# Patient Record
Sex: MALE | Race: BLACK OR AFRICAN AMERICAN | NOT HISPANIC OR LATINO | Employment: OTHER | URBAN - METROPOLITAN AREA
[De-identification: names, ages, dates, MRNs, and addresses within clinical notes are randomized per-mention and may not be internally consistent; named-entity substitution may affect disease eponyms.]

---

## 2017-02-27 ENCOUNTER — TELEPHONE (OUTPATIENT)
Dept: FAMILY MEDICINE | Facility: CLINIC | Age: 62
End: 2017-02-27

## 2017-02-27 ENCOUNTER — OFFICE VISIT (OUTPATIENT)
Dept: FAMILY MEDICINE | Facility: CLINIC | Age: 62
End: 2017-02-27
Payer: COMMERCIAL

## 2017-02-27 VITALS
DIASTOLIC BLOOD PRESSURE: 86 MMHG | SYSTOLIC BLOOD PRESSURE: 136 MMHG | TEMPERATURE: 98 F | WEIGHT: 235 LBS | BODY MASS INDEX: 27.87 KG/M2

## 2017-02-27 DIAGNOSIS — R21 GROIN RASH: ICD-10-CM

## 2017-02-27 DIAGNOSIS — N40.1 BENIGN PROSTATIC HYPERPLASIA WITH LOWER URINARY TRACT SYMPTOMS, UNSPECIFIED MORPHOLOGY: ICD-10-CM

## 2017-02-27 DIAGNOSIS — R30.0 DYSURIA: ICD-10-CM

## 2017-02-27 DIAGNOSIS — R31.9 HEMATURIA: Primary | ICD-10-CM

## 2017-02-27 DIAGNOSIS — R05.9 COUGH: ICD-10-CM

## 2017-02-27 LAB
ALBUMIN UR-MCNC: NEGATIVE MG/DL
APPEARANCE UR: CLEAR
BILIRUB UR QL STRIP: NEGATIVE
COLOR UR AUTO: YELLOW
GLUCOSE UR STRIP-MCNC: NEGATIVE MG/DL
HGB UR QL STRIP: ABNORMAL
KETONES UR STRIP-MCNC: NEGATIVE MG/DL
LEUKOCYTE ESTERASE UR QL STRIP: ABNORMAL
NITRATE UR QL: NEGATIVE
PH UR STRIP: 8 PH (ref 5–7)
RBC #/AREA URNS AUTO: NORMAL /HPF (ref 0–2)
SP GR UR STRIP: 1.01 (ref 1–1.03)
URN SPEC COLLECT METH UR: ABNORMAL
UROBILINOGEN UR STRIP-ACNC: 0.2 EU/DL (ref 0.2–1)
WBC #/AREA URNS AUTO: NORMAL /HPF (ref 0–2)

## 2017-02-27 PROCEDURE — 87591 N.GONORRHOEAE DNA AMP PROB: CPT | Performed by: PHYSICIAN ASSISTANT

## 2017-02-27 PROCEDURE — G0103 PSA SCREENING: HCPCS | Performed by: PHYSICIAN ASSISTANT

## 2017-02-27 PROCEDURE — 99214 OFFICE O/P EST MOD 30 MIN: CPT | Performed by: PHYSICIAN ASSISTANT

## 2017-02-27 PROCEDURE — 87086 URINE CULTURE/COLONY COUNT: CPT | Performed by: PHYSICIAN ASSISTANT

## 2017-02-27 PROCEDURE — 81001 URINALYSIS AUTO W/SCOPE: CPT | Performed by: PHYSICIAN ASSISTANT

## 2017-02-27 PROCEDURE — 87491 CHLMYD TRACH DNA AMP PROBE: CPT | Performed by: PHYSICIAN ASSISTANT

## 2017-02-27 RX ORDER — CICLOPIROX 7.7 MG/G
GEL TOPICAL
Qty: 45 G | Refills: 2 | Status: SHIPPED | OUTPATIENT
Start: 2017-02-27 | End: 2017-05-05

## 2017-02-27 NOTE — PROGRESS NOTES
SUBJECTIVE:                                                    Sukh Craven is a 61 year old male who presents to clinic today for the following health issues:    Genitourinary symptoms      Duration: x two weeks     Description:  frequency, hesitancy and hematuria    Intensity:  moderate    Accompanying signs and symptoms (fever/discharge/nausea/vomiting/back or abdominal pain):  Rash in groin area     History (frequent UTI's/kidney stones/prostate problems): Prostate   Sexually active: YES    Precipitating or alleviating factors: None    Therapies tried and outcome: none        Chronic history of LUTS symptoms, urgency, frequency, dysuria, hesitancy, ongoing issue, trials of Finasteride and Alpha blockers without significant improvements. Has also tried anti-cholinergics like oxybutynin, ongoing issues without improvements.     Reports a few episodes now of painless hematuria. States that he's had no increasing symptoms. No fevers, chills, etc.    Rash - chronic - going, using topical antifungal and most recently Ciclopirox - that seems to help the most. Would like refills.    Cough - dry with some viral URI symptoms, mostly resolving. Denies fevers. No shortness of breath.     Patient Active Problem List   Diagnosis     Hematuria     BPH (benign prostatic hypertrophy)     Advanced directives, counseling/discussion     Elevated serum creatinine     History of Helicobacter infection     Hypertension goal BP (blood pressure) < 140/90     CKD (chronic kidney disease) stage 2, GFR 60-89 ml/min     CARDIOVASCULAR SCREENING; LDL GOAL LESS THAN 160     Pre-diabetes      Current Outpatient Prescriptions   Medication     ciclopirox 0.77 % GEL     losartan-hydrochlorothiazide (HYZAAR) 100-12.5 MG per tablet     albuterol (PROAIR HFA, PROVENTIL HFA, VENTOLIN HFA) 108 (90 BASE) MCG/ACT inhaler     sildenafil (VIAGRA) 100 MG tablet     No current facility-administered medications for this visit.         Problem list and  histories reviewed & adjusted, as indicated.  Additional history: as documented    Problem list, Medication list, Allergies, and Medical/Social/Surgical histories reviewed in [facility] and updated as appropriate.    ROS:  Constitutional, HEENT, cardiovascular, pulmonary, gi and gu systems are negative, except as otherwise noted.    OBJECTIVE:                                                    /86  Temp 98  F (36.7  C) (Oral)  Wt 235 lb (106.6 kg)  BMI 27.87 kg/m2  Body mass index is 27.87 kg/(m^2).  GENERAL: healthy, alert and no distress  ABDOMEN: Soft, non tender, No CVA tenderness   : Declined      ASSESSMENT/PLAN:                                                      (R31.9) Hematuria  (primary encounter diagnosis)  Comment:   Plan: *UA reflex to Microscopic and Culture         ([facility] (except         [place] and [place]), NEISSERIA GONORRHOEA         PCR, CHLAMYDIA TRACHOMATIS PCR, Urine         Microscopic, Prostate spec antigen screen,         Urine Culture Aerobic Bacterial, UROLOGY ADULT         REFERRAL, CT Abdomen Pelvis w/o Contrast        UA showing 0-2 RBC - hematuria on dip. Due to chronic history and noted painless hematuria - recommend CT evaluate renal system and refer to Urology for reevaluation.     (R30.0) Dysuria  Comment:   Plan: NEISSERIA GONORRHOEA PCR, CHLAMYDIA TRACHOMATIS        PCR, Prostate spec antigen screen, Urine         Culture Aerobic Bacterial, UROLOGY ADULT         REFERRAL, CT Abdomen Pelvis w/o Contrast        As noted     (N40.1) Benign prostatic hyperplasia with lower urinary tract symptoms, unspecified morphology  Comment:   Plan: Prostate spec antigen screen, UROLOGY ADULT         REFERRAL, CT Abdomen Pelvis w/o Contrast        Chronic - as noted    (R05) Cough  Comment:   Plan: Reports viral symptoms this past few weeks. Symptomatic measures and suggestions for self care given.  Follow up if not improving or symptoms change as  discussed.  All questions were answered.     (R21) Groin rash  Comment:   Plan: ciclopirox 0.77 % GEL        Refills given      ДМИТРИЙ ORDAZ PA-C  [facility]

## 2017-02-27 NOTE — NURSING NOTE
"Chief Complaint   Patient presents with     Urinary Problem     Gross Hematuria        Initial /86  Temp 98  F (36.7  C) (Oral)  Wt 235 lb (106.6 kg)  BMI 27.87 kg/m2 Estimated body mass index is 27.87 kg/(m^2) as calculated from the following:    Height as of 7/13/16: 6' 5\" (1.956 m).    Weight as of this encounter: 235 lb (106.6 kg).  Medication Reconciliation: complete       Nataliya Pineda CMA      "

## 2017-02-27 NOTE — TELEPHONE ENCOUNTER
Rene from the [facility] would like someone from the patients pcp to return his call, he has a question about one of the tests that was ordered for the patient. Please advise [phone]    . Bernadine Farmer  Patient Representative

## 2017-02-27 NOTE — MR AVS SNAPSHOT
After Visit Summary   2/27/2017    Sukh Craven    MRN: [id number]           Patient Information     Date Of Birth          1955        Visit Information        Provider Department      2/27/2017 9:20 AM Ned Martinez PA-C [facility]        Today's Diagnoses     Hematuria    -  1    Dysuria        Benign prostatic hyperplasia with lower urinary tract symptoms, unspecified morphology        Cough        Groin rash          Care Instructions    1. To Schedule the CT scan of abdomen call:  [facility] Imaging Scheduling Phone Number: [phone]  Or   [facility] Imaging Scheduling Phone number: [phone]         Follow-ups after your visit        Additional Services     UROLOGY ADULT REFERRAL       Your provider has referred you to:[facility] [place] [facility] - [place] [phone]   [email or web address] Dr. Bowser or Weight   Please be aware that coverage of these services is subject to the terms and limitations of your health insurance plan.  Call member services at your health plan with any benefit or coverage questions.      Please bring the following with you to your appointment:    (1) Any X-Rays, CTs or MRIs which have been performed.  Contact the facility where they were done to arrange for  prior to your scheduled appointment.    (2) List of current medications  (3) This referral request   (4) Any documents/labs given to you for this referral                  Future tests that were ordered for you today     Open Future Orders        Priority Expected Expires Ordered    CT Abdomen Pelvis w/o Contrast Routine  2/27/2018 2/27/2017            Who to contact     If you have questions or need follow up information about today's clinic visit or your schedule please contact [facility] directly at [phone].  Normal or non-critical lab  "and imaging results will be communicated to you by MyChart, letter or phone within 4 business days after the clinic has received the results. If you do not hear from us within 7 days, please contact the clinic through Abinehart or phone. If you have a critical or abnormal lab result, we will notify you by phone as soon as possible.  Submit refill requests through Nextly or call your pharmacy and they will forward the refill request to us. Please allow 3 business days for your refill to be completed.          Additional Information About Your Visit        [employer] Information     Nextly lets you send messages to your doctor, view your test results, renew your prescriptions, schedule appointments and more. To sign up, go to [email or web address] . Click on \"Log in\" on the left side of the screen, which will take you to the Welcome page. Then click on \"Sign up Now\" on the right side of the page.     You will be asked to enter the access code listed below, as well as some personal information. Please follow the directions to create your username and password.     Your access code is: [email or web address]  Expires: 2017  9:56 AM     Your access code will  in 90 days. If you need help or a new code, please call your [place] clinic or [phone].        Care EveryWhere ID     This is your Care EveryWhere ID. This could be used by other organizations to access your [place] medical records  [id number]        Your Vitals Were     Temperature BMI (Body Mass Index)                98  F (36.7  C) (Oral) 27.87 kg/m2           Blood Pressure from Last 3 Encounters:   17 136/86   16 142/88   16 137/85    Weight from Last 3 Encounters:   17 235 lb (106.6 kg)   16 244 lb (110.7 kg)   16 234 lb (106.1 kg)              We Performed the Following     *UA reflex to Microscopic and Culture ([facility] (except [place] and [place])     CHLAMYDIA TRACHOMATIS PCR     " NEISSERIA GONORRHOEA PCR     Prostate spec antigen screen     Urine Culture Aerobic Bacterial     Urine Microscopic     UROLOGY ADULT REFERRAL          Where to get your medicines      These medications were sent to [id number] - [place] - [address] AT [facility] OF [address]  [address] [place] [address]     Phone:  [phone]     ciclopirox 0.77 % Gel          Primary Care Provider Office Phone # Fax #    Ned Martinez PA-C [phone]       [facility] [address] [address]        Thank you!     Thank you for choosing [facility]  for your care. Our goal is always to provide you with excellent care. Hearing back from our patients is one way we can continue to improve our services. Please take a few minutes to complete the written survey that you may receive in the mail after your visit with us. Thank you!             Your Updated Medication List - Protect others around you: Learn how to safely use, store and throw away your medicines at www.disposemymeds.org.          This list is accurate as of: 2/27/17  9:56 AM.  Always use your most recent med list.                   Brand Name Dispense Instructions for use    albuterol 108 (90 BASE) MCG/ACT Inhaler    PROAIR HFA/PROVENTIL HFA/VENTOLIN HFA    1 Inhaler    Inhale 2 puffs into the lungs every 6 hours as needed for shortness of breath / dyspnea or wheezing       ciclopirox 0.77 % Gel     45 g    Apply twice daily to affected areas       losartan-hydrochlorothiazide 100-12.5 MG per tablet    HYZAAR    90 tablet    Take 1 tablet by mouth daily       sildenafil 100 MG cap/tab    VIAGRA    12 tablet    Take 1 tablet (100 mg) by mouth daily as needed for erectile dysfunction at least 30 minutes before intercourse.

## 2017-02-27 NOTE — PATIENT INSTRUCTIONS
1. To Schedule the CT scan of abdomen call:  [facility] Imaging Scheduling Phone Number: [phone]  Or   [facility] Imaging Scheduling Phone number: [phone]

## 2017-02-27 NOTE — LETTER
[facility]  [address] [address] [phone]      February 28, 2017      Sukh Craven  [address] [address]  [facility] [place] [facility] [address]          Dear Mr. Craven    The results of your recent lab tests were within normal limits. Enclosed is a copy of these results.  If you have any further questions or problems, please contact our office.    Sincerely,      Ned Martinez PA-C/sd    Results for orders placed or performed in visit on 02/27/17   *UA reflex to Microscopic and Culture ([facility] and [facility] (except [place] and [place])   Result Value Ref Range    Color Urine Yellow     Appearance Urine Clear     Glucose Urine Negative NEG mg/dL    Bilirubin Urine Negative NEG    Ketones Urine Negative NEG mg/dL    Specific Gravity Urine 1.015 1.003 - 1.035    Blood Urine Trace (A) NEG    pH Urine 8.0 (H) 5.0 - 7.0 pH    Protein Albumin Urine Negative NEG mg/dL    Urobilinogen Urine 0.2 0.2 - 1.0 EU/dL    Nitrite Urine Negative NEG    Leukocyte Esterase Urine Trace (A) NEG    Source Midstream Urine    Urine Microscopic   Result Value Ref Range    WBC Urine O - 2 0 - 2 /HPF    RBC Urine O - 2 0 - 2 /HPF   Prostate spec antigen screen   Result Value Ref Range    PSA 1.80 0 - 4 ug/L   NEISSERIA GONORRHOEA PCR   Result Value Ref Range    Specimen Descrip Urine     N Gonorrhea PCR  NEG     Negative   Negative for N. gonorrhoeae rRNA by transcription mediated amplification.   A negative result by transcription mediated amplification does not preclude the   presence of N. gonorrhoeae infection because results are dependent on proper   and adequate collection, absence of inhibitors, and sufficient rRNA to be   detected.     CHLAMYDIA TRACHOMATIS PCR   Result Value Ref Range    Specimen Description Urine     Chlamydia Trachomatis PCR  NEG     Negative   Negative for C. trachomatis rRNA by transcription mediated amplification.   A negative result by transcription  mediated amplification does not preclude the   presence of C. trachomatis infection because results are dependent on proper   and adequate collection, absence of inhibitors, and sufficient rRNA to be   detected.     Urine Culture Aerobic Bacterial   Result Value Ref Range    Specimen Description Midstream Urine     Culture Micro No growth     Micro Report Status FINAL 02/28/2017

## 2017-02-28 LAB
BACTERIA SPEC CULT: NO GROWTH
C TRACH DNA SPEC QL NAA+PROBE: NORMAL
MICRO REPORT STATUS: NORMAL
N GONORRHOEA DNA SPEC QL NAA+PROBE: NORMAL
PSA SERPL-ACNC: 1.8 UG/L (ref 0–4)
SPECIMEN SOURCE: NORMAL

## 2017-02-28 NOTE — TELEPHONE ENCOUNTER
Called and spoke with Rohan. He would like to talk with Rodolfo Martinez. Routed call to him.    Juan Valadez RN

## 2017-03-03 ENCOUNTER — PRE VISIT (OUTPATIENT)
Dept: UROLOGY | Facility: CLINIC | Age: 62
End: 2017-03-03

## 2017-03-03 ENCOUNTER — TELEPHONE (OUTPATIENT)
Dept: FAMILY MEDICINE | Facility: CLINIC | Age: 62
End: 2017-03-03

## 2017-03-03 DIAGNOSIS — R21 GROIN RASH: ICD-10-CM

## 2017-03-03 NOTE — TELEPHONE ENCOUNTER
[facility] faxed a Drug Change Request re: ciclopirox 0.77 % GEL    Medication was prohibitively expensive.  Patient requests a less expensive alternative.  If appropriate, please fax back with approval along with strength, directions, quantity and refills.

## 2017-03-03 NOTE — TELEPHONE ENCOUNTER
Will route to provider to advise.  See message below.      Lesia Whatley RN   March 3, 2017 8:52 AM  [facility] Triage   [phone]

## 2017-03-06 ENCOUNTER — TELEPHONE (OUTPATIENT)
Dept: FAMILY MEDICINE | Facility: CLINIC | Age: 62
End: 2017-03-06

## 2017-03-06 NOTE — TELEPHONE ENCOUNTER
Team RN  Please call Sukh  CT scan was overall normal but he does have an enlarged prostate and some bladder all thickening as we predicted.  His enlarged prostate is probably the cause of most of his ongoing issues.    I'd suggest he get back in with his UROLOGIST to evaluate and discuss these results. Looks like he is on the books for 4/4 - make sure he knows to keep that but he doesn't need to be seen more urgently UNLESS he gets blood in his urine again or pain in his groin.    Thank you.  Ned Martinez

## 2017-04-04 ENCOUNTER — OFFICE VISIT (OUTPATIENT)
Dept: UROLOGY | Facility: CLINIC | Age: 62
End: 2017-04-04

## 2017-04-04 ENCOUNTER — ALLIED HEALTH/NURSE VISIT (OUTPATIENT)
Dept: UROLOGY | Facility: CLINIC | Age: 62
End: 2017-04-04

## 2017-04-04 VITALS
WEIGHT: 241 LBS | SYSTOLIC BLOOD PRESSURE: 169 MMHG | HEART RATE: 72 BPM | BODY MASS INDEX: 28.46 KG/M2 | HEIGHT: 77 IN | DIASTOLIC BLOOD PRESSURE: 102 MMHG

## 2017-04-04 DIAGNOSIS — N40.1 BENIGN NODULAR PROSTATIC HYPERPLASIA WITH LOWER URINARY TRACT SYMPTOMS: Primary | ICD-10-CM

## 2017-04-04 DIAGNOSIS — N40.1 BENIGN PROSTATIC HYPERPLASIA WITH LOWER URINARY TRACT SYMPTOMS, UNSPECIFIED MORPHOLOGY: Primary | ICD-10-CM

## 2017-04-04 RX ORDER — CIPROFLOXACIN 500 MG/1
500 TABLET, FILM COATED ORAL 2 TIMES DAILY
Qty: 14 TABLET | Refills: 0 | Status: SHIPPED | OUTPATIENT
Start: 2017-04-04 | End: 2017-04-17

## 2017-04-04 RX ORDER — LISINOPRIL 20 MG/1
TABLET ORAL
Refills: 0 | COMMUNITY
Start: 2016-06-08 | End: 2017-04-17

## 2017-04-04 RX ORDER — LOSARTAN POTASSIUM AND HYDROCHLOROTHIAZIDE 12.5; 5 MG/1; MG/1
TABLET ORAL
Refills: 0 | COMMUNITY
Start: 2016-07-12 | End: 2017-04-17

## 2017-04-04 ASSESSMENT — ENCOUNTER SYMPTOMS
DIFFICULTY URINATING: 1
HEMATURIA: 1
DYSURIA: 0

## 2017-04-04 ASSESSMENT — PAIN SCALES - GENERAL: PAINLEVEL: NO PAIN (0)

## 2017-04-04 NOTE — MR AVS SNAPSHOT
After Visit Summary   4/4/2017    Sukh Craven    MRN: [id number]           Patient Information     Date Of Birth          1955        Visit Information        Provider Department      4/4/2017 7:45 AM Nurse, [facility]rologic C[facility]        Today's Diagnoses     Benign prostatic hyperplasia with lower urinary tract symptoms, unspecified morphology    -  1       Follow-ups after your visit        Your next 10 appointments already scheduled     Apr 13, 2017  8:00 AM CDT   (Arrive by 7:45 AM)   PAC RN ASSESSMENT with Isabel Pac Rn   [facility] ([facility])    [address] [address]   [phone]            Apr 13, 2017  8:30 AM CDT   (Arrive by 8:15 AM)   PAC EVALUATION with  Pac Andrew 2   [facility] ([facility])    [address] [address]   [phone]            Apr 13, 2017  9:30 AM CDT   (Arrive by 9:15 AM)   PAC Anesthesia Consult with  Pac Anesthesiologist   [facility] [place] ([facility])    [address] [address]   [phone]            May 30, 2017 10:00 AM CDT   (Arrive by 9:45 AM)   Post-Op with Cresencio Foote MD   [facility]rolog[facility] C[facility] ([facility])    [address] [place] [id number] [phone]              Future tests that were ordered for you today     Open Future Orders        Priority Expected Expires Ordered    CBC with platelets Routine  4/4/2018 4/4/2017    Basic metabolic panel Routine  4/4/2018 4/4/2017            Who to contact     Please call your clinic at [phone] to:    Ask questions about your health    Make or cancel appointments    Discuss your  medicines    Learn about your test results    Speak to your doctor   If you have compliments or concerns about an experience at your clinic, or if you wish to file a complaint, please contact [facility] Patient Relations at [phone] or email us at [email or web address]         Additional Information About Your Visit        [employer] Information     [email or web address] is an electronic gateway that provides easy, online access to your medical records. With [employer], you can request a clinic appointment, read your test results, renew a prescription or communicate with your care team.     To sign up for [email or web address] visit the website at [email or web address]   You will be asked to enter the access code listed below, as well as some personal information. Please follow the directions to create your username and password.     Your access code is: [email or web address]  Expires: 2017 10:56 AM     Your access code will  in 90 days. If you need help or a new code, please contact your [facility] or call [phone] for assistance.        Care EveryWhere ID     This is your Care EveryWhere ID. This could be used by other organizations to access your [facility] medical records  [id number]         Blood Pressure from Last 3 Encounters:   17 (!) 169/102   17 136/86   16 142/88    Weight from Last 3 Encounters:   17 109.3 kg (241 lb)   17 106.6 kg (235 lb)   16 110.7 kg (244 lb)              Today, you had the following     No orders found for display         Today's Medication Changes          These changes are accurate as of: 17  8:30 AM.  If you have any questions, ask your nurse or doctor.               Start taking these medicines.        Dose/Directions    ciprofloxacin 500 MG tablet   Commonly known as:  CIPRO   Used for:  Benign nodular prostatic hyperplasia with lower urinary tract symptoms   Started by:  Cresencio Foote  MD AVEL        Dose:  500 mg   Take 1 tablet (500 mg) by mouth 2 times daily   Quantity:  14 tablet   Refills:  0            Where to get your medicines      These medications were sent to [id number]  [place] - [address] AT [facility] OF [address]  [address] [place] [address]     Phone:  [phone]     ciprofloxacin 500 MG tablet                Primary Care Provider Office Phone # Fax #    Ned Martinez PA-C [phone]       [facility] [address] [address]        Thank you!     Thank you for choosing [facility] UROLOGY AND [facility] FOR PROSTATE AND UROLOGIC CANCERS  for your care. Our goal is always to provide you with excellent care. Hearing back from our patients is one way we can continue to improve our services. Please take a few minutes to complete the written survey that you may receive in the mail after your visit with us. Thank you!             Your Updated Medication List - Protect others around you: Learn how to safely use, store and throw away your medicines at www.disposemymeds.org.          This list is accurate as of: 4/4/17  8:30 AM.  Always use your most recent med list.                   Brand Name Dispense Instructions for use    albuterol 108 (90 BASE) MCG/ACT Inhaler    PROAIR HFA/PROVENTIL HFA/VENTOLIN HFA    1 Inhaler    Inhale 2 puffs into the lungs every 6 hours as needed for shortness of breath / dyspnea or wheezing       ciclopirox 0.77 % Gel     45 g    Apply twice daily to affected areas       ciprofloxacin 500 MG tablet    CIPRO    14 tablet    Take 1 tablet (500 mg) by mouth 2 times daily       lisinopril 20 MG tablet    PRINIVIL/ZESTRIL         * losartan-hydrochlorothiazide 50-12.5 MG per tablet    HYZAAR     TK 1 T PO D       * losartan-hydrochlorothiazide 100-12.5 MG per tablet    HYZAAR    90 tablet    Take 1 tablet by mouth daily       sildenafil 100 MG cap/tab    VIAGRA     12 tablet    Take 1 tablet (100 mg) by mouth daily as needed for erectile dysfunction at least 30 minutes before intercourse.       terbinafine 1 % Gel    LAMISIL ADVANCED    30 g    Apply twice daily to affected areas for 2 weeks.       * Notice:  This list has 2 medication(s) that are the same as other medications prescribed for you. Read the directions carefully, and ask your doctor or other care provider to review them with you.

## 2017-04-04 NOTE — LETTER
4/4/2017       RE: Sukh Craven  [address] [address]  [facility] [address]     Dear Colleague,    Thank you for referring your patient, Sukh Craven, to the [facility] UROLOGY AND INST FOR PROSTATE AND UROLOGIC CANCERS at [facility]. Please see a copy of my visit note below.          Chief Complaint:   BPH         Consult or Referral:   Mr. Sukh Craven is a 62 year old male seen in consultation from Dr. Martinez         History of Present Illness:    Sukh Craven is a very pleasant 62 year old male who presents with a history of severely bothersome LUTS.  His main issue is nocturia up to q1h which is very bothersome to him, particularly because he is a  and feels tired all the time.  Saw a urologist previously at [facility], has been on medications which were no effective, was offered TURP but did not want surgery at the time, now open to all treatment options.    No hx of UTI.  Minimal ejaculate.  Has had gross hematuria one or two times in the very remote past, had this evaluated by his outside urologist.  HAs never had catheter dependent retention but has felt close to this several times.  His stream is usually pretty slow, has very bothersome daytime and nightime frequency. Never feels completely empty.  No family hx of prostate cancer or prostate disease.  Quit tobacco 2007.         Past Medical History:     Past Medical History:   Diagnosis Date     NO ACTIVE PROBLEMS    Hypertension         Past Surgical History:     Past Surgical History:   Procedure Laterality Date     C EXPLORE WOUND,NECK       REMOVAL OF SPERM DUCT(S)              Medications     Current Outpatient Prescriptions   Medication     lisinopril (PRINIVIL/ZESTRIL) 20 MG tablet     losartan-hydrochlorothiazide (HYZAAR) 50-12.5 MG per tablet     terbinafine (LAMISIL ADVANCED) 1 % GEL     ciclopirox 0.77 % GEL     losartan-hydrochlorothiazide (HYZAAR) 100-12.5 MG per tablet      "albuterol (PROAIR HFA, PROVENTIL HFA, VENTOLIN HFA) 108 (90 BASE) MCG/ACT inhaler     sildenafil (VIAGRA) 100 MG tablet     No current facility-administered medications for this visit.             Family History:     Family History   Problem Relation Age of Onset     DIABETES Sister      DIABETES Sister      DIABETES Brother      C.A.D. No family hx of      Hypertension No family hx of      CEREBROVASCULAR DISEASE No family hx of      Breast Cancer No family hx of      Cancer - colorectal No family hx of      Prostate Cancer No family hx of             Social History:     Social History     Social History     Marital status:      Spouse name: Ricardo     Number of children: 5     Years of education: 14     Occupational History      [employer]     Social History Main Topics     Smoking status: Former Smoker     Packs/day: 0.50     Years: 25.00     Types: Cigarettes     Quit date: 3/23/2007     Smokeless tobacco: Never Used     Alcohol use Yes      Comment: rare- social drinker     Drug use: No     Sexual activity: Yes     Partners: Female     Other Topics Concern      Service Yes     [employer]- 6 years     Blood Transfusions No     Caffeine Concern No     Occupational Exposure No     Hobby Hazards No     Sleep Concern No     Stress Concern No     Weight Concern No     Special Diet No     Back Care No     Exercise Yes     4 times a week     Bike Helmet Yes     Seat Belt Yes     Self-Exams Yes     Parent/Sibling W/ Cabg, Mi Or Angioplasty Before 65f 55m? No     Social History Narrative            Allergies:   No known drug allergies         Review of Systems:  From intake questionnaire   Negative 14 system review except as noted on HPI, nurse's note.         Physical Exam:   Patient is a 62 year old  male   Vitals: Blood pressure (!) 169/102, pulse 72, height 1.956 m (6' 5\"), weight 109.3 kg (241 lb).  General Appearance Adult: Alert, no acute distress, oriented  HENT: throat/mouth:normal, " good dentition  Neck: No adenopathy,masses or thyromegaly  Lungs: no respiratory distress, or pursed lip breathing  Heart: No obvious jugular venous distension present  Abdomen: soft, nontender, no organomegaly or masses, Body mass index is 28.58 kg/(m^2).  Lymphatics: No cervical or supraclavicular adenopathy  Musculoskeltal: extremities normal, no peripheral edema  Skin: no suspicious lesions or rashes  Neuro: Alert, oriented, speech and mentation normal  Psych: affect and mood normal  Gait: Normal  :  Normal phallus, bilateral descended testes, no masses, EDUARD smooth, greater than 50 gm     Uroflow and Post-Void Residual by Bladder Scan   PVR: 105        Labs and Pathology:    I personally reviewed all applicable laboratory data and went over findings with patient  Significant for:    CBC RESULTS:  No results for input(s): WBC, HGB, PLT in the last 50675 hours.     BMP RESULTS:  Recent Labs   Lab Test  06/08/16   1614  05/26/16   1518  05/10/16   1824  07/14/14   1509   NA  138  140  140  141   POTASSIUM  4.1  3.7  4.0  4.1   CHLORIDE  106  106  107  105   CO2  28  27  27  24   ANIONGAP  4  7  6  11   GLC  104*  88  75  85   BUN  21  17  19  16   CR  1.20  1.20  1.20  1.27*   GFRESTIMATED  62  62  62  58*   GFRESTBLACK  75  75  75  71   DORIS  9.3  8.9  9.4  9.2       UA RESULTS:   Recent Labs   Lab Test  02/27/17   0937  01/18/12   1350   SG  1.015  1.017   URINEPH  8.0*  7.5*   NITRITE  Negative  Negative   RBCU  O - 2  O - 2   WBCU  O - 2  10-25*       CALCIUM RESULTS  Lab Results   Component Value Date    DORIS 9.3 06/08/2016    DORIS 8.9 05/26/2016    DORIS 9.4 05/10/2016       PSA RESULTS  PSA   Date Value Ref Range Status   02/27/2017 1.80 0 - 4 ug/L Final     Comment:     Assay Method:  Chemiluminescence using Siemens Vista analyzer   02/24/2012 1.21 0 - 4 ug/L Final   03/26/2009 1.04 0 - 4 ug/L Final   08/07/2007 1.17 0 - 4 ug/L Final         Imaging:    I personally reviewed all applicable imaging and went  over findings with patient.  Significant for CT 2/2-017 with enormous median lobe of the prostate with severe intravesical protrusion, estiamted gland size > 100 gm    Urinary tract: No stones visualized on the noncontrast examination.  Normal appearance of the kidneys on the cortical medullary phase. No  intraluminal filling defects within the renal collecting systems. The  bladder is not completely evaluated on this scan, but there is central  prostate hypertrophy, increased since 9/27/2004. Coarse desiccation is  within the prostate. The bladder is compressed but there is concentric  bladder wall thickening.     Abdomen and pelvis:   Liver: No suspicious liver lesions. Portal veins appear patent.  Gallbladder: No gallstones. No evidence of acute cholecystitis.  Spleen: Normal size.  Pancreas: No suspicious pancreatic lesions. The pancreatic duct is not  dilated.  Adrenal glands: Subcentimeter bilateral adrenal nodules.  Bowel: No bowel wall thickening. The appendix is unremarkable.  Lymph nodes: No retroperitoneal, mesenteric, or pelvic  lymphadenopathy.  Fluid: No free fluid within the abdomen.  Vessels: No infrarenal aortic aneurysm.      Lung bases: No consolidation or pleural effusion. Elevation of the  right hemidiaphragm.     Bones and soft tissues: No suspicious osseous lesions.         IMPRESSION:   1. No evidence of an intraluminal filling defect within the kidneys  or ureter.  2. Incomplete evaluation of the bladder secondary to central prostate  hypertrophy. Given the symptoms, direct visualization of the bladder  via cystoscopy may be indicated to rule out an obscured malignancy.  3. Concentric bladder wall thickening, which may be secondary to  obstruction from the enlarged prostate versus infection.  4. Small bilateral adrenal nodules, stable since 2004 and therefore  statistically benign.     Standardized Questionnaire:      [employer] SYMPTOM SCORE  SUM 35/35  QOL  6/6    [place] INCONTINENCE INDEX    -Total Severity 17/32   -Total bother 3/8               Assessment and Plan:     Assessment:  62 year old M with hx of large prostate with very large median lobe, significant bother, elevated PVR, desires surgical treatment    Plan:  After discussion of medical and surgical treatments for BPH including alpha blockers, anticholinergics, transurethral resection, laser ablation, enucleation and simple prostatectomy, Mr. Craven has decided to proceed with Holmium Laser Enucleation of the Prostate (HoLEP).      We discussed the associated risks of this procedure included but not limited to the following:  -Bleeding, potentially significant enough to require clot evacuation and blood transfusion  -Infection, for which we will plan to treat preoperatively based on targeted antibiotic therapy  -Damage to the bladder, urethra and penis including the risk of urethral stricture and bladder neck contracture  -Risk of incidentally discovered prostate cancer.  We discussed that this would not preclude him from further therapy though it could prolong recovery and potentially increase risk of complications associated with cancer treatment.  Further preoperative workup to assess for prostate cancer prior to surgery was offered but patient deferred.  -Risk of retrograde ejaculation which would be expected to occur in the majority if not all men after HoLEP  -Risk of urinary incontinence.  We discussed that in the majority of men this is a transient process that is generally self limited to the first 6-12 weeks after surgery though could take longer to resolve depending on baseline bladder instability.  -Risk of postperative urinary retention though in published series HoLEP has been found to be associated with high success rates of achieving spontaneous voiding even in men with underactive and atonic bladders.  -We will proceed with preoperative clearance with preference to minimize all  anticoagulation as deemed acceptable by his primary care provider.       Cresencio Foote MD    CC:  Ned Martinez      Answers for HPI/ROS submitted by the patient on 4/4/2017   General Symptoms: No  Skin Symptoms: No  HENT Symptoms: No  EYE SYMPTOMS: No  HEART SYMPTOMS: No  LUNG SYMPTOMS: No  INTESTINAL SYMPTOMS: No  URINARY SYMPTOMS: Yes  REPRODUCTIVE SYMPTOMS: Yes  SKELETAL SYMPTOMS: No  BLOOD SYMPTOMS: No  NERVOUS SYSTEM SYMPTOMS: No  MENTAL HEALTH SYMPTOMS: No  Trouble holding urine or incontinence: Yes  Pain or burning: No  Trouble starting or stopping: Yes  Increased frequency of urination: Yes  Blood in urine: Yes  Decreased frequency of urination: No  Frequent nighttime urination: Yes  Difficulty emptying bladder: Yes  Scrotal pain or swelling: No  Erectile dysfunction: No  Penile discharge: Yes  Genital ulcers: No  Reduced libido: No      Again, thank you for allowing me to participate in the care of your patient.      Sincerely,    Cresencio Foote MD

## 2017-04-04 NOTE — MR AVS SNAPSHOT
After Visit Summary   4/4/2017    Sukh Craven    MRN: [id number]           Patient Information     Date Of Birth          1955        Visit Information        Provider Department      4/4/2017 7:00 AM Cresencio Foote MD [facility] Urology and [facility] for Prostate and Urologic Cancers        Today's Diagnoses     Benign nodular prostatic hyperplasia with lower urinary tract symptoms    -  1       Follow-ups after your visit        Additional Services     PAC Visit Referral (For [facility] Only)       Does this visit require an Anesthesia consult?  Preop clearance, hx of HTN    H&P done by:  Surgeon/Designee      Please be aware that coverage of these services is subject to the terms and limitations of your health insurance plan.  Call member services at your health plan with any benefit or coverage questions.      Please bring the following to your appointment:  >>   Any x-rays, CTs or MRIs which have been performed.  Contact the facility where they were done to arrange for  prior to your scheduled appointment.  Any new CT, MRI or other procedures ordered by your specialist must be performed at a [facility] facility or coordinated by your clinic's referral office.    >>   List of current medications  >>   This referral request   >>   Any documents/labs given to you for this referral                  Future tests that were ordered for you today     Open Future Orders        Priority Expected Expires Ordered    Urine Culture Aerobic Bacterial Routine  4/4/2018 4/4/2017    CBC with platelets Routine  4/4/2018 4/4/2017    Basic metabolic panel Routine  4/4/2018 4/4/2017            Who to contact     Please call your clinic at [phone] to:    Ask questions about your health    Make or cancel appointments    Discuss your medicines    Learn about your test results    Speak to your doctor   If you have compliments or concerns about an experience at your clinic, or if you wish to file a complaint,  "please contact [facility] Patient Relations at [phone] or email us at [email or web address]         Additional Information About Your Visit        [employer] Information     [facility] is an electronic gateway that provides easy, online access to your medical records. With [facility], you can request a clinic appointment, read your test results, renew a prescription or communicate with your care team.     To sign up for [facility] visit the website at [email or web address]   You will be asked to enter the access code listed below, as well as some personal information. Please follow the directions to create your username and password.     Your access code is: [email or web address]  Expires: 2017 10:56 AM     Your access code will  in 90 days. If you need help or a new code, please contact your [facility] or call [phone] for assistance.        Care EveryWhere ID     This is your Care EveryWhere ID. This could be used by other organizations to access your [facility] medical records  [id number]        Your Vitals Were     Pulse Height BMI (Body Mass Index)             72 1.956 m (6' 5\") 28.58 kg/m2          Blood Pressure from Last 3 Encounters:   17 (!) 169/102   17 136/86   16 142/88    Weight from Last 3 Encounters:   17 109.3 kg (241 lb)   17 106.6 kg (235 lb)   16 110.7 kg (244 lb)              We Performed the Following     PAC Visit Referral (For [facility] Only)     Dionne-Operative Worksheet (Urology)          Today's Medication Changes          These changes are accurate as of: 17  7:44 AM.  If you have any questions, ask your nurse or doctor.               Start taking these medicines.        Dose/Directions    ciprofloxacin 500 MG tablet   Commonly known as:  CIPRO   Used for:  Benign nodular prostatic hyperplasia with lower urinary tract symptoms   Started by:  Cresencio Foote MD        Dose:  500 " mg   Take 1 tablet (500 mg) by mouth 2 times daily   Quantity:  14 tablet   Refills:  0            Where to get your medicines      These medications were sent to [id number] - [place], [place] - [address] AT [facility] OF [address]  [address] [place] [address]     Phone:  [phone]     ciprofloxacin 500 MG tablet                Primary Care Provider Office Phone # Fax #    Ned Martinez PA-C [phone]       [facility] [address] [facility] [address]        Thank you!     Thank you for choosing [facility] UROLOGY AND [facility] FOR PROSTATE AND UROLOGIC CANCERS  for your care. Our goal is always to provide you with excellent care. Hearing back from our patients is one way we can continue to improve our services. Please take a few minutes to complete the written survey that you may receive in the mail after your visit with us. Thank you!             Your Updated Medication List - Protect others around you: Learn how to safely use, store and throw away your medicines at www.disposemymeds.org.          This list is accurate as of: 4/4/17  7:44 AM.  Always use your most recent med list.                   Brand Name Dispense Instructions for use    albuterol 108 (90 BASE) MCG/ACT Inhaler    PROAIR HFA/PROVENTIL HFA/VENTOLIN HFA    1 Inhaler    Inhale 2 puffs into the lungs every 6 hours as needed for shortness of breath / dyspnea or wheezing       ciclopirox 0.77 % Gel     45 g    Apply twice daily to affected areas       ciprofloxacin 500 MG tablet    CIPRO    14 tablet    Take 1 tablet (500 mg) by mouth 2 times daily       lisinopril 20 MG tablet    PRINIVIL/ZESTRIL         * losartan-hydrochlorothiazide 50-12.5 MG per tablet    HYZAAR     TK 1 T PO D       * losartan-hydrochlorothiazide 100-12.5 MG per tablet    HYZAAR    90 tablet    Take 1 tablet by mouth daily       sildenafil 100 MG cap/tab    VIAGRA    12 tablet    Take 1  tablet (100 mg) by mouth daily as needed for erectile dysfunction at least 30 minutes before intercourse.       terbinafine 1 % Gel    LAMISIL ADVANCED    30 g    Apply twice daily to affected areas for 2 weeks.       * Notice:  This list has 2 medication(s) that are the same as other medications prescribed for you. Read the directions carefully, and ask your doctor or other care provider to review them with you.

## 2017-04-04 NOTE — PROGRESS NOTES
Chief Complaint:   BPH         Consult or Referral:   Mr. Sukh Craven is a 62 year old male seen in consultation from Dr. Martinez         History of Present Illness:    Sukh Craven is a very pleasant 62 year old male who presents with a history of severely bothersome LUTS.  His main issue is nocturia up to q1h which is very bothersome to him, particularly because he is a  and feels tired all the time.  Saw a urologist previously at [facility], has been on medications which were no effective, was offered TURP but did not want surgery at the time, now open to all treatment options.    No hx of UTI.  Minimal ejaculate.  Has had gross hematuria one or two times in the very remote past, had this evaluated by his outside urologist.  HAs never had catheter dependent retention but has felt close to this several times.  His stream is usually pretty slow, has very bothersome daytime and nightime frequency. Never feels completely empty.  No family hx of prostate cancer or prostate disease.  Quit tobacco 2007.         Past Medical History:     Past Medical History:   Diagnosis Date     NO ACTIVE PROBLEMS    Hypertension         Past Surgical History:     Past Surgical History:   Procedure Laterality Date     C EXPLORE WOUND,NECK       REMOVAL OF SPERM DUCT(S)              Medications     Current Outpatient Prescriptions   Medication     lisinopril (PRINIVIL/ZESTRIL) 20 MG tablet     losartan-hydrochlorothiazide (HYZAAR) 50-12.5 MG per tablet     terbinafine (LAMISIL ADVANCED) 1 % GEL     ciclopirox 0.77 % GEL     losartan-hydrochlorothiazide (HYZAAR) 100-12.5 MG per tablet     albuterol (PROAIR HFA, PROVENTIL HFA, VENTOLIN HFA) 108 (90 BASE) MCG/ACT inhaler     sildenafil (VIAGRA) 100 MG tablet     No current facility-administered medications for this visit.             Family History:     Family History   Problem Relation Age of Onset     DIABETES Sister      DIABETES Sister      DIABETES Brother  "     C.A.D. No family hx of      Hypertension No family hx of      CEREBROVASCULAR DISEASE No family hx of      Breast Cancer No family hx of      Cancer - colorectal No family hx of      Prostate Cancer No family hx of             Social History:     Social History     Social History     Marital status:      Spouse name: Ricardo     Number of children: 5     Years of education: 14     Occupational History      [employer]     Social History Main Topics     Smoking status: Former Smoker     Packs/day: 0.50     Years: 25.00     Types: Cigarettes     Quit date: 3/23/2007     Smokeless tobacco: Never Used     Alcohol use Yes      Comment: rare- social drinker     Drug use: No     Sexual activity: Yes     Partners: Female     Other Topics Concern      Service Yes     [employer]- 6 years     Blood Transfusions No     Caffeine Concern No     Occupational Exposure No     Hobby Hazards No     Sleep Concern No     Stress Concern No     Weight Concern No     Special Diet No     Back Care No     Exercise Yes     4 times a week     Bike Helmet Yes     Seat Belt Yes     Self-Exams Yes     Parent/Sibling W/ Cabg, Mi Or Angioplasty Before 65f 55m? No     Social History Narrative            Allergies:   No known drug allergies         Review of Systems:  From intake questionnaire   Negative 14 system review except as noted on HPI, nurse's note.         Physical Exam:   Patient is a 62 year old  male   Vitals: Blood pressure (!) 169/102, pulse 72, height 1.956 m (6' 5\"), weight 109.3 kg (241 lb).  General Appearance Adult: Alert, no acute distress, oriented  HENT: throat/mouth:normal, good dentition  Neck: No adenopathy,masses or thyromegaly  Lungs: no respiratory distress, or pursed lip breathing  Heart: No obvious jugular venous distension present  Abdomen: soft, nontender, no organomegaly or masses, Body mass index is 28.58 kg/(m^2).  Lymphatics: No cervical or supraclavicular " adenopathy  Musculoskeltal: extremities normal, no peripheral edema  Skin: no suspicious lesions or rashes  Neuro: Alert, oriented, speech and mentation normal  Psych: affect and mood normal  Gait: Normal  :  Normal phallus, bilateral descended testes, no masses, EDUARD smooth, greater than 50 gm     Uroflow and Post-Void Residual by Bladder Scan   PVR: 105        Labs and Pathology:    I personally reviewed all applicable laboratory data and went over findings with patient  Significant for:    CBC RESULTS:  No results for input(s): WBC, HGB, PLT in the last 97733 hours.     BMP RESULTS:  Recent Labs   Lab Test  06/08/16   1614  05/26/16   1518  05/10/16   1824  07/14/14   1509   NA  138  140  140  141   POTASSIUM  4.1  3.7  4.0  4.1   CHLORIDE  106  106  107  105   CO2  28  27  27  24   ANIONGAP  4  7  6  11   GLC  104*  88  75  85   BUN  21  17  19  16   CR  1.20  1.20  1.20  1.27*   GFRESTIMATED  62  62  62  58*   GFRESTBLACK  75  75  75  71   DORIS  9.3  8.9  9.4  9.2       UA RESULTS:   Recent Labs   Lab Test  02/27/17   0937  01/18/12   1350   SG  1.015  1.017   URINEPH  8.0*  7.5*   NITRITE  Negative  Negative   RBCU  O - 2  O - 2   WBCU  O - 2  10-25*       CALCIUM RESULTS  Lab Results   Component Value Date    DORIS 9.3 06/08/2016    DORIS 8.9 05/26/2016    DORIS 9.4 05/10/2016       PSA RESULTS  PSA   Date Value Ref Range Status   02/27/2017 1.80 0 - 4 ug/L Final     Comment:     Assay Method:  Chemiluminescence using Siemens Vista analyzer   02/24/2012 1.21 0 - 4 ug/L Final   03/26/2009 1.04 0 - 4 ug/L Final   08/07/2007 1.17 0 - 4 ug/L Final         Imaging:    I personally reviewed all applicable imaging and went over findings with patient.  Significant for CT 2/2-017 with enormous median lobe of the prostate with severe intravesical protrusion, estiamted gland size > 100 gm    Urinary tract: No stones visualized on the noncontrast examination.  Normal appearance of the kidneys on the cortical medullary phase.  No  intraluminal filling defects within the renal collecting systems. The  bladder is not completely evaluated on this scan, but there is central  prostate hypertrophy, increased since 9/27/2004. Coarse desiccation is  within the prostate. The bladder is compressed but there is concentric  bladder wall thickening.     Abdomen and pelvis:   Liver: No suspicious liver lesions. Portal veins appear patent.  Gallbladder: No gallstones. No evidence of acute cholecystitis.  Spleen: Normal size.  Pancreas: No suspicious pancreatic lesions. The pancreatic duct is not  dilated.  Adrenal glands: Subcentimeter bilateral adrenal nodules.  Bowel: No bowel wall thickening. The appendix is unremarkable.  Lymph nodes: No retroperitoneal, mesenteric, or pelvic  lymphadenopathy.  Fluid: No free fluid within the abdomen.  Vessels: No infrarenal aortic aneurysm.      Lung bases: No consolidation or pleural effusion. Elevation of the  right hemidiaphragm.     Bones and soft tissues: No suspicious osseous lesions.         IMPRESSION:   1. No evidence of an intraluminal filling defect within the kidneys  or ureter.  2. Incomplete evaluation of the bladder secondary to central prostate  hypertrophy. Given the symptoms, direct visualization of the bladder  via cystoscopy may be indicated to rule out an obscured malignancy.  3. Concentric bladder wall thickening, which may be secondary to  obstruction from the enlarged prostate versus infection.  4. Small bilateral adrenal nodules, stable since 2004 and therefore  statistically benign.     Standardized Questionnaire:      [employer] SYMPTOM SCORE  SUM 35/35  QOL 6/6    [place] INCONTINENCE INDEX    -Total Severity 17/32   -Total bother 3/8               Assessment and Plan:     Assessment:  62 year old M with hx of large prostate with very large median lobe, significant bother, elevated PVR, desires surgical treatment    Plan:  After discussion of medical and surgical  treatments for BPH including alpha blockers, anticholinergics, transurethral resection, laser ablation, enucleation and simple prostatectomy, Mr. Craven has decided to proceed with Holmium Laser Enucleation of the Prostate (HoLEP).      We discussed the associated risks of this procedure included but not limited to the following:  -Bleeding, potentially significant enough to require clot evacuation and blood transfusion  -Infection, for which we will plan to treat preoperatively based on targeted antibiotic therapy  -Damage to the bladder, urethra and penis including the risk of urethral stricture and bladder neck contracture  -Risk of incidentally discovered prostate cancer.  We discussed that this would not preclude him from further therapy though it could prolong recovery and potentially increase risk of complications associated with cancer treatment.  Further preoperative workup to assess for prostate cancer prior to surgery was offered but patient deferred.  -Risk of retrograde ejaculation which would be expected to occur in the majority if not all men after HoLEP  -Risk of urinary incontinence.  We discussed that in the majority of men this is a transient process that is generally self limited to the first 6-12 weeks after surgery though could take longer to resolve depending on baseline bladder instability.  -Risk of postperative urinary retention though in published series HoLEP has been found to be associated with high success rates of achieving spontaneous voiding even in men with underactive and atonic bladders.  -We will proceed with preoperative clearance with preference to minimize all anticoagulation as deemed acceptable by his primary care provider.       Cresencio Foote MD    CC:  Ned Martinez      Answers for HPI/ROS submitted by the patient on 4/4/2017   General Symptoms: No  Skin Symptoms: No  HENT Symptoms: No  EYE SYMPTOMS: No  HEART SYMPTOMS: No  LUNG SYMPTOMS: No  INTESTINAL  SYMPTOMS: No  URINARY SYMPTOMS: Yes  REPRODUCTIVE SYMPTOMS: Yes  SKELETAL SYMPTOMS: No  BLOOD SYMPTOMS: No  NERVOUS SYSTEM SYMPTOMS: No  MENTAL HEALTH SYMPTOMS: No  Trouble holding urine or incontinence: Yes  Pain or burning: No  Trouble starting or stopping: Yes  Increased frequency of urination: Yes  Blood in urine: Yes  Decreased frequency of urination: No  Frequent nighttime urination: Yes  Difficulty emptying bladder: Yes  Scrotal pain or swelling: No  Erectile dysfunction: No  Penile discharge: Yes  Genital ulcers: No  Reduced libido: No

## 2017-04-04 NOTE — NURSING NOTE
Pre Op Teaching Flowsheet       Pre and Post op Patient Education  Relevant Diagnosis:  holmium laser enucleation of the prostate  Surgical procedure:  BPH  Teaching Topic:  Pre and post op teaching  Person Involved in teaching: Sukh Craven    Motivation Level:  Asks Questions: Yes  Eager to Learn:  Yes  Cooperative: Yes  Receptive (willing/able to accept information):  Yes    Patient demonstrates understanding of the following:  Date of surgery:  4/27/17  Location of surgery:  [address] [place] [address]  History and Physical and any other testing necessary prior to surgery: Yes  Required time line for completion of History and Physical and any pre-op testing: Yes    Patient demonstrates understanding of the following:  Pre-op bowel prep:  N/A  Pre-op showering/scrub information with PCMX Soap: Yes  Blood thinner medications discussed and when to stop (if applicable):  N/A      Infection Prevention:   Patient demonstrates understanding of the following:  Surgical procedure site care taught: Yes  Signs and symptoms of infection taught:  Yes      Post-op follow-up:  Discussed how to contact the hospital, nurse, and clinic scheduling staff if necessary.    Instructional materials used/given/mailed:  [place] Surgery Booklet, post op teaching sheet, Map, Soap, and arrival/location information.    Surgical instructions packet given to patient in office:  Yes.  Patient unable to schedule his PAC appointment due to having to leave because its his birthday today.  He still needs to be scheduled for a Pac visit

## 2017-04-05 LAB
BACTERIA SPEC CULT: NO GROWTH
Lab: NORMAL
MICRO REPORT STATUS: NORMAL
SPECIMEN SOURCE: NORMAL

## 2017-04-11 ENCOUNTER — ANESTHESIA EVENT (OUTPATIENT)
Dept: SURGERY | Facility: CLINIC | Age: 62
End: 2017-04-11
Payer: COMMERCIAL

## 2017-04-17 ENCOUNTER — ALLIED HEALTH/NURSE VISIT (OUTPATIENT)
Dept: SURGERY | Facility: CLINIC | Age: 62
End: 2017-04-17

## 2017-04-17 ENCOUNTER — OFFICE VISIT (OUTPATIENT)
Dept: SURGERY | Facility: CLINIC | Age: 62
End: 2017-04-17

## 2017-04-17 VITALS
OXYGEN SATURATION: 97 % | RESPIRATION RATE: 18 BRPM | HEART RATE: 72 BPM | TEMPERATURE: 98.4 F | BODY MASS INDEX: 27.12 KG/M2 | WEIGHT: 229.7 LBS | HEIGHT: 77 IN | DIASTOLIC BLOOD PRESSURE: 100 MMHG | SYSTOLIC BLOOD PRESSURE: 179 MMHG

## 2017-04-17 DIAGNOSIS — N40.1 BENIGN NODULAR PROSTATIC HYPERPLASIA WITH LOWER URINARY TRACT SYMPTOMS: ICD-10-CM

## 2017-04-17 DIAGNOSIS — Z01.818 PREOP GENERAL PHYSICAL EXAM: Primary | ICD-10-CM

## 2017-04-17 DIAGNOSIS — Z01.818 PREOP GENERAL PHYSICAL EXAM: ICD-10-CM

## 2017-04-17 LAB
ALBUMIN UR-MCNC: NEGATIVE MG/DL
ANION GAP SERPL CALCULATED.3IONS-SCNC: 6 MMOL/L (ref 3–14)
APPEARANCE UR: CLEAR
BILIRUB UR QL STRIP: NEGATIVE
BUN SERPL-MCNC: 16 MG/DL (ref 7–30)
CALCIUM SERPL-MCNC: 9.2 MG/DL (ref 8.5–10.1)
CHLORIDE SERPL-SCNC: 104 MMOL/L (ref 94–109)
CO2 SERPL-SCNC: 31 MMOL/L (ref 20–32)
COLOR UR AUTO: YELLOW
CREAT SERPL-MCNC: 1.14 MG/DL (ref 0.66–1.25)
ERYTHROCYTE [DISTWIDTH] IN BLOOD BY AUTOMATED COUNT: 13.9 % (ref 10–15)
GFR SERPL CREATININE-BSD FRML MDRD: 66 ML/MIN/1.7M2
GLUCOSE SERPL-MCNC: 82 MG/DL (ref 70–99)
GLUCOSE UR STRIP-MCNC: NEGATIVE MG/DL
HCT VFR BLD AUTO: 45.9 % (ref 40–53)
HGB BLD-MCNC: 15 G/DL (ref 13.3–17.7)
HGB UR QL STRIP: NEGATIVE
KETONES UR STRIP-MCNC: NEGATIVE MG/DL
LEUKOCYTE ESTERASE UR QL STRIP: NEGATIVE
MCH RBC QN AUTO: 29.5 PG (ref 26.5–33)
MCHC RBC AUTO-ENTMCNC: 32.7 G/DL (ref 31.5–36.5)
MCV RBC AUTO: 90 FL (ref 78–100)
NITRATE UR QL: NEGATIVE
PH UR STRIP: 7 PH (ref 5–7)
PLATELET # BLD AUTO: 167 10E9/L (ref 150–450)
POTASSIUM SERPL-SCNC: 4.1 MMOL/L (ref 3.4–5.3)
RBC # BLD AUTO: 5.08 10E12/L (ref 4.4–5.9)
SODIUM SERPL-SCNC: 140 MMOL/L (ref 133–144)
SP GR UR STRIP: 1.01 (ref 1–1.03)
URN SPEC COLLECT METH UR: NORMAL
UROBILINOGEN UR STRIP-MCNC: 0 MG/DL (ref 0–2)
WBC # BLD AUTO: 4.7 10E9/L (ref 4–11)

## 2017-04-17 ASSESSMENT — LIFESTYLE VARIABLES: TOBACCO_USE: 1

## 2017-04-17 NOTE — PATIENT INSTRUCTIONS
Preparing for Your Surgery      Name:  Sukh Craven   MRN:  [id number]   :  1955   Today's Date:  2017     Arriving for surgery:  Surgery date:  17  Surgery time:  1:10 pm  Arrival time:  11:10 am    Please come to:   [facility] [address]  [address] [place]  [address]    - parking is available in front of [facility] from 5:15AM to 8:00PM. If you prefer, park your car in the Green Lot.    -Proceed to the 3rd floor, check in at the Adult Surgery Waiting Lounge. [phone]    If an escort is needed stop at the Information Desk in the lobby. Inform the information person that you are here for surgery. An escort to the Adult Surgery Waiting Lounge will be provided.  -  Bring your ID and insurance card.    What can I eat or drink?  -  You may have solid food or milk products until 8 hours prior to your surgery. (5:10 am)  -  You may have water, apple juice, BLACK coffee (NO creamer or nondairy creamer), or 7up/Sprite until 2 hours prior to your surgery. (11:10 am)    Which medicines can I take?  -Do not bring your own medications to the hospital.        -  Follow Urology Clinic instructions regarding Ibuprofen. If no instructions given, NO ibuprofen the day prior to surgery.        -  Take these medications the morning of surgery:  Losartan-Hydrochlorothiazide (Per Provider)  Acetaminophen (Tylenol) if needed    How do I prepare myself?  -  Take two showers: one the night before surgery; and one the morning of surgery.         Use Scrubcare or Hibiclens to wash from neck down.  You may use your own shampoo and conditioner. No other hair products.   -  Do NOT use lotion, powder, deodorant, or antiperspirant the day of your surgery.  -  Do NOT wear any jewelry.  Questions or Concerns:  If you have questions or concerns, please call the  Preoperative Assessment Center, Monday-Friday 7AM-7PM:  [phone]    AFTER YOUR SURGERY  Breathing  exercises   Breathing exercises help you recover faster. Take deep breaths and let the air out slowly. This will:     Help you wake up after surgery.    Help prevent complications like pneumonia.  Preventing complications will help you go home sooner.   Nausea and vomiting   You may feel sick to your stomach after surgery; if so, let your nurse know.    Pain control:  After surgery, you may have pain. Our goal is to help you manage your pain. Pain medicine will help you feel comfortable enough to do activities that will help you heal.  These activities may include breathing exercises, walking and physical therapy.   To help your health care team treat your pain we will ask: 1) If you have pain  2) where it is located 3) describe your pain in your words  Methods of pain control include medications given by mouth, vein or by nerve block for some surgeries.  Sequential Compression Device (SCD) or Pneumo Boots:  You may need to wear SCD S on your legs or feet. These are wraps connected to a machine that pumps in air and releases it. The repeated pumping helps prevent blood clots from forming.

## 2017-04-17 NOTE — MR AVS SNAPSHOT
After Visit Summary   2017    Sukh Craven    MRN: [id number]           Patient Information     Date Of Birth          1955        Visit Information        Provider Department      2017 5:00 PM Rn, [facility]        Care Instructions    Preparing for Your Surgery      Name:  Sukh Craven   MRN:  [id number]   :  1955   Today's Date:  2017     Arriving for surgery:  Surgery date:  17  Surgery time:  1:10 pm  Arrival time:  11:10 am    Please come to:   [facility] [address]  [address] [place]  [address]    - parking is available in front of [facility] from 5:15AM to 8:00PM. If you prefer, park your car in the Green Lot.    -Proceed to the 3rd floor, check in at the Adult Surgery Waiting Lounge. [phone]    If an escort is needed stop at the Information Desk in the lobby. Inform the information person that you are here for surgery. An escort to the Adult Surgery Waiting Lounge will be provided.  -  Bring your ID and insurance card.    What can I eat or drink?  -  You may have solid food or milk products until 8 hours prior to your surgery. (5:10 am)  -  You may have water, apple juice, BLACK coffee (NO creamer or nondairy creamer), or 7up/Sprite until 2 hours prior to your surgery. (11:10 am)    Which medicines can I take?  -Do not bring your own medications to the hospital.        -  Follow Urology Clinic instructions regarding Ibuprofen. If no instructions given, NO ibuprofen the day prior to surgery.        -  Take these medications the morning of surgery:  Losartan-Hydrochlorothiazide (Per Provider)  Acetaminophen (Tylenol) if needed    How do I prepare myself?  -  Take two showers: one the night before surgery; and one the morning of surgery.         Use Scrubcare or Hibiclens to wash from neck down.  You may use your own shampoo and conditioner. No other hair  products.   -  Do NOT use lotion, powder, deodorant, or antiperspirant the day of your surgery.  -  Do NOT wear any jewelry.  Questions or Concerns:  If you have questions or concerns, please call the  Preoperative Assessment Center, Monday-Friday 7AM-7PM:  [phone]    AFTER YOUR SURGERY  Breathing exercises   Breathing exercises help you recover faster. Take deep breaths and let the air out slowly. This will:     Help you wake up after surgery.    Help prevent complications like pneumonia.  Preventing complications will help you go home sooner.   Nausea and vomiting   You may feel sick to your stomach after surgery; if so, let your nurse know.    Pain control:  After surgery, you may have pain. Our goal is to help you manage your pain. Pain medicine will help you feel comfortable enough to do activities that will help you heal.  These activities may include breathing exercises, walking and physical therapy.   To help your health care team treat your pain we will ask: 1) If you have pain  2) where it is located 3) describe your pain in your words  Methods of pain control include medications given by mouth, vein or by nerve block for some surgeries.  Sequential Compression Device (SCD) or Pneumo Boots:  You may need to wear SCD S on your legs or feet. These are wraps connected to a machine that pumps in air and releases it. The repeated pumping helps prevent blood clots from forming.                   Follow-ups after your visit        Your next 10 appointments already scheduled     Apr 17, 2017  4:30 PM CDT   (Arrive by 4:15 PM)   PAC Anesthesia Consult with  Pac Anesthesiologist   [facility] Preoperative Assessment Center ([facility])    [address] [place] [id number] [phone]            Apr 17, 2017  5:00 PM CDT   (Arrive by 4:45 PM)   PAC RN ASSESSMENT with Isabel Pac Rn   [facility] Preoperative Assessment [place] ([facility])     [address] [facility] [id number] [phone]            Apr 17, 2017  5:30 PM CDT   LAB with  LAB   [facility] ([facility])    [address] [place] [address] [id number] [phone]           Patient must bring picture ID.  Patient should be prepared to give a urine specimen  Please do not eat 10-12 hours before your appointment if you are coming in fasting for labs on lipids, cholesterol, or glucose (sugar).  Pregnant women should follow their Care Team instructions. Water with medications is okay. Do not drink coffee or other fluids.   If you have concerns about taking  your medications, please ask at office or if scheduling via PreApps, send a message by clicking on Secure Messaging, Message Your Care Team.            Apr 27, 2017   Procedure with Cresencio Foote MD   [facility], [place], Same Day Surgery (--)    [address] [id number] [phone]            May 30, 2017 10:00 AM CDT   (Arrive by 9:45 AM)   Post-Op with Cresencio Foote MD   [facility] Urology and Inst for Prostate and Urologic Cancers ([facility])    [address] [place] [id number] [phone]              Future tests that were ordered for you today     Open Future Orders        Priority Expected Expires Ordered    ABO/Rh type and screen Routine 4/17/2017 5/17/2017 4/17/2017    UA reflex to Microscopic and Culture Routine 4/17/2017 5/17/2017 4/17/2017            Who to contact     Please call your clinic at [phone] to:    Ask questions about your health    Make or cancel appointments    Discuss your medicines    Learn about your test results    Speak to your doctor   If you have compliments or concerns about an experience at your clinic, or if you wish to file a complaint, please contact [facility] Patient Relations at [phone] or email us at  [email or web address]         Additional Information About Your Visit        Space [employer] Information     [employer] is an electronic gateway that provides easy, online access to your medical records. With [employer], you can request a clinic appointment, read your test results, renew a prescription or communicate with your care team.     To sign up for [employer] visit the website at [email or web address]   You will be asked to enter the access code listed below, as well as some personal information. Please follow the directions to create your username and password.     Your access code is: [email or web address]  Expires: 2017 10:56 AM     Your access code will  in 90 days. If you need help or a new code, please contact your [facility] or call [phone] for assistance.        Care EveryWhere ID     This is your Care EveryWhere ID. This could be used by other organizations to access your [facility] medical records  [id number]         Blood Pressure from Last 3 Encounters:   17 (!) 187/105   17 (!) 169/102   17 136/86    Weight from Last 3 Encounters:   17 104.2 kg (229 lb 11.2 oz)   17 109.3 kg (241 lb)   17 106.6 kg (235 lb)              Today, you had the following     No orders found for display         Today's Medication Changes          These changes are accurate as of: 17  4:28 PM.  If you have any questions, ask your nurse or doctor.               These medicines have changed or have updated prescriptions.        Dose/Directions    losartan-hydrochlorothiazide 100-12.5 MG per tablet   Commonly known as:  HYZAAR   This may have changed:  when to take this   Used for:  Essential hypertension with goal blood pressure less than 140/90        Dose:  1 tablet   Take 1 tablet by mouth daily   Quantity:  90 tablet   Refills:  0                Primary Care Provider Office Phone # Fax #    Ned Martinez PA-C [phone]  [phone]       [facility] [address] [address]        Thank you!     Thank you for choosing [facility]  for your care. Our goal is always to provide you with excellent care. Hearing back from our patients is one way we can continue to improve our services. Please take a few minutes to complete the written survey that you may receive in the mail after your visit with us. Thank you!             Your Updated Medication List - Protect others around you: Learn how to safely use, store and throw away your medicines at www.disposemymeds.org.          This list is accurate as of: 4/17/17  4:28 PM.  Always use your most recent med list.                   Brand Name Dispense Instructions for use    ciclopirox 0.77 % Gel     45 g    Apply twice daily to affected areas       losartan-hydrochlorothiazide 100-12.5 MG per tablet    HYZAAR    90 tablet    Take 1 tablet by mouth daily

## 2017-04-17 NOTE — H&P
Pre-Operative H & P     CC:  Preoperative exam to assess for increased cardiopulmonary risk while undergoing surgery and anesthesia.    Date of Encounter: 4/17/2017  Primary Care Physician:  Ned Martinez  Sukh Craven is a 62 year old male who presents for pre-operative H & P in preparation for Holmium Laser Enucleation Of The Prostate  with Dr. Foote on 4/27/2017 at [facility].     History is obtained from the patient.     Past Medical History  Past Medical History:   Diagnosis Date     BPH (benign prostatic hyperplasia)      HTN (hypertension)        Past Surgical History  Past Surgical History:   Procedure Laterality Date     C EXPLORE WOUND,NECK       REMOVAL OF SPERM DUCT(S)         Hx of Blood transfusions/reactions: none    Hx of abnormal bleeding or anti-platelet use: none    Menstrual history: No LMP for male patient.:     Steroid use in the last year: none    Personal or FH with difficulty with Anesthesia:  None        Prior to Admission Medications  Current Outpatient Prescriptions   Medication Sig Dispense Refill     ciclopirox 0.77 % GEL Apply twice daily to affected areas 45 g 2     losartan-hydrochlorothiazide (HYZAAR) 100-12.5 MG per tablet Take 1 tablet by mouth daily (Patient taking differently: Take 1 tablet by mouth every morning ) 90 tablet 0       Allergies  Allergies   Allergen Reactions     No Known Drug Allergies        Social History  Social History     Social History     Marital status:      Spouse name: Ricardo     Number of children: 5     Years of education: 14     Occupational History      [employer]     Social History Main Topics     Smoking status: Former Smoker     Packs/day: 0.50     Years: 25.00     Types: Cigarettes     Quit date: 3/23/2007     Smokeless tobacco: Never Used     Alcohol use Yes      Comment: rare- social drinker     Drug use: No     Sexual activity: Yes     Partners:  Female     Other Topics Concern      Service Yes     [employer]- 6 years     Blood Transfusions No     Caffeine Concern No     Occupational Exposure No     Hobby Hazards No     Sleep Concern No     Stress Concern No     Weight Concern No     Special Diet No     Back Care No     Exercise Yes     4 times a week     Bike Helmet Yes     Seat Belt Yes     Self-Exams Yes     Parent/Sibling W/ Cabg, Mi Or Angioplasty Before 65f 55m? No     Social History Narrative       Family History  Family History   Problem Relation Age of Onset     DIABETES Sister      DIABETES Sister      DIABETES Brother      C.A.D. No family hx of      Hypertension No family hx of      CEREBROVASCULAR DISEASE No family hx of      Breast Cancer No family hx of      Cancer - colorectal No family hx of      Prostate Cancer No family hx of                Anesthesia Evaluation     . Pt has had prior anesthetic. Type: MAC           ROS/MED HX    ENT/Pulmonary:     (+)tobacco use, Past use 1/2 PPD for 25, quit 2007 packs/day  , . .    Neurologic:  - neg neurologic ROS     Cardiovascular:     (+) hypertension----. : . . . :. . No previous cardiac testing       METS/Exercise Tolerance:  >4 METS   Hematologic:  - neg hematologic  ROS       Musculoskeletal:  - neg musculoskeletal ROS       GI/Hepatic:  - neg GI/hepatic ROS       Renal/Genitourinary:     (+) BPH,       Endo:  - neg endo ROS       Psychiatric:  - neg psychiatric ROS       Infectious Disease:  - neg infectious disease ROS       Malignancy:      - no malignancy   Other:    (+) No chance of pregnancy C-spine cleared: N/A, no H/O Chronic Pain,no other significant disability   - neg other ROS           Physical Exam  Normal systems: cardiovascular and pulmonary    Airway   Mallampati: II  TM distance: >3 FB  Neck ROM: full    Dental   (+) missing    Cardiovascular   Rhythm and rate: regular and normal      Pulmonary    breath sounds clear to auscultation               The complete review of systems  "is negative other than noted in the HPI or here.   Temp: 98.4  F (36.9  C) Temp src: Oral BP: (!) 179/100 Pulse: 72   Resp: 18 SpO2: 97 %         229 lbs 11.2 oz  6' 5\"   Body mass index is 27.24 kg/(m^2).         Physical Exam  Constitutional: Awake, alert, cooperative, no apparent distress, and appears stated age.  Eyes: Pupils equal, round and reactive to light, extra ocular muscles intact, sclera clear, conjunctiva normal.  HENT: Normocephalic, oral pharynx with moist mucus membranes, good dentition. No goiter appreciated.   Respiratory: Clear to auscultation bilaterally, no crackles or wheezing.  Cardiovascular: Regular rate and rhythm, normal S1 and S2, and no murmur noted.  Carotids +2, no bruits. No edema. Palpable pulses to radial  DP and PT arteries.   GI: Normal bowel sounds, soft, non-distended, non-tender, no masses palpated, no hepatosplenomegaly.  Surgical scars: none  Lymph/Hematologic: No cervical lymphadenopathy and no supraclavicular lymphadenopathy.  Genitourinary:  deferred  Skin: Warm and dry.  No rashes at anticipated surgical site. Groin rash  Musculoskeletal: Full ROM of neck. There is no redness, warmth, or swelling of the joints. Gross motor strength is normal.    Neurologic: Awake, alert, oriented to name, place and time. Cranial nerves II-XII are grossly intact. Gait is normal.   Neuropsychiatric: Calm, cooperative. Normal affect.     Labs: (personally reviewed)  Lab Results   Component Value Date    WBC 4.7 04/17/2017     Lab Results   Component Value Date    RBC 5.08 04/17/2017     Lab Results   Component Value Date    HGB 15.0 04/17/2017     Lab Results   Component Value Date    HCT 45.9 04/17/2017     No components found for: MCT  Lab Results   Component Value Date    MCV 90 04/17/2017     Lab Results   Component Value Date    MCH 29.5 04/17/2017     Lab Results   Component Value Date    MCHC 32.7 04/17/2017     Lab Results   Component Value Date    RDW 13.9 04/17/2017     Lab " Results   Component Value Date     04/17/2017       Last Basic Metabolic Panel:  Lab Results   Component Value Date     04/17/2017      Lab Results   Component Value Date    POTASSIUM 4.1 04/17/2017     Lab Results   Component Value Date    CHLORIDE 104 04/17/2017     Lab Results   Component Value Date    DORIS 9.2 04/17/2017     Lab Results   Component Value Date    CO2 31 04/17/2017     Lab Results   Component Value Date    BUN 16 04/17/2017     Lab Results   Component Value Date    CR 1.14 04/17/2017     Lab Results   Component Value Date    GLC 82 04/17/2017       EKG: Personally reviewed but formal cardiology read pending: not indicated        ASSESSMENT and PLAN  Sukh Craven is a 62 year old male scheduled to undergo Holmium Laser Enucleation Of The Prostate. He has the following specific operative considerations:   - RCRI : Low serious cardiac risks.  0.4% risk of major adverse cardiac event.   - Anesthesia considerations:  Refer to PAC assessment in anesthesia records  - VTE risk: 1.8% due to age and gender  - DALLIN # of risks 2/8 = 25%  - Post-op delirium risk: high risk due to age  - Risk of PONV score = 2.  If > 2, anti-emetic intervention recommended.      Previous anesthesia, only MAC, without complications   Cardiac: HTN, daily hyzaar. Will take morning of surgery. Denies cardiac symptoms  Pulmonary: Smoked   PPD for 25 years, quit 2007. Denies current pulmonary symptoms  Renal: BPH causing urinary frequency and retention   Currently has rash in groin, followed by PCP and will update surgeon  Pt optimized for surgery. AVS with information on surgery time/arrival time, meds and NPO status given by nursing staff         Patient was discussed with Dr Rizzo.    HEMAL Cade CNS  Preoperative Assessment Center  [facility]  Phone: [phone]  Fax: [phone]

## 2017-04-17 NOTE — ANESTHESIA PREPROCEDURE EVALUATION
Anesthesia Evaluation     . Pt has had prior anesthetic. Type: MAC           ROS/MED HX    ENT/Pulmonary:     (+)tobacco use, Past use 1/2 PPD for 25, quit 2007 packs/day  , . .    Neurologic:  - neg neurologic ROS     Cardiovascular:     (+) hypertension----. : . . . :. . No previous cardiac testing       METS/Exercise Tolerance:  >4 METS   Hematologic:  - neg hematologic  ROS       Musculoskeletal:  - neg musculoskeletal ROS       GI/Hepatic:  - neg GI/hepatic ROS       Renal/Genitourinary:     (+) BPH,       Endo:  - neg endo ROS       Psychiatric:  - neg psychiatric ROS       Infectious Disease:  - neg infectious disease ROS       Malignancy:      - no malignancy   Other:    (+) No chance of pregnancy C-spine cleared: N/A, no H/O Chronic Pain,no other significant disability   - neg other ROS                 Physical Exam  Normal systems: cardiovascular and pulmonary    Airway   Mallampati: II  TM distance: >3 FB  Neck ROM: full    Dental   (+) missing    Cardiovascular   Rhythm and rate: regular and normal      Pulmonary    breath sounds clear to auscultation    Other findings: Lab Results      Component                Value               Date                      WBC                      4.7                 04/17/2017            Lab Results      Component                Value               Date                      RBC                      5.08                04/17/2017            Lab Results      Component                Value               Date                      HGB                      15.0                04/17/2017            Lab Results      Component                Value               Date                      HCT                      45.9                04/17/2017            No components found for: MCT  Lab Results      Component                Value               Date                      MCV                      90                  04/17/2017            Lab Results      Component                 Value               Date                      MCH                      29.5                04/17/2017            Lab Results      Component                Value               Date                      MCHC                     32.7                04/17/2017            Lab Results      Component                Value               Date                      RDW                      13.9                04/17/2017            Lab Results      Component                Value               Date                      PLT                      167                 04/17/2017              Last Basic Metabolic Panel:  Lab Results      Component                Value               Date                      NA                       140                 04/17/2017             Lab Results      Component                Value               Date                      POTASSIUM                4.1                 04/17/2017            Lab Results      Component                Value               Date                      CHLORIDE                 104                 04/17/2017            Lab Results      Component                Value               Date                      DORIS                      9.2                 04/17/2017            Lab Results      Component                Value               Date                      CO2                      31                  04/17/2017            Lab Results      Component                Value               Date                      BUN                      16                  04/17/2017            Lab Results      Component                Value               Date                      CR                       1.14                04/17/2017            Lab Results      Component                Value               Date                      GLC                      82                  04/17/2017                     PAC Discussion and Assessment    ASA Classification: 2  Case is suitable for: [place]  Anesthetic techniques and relevant risks discussed: GA  Invasive monitoring and risk discussed: Yes  Types:   Possibility and Risk of blood transfusion discussed: Yes  NPO instructions given:   Additional anesthetic preparation and risks discussed:   Needs early admission to pre-op area:   Other:     PAC Resident/NP Anesthesia Assessment:  Sukh Craven is a 61 yo male scheduled for Holmium Laser Enucleation Of The Prostate on 4/27/2017 by Dr. Foote. PAC referral by Dr. Foote for assessment and optimization of anesthesia due to HTN. Previous anesthesia, only MAC, without complications     Cardiac: HTN, daily hyzaar. Will take morning of surgery. Denies cardiac symptoms  Pulmonary: Smoked   PPD for 25 years, quit 2007. Denies current pulmonary symptoms  Renal: BPH causing urinary frequency and retention     Currently has rash in groin, followed by PCP and will update surgeon    Pt also evaluated by Dr. Rizzo. See recommendations below. Labs drawn today.  I spent 20 minutes face to face with pt, assessing, examining, and educating           Reviewed and Signed by PAC Mid-Level Provider/Resident  Mid-Level Provider/Resident: Nancie Trivedi, HEMAL  Date: 4/17/2017  Time: 1600    Attending Anesthesiologist Anesthesia Assessment:  62 year old for laser enucleation of prostate. Chart reviewed, patient seen and evaluated; agree with above assessment. No prior surgeries, no significant cardiac or pulmonary disease. Active, stable HTN.    Patient is appropriate for the planned procedure without further workup or medical management change. The final anesthesia plan will be determined by the physician anesthesiologist caring for the patient on the day of surgery.      Reviewed and Signed by PAC Anesthesiologist  Anesthesiologist: [email or web address]  Date: 4/17/2017  Time:   Pass/Fail: Pass  Disposition:     PAC Pharmacist Assessment:        Pharmacist:   Date:   Time:      Anesthesia Plan      History & Physical Review  History and  physical reviewed and following examination; no interval change.    ASA Status:  2 .        Plan for General and LMA with Intravenous and Propofol induction. Maintenance will be Balanced.    PONV prophylaxis:  Ondansetron (or other 5HT-3) and Dexamethasone or Solumedrol       Postoperative Care  Postoperative pain management:  Multi-modal analgesia.      Consents  Anesthetic plan, risks, benefits and alternatives discussed with:  Patient.  Use of blood products discussed: Yes.   Use of blood products discussed with Patient.  Consented to blood products.  .                          .

## 2017-04-27 ENCOUNTER — HOSPITAL ENCOUNTER (OUTPATIENT)
Facility: CLINIC | Age: 62
Setting detail: OBSERVATION
Discharge: HOME | End: 2017-04-29
Attending: UROLOGY | Admitting: UROLOGY
Payer: COMMERCIAL

## 2017-04-27 ENCOUNTER — ANESTHESIA (OUTPATIENT)
Dept: SURGERY | Facility: CLINIC | Age: 62
End: 2017-04-27
Payer: COMMERCIAL

## 2017-04-27 ENCOUNTER — SURGERY (OUTPATIENT)
Age: 62
End: 2017-04-27

## 2017-04-27 DIAGNOSIS — N13.8 BPH (BENIGN PROSTATIC HYPERTROPHY) WITH URINARY OBSTRUCTION: Primary | ICD-10-CM

## 2017-04-27 DIAGNOSIS — N40.1 BPH (BENIGN PROSTATIC HYPERTROPHY) WITH URINARY OBSTRUCTION: Primary | ICD-10-CM

## 2017-04-27 LAB
ABO + RH BLD: NORMAL
ABO + RH BLD: NORMAL
BLD GP AB SCN SERPL QL: NORMAL
BLOOD BANK CMNT PATIENT-IMP: NORMAL
BLOOD BANK CMNT PATIENT-IMP: NORMAL
SPECIMEN EXP DATE BLD: NORMAL

## 2017-04-27 PROCEDURE — 25000128 H RX IP 250 OP 636: Performed by: ANESTHESIOLOGY

## 2017-04-27 PROCEDURE — 25000128 H RX IP 250 OP 636: Performed by: UROLOGY

## 2017-04-27 PROCEDURE — 88305 TISSUE EXAM BY PATHOLOGIST: CPT | Performed by: UROLOGY

## 2017-04-27 PROCEDURE — 36000062 ZZH SURGERY LEVEL 4 1ST 30 MIN - UMMC: Performed by: UROLOGY

## 2017-04-27 PROCEDURE — 37000008 ZZH ANESTHESIA TECHNICAL FEE, 1ST 30 MIN: Performed by: UROLOGY

## 2017-04-27 PROCEDURE — 36000064 ZZH SURGERY LEVEL 4 EA 15 ADDTL MIN - UMMC: Performed by: UROLOGY

## 2017-04-27 PROCEDURE — 71000015 ZZH RECOVERY PHASE 1 LEVEL 2 EA ADDTL HR: Performed by: UROLOGY

## 2017-04-27 PROCEDURE — 37000009 ZZH ANESTHESIA TECHNICAL FEE, EACH ADDTL 15 MIN: Performed by: UROLOGY

## 2017-04-27 PROCEDURE — 27210794 ZZH OR GENERAL SUPPLY STERILE: Performed by: UROLOGY

## 2017-04-27 PROCEDURE — C1758 CATHETER, URETERAL: HCPCS | Performed by: UROLOGY

## 2017-04-27 PROCEDURE — 88305 TISSUE EXAM BY PATHOLOGIST: CPT | Mod: 26 | Performed by: UROLOGY

## 2017-04-27 PROCEDURE — 40000169 ZZH STATISTIC PRE-PROCEDURE ASSESSMENT I: Performed by: UROLOGY

## 2017-04-27 PROCEDURE — 71000014 ZZH RECOVERY PHASE 1 LEVEL 2 FIRST HR: Performed by: UROLOGY

## 2017-04-27 PROCEDURE — 25000132 ZZH RX MED GY IP 250 OP 250 PS 637: Performed by: UROLOGY

## 2017-04-27 PROCEDURE — 25000566 ZZH SEVOFLURANE, EA 15 MIN: Performed by: UROLOGY

## 2017-04-27 PROCEDURE — 25800025 ZZH RX 258: Performed by: NURSE ANESTHETIST, CERTIFIED REGISTERED

## 2017-04-27 PROCEDURE — 27211024 ZZHC OR SUPPLY OTHER OPNP: Performed by: UROLOGY

## 2017-04-27 PROCEDURE — 25000132 ZZH RX MED GY IP 250 OP 250 PS 637: Performed by: ANESTHESIOLOGY

## 2017-04-27 PROCEDURE — 25000128 H RX IP 250 OP 636: Performed by: NURSE ANESTHETIST, CERTIFIED REGISTERED

## 2017-04-27 PROCEDURE — 25000125 ZZHC RX 250: Performed by: NURSE ANESTHETIST, CERTIFIED REGISTERED

## 2017-04-27 RX ORDER — NALOXONE HYDROCHLORIDE 0.4 MG/ML
.1-.4 INJECTION, SOLUTION INTRAMUSCULAR; INTRAVENOUS; SUBCUTANEOUS
Status: DISCONTINUED | OUTPATIENT
Start: 2017-04-27 | End: 2017-04-27 | Stop reason: HOSPADM

## 2017-04-27 RX ORDER — ACETAMINOPHEN 325 MG/1
975 TABLET ORAL ONCE
Status: COMPLETED | OUTPATIENT
Start: 2017-04-27 | End: 2017-04-27

## 2017-04-27 RX ORDER — SODIUM CHLORIDE 9 MG/ML
INJECTION, SOLUTION INTRAVENOUS CONTINUOUS PRN
Status: DISCONTINUED | OUTPATIENT
Start: 2017-04-27 | End: 2017-04-27

## 2017-04-27 RX ORDER — SODIUM CHLORIDE 9 MG/ML
INJECTION, SOLUTION INTRAVENOUS CONTINUOUS
Status: DISCONTINUED | OUTPATIENT
Start: 2017-04-27 | End: 2017-04-29 | Stop reason: HOSPADM

## 2017-04-27 RX ORDER — MEPERIDINE HYDROCHLORIDE 25 MG/ML
12.5 INJECTION INTRAMUSCULAR; INTRAVENOUS; SUBCUTANEOUS
Status: DISCONTINUED | OUTPATIENT
Start: 2017-04-27 | End: 2017-04-27 | Stop reason: HOSPADM

## 2017-04-27 RX ORDER — ONDANSETRON 2 MG/ML
4 INJECTION INTRAMUSCULAR; INTRAVENOUS EVERY 6 HOURS PRN
Status: DISCONTINUED | OUTPATIENT
Start: 2017-04-27 | End: 2017-04-29 | Stop reason: HOSPADM

## 2017-04-27 RX ORDER — SODIUM CHLORIDE, SODIUM LACTATE, POTASSIUM CHLORIDE, CALCIUM CHLORIDE 600; 310; 30; 20 MG/100ML; MG/100ML; MG/100ML; MG/100ML
INJECTION, SOLUTION INTRAVENOUS CONTINUOUS
Status: DISCONTINUED | OUTPATIENT
Start: 2017-04-27 | End: 2017-04-27 | Stop reason: HOSPADM

## 2017-04-27 RX ORDER — ONDANSETRON 4 MG/1
4 TABLET, ORALLY DISINTEGRATING ORAL EVERY 30 MIN PRN
Status: DISCONTINUED | OUTPATIENT
Start: 2017-04-27 | End: 2017-04-27 | Stop reason: HOSPADM

## 2017-04-27 RX ORDER — FENTANYL CITRATE 50 UG/ML
25-50 INJECTION, SOLUTION INTRAMUSCULAR; INTRAVENOUS EVERY 5 MIN PRN
Status: DISCONTINUED | OUTPATIENT
Start: 2017-04-27 | End: 2017-04-27 | Stop reason: HOSPADM

## 2017-04-27 RX ORDER — ONDANSETRON 2 MG/ML
4 INJECTION INTRAMUSCULAR; INTRAVENOUS EVERY 30 MIN PRN
Status: DISCONTINUED | OUTPATIENT
Start: 2017-04-27 | End: 2017-04-27 | Stop reason: HOSPADM

## 2017-04-27 RX ORDER — FENTANYL CITRATE 50 UG/ML
INJECTION, SOLUTION INTRAMUSCULAR; INTRAVENOUS PRN
Status: DISCONTINUED | OUTPATIENT
Start: 2017-04-27 | End: 2017-04-27

## 2017-04-27 RX ORDER — FUROSEMIDE 10 MG/ML
INJECTION INTRAMUSCULAR; INTRAVENOUS PRN
Status: DISCONTINUED | OUTPATIENT
Start: 2017-04-27 | End: 2017-04-27

## 2017-04-27 RX ORDER — LIDOCAINE HYDROCHLORIDE 20 MG/ML
INJECTION, SOLUTION INFILTRATION; PERINEURAL PRN
Status: DISCONTINUED | OUTPATIENT
Start: 2017-04-27 | End: 2017-04-27

## 2017-04-27 RX ORDER — FENTANYL CITRATE 50 UG/ML
25-50 INJECTION, SOLUTION INTRAMUSCULAR; INTRAVENOUS
Status: DISCONTINUED | OUTPATIENT
Start: 2017-04-27 | End: 2017-04-27 | Stop reason: HOSPADM

## 2017-04-27 RX ORDER — ONDANSETRON 2 MG/ML
INJECTION INTRAMUSCULAR; INTRAVENOUS PRN
Status: DISCONTINUED | OUTPATIENT
Start: 2017-04-27 | End: 2017-04-27

## 2017-04-27 RX ORDER — NALOXONE HYDROCHLORIDE 0.4 MG/ML
.1-.4 INJECTION, SOLUTION INTRAMUSCULAR; INTRAVENOUS; SUBCUTANEOUS
Status: DISCONTINUED | OUTPATIENT
Start: 2017-04-27 | End: 2017-04-28

## 2017-04-27 RX ORDER — NITROFURANTOIN 25; 75 MG/1; MG/1
100 CAPSULE ORAL EVERY 12 HOURS SCHEDULED
Status: DISCONTINUED | OUTPATIENT
Start: 2017-04-27 | End: 2017-04-29 | Stop reason: HOSPADM

## 2017-04-27 RX ORDER — PROCHLORPERAZINE MALEATE 5 MG
5-10 TABLET ORAL EVERY 6 HOURS PRN
Status: DISCONTINUED | OUTPATIENT
Start: 2017-04-27 | End: 2017-04-29 | Stop reason: HOSPADM

## 2017-04-27 RX ORDER — PROPOFOL 10 MG/ML
INJECTION, EMULSION INTRAVENOUS PRN
Status: DISCONTINUED | OUTPATIENT
Start: 2017-04-27 | End: 2017-04-27

## 2017-04-27 RX ORDER — ONDANSETRON 4 MG/1
4 TABLET, ORALLY DISINTEGRATING ORAL EVERY 6 HOURS PRN
Status: DISCONTINUED | OUTPATIENT
Start: 2017-04-27 | End: 2017-04-29 | Stop reason: HOSPADM

## 2017-04-27 RX ORDER — ACETAMINOPHEN 325 MG/1
650 TABLET ORAL EVERY 6 HOURS PRN
Status: DISCONTINUED | OUTPATIENT
Start: 2017-04-27 | End: 2017-04-29 | Stop reason: HOSPADM

## 2017-04-27 RX ORDER — HYDROMORPHONE HYDROCHLORIDE 1 MG/ML
.3-.5 INJECTION, SOLUTION INTRAMUSCULAR; INTRAVENOUS; SUBCUTANEOUS
Status: DISCONTINUED | OUTPATIENT
Start: 2017-04-27 | End: 2017-04-29 | Stop reason: HOSPADM

## 2017-04-27 RX ORDER — ALUMINA, MAGNESIA, AND SIMETHICONE 2400; 2400; 240 MG/30ML; MG/30ML; MG/30ML
15-30 SUSPENSION ORAL EVERY 4 HOURS PRN
Status: DISCONTINUED | OUTPATIENT
Start: 2017-04-27 | End: 2017-04-29 | Stop reason: HOSPADM

## 2017-04-27 RX ORDER — EPHEDRINE SULFATE 50 MG/ML
INJECTION, SOLUTION INTRAVENOUS PRN
Status: DISCONTINUED | OUTPATIENT
Start: 2017-04-27 | End: 2017-04-27

## 2017-04-27 RX ORDER — GABAPENTIN 300 MG/1
300 CAPSULE ORAL ONCE
Status: COMPLETED | OUTPATIENT
Start: 2017-04-27 | End: 2017-04-27

## 2017-04-27 RX ORDER — OXYCODONE HYDROCHLORIDE 5 MG/1
5-10 TABLET ORAL
Status: DISCONTINUED | OUTPATIENT
Start: 2017-04-27 | End: 2017-04-29 | Stop reason: HOSPADM

## 2017-04-27 RX ORDER — HYDROMORPHONE HYDROCHLORIDE 1 MG/ML
.3-.5 INJECTION, SOLUTION INTRAMUSCULAR; INTRAVENOUS; SUBCUTANEOUS EVERY 10 MIN PRN
Status: DISCONTINUED | OUTPATIENT
Start: 2017-04-27 | End: 2017-04-27 | Stop reason: HOSPADM

## 2017-04-27 RX ORDER — SODIUM CHLORIDE, SODIUM LACTATE, POTASSIUM CHLORIDE, CALCIUM CHLORIDE 600; 310; 30; 20 MG/100ML; MG/100ML; MG/100ML; MG/100ML
INJECTION, SOLUTION INTRAVENOUS CONTINUOUS PRN
Status: DISCONTINUED | OUTPATIENT
Start: 2017-04-27 | End: 2017-04-27

## 2017-04-27 RX ADMIN — FENTANYL CITRATE 100 MCG: 50 INJECTION, SOLUTION INTRAMUSCULAR; INTRAVENOUS at 16:29

## 2017-04-27 RX ADMIN — FENTANYL CITRATE 100 MCG: 50 INJECTION, SOLUTION INTRAMUSCULAR; INTRAVENOUS at 15:40

## 2017-04-27 RX ADMIN — FUROSEMIDE 20 MG: 10 INJECTION, SOLUTION INTRAVENOUS at 17:29

## 2017-04-27 RX ADMIN — OXYCODONE HYDROCHLORIDE 10 MG: 5 TABLET ORAL at 23:07

## 2017-04-27 RX ADMIN — PROPOFOL 300 MG: 10 INJECTION, EMULSION INTRAVENOUS at 15:40

## 2017-04-27 RX ADMIN — ACETAMINOPHEN 975 MG: 325 TABLET, FILM COATED ORAL at 12:01

## 2017-04-27 RX ADMIN — LIDOCAINE HYDROCHLORIDE 40 MG: 20 INJECTION, SOLUTION INFILTRATION; PERINEURAL at 15:40

## 2017-04-27 RX ADMIN — SODIUM CHLORIDE, POTASSIUM CHLORIDE, SODIUM LACTATE AND CALCIUM CHLORIDE: 600; 310; 30; 20 INJECTION, SOLUTION INTRAVENOUS at 15:30

## 2017-04-27 RX ADMIN — EPHEDRINE SULFATE 10 MG: 50 INJECTION, SOLUTION INTRAMUSCULAR; INTRAVENOUS; SUBCUTANEOUS at 15:54

## 2017-04-27 RX ADMIN — OXYCODONE HYDROCHLORIDE 10 MG: 5 TABLET ORAL at 20:24

## 2017-04-27 RX ADMIN — SODIUM CHLORIDE: 9 INJECTION, SOLUTION INTRAVENOUS at 15:53

## 2017-04-27 RX ADMIN — NITROFURANTOIN (MONOHYDRATE/MACROCRYSTALS) 100 MG: 75; 25 CAPSULE ORAL at 21:02

## 2017-04-27 RX ADMIN — MIDAZOLAM HYDROCHLORIDE 2 MG: 1 INJECTION, SOLUTION INTRAMUSCULAR; INTRAVENOUS at 15:30

## 2017-04-27 RX ADMIN — SODIUM CHLORIDE: 9 INJECTION, SOLUTION INTRAVENOUS at 23:07

## 2017-04-27 RX ADMIN — GABAPENTIN 300 MG: 300 CAPSULE ORAL at 12:01

## 2017-04-27 RX ADMIN — GENTAMICIN SULFATE 380 MG: 40 INJECTION, SOLUTION INTRAMUSCULAR; INTRAVENOUS at 15:52

## 2017-04-27 RX ADMIN — ONDANSETRON 4 MG: 2 INJECTION INTRAMUSCULAR; INTRAVENOUS at 17:31

## 2017-04-27 RX ADMIN — FENTANYL CITRATE 50 MCG: 50 INJECTION, SOLUTION INTRAMUSCULAR; INTRAVENOUS at 17:06

## 2017-04-27 RX ADMIN — HYDROMORPHONE HYDROCHLORIDE 0.5 MG: 1 INJECTION, SOLUTION INTRAMUSCULAR; INTRAVENOUS; SUBCUTANEOUS at 18:46

## 2017-04-27 RX ADMIN — SODIUM CHLORIDE: 9 INJECTION, SOLUTION INTRAVENOUS at 17:57

## 2017-04-27 NOTE — IP AVS SNAPSHOT
[facility] [address]    [address]    [place] [address]    Phone:  [phone]                                       After Visit Summary   4/27/2017    Sukh Craven    MRN: [id number]           After Visit Summary Signature Page     I have received my discharge instructions, and my questions have been answered. I have discussed any challenges I see with this plan with the nurse or doctor.    ..........................................................................................................................................  Patient/Patient Representative Signature      ..........................................................................................................................................  Patient Representative Print Name and Relationship to Patient    ..................................................               ................................................  Date                                            Time    ..........................................................................................................................................  Reviewed by Signature/Title    ...................................................              ..............................................  Date                                                            Time

## 2017-04-27 NOTE — BRIEF OP NOTE
[facility] Brief Operative Note      Pre-operative diagnosis: Benign Nodular Prostatic Hyperplasia with Lower Urinary Tract Symptoms    Post-operative diagnosis: Same as above   Procedure: Procedure(s):  Holmium Laser Enucleation Of The Prostate  - Wound Class: II-Clean Contaminated     Surgeon: Surgeon(s) and Role:     * Cresencio Foote MD - Primary     * Lukasz Mathur MD - Resident - Assisting   Assistant(s):           Anesthesia: General   Estimated blood loss: 250 mL           Drains: 22Fr 3-way catheter with 60 ml in balloon     Specimens:   ID Type Source Tests Collected by Time Destination   1 : Pubic Hair for Research Other (specify in comments) Other OR DOCUMENTATION ONLY Cresencio Foote MD 4/27/2017  4:27 PM    A : Prostate Tissue Prostate SURGICAL PATHOLOGY EXAM Cresencio Foote MD 4/27/2017  5:52 PM       Complications: None   Condition: Patient taken to recovery in stable condition.         Findings: See dictated operative report for full details     Postop plan: -Admit to outpatient obs overnight on CBI  -Macrobid x 1 week

## 2017-04-27 NOTE — IP AVS SNAPSHOT
MRN:[id number]                      After Visit Summary   4/27/2017    Sukh Craven    MRN: [id number]           Thank you!     Thank you for choosing [facility] for your care. Our goal is always to provide you with excellent care. Hearing back from our patients is one way we can continue to improve our services. Please take a few minutes to complete the written survey that you may receive in the mail after you visit with us. Thank you!        Patient Information     Date Of Birth          1955        Designated Caregiver       Most Recent Value    Caregiver    Will someone help with your care after discharge? yes    Name of designated caregiver Waylon Gar [children]    Phone number of caregiver [phone]    Caregiver address N/A      About your hospital stay     You were admitted on:  April 27, 2017 You last received care in the:  [facility]    You were discharged on:  April 29, 2017       Who to Call     For medical emergencies, please call 911.  For non-urgent questions about your medical care, please call your primary care provider or clinic, [phone]  For questions related to your surgery, please call your surgery clinic        Attending Provider     Provider Cresencio Bush MD Urology       Primary Care Provider Office Phone # Fax #    Ned Martinez PA-C [phone]       [address] [address]        After Care Instructions     Activity Restriction       Activity  - No strenuous exercise for 6 weeks.  - No lifting, pushing, pulling more than 10 pounds for 6 weeks.   - Do not strain with bowel movements.  - Do not drive until you can press the brake pedal quickly and fully without pain.   - Do not operate a motor vehicle while taking narcotic pain medications.            Contact Information       Phone numbers:   - Monday through Friday 8am to 4:30pm: Call [phone] with questions or  "to schedule or confirm appointment.    - Nights or weekends: call the after hours emergency pager - [phone] and tell the  \"I would like to page the Urology Resident on call.\"  - For emergencies, call [phone]            Diet Instructions       Resume pre-procedure diet            Follow up       Follow-Up:  - Follow up with your surgeon as previously scheduled  - Call your primary care provider to touch base regarding your recent admission.   - Call or return sooner than your regularly scheduled visit if you develop any of the following: fever (greater than 101.5), uncontrolled pain, uncontrolled nausea or vomiting, as well as increased redness, swelling, or drainage from your wound.            Medications       Medications  - Transition from narcotic pain medications to tylenol (acetaminophen) as you are able.  Wean yourself off all pain medications as you are able.  - Some pain medications contain both tylenol (acetaminophen) and a narcotic (Norco, vicodin, percocet), do not take more than 4,000mg of Tylenol (acetaminophen) from all sources in any 24 hour period.  - Narcotics can make you constipated.  Take over the counter fiber (metamucil or benefiber) and stool softeners (miralax, docusate or senna) while taking narcotic pain medications, but stop if you develop diarrhea.  - No driving or operating machinery while taking narcotic pain medications            No Alcohol       For 24 hours post procedure            Urination       Urination  - Catheter will be removed prior to discharge. Some patient will need to have catheter replaced before leaving the hospital and will go home with the catheter for a few days, this does not mean the operation has failed but the prostate may need more time to recover  - Some light redness (up to a watermelon-like-pink in color) is expected in the upcoming 7-10 day and may last as long as 4 weeks.  You can also expect some debris.  - If having blood in the urine, make " "sure to increase your water intake and monitor for improvement  - If you are unable to urinate you should return urgently to the ED or call clinic to try to arrange for an urgent visit same day.   - If you are going home with a catheter: Nurse/ to write care and instructions.  Treat the catheter like an extension of your body; do not let it get caught or snagged on anything.  Leave the catheter in place until your follow up. Call the numbers listed above for any concerns.                  Your next 10 appointments already scheduled     May 30, 2017 10:00 AM CDT   (Arrive by 9:45 AM)   Post-Op with Cresencio Foote MD   [facility] ([facility])    [address]  [address] [facility] [address] [phone]              Pending Results     Date and Time Order Name Status Description    4/27/2017 1752 Surgical pathology exam In process             Statement of Approval     Ordered          04/29/17 0703  I have reviewed and agree with all the recommendations and orders detailed in this document.  EFFECTIVE NOW     Approved and electronically signed by:  Valentine Luciano MD             Admission Information     Date & Time Provider Department Dept. Phone    4/27/2017 Cresencio Foote MD [facility] [phone]      Your Vitals Were     Blood Pressure Pulse Temperature Respirations Height Weight    156/87 (BP Location: Left arm) 81 98.6  F (37  C) 16 1.956 m (6' 5\") 103.4 kg (227 lb 15.3 oz)    Pulse Oximetry BMI (Body Mass Index)                96% 27.03 kg/m2          [facility] Information     [facility] lets you send messages to your doctor, view your test results, renew your prescriptions, schedule appointments and more. To sign up, go to [email or web address] . Click on \"Log in\" on the left side of the screen, which will take you to the Welcome page. Then click on \"Sign up Now\" on the right side of the " page.     You will be asked to enter the access code listed below, as well as some personal information. Please follow the directions to create your username and password.     Your access code is: [email or web address]  Expires: 2017 10:56 AM     Your access code will  in 90 days. If you need help or a new code, please call your [facility] clinic or [phone].        Care EveryWhere ID     This is your Care EveryWhere ID. This could be used by other organizations to access your [facility] medical records  [id number]           Review of your medicines      START taking        Dose / Directions    nitrofurantoin (macrocrystal-monohydrate) 100 MG capsule   Commonly known as:  MACROBID   Indication:  Surgical Prophylaxis        Dose:  100 mg   Take 1 capsule (100 mg) by mouth every 12 hours for 7 days   Quantity:  14 capsule   Refills:  0         CONTINUE these medicines which may have CHANGED, or have new prescriptions. If we are uncertain of the size of tablets/capsules you have at home, strength may be listed as something that might have changed.        Dose / Directions    losartan-hydrochlorothiazide 100-12.5 MG per tablet   Commonly known as:  HYZAAR   This may have changed:  when to take this   Used for:  Essential hypertension with goal blood pressure less than 140/90        Dose:  1 tablet   Take 1 tablet by mouth daily   Quantity:  90 tablet   Refills:  0         CONTINUE these medicines which have NOT CHANGED        Dose / Directions    ciclopirox 0.77 % Gel   Used for:  Groin rash        Apply twice daily to affected areas   Quantity:  45 g   Refills:  2       CIPROFLOXACIN PO        Dose:  500 mg   Take 500 mg by mouth 2 times daily   Refills:  0            Where to get your medicines      These medications were sent to [facility] [address]  [address] [address]     Phone:  [phone]     nitrofurantoin (macrocrystal-monohydrate) 100 MG  capsule                Protect others around you: Learn how to safely use, store and throw away your medicines at www.disposemymeds.org.             Medication List: This is a list of all your medications and when to take them. Check marks below indicate your daily home schedule. Keep this list as a reference.      Medications           Morning Afternoon Evening Bedtime As Needed    ciclopirox 0.77 % Gel   Apply twice daily to affected areas                                CIPROFLOXACIN PO   Take 500 mg by mouth 2 times daily                                losartan-hydrochlorothiazide 100-12.5 MG per tablet   Commonly known as:  HYZAAR   Take 1 tablet by mouth daily   Last time this was given:  1 tablet on 4/29/2017  8:07 AM                                nitrofurantoin (macrocrystal-monohydrate) 100 MG capsule   Commonly known as:  MACROBID   Take 1 capsule (100 mg) by mouth every 12 hours for 7 days   Last time this was given:  100 mg on 4/29/2017  8:07 AM

## 2017-04-27 NOTE — LETTER
Transition Communication Hand-off for Care Transitions to Next Level of Care Provider    Name: Sukh Craven  MRN #: [id number]  Primary Care Provider: ДМИТРИЙ ORDAZ     Primary Clinic: [address] [address]     Reason for Hospitalization:  Benign Nodular Prostatic Hyperplasia with Lower Urinary Tract Symptoms   BPH (benign prostatic hypertrophy) with urinary obstruction  Admit Date/Time: 4/27/2017 10:50 AM  Discharge Date: 4/28/17  Payor Source: Payor: [employer] / Plan: [employer] OPEN ACCESS FULLY INSURED / Product Type: HMO /            Reason for Communication Hand-off Referral: Notify admit for procedure    Discharge Plan: Home No needs           Follow-up specialty is recommended: Yes    Follow-up plan:  Future Appointments  Date Time Provider Department Center   5/30/2017 10:00 AM [facility]       Any outstanding tests or procedures:              Key Recommendations:      Maren Nickerson    AVS/Discharge Summary is the source of truth; this is a helpful guide for improved communication of patient story

## 2017-04-27 NOTE — ANESTHESIA CARE TRANSFER NOTE
Patient: Sukh Craven    Procedure(s):  Holmium Laser Enucleation Of The Prostate  - Wound Class: II-Clean Contaminated    Diagnosis: Benign Nodular Prostatic Hyperplasia with Lower Urinary Tract Symptoms   Diagnosis Additional Information: No value filed.    Anesthesia Type:   General, LMA     Note:  Airway :Face Mask  Patient transferred to:PACU  Comments: Strong SV, VSS. Report to RN.      Vitals: (Last set prior to Anesthesia Care Transfer)    CRNA VITALS  4/27/2017 1738 - 4/27/2017 1816      4/27/2017             Resp Rate (set): 10                Electronically Signed By: HEMAL Hollingsworth CRNA  April 27, 2017  6:16 PM

## 2017-04-27 NOTE — OP NOTE
Preoperative Diagnosis - BPH  Postoperative Diagnosis - Same  Procedure - Holmium laser enucleation of prostate  Surgeon - Cresencio Foote  Assistant - Lukasz Mathur  Anesthesia - General  Estimated Blood Loss - 250  Specimens -Prostate  Drains-22F 3 way pleitez  Complications-None  Disposition-Stable to PACU    Findings-61 yo M with history of severe BPH, enlarged median lobe    Indications for procedure -   Mr. Sukh Craven is a 62 year old year old year old male with a history of BPH.  He was seen previously where a discussion was had regarding treatment options and ultimately has decided to proceed with holmium laser enucleation of the prostate for which he presents today.    Procedure in detail -   After obtaining informed consent, the patient was taken to the operating room and placed under general anesthesia.  He was repositioned in dorsal lithotomy making sure that the legs were positioned and padded safely.  He was then prepped and draped in standard sterile fashion.  Culture sensitive antibiotics were administered and bilateral sequential compression devices were placed.  A time out was performed confirming the appropriate patient identity and planned procedure.    The procedure was begun by generously lubricating the urethra.  We then sequentially dilated the urethra using the jaimie sounds from 22-30F.  The urethra was noted to be adeqaute.      Next we inserted the 28F outer sheath over a deflecting obturator and looked the scope through the posterior urethra and into the bladder.  The prostate was noted to have a trilobar configuration.  We inspected the bladder noting that it had moderate trabeculations.  At this point we inserted the 550 micron holmium laser through the 7F laser catheter.    We began enucleation by making a 7 o'clock incision.  We carried this incision down to the prostatic capsule from the bladder neck towards the apex just proximal to the veromontanum.  We then proceeded  with a bottom approach, electing to enucleate the right lobe first.  We performed the majority of the dissection at 40 schaefer making sure to keep the energy at 40 schaefer or lower when working near the apex.  The capsular planes on this side were noted to be quite sticky.  Once the majority of the lobe was dissected we isolated the mucosal strip and transected it with the laser at 40 schaefer.  We then proceeded to take down the remaining lateral and posterior attachments and pushed the lobe into the bladder.  The remaining bladder neck attachments were identified and transected, freeing the lobe up entirely. Of note, we did attempt to identify an anterior capsular plane in order to go top down, this approach however was not chosen as the anterior capsule was quite sticky and the appropriate plane was unable to be definitively identified.    With the first lobe enucleated we then turned our attention to the contralateral side and enucleated the remaining lobe with a bottom upo approach.  The capsular planes on this side were very sticky as well.  We isolated the mucosal strip and transected it at 40 schaefer.  We then took down the remaining lateral and posterior attachments and pushed the lobe into the bladder.  The remaining bladder neck attachments were identified and transected, freeing the lobe up entirely.  Total enucleation time was 70 minutes.  Enucleation was challenging secondary to the poor planes.  No capsular perforations were identified.    At this point there was a moderate amount of bleeding and approximately 15 minutes were spent identifying bleeding vessels in the fossa, coagulating them with the laser, and ablating a small amount of residual prostate tissue.  Once hemostasis was adequate we switched from the laser resectoscope to the 26F offset telescope with the Piranha morcellation device.  We added an extra inflow to distend the bladder.  The blades were adjusted and set at a rate of 1500 RPM.  We  proceeded with morcellation making sure that we morcellated the entirety of the adenoma.  Total morcellation time was 11 minutes.    We then switched back to the laser resectoscope one final time to ensure that no residual tissue was left in the bladder.  We also confirmed that the bladder was unharmed from morcellation and that both ureteral orifices were unharmed during the procedure.  We inspected the fossa and obtained final hemostasis.  We did elect to perform a bladder neck incision at 6 o'clock as the prostate was noted to be relatively small in the fossa with the majority of the tissue being in the median lobe itself. We looked the scope out noting that the sphincter was completely intact without evidence of thermal or mechanical injury.    We lubricated the urethra again and passed a 22F 3-way pleitez catheter over a catheter guide.  The urine was noted to be light pink.  We filled the balloon with 60 cc's of sterile water and  did place the catheter on traction.      The specimen was weighed and found to be approximately 47 g.    The patient was woken from anesthesia and taken to the recovery room in stable condition.      Cresencio Foote

## 2017-04-28 LAB
ANION GAP SERPL CALCULATED.3IONS-SCNC: 8 MMOL/L (ref 3–14)
BUN SERPL-MCNC: 14 MG/DL (ref 7–30)
CALCIUM SERPL-MCNC: 8.1 MG/DL (ref 8.5–10.1)
CHLORIDE SERPL-SCNC: 107 MMOL/L (ref 94–109)
CO2 SERPL-SCNC: 30 MMOL/L (ref 20–32)
CREAT SERPL-MCNC: 1.24 MG/DL (ref 0.66–1.25)
ERYTHROCYTE [DISTWIDTH] IN BLOOD BY AUTOMATED COUNT: 14.1 % (ref 10–15)
GFR SERPL CREATININE-BSD FRML MDRD: 59 ML/MIN/1.7M2
GLUCOSE SERPL-MCNC: 126 MG/DL (ref 70–99)
HCT VFR BLD AUTO: 39.6 % (ref 40–53)
HGB BLD-MCNC: 12.2 G/DL (ref 13.3–17.7)
HGB BLD-MCNC: 12.8 G/DL (ref 13.3–17.7)
MCH RBC QN AUTO: 29.5 PG (ref 26.5–33)
MCHC RBC AUTO-ENTMCNC: 32.3 G/DL (ref 31.5–36.5)
MCV RBC AUTO: 91 FL (ref 78–100)
PLATELET # BLD AUTO: 157 10E9/L (ref 150–450)
POTASSIUM SERPL-SCNC: 4 MMOL/L (ref 3.4–5.3)
RBC # BLD AUTO: 4.34 10E12/L (ref 4.4–5.9)
SODIUM SERPL-SCNC: 145 MMOL/L (ref 133–144)
WBC # BLD AUTO: 5.9 10E9/L (ref 4–11)

## 2017-04-28 PROCEDURE — G0378 HOSPITAL OBSERVATION PER HR: HCPCS

## 2017-04-28 PROCEDURE — 25000128 H RX IP 250 OP 636: Performed by: UROLOGY

## 2017-04-28 PROCEDURE — 36415 COLL VENOUS BLD VENIPUNCTURE: CPT | Performed by: UROLOGY

## 2017-04-28 PROCEDURE — 80048 BASIC METABOLIC PNL TOTAL CA: CPT | Performed by: UROLOGY

## 2017-04-28 PROCEDURE — 85018 HEMOGLOBIN: CPT | Mod: 91 | Performed by: UROLOGY

## 2017-04-28 PROCEDURE — 85027 COMPLETE CBC AUTOMATED: CPT | Performed by: UROLOGY

## 2017-04-28 PROCEDURE — 25000132 ZZH RX MED GY IP 250 OP 250 PS 637: Performed by: UROLOGY

## 2017-04-28 RX ORDER — NITROFURANTOIN 25; 75 MG/1; MG/1
100 CAPSULE ORAL EVERY 12 HOURS
Qty: 14 CAPSULE | Refills: 0 | Status: SHIPPED | OUTPATIENT
Start: 2017-04-28 | End: 2017-05-05

## 2017-04-28 RX ORDER — NALOXONE HYDROCHLORIDE 0.4 MG/ML
.1-.4 INJECTION, SOLUTION INTRAMUSCULAR; INTRAVENOUS; SUBCUTANEOUS
Status: DISCONTINUED | OUTPATIENT
Start: 2017-04-28 | End: 2017-04-29 | Stop reason: HOSPADM

## 2017-04-28 RX ORDER — LOSARTAN POTASSIUM AND HYDROCHLOROTHIAZIDE 12.5; 1 MG/1; MG/1
1 TABLET ORAL DAILY
Status: DISCONTINUED | OUTPATIENT
Start: 2017-04-28 | End: 2017-04-29 | Stop reason: HOSPADM

## 2017-04-28 RX ADMIN — NITROFURANTOIN (MONOHYDRATE/MACROCRYSTALS) 100 MG: 75; 25 CAPSULE ORAL at 22:05

## 2017-04-28 RX ADMIN — LOSARTAN POTASSIUM AND HYDROCHLOROTHIAZIDE 1 TABLET: 100; 12.5 TABLET, FILM COATED ORAL at 16:23

## 2017-04-28 RX ADMIN — SODIUM CHLORIDE: 9 INJECTION, SOLUTION INTRAVENOUS at 08:54

## 2017-04-28 RX ADMIN — OXYCODONE HYDROCHLORIDE 10 MG: 5 TABLET ORAL at 08:53

## 2017-04-28 RX ADMIN — HYDROMORPHONE HYDROCHLORIDE 0.5 MG: 1 INJECTION, SOLUTION INTRAMUSCULAR; INTRAVENOUS; SUBCUTANEOUS at 11:47

## 2017-04-28 RX ADMIN — ONDANSETRON 4 MG: 2 INJECTION INTRAMUSCULAR; INTRAVENOUS at 19:23

## 2017-04-28 RX ADMIN — NITROFURANTOIN (MONOHYDRATE/MACROCRYSTALS) 100 MG: 75; 25 CAPSULE ORAL at 09:50

## 2017-04-28 RX ADMIN — OXYCODONE HYDROCHLORIDE 10 MG: 5 TABLET ORAL at 17:50

## 2017-04-28 RX ADMIN — ATROPA BELLADONNA AND OPIUM 1 SUPPOSITORY: 16.2; 3 SUPPOSITORY RECTAL at 12:11

## 2017-04-28 RX ADMIN — OXYCODONE HYDROCHLORIDE 10 MG: 5 TABLET ORAL at 12:11

## 2017-04-28 RX ADMIN — OXYCODONE HYDROCHLORIDE 10 MG: 5 TABLET ORAL at 05:54

## 2017-04-28 RX ADMIN — OXYCODONE HYDROCHLORIDE 10 MG: 5 TABLET ORAL at 02:01

## 2017-04-28 ASSESSMENT — ACTIVITIES OF DAILY LIVING (ADL)
RETIRED_EATING: 0-->INDEPENDENT
TRANSFERRING: 0-->INDEPENDENT
BATHING: 0-->INDEPENDENT
COGNITION: 0 - NO COGNITION ISSUES REPORTED
TOILETING: 0-->INDEPENDENT
DRESS: 0-->INDEPENDENT
AMBULATION: 0-->INDEPENDENT
SWALLOWING: 0-->SWALLOWS FOODS/LIQUIDS WITHOUT DIFFICULTY
FALL_HISTORY_WITHIN_LAST_SIX_MONTHS: NO
AMBULATION: 0-->INDEPENDENT
BATHING: 0-->INDEPENDENT
TOILETING: 0-->INDEPENDENT
TRANSFERRING: 0-->INDEPENDENT
SWALLOWING: 0-->SWALLOWS FOODS/LIQUIDS WITHOUT DIFFICULTY
DRESS: 0-->INDEPENDENT
RETIRED_COMMUNICATION: 0-->UNDERSTANDS/COMMUNICATES WITHOUT DIFFICULTY
COMMUNICATION: 0-->UNDERSTANDS/COMMUNICATES WITHOUT DIFFICULTY
EATING: 0-->INDEPENDENT

## 2017-04-28 NOTE — PROGRESS NOTES
Urology Daily Progress Note    24 hour events/Subjective:     - No acute events overnight   - Overnight catheter did require irrigation for clots. Patient denies any bladder spasms or suprapubic pain    O:  Vitals: Afebrile, VSS  General: Alert, interactive, in NAD  Resp: Non-labored breathing on RA  Abdomen: Soft, non-tender, non distended.  Ext: Warm and well perfused, No LE edema   Rodriguez: Light pink/watermelon with CBI slow rate. Irrigated at bedside without success (Catheter clogged)    I&O  CBI     Labs/Imaging  Heme:  Recent Labs  Lab 04/28/17  0543   WBC 5.9   HGB 12.8*        Chem:  Recent Labs  Lab 04/28/17  0543   POTASSIUM 4.0   CR 1.24       Assessment/Plan  62 year old y/o male POD#1 s/p HoLEP for BPH.      - Patient to stay until two more voids, check PVRs,     - Other cares as ordered    - Anticipate discharge to home later today           Seen and examined with Dr. Foote    --    Lukasz Mathur MD  Urology Resident           Contacting the Urology Team     Please use the following job codes to reach the Urology Team. Note that you must use an in house phone and that job codes cannot receive text pages.     On weekdays, dial [phone] (or star-star-star 777 on the new [employer] telephones) then [phone] to reach the Adult Urology resident or PA on call    On weekdays, dial [phone] (or star-star-star 777 on the new [employer] telephones) then [phone] to reach the Pediatric Urology resident    On weeknights and weekends, dial [phone] (or star-star-star 777 on the new [employer] telephones) then [phone] to reach the Urology resident on call (for both Adult and Pediatrics)

## 2017-04-28 NOTE — DISCHARGE SUMMARY
Discharge Summary     Sukh Craven MRN# [id number]   YOB: 1955 Age: 62 year old     Date of Admission:  4/27/2017  Date of Discharge::  4/29/2017  9:28 AM  Admitting Physician:  Cresencio Foote MD  Discharge Physician:  Valentine Luciano MD  Primary Care Physician:         Ned Martinez          Admission Diagnoses:   Benign Nodular Prostatic Hyperplasia with Lower Urinary Tract Symptoms           Discharge Diagnosis:   Same as above         Procedures:   4/27/17: Procedure(s):  Holmium Laser Enucleation Of The Prostate  - Wound Class: II-Clean Contaminated        Non-operative procedures:   None performed          Consultations:   None         Imaging Studies:     Results for orders placed or performed in visit on 02/28/17   CT Abdomen Pelvis w/o & w Contrast    Narrative    EXAMINATION: CT ABDOMEN PELVIS W/O & W CONTRAST, 2/28/2017 4:15  PM    TECHNIQUE:  Helical CT images from the lung bases through the  symphysis pubis were obtained  without and with contrast using CT  urogram protocol. Contrast dose: 135 cc Isovue 370    COMPARISON: CT abdomen pelvis 9/27/2004    HISTORY: Renal protocol evaluate hematuria, Hematuria, unspecified,  Dysuria, Benign prostatic hyperplasia with lower urinary tract  symptoms    FINDINGS:    Urinary tract: No stones visualized on the noncontrast examination.  Normal appearance of the kidneys on the cortical medullary phase. No  intraluminal filling defects within the renal collecting systems. The  bladder is not completely evaluated on this scan, but there is central  prostate hypertrophy, increased since 9/27/2004. Coarse desiccation is  within the prostate. The bladder is compressed but there is concentric  bladder wall thickening.    Abdomen and pelvis:   Liver: No suspicious liver lesions. Portal veins appear patent.  Gallbladder: No gallstones. No evidence of acute cholecystitis.  Spleen: Normal size.  Pancreas: No  suspicious pancreatic lesions. The pancreatic duct is not  dilated.  Adrenal glands: Subcentimeter bilateral adrenal nodules.  Bowel: No bowel wall thickening. The appendix is unremarkable.  Lymph nodes: No retroperitoneal, mesenteric, or pelvic  lymphadenopathy.  Fluid: No free fluid within the abdomen.  Vessels: No infrarenal aortic aneurysm.     Lung bases: No consolidation or pleural effusion. Elevation of the  right hemidiaphragm.    Bones and soft tissues: No suspicious osseous lesions.      Impression    IMPRESSION:   1.  No evidence of an intraluminal filling defect within the kidneys  or ureter.  2.  Incomplete evaluation of the bladder secondary to central prostate  hypertrophy. Given the symptoms, direct visualization of the bladder  via cystoscopy may be indicated to rule out an obscured malignancy.  3.  Concentric bladder wall thickening, which may be secondary to  obstruction from the enlarged prostate versus infection.  4.  Small bilateral adrenal nodules, stable since 2004 and therefore  statistically benign.    I have personally reviewed the examination and initial interpretation  and I agree with the findings.    MARE BLOCK MD            Medications Prior to Admission:     Prescriptions Prior to Admission   Medication Sig Dispense Refill Last Dose     CIPROFLOXACIN PO Take 500 mg by mouth 2 times daily   4/27/2017 at Unknown time     ciclopirox 0.77 % GEL Apply twice daily to affected areas 45 g 2 4/25/2017 at Unknown time     losartan-hydrochlorothiazide (HYZAAR) 100-12.5 MG per tablet Take 1 tablet by mouth daily (Patient taking differently: Take 1 tablet by mouth every morning ) 90 tablet 0 4/27/2017 at 1000            Discharge Medications:     Current Discharge Medication List      START taking these medications    Details   nitrofurantoin, macrocrystal-monohydrate, (MACROBID) 100 MG capsule Take 1 capsule (100 mg) by mouth every 12 hours for 7 days  Qty: 14 capsule, Refills: 0     Associated Diagnoses: BPH (benign prostatic hypertrophy) with urinary obstruction         CONTINUE these medications which have NOT CHANGED    Details   CIPROFLOXACIN PO Take 500 mg by mouth 2 times daily      ciclopirox 0.77 % GEL Apply twice daily to affected areas  Qty: 45 g, Refills: 2    Associated Diagnoses: Groin rash      losartan-hydrochlorothiazide (HYZAAR) 100-12.5 MG per tablet Take 1 tablet by mouth daily  Qty: 90 tablet, Refills: 0    Associated Diagnoses: Essential hypertension with goal blood pressure less than 140/90                    Brief History of Illness:   Reason for admission requiring a surgical or invasive procedure:   Benign Nodular Prostatic Hyperplasia with Lower Urinary Tract Symptoms    The patient underwent the following procedure(s):   See above   There were no immediate complications during this procedure.    Please refer to the full operative summary for details.           Hospital Course:   The patient's hospital course was remarkable for development of clot retention on POD#1 after his trial of void.  A catheter was replaced and a large amount of clot was evacuated.  He was restarted on continuous bladder irrigation.  On POD#2 he passed his repeat TOV and was discharged to home without a catheter.     On POD#2 patient was ambulating without assitance, tolerating the discharge diet, had pain controlled with PO medications to go home with, and requiring no IV medications or fluids. Patient was discharged home with appropriate contact information, follow-up and instructions as seen below in the discharge paperwork.       Final Pathology Result:   Pending at time of discharge         Discharge Instructions and Follow-Up:     Discharge Procedure Orders  Activity Restriction   Order Comments: Activity  - No strenuous exercise for 6 weeks.  - No lifting, pushing, pulling more than 10 pounds for 6 weeks.   - Do not strain with bowel movements.  - Do not drive until you can press the  brake pedal quickly and fully without pain.   - Do not operate a motor vehicle while taking narcotic pain medications.     Urination   Order Comments: Urination  - Catheter will be removed prior to discharge. Some patient will need to have catheter replaced before leaving the hospital and will go home with the catheter for a few days, this does not mean the operation has failed but the prostate may need more time to recover  - Some light redness (up to a watermelon-like-pink in color) is expected in the upcoming 7-10 day and may last as long as 4 weeks.  You can also expect some debris.  - If having blood in the urine, make sure to increase your water intake and monitor for improvement  - If you are unable to urinate you should return urgently to the ED or call clinic to try to arrange for an urgent visit same day.   - If you are going home with a catheter: Nurse/ to write care and instructions.  Treat the catheter like an extension of your body; do not let it get caught or snagged on anything.  Leave the catheter in place until your follow up. Call the numbers listed above for any concerns.     Medications   Order Comments: Medications  - Transition from narcotic pain medications to tylenol (acetaminophen) as you are able.  Wean yourself off all pain medications as you are able.  - Some pain medications contain both tylenol (acetaminophen) and a narcotic (Norco, vicodin, percocet), do not take more than 4,000mg of Tylenol (acetaminophen) from all sources in any 24 hour period.  - Narcotics can make you constipated.  Take over the counter fiber (metamucil or benefiber) and stool softeners (miralax, docusate or senna) while taking narcotic pain medications, but stop if you develop diarrhea.  - No driving or operating machinery while taking narcotic pain medications     Follow up   Order Comments: Follow-Up:  - Follow up with your surgeon as previously scheduled  - Call your primary care provider to touch  "base regarding your recent admission.   - Call or return sooner than your regularly scheduled visit if you develop any of the following: fever (greater than 101.5), uncontrolled pain, uncontrolled nausea or vomiting, as well as increased redness, swelling, or drainage from your wound.     No Alcohol   Order Comments: For 24 hours post procedure     Diet Instructions   Order Comments: Resume pre-procedure diet     Contact Information   Order Comments: Phone numbers:   - Monday through Friday 8am to 4:30pm: Call [phone] with questions or to schedule or confirm appointment.    - Nights or weekends: call the after hours emergency pager - [phone] and tell the  \"I would like to page the Urology Resident on call.\"  - For emergencies, call [phone]              Discharge Disposition:   Discharged to Home      Condition at discharge: Good    --    Valentine Luciano MD  Urology Resident    6:28 PM, 4/28/2017    "

## 2017-04-28 NOTE — PROGRESS NOTES
Care Coordinator- Discharge Planning     Admission Date/Time:  4/27/2017  Attending MD:  Cresencio Foote MD     Data  Date of initial CC assessment:  4/28/17  Chart reviewed, discussed with interdisciplinary team.   Patient was admitted for: OP procedure     Assessment  Concerns with insurance coverage for discharge needs: None.  Current Living Situation: Patient lives alone.  Support System: Supportive  Services Involved: None identified  Transportation: Car  Barriers to Discharge: None identified    This CC met with patient at bedside, denies any concerns with DC. Maureen son will provide ride at time of discharge      Plan  Anticipated Discharge Date:  4/28/2017  Anticipated Discharge Plan:  Home    CTS Handoff completed:  YES    Maren Nickerson, RN

## 2017-04-28 NOTE — PLAN OF CARE
"Problem: Goal Outcome Summary  Goal: Goal Outcome Summary  Outcome: Improving  Admission to 10A     Pt arrived on stretcher from PACU, transferred via hovermat into bed, oriented to call light and room, performed routine assessments and entered appropriate data into computer, VSS, afebrile, A&Ox3, pt denied pain but reported having increased \"pressure \" around bladder area, tried to explain possible etiology of pressure sensation, pt wanted to dangle and stand for a little while, dripping blood was noticed coming from meatus and pleitez catheter did not drain appropriately, CBI stopped and bladder irrigation/clot evacuation performed x2, multiple small clots/sediments obtained and pleitez catheter now running clear again and CBI is continued for now, pt reported significant relief from pressure sensation, denies nausea or dizziness, no SOB or CP reported, tolerated standing well, no passing gas yet, PIV infusing, family members here to visit, MD notified about irrigation and will continue to monitor for further clots and necessity for bladder irrigation, continue to follow POC.    Update: CBI running on medium/heavy and output is light water melon almost clear, pt is comfortably resting in bed.   Update: CBI titrated to medium/slow and urine is clear to very light watermelon color, pt has been resting appropriately, oxycodone given as needed, VSS, afebrile, denies nausea or dizziness, no SOB or CP reported, pt able to make needs known, will continue to monitor and assist.   "

## 2017-04-28 NOTE — PROVIDER NOTIFICATION
Pt c/o extreme pain. Pt sweating profusely and shaking, stating that he needs to urinate but can't.  IV dilaudid administered and urology contacted right away.    Urology asked for bladder scan.  Results were 199ml.  Pt continued to be in pain, with elevated BP. Urology notified and coming to put pleitez in pt.

## 2017-04-28 NOTE — ANESTHESIA POSTPROCEDURE EVALUATION
Patient: Sukh Craven    Procedure(s):  Holmium Laser Enucleation Of The Prostate  - Wound Class: II-Clean Contaminated    Diagnosis:Benign Nodular Prostatic Hyperplasia with Lower Urinary Tract Symptoms   Diagnosis Additional Information: No value filed.    Anesthesia Type:  General, LMA    Note:  Anesthesia Post Evaluation    Patient location during evaluation: PACU and Bedside  Patient participation: Able to fully participate in evaluation  Level of consciousness: awake and alert  Pain management: adequate  Airway patency: patent  Cardiovascular status: acceptable  Respiratory status: acceptable  Hydration status: balanced  PONV: none     Anesthetic complications: None          Last vitals:  Vitals:    04/27/17 1930 04/27/17 1954 04/27/17 2104   BP:  158/88 144/85   Pulse:  61    Resp:      Temp: 36.5  C (97.7  F) 35.4  C (95.8  F)    SpO2:  96% 98%         Electronically Signed By: Garima Tellez MD  April 27, 2017  11:16 PM

## 2017-04-28 NOTE — PLAN OF CARE
Problem: Goal Outcome Summary  Goal: Goal Outcome Summary  Outcome: Therapy, progress toward functional goals as expected  /95, called to urology 15:55, resident will restart BP meds.

## 2017-04-28 NOTE — PROGRESS NOTES
UROLOGY BRIEF NOTE    Paged regarding by RN reporting patient in severe pain, profusely sweating.  He had been urinating mirian blood with large amounts of clot after catheter removal but PVRs were low.  Now he is reporting he is unable to urinate.      Patient was prepped and draped. 20 ml of 2% lidocaine jelly instilled per urethra.  A 24Fr 3-way catheter was inserted with 600 ml return of red urine.  I was able to irrigate out about 50 ml of clot but catheter was then clogging.  I removed the catheter and placed a 24Fr Rousch catheter and was then able to irrigate out another 150-200 ml of clot. Clot was located almost completely in the prostatic fossa (none in bladder).  50 ml put in the balloon and restarted CBI which was crystal clear on moderate rate.  Patient reported significant improvement in pain.      Will plan on changing to obs status and keeping patient one additional night for bleeding.    - Wean CBI as able  - Recheck labs tomorrow AM  - If CBI remains clear will plan on another TOV tomorrow morning and discharge at that time.    Discussed with Dr. Dary Mathur MD  Urology Resident

## 2017-04-28 NOTE — PROGRESS NOTES
Pt moved to observation status.  Observation paperwork printed and given to pt.  Change in status explained, pt has no further questions at this point.

## 2017-04-28 NOTE — PLAN OF CARE
Problem: Goal Outcome Summary  Goal: Goal Outcome Summary  Outcome: No Change  Patient A&O, lungs CTA, bowel sounds active-not passing gas yet. Pt taking oxycodone for pain. No numbness, tingling, weakness or SOB per pt report. Fluids promoted, IV fluids infusing, NS @100ml/hr. Pt had catheter and CBI removed this morning by urology.  Pt was able to urinate, though output was mostly large clots.  PVRs were in the low teens.  About noon pt reported increasing pain and struggling to urinate.  Urology notified and asked to bladder scan pt.  In the time it took to scan pt and update urology, pt had begun to sweat profusely and shake, along with having elevated BP d/t pain. Urology came over to place new pleitez and irrigate.   Pt responded well, CBI restarted, and pt staying overnight for observation. Pt independent in room, able to make needs known, and uses call light apporpriately.  Will continue to monitor.

## 2017-04-29 VITALS
TEMPERATURE: 98.6 F | OXYGEN SATURATION: 96 % | DIASTOLIC BLOOD PRESSURE: 87 MMHG | SYSTOLIC BLOOD PRESSURE: 156 MMHG | WEIGHT: 227.96 LBS | HEIGHT: 77 IN | BODY MASS INDEX: 26.92 KG/M2 | HEART RATE: 81 BPM | RESPIRATION RATE: 16 BRPM

## 2017-04-29 LAB
ANION GAP SERPL CALCULATED.3IONS-SCNC: 4 MMOL/L (ref 3–14)
BUN SERPL-MCNC: 11 MG/DL (ref 7–30)
CALCIUM SERPL-MCNC: 7.8 MG/DL (ref 8.5–10.1)
CHLORIDE SERPL-SCNC: 107 MMOL/L (ref 94–109)
CO2 SERPL-SCNC: 31 MMOL/L (ref 20–32)
CREAT SERPL-MCNC: 1.17 MG/DL (ref 0.66–1.25)
ERYTHROCYTE [DISTWIDTH] IN BLOOD BY AUTOMATED COUNT: 13.9 % (ref 10–15)
GFR SERPL CREATININE-BSD FRML MDRD: 64 ML/MIN/1.7M2
GLUCOSE SERPL-MCNC: 126 MG/DL (ref 70–99)
HCT VFR BLD AUTO: 29.8 % (ref 40–53)
HGB BLD-MCNC: 9.7 G/DL (ref 13.3–17.7)
MCH RBC QN AUTO: 29.1 PG (ref 26.5–33)
MCHC RBC AUTO-ENTMCNC: 32.6 G/DL (ref 31.5–36.5)
MCV RBC AUTO: 90 FL (ref 78–100)
PLATELET # BLD AUTO: 141 10E9/L (ref 150–450)
POTASSIUM SERPL-SCNC: 3.5 MMOL/L (ref 3.4–5.3)
RBC # BLD AUTO: 3.33 10E12/L (ref 4.4–5.9)
SODIUM SERPL-SCNC: 142 MMOL/L (ref 133–144)
WBC # BLD AUTO: 5.6 10E9/L (ref 4–11)

## 2017-04-29 PROCEDURE — 36415 COLL VENOUS BLD VENIPUNCTURE: CPT | Performed by: UROLOGY

## 2017-04-29 PROCEDURE — G0378 HOSPITAL OBSERVATION PER HR: HCPCS

## 2017-04-29 PROCEDURE — 80048 BASIC METABOLIC PNL TOTAL CA: CPT | Performed by: UROLOGY

## 2017-04-29 PROCEDURE — 25000132 ZZH RX MED GY IP 250 OP 250 PS 637: Performed by: UROLOGY

## 2017-04-29 PROCEDURE — 85027 COMPLETE CBC AUTOMATED: CPT | Performed by: UROLOGY

## 2017-04-29 RX ADMIN — NITROFURANTOIN (MONOHYDRATE/MACROCRYSTALS) 100 MG: 75; 25 CAPSULE ORAL at 08:07

## 2017-04-29 RX ADMIN — LOSARTAN POTASSIUM AND HYDROCHLOROTHIAZIDE 1 TABLET: 100; 12.5 TABLET, FILM COATED ORAL at 08:07

## 2017-04-29 NOTE — PROGRESS NOTES
"Urology Daily Progress Note    24 hour events/Subjective:     - No acute events overnight   - Pain well controlled on current regimen   - Tolerating regular diet ; no nausea or vomiting   - +flatus, +BM   - ambulating    O:  Vitals: /87 (BP Location: Left arm)  Pulse 75  Temp 98.2  F (36.8  C) (Oral)  Resp 20  Ht 1.956 m (6' 5\")  Wt 103.4 kg (227 lb 15.3 oz)  SpO2 98%  BMI 27.03 kg/m2  General: Alert, interactive, in NAD  Resp: Non-labored breathing on RA  Abdomen: Soft, non-tender;  Ext: Warm and well perfused   Rodriguez/Urostomy: Light pink/watermelon with continous bladder irrigation on very slow gtt   Drain: none    I/O last 3 completed shifts:  In: 1000 [P.O.:1000]  Out: 03919 [Urine:77873] - Last 24 hours      Labs/Imaging  Heme:  Recent Labs  Lab 04/28/17  1010 04/28/17  0543   WBC  --  5.9   HGB 12.2* 12.8*   PLT  --  157     Chem:  Recent Labs  Lab 04/28/17  0543   POTASSIUM 4.0   CR 1.24       Assessment/Plan  62 year old y/o male POD#2  s/p  HOLEP. Doing well    - Trial of void this morning.  - Anticipate discharge to home this morning.  - Macrobid x 7 days    Discussed with Dr. Foote.  --    Valentine Luciano MD  Urology Resident           Contacting the Urology Team     Please use the following job codes to reach the Urology Team. Note that you must use an in house phone and that job codes cannot receive text pages.     On weekdays, dial [phone] (or star-star-star [phone] on the new [facility] telephones) then [phone] to reach the Adult Urology resident or PA on call    On weekdays, dial [phone] (or star-star-star [phone] on the new [facility] telephones) then [phone] to reach the Pediatric Urology resident    On weeknights and weekends, dial [phone] (or star-star-star [phone] on the new [facility] telephones) then [phone] to reach the Urology resident on call (for both Adult and Pediatrics)              "

## 2017-04-29 NOTE — PLAN OF CARE
Problem: Goal Outcome Summary  Goal: Goal Outcome Summary  Outcome: Therapy, progress toward functional goals as expected  April 29, 2017. Note for the night shift.  D:  Pt is on Observation status.  Slept well tonight. Denied pain all night. Has no appetite for food. Drinking ginger ale without problems. IV saline locked this AM before the pleitez came out, so it would be easier for him to get up and void. Urine pink all night with the CBI at a moderately slow drip rate. At 0630 this AM  In and pulled out the pleitez. Shortly after that voided and scanned for 30 cc. Dr called with the results. At 0700 Voided 100cc and 15 minutes later scanned for 11cc. Day nurse aware and will contact the Urology DR. Call light with in reach.Able to make needs known.  A: Doing OK.P: Monitor closely.  Supportive cares. Medicate for pain as needed.Encourage activity today. Let Dr know pt does not have appetite yet for food.      Observation goals are:  1: Vitals will be stable. .   2: Tolerate po intake.  3: Pain under control on po meds.   0000; Vitals stable at 2200, Sipping ginger ale.  Denies pain.   0200 Sleeping .  0400 Vitals not checked at this time. Sipping ginger ale. Denies pain.  0600  Vitals not checked at this time. Will be done in the AM. Tolerating ginger ale but says really does not feel like eating food.   3; Still denying pain.

## 2017-04-29 NOTE — PLAN OF CARE
"Problem: Goal Outcome Summary  Goal: Goal Outcome Summary  Outcome: Therapy, progress toward functional goals as expected  Start of shift offered pain meds, pt wanted to wait. CDI running clear, no clots. Pt did try some juice & jello. The urology resident came & flushed the cath. At 19:00 pt did have nausea & emesis. Pt was sweating, vitals done & BP was elevated at 211/104. zofran was given. BP rechecked at 19:30 was 195/102, rechecked at 20:30 145/80. Pt felt \"relaxed\" nausea gone. CBI continues to be light pink & clear.       "

## 2017-04-29 NOTE — PLAN OF CARE
Problem: Goal Outcome Summary  Goal: Goal Outcome Summary  Outcome: Adequate for Discharge Date Met:  04/29/17  Pt. discharged at 0930 to home. Pt. was accompanied by family, and left with personal belongings. Pt. received complete discharge paperwork and 1 medication as filled by discharge pharmacy. Pt. was given times of last dose for all discharge medications in writing on discharge medication sheets. Discharge teaching included medication, pain management, activity restrictions,  and signs and symptoms of infection. Pt. to follow up with urology in 4 weeks. Pt. had no further questions at the time of discharge and no unmet needs were identified.

## 2017-05-01 ENCOUNTER — TELEPHONE (OUTPATIENT)
Dept: FAMILY MEDICINE | Facility: CLINIC | Age: 62
End: 2017-05-01

## 2017-05-01 NOTE — TELEPHONE ENCOUNTER
This patient was discharged from [facility] on 04/29/2017.    Discharge Diagnosis: Benign prostatic hypertrophy with urinary obstruction , benign nodular    Follow-up instructions: Call your primary care provider to touch base regarding your recent admission    A follow-up visit has not been scheduled.      Number of ED/ER visits in the last 12 months:  0     Please follow-up with patient.    Thelma Rodriguez

## 2017-05-02 LAB — COPATH REPORT: NORMAL

## 2017-05-02 NOTE — TELEPHONE ENCOUNTER
"Hospital/TCU/ED for chronic condition Discharge Protocol    \"Hi, my name is Juan Valadez, a registered nurse, and I am calling from [facility].  I am calling to follow up and see how things are going for you after your recent emergency visit/hospital/TCU stay.\"    Tell me how you are doing now that you are home?\" So far so good, a little but tired      Discharge Instructions    \"Let's review your discharge instructions.  What is/are the follow-up recommendations?  Pt. Response: follow up with surgeon and PCP. Went over follow up and warning signs to seek care    \"Has an appointment with your primary care provider been scheduled?\"   Yes. (confirm)    \"When you see the provider, I would recommend that you bring your medications with you.\"    Medications    \"Tell me what changed about your medicines when you discharged?\"    Changes to chronic meds?    0-1    \"What questions do you have about your medications?\"    None     New diagnoses of heart failure, COPD, diabetes, or MI?    No              Medication reconciliation completed? Yes  Was MTM referral placed (*Make sure to put transitions as reason for referral)?   No    Call Summary    \"What questions or concerns do you have about your recent visit and your follow-up care?\"     none    \"If you have questions or things don't continue to improve, we encourage you contact us through the main clinic number (give number).  Even if the clinic is not open, triage nurses are available 24/7 to help you.     We would like you to know that our clinic has extended hours (provide information).  We also have urgent care (provide details on closest location and hours/contact info)\"      \"Thank you for your time and take care!\"             "

## 2017-05-05 ENCOUNTER — OFFICE VISIT (OUTPATIENT)
Dept: FAMILY MEDICINE | Facility: CLINIC | Age: 62
End: 2017-05-05
Payer: COMMERCIAL

## 2017-05-05 VITALS
DIASTOLIC BLOOD PRESSURE: 84 MMHG | TEMPERATURE: 98.7 F | HEART RATE: 72 BPM | HEIGHT: 77 IN | BODY MASS INDEX: 26.33 KG/M2 | SYSTOLIC BLOOD PRESSURE: 140 MMHG | WEIGHT: 223 LBS

## 2017-05-05 DIAGNOSIS — D62 ANEMIA DUE TO BLOOD LOSS, ACUTE: Primary | ICD-10-CM

## 2017-05-05 DIAGNOSIS — N18.2 CKD (CHRONIC KIDNEY DISEASE) STAGE 2, GFR 60-89 ML/MIN: ICD-10-CM

## 2017-05-05 DIAGNOSIS — B35.4 TINEA CORPORIS: ICD-10-CM

## 2017-05-05 DIAGNOSIS — N40.1 BENIGN NON-NODULAR PROSTATIC HYPERPLASIA WITH LOWER URINARY TRACT SYMPTOMS: ICD-10-CM

## 2017-05-05 LAB
ERYTHROCYTE [DISTWIDTH] IN BLOOD BY AUTOMATED COUNT: 14.3 % (ref 10–15)
HCT VFR BLD AUTO: 30.6 % (ref 40–53)
HGB BLD-MCNC: 10 G/DL (ref 13.3–17.7)
MCH RBC QN AUTO: 29.8 PG (ref 26.5–33)
MCHC RBC AUTO-ENTMCNC: 32.7 G/DL (ref 31.5–36.5)
MCV RBC AUTO: 91 FL (ref 78–100)
PLATELET # BLD AUTO: 270 10E9/L (ref 150–450)
RBC # BLD AUTO: 3.36 10E12/L (ref 4.4–5.9)
WBC # BLD AUTO: 5.7 10E9/L (ref 4–11)

## 2017-05-05 PROCEDURE — 80048 BASIC METABOLIC PNL TOTAL CA: CPT | Performed by: PHYSICIAN ASSISTANT

## 2017-05-05 PROCEDURE — 99495 TRANSJ CARE MGMT MOD F2F 14D: CPT | Performed by: PHYSICIAN ASSISTANT

## 2017-05-05 PROCEDURE — 85027 COMPLETE CBC AUTOMATED: CPT | Performed by: PHYSICIAN ASSISTANT

## 2017-05-05 PROCEDURE — 36415 COLL VENOUS BLD VENIPUNCTURE: CPT | Performed by: PHYSICIAN ASSISTANT

## 2017-05-05 RX ORDER — FERROUS GLUCONATE 324(38)MG
324 TABLET ORAL
Qty: 30 TABLET | Refills: 0 | Status: SHIPPED | OUTPATIENT
Start: 2017-05-05 | End: 2017-06-23

## 2017-05-05 RX ORDER — KETOCONAZOLE 200 MG/1
200 TABLET ORAL DAILY
Qty: 14 TABLET | Refills: 0 | Status: SHIPPED | OUTPATIENT
Start: 2017-05-05 | End: 2017-06-23

## 2017-05-05 NOTE — MR AVS SNAPSHOT
"              After Visit Summary   5/5/2017    Sukh Craven    MRN: [id number]           Patient Information     Date Of Birth          1955        Visit Information        Provider Department      5/5/2017 3:00 PM Ned Martinez PA-C [facility]        Today's Diagnoses     CKD (chronic kidney disease) stage 2, GFR 60-89 ml/min    -  1    Anemia due to blood loss, acute        Benign non-nodular prostatic hyperplasia with lower urinary tract symptoms        Tinea corporis           Follow-ups after your visit        Your next 10 appointments already scheduled     May 30, 2017 10:00 AM CDT   (Arrive by 9:45 AM)   Post-Op with Cresencio Foote MD   [facility] Urology and [facility] for Prostate and Urologic Cancers ([facility])    [address] [place] [address] [phone]              Who to contact     If you have questions or need follow up information about today's clinic visit or your schedule please contact [facility] directly at [phone].  Normal or non-critical lab and imaging results will be communicated to you by [employer], letter or phone within 4 business days after the clinic has received the results. If you do not hear from us within 7 days, please contact the clinic through [employer] or phone. If you have a critical or abnormal lab result, we will notify you by phone as soon as possible.  Submit refill requests through [employer] or call your pharmacy and they will forward the refill request to us. Please allow 3 business days for your refill to be completed.          Additional Information About Your Visit        MyChart Information     [employer] lets you send messages to your doctor, view your test results, renew your prescriptions, schedule appointments and more. To sign up, go to [email or web address] . Click on \"Log in\" on the left side of the screen, which will take you to the Welcome page. Then " "click on \"Sign up Now\" on the right side of the page.     You will be asked to enter the access code listed below, as well as some personal information. Please follow the directions to create your username and password.     Your access code is: [email or web address]  Expires: 2017 10:56 AM     Your access code will  in 90 days. If you need help or a new code, please call your [place] clinic or [phone].        Care EveryWhere ID     This is your Care EveryWhere ID. This could be used by other organizations to access your [place] medical records  [id number]        Your Vitals Were     Pulse Temperature Height BMI (Body Mass Index)          72 98.7  F (37.1  C) (Oral) 6' 5\" (1.956 m) 26.44 kg/m2         Blood Pressure from Last 3 Encounters:   17 140/84   17 156/87   17 (!) 179/100    Weight from Last 3 Encounters:   17 223 lb (101.2 kg)   17 227 lb 15.3 oz (103.4 kg)   17 229 lb 11.2 oz (104.2 kg)              We Performed the Following     Basic metabolic panel  (Ca, Cl, CO2, Creat, Gluc, K, Na, BUN)     CBC with platelets          Today's Medication Changes          These changes are accurate as of: 17  3:40 PM.  If you have any questions, ask your nurse or doctor.               Start taking these medicines.        Dose/Directions    ferrous gluconate 324 (38 FE) MG tablet   Commonly known as:  FERGON   Used for:  Anemia due to blood loss, acute   Started by:  Ned Martinez PA-C        Dose:  324 mg   Take 1 tablet (324 mg) by mouth daily (with breakfast)   Quantity:  30 tablet   Refills:  0       ketoconazole 200 MG tablet   Commonly known as:  NIZORAL   Used for:  Tinea corporis   Started by:  Ned Martinez PA-C        Dose:  200 mg   Take 1 tablet (200 mg) by mouth daily   Quantity:  14 tablet   Refills:  0         These medicines have changed or have updated prescriptions.        Dose/Directions    losartan-hydrochlorothiazide 100-12.5 MG per tablet "   Commonly known as:  HYZAAR   This may have changed:  when to take this   Used for:  Essential hypertension with goal blood pressure less than 140/90        Dose:  1 tablet   Take 1 tablet by mouth daily   Quantity:  90 tablet   Refills:  0         Stop taking these medicines if you haven't already. Please contact your care team if you have questions.     ciclopirox 0.77 % Gel   Stopped by:  Ned Martinez PA-C                Where to get your medicines      These medications were sent to [facility] [address] [place] [address] BR[place]NEY LEE  [address] AT [facility] OF  [address]  [address] [facility], [place] [address]     Phone:  [phone]     ferrous gluconate 324 (38 FE) MG tablet    ketoconazole 200 MG tablet                Primary Care Provider Office Phone # Fax #    Ned Martinez PA-C [phone]       [address] [address]        Thank you!     Thank you for choosing [facility]  for your care. Our goal is always to provide you with excellent care. Hearing back from our patients is one way we can continue to improve our services. Please take a few minutes to complete the written survey that you may receive in the mail after your visit with us. Thank you!             Your Updated Medication List - Protect others around you: Learn how to safely use, store and throw away your medicines at www.disposemymeds.org.          This list is accurate as of: 5/5/17  3:40 PM.  Always use your most recent med list.                   Brand Name Dispense Instructions for use    ferrous gluconate 324 (38 FE) MG tablet    FERGON    30 tablet    Take 1 tablet (324 mg) by mouth daily (with breakfast)       ketoconazole 200 MG tablet    NIZORAL    14 tablet    Take 1 tablet (200 mg) by mouth daily       losartan-hydrochlorothiazide 100-12.5 MG per tablet    HYZAAR    90 tablet    Take 1 tablet by mouth daily        nitrofurantoin (macrocrystal-monohydrate) 100 MG capsule    MACROBID    14 capsule    Take 1 capsule (100 mg) by mouth every 12 hours for 7 days

## 2017-05-05 NOTE — PROGRESS NOTES
SUBJECTIVE:                                                    Sukh Craven is a 62 year old male who presents to clinic today for the following health issues:      Hospital Follow-up Visit:    Hospital/Nursing Home/IP Rehab Facility: [facility]  Date of Admission: 4/27/17  Date of Discharge: 4/29/17  Reason(s) for Admission: Benign nodular prostatic hyperplasia with lower urinary tract symptoms       Status post Holmium Laser enucleation of the prostate with complications of clot formation resulting in bladder outlet obstruction needing catheterization and further hospitalization. He reports great improvements in all symptoms, he was up instead of hourly only after a 4 hour stretch and bleeding is improving. He did have significant post surgical blood loss resulting in the need for transfusion and oral iron replacement. He feels fatigued.     Hemoglobin was trending up at discharge.          Problems taking medications regularly:  None       Medication changes since discharge: None       Problems adhering to non-medication therapy:  None    Summary of hospitalization:  [facility] discharge summary reviewed  Diagnostic Tests/Treatments reviewed.  Follow up needed: none  Other Healthcare Providers Involved in Patient s Care:         None  Update since discharge: improved.   Post Discharge Medication Reconciliation: discharge medications reconciled, continue medications without change.  Plan of care communicated with patient     Coding guidelines for this visit:  Type of Medical   Decision Making Face-to-Face Visit       within 7 Days of discharge Face-to-Face Visit        within 14 days of discharge   Moderate Complexity 32860 [id number]   High Complexity 87480 48565            Problem list and histories reviewed & adjusted, as indicated.  Additional history: as documented    Labs reviewed in EPIC    Reviewed and updated as needed this visit by clinical staff  Tobacco  Allergies  Med  "Hx  Surg Hx  Fam Hx  Soc Hx      Reviewed and updated as needed this visit by Provider         ROS:  Constitutional, HEENT, cardiovascular, pulmonary, gi and gu systems are negative, except as otherwise noted.    OBJECTIVE:                                                    /84 (Cuff Size: Adult Large)  Pulse 72  Temp 98.7  F (37.1  C) (Oral)  Ht 6' 5\" (1.956 m)  Wt 223 lb (101.2 kg)  BMI 26.44 kg/m2  Body mass index is 26.44 kg/(m^2).  GENERAL: healthy, alert and no distress  RESP: lungs clear to auscultation - no rales, rhonchi or wheezes  CV: regular rate and rhythm, normal S1 S2, no S3 or S4, no murmur, click or rub, no peripheral edema and peripheral pulses strong  ABDOMEN: soft, nontender, no hepatosplenomegaly, no masses and bowel sounds normal  SKIN: no suspicious lesions or rashes    Lab Results   Component Value Date    HGB 10.0 05/05/2017           ASSESSMENT/PLAN:                                                      (D62) Anemia due to blood loss, acute  (primary encounter diagnosis)  Comment:   Plan: CBC with platelets, ferrous gluconate (FERGON)         324 (38 FE) MG tablet        Rechecking - HGB is going up. Continue oral iron. Watch for increasing blood loss, sounds like he's had improvements. Monitoring parameters reviewed     (N18.2) CKD (chronic kidney disease) stage 2, GFR 60-89 ml/min  Comment:   Plan: Basic metabolic panel  (Ca, Cl, CO2, Creat,         Gluc, K, Na, BUN)        Recheck labs     (N40.1) Benign non-nodular prostatic hyperplasia with lower urinary tract symptoms  Comment:   Plan: Symptom improvements     (B35.4) Tinea corporis  Comment:   Plan: ketoconazole (NIZORAL) 200 MG tablet        Persistent groin rash known fungal infection. Will treat orals.     ДМИТРИЙ ORDAZ PA-C  [facility]  "

## 2017-05-05 NOTE — LETTER
[address] [address] [phone]      May 9, 2017      Sukh Craven  [address] [address]  [facility] [address]              Dear Sukh,    Kidney labs are good. The blood count has improved a bit. Let me know if you are feeling lousy and we need to recheck your blood counts when you are back from your trip.     Thanks.      Sincerely,    Ned Martinez PA-C/sd    Results for orders placed or performed in visit on 05/05/17   CBC with platelets   Result Value Ref Range    WBC 5.7 4.0 - 11.0 10e9/L    RBC Count 3.36 (L) 4.4 - 5.9 10e12/L    Hemoglobin 10.0 (L) 13.3 - 17.7 g/dL    Hematocrit 30.6 (L) 40.0 - 53.0 %    MCV 91 78 - 100 fl    MCH 29.8 26.5 - 33.0 pg    MCHC 32.7 31.5 - 36.5 g/dL    RDW 14.3 10.0 - 15.0 %    Platelet Count 270 150 - 450 10e9/L   Basic metabolic panel  (Ca, Cl, CO2, Creat, Gluc, K, Na, BUN)   Result Value Ref Range    Sodium 142 133 - 144 mmol/L    Potassium 3.9 3.4 - 5.3 mmol/L    Chloride 107 94 - 109 mmol/L    Carbon Dioxide 26 20 - 32 mmol/L    Anion Gap 9 3 - 14 mmol/L    Glucose 83 70 - 99 mg/dL    Urea Nitrogen 17 7 - 30 mg/dL    Creatinine 1.10 0.66 - 1.25 mg/dL    GFR Estimate 68 >60 mL/min/1.7m2    GFR Estimate If Black 83 >60 mL/min/1.7m2    Calcium 8.8 8.5 - 10.1 mg/dL

## 2017-05-05 NOTE — NURSING NOTE
"Chief Complaint   Patient presents with     Hospital F/U       Initial /84 (Cuff Size: Adult Large)  Pulse 72  Temp 98.7  F (37.1  C) (Oral)  Ht 6' 5\" (1.956 m)  Wt 223 lb (101.2 kg)  BMI 26.44 kg/m2 Estimated body mass index is 26.44 kg/(m^2) as calculated from the following:    Height as of this encounter: 6' 5\" (1.956 m).    Weight as of this encounter: 223 lb (101.2 kg).  Medication Reconciliation: complete   Guadalupe Bailey, Certified Medical Assistant (AAMA)     "

## 2017-05-06 LAB
ANION GAP SERPL CALCULATED.3IONS-SCNC: 9 MMOL/L (ref 3–14)
BUN SERPL-MCNC: 17 MG/DL (ref 7–30)
CALCIUM SERPL-MCNC: 8.8 MG/DL (ref 8.5–10.1)
CHLORIDE SERPL-SCNC: 107 MMOL/L (ref 94–109)
CO2 SERPL-SCNC: 26 MMOL/L (ref 20–32)
CREAT SERPL-MCNC: 1.1 MG/DL (ref 0.66–1.25)
GFR SERPL CREATININE-BSD FRML MDRD: 68 ML/MIN/1.7M2
GLUCOSE SERPL-MCNC: 83 MG/DL (ref 70–99)
POTASSIUM SERPL-SCNC: 3.9 MMOL/L (ref 3.4–5.3)
SODIUM SERPL-SCNC: 142 MMOL/L (ref 133–144)

## 2017-05-09 ENCOUNTER — TELEPHONE (OUTPATIENT)
Dept: FAMILY MEDICINE | Facility: CLINIC | Age: 62
End: 2017-05-09

## 2017-05-09 NOTE — TELEPHONE ENCOUNTER
Forms received from patient for Rodolfo Martinez PA-C.  Forms placed in provider 'sign me' folder.  Please place form at  for pickup after completion.    Melvi Richardson,

## 2017-05-09 NOTE — TELEPHONE ENCOUNTER
Forms completed, signed, copy made for chart and placed at  for pickup. Spoke with patient.    Melvi Richardson,

## 2017-05-23 ENCOUNTER — PRE VISIT (OUTPATIENT)
Dept: UROLOGY | Facility: CLINIC | Age: 62
End: 2017-05-23

## 2017-05-23 NOTE — TELEPHONE ENCOUNTER
Patient coming in for surgical follow up. Spoke with patient to arrive to clinic with a full bladder and patient verbalized understanding

## 2017-06-06 ENCOUNTER — OFFICE VISIT (OUTPATIENT)
Dept: UROLOGY | Facility: CLINIC | Age: 62
End: 2017-06-06

## 2017-06-06 VITALS
DIASTOLIC BLOOD PRESSURE: 111 MMHG | BODY MASS INDEX: 26.33 KG/M2 | SYSTOLIC BLOOD PRESSURE: 157 MMHG | HEIGHT: 77 IN | HEART RATE: 72 BPM | WEIGHT: 223 LBS

## 2017-06-06 DIAGNOSIS — N40.1 BENIGN NODULAR PROSTATIC HYPERPLASIA WITH LOWER URINARY TRACT SYMPTOMS: ICD-10-CM

## 2017-06-06 DIAGNOSIS — N40.1 BENIGN NODULAR PROSTATIC HYPERPLASIA WITH LOWER URINARY TRACT SYMPTOMS: Primary | ICD-10-CM

## 2017-06-06 LAB
BASOPHILS # BLD AUTO: 0.1 10E9/L (ref 0–0.2)
BASOPHILS NFR BLD AUTO: 1.6 %
DIFFERENTIAL METHOD BLD: ABNORMAL
EOSINOPHIL # BLD AUTO: 0.1 10E9/L (ref 0–0.7)
EOSINOPHIL NFR BLD AUTO: 2.8 %
ERYTHROCYTE [DISTWIDTH] IN BLOOD BY AUTOMATED COUNT: 13.8 % (ref 10–15)
HCT VFR BLD AUTO: 40.8 % (ref 40–53)
HGB BLD-MCNC: 12.6 G/DL (ref 13.3–17.7)
IMM GRANULOCYTES # BLD: 0 10E9/L (ref 0–0.4)
IMM GRANULOCYTES NFR BLD: 0.2 %
LYMPHOCYTES # BLD AUTO: 1.5 10E9/L (ref 0.8–5.3)
LYMPHOCYTES NFR BLD AUTO: 34.8 %
MCH RBC QN AUTO: 28.6 PG (ref 26.5–33)
MCHC RBC AUTO-ENTMCNC: 30.9 G/DL (ref 31.5–36.5)
MCV RBC AUTO: 93 FL (ref 78–100)
MONOCYTES # BLD AUTO: 0.4 10E9/L (ref 0–1.3)
MONOCYTES NFR BLD AUTO: 8.2 %
NEUTROPHILS # BLD AUTO: 2.2 10E9/L (ref 1.6–8.3)
NEUTROPHILS NFR BLD AUTO: 52.4 %
NRBC # BLD AUTO: 0 10*3/UL
NRBC BLD AUTO-RTO: 0 /100
PLATELET # BLD AUTO: 214 10E9/L (ref 150–450)
RBC # BLD AUTO: 4.4 10E12/L (ref 4.4–5.9)
WBC # BLD AUTO: 4.3 10E9/L (ref 4–11)

## 2017-06-06 ASSESSMENT — PAIN SCALES - GENERAL: PAINLEVEL: NO PAIN (0)

## 2017-06-06 NOTE — MR AVS SNAPSHOT
After Visit Summary   6/6/2017    Sukh Craven    MRN: [id number]           Patient Information     Date Of Birth          1955        Visit Information        Provider Department      6/6/2017 9:30 AM Cresencio Foote MD [facility] Urology and [facility] for Prostate and Urologic Cancers        Today's Diagnoses     Benign nodular prostatic hyperplasia with lower urinary tract symptoms    -  1      Care Instructions    Return in 2 months for a post void residual and an AUA symptom score.    It was a pleasure meeting with you today.  Thank you for allowing me and my team the privilege of caring for you today.  YOU are the reason we are here, and I truly hope we provided you with the excellent service you deserve.  Please let us know if there is anything else we can do for you so that we can be sure you are leaving completely satisfied with your care experience.                  Follow-ups after your visit        Your next 10 appointments already scheduled     Aug 08, 2017  3:00 PM CDT   (Arrive by 2:45 PM)   Return Visit with MD VERNON Mckoy [facility] Urology and [facility] for Prostate and Urologic Cancers ([facility])    [address] [place] [address] [phone]              Future tests that were ordered for you today     Open Future Orders        Priority Expected Expires Ordered    CBC with platelets differential Routine  6/6/2018 6/6/2017            Who to contact     Please call your clinic at [phone] to:    Ask questions about your health    Make or cancel appointments    Discuss your medicines    Learn about your test results    Speak to your doctor   If you have compliments or concerns about an experience at your clinic, or if you wish to file a complaint, please contact [facility] Patient Relations at [phone] or email us at [email or web address]         Additional Information About  "Your Visit        [employer] Information     [employer] is an electronic gateway that provides easy, online access to your medical records. With [employer], you can request a clinic appointment, read your test results, renew a prescription or communicate with your care team.     To sign up for [employer] visit the website at [email or web address]   You will be asked to enter the access code listed below, as well as some personal information. Please follow the directions to create your username and password.     Your access code is: [email or web address]  Expires: 2017 10:08 AM     Your access code will  in 90 days. If you need help or a new code, please contact your [facility] or call [phone] for assistance.        Care EveryWhere ID     This is your Care EveryWhere ID. This could be used by other organizations to access your [place] medical records  [id number]        Your Vitals Were     Pulse Height BMI (Body Mass Index)             72 1.956 m (6' 5\") 26.44 kg/m2          Blood Pressure from Last 3 Encounters:   17 (!) 157/111   17 140/84   17 156/87    Weight from Last 3 Encounters:   17 101.2 kg (223 lb)   17 101.2 kg (223 lb)   17 103.4 kg (227 lb 15.3 oz)              We Performed the Following     POST-VOID RESIDUAL BLADDER SCAN          Today's Medication Changes          These changes are accurate as of: 17 10:08 AM.  If you have any questions, ask your nurse or doctor.               These medicines have changed or have updated prescriptions.        Dose/Directions    losartan-hydrochlorothiazide 100-12.5 MG per tablet   Commonly known as:  HYZAAR   This may have changed:  when to take this   Used for:  Essential hypertension with goal blood pressure less than 140/90        Dose:  1 tablet   Take 1 tablet by mouth daily   Quantity:  90 tablet   Refills:  0                Primary Care Provider Office Phone # Fax #    Ned Doyle " LENIN Martinez [phone]       [facility] [address] [place] [address]        Thank you!     Thank you for choosing [facility] UROLOGY AND [facility] FOR PROSTATE AND UROLOGIC CANCERS  for your care. Our goal is always to provide you with excellent care. Hearing back from our patients is one way we can continue to improve our services. Please take a few minutes to complete the written survey that you may receive in the mail after your visit with us. Thank you!             Your Updated Medication List - Protect others around you: Learn how to safely use, store and throw away your medicines at www.disposemymeds.org.          This list is accurate as of: 6/6/17 10:08 AM.  Always use your most recent med list.                   Brand Name Dispense Instructions for use    ferrous gluconate 324 (38 FE) MG tablet    FERGON    30 tablet    Take 1 tablet (324 mg) by mouth daily (with breakfast)       ketoconazole 200 MG tablet    NIZORAL    14 tablet    Take 1 tablet (200 mg) by mouth daily       losartan-hydrochlorothiazide 100-12.5 MG per tablet    HYZAAR    90 tablet    Take 1 tablet by mouth daily

## 2017-06-06 NOTE — NURSING NOTE
Chief Complaint   Patient presents with     Surgical Followup     laser  holmium      pvr was obtained via ultrasound    Timothy REID

## 2017-06-06 NOTE — LETTER
6/6/2017       RE: Sukh Craven  [address] [address]  [facility] [address]     Dear Colleague,    Thank you for referring your patient, Sukh Craven, to the [facility] UROLOGY AND INST FOR PROSTATE AND UROLOGIC CANCERS at [facility]. Please see a copy of my visit note below.    HoLEP 4 Week Follow-Up Note    Today I had the pleasure of seeing Mr. Craven in follow-up for a history of BPH    He underwent holmium laser enucleation of the prostate approximately 4 weeks ago     48 gms of tissue were removed.  Pathology was benign    His recovery thus far has been uncomplicated.  He is very happy with his urinary stream and endorses marked improvement in his ability to sleep through the night.  He has minimal terminal dysuria or bothersome frequency.  He does still have a pink tinge to urine on occasion.    His main complaint is that he still feels quite weak overall since the procedure.  He saw his PMD for this last week who placed him on iron supplements.    He denies any urinary leakage    AUA Symptom Score is 14/35 with a 2/6 for bother    Uroflow/Post Void Residual  PVR 0    Lab Results   Component Value Date    WBC 4.3 06/06/2017     Lab Results   Component Value Date    RBC 4.40 06/06/2017     Lab Results   Component Value Date    HGB 12.6 06/06/2017     Lab Results   Component Value Date    HCT 40.8 06/06/2017     Lab Results   Component Value Date    MCV 93 06/06/2017     Lab Results   Component Value Date    MCH 28.6 06/06/2017     Lab Results   Component Value Date    MCHC 30.9 06/06/2017     Lab Results   Component Value Date    RDW 13.8 06/06/2017     Lab Results   Component Value Date     06/06/2017         Assessment/Plan - 62 year old male 4 weeks status post HoLEP  -CBC checked to assess for anemia as cause of weakness, Hct is 40, would not expect this to be the cause, can continue to f.u with PMD  -RTC 2-3 months for sx check and PVR    >15 minutes were  spent face to face with patient over half of which was spent providing medical counseling regarding post HoLEP urinary symptoms    Cresencio Foote MD

## 2017-06-06 NOTE — PATIENT INSTRUCTIONS
Return in 2 months for a post void residual and an AUA symptom score.    It was a pleasure meeting with you today.  Thank you for allowing me and my team the privilege of caring for you today.  YOU are the reason we are here, and I truly hope we provided you with the excellent service you deserve.  Please let us know if there is anything else we can do for you so that we can be sure you are leaving completely satisfied with your care experience.

## 2017-06-06 NOTE — LETTER
Sukh Craven  [address] [address]  [facility] [address]      June 6, 2017    To Whom It May Concern -     This letter is being written on the behalf of Sukh Craven who is under my care for a recent history of urologic surgery.  He still has some lingering effects from surgery that will recquire two more weeks of refraining from heavy straining, lifting and strenuous exertion until he makes a complete recovery and can return to his usual activities.    Sincerely,  Cresencio Foote

## 2017-06-07 ENCOUNTER — TELEPHONE (OUTPATIENT)
Dept: UROLOGY | Facility: CLINIC | Age: 62
End: 2017-06-07

## 2017-06-07 NOTE — TELEPHONE ENCOUNTER
----- Message from Cresencio Foote MD sent at 6/7/2017 11:14 AM CDT -----  Contact: [phone]  That is so strange.  He is one month after surgery and his PVR was 0 yesterday.  I called him to find out more.  I would be fine with a fill and pull today, tomorrow, anytime.  Somehting unusual going on here it seems.    Jareth    ----- Message -----     From: Vivian Perez LPN     Sent: 6/7/2017   8:29 AM       To: Cresencio Foote MD    Patient called and left his appt yesterday and was great within a few hours he was unable to void went to ED at [facility] and pleitez catheter put in  When should this come out??? Do anything else for him vivian

## 2017-06-07 NOTE — PROGRESS NOTES
HoLEP 4 Week Follow-Up Note    Today I had the pleasure of seeing Mr. Craven in follow-up for a history of BPH    He underwent holmium laser enucleation of the prostate approximately 4 weeks ago     48 gms of tissue were removed.  Pathology was benign    His recovery thus far has been uncomplicated.  He is very happy with his urinary stream and endorses marked improvement in his ability to sleep through the night.  He has minimal terminal dysuria or bothersome frequency.  He does still have a pink tinge to urine on occasion.    His main complaint is that he still feels quite weak overall since the procedure.  He saw his PMD for this last week who placed him on iron supplements.    He denies any urinary leakage    AUA Symptom Score is 14/35 with a 2/6 for bother    Uroflow/Post Void Residual  PVR 0    Lab Results   Component Value Date    WBC 4.3 06/06/2017     Lab Results   Component Value Date    RBC 4.40 06/06/2017     Lab Results   Component Value Date    HGB 12.6 06/06/2017     Lab Results   Component Value Date    HCT 40.8 06/06/2017     Lab Results   Component Value Date    MCV 93 06/06/2017     Lab Results   Component Value Date    MCH 28.6 06/06/2017     Lab Results   Component Value Date    MCHC 30.9 06/06/2017     Lab Results   Component Value Date    RDW 13.8 06/06/2017     Lab Results   Component Value Date     06/06/2017         Assessment/Plan - 62 year old male 4 weeks status post HoLEP  -CBC checked to assess for anemia as cause of weakness, Hct is 40, would not expect this to be the cause, can continue to f.u with PMD  -RTC 2-3 months for sx check and PVR    >15 minutes were spent face to face with patient over half of which was spent providing medical counseling regarding post HoLEP urinary symptoms

## 2017-06-07 NOTE — TELEPHONE ENCOUNTER
Patient called and stated after seeing dr acosta yesterday and a few hours later unable to void  Went to [facility] ED and pleitez catheter placed.  Sent message to dr acosta. Josephine Perez LPN Staff Nurse

## 2017-06-08 ENCOUNTER — ALLIED HEALTH/NURSE VISIT (OUTPATIENT)
Dept: UROLOGY | Facility: CLINIC | Age: 62
End: 2017-06-08

## 2017-06-08 DIAGNOSIS — N40.1 BENIGN NODULAR PROSTATIC HYPERPLASIA WITH LOWER URINARY TRACT SYMPTOMS: Primary | ICD-10-CM

## 2017-06-08 NOTE — PROGRESS NOTES
Chief Complaint   Patient presents with     Allied Health Visit     TOV / catheter removal       Patient Active Problem List   Diagnosis     Hematuria     BPH (benign prostatic hypertrophy)     Advanced directives, counseling/discussion     Elevated serum creatinine     History of Helicobacter infection     Hypertension goal BP (blood pressure) < 140/90     CKD (chronic kidney disease) stage 2, GFR 60-89 ml/min     CARDIOVASCULAR SCREENING; LDL GOAL LESS THAN 160     Pre-diabetes     BPH (benign prostatic hypertrophy) with urinary obstruction       Allergies   Allergen Reactions     No Known Drug Allergies        Current Outpatient Prescriptions   Medication Sig Dispense Refill     ketoconazole (NIZORAL) 200 MG tablet Take 1 tablet (200 mg) by mouth daily 14 tablet 0     ferrous gluconate (FERGON) 324 (38 FE) MG tablet Take 1 tablet (324 mg) by mouth daily (with breakfast) 30 tablet 0     losartan-hydrochlorothiazide (HYZAAR) 100-12.5 MG per tablet Take 1 tablet by mouth daily (Patient taking differently: Take 1 tablet by mouth every morning ) 90 tablet 0       Social History   Substance Use Topics     Smoking status: Former Smoker     Packs/day: 0.50     Years: 25.00     Types: Cigarettes     Quit date: 3/23/2007     Smokeless tobacco: Never Used     Alcohol use Yes      Comment: rare- social drinker       Sukh Craven comes into clinic today at the request of DR. Foote for TOV / catheter removal.    This service provided today was under the direct supervision of Dr. Bowser, who was available if needed.    Sukh Craven presented today for a trial of void.  Approximately 150 mL of normal saline instilled into bladder via catheter.  Patient stated He had urge to urinate and catheter was removed without difficulty.  Patient was given a urinal to measure urine output.  Patient voided approximately 160 mL of yellow urine.  PVR 0 mL.    Cipro 500 given per protocol: Yes    Patient did tolerate procedure  well.    Teaching done with patient verbally as where to call or go if pain, fever, or unable to urinate post catheter removal.    Sharon Jimenez LPN  6/8/2017  10:04 AM

## 2017-06-08 NOTE — MR AVS SNAPSHOT
After Visit Summary   6/8/2017    Sukh Craven    MRN: [id number]           Patient Information     Date Of Birth          1955        Visit Information        Provider Department      6/8/2017 10:30 AM Nurse,  [facility]        Today's Diagnoses     Benign nodular prostatic hyperplasia with lower urinary tract symptoms    -  1       Follow-ups after your visit        Your next 10 appointments already scheduled     Jun 08, 2017 10:30 AM CDT   (Arrive by 10:15 AM)   Return Visit with  [facility] Nurse   [facility] ([facility])    [address] [place] [id number] [phone]            Jun 23, 2017  9:00 AM CDT   Office Visit with Ned Martinez PA-C   [facility] ([facility]    [address] [place] [id number] [phone]           Bring a current list of meds and any records pertaining to this visit.  For Physicals, please bring immunization records and any forms needing to be filled out.  Please arrive 10 minutes early to complete paperwork.            Aug 08, 2017  3:00 PM CDT   (Arrive by 2:45 PM)   Return Visit with Cresencio Foote MD   [facility] ([facility])    [address] [place] [id number] [phone]              Who to contact     Please call your clinic at [phone] to:    Ask questions about your health    Make or cancel appointments    Discuss your medicines    Learn about your test results    Speak to your doctor   If you have compliments or concerns about an experience at your clinic, or if you wish to file a complaint, please contact [facility] Patient Relations at [phone] or email us at  [email or web address]         Additional Information About Your Visit        [email or web address] Information     Cleankeyst is an electronic gateway that provides easy, online access to your medical records. With [email or web address], you can request a clinic appointment, read your test results, renew a prescription or communicate with your care team.     To sign up for [email or web address] visit the website at [email or web address]   You will be asked to enter the access code listed below, as well as some personal information. Please follow the directions to create your username and password.     Your access code is: [email or web address]  Expires: 2017 10:08 AM     Your access code will  in 90 days. If you need help or a new code, please contact your [facility] or call [phone] for assistance.        Care EveryWhere ID     This is your Care EveryWhere ID. This could be used by other organizations to access your [facility] medical records  [id number]         Blood Pressure from Last 3 Encounters:   17 (!) 157/111   17 140/84   17 156/87    Weight from Last 3 Encounters:   17 101.2 kg (223 lb)   17 101.2 kg (223 lb)   17 103.4 kg (227 lb 15.3 oz)              We Performed the Following     POST-VOID RESIDUAL BLADDER SCAN          Today's Medication Changes          These changes are accurate as of: 17 10:15 AM.  If you have any questions, ask your nurse or doctor.               These medicines have changed or have updated prescriptions.        Dose/Directions    losartan-hydrochlorothiazide 100-12.5 MG per tablet   Commonly known as:  HYZAAR   This may have changed:  when to take this   Used for:  Essential hypertension with goal blood pressure less than 140/90        Dose:  1 tablet   Take 1 tablet by mouth daily   Quantity:  90 tablet   Refills:  0                Primary Care Provider Office Phone # Fax #    Ned Martinez PA-C [phone]        [facility] [address] [place] [address]        Thank you!     Thank you for choosing [facility] AND [facility] PROST[facility] UROLOGIC CANCERS  for your care. Our goal is always to provide you with excellent care. Hearing back from our patients is one way we can continue to improve our services. Please take a few minutes to complete the written survey that you may receive in the mail after your visit with us. Thank you!             Your Updated Medication List - Protect others around you: Learn how to safely use, store and throw away your medicines at www.disposemymeds.org.          This list is accurate as of: 6/8/17 10:15 AM.  Always use your most recent med list.                   Brand Name Dispense Instructions for use    ferrous gluconate 324 (38 FE) MG tablet    FERGON    30 tablet    Take 1 tablet (324 mg) by mouth daily (with breakfast)       ketoconazole 200 MG tablet    NIZORAL    14 tablet    Take 1 tablet (200 mg) by mouth daily       losartan-hydrochlorothiazide 100-12.5 MG per tablet    HYZAAR    90 tablet    Take 1 tablet by mouth daily

## 2017-06-23 ENCOUNTER — OFFICE VISIT (OUTPATIENT)
Dept: FAMILY MEDICINE | Facility: CLINIC | Age: 62
End: 2017-06-23
Payer: COMMERCIAL

## 2017-06-23 VITALS
DIASTOLIC BLOOD PRESSURE: 85 MMHG | HEART RATE: 72 BPM | SYSTOLIC BLOOD PRESSURE: 131 MMHG | WEIGHT: 225 LBS | BODY MASS INDEX: 26.57 KG/M2 | TEMPERATURE: 98.2 F | OXYGEN SATURATION: 98 % | HEIGHT: 77 IN

## 2017-06-23 DIAGNOSIS — R31.9 HEMATURIA: ICD-10-CM

## 2017-06-23 DIAGNOSIS — I10 UNCONTROLLED HYPERTENSION: ICD-10-CM

## 2017-06-23 DIAGNOSIS — R21 GROIN RASH: ICD-10-CM

## 2017-06-23 DIAGNOSIS — R33.8 ACUTE URINARY RETENTION: Primary | ICD-10-CM

## 2017-06-23 DIAGNOSIS — D62 ANEMIA DUE TO BLOOD LOSS, ACUTE: ICD-10-CM

## 2017-06-23 DIAGNOSIS — I10 ESSENTIAL HYPERTENSION WITH GOAL BLOOD PRESSURE LESS THAN 140/90: ICD-10-CM

## 2017-06-23 PROCEDURE — 99214 OFFICE O/P EST MOD 30 MIN: CPT | Performed by: PHYSICIAN ASSISTANT

## 2017-06-23 RX ORDER — FERROUS GLUCONATE 324(38)MG
324 TABLET ORAL
Qty: 30 TABLET | Refills: 0 | Status: SHIPPED | OUTPATIENT
Start: 2017-06-23 | End: 2017-08-08

## 2017-06-23 RX ORDER — TRIAMCINOLONE ACETONIDE 1 MG/G
CREAM TOPICAL
Qty: 80 G | Refills: 1 | Status: SHIPPED | OUTPATIENT
Start: 2017-06-23 | End: 2017-07-14

## 2017-06-23 RX ORDER — CLOTRIMAZOLE 1 %
CREAM (GRAM) TOPICAL 2 TIMES DAILY
Qty: 45 G | Refills: 1 | Status: SHIPPED | OUTPATIENT
Start: 2017-06-23 | End: 2017-07-14

## 2017-06-23 RX ORDER — LOSARTAN POTASSIUM AND HYDROCHLOROTHIAZIDE 12.5; 1 MG/1; MG/1
1 TABLET ORAL DAILY
Qty: 90 TABLET | Refills: 0 | Status: SHIPPED | OUTPATIENT
Start: 2017-06-23 | End: 2018-05-01

## 2017-06-23 NOTE — NURSING NOTE
"Chief Complaint   Patient presents with     Surgical Followup     Refill Request     Hypertension     Medication Reconciliation     ran out of Hyzaar       Initial /90 (BP Location: Right arm, Patient Position: Chair, Cuff Size: Adult Large)  Pulse 72  Temp 98.2  F (36.8  C) (Oral)  Ht 6' 5\" (1.956 m)  Wt 225 lb (102.1 kg)  SpO2 98%  BMI 26.68 kg/m2 Estimated body mass index is 26.68 kg/(m^2) as calculated from the following:    Height as of this encounter: 6' 5\" (1.956 m).    Weight as of this encounter: 225 lb (102.1 kg).  Medication Reconciliation: complete   Ngoc Whitfield MA      "

## 2017-06-23 NOTE — PROGRESS NOTES
SUBJECTIVE:                                                    Sukh Craven is a 62 year old male who presents to clinic today for the following health issues:    Patient ran out of BP medication. Needing refill today.    Hypertension Follow-up      Outpatient blood pressures are not being checked.    Low Salt Diet: no added salt    Ran out of medications    BP elevated      Recently presented to the [facility]  ER for sudden urinary retention brought on by an unclear cause - probably post surgical clot formation. He was catheterized and 500 cc of urine was expressed. He was discharged in stable condition with catheter in place.    He did see his urologist a few days later and the catheter was successfully removed - he's passing urine mostly clear, sometimes gets an occasional clot but overall urine flow is doing well    Fatigue, slowly getting better but still an issue. He was anemic due to acute blood loss, his recent hgb was improved.   Lab Results   Component Value Date    HGB 12.6 06/06/2017      He isn't able to exercise due to this ongoing health issue, feels tired. No low mood, no depression.     Rash - continues to itch but is mostly improved       Amount of exercise or physical activity: None    Problems taking medications regularly: No    Medication side effects: none    Diet: NO salt    Problem list and histories reviewed & adjusted, as indicated.  Additional history: as documented    Labs reviewed in EPIC    Reviewed and updated as needed this visit by clinical staff  Tobacco  Allergies  Med Hx  Surg Hx  Fam Hx  Soc Hx      Reviewed and updated as needed this visit by Provider         ROS:  Constitutional, HEENT, cardiovascular, pulmonary, gi and gu systems are negative, except as otherwise noted.    OBJECTIVE:                                                    /90 (BP Location: Right arm, Patient Position: Chair, Cuff Size: Adult Large)  Pulse 72  Temp 98.2  F (36.8  C) (Oral)   "Ht 6' 5\" (1.956 m)  Wt 225 lb (102.1 kg)  SpO2 98%  BMI 26.68 kg/m2  Body mass index is 26.68 kg/(m^2).  GENERAL: healthy, alert and no distress       ASSESSMENT/PLAN:                                                      (R33.8) Acute urinary retention  (primary encounter diagnosis)  Comment:   Plan: Resolved. Continue monitoring. Follow up with urology as needed     (I10) Uncontrolled hypertension  Comment:   Plan: losartan-hydrochlorothiazide (HYZAAR) 100-12.5         MG per tablet        Refills - restart, check blood pressures daily for a few weeks and update me if above 140/90    (I10) Essential hypertension with goal blood pressure less than 140/90  Comment:   Plan: losartan-hydrochlorothiazide (HYZAAR) 100-12.5         MG per tablet        As noted - recheck daily for a few weeks     (D62) Anemia due to blood loss, acute  Comment:   Plan: ferrous gluconate (FERGON) 324 (38 FE) MG         tablet        Refills given, I want to get his hgb a bit higher     (R31.9) Hematuria  Comment:   Plan: Resolving     (R21) Groin rash  Comment:   Plan: clotrimazole (LOTRIMIN) 1 % cream,         triamcinolone (KENALOG) 0.1 % cream        Will treat topically - mix both and use twice daily to affected areas     ДМИТРИЙ ORDAZ PA-C  [facility]    "

## 2017-06-23 NOTE — MR AVS SNAPSHOT
After Visit Summary   6/23/2017    Sukh Craven    MRN: [id number]           Patient Information     Date Of Birth          1955        Visit Information        Provider Department      6/23/2017 9:00 AM Ned Martinez PA-C [facility]        Today's Diagnoses     Acute urinary retention    -  1    Uncontrolled hypertension        Essential hypertension with goal blood pressure less than 140/90        Anemia due to blood loss, acute        Hematuria        Groin rash          Care Instructions    [facility]   Discharged by : Ngoc Whitfield MA    Paper scripts provided to patient : no   If you have any questions regarding to your visit please contact your care team:   [facility] Hours Telephone Number   LENIN Dhaliwal Dr., PA-C    7am-7pm Monday - Thursday   7am-5pm Fridays  [phone]   (Appointment scheduling available 24/7)   RN Line   [phone] option 2       What options do I have for visits at the clinic other than the traditional office visit?   To expand how we care for you, many of our providers are utilizing electronic visits (e-visits) and telephone visits, when medically appropriate, for interactions with their patients rather than a visit in the clinic. We also offer nurse visits for many medical concerns. Just like any other service, we will bill your insurance company for this type of visit based on time spent on the phone with your provider. Not all insurance companies cover these visits. Please check with your medical insurance if this type of visit is covered. You will be responsible for any charges that are not paid by your insurance.     E-visits via [employer]: generally incur a $35.00 fee.     Telephone visits:   Time spent on the phone: *charged based on time that is spent on the phone in increments of 10 minutes. Estimated cost:   5-10 mins $30.00   11-20 mins.  $59.00   21-30 mins. $85.00   Use New Earth Solutionshart (secure email communication and access to your chart) to send your primary care provider a message or make an appointment. Ask someone on your Team how to sign up for [employer].   For a Price Quote for your services, please call our Consumer Price Line at [phone].   As always, Thank you for trusting us with your health care needs!                [facility]:    Scheduling Appointments  [facility]  Call: [phone]    [facility]  Call: [phone]    [facility]  Call: [phone]                    Follow-ups after your visit        Your next 10 appointments already scheduled     Aug 08, 2017  3:00 PM CDT   (Arrive by 2:45 PM)   Return Visit with Cresencio Foote MD   [facility] ([facility])    [address] [place] [id number] [phone]              Who to contact     If you have questions or need follow up information about today's clinic visit or your schedule please contact [facility] directly at [phone].  Normal or non-critical lab and imaging results will be communicated to you by MyChart, letter or phone within 4 business days after the clinic has received the results. If you do not hear from us within 7 days, please contact the clinic through [employer] Earth Solutionshart or phone. If you have a critical or abnormal lab result, we will notify you by phone as soon as possible.  Submit refill requests through Patterns or call your pharmacy and they will forward the refill request to us. Please allow 3 business days for your refill to be completed.          Additional Information About Your Visit        Patterns Information     Patterns lets you send messages to your doctor, view your test results, renew your prescriptions, schedule appointments and more. To sign up,  "go to [email or web address] . Click on \"Log in\" on the left side of the screen, which will take you to the Welcome page. Then click on \"Sign up Now\" on the right side of the page.     You will be asked to enter the access code listed below, as well as some personal information. Please follow the directions to create your username and password.     Your access code is: [email or web address]  Expires: 2017 10:08 AM     Your access code will  in 90 days. If you need help or a new code, please call your [place] clinic or [phone].        Care EveryWhere ID     This is your Care EveryWhere ID. This could be used by other organizations to access your [place] medical records  [id number]        Your Vitals Were     Pulse Temperature Height Pulse Oximetry BMI (Body Mass Index)       72 98.2  F (36.8  C) (Oral) 6' 5\" (1.956 m) 98% 26.68 kg/m2        Blood Pressure from Last 3 Encounters:   17 183/90   17 (!) 157/111   17 140/84    Weight from Last 3 Encounters:   17 225 lb (102.1 kg)   17 223 lb (101.2 kg)   17 223 lb (101.2 kg)              Today, you had the following     No orders found for display         Today's Medication Changes          These changes are accurate as of: 17  9:31 AM.  If you have any questions, ask your nurse or doctor.               Start taking these medicines.        Dose/Directions    clotrimazole 1 % cream   Commonly known as:  LOTRIMIN   Used for:  Groin rash   Started by:  Ned Martinez PA-C        Apply topically 2 times daily   Quantity:  45 g   Refills:  1       triamcinolone 0.1 % cream   Commonly known as:  KENALOG   Used for:  Groin rash   Started by:  Ned Martinez PA-C        Apply to groin 2 times daily   Quantity:  80 g   Refills:  1         Stop taking these medicines if you haven't already. Please contact your care team if you have questions.     ketoconazole 200 MG tablet   Commonly known as:  NIZORAL   Stopped by:  " Ned Martinez PA-C                Where to get your medicines      These medications were sent to [facility] [address] [place] [address] [place], [place] - [address] AT [facility] [address]  [address] [address]     Phone:  [phone]     clotrimazole 1 % cream    ferrous gluconate 324 (38 FE) MG tablet    losartan-hydrochlorothiazide 100-12.5 MG per tablet    triamcinolone 0.1 % cream                Primary Care Provider Office Phone # Fax #    Ned Martinez PA-C [phone]       [facility] [address] [facility] [address]        Equal Access to Services     DESI TIPTON AH: Hadii maikol velasco hadasho Soomaali, waaxda luqadaha, qaybta kaalmada adeegyada, waxay idiin hayaan maura casiano. So [facility] [phone].    ATENCIÓN: Si habla español, tiene a ambriz disposición servicios gratuitos de asistencia lingüística. [facility] [phone].    We comply with applicable federal civil rights laws and [place] laws. We do not discriminate on the basis of race, color, national origin, age, disability sex, sexual orientation or gender identity.            Thank you!     Thank you for choosing [facility]  for your care. Our goal is always to provide you with excellent care. Hearing back from our patients is one way we can continue to improve our services. Please take a few minutes to complete the written survey that you may receive in the mail after your visit with us. Thank you!             Your Updated Medication List - Protect others around you: Learn how to safely use, store and throw away your medicines at www.disposemymeds.org.          This list is accurate as of: 6/23/17  9:31 AM.  Always use your most recent med list.                   Brand Name Dispense Instructions for use Diagnosis    clotrimazole 1 % cream    LOTRIMIN    45 g    Apply topically 2 times daily    Groin rash       ferrous gluconate 324 (38 FE) MG  tablet    FERGON    30 tablet    Take 1 tablet (324 mg) by mouth daily (with breakfast)    Anemia due to blood loss, acute       losartan-hydrochlorothiazide 100-12.5 MG per tablet    HYZAAR    90 tablet    Take 1 tablet by mouth daily    Essential hypertension with goal blood pressure less than 140/90, Uncontrolled hypertension       triamcinolone 0.1 % cream    KENALOG    80 g    Apply to groin 2 times daily    Groin rash

## 2017-06-23 NOTE — PATIENT INSTRUCTIONS
[facility]   Discharged by : Ngoc Whtifield MA    Paper scripts provided to patient : no   If you have any questions regarding to your visit please contact your care team:   [facility] [place] [facility] Hours Telephone Number   LENIN Dhaliwal Dr., PA-C    7am-7pm Monday - Thursday   7am-5pm Fridays  [phone]   (Appointment scheduling available 24/7)   RN Line   [phone] option 2       What options do I have for visits at the clinic other than the traditional office visit?   To expand how we care for you, many of our providers are utilizing electronic visits (e-visits) and telephone visits, when medically appropriate, for interactions with their patients rather than a visit in the clinic. We also offer nurse visits for many medical concerns. Just like any other service, we will bill your insurance company for this type of visit based on time spent on the phone with your provider. Not all insurance companies cover these visits. Please check with your medical insurance if this type of visit is covered. You will be responsible for any charges that are not paid by your insurance.     E-visits via [employer]hart: generally incur a $35.00 fee.     Telephone visits:   Time spent on the phone: *charged based on time that is spent on the phone in increments of 10 minutes. Estimated cost:   5-10 mins $30.00   11-20 mins. $59.00   21-30 mins. $85.00   Use [employer] Reagentshart (secure email communication and access to your chart) to send your primary care provider a message or make an appointment. Ask someone on your Team how to sign up for [employer].   For a Price Quote for your services, please call our Consumer Price Line at [phone].   As always, Thank you for trusting us with your health care needs!                [facility]:    Scheduling Appointments  [facility]  Call: [phone]    [facility]  Call:  [phone]    [facility]  Call: [phone]

## 2017-06-23 NOTE — LETTER
[address] [id number] [phone]      June 23, 2017      RE: Sukh Craven   [address] [id number]  [facility] [address]              To Whom it May Concern:    Sukh can return to work on 6/26/2017 without restrictions.     Sincerely,

## 2017-06-26 ENCOUNTER — OFFICE VISIT (OUTPATIENT)
Dept: FAMILY MEDICINE | Facility: CLINIC | Age: 62
End: 2017-06-26
Payer: COMMERCIAL

## 2017-06-26 ENCOUNTER — RADIANT APPOINTMENT (OUTPATIENT)
Dept: GENERAL RADIOLOGY | Facility: CLINIC | Age: 62
End: 2017-06-26
Attending: PHYSICIAN ASSISTANT
Payer: COMMERCIAL

## 2017-06-26 VITALS
BODY MASS INDEX: 26.21 KG/M2 | HEART RATE: 64 BPM | DIASTOLIC BLOOD PRESSURE: 100 MMHG | TEMPERATURE: 98.3 F | HEIGHT: 77 IN | WEIGHT: 222 LBS | SYSTOLIC BLOOD PRESSURE: 172 MMHG | OXYGEN SATURATION: 100 %

## 2017-06-26 DIAGNOSIS — M25.521 RIGHT ELBOW PAIN: ICD-10-CM

## 2017-06-26 DIAGNOSIS — M25.729 OLECRANON BONE SPUR: ICD-10-CM

## 2017-06-26 DIAGNOSIS — J20.9 ACUTE BRONCHITIS WITH SYMPTOMS > 10 DAYS: ICD-10-CM

## 2017-06-26 DIAGNOSIS — M25.521 RIGHT ELBOW PAIN: Primary | ICD-10-CM

## 2017-06-26 PROCEDURE — 73070 X-RAY EXAM OF ELBOW: CPT | Mod: RT

## 2017-06-26 PROCEDURE — 99214 OFFICE O/P EST MOD 30 MIN: CPT | Performed by: PHYSICIAN ASSISTANT

## 2017-06-26 RX ORDER — TRAMADOL HYDROCHLORIDE 50 MG/1
50-100 TABLET ORAL EVERY 6 HOURS PRN
Qty: 16 TABLET | Refills: 0 | Status: SHIPPED | OUTPATIENT
Start: 2017-06-26 | End: 2017-07-14

## 2017-06-26 RX ORDER — AZITHROMYCIN 250 MG/1
TABLET, FILM COATED ORAL
Qty: 6 TABLET | Refills: 0 | Status: SHIPPED | OUTPATIENT
Start: 2017-06-26 | End: 2017-07-14

## 2017-06-26 ASSESSMENT — PAIN SCALES - GENERAL: PAINLEVEL: EXTREME PAIN (8)

## 2017-06-26 NOTE — PROGRESS NOTES
SUBJECTIVE:                                                    Sukh Craven is a 62 year old male who presents to clinic today for the following health issues:      Joint Pain    Onset: 4 days    Description:   Location: right elbow  Character: Sharp    Intensity: severe    Progression of Symptoms: worse    Accompanying Signs & Symptoms:  Other symptoms: swelling and cannot rase his arm     History:   Previous similar pain: no       Precipitating factors:   Trauma or overuse: YES- played basketball on Friday and the pain started then      Alleviating factors:  Improved by: nothing    Therapies Tried and outcome: ice, heat not helping with symptoms     Patient is also complaining of a cough for the past month. He states that his cough is productive and it occurs throughout the day. He vipin having shortness of breath or chest pain. He has been taking mucinex for his cough, but has not noted much relief.   Problem list and histories reviewed & adjusted, as indicated.  Additional history: as documented    Patient Active Problem List   Diagnosis     Hematuria     BPH (benign prostatic hypertrophy)     Advanced directives, counseling/discussion     Elevated serum creatinine     History of Helicobacter infection     Hypertension goal BP (blood pressure) < 140/90     CKD (chronic kidney disease) stage 2, GFR 60-89 ml/min     CARDIOVASCULAR SCREENING; LDL GOAL LESS THAN 160     Pre-diabetes     BPH (benign prostatic hypertrophy) with urinary obstruction     Past Surgical History:   Procedure Laterality Date     C EXPLORE WOUND,NECK       LASER HOLMIUM ENUCLEATION PROSTATE N/A 4/27/2017    Procedure: LASER HOLMIUM ENUCLEATION PROSTATE;  Holmium Laser Enucleation Of The Prostate ;  Surgeon: Cresencio Foote MD;  Location: [facility]     REMOVAL OF SPERM DUCT(S)         Social History   Substance Use Topics     Smoking status: Former Smoker     Packs/day: 0.50     Years: 25.00     Types: Cigarettes     Quit date:  "3/23/2007     Smokeless tobacco: Never Used     Alcohol use Yes      Comment: rare- social drinker     Family History   Problem Relation Age of Onset     DIABETES Sister      DIABETES Sister      DIABETES Brother      C.A.D. No family hx of      Hypertension No family hx of      CEREBROVASCULAR DISEASE No family hx of      Breast Cancer No family hx of      Cancer - colorectal No family hx of      Prostate Cancer No family hx of          Current Outpatient Prescriptions   Medication Sig Dispense Refill     losartan-hydrochlorothiazide (HYZAAR) 100-12.5 MG per tablet Take 1 tablet by mouth daily 90 tablet 0     ferrous gluconate (FERGON) 324 (38 FE) MG tablet Take 1 tablet (324 mg) by mouth daily (with breakfast) 30 tablet 0     clotrimazole (LOTRIMIN) 1 % cream Apply topically 2 times daily 45 g 1     triamcinolone (KENALOG) 0.1 % cream Apply to groin 2 times daily 80 g 1     Allergies   Allergen Reactions     No Known Drug Allergies        Reviewed and updated as needed this visit by clinical staff  Tobacco  Allergies  Meds  Med Hx  Surg Hx  Fam Hx  Soc Hx      Reviewed and updated as needed this visit by Provider       ROS:  Constitutional, HEENT, cardiovascular, pulmonary, GI, , musculoskeletal, neuro, skin, endocrine and psych systems are negative, except as otherwise noted.    OBJECTIVE:   BP (!) 172/100  Pulse 64  Temp 98.3  F (36.8  C) (Oral)  Ht 6' 5\" (1.956 m)  Wt 222 lb (100.7 kg)  SpO2 100%  BMI 26.33 kg/m2    GENERAL: healthy, alert and no distress  EYES: Eyes grossly normal to inspection, PERRL and conjunctivae and sclerae normal  HENT: ear canals and TM's normal, nose and mouth without ulcers or lesions  NECK: no adenopathy, no asymmetry, masses, or scars and thyroid normal to palpation  RESP: lungs clear to auscultation - no rales, rhonchi or wheezes  CV: regular rate and rhythm, normal S1 S2, no S3 or S4, no murmur, click or rub, no peripheral edema and peripheral pulses strong  MS: TTP " of right elbow, not localized to medial or lateral with effusion noted. ROM decreased due to swelling of elbow. NO warmth or redness noted.     Diagnostic Test Results:  Xray - Mild osteoarthritic changes noted with a loose body posteriorly. Joint effusion.     ASSESSMENT/PLAN:     1. Right elbow pain  Follow up in the next couple of days with ortho. No sign of systemic infection or septic arthritis.   - XR Elbow Right 2 Views; Future  - traMADol (ULTRAM) 50 MG tablet; Take 1-2 tablets ( mg) by mouth every 6 hours as needed for pain maximum 6 tablet(s) per day  Dispense: 16 tablet; Refill: 0  - ORTHO  REFERRAL    2. Olecranon bone spur  - traMADol (ULTRAM) 50 MG tablet; Take 1-2 tablets ( mg) by mouth every 6 hours as needed for pain maximum 6 tablet(s) per day  Dispense: 16 tablet; Refill: 0  - ORTHO  REFERRAL    3. Acute bronchitis with symptoms > 10 days        Push fluids and rest. Honey for cough, humidified air at night.     - azithromycin (ZITHROMAX) 250 MG tablet; Two tablets first day, then one tablet daily for four days.  Dispense: 6 tablet; Refill: 0  Nevin Wilson PA-C  [facility]

## 2017-06-26 NOTE — MR AVS SNAPSHOT
After Visit Summary   6/26/2017    Sukh Craven    MRN: [id number]           Patient Information     Date Of Birth          1955        Visit Information        Provider Department      6/26/2017 10:40 AM Nevin Wilson PA-C [facility]        Today's Diagnoses     Right elbow pain    -  1    Olecranon bone spur        Acute bronchitis with symptoms > 10 days           Follow-ups after your visit        Additional Services     ORTHO  REFERRAL       [facility] is referring you to the Orthopedic  Services at [facility].       The  Representative will assist you in the coordination of your Orthopedic and Musculoskeletal Care as prescribed by your physician.    The  Representative will call you within 1 business day to help schedule your appointment, or you may contact the  Representative at:    All areas ~ [phone]     Type of Referral : Non Surgical       Timeframe requested: 1 - 2 days    Coverage of these services is subject to the terms and limitations of your health insurance plan.  Please call member services at your health plan with any benefit or coverage questions.      If X-rays, CT or MRI's have been performed, please contact the facility where they were done to arrange for , prior to your scheduled appointment.  Please bring this referral request to your appointment and present it to your specialist.                  Your next 10 appointments already scheduled     Aug 08, 2017  3:00 PM CDT   (Arrive by 2:45 PM)   Return Visit with Cresencio Foote MD   [facility] ([facility])    [address] [place] [address]  4th Floor  [facility] [address] [phone]              Who to contact     If you have questions or need follow up information about today's clinic visit or your schedule please  "contact [facility] directly at [phone].  Normal or non-critical lab and imaging results will be communicated to you by MyChart, letter or phone within 4 business days after the clinic has received the results. If you do not hear from us within 7 days, please contact the clinic through MyChart or phone. If you have a critical or abnormal lab result, we will notify you by phone as soon as possible.  Submit refill requests through [employer] or call your pharmacy and they will forward the refill request to us. Please allow 3 business days for your refill to be completed.          Additional Information About Your Visit        [facility] Information     [employer] lets you send messages to your doctor, view your test results, renew your prescriptions, schedule appointments and more. To sign up, go to [email or web address] . Click on \"Log in\" on the left side of the screen, which will take you to the Welcome page. Then click on \"Sign up Now\" on the right side of the page.     You will be asked to enter the access code listed below, as well as some personal information. Please follow the directions to create your username and password.     Your access code is: [email or web address]  Expires: 2017 10:08 AM     Your access code will  in 90 days. If you need help or a new code, please call your [facility] clinic or [phone].        Care EveryWhere ID     This is your Care EveryWhere ID. This could be used by other organizations to access your [facility] medical records  [id number]        Your Vitals Were     Pulse Temperature Height BMI (Body Mass Index)          64 98.3  F (36.8  C) (Oral) 6' 5\" (1.956 m) 26.33 kg/m2         Blood Pressure from Last 3 Encounters:   17 (!) 172/108   17 131/85   17 (!) 157/111    Weight from Last 3 Encounters:   17 222 lb (100.7 kg)   17 225 lb (102.1 kg)   17 223 lb (101.2 kg)              We Performed the Following     ORTHO  " REFERRAL          Today's Medication Changes          These changes are accurate as of: 6/26/17 11:24 AM.  If you have any questions, ask your nurse or doctor.               Start taking these medicines.        Dose/Directions    azithromycin 250 MG tablet   Commonly known as:  ZITHROMAX   Used for:  Acute bronchitis with symptoms > 10 days   Started by:  Nevin Wilson PA-C        Two tablets first day, then one tablet daily for four days.   Quantity:  6 tablet   Refills:  0       traMADol 50 MG tablet   Commonly known as:  ULTRAM   Used for:  Right elbow pain, Olecranon bone spur   Started by:  Nevin Wilson PA-C        Dose:   mg   Take 1-2 tablets ( mg) by mouth every 6 hours as needed for pain maximum 6 tablet(s) per day   Quantity:  16 tablet   Refills:  0            Where to get your medicines      These medications were sent to [facility] [address] [place] [address] [address] AT [facility] [address]  [address] [facility] [address]     Phone:  [phone]     azithromycin 250 MG tablet         Some of these will need a paper prescription and others can be bought over the counter.  Ask your nurse if you have questions.     Bring a paper prescription for each of these medications     traMADol 50 MG tablet                Primary Care Provider Office Phone # Fax #    Ned Vivek Martinez PA-C [phone]       [address] [address]        Equal Access to Services     DESI TIPTON AH: Hadii maikol ku hadasho Soomaali, waaxda luqadaha, qaybta kaalmada adeegyada, waxay dulce maria haypauline casiano. So [facility] [phone].    ATENCIÓN: Si habla español, tiene a ambriz disposición servicios gratuitos de asistencia lingüística. Llame al [phone].    We comply with applicable federal civil rights laws and [place] laws. We do not discriminate on the basis of race, color, national origin,  age, disability sex, sexual orientation or gender identity.            Thank you!     Thank you for choosing [facility]  for your care. Our goal is always to provide you with excellent care. Hearing back from our patients is one way we can continue to improve our services. Please take a few minutes to complete the written survey that you may receive in the mail after your visit with us. Thank you!             Your Updated Medication List - Protect others around you: Learn how to safely use, store and throw away your medicines at www.disposemymeds.org.          This list is accurate as of: 6/26/17 11:24 AM.  Always use your most recent med list.                   Brand Name Dispense Instructions for use Diagnosis    azithromycin 250 MG tablet    ZITHROMAX    6 tablet    Two tablets first day, then one tablet daily for four days.    Acute bronchitis with symptoms > 10 days       clotrimazole 1 % cream    LOTRIMIN    45 g    Apply topically 2 times daily    Groin rash       ferrous gluconate 324 (38 FE) MG tablet    FERGON    30 tablet    Take 1 tablet (324 mg) by mouth daily (with breakfast)    Anemia due to blood loss, acute       losartan-hydrochlorothiazide 100-12.5 MG per tablet    HYZAAR    90 tablet    Take 1 tablet by mouth daily    Essential hypertension with goal blood pressure less than 140/90, Uncontrolled hypertension       traMADol 50 MG tablet    ULTRAM    16 tablet    Take 1-2 tablets ( mg) by mouth every 6 hours as needed for pain maximum 6 tablet(s) per day    Right elbow pain, Olecranon bone spur       triamcinolone 0.1 % cream    KENALOG    80 g    Apply to groin 2 times daily    Groin rash

## 2017-06-26 NOTE — NURSING NOTE
"Chief Complaint   Patient presents with     Elbow Pain     since Friday        Initial BP (!) 172/108  Pulse 64  Temp 98.3  F (36.8  C) (Oral)  Ht 6' 5\" (1.956 m)  Wt 222 lb (100.7 kg)  BMI 26.33 kg/m2 Estimated body mass index is 26.33 kg/(m^2) as calculated from the following:    Height as of this encounter: 6' 5\" (1.956 m).    Weight as of this encounter: 222 lb (100.7 kg).  Medication Reconciliation: complete   Savanah Martínez CMA (AAMA)      "

## 2017-07-06 ENCOUNTER — TELEPHONE (OUTPATIENT)
Dept: FAMILY MEDICINE | Facility: CLINIC | Age: 62
End: 2017-07-06

## 2017-07-06 NOTE — TELEPHONE ENCOUNTER
[facility]: Medical Request Form for Rodolfo Martinez PA-C.  Forms placed in provider 'sign me' folder.  Please fax forms to [phone] after completion.    Melvi Richardson,

## 2017-07-14 ENCOUNTER — RADIANT APPOINTMENT (OUTPATIENT)
Dept: GENERAL RADIOLOGY | Facility: CLINIC | Age: 62
End: 2017-07-14
Attending: NURSE PRACTITIONER
Payer: COMMERCIAL

## 2017-07-14 ENCOUNTER — OFFICE VISIT (OUTPATIENT)
Dept: FAMILY MEDICINE | Facility: CLINIC | Age: 62
End: 2017-07-14
Payer: COMMERCIAL

## 2017-07-14 VITALS
BODY MASS INDEX: 26.21 KG/M2 | SYSTOLIC BLOOD PRESSURE: 143 MMHG | HEART RATE: 80 BPM | HEIGHT: 77 IN | RESPIRATION RATE: 22 BRPM | OXYGEN SATURATION: 98 % | WEIGHT: 222 LBS | TEMPERATURE: 97.5 F | DIASTOLIC BLOOD PRESSURE: 93 MMHG

## 2017-07-14 DIAGNOSIS — R20.2 PARESTHESIAS: ICD-10-CM

## 2017-07-14 DIAGNOSIS — M79.674 PAIN OF TOE OF RIGHT FOOT: Primary | ICD-10-CM

## 2017-07-14 DIAGNOSIS — M79.674 PAIN OF TOE OF RIGHT FOOT: ICD-10-CM

## 2017-07-14 LAB
ANION GAP SERPL CALCULATED.3IONS-SCNC: 8 MMOL/L (ref 3–14)
BASOPHILS # BLD AUTO: 0 10E9/L (ref 0–0.2)
BASOPHILS NFR BLD AUTO: 0.6 %
BUN SERPL-MCNC: 19 MG/DL (ref 7–30)
CALCIUM SERPL-MCNC: 9.7 MG/DL (ref 8.5–10.1)
CHLORIDE SERPL-SCNC: 106 MMOL/L (ref 94–109)
CO2 SERPL-SCNC: 27 MMOL/L (ref 20–32)
CREAT SERPL-MCNC: 1.12 MG/DL (ref 0.66–1.25)
DIFFERENTIAL METHOD BLD: ABNORMAL
EOSINOPHIL # BLD AUTO: 0.1 10E9/L (ref 0–0.7)
EOSINOPHIL NFR BLD AUTO: 2.5 %
ERYTHROCYTE [DISTWIDTH] IN BLOOD BY AUTOMATED COUNT: 13.7 % (ref 10–15)
GFR SERPL CREATININE-BSD FRML MDRD: 67 ML/MIN/1.7M2
GLUCOSE SERPL-MCNC: 85 MG/DL (ref 70–99)
HBA1C MFR BLD: 5.6 % (ref 4.3–6)
HCT VFR BLD AUTO: 38.2 % (ref 40–53)
HGB BLD-MCNC: 12.5 G/DL (ref 13.3–17.7)
LYMPHOCYTES # BLD AUTO: 1.3 10E9/L (ref 0.8–5.3)
LYMPHOCYTES NFR BLD AUTO: 34.9 %
MCH RBC QN AUTO: 28.5 PG (ref 26.5–33)
MCHC RBC AUTO-ENTMCNC: 32.7 G/DL (ref 31.5–36.5)
MCV RBC AUTO: 87 FL (ref 78–100)
MONOCYTES # BLD AUTO: 0.3 10E9/L (ref 0–1.3)
MONOCYTES NFR BLD AUTO: 8.9 %
NEUTROPHILS # BLD AUTO: 1.9 10E9/L (ref 1.6–8.3)
NEUTROPHILS NFR BLD AUTO: 53.1 %
PLATELET # BLD AUTO: 208 10E9/L (ref 150–450)
POTASSIUM SERPL-SCNC: 4.2 MMOL/L (ref 3.4–5.3)
RBC # BLD AUTO: 4.38 10E12/L (ref 4.4–5.9)
SODIUM SERPL-SCNC: 141 MMOL/L (ref 133–144)
TSH SERPL DL<=0.005 MIU/L-ACNC: 1.25 MU/L (ref 0.4–4)
URATE SERPL-MCNC: 5.9 MG/DL (ref 3.5–7.2)
VIT B12 SERPL-MCNC: 889 PG/ML (ref 193–986)
WBC # BLD AUTO: 3.6 10E9/L (ref 4–11)

## 2017-07-14 PROCEDURE — 36415 COLL VENOUS BLD VENIPUNCTURE: CPT | Performed by: NURSE PRACTITIONER

## 2017-07-14 PROCEDURE — 82607 VITAMIN B-12: CPT | Performed by: NURSE PRACTITIONER

## 2017-07-14 PROCEDURE — 80048 BASIC METABOLIC PNL TOTAL CA: CPT | Performed by: NURSE PRACTITIONER

## 2017-07-14 PROCEDURE — 84550 ASSAY OF BLOOD/URIC ACID: CPT | Performed by: NURSE PRACTITIONER

## 2017-07-14 PROCEDURE — 83036 HEMOGLOBIN GLYCOSYLATED A1C: CPT | Performed by: NURSE PRACTITIONER

## 2017-07-14 PROCEDURE — 99214 OFFICE O/P EST MOD 30 MIN: CPT | Performed by: NURSE PRACTITIONER

## 2017-07-14 PROCEDURE — 84443 ASSAY THYROID STIM HORMONE: CPT | Performed by: NURSE PRACTITIONER

## 2017-07-14 PROCEDURE — 72100 X-RAY EXAM L-S SPINE 2/3 VWS: CPT

## 2017-07-14 PROCEDURE — 85025 COMPLETE CBC W/AUTO DIFF WBC: CPT | Performed by: NURSE PRACTITIONER

## 2017-07-14 RX ORDER — HYDROCODONE BITARTRATE AND ACETAMINOPHEN 5; 325 MG/1; MG/1
1 TABLET ORAL EVERY 4 HOURS PRN
Qty: 18 TABLET | Refills: 0 | Status: SHIPPED | OUTPATIENT
Start: 2017-07-14 | End: 2017-08-08

## 2017-07-14 NOTE — NURSING NOTE
"Chief Complaint   Patient presents with     Musculoskeletal Problem       Initial BP (!) 143/93 (BP Location: Left arm, Patient Position: Chair, Cuff Size: Adult Regular)  Pulse 80  Temp 97.5  F (36.4  C) (Oral)  Resp 22  Ht 6' 5\" (1.956 m)  Wt 222 lb (100.7 kg)  SpO2 98%  BMI 26.33 kg/m2 Estimated body mass index is 26.33 kg/(m^2) as calculated from the following:    Height as of this encounter: 6' 5\" (1.956 m).    Weight as of this encounter: 222 lb (100.7 kg).  Medication Reconciliation: complete     Asher Malave      "

## 2017-07-14 NOTE — PROGRESS NOTES
SUBJECTIVE:                                                    Sukh Craven is a 62 year old male who presents to clinic today for the following health issues:    Musculoskeletal problem/pain      Duration: x 3-4 days     Description  Location: Both feet primarily the right big toe    Intensity:  Moderate to severe    Accompanying signs and symptoms: numbness, tingling and weakness cant put pressure down without pain, cold feeling     History  Previous similar problem: no   Previous evaluation:  none    Precipitating or alleviating factors:  Trauma or overuse: no   Aggravating factors include: standing, walking, climbing stairs, lifting, exercise, overuse and laying is the worse the last 3 night hasn't been able to sleep has been sleeping sitting up      Therapies tried and outcome: nothing    Pain is in right great toe, worse when laying down with feet elevated, better with feet flat on the ground. Tingling, cold sensation in the foot. Consumes a regular diet including meat sources. History of anemia due to blood loss during surgery April 2017.    Problem list and histories reviewed & adjusted, as indicated.  Additional history: as documented    Patient Active Problem List   Diagnosis     Hematuria     BPH (benign prostatic hypertrophy)     Advanced directives, counseling/discussion     Elevated serum creatinine     History of Helicobacter infection     Hypertension goal BP (blood pressure) < 140/90     CKD (chronic kidney disease) stage 2, GFR 60-89 ml/min     CARDIOVASCULAR SCREENING; LDL GOAL LESS THAN 160     Pre-diabetes     BPH (benign prostatic hypertrophy) with urinary obstruction     Past Surgical History:   Procedure Laterality Date     C EXPLORE WOUND,NECK       LASER HOLMIUM ENUCLEATION PROSTATE N/A 4/27/2017    Procedure: LASER HOLMIUM ENUCLEATION PROSTATE;  Holmium Laser Enucleation Of The Prostate ;  Surgeon: Cresencio Foote MD;  [facility]     REMOVAL OF SPERM DUCT(S)         Social  "History   Substance Use Topics     Smoking status: Former Smoker     Packs/day: 0.50     Years: 25.00     Types: Cigarettes     Quit date: 3/23/2007     Smokeless tobacco: Never Used     Alcohol use Yes      Comment: rare- social drinker     Family History   Problem Relation Age of Onset     DIABETES Sister      DIABETES Sister      DIABETES Brother      C.A.D. No family hx of      Hypertension No family hx of      CEREBROVASCULAR DISEASE No family hx of      Breast Cancer No family hx of      Cancer - colorectal No family hx of      Prostate Cancer No family hx of            Reviewed and updated as needed this visit by clinical staff  Tobacco  Allergies  Meds  Med Hx  Surg Hx  Fam Hx  Soc Hx      Reviewed and updated as needed this visit by Provider         ROS:  Constitutional, MS, neuro, skin systems are negative, except as otherwise noted.    OBJECTIVE:     BP (!) 143/93 (BP Location: Left arm, Patient Position: Chair, Cuff Size: Adult Regular)  Pulse 80  Temp 97.5  F (36.4  C) (Oral)  Resp 22  Ht 6' 5\" (1.956 m)  Wt 222 lb (100.7 kg)  SpO2 98%  BMI 26.33 kg/m2  Body mass index is 26.33 kg/(m^2).  GENERAL: healthy, alert and no distress  MS: Hallux valgus deformity bilaterally, nontender foot and toe joints. Good ROM of ankle and toes.   SKIN: no suspicious lesions or rashes  NEURO: Dulled sensation over right toes and right medial foot.   Comprehensive back pain exam:  No tenderness, unable to toe walk on both sides, indicating possible L4 nerve involvement, Unable to elicit LE extremity reflex bilaterally, Light touch diminished on  right dorsum of foot (or Toes 1-3), indicating possible L5 nerve involvement and Straight leg positive on  right, indicating possible ipsilateral radiculopathy    Diagnostic Test Results:  No results found for this or any previous visit (from the past 24 hour(s)).  Xray - Lumbar spine- pending    ASSESSMENT/PLAN:       1. Pain of toe of right foot  Symptoms suspicious " for radicular distribution of pain. Will check labs to r/o other causes. X-ray of spine today. One time Cisne prescription due to severe nocturnal pain. If labs unrevealing consider EMG.  - CBC with platelets differential  - Vitamin B12  - TSH with free T4 reflex  - Hemoglobin A1c  - Basic metabolic panel  - XR Lumbar Spine 2/3 Views; Future  - Uric acid  - HYDROcodone-acetaminophen (NORCO) 5-325 MG per tablet; Take 1 tablet by mouth every 4 hours as needed for pain maximum 6 tablet(s) per day  Dispense: 18 tablet; Refill: 0    2. Paresthesias  As above  - CBC with platelets differential  - Vitamin B12  - TSH with free T4 reflex  - Hemoglobin A1c  - Basic metabolic panel  - XR Lumbar Spine 2/3 Views; Future    Follow up pending lab/x-ray results    HEMAL Becerra [facility]

## 2017-07-14 NOTE — PATIENT INSTRUCTIONS
[facility]    If you have any questions regarding to your visit please contact your care team:       Team Red:   Clinic Hours Telephone Number   Dr. Orin Peguero, NP   7am-7pm  Monday - Thursday   7am-5pm  Fridays  [phone]  (Appointment scheduling available 24/7)    Questions about your visit?   Team Line  [phone]   Urgent Care - [place] - 11am-9pm Monday-Friday Saturday-Sunday- 9am-5pm   [place] - 5pm-9pm Monday-Friday Saturday-Sunday- 9am-5pm  [phone] - [place]   [phone] - [place]       What options do I have for visits at the clinic other than the traditional office visit?  To expand how we care for you, many of our providers are utilizing electronic visits (e-visits) and telephone visits, when medically appropriate, for interactions with their patients rather than a visit in the clinic.   We also offer nurse visits for many medical concerns. Just like any other service, we will bill your insurance company for this type of visit based on time spent on the phone with your provider. Not all insurance companies cover these visits. Please check with your medical insurance if this type of visit is covered. You will be responsible for any charges that are not paid by your insurance.      E-visits via Location:  generally incur a $35.00 fee.  Telephone visits:  Time spent on the phone: *charged based on time that is spent on the phone in increments of 10 minutes. Estimated cost:   5-10 mins $30.00   11-20 mins. $59.00   21-30 mins. $85.00     Use ticcklet (secure email communication and access to your chart) to send your primary care provider a message or make an appointment. Ask someone on your Team how to sign up for Location.  For a Price Quote for your services, please call our Consumer Price Line at [phone].      As always, Thank you for trusting us with your health care needs!  Asher YOUNG  [place]

## 2017-07-14 NOTE — MR AVS SNAPSHOT
After Visit Summary   7/14/2017    Sukh Craven    MRN: [id number]           Patient Information     Date Of Birth          1955        Visit Information        Provider Department      7/14/2017 2:20 PM Roselia Peguero APRN CNP [facility]        Today's Diagnoses     Pain of toe of right foot    -  1    Paresthesias          Care Instructions    [facility]    If you have any questions regarding to your visit please contact your care team:       Team Red:   Clinic Hours Telephone Number   Dr. Orin Peguero, NP   7am-7pm  Monday - Thursday   7am-5pm  Fridays  [phone]  (Appointment scheduling available 24/7)    Questions about your visit?   Team Line  [phone]   Urgent Care - [place] - 11am-9pm Monday-Friday Saturday-Sunday- 9am-5pm   [place] - 5pm-9pm Monday-Friday Saturday-Sunday- 9am-5pm  [phone] - [facility]  [phone] - [place]       What options do I have for visits at the clinic other than the traditional office visit?  To expand how we care for you, many of our providers are utilizing electronic visits (e-visits) and telephone visits, when medically appropriate, for interactions with their patients rather than a visit in the clinic.   We also offer nurse visits for many medical concerns. Just like any other service, we will bill your insurance company for this type of visit based on time spent on the phone with your provider. Not all insurance companies cover these visits. Please check with your medical insurance if this type of visit is covered. You will be responsible for any charges that are not paid by your insurance.      E-visits via [employer]:  generally incur a $35.00 fee.  Telephone visits:  Time spent on the phone: *charged based on time that is spent on the phone in increments of 10 minutes. Estimated cost:   5-10 mins $30.00   11-20 mins.  "$59.00   21-30 mins. $85.00     Use Repairogenhart (secure email communication and access to your chart) to send your primary care provider a message or make an appointment. Ask someone on your Team how to sign up for [employer].  For a Price Quote for your services, please call our Consumer Price Line at [phone].      As always, Thank you for trusting us with your health care needs!  Asher Malave            Follow-ups after your visit        Your next 10 appointments already scheduled     Aug 08, 2017  3:00 PM CDT   (Arrive by 2:45 PM)   Return Visit with Cresencio Foote MD   [facility] ([facility])    [address] [place] [id number] [phone]              Future tests that were ordered for you today     Open Future Orders        Priority Expected Expires Ordered    XR Lumbar Spine 2/3 Views Routine 7/14/2017 7/14/2018 7/14/2017            Who to contact     If you have questions or need follow up information about today's clinic visit or your schedule please contact [facility] directly at [phone].  Normal or non-critical lab and imaging results will be communicated to you by MyChart, letter or phone within 4 business days after the clinic has received the results. If you do not hear from us within 7 days, please contact the clinic through [employer]rogenhart or phone. If you have a critical or abnormal lab result, we will notify you by phone as soon as possible.  Submit refill requests through tweetTV or call your pharmacy and they will forward the refill request to us. Please allow 3 business days for your refill to be completed.          Additional Information About Your Visit        tweetTV Information     tweetTV lets you send messages to your doctor, view your test results, renew your prescriptions, schedule appointments and more. To sign up, go to [email or web address] . Click on \"Log in\" " "on the left side of the screen, which will take you to the Welcome page. Then click on \"Sign up Now\" on the right side of the page.     You will be asked to enter the access code listed below, as well as some personal information. Please follow the directions to create your username and password.     Your access code is: [email or web address]  Expires: 2017 10:08 AM     Your access code will  in 90 days. If you need help or a new code, please call your [facility] or [phone].        Care EveryWhere ID     This is your Care EveryWhere ID. This could be used by other organizations to access your [place] medical records  [id number]        Your Vitals Were     Pulse Temperature Respirations Height Pulse Oximetry BMI (Body Mass Index)    80 97.5  F (36.4  C) (Oral) 22 6' 5\" (1.956 m) 98% 26.33 kg/m2       Blood Pressure from Last 3 Encounters:   17 (!) 143/93   17 (!) 172/100   17 131/85    Weight from Last 3 Encounters:   17 222 lb (100.7 kg)   17 222 lb (100.7 kg)   17 225 lb (102.1 kg)              We Performed the Following     Basic metabolic panel     CBC with platelets differential     Hemoglobin A1c     TSH with free T4 reflex     Uric acid     Vitamin B12          Today's Medication Changes          These changes are accurate as of: 17  3:08 PM.  If you have any questions, ask your nurse or doctor.               Start taking these medicines.        Dose/Directions    HYDROcodone-acetaminophen 5-325 MG per tablet   Commonly known as:  NORCO   Used for:  Pain of toe of right foot   Started by:  Roselia Peguero APRN CNP        Dose:  1 tablet   Take 1 tablet by mouth every 4 hours as needed for pain maximum 6 tablet(s) per day   Quantity:  18 tablet   Refills:  0            Where to get your medicines      Some of these will need a paper prescription and others can be bought over the counter.  Ask your nurse if you have questions.     Bring a paper " prescription for each of these medications     HYDROcodone-acetaminophen 5-325 MG per tablet                Primary Care Provider Office Phone # Fax #    Ned Martinez PA-C [phone]       [address] [address]        Equal Access to Services     DESI TIPTON : Hadii aad ku hadasho Soomaali, waaxda luqadaha, qaybta kaalmada adeegyada, waxay benjiin hayaan adeelmer casiano. So [facility] [phone].    ATENCIÓN: Si habla español, tiene a ambriz disposición servicios gratuitos de asistencia lingüística. Omiddanielle al [phone].    We comply with applicable federal civil rights laws and [place] laws. We do not discriminate on the basis of race, color, national origin, age, disability sex, sexual orientation or gender identity.            Thank you!     Thank you for choosing [facility]  for your care. Our goal is always to provide you with excellent care. Hearing back from our patients is one way we can continue to improve our services. Please take a few minutes to complete the written survey that you may receive in the mail after your visit with us. Thank you!             Your Updated Medication List - Protect others around you: Learn how to safely use, store and throw away your medicines at www.disposemymeds.org.          This list is accurate as of: 7/14/17  3:08 PM.  Always use your most recent med list.                   Brand Name Dispense Instructions for use Diagnosis    ferrous gluconate 324 (38 FE) MG tablet    FERGON    30 tablet    Take 1 tablet (324 mg) by mouth daily (with breakfast)    Anemia due to blood loss, acute       HYDROcodone-acetaminophen 5-325 MG per tablet    NORCO    18 tablet    Take 1 tablet by mouth every 4 hours as needed for pain maximum 6 tablet(s) per day    Pain of toe of right foot       losartan-hydrochlorothiazide 100-12.5 MG per tablet    HYZAAR    90 tablet    Take 1 tablet by mouth daily     Essential hypertension with goal blood pressure less than 140/90, Uncontrolled hypertension

## 2017-07-17 NOTE — PROGRESS NOTES
Please call the patient with these results:    Your lab and x-ray results are normal. The next step is an EMG test to evaluate the nerves in your legs- a referral has been placed for the test.    Roselia Peguero, CNP

## 2017-08-03 ENCOUNTER — PRE VISIT (OUTPATIENT)
Dept: UROLOGY | Facility: CLINIC | Age: 62
End: 2017-08-03

## 2017-08-03 NOTE — TELEPHONE ENCOUNTER
PVR after recent retention issue. Passed TOV.  Called and spoke with Sukh, told to do uroflow upon arrival in clinic.

## 2017-08-08 ENCOUNTER — OFFICE VISIT (OUTPATIENT)
Dept: UROLOGY | Facility: CLINIC | Age: 62
End: 2017-08-08

## 2017-08-08 VITALS
HEART RATE: 78 BPM | WEIGHT: 232.3 LBS | SYSTOLIC BLOOD PRESSURE: 141 MMHG | HEIGHT: 77 IN | DIASTOLIC BLOOD PRESSURE: 88 MMHG | BODY MASS INDEX: 27.43 KG/M2

## 2017-08-08 DIAGNOSIS — N40.0 BENIGN PROSTATIC HYPERPLASIA, PRESENCE OF LOWER URINARY TRACT SYMPTOMS UNSPECIFIED: Primary | ICD-10-CM

## 2017-08-08 RX ORDER — AZITHROMYCIN 250 MG/1
TABLET, FILM COATED ORAL
Refills: 0 | COMMUNITY
Start: 2017-06-26 | End: 2017-08-08

## 2017-08-08 RX ORDER — CLOTRIMAZOLE 1 %
CREAM (GRAM) TOPICAL
Refills: 1 | COMMUNITY
Start: 2017-06-23 | End: 2019-04-09

## 2017-08-08 RX ORDER — OXYCODONE AND ACETAMINOPHEN 5; 325 MG/1; MG/1
1 TABLET ORAL
COMMUNITY
Start: 2016-01-28 | End: 2017-08-08

## 2017-08-08 RX ORDER — TRIAMCINOLONE ACETONIDE 1 MG/G
CREAM TOPICAL
Refills: 1 | COMMUNITY
Start: 2017-06-23 | End: 2019-04-09

## 2017-08-08 RX ORDER — TRAMADOL HYDROCHLORIDE 50 MG/1
TABLET ORAL
Refills: 0 | COMMUNITY
Start: 2017-06-26 | End: 2017-08-08

## 2017-08-08 RX ORDER — KETOCONAZOLE 200 MG/1
TABLET ORAL
Refills: 0 | COMMUNITY
Start: 2017-05-05 | End: 2017-08-08

## 2017-08-08 RX ORDER — CIPROFLOXACIN 500 MG/1
TABLET, FILM COATED ORAL
Refills: 0 | COMMUNITY
Start: 2017-04-04 | End: 2017-08-08

## 2017-08-08 ASSESSMENT — PAIN SCALES - GENERAL: PAINLEVEL: NO PAIN (0)

## 2017-08-08 NOTE — PROGRESS NOTES
UROLOGY FOLLOW-UP NOTE          Chief Complaint:   Today I had the pleasure of seeing Mr. Sukh Craven in follow-up for a chief complaint of obstructive lower urinary tract symptoms         Interval Update    Sukh Craven is a very pleasant 62 year old male last seen on 6/6/17. Patient underwent HoLEP on 4/27/17.   At the last visit, the patient was reporting weakness which he had been started on iron supplementation by his PCP. Today, he states he is doing really well. He feels that his stream is significantly improved and that his urinary frequency has essentially resolved. Denies hematuria, dysuria, abdominal or flank pain. PVR today 26 mL.    AUA 14/35, QOL 2         Physical Exam:   Patient is a 62 year old  male   General Appearance Adult: Alert, no acute distress, oriented  Abdomen: soft, nontender, no organomegaly or masses  Skin: no suspicious lesions or rashes  Neuro: Alert, oriented, speech and mentation normal  Psych: affect and mood normal        Labs and Pathology:    I personally reviewed all applicable laboratory data and went over findings with patient  Significant for:    CBC RESULTS:  Recent Labs   Lab Test  07/14/17   1511  06/06/17   1052  05/05/17   1543  04/29/17   0608   WBC  3.6*  4.3  5.7  5.6   HGB  12.5*  12.6*  10.0*  9.7*   PLT  208  214  270  141*        BMP RESULTS:  Recent Labs   Lab Test  07/14/17   1511  05/05/17   1543  04/29/17   0608  04/28/17   0543   NA  141  142  142  145*   POTASSIUM  4.2  3.9  3.5  4.0   CHLORIDE  106  107  107  107   CO2  27  26  31  30   ANIONGAP  8  9  4  8   GLC  85  83  126*  126*   CR  1.12  1.10  1.17  1.24   GFRESTIMATED  67  68  64  59*   GFRESTBLACK  81  83  77  72   DORIS  9.7  8.8  7.8*  8.1*       UA RESULTS:   Recent Labs   Lab Test  04/17/17   1705  02/27/17   0937  01/18/12   1350   SG  1.015  1.015  1.017   URINEPH  7.0  8.0*  7.5*   NITRITE  Negative  Negative  Negative   RBCU   --   O - 2  O - 2   WBCU   --   O - 2  10-25*        PSA RESULTS  PSA   Date Value Ref Range Status   02/27/2017 1.80 0 - 4 ug/L Final     Comment:     Assay Method:  Chemiluminescence using Siemens Vista analyzer   02/24/2012 1.21 0 - 4 ug/L Final   03/26/2009 1.04 0 - 4 ug/L Final   08/07/2007 1.17 0 - 4 ug/L Final         Imaging:    None           Past Medical History:     Past Medical History:   Diagnosis Date     BPH (benign prostatic hyperplasia)      HTN (hypertension)             Past Surgical History:     Past Surgical History:   Procedure Laterality Date     C EXPLORE WOUND,NECK       LASER HOLMIUM ENUCLEATION PROSTATE N/A 4/27/2017    Procedure: LASER HOLMIUM ENUCLEATION PROSTATE;  Holmium Laser Enucleation Of The Prostate ;  Surgeon: Cresencio Foote MD;  Location: [facility]     REMOVAL OF SPERM DUCT(S)              Medications     Current Outpatient Prescriptions   Medication     oxyCODONE-acetaminophen (PERCOCET) 5-325 MG per tablet     azithromycin (ZITHROMAX) 250 MG tablet     ciprofloxacin (CIPRO) 500 MG tablet     clotrimazole (LOTRIMIN) 1 % cream     ketoconazole (NIZORAL) 200 MG tablet     traMADol (ULTRAM) 50 MG tablet     triamcinolone (KENALOG) 0.1 % cream     HYDROcodone-acetaminophen (NORCO) 5-325 MG per tablet     losartan-hydrochlorothiazide (HYZAAR) 100-12.5 MG per tablet     ferrous gluconate (FERGON) 324 (38 FE) MG tablet     No current facility-administered medications for this visit.             Family History:     Family History   Problem Relation Age of Onset     DIABETES Sister      DIABETES Sister      DIABETES Brother      C.A.D. No family hx of      Hypertension No family hx of      CEREBROVASCULAR DISEASE No family hx of      Breast Cancer No family hx of      Cancer - colorectal No family hx of      Prostate Cancer No family hx of             Social History:     Social History     Social History     Marital status:      Spouse name: Ricardo     Number of children: 5     Years of education: 14     Occupational  History      [employer]     Social History Main Topics     Smoking status: Former Smoker     Packs/day: 0.50     Years: 25.00     Types: Cigarettes     Quit date: 3/23/2007     Smokeless tobacco: Never Used     Alcohol use Yes      Comment: rare- social drinker     Drug use: No     Sexual activity: Yes     Partners: Female     Other Topics Concern      Service Yes     [employer]- 6 years     Blood Transfusions No     Caffeine Concern No     Occupational Exposure No     Hobby Hazards No     Sleep Concern No     Stress Concern No     Weight Concern No     Special Diet No     Back Care No     Exercise Yes     4 times a week     Bike Helmet Yes     Seat Belt Yes     Self-Exams Yes     Parent/Sibling W/ Cabg, Mi Or Angioplasty Before 65f 55m? No     Social History Narrative            Allergies:   No known drug allergies         Review of Systems:  From intake questionnaire   Negative 14 system review except as noted on HPI, nurse's note.           Assessment/Plan   Mr. Bellamy presents today for follow up after HoLEP procedure 4/27/17. Patient's urinary symptoms have improved significantly since surgery. PVR low. Discussed PSA surveillance with patient including expected drop in PSA after procedure. Recommend yearly PSA which we will plan to check at follow-up in one year. . Patient may follow up sooner if issues arise prior.     Patient seen and discussed with Cresencio Somers MD saw and evaluated this patient and agree with the plan as stated above       CC:.  Ned Martinez      >15 minutes were spent face to face with patient over half of which was spent providing medical counseling regarding BPH and post HoLEP urinary status

## 2017-08-08 NOTE — LETTER
8/8/2017       RE: Sukh Craven  [address] [address]  [facility] [address]     Dear Colleague,    Thank you for referring your patient, Sukh Craven, to the [facility] PR[facility] UROLOGIC CANCERS at [facility]. Please see a copy of my visit note below.           UROLOGY FOLLOW-UP NOTE        Chief Complaint:   Today I had the pleasure of seeing Mr. Sukh Craven in follow-up for a chief complaint of obstructive lower urinary tract symptoms         Interval Update    Sukh Craven is a very pleasant 62 year old male last seen on 6/6/17. Patient underwent HoLEP on 4/27/17.   At the last visit, the patient was reporting weakness which he had been started on iron supplementation by his PCP. Today, he states he is doing really well. He feels that his stream is significantly improved and that his urinary frequency has essentially resolved. Denies hematuria, dysuria, abdominal or flank pain. PVR today 26 mL.    AUA 14/35, QOL 2         Physical Exam:   Patient is a 62 year old  male   General Appearance Adult: Alert, no acute distress, oriented  Abdomen: soft, nontender, no organomegaly or masses  Skin: no suspicious lesions or rashes  Neuro: Alert, oriented, speech and mentation normal  Psych: affect and mood normal      Labs and Pathology:    I personally reviewed all applicable laboratory data and went over findings with patient  Significant for:    CBC RESULTS:  Recent Labs   Lab Test  07/14/17   1511  06/06/17   1052  05/05/17   1543  04/29/17   0608   WBC  3.6*  4.3  5.7  5.6   HGB  12.5*  12.6*  10.0*  9.7*   PLT  208  214  270  141*        BMP RESULTS:  Recent Labs   Lab Test  07/14/17   1511  05/05/17   1543  04/29/17   0608  04/28/17   0543   NA  141  142  142  145*   POTASSIUM  4.2  3.9  3.5  4.0   CHLORIDE  106  107  107  107   CO2  27  26  31  30   ANIONGAP  8  9  4  8   GLC  85  83  126*  126*   CR  1.12  1.10  1.17  1.24   GFRESTIMATED  67   68  64  59*   GFRESTBLACK  81  83  77  72   DORIS  9.7  8.8  7.8*  8.1*       UA RESULTS:   Recent Labs   Lab Test  04/17/17   1705  02/27/17   0937  01/18/12   1350   SG  1.015  1.015  1.017   URINEPH  7.0  8.0*  7.5*   NITRITE  Negative  Negative  Negative   RBCU   --   O - 2  O - 2   WBCU   --   O - 2  10-25*       PSA RESULTS  PSA   Date Value Ref Range Status   02/27/2017 1.80 0 - 4 ug/L Final     Comment:     Assay Method:  Chemiluminescence using Siemens Vista analyzer   02/24/2012 1.21 0 - 4 ug/L Final   03/26/2009 1.04 0 - 4 ug/L Final   08/07/2007 1.17 0 - 4 ug/L Final         Imaging:    None         Past Medical History:     Past Medical History:   Diagnosis Date     BPH (benign prostatic hyperplasia)      HTN (hypertension)             Past Surgical History:     Past Surgical History:   Procedure Laterality Date     C EXPLORE WOUND,NECK       LASER HOLMIUM ENUCLEATION PROSTATE N/A 4/27/2017    Procedure: LASER HOLMIUM ENUCLEATION PROSTATE;  Holmium Laser Enucleation Of The Prostate ;  Surgeon: Cresencio Foote MD;  [facility]     REMOVAL OF SPERM DUCT(S)              Medications     Current Outpatient Prescriptions   Medication     oxyCODONE-acetaminophen (PERCOCET) 5-325 MG per tablet     azithromycin (ZITHROMAX) 250 MG tablet     ciprofloxacin (CIPRO) 500 MG tablet     clotrimazole (LOTRIMIN) 1 % cream     ketoconazole (NIZORAL) 200 MG tablet     traMADol (ULTRAM) 50 MG tablet     triamcinolone (KENALOG) 0.1 % cream     HYDROcodone-acetaminophen (NORCO) 5-325 MG per tablet     losartan-hydrochlorothiazide (HYZAAR) 100-12.5 MG per tablet     ferrous gluconate (FERGON) 324 (38 FE) MG tablet     No current facility-administered medications for this visit.           Family History:     Family History   Problem Relation Age of Onset     DIABETES Sister      DIABETES Sister      DIABETES Brother      C.A.D. No family hx of      Hypertension No family hx of      CEREBROVASCULAR DISEASE No family hx  of      Breast Cancer No family hx of      Cancer - colorectal No family hx of      Prostate Cancer No family hx of             Social History:     Social History     Social History     Marital status:      Spouse name: Ricardo     Number of children: 5     Years of education: 14     Occupational History      [employer]     Social History Main Topics     Smoking status: Former Smoker     Packs/day: 0.50     Years: 25.00     Types: Cigarettes     Quit date: 3/23/2007     Smokeless tobacco: Never Used     Alcohol use Yes      Comment: rare- social drinker     Drug use: No     Sexual activity: Yes     Partners: Female     Other Topics Concern      Service Yes     [employer]- 6 years     Blood Transfusions No     Caffeine Concern No     Occupational Exposure No     Hobby Hazards No     Sleep Concern No     Stress Concern No     Weight Concern No     Special Diet No     Back Care No     Exercise Yes     4 times a week     Bike Helmet Yes     Seat Belt Yes     Self-Exams Yes     Parent/Sibling W/ Cabg, Mi Or Angioplasty Before 65f 55m? No     Social History Narrative            Allergies:   No known drug allergies         Review of Systems:  From intake questionnaire   Negative 14 system review except as noted on HPI, nurse's note.           Assessment/Plan   Mr. Bellamy presents today for follow up after HoLEP procedure 4/27/17. Patient's urinary symptoms have improved significantly since surgery. PVR low. Discussed PSA surveillance with patient including expected drop in PSA after procedure. Recommend yearly PSA which we will plan to check at follow-up in one year. . Patient may follow up sooner if issues arise prior.     Patient seen and discussed with Cresencio Somers MD saw and evaluated this patient and agree with the plan as stated above       CC:.  Ned Martinez      >15 minutes were spent face to face with patient over half of which  was spent providing medical counseling regarding BPH and post HoLEP urinary status  Again, thank you for allowing me to participate in the care of your patient.      Sincerely,  Cresencio Foote MD

## 2017-08-08 NOTE — MR AVS SNAPSHOT
After Visit Summary   2017    Sukh Craven    MRN: [id number]           Patient Information     Date Of Birth          1955        Visit Information        Provider Department      2017 3:00 PM Cresencio Foote MD [facility] Urolog[facility]        Today's Diagnoses     Benign prostatic hyperplasia, presence of lower urinary tract symptoms unspecified    -  1       Follow-ups after your visit        Who to contact     Please call your clinic at [phone] to:    Ask questions about your health    Make or cancel appointments    Discuss your medicines    Learn about your test results    Speak to your doctor   If you have compliments or concerns about an experience at your clinic, or if you wish to file a complaint, please contact [facility] Patient Relations at [phone] or email us at [email or web address]         Additional Information About Your Visit        MyChart Information     Advanced BioHealingt is an electronic gateway that provides easy, online access to your medical records. With [employer], you can request a clinic appointment, read your test results, renew a prescription or communicate with your care team.     To sign up for Advanced BioHealingt visit the website at [email or web address]   You will be asked to enter the access code listed below, as well as some personal information. Please follow the directions to create your username and password.     Your access code is: [email or web address]  Expires: 2017 10:08 AM     Your access code will  in 90 days. If you need help or a new code, please contact your [facility] or call [phone] for assistance.        Care EveryWhere ID     This is your Care EveryWhere ID. This could be used by other organizations to access your [place] medical records  [id number]        Your Vitals Were     Pulse Height BMI (Body Mass Index)             78  "1.956 m (6' 5\") 27.55 kg/m2          Blood Pressure from Last 3 Encounters:   08/08/17 141/88   07/14/17 (!) 143/93   06/26/17 (!) 172/100    Weight from Last 3 Encounters:   08/08/17 105.4 kg (232 lb 4.8 oz)   07/14/17 100.7 kg (222 lb)   06/26/17 100.7 kg (222 lb)              Today, you had the following     No orders found for display       Primary Care Provider Office Phone # Fax #    Ned Martinez PA-C [phone]       [address] [facility] [address]        Equal Access to Services     DESI TIPTON : Hadii maikol gonzalezo Sokasandra, waaxda luqadaha, qaybta kaalmada adeegyada, mariel casiano. So [facility] [phone].    ATENCIÓN: Si habla español, tiene a ambriz disposición servicios gratuitos de asistencia lingüística. Llame al [phone].    We comply with applicable federal civil rights laws and [place] laws. We do not discriminate on the basis of race, color, national origin, age, disability sex, sexual orientation or gender identity.            Thank you!     Thank you for choosing [facility] AND [facility] FOR PROSTATE AND UROLOGIC CANCERS  for your care. Our goal is always to provide you with excellent care. Hearing back from our patients is one way we can continue to improve our services. Please take a few minutes to complete the written survey that you may receive in the mail after your visit with us. Thank you!             Your Updated Medication List - Protect others around you: Learn how to safely use, store and throw away your medicines at www.disposemymeds.org.          This list is accurate as of: 8/8/17 11:59 PM.  Always use your most recent med list.                   Brand Name Dispense Instructions for use Diagnosis    clotrimazole 1 % cream    LOTRIMIN     YVONNE EXT AA BID        losartan-hydrochlorothiazide 100-12.5 MG per tablet    HYZAAR    90 tablet    Take 1 tablet by mouth daily    Essential hypertension with goal blood pressure less " than 140/90, Uncontrolled hypertension       triamcinolone 0.1 % cream    KENALOG     YVONNE EXT TO GROIN BID

## 2018-04-17 ENCOUNTER — TELEPHONE (OUTPATIENT)
Dept: FAMILY MEDICINE | Facility: CLINIC | Age: 63
End: 2018-04-17

## 2018-04-17 NOTE — TELEPHONE ENCOUNTER
Panel Management Review      Patient has the following on his problem list:     Hypertension   Last three blood pressure readings:  BP Readings from Last 3 Encounters:   08/08/17 141/88   07/14/17 (!) 143/93   06/26/17 (!) 172/100     Blood pressure: FAILED    HTN Guidelines:  Age 18-59 BP range:  Less than 140/90  Age 60-85 with Diabetes:  Less than 140/90  Age 60-85 without Diabetes:  less than 150/90      Composite cancer screening  Chart review shows that this patient is due/due soon for the following None  Summary:    Patient is due/failing the following:   NONE    Action needed:   Patient needs office visit for hypertension follow up.    Type of outreach:    Phone, left message for patient to call back.     Questions for provider review:    None                                                                                                                                    Guadalupe Bailey Certified Medical Assistant (AAMA)      Chart routed to Care Team .

## 2018-04-17 NOTE — TELEPHONE ENCOUNTER
Patient returned call and scheduled appointment with Rodolfo Martinez on 5/1/18.    Guadalupe Bailey, Certified Medical Assistant (AAMA)

## 2018-05-01 ENCOUNTER — OFFICE VISIT (OUTPATIENT)
Dept: FAMILY MEDICINE | Facility: CLINIC | Age: 63
End: 2018-05-01
Payer: COMMERCIAL

## 2018-05-01 VITALS
SYSTOLIC BLOOD PRESSURE: 207 MMHG | HEART RATE: 71 BPM | HEIGHT: 77 IN | TEMPERATURE: 98.4 F | BODY MASS INDEX: 26.85 KG/M2 | WEIGHT: 227.38 LBS | DIASTOLIC BLOOD PRESSURE: 97 MMHG

## 2018-05-01 DIAGNOSIS — R35.89 POLYURIA: ICD-10-CM

## 2018-05-01 DIAGNOSIS — Z12.11 SPECIAL SCREENING FOR MALIGNANT NEOPLASMS, COLON: ICD-10-CM

## 2018-05-01 DIAGNOSIS — N40.1 BENIGN PROSTATIC HYPERPLASIA WITH LOWER URINARY TRACT SYMPTOMS, SYMPTOM DETAILS UNSPECIFIED: ICD-10-CM

## 2018-05-01 DIAGNOSIS — I10 ESSENTIAL HYPERTENSION WITH GOAL BLOOD PRESSURE LESS THAN 140/90: Primary | ICD-10-CM

## 2018-05-01 LAB
ALBUMIN UR-MCNC: 30 MG/DL
APPEARANCE UR: CLEAR
BACTERIA #/AREA URNS HPF: ABNORMAL /HPF
BILIRUB UR QL STRIP: NEGATIVE
COLOR UR AUTO: YELLOW
GLUCOSE UR STRIP-MCNC: NEGATIVE MG/DL
HBA1C MFR BLD: 5.9 % (ref 0–5.6)
HGB UR QL STRIP: ABNORMAL
KETONES UR STRIP-MCNC: NEGATIVE MG/DL
LEUKOCYTE ESTERASE UR QL STRIP: NEGATIVE
NITRATE UR QL: NEGATIVE
NON-SQ EPI CELLS #/AREA URNS LPF: ABNORMAL /LPF
PH UR STRIP: 7.5 PH (ref 5–7)
RBC #/AREA URNS AUTO: ABNORMAL /HPF
SOURCE: ABNORMAL
SP GR UR STRIP: 1.02 (ref 1–1.03)
UROBILINOGEN UR STRIP-ACNC: 0.2 EU/DL (ref 0.2–1)
WBC #/AREA URNS AUTO: ABNORMAL /HPF

## 2018-05-01 PROCEDURE — 80048 BASIC METABOLIC PNL TOTAL CA: CPT | Performed by: PHYSICIAN ASSISTANT

## 2018-05-01 PROCEDURE — 99214 OFFICE O/P EST MOD 30 MIN: CPT | Performed by: PHYSICIAN ASSISTANT

## 2018-05-01 PROCEDURE — 36415 COLL VENOUS BLD VENIPUNCTURE: CPT | Performed by: PHYSICIAN ASSISTANT

## 2018-05-01 PROCEDURE — 83036 HEMOGLOBIN GLYCOSYLATED A1C: CPT | Performed by: PHYSICIAN ASSISTANT

## 2018-05-01 PROCEDURE — 81001 URINALYSIS AUTO W/SCOPE: CPT | Performed by: PHYSICIAN ASSISTANT

## 2018-05-01 RX ORDER — LOSARTAN POTASSIUM AND HYDROCHLOROTHIAZIDE 12.5; 1 MG/1; MG/1
1 TABLET ORAL DAILY
Qty: 90 TABLET | Refills: 0 | Status: SHIPPED | OUTPATIENT
Start: 2018-05-01 | End: 2019-04-09

## 2018-05-01 NOTE — MR AVS SNAPSHOT
After Visit Summary   5/1/2018    Sukh Craven    MRN: [id number]           Patient Information     Date Of Birth          1955        Visit Information        Provider Department      5/1/2018 5:20 PM Ned Martinez PA-C [facility]        Today's Diagnoses     Essential hypertension with goal blood pressure less than 140/90    -  1    Benign prostatic hyperplasia with lower urinary tract symptoms, symptom details unspecified        Polyuria        Special screening for malignant neoplasms, colon           Follow-ups after your visit        Additional Services     GASTROENTEROLOGY ADULT REF PROCEDURE ONLY Other; [facility] [phone]       Last Lab Result: Creatinine (mg/dL)       Date                     Value                 07/14/2017               1.12             ----------  Body mass index is 26.96 kg/(m^2).     Needed:  No  Language:  English    Patient will be contacted to schedule procedure.     Please be aware that coverage of these services is subject to the terms and limitations of your health insurance plan.  Call member services at your health plan with any benefit or coverage questions.  Any procedures must be performed at a [place] facility OR coordinated by your clinic's referral office.    Please bring the following with you to your appointment:    (1) Any X-Rays, CTs or MRIs which have been performed.  Contact the facility where they were done to arrange for  prior to your scheduled appointment.    (2) List of current medications   (3) This referral request   (4) Any documents/labs given to you for this referral            UROLOGY ADULT REFERRAL       Your provider has referred you to: [facility]: [place] for Prostate and Urologic Cancers - [place] [phone]   [email or web address]    Please be aware that coverage of these services is subject to the terms and limitations of your health insurance plan.  Call member services at  "your health plan with any benefit or coverage questions.      Please bring the following with you to your appointment:    (1) Any X-Rays, CTs or MRIs which have been performed.  Contact the facility where they were done to arrange for  prior to your scheduled appointment.    (2) List of current medications  (3) This referral request   (4) Any documents/labs given to you for this referral                  Who to contact     If you have questions or need follow up information about today's clinic visit or your schedule please contact [facility] directly at [phone].  Normal or non-critical lab and imaging results will be communicated to you by CardShark Poker Productshart, letter or phone within 4 business days after the clinic has received the results. If you do not hear from us within 7 days, please contact the clinic through [employer] Productshart or phone. If you have a critical or abnormal lab result, we will notify you by phone as soon as possible.  Submit refill requests through Xpresso or call your pharmacy and they will forward the refill request to us. Please allow 3 business days for your refill to be completed.          Additional Information About Your Visit        MyChart Information     Xpresso lets you send messages to your doctor, view your test results, renew your prescriptions, schedule appointments and more. To sign up, go to [email or web address] . Click on \"Log in\" on the left side of the screen, which will take you to the Welcome page. Then click on \"Sign up Now\" on the right side of the page.     You will be asked to enter the access code listed below, as well as some personal information. Please follow the directions to create your username and password.     Your access code is: [email or web address]  Expires: 2018  6:06 PM     Your access code will  in 90 days. If you need help or a new code, please call your [facility] or [phone].        Care EveryWhere ID     This is your Care " "EveryWhere ID. This could be used by other organizations to access your [place] medical records  [id number]        Your Vitals Were     Pulse Temperature Height BMI (Body Mass Index)          76 98.4  F (36.9  C) (Oral) 6' 5\" (1.956 m) 26.96 kg/m2         Blood Pressure from Last 3 Encounters:   05/01/18 (!) 219/110   08/08/17 141/88   07/14/17 (!) 143/93    Weight from Last 3 Encounters:   05/01/18 227 lb 6 oz (103.1 kg)   08/08/17 232 lb 4.8 oz (105.4 kg)   07/14/17 222 lb (100.7 kg)              We Performed the Following     *UA reflex to Microscopic and Culture ([place] and [place] [facility] (except [place] and [place])     Basic metabolic panel  (Ca, Cl, CO2, Creat, Gluc, K, Na, BUN)     GASTROENTEROLOGY ADULT REF PROCEDURE ONLY Other; [facility] [phone]     Hemoglobin A1c     UROLOGY ADULT REFERRAL          Where to get your medicines      These medications were sent to [facility] [address] [place] [address] [address] [facility] [address]  [address] [facility] [address]     Phone:  [phone]     losartan-hydrochlorothiazide 100-12.5 MG per tablet          Primary Care Provider Office Phone # Fax #    Ned Martinez PA-C [phone]       [facility] [address]        Equal Access to Services     MAGGY TIPTON AH: Hadii maikol velasco hadasho Soomaali, waaxda luqadaha, qaybta kaalmada adeegyada, mariel casiano. So [facility] [phone].    ATENCIÓN: Si habla español, tiene a ambriz disposición servicios gratuitos de asistencia lingüística. Llame al [phone].    We comply with applicable federal civil rights laws and [place] laws. We do not discriminate on the basis of race, color, national origin, age, disability, sex, sexual orientation, or gender identity.            Thank you!     Thank you for choosing [facility]  for your care. Our goal is always to provide you with excellent care. " Hearing back from our patients is one way we can continue to improve our services. Please take a few minutes to complete the written survey that you may receive in the mail after your visit with us. Thank you!             Your Updated Medication List - Protect others around you: Learn how to safely use, store and throw away your medicines at www.disposemymeds.org.          This list is accurate as of 5/1/18  6:06 PM.  Always use your most recent med list.                   Brand Name Dispense Instructions for use Diagnosis    clotrimazole 1 % cream    LOTRIMIN     YVONNE EXT AA BID        losartan-hydrochlorothiazide 100-12.5 MG per tablet    HYZAAR    90 tablet    Take 1 tablet by mouth daily    Essential hypertension with goal blood pressure less than 140/90       triamcinolone 0.1 % cream    KENALOG     YVONNE EXT TO GROIN BID

## 2018-05-01 NOTE — PROGRESS NOTES
SUBJECTIVE:   Sukh Craven is a 63 year old male who presents to clinic today for the following health issues:      Hypertension Follow-up      Outpatient blood pressures are being checked at home.  Results are unsure.  Around the 130s.    Low Salt Diet: low salt      Amount of exercise or physical activity: 3-4 days per week    Problems taking medications regularly: No    Medication side effects: none    Diet: regular (no restrictions) and low salt    Hasn't filled or been on his BP meds regularly, last fill was nearly a year ago. Says he's missed it a good deal. Feels fine.     Urinary frequency and night time symptoms have recurred. Was doing great for some time. His flow and stream are great but his frequency and night time urgency are high.     Patient Active Problem List   Diagnosis     Hematuria     BPH (benign prostatic hypertrophy)     Advanced directives, counseling/discussion     Elevated serum creatinine     History of Helicobacter infection     Hypertension goal BP (blood pressure) < 140/90     CKD (chronic kidney disease) stage 2, GFR 60-89 ml/min     CARDIOVASCULAR SCREENING; LDL GOAL LESS THAN 160     Pre-diabetes     BPH (benign prostatic hypertrophy) with urinary obstruction      Current Outpatient Prescriptions   Medication     clotrimazole (LOTRIMIN) 1 % cream     losartan-hydrochlorothiazide (HYZAAR) 100-12.5 MG per tablet     triamcinolone (KENALOG) 0.1 % cream     No current facility-administered medications for this visit.       Problem list and histories reviewed & adjusted, as indicated.  Additional history: as documented    Labs reviewed in EPIC    Reviewed and updated as needed this visit by clinical staff  Tobacco  Allergies  Meds  Med Hx  Surg Hx  Fam Hx  Soc Hx      Reviewed and updated as needed this visit by Provider         ROS:  Constitutional, HEENT, cardiovascular, pulmonary, gi and gu systems are negative, except as otherwise noted.    OBJECTIVE:     BP (!) 207/97  "(BP Location: Right arm, Cuff Size: Adult Large)  Pulse 71  Temp 98.4  F (36.9  C) (Oral)  Ht 6' 5\" (1.956 m)  Wt 227 lb 6 oz (103.1 kg)  BMI 26.96 kg/m2  Body mass index is 26.96 kg/(m^2).  GENERAL: healthy, alert and no distress  HENT: ear canals and TM's normal, nose and mouth without ulcers or lesions  NECK: no adenopathy, no asymmetry, masses, or scars and thyroid normal to palpation  RESP: lungs clear to auscultation - no rales, rhonchi or wheezes  CV: regular rate and rhythm, normal S1 S2, no S3 or S4, no murmur, click or rub, no peripheral edema and peripheral pulses strong  MS: no gross musculoskeletal defects noted, no edema  SKIN: no suspicious lesions or rashes  NEURO: CN Ii-XII intact     ASSESSMENT/PLAN:       ICD-10-CM    1. Essential hypertension with goal blood pressure less than 140/90 I10 losartan-hydrochlorothiazide (HYZAAR) 100-12.5 MG per tablet     Urine Microscopic   2. Benign prostatic hyperplasia with lower urinary tract symptoms, symptom details unspecified N40.1    3. Polyuria R35.8 Basic metabolic panel  (Ca, Cl, CO2, Creat, Gluc, K, Na, BUN)     Hemoglobin A1c     *UA reflex to Microscopic and Culture ([place] and [place] [facility] (except [place] and [place])     UROLOGY ADULT REFERRAL   4. Special screening for malignant neoplasms, colon Z12.11 GASTROENTEROLOGY ADULT REF PROCEDURE ONLY Other; [facility] [phone]      Severe - very elevated. He needs to stay on his meds. He's often non compliant. Recommend daily use. I want to see him in 2 weeks to recheck the BP. Labs today.    Despite his very high BP, he is completely asymptomatic - states he feels fine. Recommend watch for worrisome symptoms.     - recurrence of his chronic BPH / LUTS type symptoms, refer back to urology for reevaluation.    DOROTHEA Carballo, PA-C   "

## 2018-05-01 NOTE — LETTER
May 3, 2018      Sukh Craven  [address] [address]  [facility] [address]        Dear Sukh,     Labs all look stable. A bit of protein and blood in your urine, probably from the blood pressure elevations. You do have an elevated A1C (diabetes test), probably pre-diabetic at this stage.  Enclosed are your results.   Results for orders placed or performed in visit on 05/01/18   Basic metabolic panel  (Ca, Cl, CO2, Creat, Gluc, K, Na, BUN)   Result Value Ref Range    Sodium 141 133 - 144 mmol/L    Potassium 4.5 3.4 - 5.3 mmol/L    Chloride 107 94 - 109 mmol/L    Carbon Dioxide 25 20 - 32 mmol/L    Anion Gap 9 3 - 14 mmol/L    Glucose 87 70 - 99 mg/dL    Urea Nitrogen 17 7 - 30 mg/dL    Creatinine 1.20 0.66 - 1.25 mg/dL    GFR Estimate 62 >60 mL/min/1.7m2    GFR Estimate If Black 74 >60 mL/min/1.7m2    Calcium 9.3 8.5 - 10.1 mg/dL   Hemoglobin A1c   Result Value Ref Range    Hemoglobin A1C 5.9 (H) 0 - 5.6 %   *UA reflex to Microscopic and Culture ([place] and [place] [facility] (except [place] and [place])   Result Value Ref Range    Color Urine Yellow     Appearance Urine Clear     Glucose Urine Negative NEG^Negative mg/dL    Bilirubin Urine Negative NEG^Negative    Ketones Urine Negative NEG^Negative mg/dL    Specific Gravity Urine 1.020 1.003 - 1.035    Blood Urine Trace (A) NEG^Negative    pH Urine 7.5 (H) 5.0 - 7.0 pH    Protein Albumin Urine 30 (A) NEG^Negative mg/dL    Urobilinogen Urine 0.2 0.2 - 1.0 EU/dL    Nitrite Urine Negative NEG^Negative    Leukocyte Esterase Urine Negative NEG^Negative    Source Midstream Urine    Urine Microscopic   Result Value Ref Range    WBC Urine 0 - 5 OTO5^0 - 5 /HPF    RBC Urine O - 2 OTO2^O - 2 /HPF    Squamous Epithelial /LPF Urine Few FEW^Few /LPF    Bacteria Urine Few (A) NEG^Negative /HPF         Need to keep on your medications for blood pressure!!     See you in a few weeks.         Thanks.       Ned Martinez, MPAS, PA-C/kb

## 2018-05-02 LAB
ANION GAP SERPL CALCULATED.3IONS-SCNC: 9 MMOL/L (ref 3–14)
BUN SERPL-MCNC: 17 MG/DL (ref 7–30)
CALCIUM SERPL-MCNC: 9.3 MG/DL (ref 8.5–10.1)
CHLORIDE SERPL-SCNC: 107 MMOL/L (ref 94–109)
CO2 SERPL-SCNC: 25 MMOL/L (ref 20–32)
CREAT SERPL-MCNC: 1.2 MG/DL (ref 0.66–1.25)
GFR SERPL CREATININE-BSD FRML MDRD: 62 ML/MIN/1.7M2
GLUCOSE SERPL-MCNC: 87 MG/DL (ref 70–99)
POTASSIUM SERPL-SCNC: 4.5 MMOL/L (ref 3.4–5.3)
SODIUM SERPL-SCNC: 141 MMOL/L (ref 133–144)

## 2018-05-15 ENCOUNTER — OFFICE VISIT (OUTPATIENT)
Dept: FAMILY MEDICINE | Facility: CLINIC | Age: 63
End: 2018-05-15
Payer: COMMERCIAL

## 2018-05-15 VITALS
BODY MASS INDEX: 27.04 KG/M2 | DIASTOLIC BLOOD PRESSURE: 86 MMHG | HEIGHT: 77 IN | SYSTOLIC BLOOD PRESSURE: 160 MMHG | WEIGHT: 229 LBS | TEMPERATURE: 98.2 F

## 2018-05-15 DIAGNOSIS — I10 HYPERTENSION GOAL BP (BLOOD PRESSURE) < 140/90: Primary | ICD-10-CM

## 2018-05-15 DIAGNOSIS — R35.0 URINARY FREQUENCY: ICD-10-CM

## 2018-05-15 DIAGNOSIS — R22.0 MASS OF SCALP: ICD-10-CM

## 2018-05-15 DIAGNOSIS — N18.2 CKD (CHRONIC KIDNEY DISEASE) STAGE 2, GFR 60-89 ML/MIN: ICD-10-CM

## 2018-05-15 PROCEDURE — 99214 OFFICE O/P EST MOD 30 MIN: CPT | Performed by: PHYSICIAN ASSISTANT

## 2018-05-15 NOTE — MR AVS SNAPSHOT
After Visit Summary   5/15/2018    Sukh Craven    MRN: [id number]           Patient Information     Date Of Birth          1955        Visit Information        Provider Department      5/15/2018 2:40 PM Ned Martinez PA-C [facility]        Today's Diagnoses     Hypertension goal BP (blood pressure) < 140/90    -  1    CKD (chronic kidney disease) stage 2, GFR 60-89 ml/min        Mass of scalp        Urinary frequency           Follow-ups after your visit        Additional Services     UROLOGY ADULT REFERRAL       Your provider has referred you to: [facility]: [place] for Prostate and Urologic Cancers - [place] [phone]   [email or web address]    Please be aware that coverage of these services is subject to the terms and limitations of your health insurance plan.  Call member services at your health plan with any benefit or coverage questions.      Please bring the following with you to your appointment:    (1) Any X-Rays, CTs or MRIs which have been performed.  Contact the facility where they were done to arrange for  prior to your scheduled appointment.    (2) List of current medications  (3) This referral request   (4) Any documents/labs given to you for this referral                  Who to contact     If you have questions or need follow up information about today's clinic visit or your schedule please contact [facility] directly at [phone].  Normal or non-critical lab and imaging results will be communicated to you by MyChart, letter or phone within 4 business days after the clinic has received the results. If you do not hear from us within 7 days, please contact the clinic through MyChart or phone. If you have a critical or abnormal lab result, we will notify you by phone as soon as possible.  Submit refill requests through MaintenanceNet or call your pharmacy and they will forward the refill request to us. Please allow 3 business  "days for your refill to be completed.          Additional Information About Your Visit        [email or web address] Information     BitGravity lets you send messages to your doctor, view your test results, renew your prescriptions, schedule appointments and more. To sign up, go to [email or web address] . Click on \"Log in\" on the left side of the screen, which will take you to the Welcome page. Then click on \"Sign up Now\" on the right side of the page.     You will be asked to enter the access code listed below, as well as some personal information. Please follow the directions to create your username and password.     Your access code is: [email or web address]  Expires: 2018  6:06 PM     Your access code will  in 90 days. If you need help or a new code, please call your [place] clinic or [phone].        Care EveryWhere ID     This is your Care EveryWhere ID. This could be used by other organizations to access your [place] medical records  [id number]        Your Vitals Were     Temperature Height BMI (Body Mass Index)             98.2  F (36.8  C) (Oral) 6' 5\" (1.956 m) 27.16 kg/m2          Blood Pressure from Last 3 Encounters:   05/15/18 152/86   18 (!) 207/97   17 141/88    Weight from Last 3 Encounters:   05/15/18 229 lb (103.9 kg)   18 227 lb 6 oz (103.1 kg)   17 232 lb 4.8 oz (105.4 kg)              We Performed the Following     UROLOGY ADULT REFERRAL        Primary Care Provider Office Phone # Fax #    Ned Martinez PA-C [phone]       [address] [facility] [address]        Equal Access to Services     DESI TIPTON : Lizeth Armijo, aquilino lopes, diane kaalmada vesna, mariel casiano. So [facility] [phone].    ATENCIÓN: Si habla español, tiene a ambriz disposición servicios gratuitos de asistencia lingüística. Llame al [phone].    We comply with applicable federal civil rights laws and [place] laws. We do " not discriminate on the basis of race, color, national origin, age, disability, sex, sexual orientation, or gender identity.            Thank you!     Thank you for choosing [facility]  for your care. Our goal is always to provide you with excellent care. Hearing back from our patients is one way we can continue to improve our services. Please take a few minutes to complete the written survey that you may receive in the mail after your visit with us. Thank you!             Your Updated Medication List - Protect others around you: Learn how to safely use, store and throw away your medicines at www.disposemymeds.org.          This list is accurate as of 5/15/18  3:10 PM.  Always use your most recent med list.                   Brand Name Dispense Instructions for use Diagnosis    clotrimazole 1 % cream    LOTRIMIN     YVONNE EXT AA BID        losartan-hydrochlorothiazide 100-12.5 MG per tablet    HYZAAR    90 tablet    Take 1 tablet by mouth daily    Essential hypertension with goal blood pressure less than 140/90       triamcinolone 0.1 % cream    KENALOG     YVONNE EXT TO GROIN BID

## 2018-05-15 NOTE — PROGRESS NOTES
SUBJECTIVE:   Sukh Craven is a 63 year old male who presents to clinic today for the following health issues:      Hypertension Follow-up      Outpatient blood pressures are being checked at home.     Low Salt Diet: avoiding salt      Amount of exercise or physical activity: 3 days/week for an average of 15-30 minutes    Problems taking medications regularly: No    Medication side effects: none    Diet: no salt    Elevated BPs - improving since being back on his meds. Denies side effects.     Polyuria - still night time urinating a lot.   Lab Results   Component Value Date    A1C 5.9 05/01/2018      - Denies decreased flow - flow is quite strong since his TURP, actually.    Left side temple area mass present for a month or two he thinks. Non tender.       Patient Active Problem List   Diagnosis     Hematuria     BPH (benign prostatic hypertrophy)     Advanced directives, counseling/discussion     Elevated serum creatinine     History of Helicobacter infection     Hypertension goal BP (blood pressure) < 140/90     CKD (chronic kidney disease) stage 2, GFR 60-89 ml/min     CARDIOVASCULAR SCREENING; LDL GOAL LESS THAN 160     Pre-diabetes     BPH (benign prostatic hypertrophy) with urinary obstruction      Current Outpatient Prescriptions   Medication     clotrimazole (LOTRIMIN) 1 % cream     losartan-hydrochlorothiazide (HYZAAR) 100-12.5 MG per tablet     triamcinolone (KENALOG) 0.1 % cream     No current facility-administered medications for this visit.           Problem list and histories reviewed & adjusted, as indicated.  Additional history: as documented    Labs reviewed in EPIC    Reviewed and updated as needed this visit by clinical staff       Reviewed and updated as needed this visit by Provider         ROS:  Constitutional, HEENT, cardiovascular, pulmonary, gi and gu systems are negative, except as otherwise noted.    OBJECTIVE:     /86 (Cuff Size: Adult Large)  Temp 98.2  F (36.8  C) (Oral)   "Ht 6' 5\" (1.956 m)  Wt 229 lb (103.9 kg)  BMI 27.16 kg/m2  Body mass index is 27.16 kg/(m^2).  GENERAL: healthy, alert and no distress  RESP: lungs clear to auscultation - no rales, rhonchi or wheezes  CV: regular rate and rhythm, normal S1 S2, no S3 or S4, no murmur, click or rub, no peripheral edema and peripheral pulses strong  SKIN: Left temple, there is a mobile, rubbery, well defined mass 1.5 cm diameter   ASSESSMENT/PLAN:     (I10) Hypertension goal BP (blood pressure) < 140/90  (primary encounter diagnosis)  Comment:   Plan: Improving. I want him to monitor and get back to me - we need to manage more strictly if BPs remain elevated. He agrees to check 2 times a week and get back to me in a month or so. This prescription is given with a discussion of side effects, risks and proper use.  Instructions are given to follow up if not improving or symptoms change or worsen as discussed.     (N18.2) CKD (chronic kidney disease) stage 2, GFR 60-89 ml/min  Comment:   Plan: Stable     (R22.0) Mass of scalp  Comment:   Plan: Left side scalp. Should have an US to clarify what this is. He wants to monitor for now instead.    (R35.0) Urinary frequency  Comment:   Plan: UROLOGY ADULT REFERRAL        Unclear, I want him to see his urologist again. He agrees.     ДМИТРИЙ ORDAZ PA-C  [facility]    "

## 2019-03-18 ENCOUNTER — TELEPHONE (OUTPATIENT)
Dept: FAMILY MEDICINE | Facility: CLINIC | Age: 64
End: 2019-03-18

## 2019-03-18 NOTE — TELEPHONE ENCOUNTER
Panel Management Review      Patient has the following on his problem list:     Hypertension   Last three blood pressure readings:  BP Readings from Last 3 Encounters:   05/15/18 160/86   05/01/18 (!) 207/97   08/08/17 141/88     Blood pressure: FAILED    HTN Guidelines:  Age 18-59 BP range:  Less than 140/90  Age 60-85 with Diabetes:  Less than 140/90  Age 60-85 without Diabetes:  less than 150/90      Composite cancer screening  Chart review shows that this patient is due/due soon for the following Colonoscopy  Summary:    Patient is due/failing the following:   COLONOSCOPY and PHYSICAL    Action needed:   Patient needs office visit for Physical, BP check, fasting labs and colonoscopy referral.    Type of outreach:    Sent letter.    Questions for provider review:    None                                                                                                                                    Chi Bella MA    Chart Closed.           [facility]  [facility] [place]  [address]  [address]  [address] [facility] [address]  Dept: [address] [facility] Urology Office Note - Established    Patient:  Valerie Marrero  YOB: 1953  Date: 10/23/2017    The patient is a 60 y.o. male who presents today for evaluation of the following problems:   Chief Complaint   Patient presents with    Nephrolithiasis     [facility] follow up        HPI  Here for follow up on ER visit for kidney stone. Pain started 4 days ago, LT flank 7-8/10, had CT done and was given pain meds in ER and sent home on Motrin, and pain subsided. Pain reoccured on saturday with aching in his LT flank, frequency, burning and difficulty getting comfortable. Passed 3 stones at 6 pm last night. Is emptying well, frequency has stopped, nocturia 1x, deneis weak stream. Denies fever, dysuria, hematuria. Prior stone was calcium oxylate 20 and Uric acid 80% on prior stone analysis. Summary of old records: N/A    Additional History: N/A    Procedures Today: N/A    Urinalysis today:  No results found for this visit on 10/23/17. Last several PSA's:  Lab Results   Component Value Date    PSA 0.20 08/07/2015    PSA 0.17 06/20/2012     Last total testosterone:  No results found for: TESTOSTERONE    AUA Symptom Score (10/23/2017):   INCOMPLETE EMPTYING: How often have you had the sensation of not emptying your bladder?: Not at all  FREQUENCY: How often do you have to urinate less than every two hours?: Almost always (ON DIARETICS IN THE AM )  INTERMITTENCY: How often have you found you stopped and started again several times when you urinated?: Not at all  URGENCY: How often have you found it difficult to postpone urination?: Not at all  WEAK STREAM: How often have you had a weak urinary stream?: Not at all  STRAINING: How often have you had to strain to start  urination?: Not at all  NOCTURIA: How many times did you typically get up at night to uriniate?: 3 Times  TOTAL I-PSS SCORE[de-identified] 8  How would you feel if you were to spend the rest of your life with your urinary condition?: Mostly Satisfied    Last BUN and creatinine:  Lab Results   Component Value Date    BUN 22 10/19/2017     Lab Results   Component Value Date    CREATININE 1.01 10/19/2017       Additional Lab/Culture results: none    Imaging Reviewed during this Office Visit:      HISTORY:   ORDERING SYSTEM PROVIDED HISTORY: left side pain   TECHNOLOGIST PROVIDED HISTORY:   Ordering Physician Provided Reason for Exam: lt side flank pain       FINDINGS:   Lower Chest: Pulmonary nodules in the left lower lobe and lingula have been   stable since September 11, 2015 and July 24, 2014 confirming radiographic   benignity.  Representative left lower lobe pulmonary nodule remains   approximately 7 mm.  Postoperative changes in the right lung base   redemonstrated.       Organs: Exam is limited by the absence of intravenous contrast.       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 Barbar Borer has   been increased stone burden within the draining left upper pole moiety   ureter.  There are 3 left upper pole moiety ureteral calculi located in the   mid ureteral segment overlying the left sacroiliac joint but are too small to   be seen on the  CT image.  These ureteral calculi range between 5 to 7   mm.  However, degree of left upper pole moiety dilatation is similar compared   to the July 16, 2016 exam.       There are several left lower pole moiety intrarenal calculi the largest   measuring 11 mm by the midpole region (series 2, image 60).   Stable   prominence of the left lower pole moiety renal pelvis without significant   caliceal dilatation/hydronephrosis.       There is a 5 mm intrarenal calculus in the right lower pole kidney.  No right   collecting system dilatation.       Left renal cysts of varying size and attenuation artery demonstrated the   largest measures 2.5 cm in the left lower pole TB24 tablet Take 2 tablets by mouth daily      B-D UF III MINI PEN NEEDLES 31G X 5 MM MISC       lovastatin (MEVACOR) 20 MG tablet Take 20 mg by mouth daily       omega-3 acid ethyl esters (LOVAZA) 1 G capsule Take 2 g by mouth daily.  LOSARTAN POTASSIUM PO Take 50 mg by mouth daily       GLIMEPIRIDE PO Take 8 mg by mouth daily       Montelukast Sodium (SINGULAIR PO) Take 1 tablet by mouth daily.  Fenofibrate (TRICOR PO) Take 145 mg by mouth daily       aspirin 81 MG chewable tablet Take 81 mg by mouth daily. Review of patient's allergies indicates no known allergies. History   Smoking Status    Never Smoker   Smokeless Tobacco    Never Used     (If patient a smoker, smoking cessation counseling offered)    History   Alcohol Use    Yes     Comment: once a week       REVIEW OF SYSTEMS:  Review of Systems   Constitutional: Negative for appetite change, chills and fever. Eyes: Negative for pain, redness and visual disturbance. Respiratory: Positive for cough. Negative for shortness of breath and wheezing. Cardiovascular: Negative for chest pain and leg swelling. Gastrointestinal: Negative for abdominal pain, nausea and vomiting. Genitourinary: Positive for flank pain (thursday ), frequency and hematuria (last saturday ). Negative for difficulty urinating, discharge, dysuria, scrotal swelling, testicular pain and urgency. Musculoskeletal: Negative for back pain, joint swelling and myalgias. Skin: Negative for color change, rash and wound. Neurological: Negative for dizziness, tremors and numbness. Hematological: Negative for adenopathy. Does not bruise/bleed easily. Physical Exam:      Vitals:    10/23/17 0851   BP: 132/78   Pulse: 76   Temp: 97.8 °F (36.6 °C)     Body mass index is 31.19 kg/m². Patient is a 60 y.o. male in no acute distress and alert and oriented to person, place and time. Physical Exam  Constitutional: Patient in no acute distress.   Neuro: Alert

## 2019-03-18 NOTE — LETTER
March 18, 2019      Sukh Craven  [address] [address]  [facility] [address]        Dear Sukh Craven,    Your clinic record indicates that you are due for Colonoscopy or yearly stool blood testing (FIT), complete physical exam, fasting labs and blood pressure check. Please call the  at [phone] to schedule an appointment.     If we indicated that you are due for cholesterol testing, please come in fasting for twelve hours prior to the test.     If you have questions about this letter please contact your provider.    Sincerely,    ДМИТРИЙ ORDAZ PA-C/lu

## 2019-04-09 ENCOUNTER — OFFICE VISIT (OUTPATIENT)
Dept: FAMILY MEDICINE | Facility: CLINIC | Age: 64
End: 2019-04-09
Payer: COMMERCIAL

## 2019-04-09 ENCOUNTER — TELEPHONE (OUTPATIENT)
Dept: FAMILY MEDICINE | Facility: CLINIC | Age: 64
End: 2019-04-09

## 2019-04-09 ENCOUNTER — ANCILLARY PROCEDURE (OUTPATIENT)
Dept: GENERAL RADIOLOGY | Facility: CLINIC | Age: 64
End: 2019-04-09
Attending: PHYSICIAN ASSISTANT
Payer: COMMERCIAL

## 2019-04-09 VITALS
WEIGHT: 213 LBS | DIASTOLIC BLOOD PRESSURE: 86 MMHG | OXYGEN SATURATION: 98 % | HEIGHT: 76 IN | TEMPERATURE: 97.9 F | SYSTOLIC BLOOD PRESSURE: 164 MMHG | HEART RATE: 86 BPM | BODY MASS INDEX: 25.94 KG/M2

## 2019-04-09 DIAGNOSIS — R07.89 CHEST PRESSURE: ICD-10-CM

## 2019-04-09 DIAGNOSIS — N18.2 CKD (CHRONIC KIDNEY DISEASE) STAGE 2, GFR 60-89 ML/MIN: ICD-10-CM

## 2019-04-09 DIAGNOSIS — I10 HYPERTENSION GOAL BP (BLOOD PRESSURE) < 140/90: ICD-10-CM

## 2019-04-09 DIAGNOSIS — R53.83 OTHER FATIGUE: ICD-10-CM

## 2019-04-09 DIAGNOSIS — R21 RASH OF GROIN: ICD-10-CM

## 2019-04-09 DIAGNOSIS — R06.02 SHORTNESS OF BREATH: ICD-10-CM

## 2019-04-09 DIAGNOSIS — R63.0 NO APPETITE: ICD-10-CM

## 2019-04-09 DIAGNOSIS — N40.1 BENIGN PROSTATIC HYPERPLASIA WITH LOWER URINARY TRACT SYMPTOMS, SYMPTOM DETAILS UNSPECIFIED: ICD-10-CM

## 2019-04-09 DIAGNOSIS — Z12.11 SCREEN FOR COLON CANCER: ICD-10-CM

## 2019-04-09 DIAGNOSIS — I10 ESSENTIAL HYPERTENSION WITH GOAL BLOOD PRESSURE LESS THAN 140/90: ICD-10-CM

## 2019-04-09 DIAGNOSIS — Z11.59 NEED FOR HEPATITIS C SCREENING TEST: ICD-10-CM

## 2019-04-09 DIAGNOSIS — R63.4 WEIGHT LOSS: ICD-10-CM

## 2019-04-09 DIAGNOSIS — R07.89 CHEST PRESSURE: Primary | ICD-10-CM

## 2019-04-09 LAB
ALBUMIN UR-MCNC: 30 MG/DL
APPEARANCE UR: CLEAR
BACTERIA #/AREA URNS HPF: ABNORMAL /HPF
BASOPHILS # BLD AUTO: 0 10E9/L (ref 0–0.2)
BASOPHILS NFR BLD AUTO: 0.6 %
BILIRUB UR QL STRIP: NEGATIVE
COLOR UR AUTO: YELLOW
DIFFERENTIAL METHOD BLD: ABNORMAL
EOSINOPHIL # BLD AUTO: 0 10E9/L (ref 0–0.7)
EOSINOPHIL NFR BLD AUTO: 0.6 %
ERYTHROCYTE [DISTWIDTH] IN BLOOD BY AUTOMATED COUNT: 13.6 % (ref 10–15)
ERYTHROCYTE [SEDIMENTATION RATE] IN BLOOD BY WESTERGREN METHOD: 7 MM/H (ref 0–20)
GLUCOSE UR STRIP-MCNC: NEGATIVE MG/DL
HCT VFR BLD AUTO: 52.7 % (ref 40–53)
HGB BLD-MCNC: 17.8 G/DL (ref 13.3–17.7)
HGB UR QL STRIP: ABNORMAL
KETONES UR STRIP-MCNC: NEGATIVE MG/DL
LEUKOCYTE ESTERASE UR QL STRIP: NEGATIVE
LYMPHOCYTES # BLD AUTO: 1.8 10E9/L (ref 0.8–5.3)
LYMPHOCYTES NFR BLD AUTO: 38.7 %
MCH RBC QN AUTO: 30.1 PG (ref 26.5–33)
MCHC RBC AUTO-ENTMCNC: 33.8 G/DL (ref 31.5–36.5)
MCV RBC AUTO: 89 FL (ref 78–100)
MONOCYTES # BLD AUTO: 0.4 10E9/L (ref 0–1.3)
MONOCYTES NFR BLD AUTO: 7.8 %
NEUTROPHILS # BLD AUTO: 2.4 10E9/L (ref 1.6–8.3)
NEUTROPHILS NFR BLD AUTO: 52.3 %
NITRATE UR QL: NEGATIVE
NON-SQ EPI CELLS #/AREA URNS LPF: ABNORMAL /LPF
PH UR STRIP: 6.5 PH (ref 5–7)
PLATELET # BLD AUTO: 166 10E9/L (ref 150–450)
RBC # BLD AUTO: 5.91 10E12/L (ref 4.4–5.9)
RBC #/AREA URNS AUTO: ABNORMAL /HPF
SOURCE: ABNORMAL
SP GR UR STRIP: 1.02 (ref 1–1.03)
UROBILINOGEN UR STRIP-ACNC: 1 EU/DL (ref 0.2–1)
WBC # BLD AUTO: 4.6 10E9/L (ref 4–11)
WBC #/AREA URNS AUTO: ABNORMAL /HPF

## 2019-04-09 PROCEDURE — 80061 LIPID PANEL: CPT | Performed by: PHYSICIAN ASSISTANT

## 2019-04-09 PROCEDURE — 71046 X-RAY EXAM CHEST 2 VIEWS: CPT | Mod: FY

## 2019-04-09 PROCEDURE — 86431 RHEUMATOID FACTOR QUANT: CPT | Performed by: PHYSICIAN ASSISTANT

## 2019-04-09 PROCEDURE — 99215 OFFICE O/P EST HI 40 MIN: CPT | Performed by: PHYSICIAN ASSISTANT

## 2019-04-09 PROCEDURE — 93000 ELECTROCARDIOGRAM COMPLETE: CPT | Performed by: PHYSICIAN ASSISTANT

## 2019-04-09 PROCEDURE — 83880 ASSAY OF NATRIURETIC PEPTIDE: CPT | Performed by: PHYSICIAN ASSISTANT

## 2019-04-09 PROCEDURE — 36415 COLL VENOUS BLD VENIPUNCTURE: CPT | Performed by: PHYSICIAN ASSISTANT

## 2019-04-09 PROCEDURE — 87389 HIV-1 AG W/HIV-1&-2 AB AG IA: CPT | Performed by: PHYSICIAN ASSISTANT

## 2019-04-09 PROCEDURE — 80053 COMPREHEN METABOLIC PANEL: CPT | Performed by: PHYSICIAN ASSISTANT

## 2019-04-09 PROCEDURE — 85652 RBC SED RATE AUTOMATED: CPT | Performed by: PHYSICIAN ASSISTANT

## 2019-04-09 PROCEDURE — 81001 URINALYSIS AUTO W/SCOPE: CPT | Performed by: PHYSICIAN ASSISTANT

## 2019-04-09 PROCEDURE — 86803 HEPATITIS C AB TEST: CPT | Performed by: PHYSICIAN ASSISTANT

## 2019-04-09 PROCEDURE — 82728 ASSAY OF FERRITIN: CPT | Performed by: PHYSICIAN ASSISTANT

## 2019-04-09 PROCEDURE — 84443 ASSAY THYROID STIM HORMONE: CPT | Performed by: PHYSICIAN ASSISTANT

## 2019-04-09 PROCEDURE — 85025 COMPLETE CBC W/AUTO DIFF WBC: CPT | Performed by: PHYSICIAN ASSISTANT

## 2019-04-09 RX ORDER — CLOTRIMAZOLE 1 %
CREAM (GRAM) TOPICAL 2 TIMES DAILY
Qty: 60 G | Refills: 1 | Status: SHIPPED | OUTPATIENT
Start: 2019-04-09 | End: 2019-10-10

## 2019-04-09 RX ORDER — LOSARTAN POTASSIUM AND HYDROCHLOROTHIAZIDE 12.5; 1 MG/1; MG/1
1 TABLET ORAL DAILY
Qty: 90 TABLET | Refills: 1 | Status: SHIPPED | OUTPATIENT
Start: 2019-04-09 | End: 2020-10-08 | Stop reason: ALTCHOICE

## 2019-04-09 RX ORDER — TRIAMCINOLONE ACETONIDE 1 MG/G
CREAM TOPICAL 2 TIMES DAILY
Qty: 90 G | Refills: 1 | Status: SHIPPED | OUTPATIENT
Start: 2019-04-09 | End: 2019-10-10

## 2019-04-09 ASSESSMENT — MIFFLIN-ST. JEOR: SCORE: 1859.72

## 2019-04-09 NOTE — PATIENT INSTRUCTIONS
To Schedule Stress Testing    Jomar Yeager/[facility] Scheduling Phone number: [phone]     You call Urology to schedule a visit

## 2019-04-09 NOTE — PROGRESS NOTES
"  SUBJECTIVE:   Sukh Craven is a 64 year old male who presents to clinic today for the following   health issues:    Large list of concerns today:  1. Chest pressure - noticing on and off. Primarily when in [place] at [employer]. Julian like a weight on his chest when he would lay down. Not able to sleep. Needed to sit up. Resolved when he got up to walk around. Resolved since that time. However, He's had before, but cannot confirm if during activity or randomly. He usually can exercise regularly for an hour or so, but notes recently that he is winded and weak and \"have no energy.\"  2. Shortness of breath - past few months, happens with any exercise or activity. Seems to be getting worse. Reports that he feels it with his usual basketball routine which is fairly intense.   3. No appetite - a few months. Reports that he's lost all appetite. Can eat \"3 bites\" and be full.  4. Loss of about 15 pounds of weight this past few months. Seems due to not eating. Denies fevers, chills, but \"get the sweats.\" Reports that he has no GI symptoms other than \"not having bowel movements like I used to.\" He has refused colonoscopies in the past and hasn't had one. Has occasionally done FIT testing. All normal.  5. BP is very elevated. He is intermittently non compliant with medications. He took a dose today but has missed a few this last week. He will often go off medications for a month or two at a time and then return to restart.     Genitourinary - Male  Onset: 3 months ago    Description:   Dysuria (painful urination): no   Hematuria (blood in urine): no   Frequency: YES  Are you urinating at night : YES  Hesitancy (delay in urine): YES  Retention (unable to empty): YES  Decrease in urinary flow: YES  Incontinence: YES    Progression of Symptoms:  worsening    Accompanying Signs & Symptoms:  Fever: no   Back/Flank pain: no   Urethral discharge: no   Testicle lumps/masses/pain: no   Nausea and/or vomiting: no   Abdominal pain: " "no     History:   History of frequent UTI's: no   History of kidney stones: no   History of hernias: no   Personal or Family history of Prostate problems: YES  Sexually active: YES    Precipitating factors:   none    Alleviating factors:  none      Concern - Shortness of breath  Onset: 7 months    Description:   Patient gets short of breath and feels like \"elephant is sitting on chest\" this gets worse when he lays down    Intensity: moderate    Progression of Symptoms:  worsening and intermittent    Accompanying Signs & Symptoms:  none    Previous history of similar problem:   none    Precipitating factors:   Worsened by: activity    Alleviating factors:  Improved by: none    Therapies Tried and outcome: none    Additional history: as documented    Reviewed  and updated as needed this visit by clinical staff         Reviewed and updated as needed this visit by Provider         Labs reviewed in EPIC    ROS:  Constitutional, HEENT, cardiovascular, pulmonary, GI, , musculoskeletal, neuro, skin, endocrine, hematologic, lyphmatic and psych systems are negative, except as otherwise noted.    OBJECTIVE:     /88 (BP Location: Right arm, Patient Position: Sitting, Cuff Size: Adult Large)   Pulse 86   Temp 97.9  F (36.6  C) (Oral)   Ht 1.918 m (6' 3.5\")   Wt 96.6 kg (213 lb)   SpO2 98%   BMI 26.27 kg/m    Body mass index is 26.27 kg/m .  GENERAL: healthy, alert and no distress  EYES: Eyes grossly normal to inspection, PERRL and conjunctivae and sclerae normal  HENT: ear canals and TM's normal, nose and mouth without ulcers or lesions  NECK: no adenopathy, no asymmetry, masses, or scars and thyroid normal to palpation  RESP: lungs clear to auscultation - no rales, rhonchi or wheezes  CV: He has a grade 2 murmur heard best over the LLSB, challenging to determine if systolic or diastolic on this exam but otherwise regular rate and rhythm, normal S1 S2, no S3 or S4, no other murmur, click or rub, no peripheral edema " and peripheral pulses strong  ABDOMEN: soft, nontender, no hepatosplenomegaly, no masses and bowel sounds normal  MS: no gross musculoskeletal defects noted, no edema  SKIN: no suspicious lesions or rashes  NEURO: Normal strength and tone, mentation intact and speech normal  PSYCH: mentation appears normal, affect normal/bright  LYMPH: no cervical, supraclavicular, axillary, or inguinal adenopathy    EKG:   Chest x ray: Appears normal and consistent with previous x rays - he has an elevated right hemidiaphragm which is not a new finding  EKG: He has a flattened T - wave but no acute findings. I have no previous EKG to compare to.     ASSESSMENT/PLAN:     (R07.89) Chest pressure  (primary encounter diagnosis)  Comment:   Plan: EKG 12-lead complete w/read - Clinics,         Ferritin, Comprehensive metabolic panel (BMP +         Alb, Alk Phos, ALT, AST, Total. Bili, TP), *UA         reflex to Microscopic and Culture ([facility]         [facility] (except [place] and         [place]), TSH, HIV Antigen Antibody Combo, XR         Chest 2 Views, BNP-N terminal pro,         Echocardiogram Exercise Stress, Urine         Microscopic        Unclear. Resolved. Not anginal or activity induced. Recommend labs. Recommend stress echo. If this recurs, go to ER. Has a new murmur, may need dedicated echo later. Warning symptoms of worsening condition discussed and patient shows good understanding.     (R06.02) Shortness of breath  Comment:   Plan: EKG 12-lead complete w/read - Clinics,         Ferritin, Comprehensive metabolic panel (BMP +         Alb, Alk Phos, ALT, AST, Total. Bili, TP),         Rheumatoid factor, XR Chest 2 Views, BNP-N         terminal pro, Echocardiogram Exercise Stress        Unclear. Large differential. Check labs. Stress Echo. X ray and EKG were not particularly revealing today. May need dedicated echo. Warning symptoms of worsening condition discussed and patient shows good understanding.     (I10)  Essential hypertension with goal blood pressure less than 140/90  Comment:   Plan: losartan-hydrochlorothiazide (HYZAAR) 100-12.5         MG tablet        Elevated. Restart / stay on med. He agrees.    (R63.0) No appetite  Comment:   Plan: Ferritin, Comprehensive metabolic panel (BMP +         Alb, Alk Phos, ALT, AST, Total. Bili, TP), ESR:        Erythrocyte sedimentation rate, Rheumatoid         factor, *UA reflex to Microscopic and Culture         ([facility] (except [place]        and [place]), TSH, CBC with platelets and         differential, HIV Antigen Antibody Combo        Unclear cause. Check labs. Follow up after results.    (R53.83) Other fatigue  Comment:   Plan: Ferritin, Comprehensive metabolic panel (BMP +         Alb, Alk Phos, ALT, AST, Total. Bili, TP), ESR:        Erythrocyte sedimentation rate, Rheumatoid         factor, *UA reflex to Microscopic and Culture         ([facility] (except [place]        and [place]), TSH, CBC with platelets and         differential, HIV Antigen Antibody Combo        Unclear. New this past few months. Worse recently. Check labs. Follow up in a week.    (R63.4) Weight loss  Comment:   Plan: Ferritin, Comprehensive metabolic panel (BMP +         Alb, Alk Phos, ALT, AST, Total. Bili, TP), ESR:        Erythrocyte sedimentation rate, Rheumatoid         factor, *UA reflex to Microscopic and Culture         ([facility] (except [place]        and [place]), TSH, CBC with platelets and         differential, HIV Antigen Antibody Combo        Unclear. Check labs. Follow up after results. Get high calorie foods.    (N40.1) Benign prostatic hyperplasia with lower urinary tract symptoms, symptom details unspecified  Comment:   Plan: UROLOGY ADULT REFERRAL        Recurrent. Persistent. Worse. Recommend see urology. Checking a UA today    (N18.2) CKD (chronic kidney disease) stage 2, GFR 60-89 ml/min  Comment:   Plan:  Recheck    (I10) Hypertension goal BP (blood pressure) < 140/90  Comment:   Plan: Lipid panel reflex to direct LDL Fasting        Recheck    (Z11.59) Need for hepatitis C screening test  Comment:   Plan: Hepatitis C Screen Reflex to HCV RNA Quant and         Genotype        Checking    (Z12.11) Screen for colon cancer  Comment:   Plan: Advised again, consider full colonoscopy - he agrees to consider. Do FIT for sure. He will likely to do that.    (R21) Rash of groin  Comment:   Plan: clotrimazole (LOTRIMIN) 1 % external cream,         triamcinolone (KENALOG) 0.1 % external cream        Requesting refills.    This visit took 50 minutes of care time to coordinate and provide care.   ДМИТРИЙ ORDAZ PA-C  [facility]

## 2019-04-09 NOTE — TELEPHONE ENCOUNTER
Noted. Agreed -if symptoms arise, go to ER.    This urologic concern is remarkably recurrent for him and I've done my due diligence trying to get him to go to and have Urology evaluate this but he continues to see me for it. I am unclear why, but I am happy to again address what is ultimately a surgical / functional prostate concern.     Thanks.  Ned Martinez, DOROTHEA, PA-C

## 2019-04-09 NOTE — TELEPHONE ENCOUNTER
"Called and spoke with patient. He says that all of the symptoms he was having before surgery have returned. Symptoms include frequent urination, especially at night. Flow starts, stops, then leaks.     Another reason that he wants to see PCP is because he is having some new symptoms. He states it's been going on \"for a while\", does not specify a time frame, but says it was worse when he recently went to [place]. He intermittently feels like elephant is sitting on chest. When this happens he says he can't get enough air. It feels like someone's holding nose and hand partially over mouth. Has sometimes has wheezing when this happens and breaks out in a cold sweat even if it's cold outside. He is not currently having these symptoms. I told patient that he needs to go to ER immediately if this happens again before his appointment. He would like to address this with PCP.    Route to PCP as LYN Valadez RN    "

## 2019-04-10 LAB
ALBUMIN SERPL-MCNC: 4.8 G/DL (ref 3.4–5)
ALP SERPL-CCNC: 67 U/L (ref 40–150)
ALT SERPL W P-5'-P-CCNC: 38 U/L (ref 0–70)
ANION GAP SERPL CALCULATED.3IONS-SCNC: 9 MMOL/L (ref 3–14)
AST SERPL W P-5'-P-CCNC: 36 U/L (ref 0–45)
BILIRUB SERPL-MCNC: 1.3 MG/DL (ref 0.2–1.3)
BUN SERPL-MCNC: 29 MG/DL (ref 7–30)
CALCIUM SERPL-MCNC: 9.5 MG/DL (ref 8.5–10.1)
CHLORIDE SERPL-SCNC: 104 MMOL/L (ref 94–109)
CHOLEST SERPL-MCNC: 257 MG/DL
CO2 SERPL-SCNC: 24 MMOL/L (ref 20–32)
CREAT SERPL-MCNC: 1.31 MG/DL (ref 0.66–1.25)
FERRITIN SERPL-MCNC: 145 NG/ML (ref 26–388)
GFR SERPL CREATININE-BSD FRML MDRD: 58 ML/MIN/{1.73_M2}
GLUCOSE SERPL-MCNC: 88 MG/DL (ref 70–99)
HCV AB SERPL QL IA: NONREACTIVE
HDLC SERPL-MCNC: 41 MG/DL
HIV 1+2 AB+HIV1 P24 AG SERPL QL IA: NONREACTIVE
LDLC SERPL CALC-MCNC: 196 MG/DL
NONHDLC SERPL-MCNC: 216 MG/DL
NT-PROBNP SERPL-MCNC: 141 PG/ML (ref 0–125)
POTASSIUM SERPL-SCNC: 3.8 MMOL/L (ref 3.4–5.3)
PROT SERPL-MCNC: 9.3 G/DL (ref 6.8–8.8)
RHEUMATOID FACT SER NEPH-ACNC: <20 IU/ML (ref 0–20)
SODIUM SERPL-SCNC: 137 MMOL/L (ref 133–144)
TRIGL SERPL-MCNC: 102 MG/DL
TSH SERPL DL<=0.005 MIU/L-ACNC: 1.81 MU/L (ref 0.4–4)

## 2019-04-23 ENCOUNTER — ANCILLARY PROCEDURE (OUTPATIENT)
Dept: CARDIOLOGY | Facility: CLINIC | Age: 64
End: 2019-04-23
Attending: PHYSICIAN ASSISTANT
Payer: COMMERCIAL

## 2019-04-23 DIAGNOSIS — R07.89 CHEST PRESSURE: ICD-10-CM

## 2019-04-23 DIAGNOSIS — R06.02 SHORTNESS OF BREATH: ICD-10-CM

## 2019-04-23 PROCEDURE — 93325 DOPPLER ECHO COLOR FLOW MAPG: CPT | Performed by: INTERNAL MEDICINE

## 2019-04-23 PROCEDURE — 93321 DOPPLER ECHO F-UP/LMTD STD: CPT | Performed by: INTERNAL MEDICINE

## 2019-04-23 PROCEDURE — 93308 TTE F-UP OR LMTD: CPT | Performed by: INTERNAL MEDICINE

## 2019-04-23 NOTE — NURSING NOTE
Stress test was changed to limited echo due to high resting BP (200/111 and after 10 minutes laying 185/94) per cardiologist on site.

## 2019-05-28 ENCOUNTER — NURSE TRIAGE (OUTPATIENT)
Dept: NURSING | Facility: CLINIC | Age: 64
End: 2019-05-28

## 2019-05-28 ENCOUNTER — OFFICE VISIT (OUTPATIENT)
Dept: FAMILY MEDICINE | Facility: CLINIC | Age: 64
End: 2019-05-28
Payer: COMMERCIAL

## 2019-05-28 ENCOUNTER — ANCILLARY PROCEDURE (OUTPATIENT)
Dept: CT IMAGING | Facility: CLINIC | Age: 64
End: 2019-05-28
Attending: NURSE PRACTITIONER
Payer: COMMERCIAL

## 2019-05-28 VITALS
BODY MASS INDEX: 26.27 KG/M2 | TEMPERATURE: 98.2 F | HEART RATE: 68 BPM | OXYGEN SATURATION: 100 % | WEIGHT: 213 LBS | DIASTOLIC BLOOD PRESSURE: 80 MMHG | SYSTOLIC BLOOD PRESSURE: 160 MMHG

## 2019-05-28 DIAGNOSIS — E78.5 HYPERLIPIDEMIA LDL GOAL <160: ICD-10-CM

## 2019-05-28 DIAGNOSIS — H53.9 VISUAL DISTURBANCE: ICD-10-CM

## 2019-05-28 DIAGNOSIS — I10 HTN, GOAL BELOW 140/90: ICD-10-CM

## 2019-05-28 DIAGNOSIS — I35.1 MODERATE AORTIC INSUFFICIENCY: ICD-10-CM

## 2019-05-28 DIAGNOSIS — I77.819 DILATATION OF AORTA (H): ICD-10-CM

## 2019-05-28 DIAGNOSIS — R29.90 STROKE-LIKE SYMPTOMS: Primary | ICD-10-CM

## 2019-05-28 DIAGNOSIS — R55 PRE-SYNCOPE: ICD-10-CM

## 2019-05-28 LAB
BASOPHILS # BLD AUTO: 0 10E9/L (ref 0–0.2)
BASOPHILS NFR BLD AUTO: 0.9 %
DIFFERENTIAL METHOD BLD: NORMAL
EOSINOPHIL # BLD AUTO: 0.1 10E9/L (ref 0–0.7)
EOSINOPHIL NFR BLD AUTO: 1.1 %
ERYTHROCYTE [DISTWIDTH] IN BLOOD BY AUTOMATED COUNT: 14.3 % (ref 10–15)
HCT VFR BLD AUTO: 47 % (ref 40–53)
HGB BLD-MCNC: 15.7 G/DL (ref 13.3–17.7)
LYMPHOCYTES # BLD AUTO: 1.4 10E9/L (ref 0.8–5.3)
LYMPHOCYTES NFR BLD AUTO: 29.8 %
MCH RBC QN AUTO: 30.4 PG (ref 26.5–33)
MCHC RBC AUTO-ENTMCNC: 33.4 G/DL (ref 31.5–36.5)
MCV RBC AUTO: 91 FL (ref 78–100)
MONOCYTES # BLD AUTO: 0.5 10E9/L (ref 0–1.3)
MONOCYTES NFR BLD AUTO: 10.6 %
NEUTROPHILS # BLD AUTO: 2.7 10E9/L (ref 1.6–8.3)
NEUTROPHILS NFR BLD AUTO: 57.6 %
PLATELET # BLD AUTO: 152 10E9/L (ref 150–450)
RBC # BLD AUTO: 5.17 10E12/L (ref 4.4–5.9)
WBC # BLD AUTO: 4.6 10E9/L (ref 4–11)

## 2019-05-28 PROCEDURE — 93000 ELECTROCARDIOGRAM COMPLETE: CPT | Performed by: NURSE PRACTITIONER

## 2019-05-28 PROCEDURE — 36415 COLL VENOUS BLD VENIPUNCTURE: CPT | Performed by: NURSE PRACTITIONER

## 2019-05-28 PROCEDURE — 85025 COMPLETE CBC W/AUTO DIFF WBC: CPT | Performed by: NURSE PRACTITIONER

## 2019-05-28 PROCEDURE — 70450 CT HEAD/BRAIN W/O DYE: CPT | Mod: TC

## 2019-05-28 PROCEDURE — 80048 BASIC METABOLIC PNL TOTAL CA: CPT | Performed by: NURSE PRACTITIONER

## 2019-05-28 PROCEDURE — 99215 OFFICE O/P EST HI 40 MIN: CPT | Performed by: NURSE PRACTITIONER

## 2019-05-28 RX ORDER — SPIRONOLACTONE 25 MG/1
25 TABLET ORAL DAILY
Qty: 30 TABLET | Refills: 0 | Status: CANCELLED | OUTPATIENT
Start: 2019-05-28

## 2019-05-28 RX ORDER — ROSUVASTATIN CALCIUM 20 MG/1
20 TABLET, COATED ORAL DAILY
Qty: 60 TABLET | Refills: 1 | Status: SHIPPED | OUTPATIENT
Start: 2019-05-28 | End: 2020-07-29

## 2019-05-28 RX ORDER — METOPROLOL SUCCINATE 25 MG/1
25 TABLET, EXTENDED RELEASE ORAL DAILY
Qty: 30 TABLET | Refills: 1 | Status: SHIPPED | OUTPATIENT
Start: 2019-05-28 | End: 2021-06-04

## 2019-05-28 RX ORDER — ASPIRIN 81 MG/1
81 TABLET, CHEWABLE ORAL ONCE
Status: CANCELLED | OUTPATIENT
Start: 2019-05-28 | End: 2019-05-28

## 2019-05-28 RX ORDER — ATORVASTATIN CALCIUM 20 MG/1
20 TABLET, FILM COATED ORAL DAILY
Status: CANCELLED | OUTPATIENT
Start: 2019-05-28

## 2019-05-28 NOTE — PATIENT INSTRUCTIONS
Start Crestor 20 mg daily for cholesterol    Start metoprolol 25 mg daily for BP     Return Friday for follow up.

## 2019-05-28 NOTE — TELEPHONE ENCOUNTER
Sukh is taking Lisinopril and feels like it is not working.  Pharmacy notified Sukh that there is a recall on medication.  Sukh does not want to go to [facility] he is requesting an appointment today with primary MD.      Reason for Disposition    [1] Systolic BP  >= 200 OR Diastolic >= 120  AND [2] having NO cardiac or neurologic symptoms    Additional Information    Negative: Difficult to awaken or acting confused (e.g., disoriented, slurred speech)    Negative: Severe difficulty breathing (e.g., struggling for each breath, speaks in single words)    Negative: [1] Weakness of the face, arm or leg on one side of the body AND [2] new onset    Negative: [1] Numbness (i.e., loss of sensation) of the face, arm or leg on one side of the body AND [2] new onset    Negative: [1] Chest pain lasts > 5 minutes AND [2] history of heart disease  (i.e., heart attack, bypass surgery, angina, angioplasty, CHF)    Negative: [1] Chest pain AND [2] took nitrogylcerin AND [3] pain was not relieved    Negative: Sounds like a life-threatening emergency to the triager    Negative: [1] Systolic BP  >= 160 OR Diastolic >= 100 AND [2] cardiac or neurologic symptoms (e.g., chest pain, difficulty breathing, unsteady gait, blurred vision)    Negative: [1] Pregnant > 20 weeks AND [2] new hand or face swelling    Negative: [1] Pregnant > 20 weeks AND [2] BP Systolic BP  >= 140 OR Diastolic >= 90    Protocols used: HIGH BLOOD PRESSURE-A-

## 2019-05-29 LAB
ANION GAP SERPL CALCULATED.3IONS-SCNC: 6 MMOL/L (ref 3–14)
BUN SERPL-MCNC: 18 MG/DL (ref 7–30)
CALCIUM SERPL-MCNC: 9.1 MG/DL (ref 8.5–10.1)
CHLORIDE SERPL-SCNC: 106 MMOL/L (ref 94–109)
CO2 SERPL-SCNC: 28 MMOL/L (ref 20–32)
CREAT SERPL-MCNC: 1.23 MG/DL (ref 0.66–1.25)
GFR SERPL CREATININE-BSD FRML MDRD: 62 ML/MIN/{1.73_M2}
GLUCOSE SERPL-MCNC: 84 MG/DL (ref 70–99)
POTASSIUM SERPL-SCNC: 4 MMOL/L (ref 3.4–5.3)
SODIUM SERPL-SCNC: 140 MMOL/L (ref 133–144)

## 2019-05-31 ENCOUNTER — OFFICE VISIT (OUTPATIENT)
Dept: FAMILY MEDICINE | Facility: CLINIC | Age: 64
End: 2019-05-31
Payer: COMMERCIAL

## 2019-05-31 VITALS
DIASTOLIC BLOOD PRESSURE: 70 MMHG | SYSTOLIC BLOOD PRESSURE: 160 MMHG | HEIGHT: 76 IN | WEIGHT: 222 LBS | HEART RATE: 76 BPM | TEMPERATURE: 98.3 F | BODY MASS INDEX: 27.03 KG/M2

## 2019-05-31 DIAGNOSIS — Z12.11 SPECIAL SCREENING FOR MALIGNANT NEOPLASMS, COLON: ICD-10-CM

## 2019-05-31 DIAGNOSIS — I10 HYPERTENSION GOAL BP (BLOOD PRESSURE) < 140/90: ICD-10-CM

## 2019-05-31 DIAGNOSIS — R01.1 UNDIAGNOSED CARDIAC MURMURS: ICD-10-CM

## 2019-05-31 PROCEDURE — 99214 OFFICE O/P EST MOD 30 MIN: CPT | Performed by: NURSE PRACTITIONER

## 2019-05-31 ASSESSMENT — MIFFLIN-ST. JEOR: SCORE: 1900.55

## 2019-05-31 NOTE — PATIENT INSTRUCTIONS
Preferred location:  Dr. Watts Can  [phone]  Please be aware that coverage of these services is subject to the terms and limitations of your health insurance plan.  Call member services at your health plan with any benefit or coverage questions.       Please bring the following to your appointment:  Any x-rays, CTs or MRIs which have been performed. Contact the facility where they were done to arrange for  prior to your scheduled appointment.    List of current medications  This referral request   Any documents/labs given to you for this referral

## 2019-05-31 NOTE — PROGRESS NOTES
Subjective     Sukh Craven is a 64 year old male who presents to clinic today for the following health issues:    HPI   Hypertension Follow-up      Do you check your blood pressure regularly outside of the clinic? Yes     Are you following a low salt diet? No    Are your blood pressures ever more than 140 on the top number (systolic) OR more   than 90 on the bottom number (diastolic), for example 140/90? Yes    Amount of exercise or physical activity: 6-7 days/week for an average of 45-60 minutes    Problems taking medications regularly: No    Medication side effects: none    Diet: regular (no restrictions)    Patient reports that he took his medications prior to coming to today's appointment.  He was quite flustered when he arrived as he was late for his appointment.  His initial blood pressure reading was 180/105.  On repeat measurements still elevated 160/70.  He denies headache, chest pain chest pressure shortness of breath he has not had another presyncopal episode or lightheadedness since his last visit.  He never made the improvement with cardiology as he did not have the number.    He is due for his colonoscopy and he will schedule this.    Patient Active Problem List   Diagnosis     Hematuria     BPH (benign prostatic hypertrophy)     Advanced directives, counseling/discussion     Elevated serum creatinine     History of Helicobacter infection     Hypertension goal BP (blood pressure) < 140/90     CKD (chronic kidney disease) stage 2, GFR 60-89 ml/min     CARDIOVASCULAR SCREENING; LDL GOAL LESS THAN 160     Pre-diabetes     BPH (benign prostatic hypertrophy) with urinary obstruction     Moderate aortic insufficiency     Past Surgical History:   Procedure Laterality Date     C EXPLORE WOUND,NECK       LASER HOLMIUM ENUCLEATION PROSTATE N/A 4/27/2017    Procedure: LASER HOLMIUM ENUCLEATION PROSTATE;  Holmium Laser Enucleation Of The Prostate ;  Surgeon: Cresencio Foote MD;  Location:  OR      "REMOVAL OF SPERM DUCT(S)         Social History     Tobacco Use     Smoking status: Former Smoker     Packs/day: 0.50     Years: 25.00     Pack years: 12.50     Types: Cigarettes     Last attempt to quit: 3/23/2007     Years since quittin.1     Smokeless tobacco: Never Used   Substance Use Topics     Alcohol use: Yes     Comment: rare- social drinker     Family History   Problem Relation Age of Onset     Diabetes Sister      Diabetes Sister      Diabetes Brother      C.A.D. No family hx of      Hypertension No family hx of      Cerebrovascular Disease No family hx of      Breast Cancer No family hx of      Cancer - colorectal No family hx of      Prostate Cancer No family hx of          Current Outpatient Medications   Medication Sig Dispense Refill     clotrimazole (LOTRIMIN) 1 % external cream Apply topically 2 times daily 60 g 1     losartan-hydrochlorothiazide (HYZAAR) 100-12.5 MG tablet Take 1 tablet by mouth daily 90 tablet 1     metoprolol succinate ER (TOPROL-XL) 25 MG 24 hr tablet Take 1 tablet (25 mg) by mouth daily 30 tablet 1     rosuvastatin (CRESTOR) 20 MG tablet Take 1 tablet (20 mg) by mouth daily 60 tablet 1     triamcinolone (KENALOG) 0.1 % external cream Apply topically 2 times daily 90 g 1       Reviewed and updated as needed this visit by Provider         Review of Systems   ROS COMP: Constitutional, HEENT, cardiovascular, pulmonary, GI, , musculoskeletal, neuro, skin, endocrine and psych systems are negative, except as otherwise noted.      Objective    /70   Pulse 76   Temp 98.3  F (36.8  C) (Oral)   Ht 1.918 m (6' 3.5\")   Wt 100.7 kg (222 lb)   BMI 27.38 kg/m    Body mass index is 27.38 kg/m .  Physical Exam   GENERAL: healthy, alert and no distress  EYES: Eyes grossly normal to inspection, PERRL and conjunctivae and sclerae normal  NECK: no adenopathy, no asymmetry, masses, or scars and thyroid normal to palpation  RESP: lungs clear to auscultation - no rales, rhonchi or " "wheezes  CV: normal S1 S2, no S3 or S4, grade 2/6 diastolic murmur heard best over the LLSB, peripheral pulses strong and no peripheral edema  ABDOMEN: soft, nontender, no hepatosplenomegaly, no masses and bowel sounds normal  MS: no gross musculoskeletal defects noted, no edema  PSYCH: mentation appears normal, affect normal/bright    Diagnostic Test Results:  Labs reviewed in Epic  none         Assessment & Plan     1. Hypertension goal BP (blood pressure) < 140/90  Still uncontrolled on Hyzaar 100-12.5 mg daily and recently added metoprolol 25 mg daily he needs to get into see cardiology given his recent presyncopal episode history, his moderate dilation of the aorta and moderate aortic insufficiency.  See note from 5/28/19  Await further medication changes until he is seen by cardiology.      2. Undiagnosed cardiac murmurs  Likely 2/2 aortic insufficiency.      3. Special screening for malignant neoplasms, colon  Will schedule   - GASTROENTEROLOGY ADULT REF PROCEDURE ONLY     BMI:   Estimated body mass index is 27.38 kg/m  as calculated from the following:    Height as of this encounter: 1.918 m (6' 3.5\").    Weight as of this encounter: 100.7 kg (222 lb).   Weight management plan: Discussed healthy diet and exercise guidelines        MEDICATIONS:  Continue current medications without change    No follow-ups on file.    HEMAL Metz [facility]    "

## 2019-06-04 ENCOUNTER — TELEPHONE (OUTPATIENT)
Dept: FAMILY MEDICINE | Facility: CLINIC | Age: 64
End: 2019-06-04

## 2019-10-03 ENCOUNTER — HEALTH MAINTENANCE LETTER (OUTPATIENT)
Age: 64
End: 2019-10-03

## 2019-10-10 ENCOUNTER — OFFICE VISIT (OUTPATIENT)
Dept: FAMILY MEDICINE | Facility: CLINIC | Age: 64
End: 2019-10-10
Payer: COMMERCIAL

## 2019-10-10 VITALS
TEMPERATURE: 98.3 F | WEIGHT: 220 LBS | BODY MASS INDEX: 27.35 KG/M2 | OXYGEN SATURATION: 98 % | HEART RATE: 91 BPM | DIASTOLIC BLOOD PRESSURE: 76 MMHG | HEIGHT: 75 IN | SYSTOLIC BLOOD PRESSURE: 131 MMHG

## 2019-10-10 DIAGNOSIS — L30.8 OTHER ECZEMA: Primary | ICD-10-CM

## 2019-10-10 PROCEDURE — 99213 OFFICE O/P EST LOW 20 MIN: CPT | Performed by: FAMILY MEDICINE

## 2019-10-10 RX ORDER — CLOBETASOL PROPIONATE 0.5 MG/G
CREAM TOPICAL 2 TIMES DAILY
Qty: 60 G | Refills: 0 | Status: SHIPPED | OUTPATIENT
Start: 2019-10-10 | End: 2020-10-08

## 2019-10-10 ASSESSMENT — MIFFLIN-ST. JEOR: SCORE: 1891.35

## 2019-10-10 ASSESSMENT — PAIN SCALES - GENERAL: PAINLEVEL: NO PAIN (0)

## 2019-10-10 NOTE — PROGRESS NOTES
Subjective     Sukh Craven is a 64 year old male who presents to clinic today for the following health issues:    HPI   Patient comes in today with continuing rash, he reports he has been having on his private area for many years.  Been treated multiple different cream and medication.  In the past was given Ketoconazole, Clotrimazole,  cream, been given Kenalog cream.  Reports Kenalog cream does help with the itch but does not resolve the rash.  He denies any other pain.  Is been present for many years  Rash  Onset: Ongoing    Description:   Location: Groin  Character: round  Itching (Pruritis): YES    Progression of Symptoms:  worsening    Accompanying Signs & Symptoms:  Fever: no   Body aches or joint pain: no   Sore throat symptoms: no   Recent cold symptoms: YES    History:   Previous similar rash: YES    Precipitating factors:   Exposure to similar rash: YES  New exposures: None   Recent travel: no     Alleviating factors:  None    Therapies Tried and outcome: Triamcinolone 0.1%; not working      Patient Active Problem List   Diagnosis     Hematuria     BPH (benign prostatic hypertrophy)     Advanced directives, counseling/discussion     Elevated serum creatinine     History of Helicobacter infection     Hypertension goal BP (blood pressure) < 140/90     CKD (chronic kidney disease) stage 2, GFR 60-89 ml/min     CARDIOVASCULAR SCREENING; LDL GOAL LESS THAN 160     Pre-diabetes     BPH (benign prostatic hypertrophy) with urinary obstruction     Moderate aortic insufficiency     Past Surgical History:   Procedure Laterality Date     C EXPLORE WOUND,NECK       LASER HOLMIUM ENUCLEATION PROSTATE N/A 4/27/2017    Procedure: LASER HOLMIUM ENUCLEATION PROSTATE;  Holmium Laser Enucleation Of The Prostate ;  Surgeon: Cresencio Foote MD;  Location: [facility] OR     REMOVAL OF SPERM DUCT(S)         Social History     Tobacco Use     Smoking status: Former Smoker     Packs/day: 0.50     Years: 25.00     Pack years:  12.50     Types: Cigarettes     Last attempt to quit: 3/23/2007     Years since quittin.5     Smokeless tobacco: Never Used   Substance Use Topics     Alcohol use: Yes     Comment: rare- social drinker     Family History   Problem Relation Age of Onset     Diabetes Sister      Diabetes Sister      Diabetes Brother      C.A.D. No family hx of      Hypertension No family hx of      Cerebrovascular Disease No family hx of      Breast Cancer No family hx of      Cancer - colorectal No family hx of      Prostate Cancer No family hx of          Current Outpatient Medications   Medication Sig Dispense Refill     clobetasol (TEMOVATE) 0.05 % external cream Apply topically 2 times daily Apply to area bid for 7 days, then bid as needed 60 g 0     losartan-hydrochlorothiazide (HYZAAR) 100-12.5 MG tablet Take 1 tablet by mouth daily 90 tablet 1     metoprolol succinate ER (TOPROL-XL) 25 MG 24 hr tablet Take 1 tablet (25 mg) by mouth daily 30 tablet 1     rosuvastatin (CRESTOR) 20 MG tablet Take 1 tablet (20 mg) by mouth daily 60 tablet 1       Reviewed and updated as needed this visit by Provider         Review of Systems   ROS COMP: Constitutional, HEENT, cardiovascular, pulmonary, gi and gu systems are negative, except as otherwise noted.      Objective    There were no vitals taken for this visit.  There is no height or weight on file to calculate BMI.  Physical Exam   GENERAL: healthy, alert and no distress  SKIN: excoriation - penis shaft and scortum and dry, scaling     Diagnostic Test Results:  Labs reviewed in Epic  none         Assessment & Plan     1. Other eczema    - clobetasol (TEMOVATE) 0.05 % external cream; Apply topically 2 times daily Apply to area bid for 7 days, then bid as needed  Dispense: 60 g; Refill: 0  - DERMATOLOGY REFERRAL       See Patient Instructions    Return for Physical Exam.    Misha Greenberg MD  [facility]

## 2019-12-09 ENCOUNTER — TELEPHONE (OUTPATIENT)
Dept: FAMILY MEDICINE | Facility: CLINIC | Age: 64
End: 2019-12-09

## 2019-12-09 NOTE — LETTER
December 9, 2019      Sukh Craven  [address] [address]  [place] [address]      Dear Sukh,     We care about your health and have reviewed your health plan. We have reviewed your medical conditions, medication list, and lab results and are making recommendations based on this review, to better manage your health.    You are in particular need of attention regarding:  - Scheduling a Colon Cancer Screening (Colonoscopy only) [phone] for [facility], call clinic for referral elsewhere  - Scheduling an Annual Physical / Wellness Visit with your primary care provider    Here is a list of Health Maintenance topics that are due now or due soon:  Health Maintenance Due   Topic Date Due     ZOSTER IMMUNIZATION (1 of 2) 04/04/2007     PREVENTIVE CARE VISIT  07/15/2011     COLONOSCOPY  08/18/2018     ADVANCE CARE PLANNING  08/26/2018     INFLUENZA VACCINE (1) 09/01/2019     BMP  11/28/2019       Please call us at [phone] (or use [employer]) to address the above recommendations.     Thank you for trusting [facility] with your healthcare needs. We appreciate the opportunity to serve you and look forward to supporting you in the future.    Healthy Regards,    Your Care Team

## 2019-12-09 NOTE — TELEPHONE ENCOUNTER
Panel Management Review      Patient has the following on his problem list:     Hypertension   Last three blood pressure readings:  BP Readings from Last 3 Encounters:   10/10/19 131/76   05/31/19 160/70   05/28/19 160/80     Blood pressure: MONITOR    HTN Guidelines:  Less than 140/90      Composite cancer screening  Chart review shows that this patient is due/due soon for the following Colonoscopy  Summary:    Patient is due/failing the following:   COLONOSCOPY and PHYSICAL    Action needed:   Patient needs office visit for physical.    Type of outreach:    Sent [employer] message. and Sent letter.    Questions for provider review:    None                                                                                                                                    Savanah Martínez CMA (AAMA)

## 2020-07-01 ENCOUNTER — OFFICE VISIT (OUTPATIENT)
Dept: FAMILY MEDICINE | Facility: CLINIC | Age: 65
End: 2020-07-01
Payer: OTHER MISCELLANEOUS

## 2020-07-01 ENCOUNTER — TELEPHONE (OUTPATIENT)
Dept: FAMILY MEDICINE | Facility: CLINIC | Age: 65
End: 2020-07-01

## 2020-07-01 VITALS
BODY MASS INDEX: 26.65 KG/M2 | HEART RATE: 72 BPM | DIASTOLIC BLOOD PRESSURE: 70 MMHG | SYSTOLIC BLOOD PRESSURE: 156 MMHG | WEIGHT: 216 LBS | TEMPERATURE: 98.2 F | OXYGEN SATURATION: 98 %

## 2020-07-01 DIAGNOSIS — K40.90 LEFT INGUINAL HERNIA: Primary | ICD-10-CM

## 2020-07-01 PROCEDURE — 99213 OFFICE O/P EST LOW 20 MIN: CPT | Performed by: FAMILY MEDICINE

## 2020-07-01 RX ORDER — AMLODIPINE BESYLATE 10 MG/1
10 TABLET ORAL
COMMUNITY
Start: 2020-05-06 | End: 2021-06-04

## 2020-07-01 NOTE — LETTER
July 2, 2020      Sukh Craven  [address]  [place] [address]        To Whom It May Concern:    Sukh Craven  was seen on 07/01/2020.  Please excuse him , completely off work, until : sees and get cleared by general Surgeon.   He has possible hernia, will remain off work, until get see and cleared by surgeon        Sincerely,        Misha Greenberg MD

## 2020-07-01 NOTE — PROGRESS NOTES
Subjective     Sukh Craven is a 65 year old male who presents to clinic today for the following health issues:    HPI   Pt has concerns with groin pain. Pt reports from x1 day ago. Pt is a  and pushed a pallet and felt some pressure. Pt said that the area is swollen and is still feeling pain. OTC Aleve    Patient is check a , he reports yesterday he was pushing a heavy pallet in the back of his truck and he feels sharp, pain, lump to his left lower abdomen, groin area.  Since that time the lump has gotten smaller.  He remained to have more tenderness.  However , he denies nausea, denies vomiting, denies constipation,  Denies pain with urination.    Patient Active Problem List   Diagnosis     Hematuria     BPH (benign prostatic hypertrophy)     Advanced directives, counseling/discussion     Elevated serum creatinine     History of Helicobacter infection     Hypertension goal BP (blood pressure) < 140/90     CKD (chronic kidney disease) stage 2, GFR 60-89 ml/min     CARDIOVASCULAR SCREENING; LDL GOAL LESS THAN 160     Pre-diabetes     BPH (benign prostatic hypertrophy) with urinary obstruction     Moderate aortic insufficiency     Past Surgical History:   Procedure Laterality Date     C EXPLORE WOUND,NECK       LASER HOLMIUM ENUCLEATION PROSTATE N/A 2017    Procedure: LASER HOLMIUM ENUCLEATION PROSTATE;  Holmium Laser Enucleation Of The Prostate ;  Surgeon: Cresencio Foote MD;  Location: [facility] OR     REMOVAL OF SPERM DUCT(S)         Social History     Tobacco Use     Smoking status: Former Smoker     Packs/day: 0.50     Years: 25.00     Pack years: 12.50     Types: Cigarettes     Last attempt to quit: 3/23/2007     Years since quittin.2     Smokeless tobacco: Never Used   Substance Use Topics     Alcohol use: Yes     Comment: rare- social drinker     Family History   Problem Relation Age of Onset     Diabetes Sister      Diabetes Sister      Diabetes Brother      C.A.D. No family hx  of      Hypertension No family hx of      Cerebrovascular Disease No family hx of      Breast Cancer No family hx of      Cancer - colorectal No family hx of      Prostate Cancer No family hx of          Current Outpatient Medications   Medication Sig Dispense Refill     amLODIPine (NORVASC) 10 MG tablet Take 10 mg by mouth       clobetasol (TEMOVATE) 0.05 % external cream Apply topically 2 times daily Apply to area bid for 7 days, then bid as needed 60 g 0     losartan-hydrochlorothiazide (HYZAAR) 100-12.5 MG tablet Take 1 tablet by mouth daily 90 tablet 1     metoprolol succinate ER (TOPROL-XL) 25 MG 24 hr tablet Take 1 tablet (25 mg) by mouth daily 30 tablet 1     rosuvastatin (CRESTOR) 20 MG tablet Take 1 tablet (20 mg) by mouth daily 60 tablet 1     Allergies   Allergen Reactions     No Known Drug Allergies      Reviewed and updated as needed this visit by Provider         Review of Systems   Constitutional, HEENT, cardiovascular, pulmonary, gi and gu systems are negative, except as otherwise noted.      Objective    There were no vitals taken for this visit.  There is no height or weight on file to calculate BMI.  Physical Exam   GENERAL: healthy, alert and no distress  ABDOMEN: soft, nontender, no hepatosplenomegaly, no masses and bowel sounds normal   (male): normal male genitalia without lesions or urethral discharge,   Positive for left inguinal hernia    Diagnostic Test Results:  Labs reviewed in Epic  No orders of the defined types were placed in this encounter.          Assessment & Plan       ICD-10-CM    1. Left inguinal hernia  K40.90 GENERAL SURG ADULT REFERRAL     Patient does have a small left inguinal hernia.  He was advised to avoid heavy lifting, pushing or moving.  Pending.  He was advised with diet and weight loss.    Patient would like to see a surgeon to discuss possible surgical option.  BMI:   Estimated body mass index is 26.65 kg/m  as calculated from the following:    Height as of  "10/10/19: 1.917 m (6' 3.49\").    Weight as of this encounter: 98 kg (216 lb).   Weight management plan: Discussed healthy diet and exercise guidelines        There are no Patient Instructions on file for this visit.    Return in about 3 months (around 10/1/2020) for Physical Exam.    Misha Greenberg MD  [facility]      "

## 2020-07-01 NOTE — TELEPHONE ENCOUNTER
Reason for Call:  Other Letter    Detailed comments: Patient requesting a letter for employer that states wether or not he is able to return to work. Patient was seen in clinic today for a work related injury and employer needs a letter that clearly states wether patient is okay to return to work and if so, if he will have any restrictions. Patient was letter e-mailed to  [email or web address]    Phone Number Patient can be reached at: Home number on file [phone] (home)    Best Time: Any    Can we leave a detailed message on this number? YES    Call taken on 7/1/2020 at 4:20 PM by Myriam Emery

## 2020-07-03 ENCOUNTER — OFFICE VISIT (OUTPATIENT)
Dept: SURGERY | Facility: CLINIC | Age: 65
End: 2020-07-03
Payer: OTHER MISCELLANEOUS

## 2020-07-03 VITALS
BODY MASS INDEX: 25.86 KG/M2 | SYSTOLIC BLOOD PRESSURE: 140 MMHG | HEART RATE: 72 BPM | HEIGHT: 77 IN | DIASTOLIC BLOOD PRESSURE: 81 MMHG | WEIGHT: 219 LBS

## 2020-07-03 DIAGNOSIS — K40.90 NON-RECURRENT INGUINAL HERNIA OF LEFT SIDE WITHOUT OBSTRUCTION OR GANGRENE: Primary | ICD-10-CM

## 2020-07-03 DIAGNOSIS — Z01.818 PRE-OP TESTING: ICD-10-CM

## 2020-07-03 PROCEDURE — 99244 OFF/OP CNSLTJ NEW/EST MOD 40: CPT | Performed by: SURGERY

## 2020-07-03 ASSESSMENT — MIFFLIN-ST. JEOR: SCORE: 1905.76

## 2020-07-03 NOTE — LETTER
"    7/3/2020         RE: Sukh Craven  [address]  [place] [address]        Dear Colleague,    Thank you for referring your patient, Sukh Craven, to the [facility]. Please see a copy of my visit note below.    Patient seen in consultation for inguinal hernia by Misael Greenberg    HPI:  Patient is a 65 year old male  with complaints of left groin lump, pain  The patient noticed the symptoms about earlier this week ago.    Was pushing a heavy pallet at work (works as ). Felt something in the left groin at the time. Later when at home felt a lump that was \"squishy\". If pushed on or lays down looked like it went away. Has noticed it since but the pain is much better than it was initially.  Right side has felt normal.  No previous hernia history or previous repair  pain medication (Aleve) and rest makes the episode better.  Patient has not family history of hernia problems    Review Of Systems    Skin: negative  Ears/Nose/Throat: negative  Respiratory: No shortness of breath, dyspnea on exertion, cough, or hemoptysis  Cardiovascular: negative  Gastrointestinal: negative  Genitourinary: prostate (frequency)  Musculoskeletal: as above  Neurologic: negative  Hematologic/Lymphatic/Immunologic: negative  Endocrine: negative      Past Medical History:   Diagnosis Date     BPH (benign prostatic hyperplasia)      HTN (hypertension)        Past Surgical History:   Procedure Laterality Date     C EXPLORE WOUND,NECK       LASER HOLMIUM ENUCLEATION PROSTATE N/A 4/27/2017    Procedure: LASER HOLMIUM ENUCLEATION PROSTATE;  Holmium Laser Enucleation Of The Prostate ;  Surgeon: Cresencio Foote MD;  Location: [facility] OR     REMOVAL OF SPERM DUCT(S)         Social History     Socioeconomic History     Marital status:      Spouse name: Ricardo     Number of children: 5     Years of education: 14     Highest education level: Not on file   Occupational History     Occupation:      " Employer: [employer]   Social Needs     Financial resource strain: Not on file     Food insecurity     Worry: Not on file     Inability: Not on file     Transportation needs     Medical: Not on file     Non-medical: Not on file   Tobacco Use     Smoking status: Former Smoker     Packs/day: 0.50     Years: 25.00     Pack years: 12.50     Types: Cigarettes     Last attempt to quit: 3/23/2007     Years since quittin.2     Smokeless tobacco: Never Used   Substance and Sexual Activity     Alcohol use: Yes     Comment: rare- social drinker     Drug use: No     Sexual activity: Yes     Partners: Female   Lifestyle     Physical activity     Days per week: Not on file     Minutes per session: Not on file     Stress: Not on file   Relationships     Social connections     Talks on phone: Not on file     Gets together: Not on file     Attends Temple service: Not on file     Active member of club or organization: Not on file     Attends meetings of clubs or organizations: Not on file     Relationship status: Not on file     Intimate partner violence     Fear of current or ex partner: Not on file     Emotionally abused: Not on file     Physically abused: Not on file     Forced sexual activity: Not on file   Other Topics Concern      Service Yes     Comment: [employer]- 6 years     Blood Transfusions No     Caffeine Concern No     Occupational Exposure No     Hobby Hazards No     Sleep Concern No     Stress Concern No     Weight Concern No     Special Diet No     Back Care No     Exercise Yes     Comment: 4 times a week     Bike Helmet Yes     Seat Belt Yes     Self-Exams Yes     Parent/sibling w/ CABG, MI or angioplasty before 65F 55M? No   Social History Narrative     Not on file       Current Outpatient Medications   Medication Sig Dispense Refill     amLODIPine (NORVASC) 10 MG tablet Take 10 mg by mouth       losartan-hydrochlorothiazide (HYZAAR) 100-12.5 MG tablet Take 1 tablet by mouth daily 90 tablet 1  "    metoprolol succinate ER (TOPROL-XL) 25 MG 24 hr tablet Take 1 tablet (25 mg) by mouth daily 30 tablet 1     rosuvastatin (CRESTOR) 20 MG tablet Take 1 tablet (20 mg) by mouth daily 60 tablet 1     clobetasol (TEMOVATE) 0.05 % external cream Apply topically 2 times daily Apply to area bid for 7 days, then bid as needed 60 g 0       Medications and history reviewed    Physical exam:  Vitals: BP (!) 140/81   Pulse 72   Ht 1.956 m (6' 5\")   Wt 99.3 kg (219 lb)   BMI 25.97 kg/m    BMI= Body mass index is 25.97 kg/m .     healthy, alert and no distress  Constitutional:   Head: Normocephalic. No masses, lesions, tenderness or abnormalities  Cardiovascular: negative, PMI normal. No lifts, heaves, or thrills. RRR. No murmurs, clicks gallops or rub  Respiratory: negative, Percussion normal. Good diaphragmatic excursion. Lungs clear  Gastrointestinal: Abdomen soft, non-tender. BS normal. No masses, organomegaly  : Normal external genitalia without lesions and there is a small bulge left groin with cough. Any hernia currently reduced. This area is sore with the palpation. Right side normal. No testicular tenderness/abnormality  Musculoskeletal: extremities normal- no gross deformities noted, gait normal and normal muscle tone  Skin: no suspicious lesions or rashes  Psychiatric: mentation appears normal and affect normal/bright  Hematologic/Lymphatic/Immunologic: Normal cervical lymph nodes  Patient able to get up on table without difficulty.      Assessment:     ICD-10-CM    1. Non-recurrent inguinal hernia of left side without obstruction or gangrene  K40.90 [facility]-Operative Worksheet   2. Pre-op testing  Z01.818 Asymptomatic COVID-19 Virus (Coronavirus) by PCR     Plan: Patient with small but symptomatic inguinal hernia on the left side.  This was at work and patient is pursuing work comp.  I offered a minimally invasive robotic repair.  Risks of surgery discussed including, but not limited to bleeding, infection, " recurrence, damage to intra-abdominal contents such as nerves, blood vessels, bowel or other organs.  Risks of anesthesia also discussed.  Although mesh is a better long term repair if it gets infected it must be removed. Mesh problems such as fracture, erosion or adhesions are possible.  If there is evidence of an infection at time of surgery it will be cancelled and rescheduled to when well.    Discussed massaging hernia back in and using ice if becomes more painful.  If not able to reduce then go to emergency room.  Recommended keeping lifting pushing pulling at work light to about 20 pounds or less which will also be her postop restrictions.    Rene Ashraf MD      Again, thank you for allowing me to participate in the care of your patient.        Sincerely,        Rene Ashraf MD

## 2020-07-03 NOTE — PROGRESS NOTES
"Patient seen in consultation for inguinal hernia by Misael Greenberg    HPI:  Patient is a 65 year old male  with complaints of left groin lump, pain  The patient noticed the symptoms about earlier this week ago.    Was pushing a heavy pallet at work (works as ). Felt something in the left groin at the time. Later when at home felt a lump that was \"squishy\". If pushed on or lays down looked like it went away. Has noticed it since but the pain is much better than it was initially.  Right side has felt normal.  No previous hernia history or previous repair  pain medication (Aleve) and rest makes the episode better.  Patient has not family history of hernia problems    Review Of Systems    Skin: negative  Ears/Nose/Throat: negative  Respiratory: No shortness of breath, dyspnea on exertion, cough, or hemoptysis  Cardiovascular: negative  Gastrointestinal: negative  Genitourinary: prostate (frequency)  Musculoskeletal: as above  Neurologic: negative  Hematologic/Lymphatic/Immunologic: negative  Endocrine: negative      Past Medical History:   Diagnosis Date     BPH (benign prostatic hyperplasia)      HTN (hypertension)        Past Surgical History:   Procedure Laterality Date     C EXPLORE WOUND,NECK       LASER HOLMIUM ENUCLEATION PROSTATE N/A 4/27/2017    Procedure: LASER HOLMIUM ENUCLEATION PROSTATE;  Holmium Laser Enucleation Of The Prostate ;  Surgeon: Cresencio Foote MD;  Location: [facility]     REMOVAL OF SPERM DUCT(S)         Social History     Socioeconomic History     Marital status:      Spouse name: Ricardo     Number of children: 5     Years of education: 14     Highest education level: Not on file   Occupational History     Occupation:      Employer: [employer]   Social Needs     Financial resource strain: Not on file     Food insecurity     Worry: Not on file     Inability: Not on file     Transportation needs     Medical: Not on file     Non-medical: Not on file "   Tobacco Use     Smoking status: Former Smoker     Packs/day: 0.50     Years: 25.00     Pack years: 12.50     Types: Cigarettes     Last attempt to quit: 3/23/2007     Years since quittin.2     Smokeless tobacco: Never Used   Substance and Sexual Activity     Alcohol use: Yes     Comment: rare- social drinker     Drug use: No     Sexual activity: Yes     Partners: Female   Lifestyle     Physical activity     Days per week: Not on file     Minutes per session: Not on file     Stress: Not on file   Relationships     Social connections     Talks on phone: Not on file     Gets together: Not on file     Attends Congregation service: Not on file     Active member of club or organization: Not on file     Attends meetings of clubs or organizations: Not on file     Relationship status: Not on file     Intimate partner violence     Fear of current or ex partner: Not on file     Emotionally abused: Not on file     Physically abused: Not on file     Forced sexual activity: Not on file   Other Topics Concern      Service Yes     Comment: [employer]- 6 years     Blood Transfusions No     Caffeine Concern No     Occupational Exposure No     Hobby Hazards No     Sleep Concern No     Stress Concern No     Weight Concern No     Special Diet No     Back Care No     Exercise Yes     Comment: 4 times a week     Bike Helmet Yes     Seat Belt Yes     Self-Exams Yes     Parent/sibling w/ CABG, MI or angioplasty before 65F 55M? No   Social History Narrative     Not on file       Current Outpatient Medications   Medication Sig Dispense Refill     amLODIPine (NORVASC) 10 MG tablet Take 10 mg by mouth       losartan-hydrochlorothiazide (HYZAAR) 100-12.5 MG tablet Take 1 tablet by mouth daily 90 tablet 1     metoprolol succinate ER (TOPROL-XL) 25 MG 24 hr tablet Take 1 tablet (25 mg) by mouth daily 30 tablet 1     rosuvastatin (CRESTOR) 20 MG tablet Take 1 tablet (20 mg) by mouth daily 60 tablet 1     clobetasol (TEMOVATE) 0.05 % external  "cream Apply topically 2 times daily Apply to area bid for 7 days, then bid as needed 60 g 0       Medications and history reviewed    Physical exam:  Vitals: BP (!) 140/81   Pulse 72   Ht 1.956 m (6' 5\")   Wt 99.3 kg (219 lb)   BMI 25.97 kg/m    BMI= Body mass index is 25.97 kg/m .     healthy, alert and no distress  Constitutional:   Head: Normocephalic. No masses, lesions, tenderness or abnormalities  Cardiovascular: negative, PMI normal. No lifts, heaves, or thrills. RRR. No murmurs, clicks gallops or rub  Respiratory: negative, Percussion normal. Good diaphragmatic excursion. Lungs clear  Gastrointestinal: Abdomen soft, non-tender. BS normal. No masses, organomegaly  : Normal external genitalia without lesions and there is a small bulge left groin with cough. Any hernia currently reduced. This area is sore with the palpation. Right side normal. No testicular tenderness/abnormality  Musculoskeletal: extremities normal- no gross deformities noted, gait normal and normal muscle tone  Skin: no suspicious lesions or rashes  Psychiatric: mentation appears normal and affect normal/bright  Hematologic/Lymphatic/Immunologic: Normal cervical lymph nodes  Patient able to get up on table without difficulty.      Assessment:     ICD-10-CM    1. Non-recurrent inguinal hernia of left side without obstruction or gangrene  K40.90 Dionne-Operative Worksheet   2. Pre-op testing  Z01.818 Asymptomatic COVID-19 Virus (Coronavirus) by PCR     Plan: Patient with small but symptomatic inguinal hernia on the left side.  This was at work and patient is pursuing work comp.  I offered a minimally invasive robotic repair.  Risks of surgery discussed including, but not limited to bleeding, infection, recurrence, damage to intra-abdominal contents such as nerves, blood vessels, bowel or other organs.  Risks of anesthesia also discussed.  Although mesh is a better long term repair if it gets infected it must be removed. Mesh problems such " as fracture, erosion or adhesions are possible.  If there is evidence of an infection at time of surgery it will be cancelled and rescheduled to when well.    Discussed massaging hernia back in and using ice if becomes more painful.  If not able to reduce then go to emergency room.  Recommended keeping lifting pushing pulling at work light to about 20 pounds or less which will also be her postop restrictions.    Rene Ashraf MD

## 2020-07-03 NOTE — LETTER
[facility]  [address] [address]  [phone]          July 3, 2020    RE:  Sukh Craven                                                                                                                                                       [address]  [facility] [address]            To whom it may concern:    Sukh Craven is under my professional care for inguinal hernia. He  may return to work with the following: The employee is ABLE to return to work today.    When the patient returns to work, the following restrictions apply until about 2 weeks after surgery (date TBD):  Lift, carry, push, and pull no more than:  20 lbs      Sincerely,        Rene Ashraf MD

## 2020-07-06 ENCOUNTER — TELEPHONE (OUTPATIENT)
Dept: SURGERY | Facility: CLINIC | Age: 65
End: 2020-07-06

## 2020-07-06 NOTE — TELEPHONE ENCOUNTER
NEEDS WORK COMP APPROVAL  Sharon Clement  [phone]  Claim # [id number]    Type of surgery: Robotic assisted laparoscopic left inguinal hernia repair possible bilateral possible open   CPT 43141     Non-recurrent inguinal hernia of left side without obstruction or gangrene K40.90    Location of surgery: [facility]  Date and time of surgery: TBD  Surgeon: Dr Ashraf  Pre-Op Appt Date: TBD  Post-Op Appt Date: TBD   Packet sent out:   Pre-cert/Authorization completed:  Yes  Date:     Per Patient he will call back with work comp information.     I called work comp and voicemail for Adj asked that clinicals be faxed to [phone]. I have faxed surgery request. Also information to Tish with work comp info.     Surgery has been approved per Sharon Clement at [facility]. 07/14/2020

## 2020-07-13 NOTE — TELEPHONE ENCOUNTER
Called Sharon Clement at [phone] and left a message. She is on vacation until Tuesday, 07/14. Left a message. 07/13/2020.

## 2020-07-14 NOTE — TELEPHONE ENCOUNTER
Scheduled 7-30-20 @ [facility] [place].    Work Comp has been approved per Sharon Clement at [facility].     Called Sharon with  and let her know day of surgery. 07/30/2020    Sent to  and was received. 07/14/2020

## 2020-07-27 DIAGNOSIS — Z01.818 PRE-OP TESTING: ICD-10-CM

## 2020-07-28 LAB
SARS-COV-2 RNA SPEC QL NAA+PROBE: NOT DETECTED
SPECIMEN SOURCE: NORMAL

## 2020-07-29 ENCOUNTER — OFFICE VISIT (OUTPATIENT)
Dept: FAMILY MEDICINE | Facility: CLINIC | Age: 65
End: 2020-07-29
Payer: OTHER MISCELLANEOUS

## 2020-07-29 VITALS
BODY MASS INDEX: 26.21 KG/M2 | OXYGEN SATURATION: 98 % | HEART RATE: 73 BPM | TEMPERATURE: 98.2 F | WEIGHT: 222 LBS | SYSTOLIC BLOOD PRESSURE: 130 MMHG | DIASTOLIC BLOOD PRESSURE: 70 MMHG | HEIGHT: 77 IN

## 2020-07-29 DIAGNOSIS — I10 HYPERTENSION GOAL BP (BLOOD PRESSURE) < 140/90: ICD-10-CM

## 2020-07-29 DIAGNOSIS — K40.90 NON-RECURRENT UNILATERAL INGUINAL HERNIA WITHOUT OBSTRUCTION OR GANGRENE: ICD-10-CM

## 2020-07-29 DIAGNOSIS — I77.819 DILATATION OF AORTA (H): ICD-10-CM

## 2020-07-29 DIAGNOSIS — Z01.818 PREOP GENERAL PHYSICAL EXAM: Primary | ICD-10-CM

## 2020-07-29 DIAGNOSIS — N18.2 CKD (CHRONIC KIDNEY DISEASE) STAGE 2, GFR 60-89 ML/MIN: ICD-10-CM

## 2020-07-29 DIAGNOSIS — Z12.11 SCREEN FOR COLON CANCER: ICD-10-CM

## 2020-07-29 LAB
ANION GAP SERPL CALCULATED.3IONS-SCNC: 5 MMOL/L (ref 3–14)
BUN SERPL-MCNC: 20 MG/DL (ref 7–30)
CALCIUM SERPL-MCNC: 9.5 MG/DL (ref 8.5–10.1)
CHLORIDE SERPL-SCNC: 107 MMOL/L (ref 94–109)
CO2 SERPL-SCNC: 28 MMOL/L (ref 20–32)
CREAT SERPL-MCNC: 1.38 MG/DL (ref 0.66–1.25)
GFR SERPL CREATININE-BSD FRML MDRD: 54 ML/MIN/{1.73_M2}
GLUCOSE SERPL-MCNC: 85 MG/DL (ref 70–99)
POTASSIUM SERPL-SCNC: 3.8 MMOL/L (ref 3.4–5.3)
SODIUM SERPL-SCNC: 140 MMOL/L (ref 133–144)

## 2020-07-29 PROCEDURE — 99214 OFFICE O/P EST MOD 30 MIN: CPT | Performed by: FAMILY MEDICINE

## 2020-07-29 PROCEDURE — 80048 BASIC METABOLIC PNL TOTAL CA: CPT | Performed by: FAMILY MEDICINE

## 2020-07-29 PROCEDURE — 36415 COLL VENOUS BLD VENIPUNCTURE: CPT | Performed by: FAMILY MEDICINE

## 2020-07-29 RX ORDER — HYDRALAZINE HYDROCHLORIDE 25 MG/1
25 TABLET, FILM COATED ORAL DAILY PRN
COMMUNITY
Start: 2020-05-06 | End: 2021-06-04

## 2020-07-29 ASSESSMENT — MIFFLIN-ST. JEOR: SCORE: 1919.37

## 2020-07-29 NOTE — PROGRESS NOTES
[address] [address]  [phone]  Dept: [phone]    PRE-OP EVALUATION:  Today's date: 2020     Sukh Craven (: 1955) presents for pre-operative evaluation assessment as requested by Dr. Ashraf.  He requires evaluation and anesthesia risk assessment prior to undergoing surgery/procedure for treatment of left inguinal hernia.    Proposed Surgery/ Procedure: ROBOTIC SI ASSISTED LAPAROSCOPIC HERNIA REPAIR LEFT  INGUINAL POSSIBLE BILATERAL,POSSIBLE OPEN HERNIA REPAIR INGUINAL  Date of Surgery/ Procedure: 2020  Time of Surgery/ Procedure: 9:00AM        Hospital/Surgical Facility: [facility] Operating Room  Surgery Fax Number: [phone]  Primary Physician: Ned Martinez  Type of Anesthesia Anticipated: General    Preoperative Questionnaire:   No - Have you ever had a heart attack or stroke?  No - Have you ever had surgery on your heart or blood vessels, such as a stent, coronary (heart) bypass, or surgery on an artery in the head, neck, heart, or legs?  No - Do you have chest pain when you are physically active?  No - Do you have a history of heart failure?  No - Do you currently have a cold, bronchitis, or symptoms of other respiratory (head and chest) infections?  No - Do you have a cough, shortness of breath, or wheezing?  No - Do you or anyone in your family have a history of blood clots?  No - Do you or anyone in your family have a serious bleeding problem, such as long-lasting bleeding after surgeries or cuts?  No - Have you ever had anemia or been told to take iron pills?  No - Have you had any abnormal blood loss such as black, tarry or bloody stools, or abnormal vaginal bleeding?  No - Have you ever had a blood transfusion?  NO- HAVE YOU EVER HAD A BLOOD TRANSFUSION?   No - Have you or anyone in your family ever had problems with anesthesia (sedation for surgery)?  No - Do you have sleep apnea,  excessive snoring, or daytime drowsiness?   No - Do you have any artifical heart valves or other implanted medical devices, such as a pacemaker, defibrillator, or continuous glucose monitor?  No - Do you have any artifical joints?  No - Are you allergic to latex?  No - Is there any chance that you may be pregnant?    Patient has a Health Care Directive or Living Will:  NO    HPI:     HPI related to upcoming procedure: working and felt sharp pain then found a bulge about 3 weeks ago.  No other symptoms.      HYPERTENSION - Patient has longstanding history of HTN , currently denies any symptoms referable to elevated blood pressure. Specifically denies chest pain, palpitations, dyspnea, orthopnea, PND or peripheral edema. Blood pressure readings have been in normal range. Current medication regimen is as listed below. Patient denies any side effects of medication.     RENAL INSUFFICIENCY - Patient has a longstanding history of moderate-severe chronic renal insufficiency.       MEDICAL HISTORY:     Patient Active Problem List    Diagnosis Date Noted     Dilatation of aorta (H) 07/29/2020     Priority: Medium     Moderate aortic insufficiency 05/28/2019     Priority: Medium     BPH (benign prostatic hypertrophy) with urinary obstruction 04/27/2017     Priority: Medium     Pre-diabetes 05/17/2016     Priority: Medium     A1C      6.2   5/10/2016        CARDIOVASCULAR SCREENING; LDL GOAL LESS THAN 160 10/10/2014     Priority: Medium     CKD (chronic kidney disease) stage 2, GFR 60-89 ml/min 06/17/2014     Priority: Medium     Hypertension goal BP (blood pressure) < 140/90 11/03/2013     Priority: Medium     History of Helicobacter infection 10/28/2013     Priority: Medium     Elevated serum creatinine 08/27/2013     Priority: Medium     Patient failed to return to the clinic for repeat test.       Advanced directives, counseling/discussion 08/26/2013     Priority: Medium     Advance Care Planning:   ACP Review and  Resources Provided:  Reviewed chart for advance care plan.  Sukh Craven has no plan or code status on file. Discussed available resources and provided with information. Confirmed code status reflects current choices pending further ACP discussions.  Confirmed/documented designated decision maker(s). See permanent comments section of demographics in clinical tab.   Added by Samira Bella on 8/26/2013             BPH (benign prostatic hypertrophy) 03/16/2009     Priority: Medium     (Problem list name updated by automated process. Provider to review and confirm.)       Hematuria 02/18/2008     Priority: Medium     Problem list name updated by automated process. Provider to review and confirm  Imo Update utility        Past Medical History:   Diagnosis Date     BPH (benign prostatic hyperplasia)      HTN (hypertension)      Past Surgical History:   Procedure Laterality Date     LASER HOLMIUM ENUCLEATION PROSTATE N/A 4/27/2017    Procedure: LASER HOLMIUM ENUCLEATION PROSTATE;  Holmium Laser Enucleation Of The Prostate ;  Surgeon: Cresencio Foote MD;  [facility]     REMOVAL OF SPERM DUCT(S)       Current Outpatient Medications   Medication Sig Dispense Refill     amLODIPine (NORVASC) 10 MG tablet Take 10 mg by mouth       clobetasol (TEMOVATE) 0.05 % external cream Apply topically 2 times daily Apply to area bid for 7 days, then bid as needed 60 g 0     hydrALAZINE (APRESOLINE) 25 MG tablet 25 mg by Oral or Feeding Tube route daily as needed for high blood pressure       losartan-hydrochlorothiazide (HYZAAR) 100-12.5 MG tablet Take 1 tablet by mouth daily 90 tablet 1     metoprolol succinate ER (TOPROL-XL) 25 MG 24 hr tablet Take 1 tablet (25 mg) by mouth daily 30 tablet 1     OTC products: None, except as noted above    Allergies   Allergen Reactions     No Known Drug Allergies       Latex Allergy: NO    Social History     Tobacco Use     Smoking status: Former Smoker     Packs/day: 0.50     Years: 25.00      "Pack years: 12.50     Types: Cigarettes     Last attempt to quit: 3/23/2007     Years since quittin.3     Smokeless tobacco: Never Used   Substance Use Topics     Alcohol use: Yes     Comment: rare- social drinker     History   Drug Use No       REVIEW OF SYSTEMS:   Constitutional, neuro, ENT, endocrine, pulmonary, cardiac, gastrointestinal, genitourinary, musculoskeletal, integument and psychiatric systems are negative, except as otherwise noted.    EXAM:   /70 (BP Location: Right arm)   Pulse 73   Temp 98.2  F (36.8  C) (Oral)   Ht 1.956 m (6' 5\")   Wt 100.7 kg (222 lb)   SpO2 98%   BMI 26.33 kg/m      GENERAL APPEARANCE: healthy, alert and no distress     EYES: EOMI,  PERRL     HENT: ear canals and TM's normal and nose and mouth without ulcers or lesions     NECK: no adenopathy, no asymmetry, masses, or scars and thyroid normal to palpation     RESP: lungs clear to auscultation - no rales, rhonchi or wheezes     CV: regular rates and rhythm, normal S1 S2, no S3 or S4 and no murmur, click or rub     ABDOMEN:  soft, nontender, no HSM or masses and bowel sounds normal     MS: extremities normal- no gross deformities noted, no evidence of inflammation in joints, FROM in all extremities.     SKIN: no suspicious lesions or rashes     NEURO: Normal strength and tone, sensory exam grossly normal, mentation intact and speech normal     PSYCH: mentation appears normal. and affect normal/bright     LYMPHATICS: No cervical adenopathy    DIAGNOSTICS:     Labs Resulted Today: No results found for any visits on 20.  Labs Drawn and in Process:   Unresulted Labs Ordered in the Past 30 Days of this Admission     Date and Time Order Name Status Description    2020 1707 BASIC METABOLIC PANEL In process           Recent Labs   Lab Test 19  1440 19  1737 18  1813 17  1511   HGB 15.7 17.8*  --  12.5*    166  --  208    137 141 141   POTASSIUM 4.0 3.8 4.5 4.2   CR 1.23 " 1.31* 1.20 1.12   A1C  --   --  5.9* 5.6        IMPRESSION:   Reason for surgery/procedure: left inguinal hernia  Diagnosis/reason for consult: The primary encounter diagnosis was Preop general physical exam. Diagnoses of Non-recurrent unilateral inguinal hernia without obstruction or gangrene, Hypertension goal BP (blood pressure) < 140/90, CKD (chronic kidney disease) stage 2, GFR 60-89 ml/min, Dilatation of aorta (H), and Screen for colon cancer were also pertinent to this visit.     The proposed surgical procedure is considered INTERMEDIATE risk.    REVISED CARDIAC RISK INDEX  The patient has the following serious cardiovascular risks for perioperative complications such as (MI, PE, VFib and 3  AV Block):  No serious cardiac risks  INTERPRETATION: 0 risks: Class I (very low risk - 0.4% complication rate)    The patient has the following additional risks for perioperative complications:  No identified additional risks      ICD-10-CM    1. Preop general physical exam  Z01.818 BASIC METABOLIC PANEL   2. Non-recurrent unilateral inguinal hernia without obstruction or gangrene  K40.90    3. Hypertension goal BP (blood pressure) < 140/90  I10 BASIC METABOLIC PANEL   4. CKD (chronic kidney disease) stage 2, GFR 60-89 ml/min  N18.2 BASIC METABOLIC PANEL   5. Dilatation of aorta (H)  I77.819    6. Screen for colon cancer  Z12.11        RECOMMENDATIONS:     --Patient is to take all scheduled medications on the day of surgery    APPROVAL GIVEN to proceed with proposed procedure, without further diagnostic evaluation  Patient is aware and will set up colonoscopy soon.  Dilation of aorta - has follow up soon. No concern for hernia repair.       Signed Electronically by: Gina Marrero MD    Copy of this evaluation report is provided to requesting physician.    [facility] Preop Guidelines    Revised Cardiac Risk Index

## 2020-07-30 NOTE — RESULT ENCOUNTER NOTE
Mr. Craven,    Your kidney function was normal.    Please contact the clinic if you have additional questions.  Thank you.    Sincerely,    Gina Marrero MD

## 2020-08-10 ENCOUNTER — OFFICE VISIT (OUTPATIENT)
Dept: SURGERY | Facility: CLINIC | Age: 65
End: 2020-08-10
Payer: OTHER MISCELLANEOUS

## 2020-08-10 VITALS
DIASTOLIC BLOOD PRESSURE: 78 MMHG | HEART RATE: 71 BPM | BODY MASS INDEX: 26.21 KG/M2 | WEIGHT: 221 LBS | SYSTOLIC BLOOD PRESSURE: 133 MMHG

## 2020-08-10 DIAGNOSIS — Z09 S/P BILATERAL INGUINAL HERNIA REPAIR, FOLLOW-UP EXAM: Primary | ICD-10-CM

## 2020-08-10 DIAGNOSIS — Z87.19 S/P BILATERAL INGUINAL HERNIA REPAIR, FOLLOW-UP EXAM: Primary | ICD-10-CM

## 2020-08-10 PROCEDURE — 99024 POSTOP FOLLOW-UP VISIT: CPT | Performed by: SURGERY

## 2020-08-10 ASSESSMENT — PAIN SCALES - GENERAL: PAINLEVEL: MODERATE PAIN (5)

## 2020-08-10 NOTE — LETTER
8/10/2020         RE: Sukh Craven  [address]  [facility] [address]        Dear Colleague,    Thank you for referring your patient, Sukh Craven, to the [facility]. Please see a copy of my visit note below.    General Surgery Post Op    Pt returns for follow up visit s/p robotic bilateral inguinal hernia repair on 7/30/2020.    Patient has been doing well, tolerating diet. Bowels moving well. Pain controlled. No issues with wound healing/redness/drainage. No fevers.  Areas of incisions as well as around the pubic bone are still sore.  He has been keeping it very light at home.  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    Exam:  Constitutional: healthy, alert and no distress  Cardiovascular: negative, PMI normal. No lifts, heaves, or thrills. RRR. No murmurs, clicks gallops or rub  Respiratory: negative, Percussion normal. Good diaphragmatic excursion. Lungs clear  Gastrointestinal: Abdomen soft, non-tender. BS normal. No masses, organomegaly  Incisions healing well.  No palpable recurrence of inguinal hernias    Assessment:     ICD-10-CM    1. S/P bilateral inguinal hernia repair, follow-up exam  Z09      Plan: With ongoing soreness I would like him to continue keeping activities on the lighter side for the next few weeks.  Will target a return to work in about 2 weeks though if there is persistent pain such that is not ready we may need to put that back even further.  He can start increasing his activities at home as tolerated and pain permits.  Follow-up with me as needed    Rene Ashraf MD      Again, thank you for allowing me to participate in the care of your patient.        Sincerely,        Rene Ashraf MD

## 2020-08-10 NOTE — PROGRESS NOTES
General Surgery Post Op    Pt returns for follow up visit s/p robotic bilateral inguinal hernia repair on 7/30/2020.    Patient has been doing well, tolerating diet. Bowels moving well. Pain controlled. No issues with wound healing/redness/drainage. No fevers.  Areas of incisions as well as around the pubic bone are still sore.  He has been keeping it very light at home.  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    Exam:  Constitutional: healthy, alert and no distress  Cardiovascular: negative, PMI normal. No lifts, heaves, or thrills. RRR. No murmurs, clicks gallops or rub  Respiratory: negative, Percussion normal. Good diaphragmatic excursion. Lungs clear  Gastrointestinal: Abdomen soft, non-tender. BS normal. No masses, organomegaly  Incisions healing well.  No palpable recurrence of inguinal hernias    Assessment:     ICD-10-CM    1. S/P bilateral inguinal hernia repair, follow-up exam  Z09      Plan: With ongoing soreness I would like him to continue keeping activities on the lighter side for the next few weeks.  Will target a return to work in about 2 weeks though if there is persistent pain such that is not ready we may need to put that back even further.  He can start increasing his activities at home as tolerated and pain permits.  Follow-up with me as needed    Rene Ashraf MD

## 2020-08-10 NOTE — LETTER
[facility]  [address] [facility] [address]  [phone]          August 10, 2020    RE:  Sukh Craven                                                                                                                                                       [address]  [facility] [address]            To whom it may concern:    Sukh Craven is under my professional care for S/P bilateral inguinal hernia repair, follow-up exam He may return to work with the following: The employee is UNABLE to return to work until 8/24/2020    When the patient returns to work, the following restrictions apply:  When able to return to work will be no restrictions      Sincerely,        Rene Ashraf MD

## 2020-10-05 ENCOUNTER — NURSE TRIAGE (OUTPATIENT)
Dept: NURSING | Facility: CLINIC | Age: 65
End: 2020-10-05

## 2020-10-05 NOTE — TELEPHONE ENCOUNTER
He wakes from sleep, short of breath. He has chills, sweats, no fever and has a rash. He will get to the ER for shortness of breath at rest.  Kenyatta Meyer RN  [facility]      Reason for Disposition    MODERATE difficulty breathing (e.g., speaks in phrases, SOB even at rest, pulse 100-120)    Additional Information    Negative: SEVERE difficulty breathing (e.g., struggling for each breath, speaks in single words)    Negative: Difficult to awaken or acting confused (e.g., disoriented, slurred speech)    Negative: Bluish (or gray) lips or face now    Negative: Shock suspected (e.g., cold/pale/clammy skin, too weak to stand, low BP, rapid pulse)    Negative: Sounds like a life-threatening emergency to the triager    Negative: [1] COVID-19 exposure AND [2] no symptoms    Negative: COVID-19 and Breastfeeding, questions about    Negative: [1] Adult with possible COVID-19 symptoms AND [2] triager concerned about severity of symptoms or other causes    Negative: SEVERE or constant chest pain or pressure (Exception: mild central chest pain, present only when coughing)    Protocols used: CORONAVIRUS (COVID-19) DIAGNOSED OR [id number] 8.4.20

## 2020-10-08 ENCOUNTER — OFFICE VISIT (OUTPATIENT)
Dept: FAMILY MEDICINE | Facility: CLINIC | Age: 65
End: 2020-10-08
Payer: COMMERCIAL

## 2020-10-08 VITALS
HEIGHT: 77 IN | HEART RATE: 72 BPM | BODY MASS INDEX: 26 KG/M2 | WEIGHT: 220.2 LBS | SYSTOLIC BLOOD PRESSURE: 132 MMHG | TEMPERATURE: 97 F | OXYGEN SATURATION: 98 % | DIASTOLIC BLOOD PRESSURE: 64 MMHG

## 2020-10-08 DIAGNOSIS — R06.02 SHORTNESS OF BREATH: Primary | ICD-10-CM

## 2020-10-08 DIAGNOSIS — R31.9 HEMATURIA, UNSPECIFIED TYPE: ICD-10-CM

## 2020-10-08 DIAGNOSIS — R21 RASH: ICD-10-CM

## 2020-10-08 DIAGNOSIS — I48.91 ATRIAL FIBRILLATION, UNSPECIFIED TYPE (H): ICD-10-CM

## 2020-10-08 DIAGNOSIS — I10 HYPERTENSION GOAL BP (BLOOD PRESSURE) < 140/90: ICD-10-CM

## 2020-10-08 DIAGNOSIS — R39.9 LOWER URINARY TRACT SYMPTOMS (LUTS): ICD-10-CM

## 2020-10-08 LAB
ALBUMIN UR-MCNC: NEGATIVE MG/DL
APPEARANCE UR: CLEAR
BACTERIA #/AREA URNS HPF: ABNORMAL /HPF
BILIRUB UR QL STRIP: NEGATIVE
COLOR UR AUTO: YELLOW
GLUCOSE UR STRIP-MCNC: NEGATIVE MG/DL
HGB UR QL STRIP: NEGATIVE
KETONES UR STRIP-MCNC: NEGATIVE MG/DL
LEUKOCYTE ESTERASE UR QL STRIP: NEGATIVE
NITRATE UR QL: NEGATIVE
NON-SQ EPI CELLS #/AREA URNS LPF: ABNORMAL /LPF
PH UR STRIP: 6 PH (ref 5–7)
RBC #/AREA URNS AUTO: ABNORMAL /HPF
SOURCE: ABNORMAL
SP GR UR STRIP: 1.02 (ref 1–1.03)
UROBILINOGEN UR STRIP-ACNC: 0.2 EU/DL (ref 0.2–1)
WBC #/AREA URNS AUTO: ABNORMAL /HPF

## 2020-10-08 PROCEDURE — 99214 OFFICE O/P EST MOD 30 MIN: CPT | Performed by: PHYSICIAN ASSISTANT

## 2020-10-08 PROCEDURE — 84443 ASSAY THYROID STIM HORMONE: CPT | Performed by: PHYSICIAN ASSISTANT

## 2020-10-08 PROCEDURE — 81001 URINALYSIS AUTO W/SCOPE: CPT | Performed by: PHYSICIAN ASSISTANT

## 2020-10-08 RX ORDER — LOSARTAN POTASSIUM 100 MG/1
TABLET ORAL
COMMUNITY
Start: 2020-08-26 | End: 2021-06-04

## 2020-10-08 RX ORDER — ASPIRIN 325 MG
325 TABLET ORAL DAILY
COMMUNITY
End: 2023-02-14

## 2020-10-08 RX ORDER — HYDROCHLOROTHIAZIDE 12.5 MG/1
TABLET ORAL
COMMUNITY
Start: 2020-04-23 | End: 2021-06-04

## 2020-10-08 ASSESSMENT — MIFFLIN-ST. JEOR: SCORE: 1911.2

## 2020-10-08 NOTE — PROGRESS NOTES
"Subjective     Sukh Craven is a 65 year old male who presents to clinic today for the following health issues:    HPI    The patient reports that for the past several weeks he has not slept because when he lays down he wakes up gasping for air, SOB with his heart pounding in an irregular rhythm. Once that has happened he can no longer sleep. He was recently seen in the ER for this issue and nothing apparent was found. He has a future appointment or an echocardiogram scheduled. Today he has no complaints of SOB or palpitations but he wants to find out what is going on. He hasn't had an appetite and has not been as active as he would like since Covid and he feels like he is losing weight.     He also reports a pruritic rash on the ventral side of his penis that is \"the worst its ever been.\" He usually has the rash in his groin area, but it moved to his penis. He reports that it itches so bad that he scratches the rash until it bleeds.     On two separate occasions the patient thought he saw blood at the beginning of his stream while urinating. He does not report any pain, burning or urgency. But he does report that he gets up 5-6 times at night to urinate.     Acute Illness  Acute illness concerns: Shortness of breath, it comes and goes, patient has difficulty sleeping when he has shortness of breath, there was one episode where he could not sleep for 36 hours.   Onset/Duration: a few weeks ago, it has improved  Symptoms:  Fever: no  Chills/Sweats: no  Headache (location?): no  Sinus Pressure: no  Conjunctivitis:  no  Ear Pain: no  Rhinorrhea: no  Congestion: no  Sore Throat: no  Cough: no  Wheeze: no  Decreased Appetite: YES  Nausea: no  Vomiting: no  Diarrhea: no  Dysuria/Freq.: no  Dysuria or Hematuria: no  Fatigue/Achiness: no  Sick/Strep Exposure: no  Therapies tried and outcome: None    Rash  Onset/Duration: a couple of weeks  Description  Location: groin area  Character: flakey, red, burning  Itching: " "severe, can scratch till it bleeds  Intensity:  moderate  Progression of Symptoms:  worsening  Accompanying signs and symptoms:   Fever: no  Body aches or joint pain: no  Sore throat symptoms: no  Recent cold symptoms: no  History:           Previous episodes of similar rash: Yes, on numerous occasion, it gets worse each time it comes back.   New exposures:  None  Recent travel: no  Exposure to similar rash: no  Precipitating or alleviating factors: nothing  Therapies tried and outcome: clobestol cream not helping much.     Blood in urine:     Patient Active Problem List   Diagnosis     Hematuria     BPH (benign prostatic hypertrophy)     Advanced directives, counseling/discussion     Elevated serum creatinine     History of Helicobacter infection     Hypertension goal BP (blood pressure) < 140/90     CKD (chronic kidney disease) stage 2, GFR 60-89 ml/min     CARDIOVASCULAR SCREENING; LDL GOAL LESS THAN 160     Pre-diabetes     BPH (benign prostatic hypertrophy) with urinary obstruction     Moderate aortic insufficiency     Dilatation of aorta (H)      Current Outpatient Medications   Medication     amLODIPine (NORVASC) 10 MG tablet     aspirin (ASA) 325 MG tablet     butenafine (MENTAX) 1 % CREA cream     hydrALAZINE (APRESOLINE) 25 MG tablet     hydrochlorothiazide (HYDRODIURIL) 12.5 MG tablet     losartan (COZAAR) 100 MG tablet     metoprolol succinate ER (TOPROL-XL) 25 MG 24 hr tablet     No current facility-administered medications for this visit.         Review of Systems   Constitutional, HEENT, cardiovascular, pulmonary, GI, , musculoskeletal, neuro, skin, endocrine and psych systems are negative, except as otherwise noted.      Objective    /64 (BP Location: Right arm, Patient Position: Chair, Cuff Size: Adult Large)   Pulse 72   Temp 97  F (36.1  C) (Tympanic)   Ht 1.956 m (6' 5\")   Wt 99.9 kg (220 lb 3.2 oz)   SpO2 98%   BMI 26.11 kg/m    Body mass index is 26.11 kg/m .  Physical Exam " "  GENERAL: healthy, alert and no distress  HENT: ear canals and TM's normal, nose and mouth without ulcers or lesions  NECK: no adenopathy, no asymmetry, masses, or scars and thyroid normal to palpation  RESP: lungs clear to auscultation - no rales, rhonchi or wheezes  CV: regular rate and rhythm, normal S1 S2, no S3 or S4, no murmur, click or rub, no peripheral edema and peripheral pulses strong  SKIN: Penile shaft has erythema, thinning, scaling        Assessment & Plan     Shortness of breath  Unclear. \"Episodes\" for a few years. Lasts a few days. ER work up negative. EKG negative. Has paroxysmal atrial fib. Suspect atrial fib with RVR? Recommend he see his cardiologist he agrees. Symptoms are normal today. He feels fine. Warning symptoms of worsening condition discussed and patient shows good understanding.   - TSH    Hypertension goal BP (blood pressure) < 140/90  Stable.     Atrial fibrillation, unspecified type (H)  Paroxysmal. Recommend monitoring.   - TSH    Rash  Question yeast. Will treat.   - butenafine (MENTAX) 1 % CREA cream; Apply twice a day x 3 weeks to affected area    Hematuria, unspecified type  Intermittent. Recheck shows RBC. Refer back to urology.   - UA with Microscopic reflex to Culture  - UROLOGY ADULT REFERRAL; Future    Lower urinary tract symptoms (LUTS)  Increased symptoms status post Laser treatment 5 years ago. Was \"really good\" after surgery - but has returned to prior levels. Up 5 times a night. Refer back to Urology.   - UROLOGY ADULT REFERRAL; Future      No follow-ups on file.    ДМИТРИЙ ORDAZ PA-C  [facility]    "

## 2020-10-09 LAB — TSH SERPL DL<=0.005 MIU/L-ACNC: 0.62 MU/L (ref 0.4–4)

## 2020-10-15 ENCOUNTER — TELEPHONE (OUTPATIENT)
Dept: UROLOGY | Facility: CLINIC | Age: 65
End: 2020-10-15

## 2020-10-15 NOTE — TELEPHONE ENCOUNTER
[facility] Call Center    Phone Message    May a detailed message be left on voicemail: yes     Reason for Call: Appointment Intake    Referring Provider Name: Ned Martinez PA-C  Diagnosis and/or Symptoms: Hematuria, unspecified type    Referral and recs are in the system. Please advise and contact patient directly for scheduling!    Action Taken: Message routed to:  [facility] ([facility] Urology    Travel Screening: Not Applicable

## 2021-01-15 ENCOUNTER — HEALTH MAINTENANCE LETTER (OUTPATIENT)
Age: 66
End: 2021-01-15

## 2021-03-22 ENCOUNTER — PRE VISIT (OUTPATIENT)
Dept: UROLOGY | Facility: CLINIC | Age: 66
End: 2021-03-22

## 2021-03-22 NOTE — TELEPHONE ENCOUNTER
MEDICAL RECORDS REQUEST   [facility]  Urology Clinic  [address] [place] [address]  PHONE: [phone]  Fax: [phone]        FUTURE VISIT INFORMATION                                                   Sukh Craven, : 1955 scheduled for future visit at [facility]    APPOINTMENT INFORMATION:    Date: 3/23/2021    Provider:  Liya ELLIOTT    Reason for Visit/Diagnosis: BPH    REFERRAL INFORMATION:    Referring provider:  Self    Specialty: N/A    Referring providers clinic:  N/A    Clinic contact number:  N/A    RECORDS REQUESTED FOR VISIT                                                     NOTES  STATUS/DETAILS   OFFICE NOTE from referring provider  yes   OFFICE NOTE from other specialist  yes   DISCHARGE SUMMARY from hospital  no   DISCHARGE REPORT from the ER  no   OPERATIVE REPORT  no   MEDICATION LIST  no   LABS     URINALYSIS (UA)  yes     PRE-VISIT CHECKLIST      Record collection complete Yes   Appointment appropriately scheduled           (right time/right provider) Yes   MyChart activation Yes   Questionnaire complete If no, please explain: in process      Completed by: Michelle Tesfaye

## 2021-03-22 NOTE — TELEPHONE ENCOUNTER
Reason for visit: BPH consult - last seen with Dr. Foote 2017     Relevant information: HoLEP procedure 4/27/17    Records/imaging/labs/orders: all records available    Pt called: no need for a call    At Rooming: standard

## 2021-03-23 ENCOUNTER — PRE VISIT (OUTPATIENT)
Dept: UROLOGY | Facility: CLINIC | Age: 66
End: 2021-03-23

## 2021-03-23 ENCOUNTER — OFFICE VISIT (OUTPATIENT)
Dept: UROLOGY | Facility: CLINIC | Age: 66
End: 2021-03-23
Payer: COMMERCIAL

## 2021-03-23 VITALS
BODY MASS INDEX: 27.51 KG/M2 | WEIGHT: 233 LBS | HEART RATE: 79 BPM | HEIGHT: 77 IN | SYSTOLIC BLOOD PRESSURE: 146 MMHG | DIASTOLIC BLOOD PRESSURE: 77 MMHG

## 2021-03-23 DIAGNOSIS — R31.29 MICROSCOPIC HEMATURIA: ICD-10-CM

## 2021-03-23 DIAGNOSIS — R35.0 URINARY FREQUENCY: Primary | ICD-10-CM

## 2021-03-23 DIAGNOSIS — Z98.890 HISTORY OF PROSTATE SURGERY: ICD-10-CM

## 2021-03-23 PROCEDURE — 99203 OFFICE O/P NEW LOW 30 MIN: CPT | Mod: 25 | Performed by: NURSE PRACTITIONER

## 2021-03-23 PROCEDURE — 51741 ELECTRO-UROFLOWMETRY FIRST: CPT | Performed by: NURSE PRACTITIONER

## 2021-03-23 PROCEDURE — 51798 US URINE CAPACITY MEASURE: CPT | Performed by: NURSE PRACTITIONER

## 2021-03-23 RX ORDER — OXYBUTYNIN CHLORIDE 10 MG/1
10 TABLET, EXTENDED RELEASE ORAL DAILY
Qty: 30 TABLET | Refills: 4 | Status: SHIPPED | OUTPATIENT
Start: 2021-03-23 | End: 2021-06-29 | Stop reason: ALTCHOICE

## 2021-03-23 RX ORDER — ROSUVASTATIN CALCIUM 20 MG/1
20 TABLET, COATED ORAL
COMMUNITY
Start: 2020-10-01 | End: 2022-07-08 | Stop reason: ALTCHOICE

## 2021-03-23 ASSESSMENT — MIFFLIN-ST. JEOR: SCORE: 1969.26

## 2021-03-23 ASSESSMENT — PAIN SCALES - GENERAL: PAINLEVEL: NO PAIN (0)

## 2021-03-23 NOTE — PATIENT INSTRUCTIONS
Please follow up with Rena Nickerson in 3 months with a UA lab urine test prior to your visit.     It was a pleasure meeting with you today.  Thank you for allowing me and my team the privilege of caring for you today.  YOU are the reason we are here, and I truly hope we provided you with the excellent service you deserve.  Please let us know if there is anything else we can do for you so that we can be sure you are leaving completely satisfied with your care experience.

## 2021-03-23 NOTE — PROGRESS NOTES
Chief Complaint:   LUTS         History of Present Illness:    Sukh Craven is a 65 year old male with a history of CKD and BPH s/p HoLEP in 04/2017 who presents who presents for evaluation of LUTS. Symptoms were reportedly improved after surgery, but have now returned to their pre-surgery level. Nocturia is his primary issue, as he is up 5 times per night. He does also endorse daytime frequency, as well. He has tried to limit fluids in an effort to reduce his frequency, which has not helped.     He has also had an issue with hematuria, which occured twice in the fall during the initiation of voiding. UA obtained 10/8/20 showed 2-5 RBC/HPF.  Denies any gross hematuria today.          Past Medical History:     Past Medical History:   Diagnosis Date     BPH (benign prostatic hyperplasia)      HTN (hypertension)             Past Surgical History:     Past Surgical History:   Procedure Laterality Date     LASER HOLMIUM ENUCLEATION PROSTATE N/A 4/27/2017    Procedure: LASER HOLMIUM ENUCLEATION PROSTATE;  Holmium Laser Enucleation Of The Prostate ;  Surgeon: Cresencio Foote MD;  Location: [facility] OR     REMOVAL OF SPERM DUCT(S)              Medications     Current Outpatient Medications   Medication     amLODIPine (NORVASC) 10 MG tablet     aspirin (ASA) 325 MG tablet     hydrALAZINE (APRESOLINE) 25 MG tablet     hydrochlorothiazide (HYDRODIURIL) 12.5 MG tablet     metoprolol succinate ER (TOPROL-XL) 25 MG 24 hr tablet     rosuvastatin (CRESTOR) 20 MG tablet     butenafine (MENTAX) 1 % CREA cream     losartan (COZAAR) 100 MG tablet     No current facility-administered medications for this visit.             Family History:     Family History   Problem Relation Age of Onset     Diabetes Sister      Diabetes Sister      Diabetes Brother      C.A.D. No family hx of      Hypertension No family hx of      Cerebrovascular Disease No family hx of      Breast Cancer No family hx of      Cancer - colorectal No family  hx of      Prostate Cancer No family hx of             Social History:     Social History     Socioeconomic History     Marital status:      Spouse name: Ricardo     Number of children: 5     Years of education: 14     Highest education level: Not on file   Occupational History     Occupation:      Employer: [employer]   Social Needs     Financial resource strain: Not on file     Food insecurity     Worry: Not on file     Inability: Not on file     Transportation needs     Medical: Not on file     Non-medical: Not on file   Tobacco Use     Smoking status: Former Smoker     Packs/day: 0.50     Years: 25.00     Pack years: 12.50     Types: Cigarettes     Quit date: 3/23/2007     Years since quittin.0     Smokeless tobacco: Never Used   Substance and Sexual Activity     Alcohol use: Yes     Comment: rare- social drinker     Drug use: No     Sexual activity: Yes     Partners: Female   Lifestyle     Physical activity     Days per week: Not on file     Minutes per session: Not on file     Stress: Not on file   Relationships     Social connections     Talks on phone: Not on file     Gets together: Not on file     Attends Jainism service: Not on file     Active member of club or organization: Not on file     Attends meetings of clubs or organizations: Not on file     Relationship status: Not on file     Intimate partner violence     Fear of current or ex partner: Not on file     Emotionally abused: Not on file     Physically abused: Not on file     Forced sexual activity: Not on file   Other Topics Concern      Service Yes     Comment: [employer]- 6 years     Blood Transfusions No     Caffeine Concern No     Occupational Exposure No     Hobby Hazards No     Sleep Concern No     Stress Concern No     Weight Concern No     Special Diet No     Back Care No     Exercise Yes     Comment: 4 times a week     Bike Helmet Yes     Seat Belt Yes     Self-Exams Yes     Parent/sibling w/ CABG, MI or  "angioplasty before 65F 55M? No   Social History Narrative     Not on file            Allergies:   No known drug allergies         Review of Systems:  From intake questionnaire   Negative 14 system review except as noted on HPI, nurse's note.         Physical Exam:   Patient is a 65 year old  male   Vitals: Blood pressure (!) 146/77, pulse 79, height 1.956 m (6' 5\"), weight 105.7 kg (233 lb).  General Appearance Adult: Alert, no acute distress, oriented  Lungs: no respiratory distress, or pursed lip breathing  Heart: No obvious jugular venous distension present  Abdomen: soft, nontender, no organomegaly or masses, Body mass index is 27.63 kg/m .  : deferred     Uroflow and Post-Void Residual by Bladder Scan     PVR: 0 mL      Labs and Pathology:    I personally reviewed all applicable laboratory data and went over findings with patient  Significant for:    CBC RESULTS:  Recent Labs   Lab Test 05/28/19  1440 04/09/19  1737 07/14/17  1511 06/06/17  1052   WBC 4.6 4.6 3.6* 4.3   HGB 15.7 17.8* 12.5* 12.6*    166 208 214        BMP RESULTS:  Recent Labs   Lab Test 07/29/20  1711 05/28/19  1440 04/09/19  1737 05/01/18  1813    140 137 141   POTASSIUM 3.8 4.0 3.8 4.5   CHLORIDE 107 106 104 107   CO2 28 28 24 25   ANIONGAP 5 6 9 9   GLC 85 84 88 87   BUN 20 18 29 17   CR 1.38* 1.23 1.31* 1.20   GFRESTIMATED 54* 62 58* 62   GFRESTBLACK 62 72 67 74   DORIS 9.5 9.1 9.5 9.3       UA RESULTS:   Recent Labs   Lab Test 10/08/20  1118 04/09/19  1747 05/01/18  1823   SG 1.025 1.020 1.020   URINEPH 6.0 6.5 7.5*   NITRITE Negative Negative Negative   RBCU 2-5* 5-10* O - 2   WBCU 0 - 5 0 - 5 0 - 5       CALCIUM RESULTS  Lab Results   Component Value Date    DORIS 9.5 07/29/2020    DORIS 9.1 05/28/2019    DORIS 9.5 04/09/2019       PSA RESULTS  PSA   Date Value Ref Range Status   02/27/2017 1.80 0 - 4 ug/L Final     Comment:     Assay Method:  Chemiluminescence using Siemens Vista analyzer   02/24/2012 1.21 0 - 4 ug/L Final "   03/26/2009 1.04 0 - 4 ug/L Final   08/07/2007 1.17 0 - 4 ug/L Final       INR  No results for input(s): INR in the last 47648 hours.        Imaging:    I personally reviewed all applicable imaging and went over findings with patient.  Significant for:    Results for orders placed or performed in visit on 05/28/19   CT Head w/o Contrast    Narrative    CT SCAN OF THE HEAD WITHOUT CONTRAST   5/28/2019 3:48 PM     HISTORY: Pre syncopal episodes, blurry vision, uncontrolled BP.     TECHNIQUE:  Axial images of the head and coronal reformations without  IV contrast material.  Radiation dose for this scan was reduced using  automated exposure control, adjustment of the mA and/or kV according  to patient size, or iterative reconstruction technique.    COMPARISON: None.    FINDINGS:  The ventricles are normal in size, shape and configuration.  The brain parenchyma and subarachnoid spaces are normal. There is no  evidence of intracranial hemorrhage, mass, acute infarct or anomaly.     The visualized portions of the sinuses and mastoids appear normal.  There is no evidence of trauma.      Impression    IMPRESSION: Normal CT scan of the head.      DOV SEGURA MD            Assessment and Plan:     Assessment: 65 year old male with a history of CKD and BPH s/p HoLEP in 04/2017 who has been experiencing bothersome LUTS, primarily irritative in nature (frequency, nocturia). Denies a history of sleep apnea or LE edema to attribute his nocturia. PVR 0 mL today. Given his surgical history and nature of symptoms, I suspect there is some component of bladder overactivity present. Will start him on a trial of an anticholinergic, and we discussed this today. Will also need to follow his microhematuria, and will plan to reevaluate in a few months.    Plan:   -Start oxybutynin XL 10 mg daily. Potential side effects discussed and Rx sent.  -Follow up with me in 3 months for a virtual visit, with a repeat UA collected beforehand. If  microhematuria remains on this sample, will consider moving forward with further workup.       Rena Nickerson, CNP  Department of Urology

## 2021-03-23 NOTE — LETTER
3/23/2021       RE: Sukh Craven  [address]  [facility] [address]     Dear Colleague,    Thank you for referring your patient, Sukh Craven, to the [facility] UROLOGY CLINIC [place] at [facility]. Please see a copy of my visit note below.            Chief Complaint:   LUTS         History of Present Illness:    Sukh Craven is a 65 year old male with a history of CKD and BPH s/p HoLEP in 04/2017 who presents who presents for evaluation of LUTS. Symptoms were reportedly improved after surgery, but have now returned to their pre-surgery level. Nocturia is his primary issue, as he is up 5 times per night. He does also endorse daytime frequency, as well. He has tried to limit fluids in an effort to reduce his frequency, which has not helped.     He has also had an issue with hematuria, which occured twice in the fall during the initiation of voiding. UA obtained 10/8/20 showed 2-5 RBC/HPF.  Denies any gross hematuria today.          Past Medical History:     Past Medical History:   Diagnosis Date     BPH (benign prostatic hyperplasia)      HTN (hypertension)             Past Surgical History:     Past Surgical History:   Procedure Laterality Date     LASER HOLMIUM ENUCLEATION PROSTATE N/A 4/27/2017    Procedure: LASER HOLMIUM ENUCLEATION PROSTATE;  Holmium Laser Enucleation Of The Prostate ;  Surgeon: Cresencio Foote MD;  Location: [facility] OR     REMOVAL OF SPERM DUCT(S)              Medications     Current Outpatient Medications   Medication     amLODIPine (NORVASC) 10 MG tablet     aspirin (ASA) 325 MG tablet     hydrALAZINE (APRESOLINE) 25 MG tablet     hydrochlorothiazide (HYDRODIURIL) 12.5 MG tablet     metoprolol succinate ER (TOPROL-XL) 25 MG 24 hr tablet     rosuvastatin (CRESTOR) 20 MG tablet     butenafine (MENTAX) 1 % CREA cream     losartan (COZAAR) 100 MG tablet     No current facility-administered medications for this visit.              Family History:     Family History   Problem Relation Age of Onset     Diabetes Sister      Diabetes Sister      Diabetes Brother      C.A.D. No family hx of      Hypertension No family hx of      Cerebrovascular Disease No family hx of      Breast Cancer No family hx of      Cancer - colorectal No family hx of      Prostate Cancer No family hx of             Social History:     Social History     Socioeconomic History     Marital status:      Spouse name: Ricardo     Number of children: 5     Years of education: 14     Highest education level: Not on file   Occupational History     Occupation:      Employer: [employer]   Social Needs     Financial resource strain: Not on file     Food insecurity     Worry: Not on file     Inability: Not on file     Transportation needs     Medical: Not on file     Non-medical: Not on file   Tobacco Use     Smoking status: Former Smoker     Packs/day: 0.50     Years: 25.00     Pack years: 12.50     Types: Cigarettes     Quit date: 3/23/2007     Years since quittin.0     Smokeless tobacco: Never Used   Substance and Sexual Activity     Alcohol use: Yes     Comment: rare- social drinker     Drug use: No     Sexual activity: Yes     Partners: Female   Lifestyle     Physical activity     Days per week: Not on file     Minutes per session: Not on file     Stress: Not on file   Relationships     Social connections     Talks on phone: Not on file     Gets together: Not on file     Attends Gnosticism service: Not on file     Active member of club or organization: Not on file     Attends meetings of clubs or organizations: Not on file     Relationship status: Not on file     Intimate partner violence     Fear of current or ex partner: Not on file     Emotionally abused: Not on file     Physically abused: Not on file     Forced sexual activity: Not on file   Other Topics Concern      Service Yes     Comment: [employer]- 6 years     Blood  "Transfusions No     Caffeine Concern No     Occupational Exposure No     Hobby Hazards No     Sleep Concern No     Stress Concern No     Weight Concern No     Special Diet No     Back Care No     Exercise Yes     Comment: 4 times a week     Bike Helmet Yes     Seat Belt Yes     Self-Exams Yes     Parent/sibling w/ CABG, MI or angioplasty before 65F 55M? No   Social History Narrative     Not on file            Allergies:   No known drug allergies         Review of Systems:  From intake questionnaire   Negative 14 system review except as noted on HPI, nurse's note.         Physical Exam:   Patient is a 65 year old  male   Vitals: Blood pressure (!) 146/77, pulse 79, height 1.956 m (6' 5\"), weight 105.7 kg (233 lb).  General Appearance Adult: Alert, no acute distress, oriented  Lungs: no respiratory distress, or pursed lip breathing  Heart: No obvious jugular venous distension present  Abdomen: soft, nontender, no organomegaly or masses, Body mass index is 27.63 kg/m .  : deferred     Uroflow and Post-Void Residual by Bladder Scan     PVR: 0 mL      Labs and Pathology:    I personally reviewed all applicable laboratory data and went over findings with patient  Significant for:    CBC RESULTS:  Recent Labs   Lab Test 05/28/19  1440 04/09/19  1737 07/14/17  1511 06/06/17  1052   WBC 4.6 4.6 3.6* 4.3   HGB 15.7 17.8* 12.5* 12.6*    166 208 214        BMP RESULTS:  Recent Labs   Lab Test 07/29/20  1711 05/28/19  1440 04/09/19  1737 05/01/18  1813    140 137 141   POTASSIUM 3.8 4.0 3.8 4.5   CHLORIDE 107 106 104 107   CO2 28 28 24 25   ANIONGAP 5 6 9 9   GLC 85 84 88 87   BUN 20 18 29 17   CR 1.38* 1.23 1.31* 1.20   GFRESTIMATED 54* 62 58* 62   GFRESTBLACK 62 72 67 74   DORIS 9.5 9.1 9.5 9.3       UA RESULTS:   Recent Labs   Lab Test 10/08/20  1118 04/09/19  1747 05/01/18  1823   SG 1.025 1.020 1.020   URINEPH 6.0 6.5 7.5*   NITRITE Negative Negative Negative   RBCU 2-5* 5-10* O - 2   WBCU 0 - 5 0 - 5 0 - 5 "       CALCIUM RESULTS  Lab Results   Component Value Date    DORIS 9.5 07/29/2020    DORIS 9.1 05/28/2019    DORIS 9.5 04/09/2019       PSA RESULTS  PSA   Date Value Ref Range Status   02/27/2017 1.80 0 - 4 ug/L Final     Comment:     Assay Method:  Chemiluminescence using Siemens Vista analyzer   02/24/2012 1.21 0 - 4 ug/L Final   03/26/2009 1.04 0 - 4 ug/L Final   08/07/2007 1.17 0 - 4 ug/L Final       INR  No results for input(s): INR in the last 16896 hours.        Imaging:    I personally reviewed all applicable imaging and went over findings with patient.  Significant for:    Results for orders placed or performed in visit on 05/28/19   CT Head w/o Contrast    Narrative    CT SCAN OF THE HEAD WITHOUT CONTRAST   5/28/2019 3:48 PM     HISTORY: Pre syncopal episodes, blurry vision, uncontrolled BP.     TECHNIQUE:  Axial images of the head and coronal reformations without  IV contrast material.  Radiation dose for this scan was reduced using  automated exposure control, adjustment of the mA and/or kV according  to patient size, or iterative reconstruction technique.    COMPARISON: None.    FINDINGS:  The ventricles are normal in size, shape and configuration.  The brain parenchyma and subarachnoid spaces are normal. There is no  evidence of intracranial hemorrhage, mass, acute infarct or anomaly.     The visualized portions of the sinuses and mastoids appear normal.  There is no evidence of trauma.      Impression    IMPRESSION: Normal CT scan of the head.      DOV SEGURA MD            Assessment and Plan:     Assessment: 65 year old male with a history of CKD and BPH s/p HoLEP in 04/2017 who has been experiencing bothersome LUTS, primarily irritative in nature (frequency, nocturia). Denies a history of sleep apnea or LE edema to attribute his nocturia. PVR 0 mL today. Given his surgical history and nature of symptoms, I suspect there is some component of bladder overactivity present. Will start him on a trial of an  anticholinergic, and we discussed this today. Will also need to follow his microhematuria, and will plan to reevaluate in a few months.    Plan:   -Start oxybutynin XL 10 mg daily. Potential side effects discussed and Rx sent.  -Follow up with me in 3 months for a virtual visit, with a repeat UA collected beforehand. If microhematuria remains on this sample, will consider moving forward with further workup.       Rena Nickerson, CNP  Department of Urology

## 2021-03-23 NOTE — NURSING NOTE
Chief Complaint   Patient presents with     Consult For     LUTS, some of the same symptoms pre-holep; nocturia being the most bothersome       - Rahel Ackerman,   EMT Clinic Support

## 2021-04-28 ENCOUNTER — RESEARCH ENCOUNTER (OUTPATIENT)
Dept: NEUROLOGY | Facility: CLINIC | Age: 66
End: 2021-04-28

## 2021-05-07 ENCOUNTER — TELEPHONE (OUTPATIENT)
Dept: PHARMACY | Facility: CLINIC | Age: 66
End: 2021-05-07

## 2021-05-07 NOTE — TELEPHONE ENCOUNTER
PHARMACY TELEPHONE ENCOUNTER:    Reason: MGILDE call      Called to establish care.  Patient requests a call back in 1-2 weeks when he has more values in the system.  He has been successful using the cuff over the past two days.    Paulo Cheung PharmOmerD.

## 2021-05-11 ENCOUNTER — IMMUNIZATION (OUTPATIENT)
Dept: NURSING | Facility: CLINIC | Age: 66
End: 2021-05-11
Payer: COMMERCIAL

## 2021-05-11 PROCEDURE — 91300 PR COVID VAC PFIZER DIL RECON 30 MCG/0.3 ML IM: CPT

## 2021-05-11 PROCEDURE — 0001A PR COVID VAC PFIZER DIL RECON 30 MCG/0.3 ML IM: CPT

## 2021-05-24 ENCOUNTER — TELEPHONE (OUTPATIENT)
Dept: PHARMACY | Facility: CLINIC | Age: 66
End: 2021-05-24

## 2021-05-24 NOTE — TELEPHONE ENCOUNTER
Telephone Encounter:  [facility]      Reason for Call: blood pressure control.    Patient requests a call later in the afternoon.    Audelia FryD.

## 2021-06-01 ENCOUNTER — IMMUNIZATION (OUTPATIENT)
Dept: NURSING | Facility: CLINIC | Age: 66
End: 2021-06-01
Attending: INTERNAL MEDICINE
Payer: COMMERCIAL

## 2021-06-01 PROCEDURE — 91300 PR COVID VAC PFIZER DIL RECON 30 MCG/0.3 ML IM: CPT

## 2021-06-01 PROCEDURE — 0002A PR COVID VAC PFIZER DIL RECON 30 MCG/0.3 ML IM: CPT

## 2021-06-04 ENCOUNTER — TELEPHONE (OUTPATIENT)
Dept: PHARMACY | Facility: CLINIC | Age: 66
End: 2021-06-04

## 2021-06-04 DIAGNOSIS — I10 HYPERTENSION GOAL BP (BLOOD PRESSURE) < 140/90: Primary | ICD-10-CM

## 2021-06-04 RX ORDER — HYDRALAZINE HYDROCHLORIDE 25 MG/1
25 TABLET, FILM COATED ORAL 3 TIMES DAILY
Qty: 90 TABLET | Refills: 1 | Status: SHIPPED | OUTPATIENT
Start: 2021-06-04 | End: 2022-01-14

## 2021-06-04 RX ORDER — HYDRALAZINE HYDROCHLORIDE 25 MG/1
25 TABLET, FILM COATED ORAL 3 TIMES DAILY
Qty: 90 TABLET | Refills: 1 | Status: SHIPPED | OUTPATIENT
Start: 2021-06-04 | End: 2021-06-04

## 2021-06-04 RX ORDER — HYDROCHLOROTHIAZIDE 12.5 MG/1
12.5 TABLET ORAL DAILY
Qty: 30 TABLET | Refills: 3 | Status: SHIPPED | OUTPATIENT
Start: 2021-06-04 | End: 2021-06-18

## 2021-06-04 RX ORDER — AMLODIPINE BESYLATE 5 MG/1
5 TABLET ORAL DAILY
Qty: 30 TABLET | Refills: 1 | Status: SHIPPED | OUTPATIENT
Start: 2021-06-04 | End: 2021-07-16

## 2021-06-04 NOTE — TELEPHONE ENCOUNTER
Telephone Encounter:  [facility] Study Participant      Reason for Call: Initial visit      Blood Pressure Medications:  >   After medication reconciliation patient states to take only the following medications   >hydrochlorothiazide 12.5 mg daily  > Hydralazine 25 mg x 1 time a day  Last contact with patient:           Changes in Diet:  Not discussed      Changes in Activity and Diet:   not discussed    Medication Adherence:  Poor due to refill problems    Changes in weight:      Health Goals:              Current Outpatient Medications   Medication Sig Dispense Refill     amLODIPine (NORVASC) 10 MG tablet Take 10 mg by mouth       aspirin (ASA) 325 MG tablet Take 325 mg by mouth daily       butenafine (MENTAX) 1 % CREA cream Apply twice a day x 3 weeks to affected area (Patient not taking: Reported on 3/23/2021) 30 g 2     hydrALAZINE (APRESOLINE) 25 MG tablet 25 mg by Oral or Feeding Tube route daily as needed for high blood pressure       hydrochlorothiazide (HYDRODIURIL) 12.5 MG tablet        losartan (COZAAR) 100 MG tablet TAKE ONE TABLET BY MOUTH EVERY MORNING       metoprolol succinate ER (TOPROL-XL) 25 MG 24 hr tablet Take 1 tablet (25 mg) by mouth daily 30 tablet 1     oxybutynin ER (DITROPAN-XL) 10 MG 24 hr tablet Take 1 tablet (10 mg) by mouth daily 30 tablet 4     rosuvastatin (CRESTOR) 20 MG tablet Take 20 mg by mouth            Home Blood Preassure Readings:          Sys Cara HR Date Time  [phone] 82 Jun 4, 2021, 04:45am  165 85 78 Jun 4, 2021, 04:44am  168 82 81 Caden 3, 2021, 04:44am  156 83 81 Caden 3, 2021, 04:43am  170 94 78 Jun 2, 2021, 04:32am  184 95 83 Jun 2, 2021, 04:31am  135 78 65 May 31, 2021, 09:02am  129 80 81 May 30, 2021, 10:30pm  153 97 72 May 30, 2021, 09:34pm  182 92 73 May 30, 2021, 09:29pm  131 77 71 May 28, 2021, 03:49am  172 86 74 May 28, 2021, 03:48am  155 85 75 May 27, 2021, 04:37am  175 82 76 May 27, 2021, 04:36am  151 77 72 May 26, 2021, 04:38am  148 81 72 May 26, 2021,  04:37am  163 78 79 May 25, 2021, 04:41am  140 76 68 May 13, 2021, 04:36am  157 65 70 May 13, 2021, 04:35am  194 88 72 May 12, 2021, 05:02am           Assessment & Plan   Impression:  Medication compliance has been an issue for Sukh.  Today we have done the reconciliation over the phone with the medications he has available to him at home.  He has been consistently taking hydrochlorothiazide and hydralazine.  Unfortunately the hydralazine has been taking only 1 time a day.  Other medications on the medication list have been prescribed in the past but not filled in several months.  Plan today is to restart a modest dose of amlodipine.  I described adverse effects for the patient.  He agrees to fill the medication over the weekend and to follow-up next week.       Medication Changes:  1. Hypertension goal BP (blood pressure) < 140/90  Refills sent  - amLODIPine (NORVASC) 5 MG tablet; Take 1 tablet (5 mg) by mouth daily  Dispense: 30 tablet; Refill: 1  - hydrochlorothiazide (HYDRODIURIL) 12.5 MG tablet; Take 1 tablet (12.5 mg) by mouth daily  Dispense: 30 tablet; Refill: 3    - hydrALAZINE (APRESOLINE) 25 MG tablet; Take 1 tablet (25 mg) by mouth 3 times daily  Dispense: 90 tablet; Refill: 1        Medications removed from the med list due to patient not taking:  Losartan  Metoprolol succinate.           Follow-Up Plan:  1 week              Note forwarded to Ned Martinez, Pharm.D.

## 2021-06-18 ENCOUNTER — TELEPHONE (OUTPATIENT)
Dept: PHARMACY | Facility: CLINIC | Age: 66
End: 2021-06-18

## 2021-06-18 DIAGNOSIS — I10 HYPERTENSION GOAL BP (BLOOD PRESSURE) < 140/90: ICD-10-CM

## 2021-06-18 RX ORDER — HYDROCHLOROTHIAZIDE 25 MG/1
25 TABLET ORAL DAILY
Qty: 30 TABLET | Refills: 3 | Status: SHIPPED | OUTPATIENT
Start: 2021-06-18 | End: 2021-10-18

## 2021-06-18 NOTE — TELEPHONE ENCOUNTER
Telephone Encounter:  [facility] Study Participant      Reason for Call: blood pressure follow up    Was started on CCB at last call.  Is tolerating the medication and denies any lower extremity edema.     Blood Pressure Medications:  > hydrochlorothiazide 12.5 mg dialy     >Amlodipine 5 mg daily (NEW)  >Hydralazine 25 mg TID      Takes after BP checks   Last contact with patient:           Changes in Diet:  Has not been eating well.  Due to dentures.  Reduced food intake.  Mash potatoes and mac&cheese     Tries to reduce salt added to diet.      Changes in Activity:   decreased recently.   Working - labor intensive     Medication Adherence:  Forgets to take on weekend.     Changes in weight:   not discussed    Health Goals:         New dentures happened a month ago.  This makes eating painful.  He has needed to change his diet to accommodate this.       Current Outpatient Medications   Medication Sig Dispense Refill     amLODIPine (NORVASC) 5 MG tablet Take 1 tablet (5 mg) by mouth daily 30 tablet 1     aspirin (ASA) 325 MG tablet Take 325 mg by mouth daily       butenafine (MENTAX) 1 % CREA cream Apply twice a day x 3 weeks to affected area (Patient not taking: Reported on 3/23/2021) 30 g 2     hydrALAZINE (APRESOLINE) 25 MG tablet Take 1 tablet (25 mg) by mouth 3 times daily 90 tablet 1     hydrochlorothiazide (HYDRODIURIL) 12.5 MG tablet Take 1 tablet (12.5 mg) by mouth daily 30 tablet 3     oxybutynin ER (DITROPAN-XL) 10 MG 24 hr tablet Take 1 tablet (10 mg) by mouth daily 30 tablet 4     rosuvastatin (CRESTOR) 20 MG tablet Take 20 mg by mouth        Ref Range & Units 8mo ago   SODIUM 136 - 145 mmol/L 140    POTASSIUM 3.5 - 5.1 mmol/L 3.6    CHLORIDE 98 - 112 mmol/L 106    CARBON DIOXIDE 21 - 32 mmol/L 26    BUN (UREA NITRO) 7 - 24 mg/dL 15    CREATININE 0.70 - 1.30 mg/dL 1.27    EST GFR (CKD-EPI) >60 mL/min 60Low     EST GFR IF AFRICAN AM >60 mL/min >60    GLUCOSE 74 - 106 mg/dL 131High     CALCIUM,  SERUM 8.5 - 10.1 mg/dL 9.6    ANION GAP 0.0 - 15.0 mmol/L 8.0    Resulting Agency  [facility]   Specimen Collected: 10/05/20  7:48 AM Last Resulted: 10/05/20  8:42 AM   Received From: [facility]  Result Received: 10/06/20  9:56 AM          Home Blood Preassure Readings:        Systolic Diastolic Heart Rate Date Time  147 85 62 Jun 16, 2021, 04:42am  167 94 70 Jun 16, 2021, 04:41am  149 85 78 Caden 15, 2021, 04:34am  168 97 79 Caden 15, 2021, 04:33am  156 83 63 Jun 14, 2021, 04:22am  153 78 64 Jun 14, 2021, 04:21am  198 85 67 Jun 11, 2021, 03:38am  176 79 65 Jun 11, 2021, 03:36am  176 84 65 Caden 10, 2021, 03:40am  173 77 64 Caden 10, 2021, 03:38am  170 80 74 Jun 9, 2021, 01:19pm  178 81 63 Jun 9, 2021, 04:39am  168 83 63 Jun 9, 2021, 04:37am  193 81 68 Jun 8, 2021, 04:42am  190 79 73 Jun 8, 2021, 04:41am  145 75 63 Jun 7, 2021, 08:03am  151 83 63 Jun 7, 2021, 08:01am  160 84 82 Jun 4, 2021, 04:45am  165 85 78 Jun 4, 2021, 04:44am  168 82 81 Caden 3, 2021, 04:44am  156 83 81 Caden 3, 2021, 04:43am  170 94 78 Jun 2, 2021, 04:32am  184 95 83 Jun 2, 2021, 04:31am  135 78 65 May 31, 2021, 09:02am       Assessment & Plan   Impression:  Blood pressure poorly controlled. Pt has been able to tolerate new amlodipine Rx.  Filled at the start of this month.      Poor medication adherence:  He will plan set an alarm for the weekend to remember to take his meds. Also has trouble taking medication TID.  He has been taking the hydralazine BID most days, but misses the afternoon dose.    He agrees that if he sets an alarm to take his meds on the weekend, that would be helpful.      Medication Changes:      1. Hypertension goal BP (blood pressure) < 140/90  - hydrochlorothiazide (HYDRODIURIL) 25 MG tablet; Take 1 tablet (25 mg) by mouth daily  Dispense: 30 tablet; Refill: 3    Sent to pharmacy      Follow-Up Plan:  Ask patient about weekend medication alarm.  Begin taking 25 mg hydrochlorothiazide  Will call back  in one week.               Paulo Cheung, Pharm.D.      Note forwarded to PCP.

## 2021-06-25 ENCOUNTER — PRE VISIT (OUTPATIENT)
Dept: UROLOGY | Facility: CLINIC | Age: 66
End: 2021-06-25

## 2021-06-25 NOTE — TELEPHONE ENCOUNTER
Reason for Visit: 3 month follow up    Diagnosis: urinary frequency    Orders/Procedures/Records: in system    Contact Patient: n/a    Rooming Requirements: normal      Sharon Jimenez LPN  06/25/21  10:36 AM

## 2021-06-28 NOTE — PROGRESS NOTES
Chief Complaint:   LUTS; microhematuria         History of Present Illness:    Sukh Craven is a 66 year old male with a history of CKD and BPH s/p HoLEP in 04/2017 who presents for follow up. I last saw him on 3/23/21. His LUTS had improved after surgery, but then returned to their pre-surgery level. Noted that nocturia was his primary issue, as he was having to get up 5 times per night. Also with daytime frequency. Had tried to limit fluids in an effort to reduce his frequency, which had not helped. He also mentioned gross hematuria x2, which occurred in the fall during the initiation of voiding. UA obtained 10/8/20 showed 2-5 RBC/HPF. He denied any gross hematuria at the time of our visit. PVR was 0 mL. Given the irritative nature of his symptoms (frequency, nocturia) I suspected a component of OAB contributing to his symptoms, and started him on oxybutynin XL 10 mg daily. Regarding his microhematuria, we planned to reevaluate this again prior to follow up. UA placed.     Today, he states that although the oxybutynin seemed to work at reducing his frequency initially, it is now starting to get worse again. His nocturia was down to once per night, but he is now getting up 5x per night. Also has daytime frequency, and voids every few hours. He is a  and needs to make frequent stops to void. However, he has no obstructive-type symptoms, including hesitancy, intermittency, or slowed stream. Feels like he empties his bladder completely with each void. Denies any gross hematuria.     Hematuria Risk Factors:  Age >40: Yes  Smoking history: Quiet 10 years ago  Occupational exposure to chemicals or dyes (ie, benzenes, aromatic amines): No  History of urologic disorder or disease: BPH  History of irritative voiding symptoms: Yes; frequency  History of urinary tract infection: No   Analgesic abuse: No  History of pelvic irradiation: No         Past Medical History:     Past Medical History:    Diagnosis Date     BPH (benign prostatic hyperplasia)      HTN (hypertension)             Past Surgical History:     Past Surgical History:   Procedure Laterality Date     LASER HOLMIUM ENUCLEATION PROSTATE N/A 2017    Procedure: LASER HOLMIUM ENUCLEATION PROSTATE;  Holmium Laser Enucleation Of The Prostate ;  Surgeon: Cresencio Foote MD;  Location: [facility]     REMOVAL OF SPERM DUCT(S)              Medications     Current Outpatient Medications   Medication     amLODIPine (NORVASC) 5 MG tablet     hydrALAZINE (APRESOLINE) 25 MG tablet     hydrochlorothiazide (HYDRODIURIL) 25 MG tablet     oxybutynin ER (DITROPAN XL) 15 MG 24 hr tablet     oxybutynin ER (DITROPAN-XL) 10 MG 24 hr tablet     aspirin (ASA) 325 MG tablet     butenafine (MENTAX) 1 % CREA cream     rosuvastatin (CRESTOR) 20 MG tablet     No current facility-administered medications for this visit.             Family History:     Family History   Problem Relation Age of Onset     Diabetes Sister      Diabetes Sister      Diabetes Brother      C.A.D. No family hx of      Hypertension No family hx of      Cerebrovascular Disease No family hx of      Breast Cancer No family hx of      Cancer - colorectal No family hx of      Prostate Cancer No family hx of             Social History:     Social History     Socioeconomic History     Marital status: Single     Spouse name: Ricardo     Number of children: 5     Years of education: 14     Highest education level: Not on file   Occupational History     Occupation:      Employer: [employer]   Social Needs     Financial resource strain: Not on file     Food insecurity     Worry: Not on file     Inability: Not on file     Transportation needs     Medical: Not on file     Non-medical: Not on file   Tobacco Use     Smoking status: Former Smoker     Packs/day: 0.50     Years: 25.00     Pack years: 12.50     Types: Cigarettes     Quit date: 3/23/2007     Years since quittin.2      Smokeless tobacco: Never Used   Substance and Sexual Activity     Alcohol use: Yes     Comment: rare- social drinker     Drug use: No     Sexual activity: Yes     Partners: Female   Lifestyle     Physical activity     Days per week: Not on file     Minutes per session: Not on file     Stress: Not on file   Relationships     Social connections     Talks on phone: Not on file     Gets together: Not on file     Attends [employer] service: Not on file     Active member of club or organization: Not on file     Attends meetings of clubs or organizations: Not on file     Relationship status: Not on file     Intimate partner violence     Fear of current or ex partner: Not on file     Emotionally abused: Not on file     Physically abused: Not on file     Forced sexual activity: Not on file   Other Topics Concern      Service Yes     Comment: [employer]- 6 years     Blood Transfusions No     Caffeine Concern No     Occupational Exposure No     Hobby Hazards No     Sleep Concern No     Stress Concern No     Weight Concern No     Special Diet No     Back Care No     Exercise Yes     Comment: 4 times a week     Bike Helmet Yes     Seat Belt Yes     Self-Exams Yes     Parent/sibling w/ CABG, MI or angioplasty before 65F 55M? No   Social History Narrative     Not on file            Allergies:   No known drug allergies         Review of Systems:  From intake questionnaire   Negative 14 system review except as noted on HPI, nurse's note.         Physical Exam:   Patient is a 66 year old  male   Vitals: Blood pressure (!) 141/80, pulse 69.  General Appearance Adult: Alert, no acute distress, oriented  Lungs: no respiratory distress, or pursed lip breathing  Heart: No obvious jugular venous distension present  Abdomen: soft, nontender, no organomegaly or masses, There is no height or weight on file to calculate BMI.  : deferred      Labs and Pathology:    I personally reviewed all applicable laboratory data and went over findings  with patient  Significant for:    CBC RESULTS:  Recent Labs   Lab Test 05/28/19  1440 04/09/19  1737 07/14/17  1511 06/06/17  1052   WBC 4.6 4.6 3.6* 4.3   HGB 15.7 17.8* 12.5* 12.6*    166 208 214        BMP RESULTS:  Recent Labs   Lab Test 07/29/20  1711 05/28/19  1440 04/09/19  1737 05/01/18  1813    140 137 141   POTASSIUM 3.8 4.0 3.8 4.5   CHLORIDE 107 106 104 107   CO2 28 28 24 25   ANIONGAP 5 6 9 9   GLC 85 84 88 87   BUN 20 18 29 17   CR 1.38* 1.23 1.31* 1.20   GFRESTIMATED 54* 62 58* 62   GFRESTBLACK 62 72 67 74   DORIS 9.5 9.1 9.5 9.3       UA RESULTS:   Recent Labs   Lab Test 10/08/20  1118 04/09/19  1747 05/01/18  1823   SG 1.025 1.020 1.020   URINEPH 6.0 6.5 7.5*   NITRITE Negative Negative Negative   RBCU 2-5* 5-10* O - 2   WBCU 0 - 5 0 - 5 0 - 5       PSA RESULTS  PSA   Date Value Ref Range Status   02/27/2017 1.80 0 - 4 ug/L Final     Comment:     Assay Method:  Chemiluminescence using Siemens Vista analyzer   02/24/2012 1.21 0 - 4 ug/L Final   03/26/2009 1.04 0 - 4 ug/L Final   08/07/2007 1.17 0 - 4 ug/L Final            Assessment and Plan:     Assessment: 66 year old male with a history of CKD and BPH s/p HoLEP in 04/2017 who has been experiencing urinary frequency and nocturia, which was initially improved with oxybutynin 10 mg, but has now gotten worse. Nocturia x5. Discussed options for next steps, including increasing his dose of oxybutynin vs trialing alternative med vs pursuing urodynamics. He would prefer to trial a higher dose of oxybutynin.     Regarding his microhematuria, this is present in trace amounts on our in-office urine dip today. Will send for formal UA to confirm. He denies gross hematuria. No history of kidney stones.     Plan:  -Increase oxybutynin XL dose to 15 mg daily.  -Will send UA/UC. If microhematuria present, will pursue hematuria workup per AUA guidelines.  -Will update PSA.  -Follow up with me via virtual visit in 3 months.       Rena Nickerson,  CNP  Department of Urology

## 2021-06-29 ENCOUNTER — OFFICE VISIT (OUTPATIENT)
Dept: UROLOGY | Facility: CLINIC | Age: 66
End: 2021-06-29
Payer: COMMERCIAL

## 2021-06-29 VITALS — DIASTOLIC BLOOD PRESSURE: 80 MMHG | SYSTOLIC BLOOD PRESSURE: 141 MMHG | HEART RATE: 69 BPM

## 2021-06-29 DIAGNOSIS — R35.0 URINARY FREQUENCY: ICD-10-CM

## 2021-06-29 DIAGNOSIS — R31.29 MICROSCOPIC HEMATURIA: Primary | ICD-10-CM

## 2021-06-29 LAB
ALBUMIN UR-MCNC: 30 MG/DL
ALBUMIN UR-MCNC: 30 MG/DL
APPEARANCE UR: ABNORMAL
APPEARANCE UR: CLEAR
BILIRUB UR QL STRIP: NEGATIVE
BILIRUB UR QL STRIP: NEGATIVE
COLOR UR AUTO: YELLOW
COLOR UR AUTO: YELLOW
GLUCOSE UR STRIP-MCNC: NEGATIVE MG/DL
GLUCOSE UR STRIP-MCNC: NEGATIVE MG/DL
HGB UR QL STRIP: ABNORMAL
HGB UR QL STRIP: ABNORMAL
KETONES UR STRIP-MCNC: NEGATIVE MG/DL
KETONES UR STRIP-MCNC: NEGATIVE MG/DL
LEUKOCYTE ESTERASE UR QL STRIP: NEGATIVE
LEUKOCYTE ESTERASE UR QL STRIP: NEGATIVE
NITRATE UR QL: NEGATIVE
NITRATE UR QL: NEGATIVE
PH UR STRIP: 6 PH (ref 5–7)
PH UR STRIP: 7 PH (ref 5–7)
PSA SERPL-MCNC: 0.65 UG/L (ref 0–4)
RBC #/AREA URNS AUTO: 0 /HPF (ref 0–2)
SOURCE: ABNORMAL
SP GR UR STRIP: 1.02 (ref 1–1.03)
SP GR UR STRIP: 1.02 (ref 1–1.03)
UROBILINOGEN UR STRIP-ACNC: 1 EU/DL (ref 0.2–1)
UROBILINOGEN UR STRIP-MCNC: 0 MG/DL (ref 0–2)
WBC #/AREA URNS AUTO: 0 /HPF (ref 0–5)

## 2021-06-29 PROCEDURE — 81001 URINALYSIS AUTO W/SCOPE: CPT | Performed by: PATHOLOGY

## 2021-06-29 PROCEDURE — 99000 SPECIMEN HANDLING OFFICE-LAB: CPT | Performed by: PATHOLOGY

## 2021-06-29 PROCEDURE — 81003 URINALYSIS AUTO W/O SCOPE: CPT | Performed by: PATHOLOGY

## 2021-06-29 PROCEDURE — 36415 COLL VENOUS BLD VENIPUNCTURE: CPT | Performed by: PATHOLOGY

## 2021-06-29 PROCEDURE — 84153 ASSAY OF PSA TOTAL: CPT | Performed by: PATHOLOGY

## 2021-06-29 PROCEDURE — 87086 URINE CULTURE/COLONY COUNT: CPT | Mod: 90 | Performed by: PATHOLOGY

## 2021-06-29 PROCEDURE — 99213 OFFICE O/P EST LOW 20 MIN: CPT | Performed by: NURSE PRACTITIONER

## 2021-06-29 RX ORDER — OXYBUTYNIN CHLORIDE 15 MG/1
15 TABLET, EXTENDED RELEASE ORAL DAILY
Qty: 90 TABLET | Refills: 3 | Status: SHIPPED | OUTPATIENT
Start: 2021-06-29 | End: 2022-07-08

## 2021-06-29 ASSESSMENT — PAIN SCALES - GENERAL: PAINLEVEL: NO PAIN (0)

## 2021-06-29 NOTE — NURSING NOTE
Chief Complaint   Patient presents with     Follow Up     3 month follow up for microhematuria

## 2021-06-29 NOTE — PATIENT INSTRUCTIONS
UROLOGY CLINIC VISIT PATIENT INSTRUCTIONS    -Start the oxybutynin XL 15 mg daily.  -Have your PSA drawn at the lab.  -We will send your urine for analysis.   -Follow up with me via virtual visit in 3 months.     If you have any issues, questions or concerns in the meantime, do not hesitate to contact us at [phone] or via [facility].     It was a pleasure meeting with you today.  Thank you for allowing me and my team the privilege of caring for you today.  YOU are the reason we are here, and I truly hope we provided you with the excellent service you deserve.  Please let us know if there is anything else we can do for you so that we can be sure you are leaving completely satisfied with your care experience.    Rena Nickerson, CNP

## 2021-06-29 NOTE — LETTER
6/29/2021       RE: Sukh Craven  [address]  [facility] [address]     Dear Colleague,    Thank you for referring your patient, Sukh Craven, to the [facility] [place] [facility]. Please see a copy of my visit note below.            Chief Complaint:   LUTS; microhematuria         History of Present Illness:    Sukh Craven is a 66 year old male with a history of CKD and BPH s/p HoLEP in 04/2017 who presents for follow up. I last saw him on 3/23/21. His LUTS had improved after surgery, but then returned to their pre-surgery level. Noted that nocturia was his primary issue, as he was having to get up 5 times per night. Also with daytime frequency. Had tried to limit fluids in an effort to reduce his frequency, which had not helped. He also mentioned gross hematuria x2, which occurred in the fall during the initiation of voiding. UA obtained 10/8/20 showed 2-5 RBC/HPF. He denied any gross hematuria at the time of our visit. PVR was 0 mL. Given the irritative nature of his symptoms (frequency, nocturia) I suspected a component of OAB contributing to his symptoms, and started him on oxybutynin XL 10 mg daily. Regarding his microhematuria, we planned to reevaluate this again prior to follow up. UA placed.     Today, he states that although the oxybutynin seemed to work at reducing his frequency initially, it is now starting to get worse again. His nocturia was down to once per night, but he is now getting up 5x per night. Also has daytime frequency, and voids every few hours. He is a  and needs to make frequent stops to void. However, he has no obstructive-type symptoms, including hesitancy, intermittency, or slowed stream. Feels like he empties his bladder completely with each void. Denies any gross hematuria.     Hematuria Risk Factors:  Age >40: Yes  Smoking history: Quiet 10 years ago  Occupational exposure to  chemicals or dyes (ie, benzenes, aromatic amines): No  History of urologic disorder or disease: BPH  History of irritative voiding symptoms: Yes; frequency  History of urinary tract infection: No   Analgesic abuse: No  History of pelvic irradiation: No         Past Medical History:     Past Medical History:   Diagnosis Date     BPH (benign prostatic hyperplasia)      HTN (hypertension)             Past Surgical History:     Past Surgical History:   Procedure Laterality Date     LASER HOLMIUM ENUCLEATION PROSTATE N/A 4/27/2017    Procedure: LASER HOLMIUM ENUCLEATION PROSTATE;  Holmium Laser Enucleation Of The Prostate ;  Surgeon: Cresencio Foote MD;  Location[facility]     REMOVAL OF SPERM DUCT(S)              Medications     Current Outpatient Medications   Medication     amLODIPine (NORVASC) 5 MG tablet     hydrALAZINE (APRESOLINE) 25 MG tablet     hydrochlorothiazide (HYDRODIURIL) 25 MG tablet     oxybutynin ER (DITROPAN XL) 15 MG 24 hr tablet     oxybutynin ER (DITROPAN-XL) 10 MG 24 hr tablet     aspirin (ASA) 325 MG tablet     butenafine (MENTAX) 1 % CREA cream     rosuvastatin (CRESTOR) 20 MG tablet     No current facility-administered medications for this visit.             Family History:     Family History   Problem Relation Age of Onset     Diabetes Sister      Diabetes Sister      Diabetes Brother      C.A.D. No family hx of      Hypertension No family hx of      Cerebrovascular Disease No family hx of      Breast Cancer No family hx of      Cancer - colorectal No family hx of      Prostate Cancer No family hx of             Social History:     Social History     Socioeconomic History     Marital status: Single     Spouse name: Ricardo     Number of children: 5     Years of education: 14     Highest education level: Not on file   Occupational History     Occupation:      Employer: [employer]   Social Needs     Financial resource strain: Not on file     Food insecurity     Worry: Not  on file     Inability: Not on file     Transportation needs     Medical: Not on file     Non-medical: Not on file   Tobacco Use     Smoking status: Former Smoker     Packs/day: 0.50     Years: 25.00     Pack years: 12.50     Types: Cigarettes     Quit date: 3/23/2007     Years since quittin.2     Smokeless tobacco: Never Used   Substance and Sexual Activity     Alcohol use: Yes     Comment: rare- social drinker     Drug use: No     Sexual activity: Yes     Partners: Female   Lifestyle     Physical activity     Days per week: Not on file     Minutes per session: Not on file     Stress: Not on file   Relationships     Social connections     Talks on phone: Not on file     Gets together: Not on file     Attends [employer] service: Not on file     Active member of club or organization: Not on file     Attends meetings of clubs or organizations: Not on file     Relationship status: Not on file     Intimate partner violence     Fear of current or ex partner: Not on file     Emotionally abused: Not on file     Physically abused: Not on file     Forced sexual activity: Not on file   Other Topics Concern      Service Yes     Comment: [employer]- 6 years     Blood Transfusions No     Caffeine Concern No     Occupational Exposure No     Hobby Hazards No     Sleep Concern No     Stress Concern No     Weight Concern No     Special Diet No     Back Care No     Exercise Yes     Comment: 4 times a week     Bike Helmet Yes     Seat Belt Yes     Self-Exams Yes     Parent/sibling w/ CABG, MI or angioplasty before 65F 55M? No   Social History Narrative     Not on file            Allergies:   No known drug allergies         Review of Systems:  From intake questionnaire   Negative 14 system review except as noted on HPI, nurse's note.         Physical Exam:   Patient is a 66 year old  male   Vitals: Blood pressure (!) 141/80, pulse 69.  General Appearance Adult: Alert, no acute distress, oriented  Lungs: no respiratory distress, or  pursed lip breathing  Heart: No obvious jugular venous distension present  Abdomen: soft, nontender, no organomegaly or masses, There is no height or weight on file to calculate BMI.  : deferred      Labs and Pathology:    I personally reviewed all applicable laboratory data and went over findings with patient  Significant for:    CBC RESULTS:  Recent Labs   Lab Test 05/28/19  1440 04/09/19  1737 07/14/17  1511 06/06/17  1052   WBC 4.6 4.6 3.6* 4.3   HGB 15.7 17.8* 12.5* 12.6*    166 208 214        BMP RESULTS:  Recent Labs   Lab Test 07/29/20  1711 05/28/19  1440 04/09/19  1737 05/01/18  1813    140 137 141   POTASSIUM 3.8 4.0 3.8 4.5   CHLORIDE 107 106 104 107   CO2 28 28 24 25   ANIONGAP 5 6 9 9   GLC 85 84 88 87   BUN 20 18 29 17   CR 1.38* 1.23 1.31* 1.20   GFRESTIMATED 54* 62 58* 62   GFRESTBLACK 62 72 67 74   DORIS 9.5 9.1 9.5 9.3       UA RESULTS:   Recent Labs   Lab Test 10/08/20  1118 04/09/19  1747 05/01/18  1823   SG 1.025 1.020 1.020   URINEPH 6.0 6.5 7.5*   NITRITE Negative Negative Negative   RBCU 2-5* 5-10* O - 2   WBCU 0 - 5 0 - 5 0 - 5       PSA RESULTS  PSA   Date Value Ref Range Status   02/27/2017 1.80 0 - 4 ug/L Final     Comment:     Assay Method:  Chemiluminescence using Siemens Vista analyzer   02/24/2012 1.21 0 - 4 ug/L Final   03/26/2009 1.04 0 - 4 ug/L Final   08/07/2007 1.17 0 - 4 ug/L Final            Assessment and Plan:     Assessment: 66 year old male with a history of CKD and BPH s/p HoLEP in 04/2017 who has been experiencing urinary frequency and nocturia, which was initially improved with oxybutynin 10 mg, but has now gotten worse. Nocturia x5. Discussed options for next steps, including increasing his dose of oxybutynin vs trialing alternative med vs pursuing urodynamics. He would prefer to trial a higher dose of oxybutynin.     Regarding his microhematuria, this is present in trace amounts on our in-office urine dip today. Will send for formal UA to confirm. He  denies gross hematuria. No history of kidney stones.     Plan:  -Increase oxybutynin XL dose to 15 mg daily.  -Will send UA/UC. If microhematuria present, will pursue hematuria workup per AUA guidelines.  -Will update PSA.  -Follow up with me via virtual visit in 3 months.       Rena Nickerson CNP  Department of Urology

## 2021-06-30 LAB
BACTERIA SPEC CULT: NORMAL
Lab: NORMAL
SPECIMEN SOURCE: NORMAL

## 2021-07-02 ENCOUNTER — TELEPHONE (OUTPATIENT)
Dept: PHARMACY | Facility: CLINIC | Age: 66
End: 2021-07-02

## 2021-07-02 NOTE — TELEPHONE ENCOUNTER
Telephone Encounter:  MGlide Study Participant      Reason for Call:   Called for follow up x 2      Blood Pressure Medications:  >     > hydrochlorothiazide 12.5 mg dialy     >Amlodipine 5 mg daily (NEW)  >Hydralazine 25 mg TID    Last contact with patient:           Changes in Diet:  none     Changes in Activity and Diet:   none   Medication Adherence:  Missing evening dose   Changes in weight:   not discussed   Health Goals:         Missing some hydralazine dose.    Misses the evening dose.       Current Outpatient Medications   Medication Sig Dispense Refill     amLODIPine (NORVASC) 5 MG tablet Take 1 tablet (5 mg) by mouth daily 30 tablet 1     aspirin (ASA) 325 MG tablet Take 325 mg by mouth daily       butenafine (MENTAX) 1 % CREA cream Apply twice a day x 3 weeks to affected area (Patient not taking: Reported on 3/23/2021) 30 g 2     hydrALAZINE (APRESOLINE) 25 MG tablet Take 1 tablet (25 mg) by mouth 3 times daily 90 tablet 1     hydrochlorothiazide (HYDRODIURIL) 25 MG tablet Take 1 tablet (25 mg) by mouth daily 30 tablet 3     oxybutynin ER (DITROPAN XL) 15 MG 24 hr tablet Take 1 tablet (15 mg) by mouth daily 90 tablet 3     rosuvastatin (CRESTOR) 20 MG tablet Take 20 mg by mouth            Home Blood Preassure Readings:        Sys Cara HR Date Time  [phone] Jul 1, 2021, 03:37am  155 87 70 Jul 1, 2021, 03:36am  159 87 66 Jun 30, 2021, 04:32am  [phone] 70 Jun 30, 2021, 04:30am  185 86 77 Jun 29, 2021, 06:17am  165 96 76 Jun 29, 2021, 06:16am  192 110 68 Jun 28, 2021, 04:47am  197 101 75 Jun 28, 2021, 04:46am  161 94 82 Jun 26, 2021, 04:37am  152 88 83 Jun 26, 2021, 04:35am  192 91 73 Jun 25, 2021, 03:33am  160 93 75 Jun 25, 2021, 03:31am  181 96 69 Jun 23, 2021, 04:33am  178 97 69 Jun 23, 2021, 04:32am  189 111 82 Jun 22, 2021, 04:37am  203 101 79 Jun 22, 2021, 04:36am  185 87 67 Jun 21, 2021, 04:34am  178 93 67 Jun 21, 2021, 04:33am  171 92 58 Jun 20, 2021, 09:45am  169 88 62 Jun 20, 2021,  09:43am  171 89 72 Jun 19, 2021, 07:28am  187 93 76 Jun 19, 2021, 07:27am         Assessment & Plan   Impression:    Blood pressure above goal of 140 mmHg.  Discussion today focused on adherence to medication.  Patient has been missing his evening hydralazine dose.  I have emphasized the need to take the evening dose as this likely has not impact on his morning blood pressure readings.  Adherence with morning medications is likely good because the patient has demonstrated good adherence with his blood pressure monitoring.  He can ask the use of his blood pressure machine with taking his morning medications.  Plan today is to reaffirm the need for him to take his evening hydralazine dose.         Medication Changes:    none         Follow-Up Plan:  Follow-up in 1 week.  Plan to increase felodipine to max dose at that time.              Paulo Cheung, Pharm.D.

## 2021-07-12 ENCOUNTER — TELEPHONE (OUTPATIENT)
Dept: PHARMACY | Facility: CLINIC | Age: 66
End: 2021-07-12

## 2021-07-12 NOTE — TELEPHONE ENCOUNTER
Telephone Encounter:  MGlide Study Participant      Reason for Call: follow up blood pressure call  Left VM for Sukh to call back    Blood Pressure Medications:  > hydrochlorothiazide 25 mg dialy     >Amlodipine 5 mg daily (NEW)  >Hydralazine 25 mg TID Last contact with patient:           Changes in Diet:       Changes in Activity and Diet:      Medication Adherence:   Changes in weight:      Health Goals:              Current Outpatient Medications   Medication Sig Dispense Refill     amLODIPine (NORVASC) 5 MG tablet Take 1 tablet (5 mg) by mouth daily 30 tablet 1     aspirin (ASA) 325 MG tablet Take 325 mg by mouth daily       butenafine (MENTAX) 1 % CREA cream Apply twice a day x 3 weeks to affected area (Patient not taking: Reported on 3/23/2021) 30 g 2     hydrALAZINE (APRESOLINE) 25 MG tablet Take 1 tablet (25 mg) by mouth 3 times daily 90 tablet 1     hydrochlorothiazide (HYDRODIURIL) 25 MG tablet Take 1 tablet (25 mg) by mouth daily 30 tablet 3     oxybutynin ER (DITROPAN XL) 15 MG 24 hr tablet Take 1 tablet (15 mg) by mouth daily 90 tablet 3     rosuvastatin (CRESTOR) 20 MG tablet Take 20 mg by mouth            Home Blood Preassure Readings:            Systolic Diastolic Heart Rate Date Time  [phone] Jul 9, 2021, 04:37am  [phone] 80 Jul 9, 2021, 04:36am  172 98 76 Jul 8, 2021, 06:06am  153 91 73 Jul 8, 2021, 06:05am  159 92 77 Jul 7, 2021, 04:38am  162 99 81 Jul 7, 2021, 04:37am  162 99 81 Jul 7, 2021, 04:37am  148 86 56 Jul 6, 2021, 04:41am  148 86 56 Jul 6, 2021, 04:41am  167 101 69 Jul 6, 2021, 04:39am  167 101 69 Jul 6, 2021, 04:39am  151 96 61 Jul 6, 2021, 12:47am  151 96 61 Jul 6, 2021, 12:47am  139 78 77 Jul 5, 2021, 01:44pm  139 78 77 Jul 5, 2021, 01:44pm  144 84 69 Jul 5, 2021, 01:43pm  144 84 69 Jul 5, 2021, 01:43pm  162 85 76 Jul 3, 2021, 07:32am  162 85 76 Jul 3, 2021, 07:32am  139 87 74 Jul 3, 2021, 07:30am  139 87 74 Jul 3, 2021, 07:30am  187 101 82 Jul 2, 2021, 04:37am  176 111 87 Jul 2,  2021, 04:36am  177 92 73 Jul 1, 2021, 03:37am  155 87 70 Jul 1, 2021, 03:36am

## 2021-07-16 ENCOUNTER — TELEPHONE (OUTPATIENT)
Dept: PHARMACY | Facility: CLINIC | Age: 66
End: 2021-07-16

## 2021-07-16 DIAGNOSIS — I10 HYPERTENSION GOAL BP (BLOOD PRESSURE) < 140/90: ICD-10-CM

## 2021-07-16 RX ORDER — AMLODIPINE BESYLATE 10 MG/1
10 TABLET ORAL DAILY
Qty: 30 TABLET | Refills: 6 | Status: SHIPPED | OUTPATIENT
Start: 2021-07-16 | End: 2023-06-16

## 2021-07-16 NOTE — TELEPHONE ENCOUNTER
Telephone Encounter:  MGlide Study Participant      Reason for Call: mglide follow up      Blood Pressure Medications:  >   hydrochlorothiazide 25 mg daily   Amlodipine 5 mg daily   Hydralazine 25 mg daily   >  > Last contact with patient:  7/2/21         Changes in Diet:  none     Changes in Activity and Diet:   no change   Medication Adherence:  Improved evening dose of hydralazine    Changes in weight:   not discussed   Health Goals:              Current Outpatient Medications   Medication Sig Dispense Refill     amLODIPine (NORVASC) 5 MG tablet Take 1 tablet (5 mg) by mouth daily 30 tablet 1     aspirin (ASA) 325 MG tablet Take 325 mg by mouth daily       butenafine (MENTAX) 1 % CREA cream Apply twice a day x 3 weeks to affected area (Patient not taking: Reported on 3/23/2021) 30 g 2     hydrALAZINE (APRESOLINE) 25 MG tablet Take 1 tablet (25 mg) by mouth 3 times daily 90 tablet 1     hydrochlorothiazide (HYDRODIURIL) 25 MG tablet Take 1 tablet (25 mg) by mouth daily 30 tablet 3     oxybutynin ER (DITROPAN XL) 15 MG 24 hr tablet Take 1 tablet (15 mg) by mouth daily 90 tablet 3     rosuvastatin (CRESTOR) 20 MG tablet Take 20 mg by mouth            Home Blood Preassure Readings:          Sys Cara    HR Date Time  [phone] 75 Jul 14, 2021, 04:36am  164 97 74 Jul 14, 2021, 04:35am  166 94 70 Jul 11, 2021, 04:42am  203 101 76 Jul 11, 2021, 04:41am  157 96 81 Jul 9, 2021, 04:37am  187 104 80 Jul 9, 2021, 04:36am  172 98 76 Jul 8, 2021, 06:06am  153 91 73 Jul 8, 2021, 06:05am  159 92 77 Jul 7, 2021, 04:38am  162 99 81 Jul 7, 2021, 04:37am  162 99 81 Jul 7, 2021, 04:37am  148 86 56 Jul 6, 2021, 04:41am  148 86 56 Jul 6, 2021, 04:41am  167 101 69 Jul 6, 2021, 04:39am  167 101 69 Jul 6, 2021, 04:39am  151 96 61 Jul 6, 2021, 12:47am  151 96 61 Jul 6, 2021, 12:47am  139 78 77 Jul 5, 2021, 01:44pm  139 78 77 Jul 5, 2021, 01:44pm  144 84 69 Jul 5, 2021, 01:43pm  144 84 69 Jul 5, 2021, 01:43pm  162 85 76 Jul 3, 2021,  07:32am  162 85 76 Jul 3, 2021, 07:32am  139 87 74 Jul 3, 2021, 07:30am  139 87 74 Jul 3, 2021, 07:30am  187 101 82 Jul 2, 2021, 04:37am  176 111 87 Jul 2, 2021, 04:36am  177 92 73 Jul 1, 2021, 03:37am  155 87 70 Jul 1, 2021, 03:36am         Assessment & Plan   Impression:  Uncontrolled  Patient has been taking his blood pressure medications.  Continues to take the hydralazine 3 times a day.  He states that he has improved his evening dose compliance.  This was her goal from our last encounter.  Continues to take the calcium channel blocker and the thiazide diuretic in the morning.  Tolerating the medication with no new adverse effects.  The patient's only concern is that his pharmacy continues to dispense older doses of the medication, causing some confusion.       Medication Changes:    Plan to increase amlodipine to 10 mg once a day.  I have advised the patient to continue to monitor blood pressure as well as adverse effects.         Follow-Up Plan:  1 to 2-week follow-up.              Note forwarded to PCP    Paulo Cheung, AudeliaD.

## 2021-07-26 ENCOUNTER — TELEPHONE (OUTPATIENT)
Dept: PHARMACY | Facility: CLINIC | Age: 66
End: 2021-07-26

## 2021-07-26 DIAGNOSIS — I10 HYPERTENSION GOAL BP (BLOOD PRESSURE) < 140/90: Primary | ICD-10-CM

## 2021-07-26 NOTE — TELEPHONE ENCOUNTER
Telephone Encounter:  MGlide Study Participant      Reason for Call: blood pressure follow up.    Called to review new blood pressure values.  We discussed the elevated readings recently found.  Home blood pressures remain on average of 180,  but there were readings greater than 200 mmHg. patient denies any chest pain or changes in vision.  Denies any dizziness or acute symptoms.      Blood Pressure Medications:  >   hydrochlorothiazide 25 mg   >   Hydralazine 25 mg TID  >   Amlodipine 10 mg once daily  Last contact with patient:    7/16/21       Changes in Diet:  Not discussed      Changes in Activity and Diet:   no change   Medication Adherence:  Has been taking the amlodipine 10 mg daily.    Has missed the hydralazine 25 mg dose  Once a day.   Changes in weight:      Health Goals:              Current Outpatient Medications   Medication Sig Dispense Refill     amLODIPine (NORVASC) 10 MG tablet Take 1 tablet (10 mg) by mouth daily 30 tablet 6     aspirin (ASA) 325 MG tablet Take 325 mg by mouth daily       butenafine (MENTAX) 1 % CREA cream Apply twice a day x 3 weeks to affected area (Patient not taking: Reported on 3/23/2021) 30 g 2     hydrALAZINE (APRESOLINE) 25 MG tablet Take 1 tablet (25 mg) by mouth 3 times daily 90 tablet 1     hydrochlorothiazide (HYDRODIURIL) 25 MG tablet Take 1 tablet (25 mg) by mouth daily 30 tablet 3     oxybutynin ER (DITROPAN XL) 15 MG 24 hr tablet Take 1 tablet (15 mg) by mouth daily 90 tablet 3     rosuvastatin (CRESTOR) 20 MG tablet Take 20 mg by mouth            Home Blood Preassure Readings:          Sys Cara HR  Date Time  [phone] 75 Jul 24, 2021, 07:41am  170 96 79 Jul 24, 2021, 07:40am  181 95 71 Jul 22, 2021, 04:37am  177 89 74 Jul 22, 2021, 04:36am  169 86 73 Jul 21, 2021, 04:38am  184 92 74 Jul 21, 2021, 04:37am  213 106 90 Jul 20, 2021, 04:28am  188 104 75 Jul 19, 2021, 04:42am  170 91 73 Jul 19, 2021, 04:40am  160 88 74 Jul 18, 2021, 11:42am  169 87 74 Jul 17, 2021,  12:19pm           Assessment & Plan   Impression:    Blood pressure remains above goal, and on occasion has been greater than 200 mmHg.  Patient denies any new acute symptoms related to his hypertension.    Will add lostartan today. Patient understands that he will need to return to clinic for a lab follow up in 2 weeks.  Encouraged TID dosing of the hydralazine therapy.         Medication Changes:  New Losartan 50 mg once a day             Follow-Up Plan:  1 week on Tuesday afternoons.               Paulo Cheung, Pharm.D.  [facility]  [phone]

## 2021-08-05 ENCOUNTER — TELEPHONE (OUTPATIENT)
Dept: PHARMACY | Facility: CLINIC | Age: 66
End: 2021-08-05

## 2021-08-05 RX ORDER — LOSARTAN POTASSIUM 50 MG/1
50 TABLET ORAL DAILY
Qty: 30 TABLET | Refills: 1 | Status: SHIPPED | OUTPATIENT
Start: 2021-08-05 | End: 2021-10-06

## 2021-08-06 NOTE — TELEPHONE ENCOUNTER
PHARMACY TELEPHONE ENCOUNTER:    Reason: Blood pressure follow up      Blood pressure have been elevated for the past week.  Patient notes that he did run out of one of his medications but has restarted the amlodipine.  He has missed a few doses of the hydralazine 25 mg tables as well.  Unclear as to the reason for the spikes in blood pressure.      Today Sukh agrees to initiate Losartan 50 mg once daily. I have described how to use the product(s).  I have explained related adverse effects of the drug therapy to the patient today. He has been on this therapy in the past and understand that he will need to have blood work done in he next 2 weeks.    Follow up in one week.    Paulo Cheung, Pharm.D.      Paulo Cheung, Pharm.D.

## 2021-08-06 NOTE — TELEPHONE ENCOUNTER
Telephone Encounter:  MGlide Study Participant      Reason for Call: home blood pressure       Blood Pressure Medications:  Losartan 50 mg daily   Amlodipine 10 mg daily  Hydralazine 25 mg TID  hydrochlorothiazide 25 mg daily  Last contact with patient:           Changes in Diet:  No change     Changes in Activity and Diet:    No change   Medication Adherence:     Changes in weight:   not discussed     Health Goals:              Current Outpatient Medications   Medication Sig Dispense Refill     amLODIPine (NORVASC) 10 MG tablet Take 1 tablet (10 mg) by mouth daily 30 tablet 6     aspirin (ASA) 325 MG tablet Take 325 mg by mouth daily       butenafine (MENTAX) 1 % CREA cream Apply twice a day x 3 weeks to affected area (Patient not taking: Reported on 3/23/2021) 30 g 2     hydrALAZINE (APRESOLINE) 25 MG tablet Take 1 tablet (25 mg) by mouth 3 times daily 90 tablet 1     hydrochlorothiazide (HYDRODIURIL) 25 MG tablet Take 1 tablet (25 mg) by mouth daily 30 tablet 3     oxybutynin ER (DITROPAN XL) 15 MG 24 hr tablet Take 1 tablet (15 mg) by mouth daily 90 tablet 3     rosuvastatin (CRESTOR) 20 MG tablet Take 20 mg by mouth            Home Blood Preassure Readings:                                           Assessment & Plan   Impression:  Has not been able to fill the Losartan Rx.  Will resend Rx today.   Overall feels that his blood pressures are improved because he has been more compliant with his Hydralazine Rx          Medication Changes:    Begin taking Losartan 50 mg daily          Follow-Up Plan:  1 week follow up              Audelia FryD.

## 2021-08-18 ENCOUNTER — OFFICE VISIT (OUTPATIENT)
Dept: FAMILY MEDICINE | Facility: CLINIC | Age: 66
End: 2021-08-18
Payer: COMMERCIAL

## 2021-08-18 ENCOUNTER — TELEPHONE (OUTPATIENT)
Dept: PHARMACY | Facility: CLINIC | Age: 66
End: 2021-08-18

## 2021-08-18 VITALS
SYSTOLIC BLOOD PRESSURE: 120 MMHG | TEMPERATURE: 98.5 F | BODY MASS INDEX: 24.98 KG/M2 | RESPIRATION RATE: 16 BRPM | WEIGHT: 211.6 LBS | HEART RATE: 72 BPM | HEIGHT: 77 IN | OXYGEN SATURATION: 99 % | DIASTOLIC BLOOD PRESSURE: 70 MMHG

## 2021-08-18 DIAGNOSIS — B35.6 TINEA CRURIS: Primary | ICD-10-CM

## 2021-08-18 DIAGNOSIS — Z12.11 SCREEN FOR COLON CANCER: ICD-10-CM

## 2021-08-18 DIAGNOSIS — N18.31 STAGE 3A CHRONIC KIDNEY DISEASE (H): ICD-10-CM

## 2021-08-18 PROBLEM — N18.30 CHRONIC KIDNEY DISEASE, STAGE 3 (H): Status: ACTIVE | Noted: 2021-08-18

## 2021-08-18 PROCEDURE — 99213 OFFICE O/P EST LOW 20 MIN: CPT | Performed by: FAMILY MEDICINE

## 2021-08-18 RX ORDER — NYSTATIN 100000 [USP'U]/G
POWDER TOPICAL 2 TIMES DAILY PRN
Qty: 60 G | Refills: 0 | Status: SHIPPED | OUTPATIENT
Start: 2021-08-18 | End: 2023-06-16

## 2021-08-18 RX ORDER — PRENATAL VIT 91/IRON/FOLIC/DHA 28-975-200
COMBINATION PACKAGE (EA) ORAL 2 TIMES DAILY
Qty: 84 G | Refills: 3 | Status: SHIPPED | OUTPATIENT
Start: 2021-08-18 | End: 2022-06-15 | Stop reason: ALTCHOICE

## 2021-08-18 ASSESSMENT — MIFFLIN-ST. JEOR: SCORE: 1867.19

## 2021-08-18 NOTE — PROGRESS NOTES
"    Assessment & Plan     Tinea cruris  Discussed good hygiene,   Keep area clean and dry.  - terbinafine (LAMISIL) 1 % external cream; Apply topically 2 times daily Apply bid for 2 weeks, then bid as needed  - nystatin (MYCOSTATIN) 011438 UNIT/GM external powder; Apply topically 2 times daily as needed (as needed)    Stage 3a chronic kidney disease  Stable, no complications    BMI:   Estimated body mass index is 25.09 kg/m  as calculated from the following:    Height as of this encounter: 1.956 m (6' 5\").    Weight as of this encounter: 96 kg (211 lb 9.6 oz).   Weight management plan: Discussed healthy diet and exercise guidelines    No follow-ups on file.    Misha Greenberg MD  [facility]    Eva Luz is a 66 year old who presents for the following health issues   Rash  Onset/Duration: a while   Description  Location: groin   Character: painful, red, breaks open and bleeds   Itching: severe  Intensity:  moderate  Progression of Symptoms:  worsening and intermittent  Accompanying signs and symptoms:   Fever: no  Body aches or joint pain: no  Sore throat symptoms: no  Recent cold symptoms: no  History:           Previous episodes of similar rash: has had before   New exposures:  None  Recent travel: no  Exposure to similar rash: no  Precipitating or alleviating factors: - anti fungal crm, helps (Mentax 1%)    Therapies tried and outcome: mentax helps some but doesn't go away      Review of Systems   Constitutional, HEENT, cardiovascular, pulmonary, gi and gu systems are negative, except as otherwise noted.      Objective    There were no vitals taken for this visit.  There is no height or weight on file to calculate BMI.  Physical Exam   GENERAL: healthy, alert and no distress  SKIN: groin area, erythematous, rash,       Misha Greenberg MD        "

## 2021-08-18 NOTE — TELEPHONE ENCOUNTER
Telephone Encounter:  MGlide Study Participant      Reason for Call:   Called and left vm  Called 3 times  Asked to call back and to look at mychart msg.      Current Outpatient Medications   Medication Sig Dispense Refill     amLODIPine (NORVASC) 10 MG tablet Take 1 tablet (10 mg) by mouth daily 30 tablet 6     aspirin (ASA) 325 MG tablet Take 325 mg by mouth daily       butenafine (MENTAX) 1 % CREA cream Apply twice a day x 3 weeks to affected area (Patient not taking: Reported on 3/23/2021) 30 g 2     hydrALAZINE (APRESOLINE) 25 MG tablet Take 1 tablet (25 mg) by mouth 3 times daily 90 tablet 1     hydrochlorothiazide (HYDRODIURIL) 25 MG tablet Take 1 tablet (25 mg) by mouth daily 30 tablet 3     losartan (COZAAR) 50 MG tablet Take 1 tablet (50 mg) by mouth daily 30 tablet 1     oxybutynin ER (DITROPAN XL) 15 MG 24 hr tablet Take 1 tablet (15 mg) by mouth daily 90 tablet 3     rosuvastatin (CRESTOR) 20 MG tablet Take 20 mg by mouth            Home Blood Preassure Readings:            Sys Cara HR Date Time  [phone] 73 Aug 14, 2021, 08:52am  140 87 79 Aug 13, 2021, 08:30am  164 80 80 Aug 13, 2021, 08:29am  161 88 69 Aug 12, 2021, 08:30am  156 93 73 Aug 11, 2021, 07:40am  156 85 70 Aug 9, 2021, 04:43am  142 83 84 Aug 8, 2021, 01:38pm  165 100 75 Aug 6, 2021, 04:42am  173 86 76 Aug 6, 2021, 04:41am  152 84 70 Aug 5, 2021, 04:42am  152 86 68 Aug 5, 2021, 04:41am  134 86 76 Aug 4, 2021, 04:41am  143 92 77 Aug 4, 2021, 04:40am  137 85 75 Aug 4, 2021, 04:38am  153 91 84 Aug 2, 2021, 04:39am  176 94 85 Aug 2, 2021, 04:37am

## 2021-08-21 ENCOUNTER — TELEPHONE (OUTPATIENT)
Dept: PHARMACY | Facility: CLINIC | Age: 66
End: 2021-08-21

## 2021-08-21 NOTE — TELEPHONE ENCOUNTER
Called today to follow up on blood pressure values.  No answer after 2 calls.   I have left a VM to talk again in one week.  Jabari Fry.D.

## 2021-09-02 ENCOUNTER — PRE VISIT (OUTPATIENT)
Dept: UROLOGY | Facility: CLINIC | Age: 66
End: 2021-09-02

## 2021-09-02 NOTE — TELEPHONE ENCOUNTER
Reason for visit: follow up LUTS     Dx/Hx/Sx: nocturia, BPH    Records/imaging/labs/orders: PSA and UA in epic     At Rooming: video visit

## 2021-09-03 ENCOUNTER — TELEPHONE (OUTPATIENT)
Dept: PHARMACY | Facility: CLINIC | Age: 66
End: 2021-09-03

## 2021-09-03 NOTE — TELEPHONE ENCOUNTER
Telephone Encounter:  MGlide Study Participant      Reason for Call: called x1      Blood Pressure Medications:  >     >  > Last contact with patient:           Changes in Diet:         Changes in Activity and Diet:       Medication Adherence:     Changes in weight:       Health Goals:              Current Outpatient Medications   Medication Sig Dispense Refill     amLODIPine (NORVASC) 10 MG tablet Take 1 tablet (10 mg) by mouth daily 30 tablet 6     aspirin (ASA) 325 MG tablet Take 325 mg by mouth daily       butenafine (MENTAX) 1 % CREA cream Apply twice a day x 3 weeks to affected area 30 g 2     hydrALAZINE (APRESOLINE) 25 MG tablet Take 1 tablet (25 mg) by mouth 3 times daily 90 tablet 1     hydrochlorothiazide (HYDRODIURIL) 25 MG tablet Take 1 tablet (25 mg) by mouth daily 30 tablet 3     losartan (COZAAR) 50 MG tablet Take 1 tablet (50 mg) by mouth daily 30 tablet 1     nystatin (MYCOSTATIN) 872439 UNIT/GM external powder Apply topically 2 times daily as needed (as needed) 60 g 0     oxybutynin ER (DITROPAN XL) 15 MG 24 hr tablet Take 1 tablet (15 mg) by mouth daily 90 tablet 3     rosuvastatin (CRESTOR) 20 MG tablet Take 20 mg by mouth       terbinafine (LAMISIL) 1 % external cream Apply topically 2 times daily Apply bid for 2 weeks, then bid as needed 84 g 3          Home Blood Preassure Readings:              Sys Cara H R Date Time  [phone] Sep 3, 2021, 09:10am  172 92 72 Sep 3, 2021, 09:09am  175 91 65 Sep 3, 2021, 09:08am  149 87 74 Aug 31, 2021, 08:24am  162 86 70 Aug 31, 2021, 08:21am  138 81 65 Aug 30, 2021, 10:12am  144 81 71 Aug 29, 2021, 10:11am  133 81 67 Aug 29, 2021, 10:10am  166 94 75 Aug 28, 2021, 08:21am  138 85 69 Aug 27, 2021, 12:25pm  138 82 74 Aug 26, 2021, 11:38am  156 81 71 Aug 25, 2021, 11:11am  151 85 67 Aug 24, 2021, 09:17am  159 95 75 Aug 23, 2021, 08:17am  159 90 75 Aug 19, 2021, 07:52am  158 93 89 Aug 18, 2021, 08:28am  146 86 82 Aug 17, 2021, 10:47am  163 83 62 Aug 15, 2021,  11:54am       Assessment & Plan   Impression:           Medication Changes:  There are no diagnoses linked to this encounter.             Follow-Up Plan:

## 2021-09-05 ENCOUNTER — HEALTH MAINTENANCE LETTER (OUTPATIENT)
Age: 66
End: 2021-09-05

## 2021-09-13 ENCOUNTER — TELEPHONE (OUTPATIENT)
Dept: PHARMACY | Facility: CLINIC | Age: 66
End: 2021-09-13

## 2021-09-13 DIAGNOSIS — I10 HYPERTENSION GOAL BP (BLOOD PRESSURE) < 140/90: Primary | ICD-10-CM

## 2021-09-13 NOTE — TELEPHONE ENCOUNTER
Telephone Encounter:  MGlide Study Participant      Reason for Call: home blood pressure follow up    Patient states that he has been unable to receive calls because of phone problems.  He was also out of town over the last 1 or 2 weeks and did not bring his device with him.  He was compliant with his medications at those times.      Blood Pressure Medications:  >     Losartan 50 mg daily   Amlodipine 10 mg daily  Hydralazine 25 mg TID  hydrochlorothiazide 25 mg daily  Last contact with patient:           Changes in Diet:  No change     Changes in Activity and Diet:  No change   Medication Adherence:  Good, has been taking medications daily.  Uses his glucometer as a reminder to take his medications   Changes in weight:  Not discussed   Health Goals:              Current Outpatient Medications   Medication Sig Dispense Refill     amLODIPine (NORVASC) 10 MG tablet Take 1 tablet (10 mg) by mouth daily 30 tablet 6     aspirin (ASA) 325 MG tablet Take 325 mg by mouth daily       butenafine (MENTAX) 1 % CREA cream Apply twice a day x 3 weeks to affected area 30 g 2     hydrALAZINE (APRESOLINE) 25 MG tablet Take 1 tablet (25 mg) by mouth 3 times daily 90 tablet 1     hydrochlorothiazide (HYDRODIURIL) 25 MG tablet Take 1 tablet (25 mg) by mouth daily 30 tablet 3     losartan (COZAAR) 50 MG tablet Take 1 tablet (50 mg) by mouth daily 30 tablet 1     nystatin (MYCOSTATIN) 735385 UNIT/GM external powder Apply topically 2 times daily as needed (as needed) 60 g 0     oxybutynin ER (DITROPAN XL) 15 MG 24 hr tablet Take 1 tablet (15 mg) by mouth daily 90 tablet 3     rosuvastatin (CRESTOR) 20 MG tablet Take 20 mg by mouth       terbinafine (LAMISIL) 1 % external cream Apply topically 2 times daily Apply bid for 2 weeks, then bid as needed 84 g 3          Home Blood Preassure Readings:            Sys Cara HR Date Time  [phone] 72 Sep 10, 2021, 07:11am  180 95 73 Sep 10, 2021, 07:10am  181 93 62 Sep 5, 2021, 09:56am  172 94 63 Sep  5, 2021, 09:55am  174 91 67 Sep 3, 2021, 09:10am  172 92 72 Sep 3, 2021, 09:09am  175 91 65 Sep 3, 2021, 09:08am  149 87 74 Aug 31, 2021, 08:24am  162 86 70 Aug 31, 2021, 08:21am  138 81 65 Aug 30, 2021, 10:12am  144 81 71 Aug 29, 2021, 10:11am  133 81 67 Aug 29, 2021, 10:10am  166 94 75 Aug 28, 2021, 08:21am  138 85 69 Aug 27, 2021, 12:25pm                     Assessment & Plan   Impression:  Blood pressure remains uncontrolled.  Patient denies any new symptoms or problems with his elevated blood pressure.  He states compliance with the medication.  He believes that some of his recent traveling may have led to some of the elevated blood pressure readings, but otherwise no change in medication diet or activity.    Patient has not returned to his lab for a BMP.  Plan is to assess renal and potassium levels before elevating losartan.  Patient states that he is able to go to the [facility] for labs.  He agrees to make an appointment this week.         Medication Changes:  No changes made today.             Follow-Up Plan:  Follow-up this week regarding lab work and potentially to increase losartan.              Jabari Fry.D.

## 2021-09-20 ENCOUNTER — TELEPHONE (OUTPATIENT)
Dept: PHARMACY | Facility: CLINIC | Age: 66
End: 2021-09-20

## 2021-09-20 NOTE — TELEPHONE ENCOUNTER
Telephone Encounter:  MGlide Study Participant      Reason for Call: blood pressure follow up      Blood Pressure Medications:  Losartan 50 mg daily   Amlodipine 10 mg daily  Hydralazine 25 mg TID  hydrochlorothiazide 25 mg daily  Last contact with patient:  9/13/21         Changes in Diet:  none     Changes in Activity and Diet:   none   Medication Adherence:  good   Changes in weight:   not discussed   Health Goals:              Current Outpatient Medications   Medication Sig Dispense Refill     amLODIPine (NORVASC) 10 MG tablet Take 1 tablet (10 mg) by mouth daily 30 tablet 6     aspirin (ASA) 325 MG tablet Take 325 mg by mouth daily       butenafine (MENTAX) 1 % CREA cream Apply twice a day x 3 weeks to affected area 30 g 2     hydrALAZINE (APRESOLINE) 25 MG tablet Take 1 tablet (25 mg) by mouth 3 times daily 90 tablet 1     hydrochlorothiazide (HYDRODIURIL) 25 MG tablet Take 1 tablet (25 mg) by mouth daily 30 tablet 3     losartan (COZAAR) 50 MG tablet Take 1 tablet (50 mg) by mouth daily 30 tablet 1     nystatin (MYCOSTATIN) 145294 UNIT/GM external powder Apply topically 2 times daily as needed (as needed) 60 g 0     oxybutynin ER (DITROPAN XL) 15 MG 24 hr tablet Take 1 tablet (15 mg) by mouth daily 90 tablet 3     rosuvastatin (CRESTOR) 20 MG tablet Take 20 mg by mouth       terbinafine (LAMISIL) 1 % external cream Apply topically 2 times daily Apply bid for 2 weeks, then bid as needed 84 g 3          Home Blood Preassure Readings:            Sys Cara HR Date Time  [phone] 73 Sep 19, 2021, 10:00am  161 91 76 Sep 18, 2021, 09:10am  161 91 74 Sep 18, 2021, 09:09am  161 90 75 Sep 17, 2021, 08:01am  146 79 68 Sep 16, 2021, 10:10am  177 98 70 Sep 15, 2021, 08:01am  173 88 60 Sep 14, 2021, 09:04am  207 95 65 Sep 14, 2021, 09:02am       Assessment & Plan   Impression:  Pt has not followed up for BMP labs. He commits to going this week and having this checked.           Medication Changes:    No changes today           Follow-Up Plan:  1 week              Paulo Cheung Pharm.D.

## 2021-09-24 ENCOUNTER — TELEPHONE (OUTPATIENT)
Dept: PHARMACY | Facility: CLINIC | Age: 66
End: 2021-09-24

## 2021-09-24 NOTE — TELEPHONE ENCOUNTER
PHARMACY TELEPHONE ENCOUNTER:    Reason: no insurance       Patient has reached out to [facility]  and explained a lack of insurance.  He is unable to return to clinic for lab work.  I have called to review options for the patient.  This may include using the [facility].    Left a message for the patient.  Will call back later today.        Audelia FryD.        Sys Cara HR Date Time  [phone] 67 Sep 24, 2021, 08:41am  197 95 65 Sep 23, 2021, 10:30am  169 87 73 Sep 22, 2021, 10:57am  184 86 60 Sep 22, 2021, 10:01am  190 92 96 Sep 21, 2021, 08:53am  151 83 65 Sep 20, 2021, 08:12am  134 86 73 Sep 19, 2021, 10:00a      ADDENDUM:  Spoke to patient and he agrees to go to the [facility] for a follow up of his needed BMP lab.    Paulo Cheung Pharm.D.

## 2021-10-06 ENCOUNTER — TELEPHONE (OUTPATIENT)
Dept: PHARMACY | Facility: CLINIC | Age: 66
End: 2021-10-06

## 2021-10-06 DIAGNOSIS — I10 HYPERTENSION GOAL BP (BLOOD PRESSURE) < 140/90: ICD-10-CM

## 2021-10-06 RX ORDER — LOSARTAN POTASSIUM 100 MG/1
100 TABLET ORAL DAILY
Qty: 30 TABLET | Refills: 3 | Status: SHIPPED | OUTPATIENT
Start: 2021-10-06 | End: 2023-06-16

## 2021-10-06 NOTE — TELEPHONE ENCOUNTER
Telephone Encounter:  MGlide Study Participant      Reason for Call: Blood pressure follow-up    Sukh states that he has been traveling for the past week.  There was a death in his family and he needed to travel to [place].  Because of this he was unable to go to the [facility] and received his a lab work.      Blood Pressure Medications:  Losartan 50 mg daily   Amlodipine 10 mg daily  Hydralazine 25 mg TID  hydrochlorothiazide 25 mg daily  Last contact with patient:  9/24/21         Changes in Diet:  No change     Changes in Activity and Diet:  No change   Medication Adherence:  Reports good adherence   Changes in weight:  Not discussed   Health Goals:              Current Outpatient Medications   Medication Sig Dispense Refill     amLODIPine (NORVASC) 10 MG tablet Take 1 tablet (10 mg) by mouth daily 30 tablet 6     aspirin (ASA) 325 MG tablet Take 325 mg by mouth daily       butenafine (MENTAX) 1 % CREA cream Apply twice a day x 3 weeks to affected area 30 g 2     hydrALAZINE (APRESOLINE) 25 MG tablet Take 1 tablet (25 mg) by mouth 3 times daily 90 tablet 1     hydrochlorothiazide (HYDRODIURIL) 25 MG tablet Take 1 tablet (25 mg) by mouth daily 30 tablet 3     losartan (COZAAR) 50 MG tablet Take 1 tablet (50 mg) by mouth daily 30 tablet 1     nystatin (MYCOSTATIN) 681260 UNIT/GM external powder Apply topically 2 times daily as needed (as needed) 60 g 0     oxybutynin ER (DITROPAN XL) 15 MG 24 hr tablet Take 1 tablet (15 mg) by mouth daily 90 tablet 3     rosuvastatin (CRESTOR) 20 MG tablet Take 20 mg by mouth       terbinafine (LAMISIL) 1 % external cream Apply topically 2 times daily Apply bid for 2 weeks, then bid as needed 84 g 3          Home Blood Preassure Readings:      Sys Cara H R Date Time  [phone] Oct 5, 2021, 09:13am  134 88 82 Oct 4, 2021, 09:17am  141 79 68 Oct 3, 2021, 08:58am  158 81 66 Oct 3, 2021, 08:57am  153 93 87 Oct 2, 2021, 08:31am  144 83 79 Oct 1, 2021,  12:02pm  143 89 72 Sep 30, 2021, 11:48am  168 90 68 Sep 29, 2021, 08:10am  189 93 69 Sep 28, 2021, 12:27pm  188 97 70 Sep 27, 2021, 07:54am  193 98 79 Sep 26, 2021, 11:06am  178 105 73 Sep 25, 2021, 08:42am  176 96 67 Sep 24, 2021, 08:41am  197 95 65 Sep 23, 2021, 10:30am  169 87 73 Sep 22, 2021, 10:57am  184 86 60 Sep 22, 2021, 10:01am  190 92 96 Sep 21, 2021, 08:53am  151 83 65 Sep 20, 2021, 08:12a           Assessment & Plan   Impression:  Blood pressure is above goal.  Surprisingly, his blood pressures have been decreased since he is traveling.  Patient is unclear why his blood pressure values are better.  He states his compliance to medications the same and he has been able to bring all his medications to [place], including his blood pressure cuff.  The patient apologizes for not being able to obtain his lab work, but also states that he would prefer to make a change in his medication now without his blood drawn.  The reason for the BMP and requested lab work is to evaluate potassium and serum creatinine.  I have explained to the patient the need to make this change.  Today we agreed to increase the losartan to 100 mg once daily.  The patient will try to attend the [place] [facility] upon his return to have his blood work done.         Medication Changes:  Increase losartan to 100 mg once daily             Follow-Up Plan:    Follow-up in 2 weeks.

## 2021-10-15 DIAGNOSIS — I10 HYPERTENSION GOAL BP (BLOOD PRESSURE) < 140/90: ICD-10-CM

## 2021-10-18 RX ORDER — HYDROCHLOROTHIAZIDE 25 MG/1
25 TABLET ORAL DAILY
Qty: 30 TABLET | Refills: 0 | Status: SHIPPED | OUTPATIENT
Start: 2021-10-18 | End: 2021-10-25 | Stop reason: ALTCHOICE

## 2021-10-25 ENCOUNTER — TELEPHONE (OUTPATIENT)
Dept: PHARMACY | Facility: CLINIC | Age: 66
End: 2021-10-25

## 2021-10-25 DIAGNOSIS — I10 HYPERTENSION GOAL BP (BLOOD PRESSURE) < 140/90: Primary | ICD-10-CM

## 2021-10-25 RX ORDER — CHLORTHALIDONE 50 MG/1
50 TABLET ORAL DAILY
Qty: 30 TABLET | Refills: 3 | Status: SHIPPED | OUTPATIENT
Start: 2021-10-25 | End: 2022-06-15

## 2021-10-25 NOTE — TELEPHONE ENCOUNTER
Telephone Encounter:  MGlide Study Participant      Reason for Call: HTN follow up      Blood Pressure Medications:  >   Amlodipine 10 mg daily   > hydrochlorothiazide 25 mg daily   > Losartan 100 mg daily   Hydralazine 25 mg TID Last contact with patient:    10/06/21       Changes in Diet:  Has been eating better since he has recovered from dental procedure     Changes in Activity and Diet:   no change   Medication Adherence:  Good   Sometimes forgets to take afternoon hydralazine tablet   Changes in weight:      Health Goals:              Current Outpatient Medications   Medication Sig Dispense Refill     amLODIPine (NORVASC) 10 MG tablet Take 1 tablet (10 mg) by mouth daily 30 tablet 6     aspirin (ASA) 325 MG tablet Take 325 mg by mouth daily       butenafine (MENTAX) 1 % CREA cream Apply twice a day x 3 weeks to affected area 30 g 2     hydrALAZINE (APRESOLINE) 25 MG tablet Take 1 tablet (25 mg) by mouth 3 times daily 90 tablet 1     hydrochlorothiazide (HYDRODIURIL) 25 MG tablet Take 1 tablet (25 mg) by mouth daily +++NEED RECHECK+++ 30 tablet 0     losartan (COZAAR) 100 MG tablet Take 1 tablet (100 mg) by mouth daily 30 tablet 3     nystatin (MYCOSTATIN) 325121 UNIT/GM external powder Apply topically 2 times daily as needed (as needed) 60 g 0     oxybutynin ER (DITROPAN XL) 15 MG 24 hr tablet Take 1 tablet (15 mg) by mouth daily 90 tablet 3     rosuvastatin (CRESTOR) 20 MG tablet Take 20 mg by mouth       terbinafine (LAMISIL) 1 % external cream Apply topically 2 times daily Apply bid for 2 weeks, then bid as needed 84 g 3          Home Blood Preassure Readings:            Sys Cara HR Date Time  [phone] Oct 22, 2021, 08:52am  [phone] Oct 21, 2021, 12:40pm  [phone] 66 Oct 20, 2021, 08:05am  [phone] 71 Oct 19, 2021, 10:31am  143 90 86 Oct 18, 2021, 09:20am  146 81 69 Oct 17, 2021, 10:24am  [phone] 72 Oct 16, 2021, 07:49am  138 81 67 Oct 15, 2021, 12:08pm  167 80 70 Oct 14, 2021, 12:23pm  142 84 69 Oct 13,  2021, 09:07am  150 84 52 Oct 12, 2021, 08:36am  148 82 77 Oct 11, 2021, 01:19pm  159 83 71 Oct 8, 2021, 11:28am  190 103 71 Oct 7, 2021, 08:32am  143 82 80 Oct 6, 2021, 11:03am  157 84 66 Oct 5, 2021, 09:13am  134 88 82 Oct 4, 2021, 09:17am  141 79 68 Oct 3, 2021, 08:58am  158 81 66 Oct 3, 2021, 08:57am  153 93 87 Oct 2, 2021, 08:31am  144 83 79 Oct 1, 2021, 12:02pm           Assessment & Plan   Impression:  Blood pressure remains above study goal of .  Patient has been taking the medication as prescribed, but forgets his afternoon hydralazine dose.  He states that his blood pressures have improved since his mouth is healed from his recent dental procedures.  He feels that as his diet has improved, so ice his blood pressure.  Today will change the thiazide diuretic.  Patient will discontinue hydrochlorothiazide 25 mg once daily and initiate chlorthalidone 50 mg once daily.    I have again requested that the patient schedule appointment with lab.  Patient agrees to present to the [facility] clinic for an assessment and blood work.  Recommend a basic metabolic panel to assess both renal function and potassium level.         Medication Changes:    Discontinue hydrochlorothiazide    Initiate chlorthalidone 50 mg once daily         Follow-Up Plan:  Follow-up in 1 week              Note forwarded to PCP.    Paulo Cheung Pharm.D.

## 2021-11-08 ENCOUNTER — TELEPHONE (OUTPATIENT)
Dept: PHARMACY | Facility: CLINIC | Age: 66
End: 2021-11-08

## 2021-11-08 NOTE — TELEPHONE ENCOUNTER
Telephone Encounter:  MGlide Study Participant      Reason for Call: blood pressure follow up    - no response from the patient.   Called 3x      Blood Pressure Medications:  >     >>   Amlodipine 10 mg daily   > chlorthalidone 50 mg daily   > Losartan 100 mg daily   Hydralazine 25 mg TID  > Last contact with patient:           Changes in Diet:         Changes in Activity and Diet:       Medication Adherence:     Changes in weight:       Health Goals:              Current Outpatient Medications   Medication Sig Dispense Refill     amLODIPine (NORVASC) 10 MG tablet Take 1 tablet (10 mg) by mouth daily 30 tablet 6     aspirin (ASA) 325 MG tablet Take 325 mg by mouth daily       butenafine (MENTAX) 1 % CREA cream Apply twice a day x 3 weeks to affected area 30 g 2     chlorthalidone (HYGROTON) 50 MG tablet Take 1 tablet (50 mg) by mouth daily 30 tablet 3     hydrALAZINE (APRESOLINE) 25 MG tablet Take 1 tablet (25 mg) by mouth 3 times daily 90 tablet 1     losartan (COZAAR) 100 MG tablet Take 1 tablet (100 mg) by mouth daily 30 tablet 3     nystatin (MYCOSTATIN) 029081 UNIT/GM external powder Apply topically 2 times daily as needed (as needed) 60 g 0     oxybutynin ER (DITROPAN XL) 15 MG 24 hr tablet Take 1 tablet (15 mg) by mouth daily 90 tablet 3     rosuvastatin (CRESTOR) 20 MG tablet Take 20 mg by mouth       terbinafine (LAMISIL) 1 % external cream Apply topically 2 times daily Apply bid for 2 weeks, then bid as needed 84 g 3          Home Blood Preassure Readings:                     Assessment & Plan   Impression:           Medication Changes:  There are no diagnoses linked to this encounter.             Follow-Up Plan:

## 2021-12-20 RX ORDER — HYDROCHLOROTHIAZIDE 12.5 MG/1
TABLET ORAL
Qty: 30 TABLET | Refills: 3 | OUTPATIENT
Start: 2021-12-20

## 2021-12-20 NOTE — TELEPHONE ENCOUNTER
Medication discontinued, refused.     Ashlee Ward, RN, BSN, PHN  [facility]: [place]

## 2022-01-06 RX ORDER — HYDROCHLOROTHIAZIDE 12.5 MG/1
TABLET ORAL
Qty: 30 TABLET | Refills: 3 | OUTPATIENT
Start: 2022-01-06

## 2022-01-06 NOTE — TELEPHONE ENCOUNTER
Medication discontinued 10/25/21, refused.     Ashlee Ward, RN, BSN, PHN  [facility]: [place]

## 2022-02-20 ENCOUNTER — HEALTH MAINTENANCE LETTER (OUTPATIENT)
Age: 67
End: 2022-02-20

## 2022-05-01 NOTE — TELEPHONE ENCOUNTER
Left a detailed VM to get patient scheduled with Rena Nickerson, Bette Valle or Sayda Glover. Left clinic number for patient to call back and get scheduled. Can be virtual    GI bleed

## 2022-06-12 ENCOUNTER — HEALTH MAINTENANCE LETTER (OUTPATIENT)
Age: 67
End: 2022-06-12

## 2022-06-15 ENCOUNTER — OFFICE VISIT (OUTPATIENT)
Dept: FAMILY MEDICINE | Facility: CLINIC | Age: 67
End: 2022-06-15
Payer: COMMERCIAL

## 2022-06-15 VITALS
HEIGHT: 77 IN | DIASTOLIC BLOOD PRESSURE: 74 MMHG | RESPIRATION RATE: 16 BRPM | WEIGHT: 213 LBS | SYSTOLIC BLOOD PRESSURE: 124 MMHG | BODY MASS INDEX: 25.15 KG/M2 | HEART RATE: 70 BPM | TEMPERATURE: 98 F | OXYGEN SATURATION: 100 %

## 2022-06-15 DIAGNOSIS — L30.8 OTHER ECZEMA: ICD-10-CM

## 2022-06-15 DIAGNOSIS — R10.84 ABDOMINAL PAIN, DIFFUSE: ICD-10-CM

## 2022-06-15 DIAGNOSIS — K21.00 GASTROESOPHAGEAL REFLUX DISEASE WITH ESOPHAGITIS, UNSPECIFIED WHETHER HEMORRHAGE: ICD-10-CM

## 2022-06-15 DIAGNOSIS — Z12.11 SCREEN FOR COLON CANCER: ICD-10-CM

## 2022-06-15 DIAGNOSIS — R63.4 WEIGHT LOSS: ICD-10-CM

## 2022-06-15 DIAGNOSIS — R21 RASH AND NONSPECIFIC SKIN ERUPTION: Primary | ICD-10-CM

## 2022-06-15 DIAGNOSIS — I10 HYPERTENSION GOAL BP (BLOOD PRESSURE) < 140/90: ICD-10-CM

## 2022-06-15 DIAGNOSIS — Z23 HIGH PRIORITY FOR 2019-NCOV VACCINE: ICD-10-CM

## 2022-06-15 PROBLEM — N18.30 CHRONIC KIDNEY DISEASE, STAGE 3 (H): Status: RESOLVED | Noted: 2021-08-18 | Resolved: 2022-06-15

## 2022-06-15 PROBLEM — I77.819 DILATATION OF AORTA (H): Status: RESOLVED | Noted: 2020-07-29 | Resolved: 2022-06-15

## 2022-06-15 LAB — KOH PREPARATION: NORMAL

## 2022-06-15 PROCEDURE — 91305 COVID-19,PF,PFIZER (12+ YRS): CPT | Performed by: FAMILY MEDICINE

## 2022-06-15 PROCEDURE — 0054A COVID-19,PF,PFIZER (12+ YRS): CPT | Performed by: FAMILY MEDICINE

## 2022-06-15 PROCEDURE — 87220 TISSUE EXAM FOR FUNGI: CPT | Performed by: FAMILY MEDICINE

## 2022-06-15 PROCEDURE — 99214 OFFICE O/P EST MOD 30 MIN: CPT | Mod: 25 | Performed by: FAMILY MEDICINE

## 2022-06-15 RX ORDER — METOPROLOL TARTRATE 25 MG/1
25 TABLET, FILM COATED ORAL 2 TIMES DAILY
COMMUNITY
Start: 2022-06-15 | End: 2023-06-16 | Stop reason: ALTCHOICE

## 2022-06-15 RX ORDER — CLOBETASOL PROPIONATE 0.5 MG/G
OINTMENT TOPICAL 2 TIMES DAILY
Qty: 60 G | Refills: 1 | Status: SHIPPED | OUTPATIENT
Start: 2022-06-15 | End: 2022-08-26

## 2022-06-15 RX ORDER — TERBINAFINE HYDROCHLORIDE 250 MG/1
250 TABLET ORAL 2 TIMES DAILY
Qty: 28 TABLET | Refills: 0 | Status: SHIPPED | OUTPATIENT
Start: 2022-06-15 | End: 2022-06-29

## 2022-06-15 ASSESSMENT — PAIN SCALES - GENERAL: PAINLEVEL: NO PAIN (0)

## 2022-06-15 NOTE — PROGRESS NOTES
"  Assessment & Plan     Rash and nonspecific skin eruption  Negative for KOH.  However, there is element of tinea between toes.  Advised with good hygiene,  Change socks daily, wash feet 2x daily  - KOH prep (Other than skin, nails, hair)  - terbinafine (LAMISIL) 250 MG tablet; Take 1 tablet (250 mg) by mouth 2 times daily for 14 days    Gastroesophageal reflux disease with esophagitis, unspecified whether hemorrhage  With nausea, stomach upset, weight loss.  Patient recently had blood work which included TSH, CMP, CBC, PSA, lipid.  No history of diabetes.  Advised patient with diet modification.  Start patient Prilosec 1 tablet twice daily for 2 weeks.  Then 1 tablet daily.  Will be set up to have upper and lower endoscopy.  - omeprazole (PRILOSEC) 20 MG DR capsule; One tab bid for 2 weeks, then one tab daily as needed    High priority for 2019-nCoV vaccine  given  - COVID-19,PF,PFIZER (12+ Yrs GRAY LABEL)    Other eczema  Use as directed  - clobetasol (TEMOVATE) 0.05 % external ointment; Apply topically 2 times daily Apply to area bid for 5- 7 days then bid as needed    Screen for colon cancer  Screening, never had one  - Adult Gastro Ref - Procedure Only; Future    Abdominal pain, diffuse      Weight loss  Started Prilosec,  Upper and lower endoscopies.    Hypertension goal BP (blood pressure) < 140/90  Stable, continue with current medications.  6}    BMI:   Estimated body mass index is 25.26 kg/m  as calculated from the following:    Height as of this encounter: 1.956 m (6' 5\").    Weight as of this encounter: 96.6 kg (213 lb).   Weight management plan: Discussed healthy diet and exercise guidelines      Return in about 2 weeks (around 6/29/2022) for Follow up, in person.    Misha Greenberg MD  [facility]    Eva Luz is a 67 year old:  Patient reports he has been having itchy rashes in both of his feet for some time now, reported are itchy, they are dried and " scaling.      Patient reports he has been having diffuse abdominal pain, nausea weight loss for the past 6 months or so. Lost over 20 ib since last March.     Recently, had a routine physical exam in January of this year with normal CBC, CMP, PSA, thyroid and blood sugar.  He denies any vomiting he has no constipation or diarrhea.  He does report nausea sometimes.  He has no fever no chills.  No headache.  Denies drinking alcohol, does not smoke.  Does not eat spicy food or acidic food.  Does not take aspirin or ibuprofen.  No blood in the urine or stool.    No chief complaint on file.    Patient would like to discuss weight loss  Covid booster given     History of Present Illness       Reason for visit:  Foot rash. I m not sure bot it started a small spot and it just grew now it is all over my feet it really hurts  Symptom onset:  1-2 weeks ago  Symptoms include:  Lots of scratching  Symptom intensity:  Severe  Symptom progression:  Worsening  Had these symptoms before:  Yes  Has tried/received treatment for these symptoms:  Yes  Previous treatment was successful:  No  What makes it worse:  No  What makes it better:  No    He eats 0-1 servings of fruits and vegetables daily.He consumes 1 sweetened beverage(s) daily.He exercises with enough effort to increase his heart rate 9 or less minutes per day.  He exercises with enough effort to increase his heart rate 3 or less days per week. He is missing 1 dose(s) of medications per week.       Review of Systems   Constitutional, HEENT, cardiovascular, pulmonary, GI, , musculoskeletal, neuro, skin, endocrine and psych systems are negative, except as otherwise noted.      Objective    There were no vitals taken for this visit.  There is no height or weight on file to calculate BMI.  Physical Exam   GENERAL: healthy, alert and no distress  NECK: no adenopathy, no asymmetry, masses, or scars and thyroid normal to palpation  RESP: lungs clear to auscultation - no rales,  rhonchi or wheezes  CV: regular rate and rhythm, normal S1 S2, no S3 or S4, no murmur, click or rub, no peripheral edema and peripheral pulses strong  ABDOMEN: soft, nontender, no hepatosplenomegaly, no masses and bowel sounds normal  MS: no gross musculoskeletal defects noted, no edema  NEURO: Normal strength and tone, mentation intact and speech normal  PSYCH: mentation appears normal, affect normal/bright    Orders Placed This Encounter   Procedures     REVIEW OF HEALTH MAINTENANCE PROTOCOL ORDERS     COVID-19,PF,PFIZER (12+ Yrs GRAY LABEL)     Adult Gastro Ref - Procedure Only     Patient labs from January of this year, patient had lipid panel, fasting glucose, PSA, CBC, CMP, TSH.  Reviewed all recent labs with patient.    Misha Greenberg MD            .  ..

## 2022-06-23 ENCOUNTER — TELEPHONE (OUTPATIENT)
Dept: GASTROENTEROLOGY | Facility: CLINIC | Age: 67
End: 2022-06-23

## 2022-06-23 NOTE — TELEPHONE ENCOUNTER
Screening Questions    BlueKIND OF PREP RedLOCATION [review exclusion criteria] GreenSEDATION TYPE    1. Have you had a positive covid test in the last 90 days? N  a. If yes, what date?     2. Do you have a legal guardian or medical Power of ?  Are you able to give consent for your medical care?Y (Sedation review/consideration needed)    3. Are you active on mychart? Y    4. What insurance is in the chart? [employer]    3.   Ordering/Referring Provider: Misha Greenberg MD     4. BMI 25.3 [BMI OVER 40-EXTENDED PREP]  If greater than 40 review exclusion criteria [PAC APPT IF (MAC) @ [facility]]      5.  Respiratory Screening:  [If yes to any of the following HOSPITAL setting only]     Do you use daily home oxygen? N    Do you have mod to severe Obstructive Sleep Apnea? N  [OKAY @ [facility] SH PH RI]   Do you have Pulmonary Hypertension? N     Do you have UNCONTROLLED asthma? N        6.   Have you had a heart or lung transplant? N      7.   Are you currently on dialysis? N [ If yes, G-PREP & HOSPITAL setting only]     8.   Do you have chronic kidney disease? Y [ If yes, G-PREP ]    9.   Have you had a stroke or Transient ischemic attack (TIA - aka  mini stroke ) within 6 months?  N (If yes, please review exclusion criteria)         In the past 6 months, have you had any heart related issues including cardiomyopathy or heart attack? N           If yes, did it require cardiac stenting or other implantable device? N      10   Do you have any implantable devices in your body (pacemaker, defib, LVAD)? N (If yes, please review exclusion criteria)    11.   Do you take nitroglycerin? N            If yes, how often? N  (if yes, HOSPITAL setting ONLY)    12.   Are you currently taking any blood thinners? N           [IF YES, INFORM PATIENT TO FOLLOW UP W/ ORDERING PROVIDER FOR BRIDGING INSTRUCTIONS]     13.   Do you have a diagnosis of diabetes? N   [ If yes, G-PREP ]    14.   [FEMALES] Are you currently pregnant? N    If yes,  how many weeks? N    15.   Are you taking any prescription pain medications on a routine schedule?  N  [ If yes, EXTENDED PREP.] [If yes, MAC]    16.   Do you have any chemical dependencies such as alcohol, street drugs, or methadone?  N [If yes, MAC]    17.   Do you have any history of post-traumatic stress syndrome, severe anxiety or history of psychosis?  N  [If yes, MAC]    18.   Do you transfer independently? (If NO, please HOSPITAL setting  only)  Y    19.  On a regular basis do you go 3-5 days between bowel movements? N   [ If yes, EXTENDED PREP.]    20.   Preferred LOCAL Pharmacy for Pre Prescription:        [facility] [place] [facility] [address] [place] [address].    Scheduling Details      Caller: Sukh Craven  (Please ask for phone number if not scheduled by patient)    Type of Procedure Scheduled: Upper and Lower Endoscopy [EGD and Colonoscopy]    Which Colonoscopy Prep was Sent?: MAYA LONGORIA CF PATIENTS & GROEN'S PATIENTS NEEDS EXTENDED PREP    Surgeon: JOANN  Date of Procedure: 8/16  [place] [facility] [place] Type: [place]    Conscious Sedation- Needs  for 6 hours after the procedure  MAC/General-Needs  for 24 hours after procedure    Pre-op Required at [facility] [place], [place] and OR for MAC sedation: N  (advise patient they will need a pre-op WITH IN 30 DAYS prior to procedure -)      Informed patient they will need an adult  Y  Cannot take any type of public or medical transportation alone    Pre-Procedure Covid test to be completed at [facility] or Externally: GAVE DIRECTIONS    Confirmed Nurse will call to complete assessment Y    Additional comments:

## 2022-07-08 ENCOUNTER — OFFICE VISIT (OUTPATIENT)
Dept: FAMILY MEDICINE | Facility: CLINIC | Age: 67
End: 2022-07-08
Payer: COMMERCIAL

## 2022-07-08 VITALS
WEIGHT: 206 LBS | HEART RATE: 64 BPM | DIASTOLIC BLOOD PRESSURE: 62 MMHG | HEIGHT: 76 IN | RESPIRATION RATE: 20 BRPM | BODY MASS INDEX: 25.09 KG/M2 | TEMPERATURE: 97.4 F | SYSTOLIC BLOOD PRESSURE: 138 MMHG | OXYGEN SATURATION: 100 %

## 2022-07-08 DIAGNOSIS — Z12.11 SCREEN FOR COLON CANCER: ICD-10-CM

## 2022-07-08 DIAGNOSIS — Z23 NEED FOR PNEUMOCOCCAL VACCINATION: ICD-10-CM

## 2022-07-08 DIAGNOSIS — R63.4 WEIGHT LOSS: ICD-10-CM

## 2022-07-08 DIAGNOSIS — I10 HYPERTENSION GOAL BP (BLOOD PRESSURE) < 140/90: ICD-10-CM

## 2022-07-08 DIAGNOSIS — R73.9 ELEVATED BLOOD SUGAR: ICD-10-CM

## 2022-07-08 DIAGNOSIS — Z23 ENCOUNTER FOR IMMUNIZATION: ICD-10-CM

## 2022-07-08 DIAGNOSIS — R19.8 ABDOMINAL FULLNESS: ICD-10-CM

## 2022-07-08 DIAGNOSIS — E55.9 VITAMIN D DEFICIENCY: ICD-10-CM

## 2022-07-08 DIAGNOSIS — R31.9 HEMATURIA, UNSPECIFIED TYPE: ICD-10-CM

## 2022-07-08 DIAGNOSIS — N18.2 CKD (CHRONIC KIDNEY DISEASE) STAGE 2, GFR 60-89 ML/MIN: ICD-10-CM

## 2022-07-08 DIAGNOSIS — Z00.00 ENCOUNTER FOR ANNUAL PHYSICAL EXAM: Primary | ICD-10-CM

## 2022-07-08 DIAGNOSIS — E78.5 DYSLIPIDEMIA: ICD-10-CM

## 2022-07-08 DIAGNOSIS — N40.1 BENIGN PROSTATIC HYPERPLASIA WITH NOCTURIA: ICD-10-CM

## 2022-07-08 DIAGNOSIS — R35.1 BENIGN PROSTATIC HYPERPLASIA WITH NOCTURIA: ICD-10-CM

## 2022-07-08 LAB
ALBUMIN SERPL-MCNC: 4.2 G/DL (ref 3.4–5)
ALBUMIN UR-MCNC: NEGATIVE MG/DL
ALP SERPL-CCNC: 62 U/L (ref 40–150)
ALT SERPL W P-5'-P-CCNC: 42 U/L (ref 0–70)
ANION GAP SERPL CALCULATED.3IONS-SCNC: 11 MMOL/L (ref 3–14)
APPEARANCE UR: CLEAR
AST SERPL W P-5'-P-CCNC: 23 U/L (ref 0–45)
BACTERIA #/AREA URNS HPF: ABNORMAL /HPF
BASOPHILS # BLD AUTO: 0 10E3/UL (ref 0–0.2)
BASOPHILS NFR BLD AUTO: 1 %
BILIRUB SERPL-MCNC: 1 MG/DL (ref 0.2–1.3)
BILIRUB UR QL STRIP: NEGATIVE
BUN SERPL-MCNC: 15 MG/DL (ref 7–30)
CALCIUM SERPL-MCNC: 9.3 MG/DL (ref 8.5–10.1)
CHLORIDE BLD-SCNC: 107 MMOL/L (ref 94–109)
CO2 SERPL-SCNC: 23 MMOL/L (ref 20–32)
COLOR UR AUTO: YELLOW
CREAT SERPL-MCNC: 1.05 MG/DL (ref 0.66–1.25)
CREAT UR-MCNC: 199 MG/DL
EOSINOPHIL # BLD AUTO: 0.1 10E3/UL (ref 0–0.7)
EOSINOPHIL NFR BLD AUTO: 2 %
ERYTHROCYTE [DISTWIDTH] IN BLOOD BY AUTOMATED COUNT: 13.3 % (ref 10–15)
GFR SERPL CREATININE-BSD FRML MDRD: 79 ML/MIN/1.73M2
GLUCOSE BLD-MCNC: 113 MG/DL (ref 70–99)
GLUCOSE UR STRIP-MCNC: NEGATIVE MG/DL
HBA1C MFR BLD: 6 % (ref 0–5.6)
HCT VFR BLD AUTO: 41.8 % (ref 40–53)
HGB BLD-MCNC: 14.1 G/DL (ref 13.3–17.7)
HGB UR QL STRIP: ABNORMAL
KETONES UR STRIP-MCNC: NEGATIVE MG/DL
LEUKOCYTE ESTERASE UR QL STRIP: NEGATIVE
LIPASE SERPL-CCNC: 19 U/L (ref 13–60)
LYMPHOCYTES # BLD AUTO: 1.1 10E3/UL (ref 0.8–5.3)
LYMPHOCYTES NFR BLD AUTO: 33 %
MCH RBC QN AUTO: 30.7 PG (ref 26.5–33)
MCHC RBC AUTO-ENTMCNC: 33.7 G/DL (ref 31.5–36.5)
MCV RBC AUTO: 91 FL (ref 78–100)
MICROALBUMIN UR-MCNC: 68 MG/L
MICROALBUMIN/CREAT UR: 34.17 MG/G CR (ref 0–17)
MONOCYTES # BLD AUTO: 0.4 10E3/UL (ref 0–1.3)
MONOCYTES NFR BLD AUTO: 11 %
NEUTROPHILS # BLD AUTO: 1.9 10E3/UL (ref 1.6–8.3)
NEUTROPHILS NFR BLD AUTO: 53 %
NITRATE UR QL: NEGATIVE
PH UR STRIP: 5.5 [PH] (ref 5–7)
PLATELET # BLD AUTO: 154 10E3/UL (ref 150–450)
POTASSIUM BLD-SCNC: 3.8 MMOL/L (ref 3.4–5.3)
PROT SERPL-MCNC: 8.2 G/DL (ref 6.8–8.8)
RBC # BLD AUTO: 4.6 10E6/UL (ref 4.4–5.9)
RBC #/AREA URNS AUTO: ABNORMAL /HPF
SODIUM SERPL-SCNC: 141 MMOL/L (ref 133–144)
SP GR UR STRIP: 1.02 (ref 1–1.03)
SQUAMOUS #/AREA URNS AUTO: ABNORMAL /LPF
UROBILINOGEN UR STRIP-ACNC: 0.2 E.U./DL
WBC # BLD AUTO: 3.5 10E3/UL (ref 4–11)
WBC #/AREA URNS AUTO: ABNORMAL /HPF

## 2022-07-08 PROCEDURE — 82043 UR ALBUMIN QUANTITATIVE: CPT | Performed by: FAMILY MEDICINE

## 2022-07-08 PROCEDURE — 90677 PCV20 VACCINE IM: CPT | Performed by: FAMILY MEDICINE

## 2022-07-08 PROCEDURE — 83036 HEMOGLOBIN GLYCOSYLATED A1C: CPT | Performed by: FAMILY MEDICINE

## 2022-07-08 PROCEDURE — 83690 ASSAY OF LIPASE: CPT | Performed by: FAMILY MEDICINE

## 2022-07-08 PROCEDURE — 80053 COMPREHEN METABOLIC PANEL: CPT | Performed by: FAMILY MEDICINE

## 2022-07-08 PROCEDURE — 36415 COLL VENOUS BLD VENIPUNCTURE: CPT | Performed by: FAMILY MEDICINE

## 2022-07-08 PROCEDURE — G0009 ADMIN PNEUMOCOCCAL VACCINE: HCPCS | Performed by: FAMILY MEDICINE

## 2022-07-08 PROCEDURE — 81001 URINALYSIS AUTO W/SCOPE: CPT | Performed by: FAMILY MEDICINE

## 2022-07-08 PROCEDURE — 99214 OFFICE O/P EST MOD 30 MIN: CPT | Mod: 25 | Performed by: FAMILY MEDICINE

## 2022-07-08 PROCEDURE — G0402 INITIAL PREVENTIVE EXAM: HCPCS | Performed by: FAMILY MEDICINE

## 2022-07-08 PROCEDURE — 85025 COMPLETE CBC W/AUTO DIFF WBC: CPT | Performed by: FAMILY MEDICINE

## 2022-07-08 RX ORDER — CHOLECALCIFEROL (VITAMIN D3) 50 MCG
1 TABLET ORAL DAILY
Qty: 90 TABLET | Refills: 3 | Status: SHIPPED | OUTPATIENT
Start: 2022-07-08 | End: 2023-06-16

## 2022-07-08 RX ORDER — TAMSULOSIN HYDROCHLORIDE 0.4 MG/1
CAPSULE ORAL
Qty: 180 CAPSULE | Refills: 3 | Status: SHIPPED | OUTPATIENT
Start: 2022-07-08 | End: 2023-06-16

## 2022-07-08 RX ORDER — SIMVASTATIN 20 MG
20 TABLET ORAL DAILY
COMMUNITY
Start: 2022-05-11 | End: 2023-02-14

## 2022-07-08 ASSESSMENT — ENCOUNTER SYMPTOMS
FREQUENCY: 1
ABDOMINAL PAIN: 0
DIZZINESS: 0
SHORTNESS OF BREATH: 0
EYE PAIN: 0
NAUSEA: 0
HEARTBURN: 0
DYSURIA: 0
PARESTHESIAS: 0
DIARRHEA: 0
JOINT SWELLING: 0
NERVOUS/ANXIOUS: 0
HEMATOCHEZIA: 0
WEAKNESS: 0
HEADACHES: 0
CHILLS: 0
HEMATURIA: 0
COUGH: 0
PALPITATIONS: 0
ARTHRALGIAS: 0
FEVER: 0
SORE THROAT: 0
CONSTIPATION: 0
MYALGIAS: 0

## 2022-07-08 ASSESSMENT — ACTIVITIES OF DAILY LIVING (ADL): CURRENT_FUNCTION: NO ASSISTANCE NEEDED

## 2022-07-08 ASSESSMENT — PAIN SCALES - GENERAL: PAINLEVEL: NO PAIN (0)

## 2022-07-08 NOTE — PROGRESS NOTES
"SUBJECTIVE:   Sukh Craven is a 67 year old male who presents for Preventive Visit.  Comes for a physical exam.  History of hypertension, dyslipidemia, bph.  Patient reports continue to loose weight.  He reports in the past year or so he lost over 40 pounds.  He denies any abdominal pain, has no diarrhea no vomiting, no chest pain no short of breath, no blood in urine or stool.  He reports sometimes when he tried to eat he will feel fullness, food has no tastes, that he stopped eating after a few bites.  Otherwise, he has no stomach pain, he has no vomiting.  No diarrhea.  He does not smoke anymore, he drinks maybe once a week.    History of benign prostate hypertrophy, he has been getting more frequency, more urgency, in the past years prostate surgery.    Recent blood test from January of this year he had TSH which was normal, vitamin D was low, PSA was normal, lipid panel    Patient has been advised of split billing requirements and indicates understanding: Yes  Are you in the first 12 months of your Medicare coverage?  Yes,  Visual Acuity:  Right Eye: 20/50   Left Eye: 20/40  Both Eyes: 20/40    Healthy Habits:     In general, how would you rate your overall health?  Fair    Frequency of exercise:  1 day/week    Duration of exercise:  15-30 minutes    Do you usually eat at least 4 servings of fruit and vegetables a day, include whole grains    & fiber and avoid regularly eating high fat or \"junk\" foods?  No    Taking medications regularly:  Yes    Medication side effects:  None    Ability to successfully perform activities of daily living:  No assistance needed    Home Safety:  No safety concerns identified    Hearing Impairment:  No hearing concerns    In the past 6 months, have you been bothered by leaking of urine?  No    In general, how would you rate your overall mental or emotional health?  Good      PHQ-2 Total Score: 0    Additional concerns today:  No    Do you feel safe in your environment? " Yes    Have you ever done Advance Care Planning? (For example, a Health Directive, POLST, or a discussion with a medical provider or your loved ones about your wishes): No, advance care planning information given to patient to review.  Patient declined advance care planning discussion at this time.       Fall risk  Fallen 2 or more times in the past year?: No  Any fall with injury in the past year?: No    Cognitive Screening Clock score 2   , Word score  3    Do you have sleep apnea, excessive snoring or daytime drowsiness?: no    Reviewed and updated as needed this visit by clinical staff                    Reviewed and updated as needed this visit by Provider                   Social History     Tobacco Use     Smoking status: Former Smoker     Packs/day: 0.50     Years: 25.00     Pack years: 12.50     Types: Cigarettes     Quit date: 3/23/2007     Years since quitting: 15.3     Smokeless tobacco: Never Used   Substance Use Topics     Alcohol use: Yes     Comment: rare- social drinker         Alcohol Use 7/8/2022   Prescreen: >3 drinks/day or >7 drinks/week? No           Hyperlipidemia Follow-Up      Are you regularly taking any medication or supplement to lower your cholesterol?   Yes- simvastatin     Are you having muscle aches or other side effects that you think could be caused by your cholesterol lowering medication?  No    Hypertension Follow-up      Do you check your blood pressure regularly outside of the clinic? Yes     Are you following a low salt diet? Yes    Are your blood pressures ever more than 140 on the top number (systolic) OR more   than 90 on the bottom number (diastolic), for example 140/90? No      Current providers sharing in care for this patient include:   Patient Care Team:  Teresa Marquis RN as Registered Nurse  Ned Martinez PA-C as Referring Physician (Physician Assistant)  Rena Nickerson CNP as Nurse Practitioner (Urology)  Paulo Cheung [facility] as Pharmacist  (Pharmacist)  Rena Nickerson CNP as Assigned Surgical Provider  Misha Greenberg MD as Assigned PCP    The following health maintenance items are reviewed in Epic and correct as of today:  Health Maintenance Due   Topic Date Due     MICROALBUMIN  Never done     ZOSTER IMMUNIZATION (1 of 2) Never done     COLORECTAL CANCER SCREENING  08/18/2018     ADVANCE CARE PLANNING  08/26/2018     LIPID  04/09/2020     BMP  01/29/2021     MEDICARE ANNUAL WELLNESS VISIT  04/04/2022     AORTIC ANEURYSM SCREENING (SYSTEM ASSIGNED)  04/04/2022     Pneumococcal Vaccine: 65+ Years (1 - PCV) 04/04/2022     Labs reviewed in EPIC  BP Readings from Last 3 Encounters:   07/08/22 138/62   06/15/22 124/74   08/18/21 120/70    Wt Readings from Last 3 Encounters:   07/08/22 93.4 kg (206 lb)   06/15/22 96.6 kg (213 lb)   08/18/21 96 kg (211 lb 9.6 oz)                  Patient Active Problem List   Diagnosis     Hematuria     BPH (benign prostatic hypertrophy)     Advanced directives, counseling/discussion     Elevated serum creatinine     History of Helicobacter infection     Hypertension goal BP (blood pressure) < 140/90     CKD (chronic kidney disease) stage 2, GFR 60-89 ml/min     CARDIOVASCULAR SCREENING; LDL GOAL LESS THAN 160     Pre-diabetes     BPH (benign prostatic hypertrophy) with urinary obstruction     Moderate aortic insufficiency     Past Surgical History:   Procedure Laterality Date     LASER HOLMIUM ENUCLEATION PROSTATE N/A 4/27/2017    Procedure: LASER HOLMIUM ENUCLEATION PROSTATE;  Holmium Laser Enucleation Of The Prostate ;  Surgeon: Cresencio Foote MD;  Location: [facility]     REMOVAL OF SPERM DUCT(S)         Social History     Tobacco Use     Smoking status: Former Smoker     Packs/day: 0.50     Years: 25.00     Pack years: 12.50     Types: Cigarettes     Quit date: 3/23/2007     Years since quitting: 15.3     Smokeless tobacco: Never Used   Substance Use Topics     Alcohol use: Yes     Comment: rare- social drinker      Family History   Problem Relation Age of Onset     Diabetes Sister      Diabetes Sister      Diabetes Brother      C.A.D. No family hx of      Hypertension No family hx of      Cerebrovascular Disease No family hx of      Breast Cancer No family hx of      Cancer - colorectal No family hx of      Prostate Cancer No family hx of          Current Outpatient Medications   Medication Sig Dispense Refill     amLODIPine (NORVASC) 10 MG tablet Take 1 tablet (10 mg) by mouth daily 30 tablet 6     butenafine (MENTAX) 1 % CREA cream Apply twice a day x 3 weeks to affected area 30 g 2     clobetasol (TEMOVATE) 0.05 % external ointment Apply topically 2 times daily Apply to area bid for 5- 7 days then bid as needed 60 g 1     losartan (COZAAR) 100 MG tablet Take 1 tablet (100 mg) by mouth daily 30 tablet 3     metoprolol tartrate (LOPRESSOR) 25 MG tablet Take 1 tablet (25 mg) by mouth 2 times daily       omeprazole (PRILOSEC) 20 MG DR capsule One tab bid for 2 weeks, then one tab daily as needed 90 capsule 1     simvastatin (ZOCOR) 20 MG tablet Take 20 mg by mouth daily       tamsulosin (FLOMAX) 0.4 MG capsule One tab at bedtime for one month, then 2 tab qhs 180 capsule 3     vitamin D3 (CHOLECALCIFEROL) 50 mcg (2000 units) tablet Take 1 tablet (50 mcg) by mouth daily 90 tablet 3     aspirin (ASA) 325 MG tablet Take 325 mg by mouth daily (Patient not taking: Reported on 7/8/2022)       nystatin (MYCOSTATIN) 875123 UNIT/GM external powder Apply topically 2 times daily as needed (as needed) (Patient not taking: Reported on 7/8/2022) 60 g 0     Pneumonia Vaccine: given today.    Review of Systems   Constitutional: Negative for chills and fever.   HENT: Negative for congestion, ear pain, hearing loss and sore throat.    Eyes: Negative for pain and visual disturbance.   Respiratory: Negative for cough and shortness of breath.    Cardiovascular: Negative for chest pain, palpitations and peripheral edema.   Gastrointestinal:  "Negative for abdominal pain, constipation, diarrhea, heartburn, hematochezia and nausea.   Genitourinary: Positive for frequency and urgency. Negative for dysuria, genital sores, hematuria, impotence and penile discharge.   Musculoskeletal: Negative for arthralgias, joint swelling and myalgias.   Skin: Negative for rash.   Neurological: Negative for dizziness, weakness, headaches and paresthesias.   Psychiatric/Behavioral: Negative for mood changes. The patient is not nervous/anxious.      Constitutional, HEENT, cardiovascular, pulmonary, GI, , musculoskeletal, neuro, skin, endocrine and psych systems are negative, except as otherwise noted.    OBJECTIVE:   There were no vitals taken for this visit. Estimated body mass index is 25.26 kg/m  as calculated from the following:    Height as of 6/15/22: 1.956 m (6' 5\").    Weight as of 6/15/22: 96.6 kg (213 lb).  Physical Exam  GENERAL: healthy, alert and no distress  HENT: ear canals and TM's normal, nose and mouth without ulcers or lesions  NECK: no adenopathy, no asymmetry, masses, or scars and thyroid normal to palpation  RESP: lungs clear to auscultation - no rales, rhonchi or wheezes  CV: regular rate and rhythm, normal S1 S2, no S3 or S4, no murmur, click or rub, no peripheral edema and peripheral pulses strong  ABDOMEN: soft, nontender, no hepatosplenomegaly, no masses and bowel sounds normal  RECTAL: normal sphincter tone, no rectal masses, prostate, large in size. smooth, nontender without nodules or masses  MS: no gross musculoskeletal defects noted, no edema  SKIN: no suspicious lesions or rashes  NEURO: Normal strength and tone, mentation intact and speech normal  PSYCH: mentation appears normal, affect normal/bright    Diagnostic Test Results:  Labs reviewed in Epic  Orders Placed This Encounter   Procedures     SCREENING QUESTIONS FOR ADULT IMMUNIZATIONS     XR Chest 2 Views     Pneumococcal 20 Valent Conjugate (PCV20)     Hemoglobin A1c     Lipase     UA " Macro with Reflex to Micro and Culture - lab collect     Comprehensive metabolic panel (BMP + Alb, Alk Phos, ALT, AST, Total. Bili, TP)     Albumin Random Urine Quantitative with Creat Ratio     Adult GI  Referral - Procedure Only     CBC with platelets and differential       ASSESSMENT / PLAN:   (Z00.00) Encounter for annual physical exam  (primary encounter diagnosis)  Comment:   Plan: Pneumococcal 20 Valent Conjugate (PCV20), CBC         with platelets and differential, Comprehensive         metabolic panel (BMP + Alb, Alk Phos, ALT, AST,        Total. Bili, TP), CANCELED: CBC with platelets         and differential        Routine preventive care discussed.  Advised with diet and exercise.  Update his immunization.  He scheduled to have a colonoscopy August 16, 2022.    (N18.2) CKD (chronic kidney disease) stage 2, GFR 60-89 ml/min  Comment:   Plan: Albumin Random Urine Quantitative with Creat         Ratio        Stable, continue to monitor    (Z12.11) Screen for colon cancer  Comment:   Plan: Schedule for colonoscopy August 16, 2022    (I10) Hypertension goal BP (blood pressure) < 140/90  Comment:   Plan: stable, continue with current medications.    (E78.5) Dyslipidemia  Comment:   Plan: LIpid panel done, 01/19/2022,   Continue with current medications.    (Z23) Encounter for immunization   Comment:   Plan: Pneumococcal 20 Valent Conjugate (PCV20)        Given,    (E55.9) Vitamin D deficiency  Comment:   Plan: vitamin D3 (CHOLECALCIFEROL) 50 mcg (2000         units) tablet        Started.    (R73.9) Elevated blood sugar  Comment:   Plan: Hemoglobin A1c            (R63.4) Weight loss  Comment:   Plan: Lipase, XR Chest 2 Views, UA Macro with Reflex         to Micro and Culture - lab collect, Adult GI          Referral - Procedure Only        Weight loss,  Discussed with patient could be many reasons.  Is scheduled to have a colonoscopy August 16, 2022.  In addition, I added an upper endoscopy  "to be done at the same time.  In addition, we will obtain a chest x-ray, lipase, UA.  Other causes could be medications.    (N40.1,  R35.1) Benign prostatic hyperplasia with nocturia  Comment:   Plan: tamsulosin (FLOMAX) 0.4 MG capsule, Adult GI          Referral - Procedure Only       Stopped Oxybutynin  Started Flomax  Follow up with Urology.    (R19.8) Abdominal fullness  Comment:   Plan: upper Endoscopy.  Lipase    (Z23) Need for pneumococcal vaccination  Comment:   Plan: SCREENING QUESTIONS FOR ADULT IMMUNIZATIONS        Given,       Patient has been advised of split billing requirements and indicates understanding: Yes    COUNSELING:  Reviewed preventive health counseling, as reflected in patient instructions       Regular exercise       Healthy diet/nutrition       Immunizations    Vaccinated for: Pneumococcal             Colon cancer screening       Prostate cancer screening    Estimated body mass index is 25.26 kg/m  as calculated from the following:    Height as of 6/15/22: 1.956 m (6' 5\").    Weight as of 6/15/22: 96.6 kg (213 lb).    Weight management plan: Discussed healthy diet and exercise guidelines    He reports that he quit smoking about 15 years ago. His smoking use included cigarettes. He has a 12.50 pack-year smoking history. He has never used smokeless tobacco.      Appropriate preventive services were discussed with this patient, including applicable screening as appropriate for cardiovascular disease, diabetes, osteopenia/osteoporosis, and glaucoma.  As appropriate for age/gender, discussed screening for colorectal cancer, prostate cancer, breast cancer, and cervical cancer. Checklist reviewing preventive services available has been given to the patient.    Reviewed patients plan of care and provided an AVS. The Basic Care Plan (routine screening as documented in Health Maintenance) for Sukh meets the Care Plan requirement. This Care Plan has been established and reviewed with the " Patient.    Counseling Resources:  ATP IV Guidelines  Pooled Cohorts Equation Calculator  Breast Cancer Risk Calculator  Breast Cancer: Medication to Reduce Risk  FRAX Risk Assessment  ICSI Preventive Guidelines  Dietary Guidelines for Americans, 2010  USDA's MyPlate  ASA Prophylaxis  Lung CA Screening    Misha Greenberg MD  [facility]    Identified Health Risks:

## 2022-07-16 DIAGNOSIS — R35.0 URINARY FREQUENCY: ICD-10-CM

## 2022-07-19 RX ORDER — OXYBUTYNIN CHLORIDE 15 MG/1
15 TABLET, EXTENDED RELEASE ORAL DAILY
Qty: 30 TABLET | Refills: 0 | Status: SHIPPED | OUTPATIENT
Start: 2022-07-19 | End: 2023-07-02

## 2022-07-19 NOTE — TELEPHONE ENCOUNTER
oxybutynin ER (DITROPAN XL) 15 MG 24 hr tablet   Last Written Prescription Date:6/29/21  Last Fill Quantity: 90,   # refills: 3  Last Office Visit :6/29/21  Future Office visit:  None    Scheduling has been notified to contact the pt for appointment.    Routing refill request to provider for review/approval because: end 7/8/22 by other provider. Past due for appt.

## 2022-07-20 ENCOUNTER — TELEPHONE (OUTPATIENT)
Dept: UROLOGY | Facility: CLINIC | Age: 67
End: 2022-07-20

## 2022-08-02 ENCOUNTER — TELEPHONE (OUTPATIENT)
Dept: GASTROENTEROLOGY | Facility: CLINIC | Age: 67
End: 2022-08-02

## 2022-08-02 NOTE — TELEPHONE ENCOUNTER
Caller: Sukh    Procedure: EGD/COLON    [place], [place], and Sur[place] [facility] [place] Provider:Misha Greenberg MD    Reason for cancel (please be detailed, any staff messages or encounters to note?): schedule conflict        Rescheduled: yes     If rescheduled:    Date: 09/26   [place] [facility] [place] Resent: sent gprep due to recall (changes to prep?)   Covid Test Rescheduled: home   Note any change or update to original order/sedation:

## 2022-08-24 DIAGNOSIS — L30.8 OTHER ECZEMA: ICD-10-CM

## 2022-08-26 RX ORDER — CLOBETASOL PROPIONATE 0.5 MG/G
OINTMENT TOPICAL 2 TIMES DAILY
Qty: 60 G | Refills: 1 | Status: SHIPPED | OUTPATIENT
Start: 2022-08-26 | End: 2023-06-16

## 2022-09-19 ENCOUNTER — TELEPHONE (OUTPATIENT)
Dept: GASTROENTEROLOGY | Facility: CLINIC | Age: 67
End: 2022-09-19

## 2022-09-19 DIAGNOSIS — Z12.11 ENCOUNTER FOR SCREENING COLONOSCOPY: Primary | ICD-10-CM

## 2022-09-19 RX ORDER — BISACODYL 5 MG/1
TABLET, DELAYED RELEASE ORAL
Qty: 4 TABLET | Refills: 0 | Status: SHIPPED | OUTPATIENT
Start: 2022-09-19 | End: 2023-02-14

## 2022-09-19 NOTE — TELEPHONE ENCOUNTER
Attempted to contact patient regarding upcoming colonoscopy/EGD procedure on 9.26.2022 for pre assessment questions. No answer.     Unable to leave a message.    Discuss at home rapid antigen COVID test 1-2 days prior to procedure.    Arrival time: 1245    Facility location: [facility]    Sedation type: CS    Indication for procedure: screening, weight loss, epigastric pain    Bowel prep recommendation: Lamar d/t CKD    Prep instructions sent via [employer].  Prep prescription sent to    [facility] [place]  [place] - [address].     Nataliya Burnett RN

## 2022-09-22 NOTE — TELEPHONE ENCOUNTER
Patient returned call.     Pre assessment questions completed for upcoming colonoscopy/EGD procedure scheduled on 9/26/22    COVID policy reviewed. Patient to complete rapid antigen test one to two days before their scheduled procedure. Patient to bring photo of the results when they come in for their procedure.    Reviewed procedural arrival time 1245 and facility location [facility].    Designated  policy reviewed. Instructed to have someone stay 6 hours post procedure.     Reviewed [employer] prep instructions. No fiber/iron supplements or foods that contain nuts/seeds 7 days prior to procedure.     Anticoagulation/blood thinners? ASA    Patient verbalized understanding and had no questions or concerns at this time.    Rachel Quiñonez RN

## 2022-09-26 ENCOUNTER — ANESTHESIA EVENT (OUTPATIENT)
Dept: SURGERY | Facility: AMBULATORY SURGERY CENTER | Age: 67
End: 2022-09-26
Payer: COMMERCIAL

## 2022-09-26 ENCOUNTER — ANESTHESIA (OUTPATIENT)
Dept: SURGERY | Facility: AMBULATORY SURGERY CENTER | Age: 67
End: 2022-09-26
Payer: COMMERCIAL

## 2022-09-26 ENCOUNTER — HOSPITAL ENCOUNTER (OUTPATIENT)
Facility: AMBULATORY SURGERY CENTER | Age: 67
Discharge: HOME | End: 2022-09-26
Attending: INTERNAL MEDICINE | Admitting: INTERNAL MEDICINE
Payer: COMMERCIAL

## 2022-09-26 VITALS
TEMPERATURE: 97.3 F | DIASTOLIC BLOOD PRESSURE: 67 MMHG | RESPIRATION RATE: 16 BRPM | HEIGHT: 78 IN | HEART RATE: 70 BPM | SYSTOLIC BLOOD PRESSURE: 162 MMHG | WEIGHT: 215 LBS | BODY MASS INDEX: 24.88 KG/M2 | OXYGEN SATURATION: 98 %

## 2022-09-26 VITALS — HEART RATE: 72 BPM

## 2022-09-26 LAB
COLONOSCOPY: NORMAL
UPPER GI ENDOSCOPY: NORMAL

## 2022-09-26 PROCEDURE — 88305 TISSUE EXAM BY PATHOLOGIST: CPT | Mod: TC | Performed by: INTERNAL MEDICINE

## 2022-09-26 PROCEDURE — 45380 COLONOSCOPY AND BIOPSY: CPT | Mod: PT

## 2022-09-26 PROCEDURE — 43239 EGD BIOPSY SINGLE/MULTIPLE: CPT

## 2022-09-26 RX ORDER — LIDOCAINE 40 MG/G
CREAM TOPICAL
Status: DISCONTINUED | OUTPATIENT
Start: 2022-09-26 | End: 2022-09-26 | Stop reason: HOSPADM

## 2022-09-26 RX ORDER — ONDANSETRON 2 MG/ML
4 INJECTION INTRAMUSCULAR; INTRAVENOUS EVERY 6 HOURS PRN
Status: DISCONTINUED | OUTPATIENT
Start: 2022-09-26 | End: 2022-09-27 | Stop reason: HOSPADM

## 2022-09-26 RX ORDER — ONDANSETRON 2 MG/ML
4 INJECTION INTRAMUSCULAR; INTRAVENOUS
Status: DISCONTINUED | OUTPATIENT
Start: 2022-09-26 | End: 2022-09-26 | Stop reason: HOSPADM

## 2022-09-26 RX ORDER — PROPOFOL 10 MG/ML
INJECTION, EMULSION INTRAVENOUS PRN
Status: DISCONTINUED | OUTPATIENT
Start: 2022-09-26 | End: 2022-09-26

## 2022-09-26 RX ORDER — NALOXONE HYDROCHLORIDE 0.4 MG/ML
0.2 INJECTION, SOLUTION INTRAMUSCULAR; INTRAVENOUS; SUBCUTANEOUS
Status: DISCONTINUED | OUTPATIENT
Start: 2022-09-26 | End: 2022-09-27 | Stop reason: HOSPADM

## 2022-09-26 RX ORDER — ONDANSETRON 4 MG/1
4 TABLET, ORALLY DISINTEGRATING ORAL EVERY 6 HOURS PRN
Status: DISCONTINUED | OUTPATIENT
Start: 2022-09-26 | End: 2022-09-27 | Stop reason: HOSPADM

## 2022-09-26 RX ORDER — PROPOFOL 10 MG/ML
INJECTION, EMULSION INTRAVENOUS CONTINUOUS PRN
Status: DISCONTINUED | OUTPATIENT
Start: 2022-09-26 | End: 2022-09-26

## 2022-09-26 RX ORDER — SODIUM CHLORIDE, SODIUM LACTATE, POTASSIUM CHLORIDE, CALCIUM CHLORIDE 600; 310; 30; 20 MG/100ML; MG/100ML; MG/100ML; MG/100ML
INJECTION, SOLUTION INTRAVENOUS CONTINUOUS
Status: DISCONTINUED | OUTPATIENT
Start: 2022-09-26 | End: 2022-09-26 | Stop reason: HOSPADM

## 2022-09-26 RX ORDER — FLUMAZENIL 0.1 MG/ML
0.2 INJECTION, SOLUTION INTRAVENOUS
Status: DISCONTINUED | OUTPATIENT
Start: 2022-09-26 | End: 2022-09-27 | Stop reason: HOSPADM

## 2022-09-26 RX ORDER — PROCHLORPERAZINE MALEATE 5 MG
5 TABLET ORAL EVERY 6 HOURS PRN
Status: DISCONTINUED | OUTPATIENT
Start: 2022-09-26 | End: 2022-09-27 | Stop reason: HOSPADM

## 2022-09-26 RX ORDER — NALOXONE HYDROCHLORIDE 0.4 MG/ML
0.4 INJECTION, SOLUTION INTRAMUSCULAR; INTRAVENOUS; SUBCUTANEOUS
Status: DISCONTINUED | OUTPATIENT
Start: 2022-09-26 | End: 2022-09-27 | Stop reason: HOSPADM

## 2022-09-26 RX ADMIN — PROPOFOL 20 MG: 10 INJECTION, EMULSION INTRAVENOUS at 14:41

## 2022-09-26 RX ADMIN — SODIUM CHLORIDE, SODIUM LACTATE, POTASSIUM CHLORIDE, CALCIUM CHLORIDE: 600; 310; 30; 20 INJECTION, SOLUTION INTRAVENOUS at 13:06

## 2022-09-26 RX ADMIN — PROPOFOL 150 MCG/KG/MIN: 10 INJECTION, EMULSION INTRAVENOUS at 14:56

## 2022-09-26 RX ADMIN — PROPOFOL 250 MCG/KG/MIN: 10 INJECTION, EMULSION INTRAVENOUS at 14:39

## 2022-09-26 NOTE — ANESTHESIA PREPROCEDURE EVALUATION
Anesthesia Pre-Procedure Evaluation    Patient: Sukh Craven   MRN: [id number] : 1955        Procedure : Procedure(s):  COLONOSCOPY, WITH POLYPECTOMY  ESOPHAGOGASTRODUODENOSCOPY, WITH BIOPSY          Past Medical History:   Diagnosis Date     BPH (benign prostatic hyperplasia)      Dyslipidemia      HTN (hypertension)       Past Surgical History:   Procedure Laterality Date     LASER HOLMIUM ENUCLEATION PROSTATE N/A 2017    Procedure: LASER HOLMIUM ENUCLEATION PROSTATE;  Holmium Laser Enucleation Of The Prostate ;  Surgeon: Cresencio Foote MD;  Location: [facility]     REMOVAL OF SPERM DUCT(S)        Allergies   Allergen Reactions     No Known Drug Allergies       Social History     Tobacco Use     Smoking status: Former Smoker     Packs/day: 0.50     Years: 25.00     Pack years: 12.50     Types: Cigarettes     Quit date: 3/23/2007     Years since quitting: 15.5     Smokeless tobacco: Never Used   Substance Use Topics     Alcohol use: Yes     Comment: rare- social drinker      Wt Readings from Last 1 Encounters:   22 97.5 kg (215 lb)        Anesthesia Evaluation            ROS/MED HX  ENT/Pulmonary:       Neurologic:       Cardiovascular:     (+) hypertension-----    METS/Exercise Tolerance:     Hematologic:       Musculoskeletal:       GI/Hepatic:       Renal/Genitourinary:     (+) renal disease, type: CRI,     Endo:       Psychiatric/Substance Use:       Infectious Disease:       Malignancy:       Other:               OUTSIDE LABS:  CBC:   Lab Results   Component Value Date    WBC 3.5 (L) 2022    WBC 4.6 2019    HGB 14.1 2022    HGB 15.7 2019    HCT 41.8 2022    HCT 47.0 2019     2022     2019     BMP:   Lab Results   Component Value Date     2022     2020    POTASSIUM 3.8 2022    POTASSIUM 3.8 2020    CHLORIDE 107 2022    CHLORIDE 107 2020    CO2 23 2022    CO2 28 2020     BUN 15 07/08/2022    BUN 20 07/29/2020    CR 1.05 07/08/2022    CR 1.38 (H) 07/29/2020     (H) 07/08/2022    GLC 85 07/29/2020     COAGS: No results found for: PTT, INR, FIBR  POC: No results found for: BGM, HCG, HCGS  HEPATIC:   Lab Results   Component Value Date    ALBUMIN 4.2 07/08/2022    PROTTOTAL 8.2 07/08/2022    ALT 42 07/08/2022    AST 23 07/08/2022    ALKPHOS 62 07/08/2022    BILITOTAL 1.0 07/08/2022     OTHER:   Lab Results   Component Value Date    A1C 6.0 (H) 07/08/2022    DORIS 9.3 07/08/2022    LIPASE 19 07/08/2022    TSH 0.62 10/08/2020    SED 7 04/09/2019       Anesthesia Plan    ASA Status:  3      Anesthesia Type: MAC.   Induction: Intravenous.   Maintenance: TIVA.        Consents            Postoperative Care    Pain management: Multi-modal analgesia.   PONV prophylaxis: Ondansetron (or other 5HT-3), Background Propofol Infusion     Comments:                Karo Calvillo MD

## 2022-09-26 NOTE — ANESTHESIA CARE TRANSFER NOTE
Patient: Sukh Craven    Procedure: Procedure(s):  COLONOSCOPY, WITH POLYPECTOMY  ESOPHAGOGASTRODUODENOSCOPY, WITH BIOPSY       Diagnosis: Screen for colon cancer [Z12.11]  Diagnosis Additional Information: No value filed.    Anesthesia Type:   No value filed.     Note:    Oropharynx: spontaneously breathing  Level of Consciousness: drowsy  Oxygen Supplementation: room air    Independent Airway: airway patency satisfactory and stable  Dentition: dentition unchanged  Vital Signs Stable: post-procedure vital signs reviewed and stable  Report to RN Given: handoff report given  Patient transferred to: Phase II    Handoff Report: Identifed the Patient, Identified the Reponsible Provider, Reviewed the pertinent medical history, Discussed the surgical course, Reviewed Intra-OP anesthesia mangement and issues during anesthesia, Set expectations for post-procedure period and Allowed opportunity for questions and acknowledgement of understanding      Vitals:  Vitals Value Taken Time   /82 09/26/22 1536   Temp 36.2  C (97.2  F) 09/26/22 1535   Pulse 70 09/26/22 1535   Resp 14 09/26/22 1536   SpO2 95 % 09/26/22 1541   Vitals shown include unvalidated device data.    Electronically Signed By: HEMAL El CRNA  September 26, 2022  3:43 PM

## 2022-09-26 NOTE — ANESTHESIA POSTPROCEDURE EVALUATION
Patient: Sukh Craven    Procedure: Procedure(s):  COLONOSCOPY, WITH POLYPECTOMY  ESOPHAGOGASTRODUODENOSCOPY, WITH BIOPSY       Anesthesia Type:  MAC    Note:  Disposition: Outpatient   Postop Pain Control: Uneventful            Sign Out: Well controlled pain   PONV: No   Neuro/Psych: Uneventful            Sign Out: Acceptable/Baseline neuro status   Airway/Respiratory: Uneventful            Sign Out: Acceptable/Baseline resp. status   CV/Hemodynamics: Uneventful            Sign Out: Acceptable CV status; No obvious hypovolemia; No obvious fluid overload   Other NRE: NONE   DID A NON-ROUTINE EVENT OCCUR? No           Last vitals:  Vitals Value Taken Time   /80 09/26/22 1547   Temp 36.2  C (97.2  F) 09/26/22 1535   Pulse 60 09/26/22 1545   Resp 14 09/26/22 1547   SpO2 100 % 09/26/22 1549   Vitals shown include unvalidated device data.    Electronically Signed By: Karo Calvillo MD  September 26, 2022  4:49 PM

## 2022-09-27 LAB
PATH REPORT.COMMENTS IMP SPEC: NORMAL
PATH REPORT.COMMENTS IMP SPEC: NORMAL
PATH REPORT.FINAL DX SPEC: NORMAL
PATH REPORT.GROSS SPEC: NORMAL
PATH REPORT.MICROSCOPIC SPEC OTHER STN: NORMAL
PATH REPORT.RELEVANT HX SPEC: NORMAL
PHOTO IMAGE: NORMAL

## 2022-09-27 PROCEDURE — 88305 TISSUE EXAM BY PATHOLOGIST: CPT | Mod: 26 | Performed by: PATHOLOGY

## 2022-09-27 NOTE — H&P
Sukh Craven  [id number]  male  67 year old      Reason for procedure/surgery: weight loss    Patient Active Problem List   Diagnosis     Hematuria     BPH (benign prostatic hypertrophy)     Advanced directives, counseling/discussion     Elevated serum creatinine     History of Helicobacter infection     Hypertension goal BP (blood pressure) < 140/90     CKD (chronic kidney disease) stage 2, GFR 60-89 ml/min     CARDIOVASCULAR SCREENING; LDL GOAL LESS THAN 160     Pre-diabetes     BPH (benign prostatic hypertrophy) with urinary obstruction     Moderate aortic insufficiency       Past Surgical History:    Past Surgical History:   Procedure Laterality Date     COLONOSCOPY N/A 9/26/2022    Procedure: COLONOSCOPY, WITH POLYPECTOMY;  Surgeon: Long Silver MD;  Location: [facility] OR     ESOPHAGOSCOPY, GASTROSCOPY, DUODENOSCOPY (EGD), COMBINED N/A 9/26/2022    Procedure: ESOPHAGOGASTRODUODENOSCOPY, WITH BIOPSY;  Surgeon: Long Silver MD;  Location: [facility] OR     LASER HOLMIUM ENUCLEATION PROSTATE N/A 4/27/2017    Procedure: LASER HOLMIUM ENUCLEATION PROSTATE;  Holmium Laser Enucleation Of The Prostate ;  Surgeon: Cresencio Foote MD;  [facility]     REMOVAL OF SPERM DUCT(S)         Past Medical History:   Past Medical History:   Diagnosis Date     BPH (benign prostatic hyperplasia)      Dyslipidemia      HTN (hypertension)        Social History:   Social History     Tobacco Use     Smoking status: Former Smoker     Packs/day: 0.50     Years: 25.00     Pack years: 12.50     Types: Cigarettes     Quit date: 3/23/2007     Years since quitting: 15.5     Smokeless tobacco: Never Used   Substance Use Topics     Alcohol use: Yes     Comment: rare- social drinker       Family History:   Family History   Problem Relation Age of Onset     Diabetes Sister      Diabetes Sister      Diabetes Brother      C.A.D. No family hx of      Hypertension No family hx of      Cerebrovascular Disease No family hx  of      Breast Cancer No family hx of      Cancer - colorectal No family hx of      Prostate Cancer No family hx of        Allergies:   Allergies   Allergen Reactions     No Known Drug Allergies        Active Medications:   Current Outpatient Medications   Medication Sig Dispense Refill     amLODIPine (NORVASC) 10 MG tablet Take 1 tablet (10 mg) by mouth daily 30 tablet 6     bisacodyl (DULCOLAX) 5 MG EC tablet Take as directed. One day before exam take 2 tablets at 3 PM. Day of exam take 2 tablets at 6 AM. 4 tablet 0     butenafine (MENTAX) 1 % CREA cream Apply twice a day x 3 weeks to affected area 30 g 2     clobetasol (TEMOVATE) 0.05 % external ointment Apply topically 2 times daily Apply to area bid for 5- 7 days then bid as needed 60 g 1     losartan (COZAAR) 100 MG tablet Take 1 tablet (100 mg) by mouth daily 30 tablet 3     metoprolol tartrate (LOPRESSOR) 25 MG tablet Take 1 tablet (25 mg) by mouth 2 times daily       omeprazole (PRILOSEC) 20 MG DR capsule One tab bid for 2 weeks, then one tab daily as needed 90 capsule 1     oxybutynin ER (DITROPAN XL) 15 MG 24 hr tablet Take 1 tablet (15 mg) by mouth daily Call clinic to schedule follow up appointment. 30 tablet 0     polyethylene glycol (GOLYTELY) 236 g suspension Take as directed. One day before exam fill the jug with water. Cover and shake until well mixed. At 6 PM start drinking an 8oz glass of mixture every 15 minutes until jug is 1/2 empty. Store remainder in the refrigerator. Day of exam at 6 AM Drink the other half of the Golytely jug. Drink one 8-ounce glass every 15 minutes until the jug is empty. You should finish the prep 4 hours before the exam. 4000 mL 0     tamsulosin (FLOMAX) 0.4 MG capsule One tab at bedtime for one month, then 2 tab qhs 180 capsule 3     vitamin D3 (CHOLECALCIFEROL) 50 mcg (2000 units) tablet Take 1 tablet (50 mcg) by mouth daily 90 tablet 3     aspirin (ASA) 325 MG tablet Take 325 mg by mouth daily (Patient not taking:  "Reported on 7/8/2022)       nystatin (MYCOSTATIN) 917497 UNIT/GM external powder Apply topically 2 times daily as needed (as needed) (Patient not taking: Reported on 7/8/2022) 60 g 0     simvastatin (ZOCOR) 20 MG tablet Take 20 mg by mouth daily         Systemic Review:   CONSTITUTIONAL: NEGATIVE for fever, chills, change in weight  ENT/MOUTH: NEGATIVE for ear, mouth and throat problems  RESP: NEGATIVE for significant cough or SOB  CV: NEGATIVE for chest pain, palpitations or peripheral edema    Physical Examination:   Vital Signs: BP (!) 162/67   Pulse 70   Temp 97.3  F (36.3  C) (Temporal)   Resp 16   Ht 1.981 m (6' 6\")   Wt 97.5 kg (215 lb)   SpO2 98%   BMI 24.85 kg/m    GENERAL: healthy, alert and no distress  NECK: no adenopathy, no asymmetry, masses, or scars  RESP: lungs clear to auscultation - no rales, rhonchi or wheezes  CV: regular rate and rhythm, normal S1 S2, no S3 or S4, no murmur, click or rub, no peripheral edema and peripheral pulses strong  ABDOMEN: soft, nontender, no hepatosplenomegaly, no masses and bowel sounds normal  MS: no gross musculoskeletal defects noted, no edema    Plan: Appropriate to proceed as scheduled.      Long Silver MD  9/27/2022    PCP:  No primary care provider on file.    "

## 2022-10-23 ENCOUNTER — HEALTH MAINTENANCE LETTER (OUTPATIENT)
Age: 67
End: 2022-10-23

## 2022-11-08 ENCOUNTER — IMMUNIZATION (OUTPATIENT)
Dept: FAMILY MEDICINE | Facility: CLINIC | Age: 67
End: 2022-11-08
Payer: COMMERCIAL

## 2022-11-08 DIAGNOSIS — Z23 NEED FOR PROPHYLACTIC VACCINATION AND INOCULATION AGAINST INFLUENZA: Primary | ICD-10-CM

## 2022-11-08 PROCEDURE — 99207 PR NO CHARGE NURSE ONLY: CPT

## 2022-11-08 PROCEDURE — G0008 ADMIN INFLUENZA VIRUS VAC: HCPCS

## 2022-11-08 PROCEDURE — 90662 IIV NO PRSV INCREASED AG IM: CPT

## 2022-12-14 ENCOUNTER — PRE VISIT (OUTPATIENT)
Dept: UROLOGY | Facility: CLINIC | Age: 67
End: 2022-12-14

## 2022-12-14 NOTE — TELEPHONE ENCOUNTER
Reason for visit: med refill     Dx/Hx/Sx: urinary frequency     Records/imaging/labs/orders: in epic    At Rooming: standard

## 2023-01-02 NOTE — PROGRESS NOTES
Assessment and Plan:     Assessment: 67 year old male who is ~5.5 years post-HoLEP who experienced a recurrence of irritative LUTS, temporarily improved with oxybutynin, though which are now very severe. AUA score 31/35, poor QOL. He also has a remote history of gross hematuria, now with 5-10 RBCs on a sample from July. PVR 0 mL today, ruling out retention. Given his symptoms, surgical history and hematuria, recommend a cystoscopy for further evaluation. Will also obtain imaging to rule out bleeding from upper tracts.     Plan:  At this time, recommend proceeding with comprehensive hematuria evaluation to include:  - Urinalysis and urine culture to rule out an acute urinary tract infection.   - Urine cytology to look for cells concerning for malignancy.  - CT urogram for upper tract imaging.  - Cystoscopy with Dr. Foote to evaluate the interior of the bladder. Follow up for hematuria as recommended by urologist performing cystoscopic evaluation.  - Will also update a PSA as it has been over a year.     Rena Nickerson, CNP  Department of Urology           Chief Complaint:   LUTS         History of Present Illness:    Sukh Craven is a 67 year old male with a history of CKD and BPH s/p HoLEP in 04/2017 with Dr. Foote who presents for follow up. LUTS improved after surgery, but then came back, and have been primarily irritative in nature (frequency, nocturia). He was started on oxybutynin, which temporarily helped. Dose was increased from 10 mg daily to 15 mg. He had also had a few remote episodes of gross hematuria, so UA with micro obtained and was negative for blood. PSA was low at 0.65.     More recently, UA obtained on 7/8/22 showed 5-10 RBCs.     Today, he states that he has continued to be extremely bothered by irritative symptoms, specifically hourly urination day and night and double-voiding. His stream has remained strong since surgery. He stopped taking the oxybutynin as his prescription was  , but he also does not feel that it really helped all that much. He denies any recent gross hematuria.    Hematuria Risk Factors:  Age >40: Yes  Smoking history: Smoked for 30 years but quit 10-15 years ago  Occupational exposure to chemicals or dyes (ie, benzenes, aromatic amines): No  History of urologic disorder or disease: BPH  History of irritative voiding symptoms: Yes  History of urinary tract infection: No  Analgesic abuse: No  History of pelvic irradiation: No         Past Medical History:     Past Medical History:   Diagnosis Date     BPH (benign prostatic hyperplasia)      Dyslipidemia      HTN (hypertension)             Past Surgical History:     Past Surgical History:   Procedure Laterality Date     COLONOSCOPY N/A 2022    Procedure: COLONOSCOPY, WITH POLYPECTOMY;  Surgeon: Long Silver MD;  Location: [facility] OR     ESOPHAGOSCOPY, GASTROSCOPY, DUODENOSCOPY (EGD), COMBINED N/A 2022    Procedure: ESOPHAGOGASTRODUODENOSCOPY, WITH BIOPSY;  Surgeon: Long Silver MD;  Location: [facility] OR     LASER HOLMIUM ENUCLEATION PROSTATE N/A 2017    Procedure: LASER HOLMIUM ENUCLEATION PROSTATE;  Holmium Laser Enucleation Of The Prostate ;  Surgeon: Cresencio Foote MD;  [facility]     REMOVAL OF SPERM DUCT(S)              Medications     Current Outpatient Medications   Medication     amLODIPine (NORVASC) 10 MG tablet     aspirin (ASA) 325 MG tablet     bisacodyl (DULCOLAX) 5 MG EC tablet     butenafine (MENTAX) 1 % CREA cream     clobetasol (TEMOVATE) 0.05 % external ointment     losartan (COZAAR) 100 MG tablet     metoprolol tartrate (LOPRESSOR) 25 MG tablet     nystatin (MYCOSTATIN) 639151 UNIT/GM external powder     omeprazole (PRILOSEC) 20 MG DR capsule     oxybutynin ER (DITROPAN XL) 15 MG 24 hr tablet     polyethylene glycol (GOLYTELY) 236 g suspension     simvastatin (ZOCOR) 20 MG tablet     tamsulosin (FLOMAX) 0.4 MG capsule     vitamin D3 (CHOLECALCIFEROL) 50  "mcg (2000 units) tablet     No current facility-administered medications for this visit.            Allergies:   No known drug allergies         Review of Systems:  From intake questionnaire   Negative 14 system review except as noted on HPI, nurse's note.         Physical Exam:   Patient is a 67 year old  male   Vitals: Blood pressure (!) 163/73, pulse 73, height 1.956 m (6' 5\"), weight 94.8 kg (209 lb).  General Appearance Adult: Alert, no acute distress, oriented  Lungs: no respiratory distress, or pursed lip breathing  Heart: No obvious jugular venous distension present  Abdomen: soft, nontender, no organomegaly or masses, Body mass index is 24.78 kg/m .  : deferred to cystoscopy    PVR: 0 mL      Labs and Pathology:    I personally reviewed all applicable laboratory data and went over findings with patient  Significant for:    CBC RESULTS:  Recent Labs   Lab Test 07/08/22  0853 05/28/19  1440 04/09/19  1737 07/14/17  1511   WBC 3.5* 4.6 4.6 3.6*   HGB 14.1 15.7 17.8* 12.5*    152 166 208        BMP RESULTS:  Recent Labs   Lab Test 07/08/22  0853 07/29/20  1711 05/28/19  1440 04/09/19  1737 05/01/18  1813    140 140 137 141   POTASSIUM 3.8 3.8 4.0 3.8 4.5   CHLORIDE 107 107 106 104 107   CO2 23 28 28 24 25   ANIONGAP 11 5 6 9 9   * 85 84 88 87   BUN 15 20 18 29 17   CR 1.05 1.38* 1.23 1.31* 1.20   GFRESTIMATED 79 54* 62 58* 62   GFRESTBLACK  --  62 72 67 74   DORIS 9.3 9.5 9.1 9.5 9.3       UA RESULTS:   Recent Labs   Lab Test 07/08/22  0853 06/29/21  0715 06/29/21  0700 10/08/20  1118   SG 1.025 1.025 1.020 1.025   URINEPH 5.5 7.0 6.0 6.0   NITRITE Negative Negative Negative Negative   RBCU 5-10*  --  0 2-5*   WBCU 0-5  --  0 0 - 5       PSA RESULTS  PSA   Date Value Ref Range Status   06/29/2021 0.65 0 - 4 ug/L Final     Comment:     Assay Method:  Chemiluminescence using Siemens Vista analyzer   02/27/2017 1.80 0 - 4 ug/L Final     Comment:     Assay Method:  Chemiluminescence using " Siemens Vista analyzer   02/24/2012 1.21 0 - 4 ug/L Final   03/26/2009 1.04 0 - 4 ug/L Final   08/07/2007 1.17 0 - 4 ug/L Final        Standardized Questionnaire:      [employer] SYMPTOM SCORE  SUM 31/35  QOL 5/6

## 2023-01-03 ENCOUNTER — OFFICE VISIT (OUTPATIENT)
Dept: UROLOGY | Facility: CLINIC | Age: 68
End: 2023-01-03
Payer: COMMERCIAL

## 2023-01-03 VITALS
WEIGHT: 209 LBS | HEIGHT: 77 IN | DIASTOLIC BLOOD PRESSURE: 73 MMHG | SYSTOLIC BLOOD PRESSURE: 163 MMHG | BODY MASS INDEX: 24.68 KG/M2 | HEART RATE: 73 BPM

## 2023-01-03 DIAGNOSIS — Z87.438 HISTORY OF BPH: ICD-10-CM

## 2023-01-03 DIAGNOSIS — R35.0 URINARY FREQUENCY: ICD-10-CM

## 2023-01-03 DIAGNOSIS — R31.29 MICROSCOPIC HEMATURIA: Primary | ICD-10-CM

## 2023-01-03 LAB
ALBUMIN UR-MCNC: 20 MG/DL
APPEARANCE UR: CLEAR
BILIRUB UR QL STRIP: NEGATIVE
COLOR UR AUTO: ABNORMAL
GLUCOSE UR STRIP-MCNC: NEGATIVE MG/DL
HGB UR QL STRIP: ABNORMAL
KETONES UR STRIP-MCNC: NEGATIVE MG/DL
LEUKOCYTE ESTERASE UR QL STRIP: ABNORMAL
NITRATE UR QL: NEGATIVE
PH UR STRIP: 6 [PH] (ref 5–7)
RBC URINE: 6 /HPF
SP GR UR STRIP: 1.02 (ref 1–1.03)
SQUAMOUS EPITHELIAL: 2 /HPF
UROBILINOGEN UR STRIP-MCNC: NORMAL MG/DL
WBC URINE: 7 /HPF

## 2023-01-03 PROCEDURE — 88112 CYTOPATH CELL ENHANCE TECH: CPT | Mod: TC | Performed by: NURSE PRACTITIONER

## 2023-01-03 PROCEDURE — 88112 CYTOPATH CELL ENHANCE TECH: CPT | Mod: 26 | Performed by: PATHOLOGY

## 2023-01-03 PROCEDURE — 51798 US URINE CAPACITY MEASURE: CPT | Performed by: NURSE PRACTITIONER

## 2023-01-03 PROCEDURE — 99213 OFFICE O/P EST LOW 20 MIN: CPT | Mod: 25 | Performed by: NURSE PRACTITIONER

## 2023-01-03 PROCEDURE — 87086 URINE CULTURE/COLONY COUNT: CPT | Performed by: NURSE PRACTITIONER

## 2023-01-03 PROCEDURE — 81001 URINALYSIS AUTO W/SCOPE: CPT | Performed by: PATHOLOGY

## 2023-01-03 ASSESSMENT — PAIN SCALES - GENERAL: PAINLEVEL: NO PAIN (0)

## 2023-01-03 NOTE — NURSING NOTE
"Chief Complaint   Patient presents with     Follow Up     Medication refill       Blood pressure (!) 163/73, pulse 73, height 1.956 m (6' 5\"), weight 94.8 kg (209 lb). Body mass index is 24.78 kg/m .    Patient Active Problem List   Diagnosis     Hematuria     BPH (benign prostatic hypertrophy)     Advanced directives, counseling/discussion     Elevated serum creatinine     History of Helicobacter infection     Hypertension goal BP (blood pressure) < 140/90     CKD (chronic kidney disease) stage 2, GFR 60-89 ml/min     CARDIOVASCULAR SCREENING; LDL GOAL LESS THAN 160     Pre-diabetes     BPH (benign prostatic hypertrophy) with urinary obstruction     Moderate aortic insufficiency       Allergies   Allergen Reactions     No Known Drug Allergies        Current Outpatient Medications   Medication Sig Dispense Refill     amLODIPine (NORVASC) 10 MG tablet Take 1 tablet (10 mg) by mouth daily 30 tablet 6     aspirin (ASA) 325 MG tablet Take 325 mg by mouth daily (Patient not taking: Reported on 7/8/2022)       bisacodyl (DULCOLAX) 5 MG EC tablet Take as directed. One day before exam take 2 tablets at 3 PM. Day of exam take 2 tablets at 6 AM. 4 tablet 0     butenafine (MENTAX) 1 % CREA cream Apply twice a day x 3 weeks to affected area 30 g 2     clobetasol (TEMOVATE) 0.05 % external ointment Apply topically 2 times daily Apply to area bid for 5- 7 days then bid as needed 60 g 1     losartan (COZAAR) 100 MG tablet Take 1 tablet (100 mg) by mouth daily 30 tablet 3     metoprolol tartrate (LOPRESSOR) 25 MG tablet Take 1 tablet (25 mg) by mouth 2 times daily       nystatin (MYCOSTATIN) 646414 UNIT/GM external powder Apply topically 2 times daily as needed (as needed) (Patient not taking: Reported on 7/8/2022) 60 g 0     omeprazole (PRILOSEC) 20 MG DR capsule One tab bid for 2 weeks, then one tab daily as needed 90 capsule 1     oxybutynin ER (DITROPAN XL) 15 MG 24 hr tablet Take 1 tablet (15 mg) by mouth daily Call clinic to " schedule follow up appointment. 30 tablet 0     polyethylene glycol (GOLYTELY) 236 g suspension Take as directed. One day before exam fill the jug with water. Cover and shake until well mixed. At 6 PM start drinking an 8oz glass of mixture every 15 minutes until jug is 1/2 empty. Store remainder in the refrigerator. Day of exam at 6 AM Drink the other half of the Golytely jug. Drink one 8-ounce glass every 15 minutes until the jug is empty. You should finish the prep 4 hours before the exam. 4000 mL 0     simvastatin (ZOCOR) 20 MG tablet Take 20 mg by mouth daily       tamsulosin (FLOMAX) 0.4 MG capsule One tab at bedtime for one month, then 2 tab qhs 180 capsule 3     vitamin D3 (CHOLECALCIFEROL) 50 mcg (2000 units) tablet Take 1 tablet (50 mcg) by mouth daily 90 tablet 3       Social History     Tobacco Use     Smoking status: Former     Packs/day: 0.50     Years: 25.00     Pack years: 12.50     Types: Cigarettes     Quit date: 3/23/2007     Years since quitting: 15.7     Smokeless tobacco: Never   Vaping Use     Vaping Use: Never used   Substance Use Topics     Alcohol use: Yes     Comment: rare- social drinker     Drug use: No       Roselia Knight  1/3/2023  9:00 AM

## 2023-01-03 NOTE — PATIENT INSTRUCTIONS
UROLOGY CLINIC VISIT PATIENT INSTRUCTIONS    1) We will send your urine for repeat analysis, culture, and cytology (to look for any unusual cells coming from the urinary tract).  2) We will also obtain a CT urogram to look at your kidneys, ureters, and bladder.  3) I've decided to update your PSA now since it has been over a year, so my apologies that we didn't discuss this today. The order is in your chart, so will have you get this done prior to your cystoscopy visit.   4) Lastly, we will have you return for a cystoscopy with Dary to evaluate the interior of your bladder.     If you have any issues, questions or concerns in the meantime, do not hesitate to contact us at [phone] or via [facility].     Rena Nickerson, CNP  Department of Urology

## 2023-01-03 NOTE — LETTER
1/3/2023       RE: Sukh Craven  [address]  [facility] [address]     Dear Colleague,    Thank you for referring your patient, Sukh Craven, to the [facility] UROLOGY CLINIC [place] at [facility]. Please see a copy of my visit note below.         Assessment and Plan:     Assessment: 67 year old male who is ~5.5 years post-HoLEP who experienced a recurrence of irritative LUTS, temporarily improved with oxybutynin, though which are now very severe. AUA score 31/35, poor QOL. He also has a remote history of gross hematuria, now with 5-10 RBCs on a sample from July. PVR 0 mL today, ruling out retention. Given his symptoms, surgical history and hematuria, recommend a cystoscopy for further evaluation. Will also obtain imaging to rule out bleeding from upper tracts.     Plan:  At this time, recommend proceeding with comprehensive hematuria evaluation to include:  - Urinalysis and urine culture to rule out an acute urinary tract infection.   - Urine cytology to look for cells concerning for malignancy.  - CT urogram for upper tract imaging.  - Cystoscopy with Dr. Foote to evaluate the interior of the bladder. Follow up for hematuria as recommended by urologist performing cystoscopic evaluation.  - Will also update a PSA as it has been over a year.     Rena Nickerson, CNP  Department of Urology           Chief Complaint:   LUTS         History of Present Illness:    Sukh Craven is a 67 year old male with a history of CKD and BPH s/p HoLEP in 04/2017 with Dr. Foote who presents for follow up. LUTS improved after surgery, but then came back, and have been primarily irritative in nature (frequency, nocturia). He was started on oxybutynin, which temporarily helped. Dose was increased from 10 mg daily to 15 mg. He had also had a few remote episodes of gross hematuria, so UA with micro obtained and was negative for blood. PSA was low at 0.65.      More recently, UA obtained on 22 showed 5-10 RBCs.     Today, he states that he has continued to be extremely bothered by irritative symptoms, specifically hourly urination day and night and double-voiding. His stream has remained strong since surgery. He stopped taking the oxybutynin as his prescription was , but he also does not feel that it really helped all that much. He denies any recent gross hematuria.    Hematuria Risk Factors:  Age >40: Yes  Smoking history: Smoked for 30 years but quit 10-15 years ago  Occupational exposure to chemicals or dyes (ie, benzenes, aromatic amines): No  History of urologic disorder or disease: BPH  History of irritative voiding symptoms: Yes  History of urinary tract infection: No  Analgesic abuse: No  History of pelvic irradiation: No         Past Medical History:     Past Medical History:   Diagnosis Date     BPH (benign prostatic hyperplasia)      Dyslipidemia      HTN (hypertension)             Past Surgical History:     Past Surgical History:   Procedure Laterality Date     COLONOSCOPY N/A 2022    Procedure: COLONOSCOPY, WITH POLYPECTOMY;  Surgeon: Long Silver MD;  Location: [facility] OR     ESOPHAGOSCOPY, GASTROSCOPY, DUODENOSCOPY (EGD), COMBINED N/A 2022    Procedure: ESOPHAGOGASTRODUODENOSCOPY, WITH BIOPSY;  Surgeon: Long Silver MD;  Location: [facility] OR     LASER HOLMIUM ENUCLEATION PROSTATE N/A 2017    Procedure: LASER HOLMIUM ENUCLEATION PROSTATE;  Holmium Laser Enucleation Of The Prostate ;  Surgeon: Cresencio Foote MD;  [facility]     REMOVAL OF SPERM DUCT(S)              Medications     Current Outpatient Medications   Medication     amLODIPine (NORVASC) 10 MG tablet     aspirin (ASA) 325 MG tablet     bisacodyl (DULCOLAX) 5 MG EC tablet     butenafine (MENTAX) 1 % CREA cream     clobetasol (TEMOVATE) 0.05 % external ointment     losartan (COZAAR) 100 MG tablet     metoprolol tartrate (LOPRESSOR) 25 MG  "tablet     nystatin (MYCOSTATIN) 887329 UNIT/GM external powder     omeprazole (PRILOSEC) 20 MG DR capsule     oxybutynin ER (DITROPAN XL) 15 MG 24 hr tablet     polyethylene glycol (GOLYTELY) 236 g suspension     simvastatin (ZOCOR) 20 MG tablet     tamsulosin (FLOMAX) 0.4 MG capsule     vitamin D3 (CHOLECALCIFEROL) 50 mcg (2000 units) tablet     No current facility-administered medications for this visit.            Allergies:   No known drug allergies         Review of Systems:  From intake questionnaire   Negative 14 system review except as noted on HPI, nurse's note.         Physical Exam:   Patient is a 67 year old  male   Vitals: Blood pressure (!) 163/73, pulse 73, height 1.956 m (6' 5\"), weight 94.8 kg (209 lb).  General Appearance Adult: Alert, no acute distress, oriented  Lungs: no respiratory distress, or pursed lip breathing  Heart: No obvious jugular venous distension present  Abdomen: soft, nontender, no organomegaly or masses, Body mass index is 24.78 kg/m .  : deferred to cystoscopy    PVR: 0 mL      Labs and Pathology:    I personally reviewed all applicable laboratory data and went over findings with patient  Significant for:    CBC RESULTS:  Recent Labs   Lab Test 07/08/22  0853 05/28/19  1440 04/09/19  1737 07/14/17  1511   WBC 3.5* 4.6 4.6 3.6*   HGB 14.1 15.7 17.8* 12.5*    152 166 208        BMP RESULTS:  Recent Labs   Lab Test 07/08/22  0853 07/29/20  1711 05/28/19  1440 04/09/19  1737 05/01/18  1813    140 140 137 141   POTASSIUM 3.8 3.8 4.0 3.8 4.5   CHLORIDE 107 107 106 104 107   CO2 23 28 28 24 25   ANIONGAP 11 5 6 9 9   * 85 84 88 87   BUN 15 20 18 29 17   CR 1.05 1.38* 1.23 1.31* 1.20   GFRESTIMATED 79 54* 62 58* 62   GFRESTBLACK  --  62 72 67 74   DORIS 9.3 9.5 9.1 9.5 9.3       UA RESULTS:   Recent Labs   Lab Test 07/08/22  0853 06/29/21  0715 06/29/21  0700 10/08/20  1118   SG 1.025 1.025 1.020 1.025   URINEPH 5.5 7.0 6.0 6.0   NITRITE Negative Negative Negative " Negative   RBCU 5-10*  --  0 2-5*   WBCU 0-5  --  0 0 - 5       PSA RESULTS  PSA   Date Value Ref Range Status   06/29/2021 0.65 0 - 4 ug/L Final     Comment:     Assay Method:  Chemiluminescence using Siemens Vista analyzer   02/27/2017 1.80 0 - 4 ug/L Final     Comment:     Assay Method:  Chemiluminescence using Siemens Vista analyzer   02/24/2012 1.21 0 - 4 ug/L Final   03/26/2009 1.04 0 - 4 ug/L Final   08/07/2007 1.17 0 - 4 ug/L Final        Standardized Questionnaire:      [employer] SYMPTOM SCORE  SUM 31/35  QOL 5/6

## 2023-01-04 LAB
PATH REPORT.COMMENTS IMP SPEC: NORMAL
PATH REPORT.FINAL DX SPEC: NORMAL
PATH REPORT.GROSS SPEC: NORMAL
PATH REPORT.MICROSCOPIC SPEC OTHER STN: NORMAL
PATH REPORT.RELEVANT HX SPEC: NORMAL

## 2023-01-05 LAB — BACTERIA UR CULT: NORMAL

## 2023-01-10 ENCOUNTER — PRE VISIT (OUTPATIENT)
Dept: UROLOGY | Facility: CLINIC | Age: 68
End: 2023-01-10

## 2023-01-10 NOTE — TELEPHONE ENCOUNTER
Reason for visit: Cystoscopy    Dx/Hx/Sx: Microscopic hematuria / Urinary frequency / Hx of BPH    Records/images: In EPIC    At rooming: paper AUA, possible flow/pvr post-cysto; collect urine    Jarad Carranza, KATELYN  January 10, 2023  1:33 PM

## 2023-01-11 ENCOUNTER — LAB (OUTPATIENT)
Dept: LAB | Facility: CLINIC | Age: 68
End: 2023-01-11
Payer: COMMERCIAL

## 2023-01-11 DIAGNOSIS — R31.29 MICROSCOPIC HEMATURIA: ICD-10-CM

## 2023-01-11 DIAGNOSIS — Z87.438 HISTORY OF BPH: ICD-10-CM

## 2023-01-11 LAB — PSA SERPL-MCNC: 0.36 NG/ML (ref 0–4.5)

## 2023-01-11 PROCEDURE — 84153 ASSAY OF PSA TOTAL: CPT | Performed by: PATHOLOGY

## 2023-01-11 PROCEDURE — 36415 COLL VENOUS BLD VENIPUNCTURE: CPT | Performed by: PATHOLOGY

## 2023-01-14 ENCOUNTER — ANCILLARY PROCEDURE (OUTPATIENT)
Dept: CT IMAGING | Facility: CLINIC | Age: 68
End: 2023-01-14
Attending: NURSE PRACTITIONER
Payer: COMMERCIAL

## 2023-01-14 DIAGNOSIS — R31.29 MICROSCOPIC HEMATURIA: ICD-10-CM

## 2023-01-14 LAB
CREAT BLD-MCNC: 1.1 MG/DL (ref 0.7–1.3)
GFR SERPL CREATININE-BSD FRML MDRD: >60 ML/MIN/1.73M2

## 2023-01-14 PROCEDURE — 82565 ASSAY OF CREATININE: CPT | Performed by: PATHOLOGY

## 2023-01-14 PROCEDURE — 74178 CT ABD&PLV WO CNTR FLWD CNTR: CPT | Performed by: STUDENT IN AN ORGANIZED HEALTH CARE EDUCATION/TRAINING PROGRAM

## 2023-01-14 RX ORDER — IOPAMIDOL 755 MG/ML
116 INJECTION, SOLUTION INTRAVASCULAR ONCE
Status: COMPLETED | OUTPATIENT
Start: 2023-01-14 | End: 2023-01-14

## 2023-01-14 RX ADMIN — IOPAMIDOL 116 ML: 755 INJECTION, SOLUTION INTRAVASCULAR at 08:48

## 2023-02-14 ENCOUNTER — TELEPHONE (OUTPATIENT)
Dept: UROLOGY | Facility: CLINIC | Age: 68
End: 2023-02-14

## 2023-02-14 ENCOUNTER — OFFICE VISIT (OUTPATIENT)
Dept: UROLOGY | Facility: CLINIC | Age: 68
End: 2023-02-14
Payer: COMMERCIAL

## 2023-02-14 ENCOUNTER — PATIENT OUTREACH (OUTPATIENT)
Dept: UROLOGY | Facility: CLINIC | Age: 68
End: 2023-02-14

## 2023-02-14 VITALS
HEART RATE: 69 BPM | WEIGHT: 200 LBS | DIASTOLIC BLOOD PRESSURE: 76 MMHG | SYSTOLIC BLOOD PRESSURE: 170 MMHG | BODY MASS INDEX: 23.62 KG/M2 | OXYGEN SATURATION: 98 % | HEIGHT: 77 IN

## 2023-02-14 DIAGNOSIS — R35.0 URINARY FREQUENCY: ICD-10-CM

## 2023-02-14 DIAGNOSIS — R31.29 MICROSCOPIC HEMATURIA: Primary | ICD-10-CM

## 2023-02-14 DIAGNOSIS — N32.0 BLADDER NECK CONTRACTURE: ICD-10-CM

## 2023-02-14 LAB
ALBUMIN UR-MCNC: 20 MG/DL
APPEARANCE UR: CLEAR
BILIRUB UR QL STRIP: NEGATIVE
COLOR UR AUTO: YELLOW
GLUCOSE UR STRIP-MCNC: NEGATIVE MG/DL
HGB UR QL STRIP: NEGATIVE
KETONES UR STRIP-MCNC: ABNORMAL MG/DL
LEUKOCYTE ESTERASE UR QL STRIP: ABNORMAL
MUCOUS THREADS #/AREA URNS LPF: PRESENT /LPF
NITRATE UR QL: NEGATIVE
PH UR STRIP: 6 [PH] (ref 5–7)
RBC URINE: 3 /HPF
SP GR UR STRIP: 1.02 (ref 1–1.03)
SQUAMOUS EPITHELIAL: 1 /HPF
UROBILINOGEN UR STRIP-MCNC: NORMAL MG/DL
WBC URINE: 2 /HPF

## 2023-02-14 PROCEDURE — 52000 CYSTOURETHROSCOPY: CPT | Performed by: UROLOGY

## 2023-02-14 PROCEDURE — 81001 URINALYSIS AUTO W/SCOPE: CPT | Performed by: PATHOLOGY

## 2023-02-14 PROCEDURE — 51798 US URINE CAPACITY MEASURE: CPT | Performed by: UROLOGY

## 2023-02-14 RX ORDER — METOPROLOL SUCCINATE 50 MG/1
1 TABLET, EXTENDED RELEASE ORAL
COMMUNITY
Start: 2022-12-21 | End: 2023-06-16 | Stop reason: ALTCHOICE

## 2023-02-14 RX ORDER — LIDOCAINE HYDROCHLORIDE 20 MG/ML
JELLY TOPICAL ONCE
Status: COMPLETED | OUTPATIENT
Start: 2023-02-14 | End: 2023-02-14

## 2023-02-14 RX ORDER — SIMVASTATIN 20 MG
1 TABLET ORAL AT BEDTIME
COMMUNITY
Start: 2022-01-20 | End: 2023-06-16

## 2023-02-14 RX ORDER — METOPROLOL SUCCINATE 50 MG/1
1 TABLET, EXTENDED RELEASE ORAL DAILY
COMMUNITY
Start: 2022-02-25 | End: 2023-06-16 | Stop reason: ALTCHOICE

## 2023-02-14 RX ADMIN — LIDOCAINE HYDROCHLORIDE: 20 JELLY TOPICAL at 13:06

## 2023-02-14 ASSESSMENT — PAIN SCALES - GENERAL: PAINLEVEL: NO PAIN (0)

## 2023-02-14 NOTE — LETTER
2/14/2023       RE: Sukh Craven  [address]  [facility] [address]     Dear Colleague,    Thank you for referring your patient, Sukh Craven, to the [facility] UROLOGY CLINIC [place] at [facility]. Please see a copy of my visit note below.    CYSTOSCOPY PROCEDURE NOTE    Reason for cystoscopy: Bothersome LUTS    Brief History: 66 yo M with HoLEP 5+ years ago.  Was doing well for years but then more recently started to have bothersome frequency, urgency and sense of incomplete emptying.    CYSTOSCOPY  After obtaining informed consent, the patient was prepped and draped in the standard sterile fashion.  The 15 K[place] flexible cystoscope was inserted through the urethral meatus.      The anterior urethra was:  normal without stricture.    The external sphincter was  appropriately coapted.   The prostatic urethra demonstrated an open holep fossa hypertrophy.    The bladder neck was contracted, there was a 6-7 F opening that was highly occlusive and we could not pass with the scope.      The patient tolerated the procedure well without complication.        Was able to completely empty to toilet with 0 PVR      Assessment/Plan: 66 yo M with remote hx of HoLEP now with bladder neck contracture  -Discussed nature of BNC, treatment options  -Recommend bladder neck incision with holmium laser next available.  -Risks, benefits, alternatives reviewed    ICresencio saw and evaluated this patient and agree with the plan as stated above.  I personally performed all listed procedures.

## 2023-02-14 NOTE — TELEPHONE ENCOUNTER
Spoke with: Patient       Date of surgery: Thursday Feb 16 2023       Location: [facility]      Informed patient they will need a adult : YES      Pre op with provider: TARA      H&P Scheduled in PAC- Dr Foote will do DOS         Pre procedure covid : Not required       Additional imaging: NA        Surgery Packet :NA      Additional comments:Please call patient with surgery teaching.

## 2023-02-14 NOTE — NURSING NOTE
"Chief Complaint   Patient presents with     Cystoscopy     Microscopic hematuria; urinary frequency       Blood pressure (!) 170/76, pulse 69, height 1.956 m (6' 5\"), weight 90.7 kg (200 lb), SpO2 98 %. Body mass index is 23.72 kg/m .    Patient Active Problem List   Diagnosis     Hematuria     BPH (benign prostatic hypertrophy)     Advanced directives, counseling/discussion     Elevated serum creatinine     History of Helicobacter infection     Hypertension goal BP (blood pressure) < 140/90     CKD (chronic kidney disease) stage 2, GFR 60-89 ml/min     CARDIOVASCULAR SCREENING; LDL GOAL LESS THAN 160     Pre-diabetes     BPH (benign prostatic hypertrophy) with urinary obstruction     Moderate aortic insufficiency       Allergies   Allergen Reactions     No Known Drug Allergies        Current Outpatient Medications   Medication Sig Dispense Refill     metoprolol succinate ER (TOPROL XL) 50 MG 24 hr tablet Take 1 tablet by mouth daily       simvastatin (ZOCOR) 20 MG tablet Take 1 tablet by mouth At Bedtime       amLODIPine (NORVASC) 10 MG tablet Take 1 tablet (10 mg) by mouth daily 30 tablet 6     butenafine (MENTAX) 1 % CREA cream Apply twice a day x 3 weeks to affected area 30 g 2     clobetasol (TEMOVATE) 0.05 % external ointment Apply topically 2 times daily Apply to area bid for 5- 7 days then bid as needed 60 g 1     losartan (COZAAR) 100 MG tablet Take 1 tablet (100 mg) by mouth daily 30 tablet 3     metoprolol succinate ER (TOPROL XL) 50 MG 24 hr tablet Take 1 tablet by mouth daily at 2 pm       metoprolol tartrate (LOPRESSOR) 25 MG tablet Take 1 tablet (25 mg) by mouth 2 times daily       nystatin (MYCOSTATIN) 267812 UNIT/GM external powder Apply topically 2 times daily as needed (as needed) (Patient not taking: Reported on 7/8/2022) 60 g 0     omeprazole (PRILOSEC) 20 MG DR capsule One tab bid for 2 weeks, then one tab daily as needed 90 capsule 1     oxybutynin ER (DITROPAN XL) 15 MG 24 hr tablet Take 1 " tablet (15 mg) by mouth daily Call clinic to schedule follow up appointment. 30 tablet 0     tamsulosin (FLOMAX) 0.4 MG capsule One tab at bedtime for one month, then 2 tab qhs 180 capsule 3     vitamin D3 (CHOLECALCIFEROL) 50 mcg (2000 units) tablet Take 1 tablet (50 mcg) by mouth daily 90 tablet 3       Social History     Tobacco Use     Smoking status: Former     Packs/day: 0.50     Years: 25.00     Pack years: 12.50     Types: Cigarettes     Quit date: 3/23/2007     Years since quitting: 15.9     Smokeless tobacco: Never   Vaping Use     Vaping Use: Never used   Substance Use Topics     Alcohol use: Yes     Comment: rare- social drinker     Drug use: No       Invasive Procedure Safety Checklist:    Procedure: Cystoscopy    Action: Complete sections and checkboxes as appropriate.    Pre-procedure:  1. Patient ID Verified with 2 identifiers (Natasha and  or MRN) : YES    2. Procedure and site verified with patient/designee (when able) : YES    3. Accurate consent documentation in medical record : YES    4. H&P (or appropriate assessment) documented in medical record : N/A  H&P must be up to 30 days prior to procedure an updated within 24 hours of                 Procedure as applicable.     5. Relevant diagnostic and radiology test results appropriately labeled and displayed as applicable : YES    6. Blood products, implants, devices, and/or special equipment available for the procedure as applicable : YES    7. Procedure site(s) marked with provider initials [Exclusions: none] : NO    8. Marking not required. Reason : Yes  Procedure does not require site marking    Time Out:     Time-Out performed immediately prior to starting procedure, including verbal and active participation of all team members addressing: YES    1. Correct patient identity.  2. Confirmed that the correct side and site are marked.  3. An accurate procedure to be done.  4. Agreement on the procedure to be done.  5. Correct patient  position.  6. Relevant images and results are properly labeled and appropriately displayed.  7. The need to administer antibiotics or fluids for irrigation purposes during the procedure as applicable.  8. Safety precautions based on patient history or medication use.    During Procedure: Verification of correct person, site, and procedure occurs any time the responsibility for care of the patient is transferred to another member of the care team.    The following medication was given:     MEDICATION:  Lidocaine without epinephrine 2% jelly  ROUTE: urethral   SITE: urethral   DOSE: 10 mL  LOT #: [id number]  : [employer]  EXPIRATION DATE: 8-24  NDC#: [address]-2   Was there drug waste? No    Prior to med admin, verified patient identity using patient's name and date of birth.  Due to med administration, patient instructed to remain in clinic for 15 minutes  afterwards, and to report any adverse reaction to me immediately.    Drug Amount Wasted:  None.  Vial/Syringe: Syringe      KATELYN Monroy  2/14/2023  1:06 PM

## 2023-02-14 NOTE — PROGRESS NOTES
[facility] called and lm with direct cb number for surgery teaching    UA back and resulted, per Dr Foote no culture needed    Pt to have pre-op day of surgery      [facility]  [phone]

## 2023-02-14 NOTE — PATIENT INSTRUCTIONS
"AFTER YOUR CYSTOSCOPY        You have just completed a cystoscopy, or \"cysto\", which allowed your physician to learn more about your bladder (or to remove a stent placed after surgery). We suggest that you continue to avoid caffeine, fruit juice, and alcohol for the next 24 hours, however, you are encouraged to return to your normal activities.         A few things that are considered normal after your cystoscopy:     * Small amount of bleeding (or spotting) that clears within the next 24 hours     * Slight burning sensation with urination     * Sensation of needing to void more frequently     * The feeling of \"air\" in your urine     * Mild discomfort that is relieved with Tylenol        Please contact our office promptly if you:     * Develop a fever above 101 degrees     * Are unable to urinate     * Develop bright red blood that does not stop     * Severe pain or swelling         Please contact our office with any concerns or questions @[phone]   "

## 2023-02-15 ENCOUNTER — ANESTHESIA EVENT (OUTPATIENT)
Dept: SURGERY | Facility: AMBULATORY SURGERY CENTER | Age: 68
End: 2023-02-15
Payer: COMMERCIAL

## 2023-02-15 RX ORDER — OXYCODONE HYDROCHLORIDE 5 MG/1
5 TABLET ORAL EVERY 4 HOURS PRN
Status: DISCONTINUED | OUTPATIENT
Start: 2023-02-15 | End: 2023-02-17 | Stop reason: HOSPADM

## 2023-02-15 RX ORDER — OXYCODONE HYDROCHLORIDE 5 MG/1
10 TABLET ORAL EVERY 4 HOURS PRN
Status: DISCONTINUED | OUTPATIENT
Start: 2023-02-15 | End: 2023-02-17 | Stop reason: HOSPADM

## 2023-02-15 NOTE — PROGRESS NOTES
CYSTOSCOPY PROCEDURE NOTE    Reason for cystoscopy: Bothersome LUTS    Brief History: 68 yo M with HoLEP 5+ years ago.  Was doing well for years but then more recently started to have bothersome frequency, urgency and sense of incomplete emptying.    CYSTOSCOPY  After obtaining informed consent, the patient was prepped and draped in the standard sterile fashion.  The 15 [place]hi flexible cystoscope was inserted through the urethral meatus.      The anterior urethra was:  normal without stricture.    The external sphincter was  appropriately coapted.   The prostatic urethra demonstrated an open holep fossa hypertrophy.    The bladder neck was contracted, there was a 6-7 F opening that was highly occlusive and we could not pass with the scope.      The patient tolerated the procedure well without complication.        Was able to completely empty to toilet with 0 PVR      Assessment/Plan: 68 yo M with remote hx of HoLEP now with bladder neck contracture  -Discussed nature of BNC, treatment options  -Recommend bladder neck incision with holmium laser next available.  -Risks, benefits, alternatives reviewed    ICresencio saw and evaluated this patient and agree with the plan as stated above.  I personally performed all listed procedures.

## 2023-02-16 ENCOUNTER — ANESTHESIA (OUTPATIENT)
Dept: SURGERY | Facility: AMBULATORY SURGERY CENTER | Age: 68
End: 2023-02-16
Payer: COMMERCIAL

## 2023-02-16 ENCOUNTER — HOSPITAL ENCOUNTER (OUTPATIENT)
Facility: AMBULATORY SURGERY CENTER | Age: 68
Discharge: HOME | End: 2023-02-16
Attending: UROLOGY
Payer: COMMERCIAL

## 2023-02-16 VITALS
SYSTOLIC BLOOD PRESSURE: 152 MMHG | HEIGHT: 77 IN | OXYGEN SATURATION: 94 % | RESPIRATION RATE: 16 BRPM | HEART RATE: 63 BPM | TEMPERATURE: 97.3 F | BODY MASS INDEX: 23.62 KG/M2 | DIASTOLIC BLOOD PRESSURE: 82 MMHG | WEIGHT: 200 LBS

## 2023-02-16 DIAGNOSIS — N32.0 BLADDER NECK CONTRACTURE: ICD-10-CM

## 2023-02-16 PROCEDURE — 52276 CYSTOSCOPY AND TREATMENT: CPT

## 2023-02-16 PROCEDURE — 52276 CYSTOSCOPY AND TREATMENT: CPT | Mod: GC | Performed by: UROLOGY

## 2023-02-16 RX ORDER — SODIUM CHLORIDE, SODIUM LACTATE, POTASSIUM CHLORIDE, CALCIUM CHLORIDE 600; 310; 30; 20 MG/100ML; MG/100ML; MG/100ML; MG/100ML
INJECTION, SOLUTION INTRAVENOUS CONTINUOUS
Status: DISCONTINUED | OUTPATIENT
Start: 2023-02-16 | End: 2023-02-16 | Stop reason: HOSPADM

## 2023-02-16 RX ORDER — HYDROMORPHONE HYDROCHLORIDE 1 MG/ML
0.4 INJECTION, SOLUTION INTRAMUSCULAR; INTRAVENOUS; SUBCUTANEOUS EVERY 5 MIN PRN
Status: DISCONTINUED | OUTPATIENT
Start: 2023-02-16 | End: 2023-02-16 | Stop reason: HOSPADM

## 2023-02-16 RX ORDER — GLYCOPYRROLATE 0.2 MG/ML
INJECTION, SOLUTION INTRAMUSCULAR; INTRAVENOUS PRN
Status: DISCONTINUED | OUTPATIENT
Start: 2023-02-16 | End: 2023-02-16

## 2023-02-16 RX ORDER — ONDANSETRON 2 MG/ML
INJECTION INTRAMUSCULAR; INTRAVENOUS PRN
Status: DISCONTINUED | OUTPATIENT
Start: 2023-02-16 | End: 2023-02-16

## 2023-02-16 RX ORDER — ONDANSETRON 2 MG/ML
4 INJECTION INTRAMUSCULAR; INTRAVENOUS EVERY 30 MIN PRN
Status: DISCONTINUED | OUTPATIENT
Start: 2023-02-16 | End: 2023-02-16 | Stop reason: HOSPADM

## 2023-02-16 RX ORDER — CEFAZOLIN SODIUM 2 G/50ML
2 SOLUTION INTRAVENOUS SEE ADMIN INSTRUCTIONS
Status: DISCONTINUED | OUTPATIENT
Start: 2023-02-16 | End: 2023-02-16 | Stop reason: HOSPADM

## 2023-02-16 RX ORDER — FENTANYL CITRATE 50 UG/ML
25 INJECTION, SOLUTION INTRAMUSCULAR; INTRAVENOUS EVERY 5 MIN PRN
Status: DISCONTINUED | OUTPATIENT
Start: 2023-02-16 | End: 2023-02-16 | Stop reason: HOSPADM

## 2023-02-16 RX ORDER — CEFAZOLIN SODIUM 2 G/50ML
2 SOLUTION INTRAVENOUS
Status: COMPLETED | OUTPATIENT
Start: 2023-02-16 | End: 2023-02-16

## 2023-02-16 RX ORDER — NITROFURANTOIN 25; 75 MG/1; MG/1
100 CAPSULE ORAL 2 TIMES DAILY
Qty: 10 CAPSULE | Refills: 0 | Status: SHIPPED | OUTPATIENT
Start: 2023-02-16 | End: 2023-06-16

## 2023-02-16 RX ORDER — FENTANYL CITRATE 50 UG/ML
50 INJECTION, SOLUTION INTRAMUSCULAR; INTRAVENOUS EVERY 5 MIN PRN
Status: DISCONTINUED | OUTPATIENT
Start: 2023-02-16 | End: 2023-02-16 | Stop reason: HOSPADM

## 2023-02-16 RX ORDER — PROPOFOL 10 MG/ML
INJECTION, EMULSION INTRAVENOUS CONTINUOUS PRN
Status: DISCONTINUED | OUTPATIENT
Start: 2023-02-16 | End: 2023-02-16

## 2023-02-16 RX ORDER — ACETAMINOPHEN 325 MG/1
975 TABLET ORAL ONCE
Status: COMPLETED | OUTPATIENT
Start: 2023-02-16 | End: 2023-02-16

## 2023-02-16 RX ORDER — HYDROMORPHONE HYDROCHLORIDE 1 MG/ML
0.2 INJECTION, SOLUTION INTRAMUSCULAR; INTRAVENOUS; SUBCUTANEOUS EVERY 5 MIN PRN
Status: DISCONTINUED | OUTPATIENT
Start: 2023-02-16 | End: 2023-02-16 | Stop reason: HOSPADM

## 2023-02-16 RX ORDER — OXYCODONE HYDROCHLORIDE 5 MG/1
10 TABLET ORAL EVERY 4 HOURS PRN
Status: DISCONTINUED | OUTPATIENT
Start: 2023-02-16 | End: 2023-02-16 | Stop reason: HOSPADM

## 2023-02-16 RX ORDER — LIDOCAINE HYDROCHLORIDE 20 MG/ML
INJECTION, SOLUTION INFILTRATION; PERINEURAL PRN
Status: DISCONTINUED | OUTPATIENT
Start: 2023-02-16 | End: 2023-02-16

## 2023-02-16 RX ORDER — OXYCODONE HYDROCHLORIDE 5 MG/1
5 TABLET ORAL EVERY 4 HOURS PRN
Status: DISCONTINUED | OUTPATIENT
Start: 2023-02-16 | End: 2023-02-16 | Stop reason: HOSPADM

## 2023-02-16 RX ORDER — DEXAMETHASONE SODIUM PHOSPHATE 4 MG/ML
INJECTION, SOLUTION INTRA-ARTICULAR; INTRALESIONAL; INTRAMUSCULAR; INTRAVENOUS; SOFT TISSUE PRN
Status: DISCONTINUED | OUTPATIENT
Start: 2023-02-16 | End: 2023-02-16

## 2023-02-16 RX ORDER — LIDOCAINE 40 MG/G
CREAM TOPICAL
Status: DISCONTINUED | OUTPATIENT
Start: 2023-02-16 | End: 2023-02-16 | Stop reason: HOSPADM

## 2023-02-16 RX ORDER — PHENAZOPYRIDINE HYDROCHLORIDE 100 MG/1
100 TABLET, FILM COATED ORAL 3 TIMES DAILY PRN
Qty: 9 TABLET | Refills: 0 | Status: SHIPPED | OUTPATIENT
Start: 2023-02-16 | End: 2023-06-16

## 2023-02-16 RX ORDER — PROPOFOL 10 MG/ML
INJECTION, EMULSION INTRAVENOUS PRN
Status: DISCONTINUED | OUTPATIENT
Start: 2023-02-16 | End: 2023-02-16

## 2023-02-16 RX ORDER — ONDANSETRON 4 MG/1
4 TABLET, ORALLY DISINTEGRATING ORAL EVERY 30 MIN PRN
Status: DISCONTINUED | OUTPATIENT
Start: 2023-02-16 | End: 2023-02-16 | Stop reason: HOSPADM

## 2023-02-16 RX ADMIN — ONDANSETRON 4 MG: 2 INJECTION INTRAMUSCULAR; INTRAVENOUS at 10:24

## 2023-02-16 RX ADMIN — LIDOCAINE HYDROCHLORIDE 100 MG: 20 INJECTION, SOLUTION INFILTRATION; PERINEURAL at 10:18

## 2023-02-16 RX ADMIN — SODIUM CHLORIDE, SODIUM LACTATE, POTASSIUM CHLORIDE, CALCIUM CHLORIDE: 600; 310; 30; 20 INJECTION, SOLUTION INTRAVENOUS at 08:59

## 2023-02-16 RX ADMIN — CEFAZOLIN SODIUM 2 G: 2 SOLUTION INTRAVENOUS at 10:11

## 2023-02-16 RX ADMIN — DEXAMETHASONE SODIUM PHOSPHATE 4 MG: 4 INJECTION, SOLUTION INTRA-ARTICULAR; INTRALESIONAL; INTRAMUSCULAR; INTRAVENOUS; SOFT TISSUE at 10:24

## 2023-02-16 RX ADMIN — ACETAMINOPHEN 975 MG: 325 TABLET ORAL at 08:58

## 2023-02-16 RX ADMIN — PROPOFOL 200 MG: 10 INJECTION, EMULSION INTRAVENOUS at 10:19

## 2023-02-16 RX ADMIN — GLYCOPYRROLATE 0.2 MG: 0.2 INJECTION, SOLUTION INTRAMUSCULAR; INTRAVENOUS at 10:11

## 2023-02-16 RX ADMIN — PROPOFOL 150 MCG/KG/MIN: 10 INJECTION, EMULSION INTRAVENOUS at 10:19

## 2023-02-16 RX ADMIN — PROPOFOL 100 MCG/KG/MIN: 10 INJECTION, EMULSION INTRAVENOUS at 10:48

## 2023-02-16 NOTE — ANESTHESIA CARE TRANSFER NOTE
Patient: Sukh Craven    Procedure: Procedure(s):  CYSTOSCOPY, WITH MULTIPLE INCISIONS OF BLADDER NECK WITH HOLMIUM LASER       Diagnosis: Bladder neck contracture [N32.0]  Diagnosis Additional Information: No value filed.    Anesthesia Type:   General     Note:    Oropharynx: oropharynx clear of all foreign objects  Level of Consciousness: awake  Oxygen Supplementation: face mask  Level of Supplemental Oxygen (L/min / FiO2): 6  Independent Airway: airway patency satisfactory and stable  Dentition: dentition unchanged  Vital Signs Stable: post-procedure vital signs reviewed and stable  Report to RN Given: handoff report given  Patient transferred to: PACU  Comments: VSS and WNL, comfortable, no PONV, report to Nathan RN  Handoff Report: Identifed the Patient, Identified the Reponsible Provider, Reviewed the pertinent medical history, Discussed the surgical course, Reviewed Intra-OP anesthesia mangement and issues during anesthesia, Set expectations for post-procedure period and Allowed opportunity for questions and acknowledgement of understanding      Vitals:  Vitals Value Taken Time   /75 02/16/23 1128   Temp 36.2  C (97.1  F) 02/16/23 1128   Pulse 65 02/16/23 1128   Resp 16 02/16/23 1128   SpO2 97 % 02/16/23 1128       Electronically Signed By: HEMAL Owens CRNA  February 16, 2023  11:38 AM

## 2023-02-16 NOTE — DISCHARGE INSTRUCTIONS
Diet: No restrictions    Activity: No heavy lifting this weekend    Medications:  Macrobid - take until gone to help prevent infection  Pyridium - take as needed for urinary tract burning/discomfort    Expect some blood in the urine, this is normal as long as your catheter continues to drain.    Follow up:  Return to clinic (same building as surgery was performed, but on 4th floor) tomorrow at 9am to have the catheter removed    Catheter Care:  You will be discharged home with a urinary catheter in place. Your nurse will instruct you on how to care for this at home.  Empty the bag into the toilet several times daily   Keep the catheter secured to your thigh using the securement device. Ensure the tubing doesn t become snagged or tugged.  You may shower normally and allow soapy water to run over the catheter  Sometimes people notice build-up at the insertion site of the catheter to the urethra. You may gently wipe this away with a towelette or washcloth.     Common catheter issues:  Urine leaking around the catheter. This is a common finding in patients with a catheter and is usually no cause for alarm. Typically, it is due to bladder spasms. Bladder spasms occur because your bladder is not used to a forgein object and tries to  squeeze  this out, releasing urine around the catheter. Sometimes patients can experience abdominal cramping. Typically, bladder spasms resolve after a few days. If you find bladder spasms particularly bothersome, ask your doctor about medication to calm these.   Occasionally, urine leaking from around that catheter can be due to a blockage of the catheter- especially if your urine appears bloody. If the urine does not appear to be draining through the tube and your bladder feels full, please contact our department.  The feeling of needing to urinate. Some patients feel the persistent need to urinate with the catheter in place. As long as the catheter is still draining, this is likely due to  "a  forgein object sensation  in your bladder. Even though your bladder is empty of urine, the catheter balloon gives a false sensation that your bladder is full. This typically subsides after 24-48 hours but there are medications that may be able to help with this if the feeling is too bothersome.   Catheter sliding in and out. There is a balloon filled with water inside your bladder to prevent the catheter from slipping out. The tubing is quite long and may slide back and forth freely. If the catheter does slide completely out, save the catheter and call our department immediately.   You may use Vaseline at the tip of the penis to prevent chafing   If you need extra catheter supplies (leg bags, securement devices), call the clinic during business hours to arrange for this.      - Call or return sooner than your regularly scheduled visit if you develop any of the following: fever (greater than 101.5), uncontrolled pain, uncontrolled nausea or vomiting, or inability to urinate.    Phone numbers:   - Monday through Friday 8am to 4:30pm: Call [phone] with questions, requests for medication refills, or to schedule or confirm an appointment.  - Nights or weekends: call the after hours emergency pager - [phone] and tell the  \"I would like to page the Urology Resident on call.\" Please note, due to prescribing laws, resident physicians are unable to prescribe narcotics after-hours. If you feel as though you will need a refill of a narcotic pain medication, you will need to call the clinic during business hours OR seek emergency care.  - For emergencies, call 911      [facility]  Home Care Following Anesthesia  For 24 hours after surgery:  Get plenty of rest.  A responsible adult must stay with you for at least 24 hours after you leave the surgery center.  Do not drive or use heavy equipment.  If you have weakness or tingling, don't drive or use heavy equipment until " "this feeling goes away.   Do not drink alcohol.   Avoid strenuous or risky activities.  Ask for help when climbing stairs.  You may feel lightheaded.  IF so, sit for a few minutes before standing.  Have someone help you get up.   If you have nausea (feel sick to your stomach): Drink only clear liquids such as apple juice, ginger ale, broth or 7-Up.  Rest may also help.  Be sure to drink enough fluids.  Move to a regular diet as you feel able.   You may have a slight fever.  Call the doctor if your fever is over 100 F (37.7 C) (taken under the tongue) or lasts longer than 24 hours.  You may have a dry mouth, a sore throat, muscle aches or trouble sleeping. These should go away after 24 hours.  Do not make important or legal decisions.   It is recommended to avoid smoking.        Today you received a Marcaine or bupivacaine block to numb the nerves near your surgery site.  This is a block using local anesthetic or \"numbing\" medication injected around the nerves to anesthetize or \"numb\" the area supplied by those nerves.  This block is injected into the muscle layer near your surgical site.  The medication may numb the location where you had surgery for 6-18 hours, but may last up to 24 hours.  If your surgical site is an arm or leg you should be careful with your affected limb, since it is possible to injure your limb without being aware of it due to the numbing.  Until full feeling returns, you should guard against bumping or hitting your limb, and avoid extreme hot or cold temperatures on the skin.  As the block wears off, the feeling will return as a tingling or prickly sensation near your surgical site.  You will experience more discomfort from your incision as the feeling returns.  You may want to take a pain pill (a narcotic or Tylenol if this was prescribed by your surgeon) when you start to experience mild pain before the pain beccomes more severe.  If your pain medications do not control your pain you should " notifiy your surgeon.    Tips for taking pain medications  To get the best pain relief possible, remember these points:  Take pain medications as directed, before pain becomes severe.  Pain medication can upset your stomach: taking it with food may help.  Constipation is a common side effect of pain medication. Drink plenty of  fluids.  Eat foods high in fiber. Take a stool softener if recommended by your doctor or pharmacist.  Do not drink alcohol, drive or operate machinery while taking pain medications.  Ask about other ways to control pain, such as with heat, ice or relaxation.    Tylenol/Acetaminophen Consumption  To help encourage the safe use of acetaminophen, the makers of TYLENOL  have lowered the maximum daily dose for single-ingredient Extra Strength TYLENOL  (acetaminophen) products sold in the [place] from 8 pills per day (4,000 mg) to 6 pills per day (3,000 mg). The dosing interval has also changed from 2 pills every 4-6 hours to 2 pills every 6 hours.  If you feel your pain relief is insufficient, you may take Tylenol/Acetaminophen in addition to your narcotic pain medication.   Be careful not to exceed 3,000 mg of Tylenol/Acetaminophen in a 24 hour period from all sources.  If you are taking extra strength Tylenol/acetaminophen (500 mg), the maximum dose is 6 tablets in 24 hours.  If you are taking regular strength acetaminophen (325 mg), the maximum dose is 9 tablets in 24 hours.    Call a doctor for any of the following:  Signs of infection (fever, growing tenderness at the surgery site, a large amount of drainage or bleeding, severe pain, foul-smelling drainage, redness, swelling).  It has been over 8 to 10 hours since surgery and you are still not able to urinate (pass water).  Headache for over 24 hours.  Signs of Covid-19 infection (temperature over 100 degrees, shortness of breath, cough, loss of taste/smell, generalized body aches, persistent headache, chills, sore throat,  nausea/vomiting/diarrhea)  Your doctor is:  Dr. Cresencio Foote, Prostate and Urology: [phone]                  Or dial [phone] and ask for the resident on call for:  Prostate Urology  For emergency care, call the:  [facility]:  [phone] (TTY for hearing impaired: [phone])    Emptying and Cleaning Your Urinary Catheter Bag  You have an indwelling urinary catheter. This drains urine from your bladder into a bag. The bag can be one that is used at your bedside. Or it can be a smaller bag that is strapped to your leg. Follow the steps below to empty and clean a urinary bag.       Drain Clean tube Clean catheter   Step 1. Drain the bag  Wash your hands well with soap and water to prevent infecting the urinary catheter and bag.  If the short drainage tube is inserted into a pocket on the bag, take the drainage tube out of the pocket.  Hold the drainage tube over a toilet or measuring container. Open the valve.  Don t touch the tip of the valve or let it touch the toilet or container.  Wash your hands again.  Step 2. Clean the drainage tube  When the bag is empty, clean the tip of the drainage valve with an alcohol wipe.  Close the valve.  Reinsert the drainage tube into the pocket, if there is one.  Step 3. Clean your skin  Wash your hands well before and after cleaning your skin.  If you have a catheter (such as a Rdoriguez) that enters through the urethra, clean the urethral area with soap and water 1 time(s) daily as you were taught by your healthcare provider. You should also clean after every bowel movement to prevent infection.  Don't pull on the tubing when cleaning so you don t injure the urethra.  Don t apply powder to the genital area or to the tubing.  If you have a suprapubic catheter, your healthcare provider will tell you how to clean your skin around the catheter. This is a catheter that was surgically placed into the bladder through the lower abdomen.  Step 4. Check and  clean the catheter tubing  Check the tubing. If there are kinks, cracks, clogs, or you can t see into the tubing, you ll need to change to new tubing as you were shown by your healthcare provider.  If the current tubing can still be used, wash it with soap and water. Always wash the tubing in the direction away from your body. Don't pull on the tubing.  Dry the tubing with a clean washcloth or paper towel.  Step 5. Keep the drainage bag clean.  If  you have a catheter in place long term, talk to you provider about if and how often you should clean your drainage bag.  If need, clean your drainage bag by following these steps:  Wash your hands well with soap and water.  Disconnect the bag from the catheter tubing. Connect the tubing to the backup bag or drainage device.  Drain any remaining urine from the bag you just disconnected. Close the drainage valve.  Pour some warm (not hot) soapy water into the bag. Swish the soap around, being sure to get the corners of the bag.  Open the drainage valve to drain the soap. Close the valve.  Ask your healthcare provider how often you should clean your bag and what solution you should use to reduce odor and keep your bag free of germs.  Shake the solution a bit and allow it to remain in the bag for 30 minutes.  Drain the solution and rinse the bag with cold tap water.  Hang the bag to drain and air-dry.  When to call your healthcare provider  Call your healthcare provider right away if you have any of the following:  Little or no urine flowing into the bag  Urine leaking where the catheter enters the body  Pain, burning, or redness of the area where the catheter enters the body  Bloody urine (a trace of blood is normal)  Cloudy or foul-smelling urine, or sand-like grains in your urine  Pain in your lower back or lower abdomen  Your catheter falls out  Fever of 100.4 F (38 C) or higher, or as directed by your healthcare provider  Shaking chills      Discharge Instructions:  Caring for Your Leg Bag  You are going home with a urinary catheter and collection device (drainage bag) in place. One type of collection device is called a leg bag. This is a smaller drainage bag that you can wear on your leg to collect urine during the day. The bag can fit under your clothing.   Home care  Wash your hands thoroughly before and after you care for your catheter or collection device.  Gather your supplies:  Alcohol wipes  Soap and water  Towel and washcloth  Leg strap and leg bag  Use soap and water to wash the area where your catheter enters your body. Rinse well.  Secure the bag mulligan to your leg:  Put the leg band high on your thigh with the product label pointing away from your leg.  Stretch the leg band in place and fasten.  Place the catheter tubing over the bag and secure it. You may secure it with a Velcro tab or other method, depending on the product you use. Be sure to leave enough loop in the catheter above the leg band so you won't pull on the tube.  Every 4 to 6 hours, reposition the band. This will prevent pressure from the elastic on your leg. You can do this by changing the bag to the other leg or by raising or lowering the leg band.  Wash the band as often as needed. You can hand wash and dry the leg band.  Place the bag in the bag mulligan.  Clean the urine bag end of the catheter and your catheter port with an alcohol wipe.  Place a towel under the bag and port to keep urine from dripping onto your leg.  Before connecting the outlet valve at the bottom of the bag to the catheter, make sure that it is firmly closed. Flip the valve upward toward the bag. It needs to snap firmly in place. Be sure not to tug on the tubing. Be gentle.  Attach the urine bag to the end of the catheter. Insert the connector snugly into the catheter port. You can prevent dribbling urine by bending the catheter tubing just below the tip and holding it while you disconnect it from the catheter. Be careful to  keep the tip clean while connecting the leg bag tubing to the catheter--this keeps germs from getting into the system.  Drain the bag when it's full. To drain the bag, flip the clamp downward. Direct the flexible outlet tube to control the flow of urine. You don t have to disconnect the leg bag from the catheter to empty it. Raise your leg up to the edge of the toilet to reach the leg bag. Then you can empty the bag directly into the toilet. This way, you won t need to bend over, which may be uncomfortable.  Keep the leg bag clean.  Ask your healthcare provider how often you should clean your bag and what solution you should use ?to reduce odor and keep the bag free of germs.  Shake the solution a bit and allow it to remain in the bag for 30 minutes.    Drain the solution and rinse the bag with cold tap water.  Hang the bag to drain and air dry.  Remember to keep the drainage bag below the level of your bladder for proper drainage.  Follow-up  Make a follow-up appointment as directed by your healthcare provider.  When to call your healthcare provider  Call your healthcare provider right away if you have any of the following:  Redness, swelling, or warmth around the catheter entry site  Pus draining from your catheter entry site or into the catheter tubing and bag  Blood, clots, or floating debris in the urine  Nausea and vomiting  Shaking chills  Fever above 100.4 F (38 C), or as directed by your healthcare provider  Pain that is not relieved by medicine   Catheter that falls out or is dislodged

## 2023-02-16 NOTE — ANESTHESIA POSTPROCEDURE EVALUATION
Patient: Sukh Craven    Procedure: Procedure(s):  CYSTOSCOPY, WITH MULTIPLE INCISIONS OF BLADDER NECK WITH HOLMIUM LASER       Anesthesia Type:  General    Note:  Disposition: Outpatient   Postop Pain Control: Uneventful            Sign Out: Well controlled pain   PONV: No   Neuro/Psych: Uneventful            Sign Out: Acceptable/Baseline neuro status   Airway/Respiratory: Uneventful            Sign Out: Acceptable/Baseline resp. status   CV/Hemodynamics: Uneventful            Sign Out: Acceptable CV status; No obvious hypovolemia; No obvious fluid overload   Other NRE: NONE   DID A NON-ROUTINE EVENT OCCUR? No           Last vitals:  Vitals Value Taken Time   /75 02/16/23 1128   Temp 36.2  C (97.1  F) 02/16/23 1128   Pulse 65 02/16/23 1128   Resp 16 02/16/23 1128   SpO2 97 % 02/16/23 1128       Electronically Signed By: Dinh Leger MD, MD  February 16, 2023  2:46 PM

## 2023-02-16 NOTE — OP NOTE
Operative Report    PREOPERATIVE DIAGNOSIS: Bladder Neck Contracture  POSTOPERATIVE DIAGNOSIS: Same as above     PROCEDURE PERFORMED:   1. Cystoscopy  2. Bladder Neck Incision with Laser     STAFF SURGEON: Cresencio Foote MD was present and participatory for the entire case.   RESIDENT(S):  Rebel Marquis MD  FELLOW: Parvez Bloom MD     FINDINGS: 8 fr bladder neck contracture that was incised with laser to open up at 5 oclock, 7 oclock and 12 oclock. Easily accomodated a 26 Maldivian resectoscope sheath    ANESTHESIA: General  INTRAVENOUS FLUIDS: See anesthesia records  ESTIMATED BLOOD LOSS: Minimal   SPECIMENS: none  DRAINS: 22 Maldivian latex pleitez with 30 ml in balloon.     INDICATIONS FOR PROCEDURE: 67 year old male with history of Holep with recurrent urinary symptoms. A Cystoscopy demonstrated a bladder neck contracture. After discussion of options, Patient interested in proceeding with transurethral incision of bladder neck contracture with laser.     DESCRIPTION OF PROCEDURE: After obtaining informed consent, the patient was taken to the operating room and placed under general anesthesia.  He was repositioned in dorsal lithotomy making sure that the legs were positioned and padded safely.  He was then prepped and draped in standard sterile fashion.  Culture directed antibiotics were administered and bilateral sequential compression devices were placed.  A time out was performed confirming the appropriate patient identity and planned procedure.     We entered the bladder with rigid 26 Maldivian resectoscope. The 8 Maldivian bladder neck contracture was noted. The laser fiber catheter was advanced into bladder and urine aspirated. We then used the 550 micron laser fiber at settings of 2J and 20Hz and made incisions at 12 oclock and 5 oclock and 7 oclock to open up the bladder neck. The scope was able to enter the bladder easily. No bleeding noted.      We then removed the resectoscope and then placed the 22  Niuean 3-way pleitez catheter. Clear urine drained and 30 ml in balloon placed.       The patient was woken from anesthesia and taken to the recovery room in stable condition.     POSTOPERATIVE PLAN:   Home today with pleitez until 2/17/2022 will remove at clinic  Macrobid 100 BID until Monday    I, Cresencio Foote, was present and participatory for the entirety of the procedure

## 2023-02-16 NOTE — ANESTHESIA PREPROCEDURE EVALUATION
Anesthesia Pre-Procedure Evaluation    Patient: Sukh Craven   MRN: [id number] : 1955        Procedure : Procedure(s):  CYSTOSCOPY, WITH MULTIPLE INCISIONS OF BLADDER NECK WITH HOLMIUM LASER          Past Medical History:   Diagnosis Date     BPH (benign prostatic hyperplasia)      Dyslipidemia      HTN (hypertension)       Past Surgical History:   Procedure Laterality Date     COLONOSCOPY N/A 2022    Procedure: COLONOSCOPY, WITH POLYPECTOMY;  Surgeon: Long Silver MD;  Location: [facility] OR     ESOPHAGOSCOPY, GASTROSCOPY, DUODENOSCOPY (EGD), COMBINED N/A 2022    Procedure: ESOPHAGOGASTRODUODENOSCOPY, WITH BIOPSY;  Surgeon: Long Silver MD;  Location: [facility] OR     LASER HOLMIUM ENUCLEATION PROSTATE N/A 2017    Procedure: LASER HOLMIUM ENUCLEATION PROSTATE;  Holmium Laser Enucleation Of The Prostate ;  Surgeon: Cresencio Foote MD;  Location: [facility] OR     REMOVAL OF SPERM DUCT(S)        Allergies   Allergen Reactions     No Known Drug Allergies       Social History     Tobacco Use     Smoking status: Former     Packs/day: 0.50     Years: 25.00     Pack years: 12.50     Types: Cigarettes     Quit date: 3/23/2007     Years since quitting: 15.9     Smokeless tobacco: Never   Substance Use Topics     Alcohol use: Yes     Comment: rare- social drinker      Wt Readings from Last 1 Encounters:   23 90.7 kg (200 lb)        Anesthesia Evaluation            ROS/MED HX  ENT/Pulmonary:  - neg pulmonary ROS     Neurologic:  - neg neurologic ROS     Cardiovascular:     (+) hypertension-----    METS/Exercise Tolerance:     Hematologic:  - neg hematologic  ROS     Musculoskeletal:       GI/Hepatic:  - neg GI/hepatic ROS     Renal/Genitourinary:     (+) renal disease, type: CRI, Pt does not require dialysis,     Endo:  - neg endo ROS     Psychiatric/Substance Use:  - neg psychiatric ROS     Infectious Disease:  - neg infectious disease ROS     Malignancy:       Other:                OUTSIDE LABS:  CBC:   Lab Results   Component Value Date    WBC 3.5 (L) 07/08/2022    WBC 4.6 05/28/2019    HGB 14.1 07/08/2022    HGB 15.7 05/28/2019    HCT 41.8 07/08/2022    HCT 47.0 05/28/2019     07/08/2022     05/28/2019     BMP:   Lab Results   Component Value Date     07/08/2022     07/29/2020    POTASSIUM 3.8 07/08/2022    POTASSIUM 3.8 07/29/2020    CHLORIDE 107 07/08/2022    CHLORIDE 107 07/29/2020    CO2 23 07/08/2022    CO2 28 07/29/2020    BUN 15 07/08/2022    BUN 20 07/29/2020    CR 1.1 01/14/2023    CR 1.05 07/08/2022     (H) 07/08/2022    GLC 85 07/29/2020     COAGS: No results found for: PTT, INR, FIBR  POC: No results found for: BGM, HCG, HCGS  HEPATIC:   Lab Results   Component Value Date    ALBUMIN 4.2 07/08/2022    PROTTOTAL 8.2 07/08/2022    ALT 42 07/08/2022    AST 23 07/08/2022    ALKPHOS 62 07/08/2022    BILITOTAL 1.0 07/08/2022     OTHER:   Lab Results   Component Value Date    A1C 6.0 (H) 07/08/2022    DORIS 9.3 07/08/2022    LIPASE 19 07/08/2022    TSH 0.62 10/08/2020    SED 7 04/09/2019       Anesthesia Plan    ASA Status:  2      Anesthesia Type: General.     - Airway: LMA              Consents    Anesthesia Plan(s) and associated risks, benefits, and realistic alternatives discussed. Questions answered and patient/representative(s) expressed understanding.    - Discussed: Risks, Benefits and Alternatives for BOTH SEDATION and the PROCEDURE were discussed     - Discussed with:       - Extended Intubation/Ventilatory Support Discussed: No.      - Patient is DNR/DNI Status: No    Use of blood products discussed: No .     Postoperative Care       PONV prophylaxis: Ondansetron (or other 5HT-3)     Comments:           H&P reviewed: Unable to attach H&P to encounter due to EHR limitations. H&P Update: appropriate H&P reviewed, patient examined. No interval changes since H&P (within 30 days).         Dinh Leger MD, MD

## 2023-02-17 ENCOUNTER — OFFICE VISIT (OUTPATIENT)
Dept: UROLOGY | Facility: CLINIC | Age: 68
End: 2023-02-17
Payer: COMMERCIAL

## 2023-02-17 ENCOUNTER — PATIENT OUTREACH (OUTPATIENT)
Dept: UROLOGY | Facility: CLINIC | Age: 68
End: 2023-02-17

## 2023-02-17 DIAGNOSIS — N32.0 BLADDER NECK CONTRACTURE: Primary | ICD-10-CM

## 2023-02-17 PROCEDURE — 99211 OFF/OP EST MAY X REQ PHY/QHP: CPT

## 2023-02-17 NOTE — PROGRESS NOTES
Sukh Craven presented today for a trial of void per Dr. Foote following bladder neck cuts 2/16/23    Approximately 120 mL of normal saline instilled into bladder via catheter.  Patient stated He had urge to urinate and catheter was removed without difficulty.  Patient was given a urinal to measure urine output.  Patient voided approximately 100 mL of orange urine around the catheter prior to pull and 20cc into urinal.   Patient did tolerate procedure well.   Pt on macrobid until Monday   Teaching done with patient verbally as where to call or go if pain, fever, or unable to urinate post catheter removal.    Procedure completed with Dr. Bowser available if needed/supervision    Bernadine Osorio, RN, RN  2/17/2023  10:01 AM

## 2023-02-17 NOTE — TELEPHONE ENCOUNTER
Pt phoned for a symptom update. Detailed VM left for pt to return call direct to [facility]    Pt also needs a 6 week followup with Dr. Foote in person for uroflow/pvr. Note sent to marcie Colindres RN  Care Coordinator  [phone]

## 2023-02-20 ENCOUNTER — TELEPHONE (OUTPATIENT)
Dept: UROLOGY | Facility: CLINIC | Age: 68
End: 2023-02-20
Payer: COMMERCIAL

## 2023-03-05 NOTE — H&P
Chief Complaint:   Bladder neck contracture         History of Present Illness:    Sukh Craven is a very pleasant 67 year old male who presents with a history of bnc identified on cystoscopy.         Past Medical History:     Past Medical History:   Diagnosis Date     BPH (benign prostatic hyperplasia)      Dyslipidemia      HTN (hypertension)             Past Surgical History:     Past Surgical History:   Procedure Laterality Date     COLONOSCOPY N/A 9/26/2022    Procedure: COLONOSCOPY, WITH POLYPECTOMY;  Surgeon: Long Silver MD;  Location: [facility] OR     CYSTOSCOPY, BLADDER NECK CUTS, COMBINED N/A 2/16/2023    Procedure: CYSTOSCOPY, WITH MULTIPLE INCISIONS OF BLADDER NECK WITH HOLMIUM LASER;  Surgeon: Cresencio Foote MD;  Location: [facility] OR     ESOPHAGOSCOPY, GASTROSCOPY, DUODENOSCOPY (EGD), COMBINED N/A 9/26/2022    Procedure: ESOPHAGOGASTRODUODENOSCOPY, WITH BIOPSY;  Surgeon: Long Silver MD;  Location: [facility] OR     LASER HOLMIUM ENUCLEATION PROSTATE N/A 4/27/2017    Procedure: LASER HOLMIUM ENUCLEATION PROSTATE;  Holmium Laser Enucleation Of The Prostate ;  Surgeon: Cresencio Foote MD;  Location: [facility]     REMOVAL OF SPERM DUCT(S)              Medications     Current Outpatient Medications   Medication     amLODIPine (NORVASC) 10 MG tablet     butenafine (MENTAX) 1 % CREA cream     clobetasol (TEMOVATE) 0.05 % external ointment     losartan (COZAAR) 100 MG tablet     metoprolol succinate ER (TOPROL XL) 50 MG 24 hr tablet     nitroFURantoin macrocrystal-monohydrate (MACROBID) 100 MG capsule     nystatin (MYCOSTATIN) 925316 UNIT/GM external powder     omeprazole (PRILOSEC) 20 MG DR capsule     oxybutynin ER (DITROPAN XL) 15 MG 24 hr tablet     phenazopyridine (PYRIDIUM) 100 MG tablet     simvastatin (ZOCOR) 20 MG tablet     tamsulosin (FLOMAX) 0.4 MG capsule     vitamin D3 (CHOLECALCIFEROL) 50 mcg (2000 units) tablet     metoprolol succinate ER (TOPROL XL) 50 MG 24  hr tablet     metoprolol tartrate (LOPRESSOR) 25 MG tablet     No current facility-administered medications for this encounter.            Family History:     Family History   Problem Relation Age of Onset     Diabetes Sister      Diabetes Sister      Diabetes Brother      C.A.D. No family hx of      Hypertension No family hx of      Cerebrovascular Disease No family hx of      Breast Cancer No family hx of      Cancer - colorectal No family hx of      Prostate Cancer No family hx of             Social History:     Social History     Socioeconomic History     Marital status: Single     Spouse name: Ricardo     Number of children: 5     Years of education: 14     Highest education level: Not on file   Occupational History     Occupation:      Employer: [employer]   Tobacco Use     Smoking status: Former     Packs/day: 0.50     Years: 25.00     Pack years: 12.50     Types: Cigarettes     Quit date: 3/23/2007     Years since quitting: 15.9     Smokeless tobacco: Never   Vaping Use     Vaping Use: Never used   Substance and Sexual Activity     Alcohol use: Yes     Comment: rare- social drinker     Drug use: No     Sexual activity: Yes     Partners: Female   Other Topics Concern      Service Yes     Comment: [employer]- 6 years     Blood Transfusions No     Caffeine Concern No     Occupational Exposure No     Hobby Hazards No     Sleep Concern No     Stress Concern No     Weight Concern No     Special Diet No     Back Care No     Exercise Yes     Comment: 4 times a week     Bike Helmet Yes     Seat Belt Yes     Self-Exams Yes     Parent/sibling w/ CABG, MI or angioplasty before 65F 55M? No   Social History Narrative     Not on file     Social Determinants of Health     Financial Resource Strain: Not on file   Food Insecurity: Not on file   Transportation Needs: Not on file   Physical Activity: Not on file   Stress: Not on file   Social Connections: Not on file   Intimate Partner Violence: Not on  "file   Housing Stability: Not on file            Allergies:   No known drug allergies         Review of Systems:  From intake questionnaire   Negative 14 system review except as noted on HPI, nurse's note.         Physical Exam:   Patient is a 67 year old  male   Vitals: Blood pressure (!) 152/82, pulse 63, temperature 97.3  F (36.3  C), temperature source Temporal, resp. rate 16, height 1.956 m (6' 5\"), weight 90.7 kg (200 lb), SpO2 94 %.  General Appearance Adult: Alert, no acute distress, oriented  HENT: throat/mouth:normal, good dentition  Neck: No adenopathy,masses or thyromegaly  Lungs: no respiratory distress, or pursed lip breathing  Heart: No obvious jugular venous distension present  Abdomen: soft, nontender, no organomegaly or masses, Body mass index is 23.72 kg/m .  Lymphatics: No cervical or supraclavicular adenopathy  Musculoskeltal: extremities normal, no peripheral edema  Skin: no suspicious lesions or rashes  Neuro: Alert, oriented, speech and mentation normal  Psych: affect and mood normal  Gait: Normal  : Normal phallus        Labs and Pathology:    I personally reviewed all applicable laboratory data and went over findings with patient  Significant for:    CBC RESULTS:  Recent Labs   Lab Test 07/08/22  0853 05/28/19  1440 04/09/19  1737 07/14/17  1511   WBC 3.5* 4.6 4.6 3.6*   HGB 14.1 15.7 17.8* 12.5*    152 166 208        BMP RESULTS:  Recent Labs   Lab Test 01/14/23  0845 07/08/22  0853 07/29/20  1711 05/28/19  1440 04/09/19  1737 05/01/18  1813   NA  --  141 140 140 137 141   POTASSIUM  --  3.8 3.8 4.0 3.8 4.5   CHLORIDE  --  107 107 106 104 107   CO2  --  23 28 28 24 25   ANIONGAP  --  11 5 6 9 9   GLC  --  113* 85 84 88 87   BUN  --  15 20 18 29 17   CR 1.1 1.05 1.38* 1.23 1.31* 1.20   GFRESTIMATED >60 79 54* 62 58* 62   GFRESTBLACK  --   --  62 72 67 74   DORIS  --  9.3 9.5 9.1 9.5 9.3       UA RESULTS:   Recent Labs   Lab Test 02/14/23  1323 01/03/23  0922 07/08/22  0853   SG 1.025 " 1.017 1.025   URINEPH 6.0 6.0 5.5   NITRITE Negative Negative Negative   RBCU 3* 6* 5-10*   WBCU 2 7* 0-5       CALCIUM RESULTS  Lab Results   Component Value Date    DORIS 9.3 07/08/2022    DORIS 9.5 07/29/2020    DORIS 9.1 05/28/2019    DORIS 9.5 04/09/2019       PSA RESULTS  PSA   Date Value Ref Range Status   06/29/2021 0.65 0 - 4 ug/L Final     Comment:     Assay Method:  Chemiluminescence using Siemens Vista analyzer   02/27/2017 1.80 0 - 4 ug/L Final     Comment:     Assay Method:  Chemiluminescence using Siemens Vista analyzer   02/24/2012 1.21 0 - 4 ug/L Final   03/26/2009 1.04 0 - 4 ug/L Final   08/07/2007 1.17 0 - 4 ug/L Final     PSA Tumor Marker   Date Value Ref Range Status   01/11/2023 0.36 0.00 - 4.50 ng/mL Final       INR  No results for input(s): INR in the last 97419 hours.        Imaging:    I personally reviewed all applicable imaging and went over findings with patient.  Significant for:    Results for orders placed or performed in visit on 01/14/23   CT Urogram    Narrative    EXAMINATION: CT ABDOMEN PELVIS W/O & W CONTRAST, 1/14/2023 9:07  AM    TECHNIQUE:  Helical CT images from the lung bases through the  symphysis pubis were obtained  without and with contrast using CT  urogram protocol. Contrast dose: Isovue 370  116 mls    COMPARISON: 2/28/2017, 9/27/2004    HISTORY: Microscopic hematuria.  History of Holmium laser enucleation  of prostate in 2017.    FINDINGS:    Urinary tract: No urolithiasis. Single draining ureter bilaterally. No  filling defect or focal narrowing. Note a small portion of the  proximal ureter is nonopacified with contrast.  No hydronephrosis or  hydroureter.  Bladder is distended and opacified.  No bladder  abnormality.    Chest: Bibasilar atelectasis. Bibasilar fibroatelectasis/scar.    Liver: No mass. No intrahepatic biliary ductal dilation.    Biliary System: Normal gallbladder. No extrahepatic biliary ductal  dilation.    Pancreas: No mass or pancreatic ductal  dilation.    Adrenal glands: Bilateral adrenal gland thickening similar to exam in  2017.    Spleen: Normal.    Gastrointestinal tract: Normal appendix. Normal caliber small and  large bowel.    Mesentery/peritoneum/retroperitoneum: No free fluid or air.  Smooth  curvilinear soft tissue thickening along inferior right hepatic lobe  and in close proximity to upper pole of right kidney is nonspecific  but dates back to 2017 without significant change (ex. Series 4, image  222) favoring benignity.    Lymph nodes: No significant lymphadenopathy.    Vasculature: Patent major abdominal vasculature.  Aortobiiliac  atherosclerotic calcification without aneurysmal dilation.    Pelvis: Urinary bladder is normal.  Decreased size of prostate status  post Holmium laser enucleation in 2017.    Osseous structures: No aggressive or acute osseous lesion.  Multilevel  degenerative changes in the spine.      Soft tissues: Within normal limits.      Impression    IMPRESSION:   1.  No CT urogram finding to explain clinical history.   2.  Bilateral adrenal gland thickening is not significantly changed.  3.  Other chronic unchanged findings as above.    STACI NEWBERRY MD         SYSTEM ID:  [id number]              Assessment and Plan:     Assessment:67 year old male with bladder neck contracture    Plan:  -To OR for laser incision of bnc          ICresencio saw and evaluated this patient and agree with the plan as stated above.  I personally performed all listed procedures.     CC:  Clinic - [facility]

## 2023-03-21 ENCOUNTER — PRE VISIT (OUTPATIENT)
Dept: UROLOGY | Facility: CLINIC | Age: 68
End: 2023-03-21
Payer: COMMERCIAL

## 2023-03-21 NOTE — TELEPHONE ENCOUNTER
Reason for visit: Follow-Up    Dx/Hx/Sx: Bladder neck contracture    Records/imaging/labs/orders: In EPIC    At Rooming: pt had TOV on 2/17/23; paper JAMAL Carranza, EMT  March 21, 2023  9:39 AM

## 2023-04-25 ENCOUNTER — OFFICE VISIT (OUTPATIENT)
Dept: UROLOGY | Facility: CLINIC | Age: 68
End: 2023-04-25
Payer: COMMERCIAL

## 2023-04-25 VITALS
WEIGHT: 200 LBS | DIASTOLIC BLOOD PRESSURE: 83 MMHG | HEIGHT: 77 IN | SYSTOLIC BLOOD PRESSURE: 166 MMHG | HEART RATE: 74 BPM | BODY MASS INDEX: 23.62 KG/M2

## 2023-04-25 DIAGNOSIS — N32.81 OVERACTIVE BLADDER: Primary | ICD-10-CM

## 2023-04-25 PROCEDURE — 99213 OFFICE O/P EST LOW 20 MIN: CPT | Performed by: UROLOGY

## 2023-04-25 RX ORDER — POLYETHYLENE GLYCOL 3350 17 G/17G
1 POWDER, FOR SOLUTION ORAL DAILY
Qty: 507 G | Refills: 1 | Status: SHIPPED | OUTPATIENT
Start: 2023-04-25 | End: 2023-06-16

## 2023-04-25 ASSESSMENT — PAIN SCALES - GENERAL: PAINLEVEL: NO PAIN (0)

## 2023-04-25 NOTE — PATIENT INSTRUCTIONS
Please follow-up with Rena Weeks in 6 weeks virtually or in person, if she is booked up, you can see Devin NOYOLA in person.

## 2023-04-25 NOTE — LETTER
4/25/2023       RE: Sukh Craven  [address]  [facility] [address]     Dear Colleague,    Thank you for referring your patient, Sukh Craven, to the [facility] [place] [facility]. Please see a copy of my visit note below.          UROLOGY OUTPATIENT VISIT      Chief Complaint:   Bladder neck obstruction      Synopsis    Sukh rCaven is a very pleasant AGE: 68 year old year old person    He underwent laser enucleation of the prostate several years ago  This past year was noted to have a bladder neck contracture  He underwent laser incision of the contracture about 6 weeks ago  He is delighted with symptom improvement since the procedure, flow is excellent again  He mainly has some overactive type symptoms  This is in the setting of severe constipation  AUA SS - 13/35 - 5/ for postpone and nocturia  QOL 3  PVR 0          Medications     Current Outpatient Medications   Medication    amLODIPine (NORVASC) 10 MG tablet    losartan (COZAAR) 100 MG tablet    metoprolol succinate ER (TOPROL XL) 50 MG 24 hr tablet    metoprolol succinate ER (TOPROL XL) 50 MG 24 hr tablet    metoprolol tartrate (LOPRESSOR) 25 MG tablet    polyethylene glycol (MIRALAX) 17 GM/Dose powder    butenafine (MENTAX) 1 % CREA cream    clobetasol (TEMOVATE) 0.05 % external ointment    nitroFURantoin macrocrystal-monohydrate (MACROBID) 100 MG capsule    nystatin (MYCOSTATIN) 292484 UNIT/GM external powder    omeprazole (PRILOSEC) 20 MG DR capsule    oxybutynin ER (DITROPAN XL) 15 MG 24 hr tablet    phenazopyridine (PYRIDIUM) 100 MG tablet    simvastatin (ZOCOR) 20 MG tablet    tamsulosin (FLOMAX) 0.4 MG capsule    vitamin D3 (CHOLECALCIFEROL) 50 mcg (2000 units) tablet     No current facility-administered medications for this visit.         The following  distinct labs were reviewed    I personally reviewed all applicable laboratory data and went over findings  with patient  Significant for:    CBC RESULTS:  Recent Labs   Lab Test 07/08/22  0853 05/28/19  1440 04/09/19  1737 07/14/17  1511   WBC 3.5* 4.6 4.6 3.6*   HGB 14.1 15.7 17.8* 12.5*    152 166 208        BMP RESULTS:  Recent Labs   Lab Test 01/14/23  0845 07/08/22  0853 07/29/20  1711 05/28/19  1440 04/09/19  1737 05/01/18  1813   NA  --  141 140 140 137 141   POTASSIUM  --  3.8 3.8 4.0 3.8 4.5   CHLORIDE  --  107 107 106 104 107   CO2  --  23 28 28 24 25   ANIONGAP  --  11 5 6 9 9   GLC  --  113* 85 84 88 87   BUN  --  15 20 18 29 17   CR 1.1 1.05 1.38* 1.23 1.31* 1.20   GFRESTIMATED >60 79 54* 62 58* 62   GFRESTBLACK  --   --  62 72 67 74       CALCIUM RESULTS:  Recent Labs   Lab Test 07/08/22  0853 07/29/20  1711 05/28/19  1440 04/09/19  1737   DORIS 9.3 9.5 9.1 9.5       PTH RESULTS:  No results for input(s): PTHI in the last 10662 hours.    HGB A1C RESULTS:  Lab Results   Component Value Date    A1C 6.0 07/08/2022    A1C 5.9 05/01/2018    A1C 5.6 07/14/2017    A1C 6.2 05/10/2016       UA RESULTS:   Recent Labs   Lab Test 02/14/23  1323 01/03/23  0922 07/08/22  0853   SG 1.025 1.017 1.025   URINEPH 6.0 6.0 5.5   NITRITE Negative Negative Negative   RBCU 3* 6* 5-10*   WBCU 2 7* 0-5       PSA RESULTS  PSA   Date Value Ref Range Status   06/29/2021 0.65 0 - 4 ug/L Final     Comment:     Assay Method:  Chemiluminescence using Siemens Vista analyzer   02/27/2017 1.80 0 - 4 ug/L Final     Comment:     Assay Method:  Chemiluminescence using Siemens Vista analyzer   02/24/2012 1.21 0 - 4 ug/L Final   03/26/2009 1.04 0 - 4 ug/L Final   08/07/2007 1.17 0 - 4 ug/L Final     PSA Tumor Marker   Date Value Ref Range Status   01/11/2023 0.36 0.00 - 4.50 ng/mL Final         Recent Imaging Report    I personally reviewed all applicable imaging and went over the below findings with patient.    Results for orders placed or performed in visit on 01/14/23   CT Urogram    Narrative    EXAMINATION: CT ABDOMEN PELVIS W/O & W  CONTRAST, 1/14/2023 9:07  AM    TECHNIQUE:  Helical CT images from the lung bases through the  symphysis pubis were obtained  without and with contrast using CT  urogram protocol. Contrast dose: Isovue 370  116 mls    COMPARISON: 2/28/2017, 9/27/2004    HISTORY: Microscopic hematuria.  History of Holmium laser enucleation  of prostate in 2017.    FINDINGS:    Urinary tract: No urolithiasis. Single draining ureter bilaterally. No  filling defect or focal narrowing. Note a small portion of the  proximal ureter is nonopacified with contrast.  No hydronephrosis or  hydroureter.  Bladder is distended and opacified.  No bladder  abnormality.    Chest: Bibasilar atelectasis. Bibasilar fibroatelectasis/scar.    Liver: No mass. No intrahepatic biliary ductal dilation.    Biliary System: Normal gallbladder. No extrahepatic biliary ductal  dilation.    Pancreas: No mass or pancreatic ductal dilation.    Adrenal glands: Bilateral adrenal gland thickening similar to exam in  2017.    Spleen: Normal.    Gastrointestinal tract: Normal appendix. Normal caliber small and  large bowel.    Mesentery/peritoneum/retroperitoneum: No free fluid or air.  Smooth  curvilinear soft tissue thickening along inferior right hepatic lobe  and in close proximity to upper pole of right kidney is nonspecific  but dates back to 2017 without significant change (ex. Series 4, image  222) favoring benignity.    Lymph nodes: No significant lymphadenopathy.    Vasculature: Patent major abdominal vasculature.  Aortobiiliac  atherosclerotic calcification without aneurysmal dilation.    Pelvis: Urinary bladder is normal.  Decreased size of prostate status  post Holmium laser enucleation in 2017.    Osseous structures: No aggressive or acute osseous lesion.  Multilevel  degenerative changes in the spine.      Soft tissues: Within normal limits.      Impression    IMPRESSION:   1.  No CT urogram finding to explain clinical history.   2.  Bilateral adrenal gland  thickening is not significantly changed.  3.  Other chronic unchanged findings as above.    STACI NEWBERRY MD         SYSTEM ID:  [id number]              Assessment/Plan   68 year old year old person with Holep/BNC s/p BNI  -Miralax  -F/U appt for symptom check, can try anticholinergic if still OAB symptoms once constipation adddressd    CC:  Clinic - [facility]    Sincerely,    Cresencio Foote MD

## 2023-04-25 NOTE — PROGRESS NOTES
UROLOGY OUTPATIENT VISIT      Chief Complaint:   Bladder neck obstruction      Synopsis    Sukh Craven is a very pleasant AGE: 68 year old year old person    He underwent laser enucleation of the prostate several years ago  This past year was noted to have a bladder neck contracture  He underwent laser incision of the contracture about 6 weeks ago  He is delighted with symptom improvement since the procedure, flow is excellent again  He mainly has some overactive type symptoms  This is in the setting of severe constipation  AUA SS - 13/35 - 5/ for postpone and nocturia  QOL 3  PVR 0          Medications     Current Outpatient Medications   Medication     amLODIPine (NORVASC) 10 MG tablet     losartan (COZAAR) 100 MG tablet     metoprolol succinate ER (TOPROL XL) 50 MG 24 hr tablet     metoprolol succinate ER (TOPROL XL) 50 MG 24 hr tablet     metoprolol tartrate (LOPRESSOR) 25 MG tablet     polyethylene glycol (MIRALAX) 17 GM/Dose powder     butenafine (MENTAX) 1 % CREA cream     clobetasol (TEMOVATE) 0.05 % external ointment     nitroFURantoin macrocrystal-monohydrate (MACROBID) 100 MG capsule     nystatin (MYCOSTATIN) 772602 UNIT/GM external powder     omeprazole (PRILOSEC) 20 MG DR capsule     oxybutynin ER (DITROPAN XL) 15 MG 24 hr tablet     phenazopyridine (PYRIDIUM) 100 MG tablet     simvastatin (ZOCOR) 20 MG tablet     tamsulosin (FLOMAX) 0.4 MG capsule     vitamin D3 (CHOLECALCIFEROL) 50 mcg (2000 units) tablet     No current facility-administered medications for this visit.         The following  distinct labs were reviewed    I personally reviewed all applicable laboratory data and went over findings with patient  Significant for:    CBC RESULTS:  Recent Labs   Lab Test 07/08/22  0853 05/28/19  1440 04/09/19  1737 07/14/17  1511   WBC 3.5* 4.6 4.6 3.6*   HGB 14.1 15.7 17.8* 12.5*    152 166 208        BMP RESULTS:  Recent Labs   Lab Test 01/14/23  0845 07/08/22  0853 07/29/20  1711  05/28/19  1440 04/09/19  1737 05/01/18  1813   NA  --  141 140 140 137 141   POTASSIUM  --  3.8 3.8 4.0 3.8 4.5   CHLORIDE  --  107 107 106 104 107   CO2  --  23 28 28 24 25   ANIONGAP  --  11 5 6 9 9   GLC  --  113* 85 84 88 87   BUN  --  15 20 18 29 17   CR 1.1 1.05 1.38* 1.23 1.31* 1.20   GFRESTIMATED >60 79 54* 62 58* 62   GFRESTBLACK  --   --  62 72 67 74       CALCIUM RESULTS:  Recent Labs   Lab Test 07/08/22  0853 07/29/20  1711 05/28/19  1440 04/09/19  1737   DORIS 9.3 9.5 9.1 9.5       PTH RESULTS:  No results for input(s): PTHI in the last 87063 hours.    HGB A1C RESULTS:  Lab Results   Component Value Date    A1C 6.0 07/08/2022    A1C 5.9 05/01/2018    A1C 5.6 07/14/2017    A1C 6.2 05/10/2016       UA RESULTS:   Recent Labs   Lab Test 02/14/23  1323 01/03/23  0922 07/08/22  0853   SG 1.025 1.017 1.025   URINEPH 6.0 6.0 5.5   NITRITE Negative Negative Negative   RBCU 3* 6* 5-10*   WBCU 2 7* 0-5       PSA RESULTS  PSA   Date Value Ref Range Status   06/29/2021 0.65 0 - 4 ug/L Final     Comment:     Assay Method:  Chemiluminescence using Siemens Vista analyzer   02/27/2017 1.80 0 - 4 ug/L Final     Comment:     Assay Method:  Chemiluminescence using Siemens Vista analyzer   02/24/2012 1.21 0 - 4 ug/L Final   03/26/2009 1.04 0 - 4 ug/L Final   08/07/2007 1.17 0 - 4 ug/L Final     PSA Tumor Marker   Date Value Ref Range Status   01/11/2023 0.36 0.00 - 4.50 ng/mL Final         Recent Imaging Report    I personally reviewed all applicable imaging and went over the below findings with patient.    Results for orders placed or performed in visit on 01/14/23   CT Urogram    Narrative    EXAMINATION: CT ABDOMEN PELVIS W/O & W CONTRAST, 1/14/2023 9:07  AM    TECHNIQUE:  Helical CT images from the lung bases through the  symphysis pubis were obtained  without and with contrast using CT  urogram protocol. Contrast dose: Isovue 370  116 mls    COMPARISON: 2/28/2017, 9/27/2004    HISTORY: Microscopic hematuria.  History of  Holmium laser enucleation  of prostate in 2017.    FINDINGS:    Urinary tract: No urolithiasis. Single draining ureter bilaterally. No  filling defect or focal narrowing. Note a small portion of the  proximal ureter is nonopacified with contrast.  No hydronephrosis or  hydroureter.  Bladder is distended and opacified.  No bladder  abnormality.    Chest: Bibasilar atelectasis. Bibasilar fibroatelectasis/scar.    Liver: No mass. No intrahepatic biliary ductal dilation.    Biliary System: Normal gallbladder. No extrahepatic biliary ductal  dilation.    Pancreas: No mass or pancreatic ductal dilation.    Adrenal glands: Bilateral adrenal gland thickening similar to exam in  2017.    Spleen: Normal.    Gastrointestinal tract: Normal appendix. Normal caliber small and  large bowel.    Mesentery/peritoneum/retroperitoneum: No free fluid or air.  Smooth  curvilinear soft tissue thickening along inferior right hepatic lobe  and in close proximity to upper pole of right kidney is nonspecific  but dates back to 2017 without significant change (ex. Series 4, image  222) favoring benignity.    Lymph nodes: No significant lymphadenopathy.    Vasculature: Patent major abdominal vasculature.  Aortobiiliac  atherosclerotic calcification without aneurysmal dilation.    Pelvis: Urinary bladder is normal.  Decreased size of prostate status  post Holmium laser enucleation in 2017.    Osseous structures: No aggressive or acute osseous lesion.  Multilevel  degenerative changes in the spine.      Soft tissues: Within normal limits.      Impression    IMPRESSION:   1.  No CT urogram finding to explain clinical history.   2.  Bilateral adrenal gland thickening is not significantly changed.  3.  Other chronic unchanged findings as above.    STACI NEWBERRY MD         SYSTEM ID:  [id number]              Assessment/Plan   68 year old year old person with Holep/BNC s/p BNI  -Miralax  -F/U appt for symptom check, can try anticholinergic if still OAB  symptoms once constipation adddressd    CC:  Clinic - [place]

## 2023-04-25 NOTE — NURSING NOTE
Chief Complaint   Patient presents with     Consult     6 month follow-up s/p bladder neck incision.  Pt reports he is feeling a lot better, flow is stronger, still experiencing nocturia x 5-6 per night which is still bothering him.      - Rahel SEGURA, EMT  Urology Clinic

## 2023-05-30 ENCOUNTER — TELEPHONE (OUTPATIENT)
Dept: FAMILY MEDICINE | Facility: CLINIC | Age: 68
End: 2023-05-30
Payer: COMMERCIAL

## 2023-05-30 NOTE — TELEPHONE ENCOUNTER
Reason for Call:  Appointment Request    Patient requesting this type of appt:  Office visit    Requested provider: Any provider    Reason patient unable to be scheduled: Not within requested timeframe    When does patient want to be seen/preferred time: after 6/2    Comments: patient called and is currently in [place] and is having SOB declined triage at this time but is coming back to [place] on 6/6 and is wondering if he can get worked in    Could we send this information to you in [facility] or would you prefer to receive a phone call?:   Patient would prefer a phone call   Okay to leave a detailed message?: Yes at Home number on file [phone] (home)    Call taken on 5/30/2023 at 1:48 PM by Michelle Robertson

## 2023-06-01 ENCOUNTER — TRANSFERRED RECORDS (OUTPATIENT)
Dept: HEALTH INFORMATION MANAGEMENT | Facility: CLINIC | Age: 68
End: 2023-06-01
Payer: COMMERCIAL

## 2023-06-02 ENCOUNTER — TRANSFERRED RECORDS (OUTPATIENT)
Dept: HEALTH INFORMATION MANAGEMENT | Facility: CLINIC | Age: 68
End: 2023-06-02
Payer: COMMERCIAL

## 2023-06-05 NOTE — PROGRESS NOTES
Virtual Visit Details    Type of service:  Video Visit   Video Start Time: 8:55 AM  Video End Time:9:02 AM    Originating Location (pt. Location): Home    Distant Location (provider location):  Off-site  Platform used for Video Visit: Steve      Consult conducted via real-time audio/video technology by Devin Gunderson PA-C from [facility] to the patient in their home.    REASON FOR VISIT  Irritative lower urinary tract symptoms follow-up     HISTORY OF PRESENT ILLNESS  Mr. Craven is a 68 year old male who I am speaking with today in follow-up for his irritative lower urinary tract symptoms. I personally reviewed his most recent urology note from 4/25/23 in preparation for this visit as well as hist notes from 1/3/23 by Rena Nickerson. 0    Sukh's urologic history is significant for BPH status post HoLEP greater than 5 years ago who was recently found to have narrowing of his bladder neck.  He underwent laser incision of bladder neck on 2/16/2023, and had excellent resolution of obstructive urinary symptoms as of follow-up on 4/25/2023.  However, given his continued irritative LUTS in the setting of severe constipation, it was recommended he start taking MiraLAX, then have a follow-up appointment for symptom check for consideration of anticholinergic if OAB symptoms were still problematic.    Today:    MiraLAX is working well    Flow is very improved and he feels he is emptying well    Overall feels very stable    PHYSICAL EXAMINATION  Deferred given virtual visit.    IMPRESSION  1. Overactive bladder  2. Constipation    It was my pleasure to speak with Sukh virtually in follow-up for his weak urinary stream, frequency, and feeling of incomplete bladder emptiness.  Overall, I am very happy to hear that he feels the symptoms have completely resolved just by addressing his constipation, and at this time I do not believe any further intervention is necessary.  Given his recent procedure, I believe  it would be reasonable for us to follow-up with him in 6 months to be sure his urinary stream continues to remain solid and without evidence of recurrence of his bladder neck stricture.  If at that time things look good, we can go back to yearly follow-ups.    Mr. Craven expressed understanding and agreement to the above discussion and plan and all of his questions were answered to his satisfaction.      PLAN    Observation of symptoms for 6 months    Continue with aggressive constipation management with Miralax    SIGNED    Devin Gunderson PA-C      I spent a total of 20 minutes spent on the date of the encounter doing chart review, history and exam, documentation, and further activities as noted above.

## 2023-06-08 ENCOUNTER — VIRTUAL VISIT (OUTPATIENT)
Dept: UROLOGY | Facility: CLINIC | Age: 68
End: 2023-06-08
Payer: COMMERCIAL

## 2023-06-08 DIAGNOSIS — N32.0 BLADDER NECK CONTRACTURE: Primary | ICD-10-CM

## 2023-06-08 DIAGNOSIS — K59.00 CONSTIPATION, UNSPECIFIED CONSTIPATION TYPE: ICD-10-CM

## 2023-06-08 PROCEDURE — 99213 OFFICE O/P EST LOW 20 MIN: CPT | Mod: VID | Performed by: STUDENT IN AN ORGANIZED HEALTH CARE EDUCATION/TRAINING PROGRAM

## 2023-06-08 RX ORDER — ATORVASTATIN CALCIUM 20 MG/1
TABLET, FILM COATED ORAL
COMMUNITY
Start: 2023-06-02 | End: 2023-06-16

## 2023-06-08 NOTE — NURSING NOTE
Pt reports taking Cardedilol 12.5 mg prescribed by provider in [place].     Is the patient currently in the state of [place]? YES    Visit mode:VIDEO    If the visit is dropped, the patient can be reconnected by: VIDEO VISIT: Text to cell phone: [phone]    Will anyone else be joining the visit? NO      How would you like to obtain your AVS? MyChart    Are changes needed to the allergy or medication list? NO    Reason for visit: RECHECK

## 2023-06-08 NOTE — LETTER
6/8/2023       RE: Sukh Craven  [address]  [place] [facility] [place] [address]     Dear Colleague,    Thank you for referring your patient, Sukh Craven, to the [facility] [place] at [facility]. Please see a copy of my visit note below.    Virtual Visit Details    Type of service:  Video Visit   Video Start Time: 8:55 AM  Video End Time:9:02 AM    Originating Location (pt. Location): Home    Distant Location (provider location):  Off-site  Platform used for Video Visit: Well      Consult conducted via real-time audio/video technology by Devin Gunderson PA-C from [facility] to the patient in their home.    REASON FOR VISIT  Irritative lower urinary tract symptoms follow-up     HISTORY OF PRESENT ILLNESS  Mr. Craven is a 68 year old male who I am speaking with today in follow-up for his irritative lower urinary tract symptoms. I personally reviewed his most recent urology note from 4/25/23 in preparation for this visit as well as hist notes from 1/3/23 by Rena Nickerson. 0    Sukh's urologic history is significant for BPH status post HoLEP greater than 5 years ago who was recently found to have narrowing of his bladder neck.  He underwent laser incision of bladder neck on 2/16/2023, and had excellent resolution of obstructive urinary symptoms as of follow-up on 4/25/2023.  However, given his continued irritative LUTS in the setting of severe constipation, it was recommended he start taking MiraLAX, then have a follow-up appointment for symptom check for consideration of anticholinergic if OAB symptoms were still problematic.    Today:  MiraLAX is working well  Flow is very improved and he feels he is emptying well  Overall feels very stable    PHYSICAL EXAMINATION  Deferred given virtual visit.    IMPRESSION  Overactive bladder  Constipation    It was my pleasure to speak with Sukh virtually in follow-up for his weak  urinary stream, frequency, and feeling of incomplete bladder emptiness.  Overall, I am very happy to hear that he feels the symptoms have completely resolved just by addressing his constipation, and at this time I do not believe any further intervention is necessary.  Given his recent procedure, I believe it would be reasonable for us to follow-up with him in 6 months to be sure his urinary stream continues to remain solid and without evidence of recurrence of his bladder neck stricture.  If at that time things look good, we can go back to yearly follow-ups.    Mr. Craven expressed understanding and agreement to the above discussion and plan and all of his questions were answered to his satisfaction.      PLAN  Observation of symptoms for 6 months  Continue with aggressive constipation management with Miralax    SIGNED    Devin Gunderson PA-C      I spent a total of 20 minutes spent on the date of the encounter doing chart review, history and exam, documentation, and further activities as noted above.

## 2023-06-08 NOTE — PATIENT INSTRUCTIONS
Follow-up office visit in 6 months with Devin Gunderson PA-C with special urination test in the clinic.     Please come with a moderate urge to urinate.

## 2023-06-16 ENCOUNTER — OFFICE VISIT (OUTPATIENT)
Dept: FAMILY MEDICINE | Facility: CLINIC | Age: 68
End: 2023-06-16
Payer: COMMERCIAL

## 2023-06-16 VITALS
DIASTOLIC BLOOD PRESSURE: 65 MMHG | TEMPERATURE: 97.3 F | WEIGHT: 216.1 LBS | HEART RATE: 66 BPM | HEIGHT: 77 IN | RESPIRATION RATE: 16 BRPM | OXYGEN SATURATION: 98 % | BODY MASS INDEX: 25.52 KG/M2 | SYSTOLIC BLOOD PRESSURE: 126 MMHG

## 2023-06-16 DIAGNOSIS — E78.5 HYPERLIPIDEMIA, UNSPECIFIED HYPERLIPIDEMIA TYPE: ICD-10-CM

## 2023-06-16 DIAGNOSIS — I10 HYPERTENSION GOAL BP (BLOOD PRESSURE) < 140/90: ICD-10-CM

## 2023-06-16 DIAGNOSIS — Q25.43 AORTIC ROOT ANEURYSM: ICD-10-CM

## 2023-06-16 DIAGNOSIS — I35.1 MODERATE AORTIC INSUFFICIENCY: ICD-10-CM

## 2023-06-16 DIAGNOSIS — I73.9 CLAUDICATION OF BOTH LOWER EXTREMITIES (H): ICD-10-CM

## 2023-06-16 DIAGNOSIS — R73.03 PREDIABETES: ICD-10-CM

## 2023-06-16 LAB
ANION GAP SERPL CALCULATED.3IONS-SCNC: 12 MMOL/L (ref 7–15)
BUN SERPL-MCNC: 16 MG/DL (ref 8–23)
CALCIUM SERPL-MCNC: 9.9 MG/DL (ref 8.8–10.2)
CHLORIDE SERPL-SCNC: 105 MMOL/L (ref 98–107)
CREAT SERPL-MCNC: 1.1 MG/DL (ref 0.67–1.17)
DEPRECATED HCO3 PLAS-SCNC: 26 MMOL/L (ref 22–29)
GFR SERPL CREATININE-BSD FRML MDRD: 74 ML/MIN/1.73M2
GLUCOSE SERPL-MCNC: 120 MG/DL (ref 70–99)
HBA1C MFR BLD: 6.1 % (ref 0–5.6)
POTASSIUM SERPL-SCNC: 4.3 MMOL/L (ref 3.4–5.3)
SODIUM SERPL-SCNC: 143 MMOL/L (ref 136–145)

## 2023-06-16 PROCEDURE — 36415 COLL VENOUS BLD VENIPUNCTURE: CPT | Performed by: FAMILY MEDICINE

## 2023-06-16 PROCEDURE — 80048 BASIC METABOLIC PNL TOTAL CA: CPT | Performed by: FAMILY MEDICINE

## 2023-06-16 PROCEDURE — 99214 OFFICE O/P EST MOD 30 MIN: CPT | Performed by: FAMILY MEDICINE

## 2023-06-16 PROCEDURE — 83036 HEMOGLOBIN GLYCOSYLATED A1C: CPT | Performed by: FAMILY MEDICINE

## 2023-06-16 RX ORDER — ATORVASTATIN CALCIUM 20 MG/1
20 TABLET, FILM COATED ORAL DAILY
Qty: 90 TABLET | Refills: 1 | Status: SHIPPED | OUTPATIENT
Start: 2023-06-16 | End: 2023-09-15

## 2023-06-16 RX ORDER — LOSARTAN POTASSIUM 100 MG/1
100 TABLET ORAL DAILY
Qty: 90 TABLET | Refills: 1 | Status: SHIPPED | OUTPATIENT
Start: 2023-06-16 | End: 2023-08-15

## 2023-06-16 RX ORDER — AMLODIPINE BESYLATE 10 MG/1
10 TABLET ORAL DAILY
Qty: 90 TABLET | Refills: 1 | Status: SHIPPED | OUTPATIENT
Start: 2023-06-16 | End: 2023-10-30

## 2023-06-16 RX ORDER — CARVEDILOL 12.5 MG/1
TABLET ORAL
COMMUNITY
Start: 2023-06-02 | End: 2023-06-16

## 2023-06-16 RX ORDER — CARVEDILOL 12.5 MG/1
12.5 TABLET ORAL 2 TIMES DAILY WITH MEALS
Qty: 180 TABLET | Refills: 1 | Status: ON HOLD | OUTPATIENT
Start: 2023-06-16 | End: 2023-10-27

## 2023-06-16 ASSESSMENT — PAIN SCALES - GENERAL: PAINLEVEL: NO PAIN (0)

## 2023-06-16 NOTE — PROGRESS NOTES
Assessment & Plan     Aortic root aneurysm   - Adult Cardiology Eval  Referral; Future    Claudication of both lower extremities   - ordered US for further evaluation and referred to vascular provider   - US ALFONSO Doppler with Exercise Bilateral; Future  - Vascular Medicine Referral; Future    Hypertension goal BP (blood pressure) < 140/90  - controlled, continue current regimen   - amLODIPine (NORVASC) 10 MG tablet; Take 1 tablet (10 mg) by mouth daily  Dispense: 90 tablet; Refill: 1  - losartan (COZAAR) 100 MG tablet; Take 1 tablet (100 mg) by mouth daily  Dispense: 90 tablet; Refill: 1  - carvedilol (COREG) 12.5 MG tablet; Take 1 tablet (12.5 mg) by mouth 2 times daily (with meals) for 180 days  Dispense: 180 tablet; Refill: 1  - Adult Cardiology Eval  Referral; Future  - Basic metabolic panel  (Ca, Cl, CO2, Creat, Gluc, K, Na, BUN); Future  - Basic metabolic panel  (Ca, Cl, CO2, Creat, Gluc, K, Na, BUN)    Moderate aortic insufficiency  - Adult Cardiology Eval  Referral; Future    Hyperlipidemia, unspecified hyperlipidemia type  - atorvastatin (LIPITOR) 20 MG tablet; Take 1 tablet (20 mg) by mouth daily  Dispense: 90 tablet; Refill: 1    Prediabetes  - Hemoglobin A1c; Future  - Hemoglobin A1c        Parrish Stephenson MD  [facility]    Eva Luz is a 68 year old, presenting for the following health issues:  Hypertension and Heart Problem        6/16/2023     8:46 AM   Additional Questions   Roomed by Yusef Verma   Accompanied by Self     Heart Problem  This is a new problem. The current episode started 1 to 4 weeks ago.   History of Present Illness       Hypertension: He presents for follow up of hypertension.  He does check blood pressure  regularly outside of the clinic. Outside blood pressures have been over 140/90. He follows a low salt diet.     Vascular Disease:  He presents for follow up of vascular disease.  He never takes nitroglycerin. He  "is not taking daily aspirin.    He eats 0-1 servings of fruits and vegetables daily.He consumes 4 sweetened beverage(s) daily.He exercises with enough effort to increase his heart rate 9 or less minutes per day.  He exercises with enough effort to increase his heart rate 3 or less days per week. He is missing 1 dose(s) of medications per week.  He is not taking prescribed medications regularly due to remembering to take.       Cardiology 2/25/22   Patient Instructions  - documented in this encounter  Patient Instructions  Jovi Patel MD - 02/25/2022 8:20 AM CST    Formatting of this note might be different from the original.  -Please stop HYDRALAZINE  -Take Aspirin 81 mg (Baby aspirin)  -start Toprol XL 50 mg once a day - to decrease the stress on your Aorta  -Your echo in Feb of 2022 shows normal heart function  -Your Aortic Valve is Moderately leaky  -Your Aorta is moderately dilated   -The Aortic root is severely dilated at 5.2 cms  -repeat another echo in 5 months  -follow up with me in 6 months      Last month he was hospitalized in [place] for 2.5 days.     He was admitted for HTN urgency, aortic root aneurysm, CKD 3.     Medications were changed and current medications are Atorvastatin 20 mg daily, Coreg 12.5 mg BID, Norvasc 10 mg daily, Losartan 100 mg daily.     He does have pain in the calf if he walks which improves. Normally when he is resting during the day, he feels that feet are cold or little pins. No back pain.     No current chest pain, headaches, dyspnea with exertion or leg edema.     Review of Systems         Objective    /65 (BP Location: Right arm, Patient Position: Sitting, Cuff Size: Adult Large)   Pulse 66   Temp 97.3  F (36.3  C) (Tympanic)   Resp 16   Ht 1.956 m (6' 5\")   Wt 98 kg (216 lb 1.6 oz)   SpO2 98%   BMI 25.63 kg/m    Body mass index is 25.63 kg/m .  Physical Exam   /65 (BP Location: Right arm, Patient Position: Sitting, Cuff Size: Adult Large)   Pulse " "66   Temp 97.3  F (36.3  C) (Tympanic)   Resp 16   Ht 1.956 m (6' 5\")   Wt 98 kg (216 lb 1.6 oz)   SpO2 98%   BMI 25.63 kg/m                        "

## 2023-06-19 ENCOUNTER — ANCILLARY PROCEDURE (OUTPATIENT)
Dept: ULTRASOUND IMAGING | Facility: CLINIC | Age: 68
End: 2023-06-19
Attending: FAMILY MEDICINE
Payer: COMMERCIAL

## 2023-06-19 DIAGNOSIS — I73.9 CLAUDICATION OF BOTH LOWER EXTREMITIES (H): ICD-10-CM

## 2023-06-19 PROCEDURE — 93924 LWR XTR VASC STDY BILAT: CPT | Mod: GC | Performed by: RADIOLOGY

## 2023-06-26 NOTE — TELEPHONE ENCOUNTER
RECORDS RECEIVED FROM:    DATE RECEIVED:    NOTES STATUS DETAILS   OFFICE NOTE from referring provider  Internal Dr. Douglas   OFFICE NOTE from other cardiologists  Care Everywhere Dr. Patel 2-25-22   RECORDS from hospital/ED N/A    MEDICATION LIST Internal    GENERAL CARDIO RECORDS   (ALL APPOINTMENT TYPES)     LABS (CBC,BMP,CMP, TSH) Internal    EKG (STRIPS & REPORTS) Care Everywhere 2-25-22   MONITORS (STRIPS & REPORTS) N/A    ECHOS (IMAGES AND REPORTS) In process 2-23-22 Requested   STRESS TESTS (IMAGES AND REPORTS) N/A    cMRI (IMAGES AND REPORTS) N/A    CT/CTA (IMAGES AND REPORTS) N/A      Action 6/26/23 [facility]  Fax #[phone]   Action Taken Requested Echo 2-23-22    Tracking #[id number]    Received disc, sent to N to be uploaded to PACS. 6/27      Action 6/26/23 [facility]   Fax #[phone]   Action Taken Requested:    Cardiology office notes    EKG Strips    Chest imaging reports    Cardiac monitor    2021 - current     2021 - current    2021 - current    2021 - current     Sent second request to [facility] 6/29

## 2023-06-30 ENCOUNTER — TELEPHONE (OUTPATIENT)
Dept: CARDIOLOGY | Facility: CLINIC | Age: 68
End: 2023-06-30

## 2023-06-30 ENCOUNTER — OFFICE VISIT (OUTPATIENT)
Dept: CARDIOLOGY | Facility: CLINIC | Age: 68
End: 2023-06-30
Attending: INTERNAL MEDICINE
Payer: COMMERCIAL

## 2023-06-30 ENCOUNTER — PRE VISIT (OUTPATIENT)
Dept: CARDIOLOGY | Facility: CLINIC | Age: 68
End: 2023-06-30
Payer: COMMERCIAL

## 2023-06-30 VITALS
BODY MASS INDEX: 25.24 KG/M2 | HEIGHT: 77 IN | DIASTOLIC BLOOD PRESSURE: 70 MMHG | OXYGEN SATURATION: 97 % | HEART RATE: 62 BPM | SYSTOLIC BLOOD PRESSURE: 152 MMHG | WEIGHT: 213.8 LBS

## 2023-06-30 DIAGNOSIS — I73.9 PAD (PERIPHERAL ARTERY DISEASE) (H): ICD-10-CM

## 2023-06-30 DIAGNOSIS — I71.21 ANEURYSM OF ASCENDING AORTA WITHOUT RUPTURE (H): Primary | ICD-10-CM

## 2023-06-30 DIAGNOSIS — I1A.0 RESISTANT HYPERTENSION: ICD-10-CM

## 2023-06-30 DIAGNOSIS — I10 ESSENTIAL HYPERTENSION: ICD-10-CM

## 2023-06-30 DIAGNOSIS — E78.5 HYPERLIPIDEMIA LDL GOAL <70: ICD-10-CM

## 2023-06-30 DIAGNOSIS — I35.1 MODERATE AORTIC INSUFFICIENCY: ICD-10-CM

## 2023-06-30 DIAGNOSIS — I25.10 CORONARY ARTERY DISEASE DUE TO CALCIFIED CORONARY LESION: ICD-10-CM

## 2023-06-30 DIAGNOSIS — I25.84 CORONARY ARTERY DISEASE DUE TO CALCIFIED CORONARY LESION: ICD-10-CM

## 2023-06-30 PROCEDURE — G0463 HOSPITAL OUTPT CLINIC VISIT: HCPCS | Performed by: INTERNAL MEDICINE

## 2023-06-30 PROCEDURE — 99204 OFFICE O/P NEW MOD 45 MIN: CPT | Performed by: INTERNAL MEDICINE

## 2023-06-30 PROCEDURE — 93005 ELECTROCARDIOGRAM TRACING: CPT

## 2023-06-30 RX ORDER — HYDROCHLOROTHIAZIDE 25 MG/1
25 TABLET ORAL DAILY
Qty: 90 TABLET | Refills: 3 | Status: SHIPPED | OUTPATIENT
Start: 2023-06-30 | End: 2023-08-15

## 2023-06-30 ASSESSMENT — PAIN SCALES - GENERAL: PAINLEVEL: NO PAIN (0)

## 2023-06-30 NOTE — NURSING NOTE
Chief Complaint   Patient presents with     New Patient     [place] preventative? pt, referred d/t HTN, moderate aortic insufficiency, aortic root aneurysm       Vitals were taken, medications reconciled and EKG performed.    Melissa Rojas, EMT   1:01 PM

## 2023-06-30 NOTE — TELEPHONE ENCOUNTER
6/30 lvm and my chart msg to [facility] fasting lab when able , 3 months follow up with dr em conte sb

## 2023-06-30 NOTE — PROGRESS NOTES
"PREVENTIVE CARDIOLOGY INITIAL CONSULTATION    HPI:  Sukh Craven is a 68 year old male who is transitioning his care to the [facility] system.  He is here for management of thoracic aortic aneurysm and aortic insufficiency.     Sukh is a pleasant man with moderate to severely dilated thoracic aortic aneurysm and at least moderate aortic insufficiency. CVD risk factors include difficult to control HTN, HLD.    Sukh was previously followed at [facility] but after an insurance change is moving all his care to [facility].  He was hospitalized in the beginning of June for hypertension emergency in [place] when he was visiting family.  Records there indicate an aortic root measurement of 5 cm which corresponds closely with a February 2022 TTE from [facility] which showed a root diameter of 5.2 cm and proximal ascending aorta diameter of 4.9 cm.  Echo in [place] showed a normal LVEF of 60-65% with an end-diastolic diam of 5.6 cm and severe AI, again findings similar to the February echo from [facility].  Of note Sukh is tall, 6' 5\" without any marfanoid characteristics.    Sukh he reports intermittent and unpredictable dyspnea and fatigue with mild activities like climbing 1 flight of stairs.  On other days he has no problems at all with this level of activity.  However, he used to be much more active before the pandemic playing basketball 3 days/week.  He has not been able to regain this level of physical activity after being sedentary for so long.  He reports eating a well-balanced diet.  No orthopnea, PND, LE edema.    Blood pressure has been difficult to control for a long time and medical records show LVH.  Sukh prepares most of his food at home and does not regularly eat fast food or high sodium foods.  Blood pressure is currently treated with carvedilol, amlodipine, losartan.  Blood pressure is elevated in clinic today, but was normal 2 weeks ago at his PCP visit.    One pertinent " symptom on ROS is bilateral calf pain with activity, but which is not always consistent.  He did have an ALFONSO which was abnormal on the left. No hair loss or skin changes.  No ulcers and no problems with wound healing.    Sukh is a former smoker, but quit in 2007.  No family history of aortic aneurysm or dissection.  Sukh is currently retired.    The 10-year ASCVD risk score (Rekha KRISHNA, et al., 2019) is: 25.2%    Values used to calculate the score:      Age: 68 years      Sex: Male      Is Non- : Yes      Diabetic: No      Tobacco smoker: No      Systolic Blood Pressure: 152 mmHg      Is BP treated: Yes      HDL Cholesterol: 41 mg/dL      Total Cholesterol: 257 mg/dL     ASSESSMENT AND PLAN  Sukh Craven is a 68 year old male with thoracic aortic aneurysm, at least moderate aortic insufficiency, resistant hypertension, HLD, CKD.    #. Moderate thoracic aortic aneurysm: likely degenerative given age and CV risk factors without any family history.  -Plan to do MRI of chest and abdomen to fully characterize aorta in about 6 months  -Continue aggressive management of HTN and HLD    #.  At least moderate aortic insufficiency, most likely etiology is thoracic aortic aneurysm  LVIDd 5.6cm on echo report from [place] in June 2023  -Echo in 6 months  -We discussed signs and symptoms of heart failure to watch out for    #.  Resistant hypertension  -Sukh thinks they might have done a renal ultrasound in [place].  He will try to get those records to us, but if it was not done I will plan to order that   -Check early morning renal and aldosterone level; will also check metabolic panel and thyroid function  -Start HCTZ 25 mg daily  -Monitor home blood pressures and send us a 2-week log    #. CAD: on non-gated CT chest  #. PAD: abnormal ALFONSO on left  #. HLD: goal LDL <70mg/dL  - lipid panel  - will discuss aspirin at next visit  - continue atorvastatin 20mg daily    Follow-up in 3 months    Thank you  for the opportunity to participate in the care of Mr. Sukh Craven. Please feel free to contact me with any additional questions or concerns.    Wilton Lan MD    Preventive Cardiology  Pager: [phone]    This note was dictated with voice recognition software and then edited. Please excuse any unintentional errors.    The total time of this encounter amounted to 55 minutes on the day of service. This time included time spent with the patient, reviewing records or previous testing, ordering tests, care coordination, and performing post visit documentation.     PAST MEDICAL HISTORY:  Patient Active Problem List   Diagnosis     Hematuria     BPH (benign prostatic hypertrophy)     Advanced directives, counseling/discussion     Elevated serum creatinine     History of Helicobacter infection     Essential hypertension     CKD (chronic kidney disease) stage 2, GFR 60-89 ml/min     CARDIOVASCULAR SCREENING; LDL GOAL LESS THAN 160     Pre-diabetes     BPH (benign prostatic hypertrophy) with urinary obstruction     Moderate aortic insufficiency     Bladder neck contracture     Aneurysm of ascending aorta without rupture (H)     Past Medical History:   Diagnosis Date     BPH (benign prostatic hyperplasia)      Dyslipidemia      HTN (hypertension)        CURRENT MEDICATIONS:  Current Outpatient Medications   Medication Sig Dispense Refill     amLODIPine (NORVASC) 10 MG tablet Take 1 tablet (10 mg) by mouth daily 90 tablet 1     atorvastatin (LIPITOR) 20 MG tablet Take 1 tablet (20 mg) by mouth daily 90 tablet 1     carvedilol (COREG) 12.5 MG tablet Take 1 tablet (12.5 mg) by mouth 2 times daily (with meals) for 180 days 180 tablet 1     hydrochlorothiazide (HYDRODIURIL) 25 MG tablet Take 1 tablet (25 mg) by mouth daily 90 tablet 3     losartan (COZAAR) 100 MG tablet Take 1 tablet (100 mg) by mouth daily 90 tablet 1     oxybutynin ER (DITROPAN XL) 15 MG 24 hr tablet Take 1 tablet (15 mg) by mouth  "daily Call clinic to schedule follow up appointment. (Patient not taking: Reported on 2023) 30 tablet 0       PAST SURGICAL HISTORY:  Past Surgical History:   Procedure Laterality Date     COLONOSCOPY N/A 2022    Procedure: COLONOSCOPY, WITH POLYPECTOMY;  Surgeon: Long Silver MD;  Location: [facility] OR     CYSTOSCOPY, BLADDER NECK CUTS, COMBINED N/A 2023    Procedure: CYSTOSCOPY, WITH MULTIPLE INCISIONS OF BLADDER NECK WITH HOLMIUM LASER;  Surgeon: Cresencio Foote MD;  Location: [facility] OR     ESOPHAGOSCOPY, GASTROSCOPY, DUODENOSCOPY (EGD), COMBINED N/A 2022    Procedure: ESOPHAGOGASTRODUODENOSCOPY, WITH BIOPSY;  Surgeon: Long Silver MD;  Location: [facility] OR     LASER HOLMIUM ENUCLEATION PROSTATE N/A 2017    Procedure: LASER HOLMIUM ENUCLEATION PROSTATE;  Holmium Laser Enucleation Of The Prostate ;  Surgeon: Cresencio Foote MD;  Location: [facility] OR     REMOVAL OF SPERM DUCT(S)         ALLERGIES  No known drug allergy    FAMILY HX:  Family History   Problem Relation Age of Onset     Pulmonary Embolism Mother      Diabetes Sister      Peripheral Vascular Disease Sister      Diabetes Sister      Diabetes Brother      C.A.D. No family hx of      Hypertension No family hx of      Cerebrovascular Disease No family hx of      Breast Cancer No family hx of      Cancer - colorectal No family hx of      Prostate Cancer No family hx of        SOCIAL HX:  Social History     Tobacco Use     Smoking status: Former     Packs/day: 0.50     Years: 25.00     Pack years: 12.50     Types: Cigarettes     Quit date: 3/23/2007     Years since quittin.2     Smokeless tobacco: Never   Vaping Use     Vaping Use: Never used   Substance Use Topics     Alcohol use: Yes     Comment: rare- social drinker     Drug use: No        VITAL SIGNS:  BP (!) 152/70 (BP Location: Right arm, Patient Position: Sitting, Cuff Size: Adult Regular)   Pulse 62   Ht 1.956 m (6' 5.01\")   Wt 97 kg (213 lb 12.8 " oz)   SpO2 97%   BMI 25.35 kg/m    Body mass index is 25.35 kg/m .  Wt Readings from Last 2 Encounters:   06/30/23 97 kg (213 lb 12.8 oz)   06/16/23 98 kg (216 lb 1.6 oz)       PHYSICAL EXAM  Gen: pleasant man sitting comfortably in NAD  Head: nc/at  CV: nml s1/s2, 2/6 systolic murmur and 2/6 holodiastolic murmur at upper LSB; no JVD  Chest: clear lungs  Abd: soft, NT, NABS  Ext: warm, no LE edema  Skin: no rash on limited exam; no pulsating nails  Neuro: awake, alert, oriented, nml speech and gait  Vasc: carotid pulse is brisk, no bruit; 1+ DP and PT pulses bilaterally     LABS: personally reviewed  CMP  Recent Labs   Lab Test 06/16/23  0948 01/14/23  0845 07/08/22  0853 07/29/20  1711 05/28/19  1440 04/09/19 1737 05/01/18  1813     --  141 140 140 137 141   POTASSIUM 4.3  --  3.8 3.8 4.0 3.8 4.5   CHLORIDE 105  --  107 107 106 104 107   CO2 26  --  23 28 28 24 25   ANIONGAP 12  --  11 5 6 9 9   *  --  113* 85 84 88 87   BUN 16.0  --  15 20 18 29 17   CR 1.10 1.1 1.05 1.38* 1.23 1.31* 1.20   GFRESTIMATED 74 >60 79 54* 62 58* 62   GFRESTBLACK  --   --   --  62 72 67 74   DORIS 9.9  --  9.3 9.5 9.1 9.5 9.3   PROTTOTAL  --   --  8.2  --   --  9.3*  --    ALBUMIN  --   --  4.2  --   --  4.8  --    BILITOTAL  --   --  1.0  --   --  1.3  --    ALKPHOS  --   --  62  --   --  67  --    AST  --   --  23  --   --  36  --    ALT  --   --  42  --   --  38  --      CBC  Recent Labs   Lab Test 07/08/22  0853 05/28/19  1440 04/09/19  1737 07/14/17  1511   WBC 3.5* 4.6 4.6 3.6*   RBC 4.60 5.17 5.91* 4.38*   HGB 14.1 15.7 17.8* 12.5*   HCT 41.8 47.0 52.7 38.2*   MCV 91 91 89 87   MCH 30.7 30.4 30.1 28.5   MCHC 33.7 33.4 33.8 32.7   RDW 13.3 14.3 13.6 13.7    152 166 208     INRNo lab results found.  Recent Labs   Lab Test 04/09/19  1737   CHOL 257*   HDL 41   *   TRIG 102     Recent Labs   Lab Test 06/16/23  0948 07/08/22  0853   A1C 6.1* 6.0*       Most recent EKG: reviewed and discussed with the  patient  6/30/23 Sinus rhythm, 1st degree AVB, LVH    Most recent ECHO: reviewed and discussed with the patient  6/1/30 TTE      2/23/22 [facility] TTE    * Normal left ventricular size and systolic function, quantified ejection   fraction is 61%.     * Left ventricular wall motion is normal.     * There is moderate septal hypertrophy.     * Low normal right ventricular systolic function.     * The aortic valve is trileaflet; there is moderate to severe aortic   regurgitation with a vena contracta of 0.5 cm; pressure half time of 342 ms;   jet is broad based and takes up >60% of the LVOT; incomplete assessment for   diastolic flow reversal in the descending aorta.     * Due to inadequate tricuspid regurgitant velocity, unable to calculate   right ventricular systolic pressure.     * The sinuses of Valsalva is severely dilated measuring 5.2 cm; the proximal   ascending aorta is moderately dilated measuring 4.9 cm.     * Compared to prior study on 10/15/2020, the sinuses of Valsalva has   increased slightly in size from 5.1 cm to now 5.2 cm; the proximal ascending   aorta has also increased in size from 4.5 cm to 4.9 cm; the degree of aortic   regurgitation remains moderate to severe; no significant change in left   ventricular systolic function with side by side comparison.    4/23/19 TTE  Left ventricular size is normal. Global and regional left ventricular function  is normal with an EF of 55-60%.  Global right ventricular function is normal.  The aortic valve is tricuspid. Moderate aortic insufficiency is present. P1/2  t = 416 ms and EROA = 0.22 cm2  Pulmonary artery systolic pressure is normal.  Moderate dilatation of the aorta is present. Sinuses of Valsalva 4.9 cm.  Ascending aorta 4.5 cm.  No pericardial effusion is present.    Other Imaging: reviewed and discussed with the patient  5/31/22 CT in [place]

## 2023-06-30 NOTE — LETTER
"6/30/2023      RE: Sukh Craven  [address]  [facility] [address]       Dear Colleague,    Thank you for the opportunity to participate in the care of your patient, Sukh Craven, at the [facility] [place] [facility]. Please see a copy of my visit note below.    PREVENTIVE CARDIOLOGY INITIAL CONSULTATION    HPI:  Sukh Craven is a 68 year old male who is transitioning his care to the [facility] system.  He is here for management of thoracic aortic aneurysm and aortic insufficiency.     Sukh is a pleasant man with moderate to severely dilated thoracic aortic aneurysm and at least moderate aortic insufficiency. CVD risk factors include difficult to control HTN, HLD.    Sukh was previously followed at [facility] but after an insurance change is moving all his care to [facility].  He was hospitalized in the beginning of June for hypertension emergency in [place] when he was visiting family.  Records there indicate an aortic root measurement of 5 cm which corresponds closely with a February 2022 TTE from [facility] which showed a root diameter of 5.2 cm and proximal ascending aorta diameter of 4.9 cm.  Echo in [place] showed a normal LVEF of 60-65% with an end-diastolic diam of 5.6 cm and severe AI, again findings similar to the February echo from [facility].  Of note Sukh is tall, 6' 5\" without any marfanoid characteristics.    Sukh he reports intermittent and unpredictable dyspnea and fatigue with mild activities like climbing 1 flight of stairs.  On other days he has no problems at all with this level of activity.  However, he used to be much more active before the pandemic playing basketball 3 days/week.  He has not been able to regain this level of physical activity after being sedentary for so long.  He reports eating a well-balanced diet.  No orthopnea, PND, LE edema.    Blood pressure has been " difficult to control for a long time and medical records show LVH.  Sukh prepares most of his food at home and does not regularly eat fast food or high sodium foods.  Blood pressure is currently treated with carvedilol, amlodipine, losartan.  Blood pressure is elevated in clinic today, but was normal 2 weeks ago at his PCP visit.    One pertinent symptom on ROS is bilateral calf pain with activity, but which is not always consistent.  He did have an ALFONSO which was abnormal on the left. No hair loss or skin changes.  No ulcers and no problems with wound healing.    Sukh is a former smoker, but quit in 2007.  No family history of aortic aneurysm or dissection.  Sukh is currently retired.    The 10-year ASCVD risk score (Rekha KRISHNA, et al., 2019) is: 25.2%    Values used to calculate the score:      Age: 68 years      Sex: Male      Is Non- : Yes      Diabetic: No      Tobacco smoker: No      Systolic Blood Pressure: 152 mmHg      Is BP treated: Yes      HDL Cholesterol: 41 mg/dL      Total Cholesterol: 257 mg/dL     ASSESSMENT AND PLAN  Sukh Craven is a 68 year old male with thoracic aortic aneurysm, at least moderate aortic insufficiency, resistant hypertension, HLD, CKD.    #. Moderate thoracic aortic aneurysm: likely degenerative given age and CV risk factors without any family history.  -Plan to do MRI of chest and abdomen to fully characterize aorta in about 6 months  -Continue aggressive management of HTN and HLD    #.  At least moderate aortic insufficiency, most likely etiology is thoracic aortic aneurysm  LVIDd 5.6cm on echo report from [place] in June 2023  -Echo in 6 months  -We discussed signs and symptoms of heart failure to watch out for    #.  Resistant hypertension  -Sukh thinks they might have done a renal ultrasound in [place].  He will try to get those records to us, but if it was not done I will plan to order that   -Check early morning renal and aldosterone level;  will also check metabolic panel and thyroid function  -Start HCTZ 25 mg daily  -Monitor home blood pressures and send us a 2-week log    #. CAD: on non-gated CT chest  #. PAD: abnormal ALFONSO on left  #. HLD: goal LDL <70mg/dL  - lipid panel  - will discuss aspirin at next visit  - continue atorvastatin 20mg daily    Follow-up in 3 months    Thank you for the opportunity to participate in the care of Mr. Sukh Craven. Please feel free to contact me with any additional questions or concerns.    Wilton Lan MD    Preventive Cardiology  Pager: [phone]    This note was dictated with voice recognition software and then edited. Please excuse any unintentional errors.    The total time of this encounter amounted to 55 minutes on the day of service. This time included time spent with the patient, reviewing records or previous testing, ordering tests, care coordination, and performing post visit documentation.     PAST MEDICAL HISTORY:  Patient Active Problem List   Diagnosis     Hematuria     BPH (benign prostatic hypertrophy)     Advanced directives, counseling/discussion     Elevated serum creatinine     History of Helicobacter infection     Essential hypertension     CKD (chronic kidney disease) stage 2, GFR 60-89 ml/min     CARDIOVASCULAR SCREENING; LDL GOAL LESS THAN 160     Pre-diabetes     BPH (benign prostatic hypertrophy) with urinary obstruction     Moderate aortic insufficiency     Bladder neck contracture     Aneurysm of ascending aorta without rupture (H)     Past Medical History:   Diagnosis Date     BPH (benign prostatic hyperplasia)      Dyslipidemia      HTN (hypertension)        CURRENT MEDICATIONS:  Current Outpatient Medications   Medication Sig Dispense Refill     amLODIPine (NORVASC) 10 MG tablet Take 1 tablet (10 mg) by mouth daily 90 tablet 1     atorvastatin (LIPITOR) 20 MG tablet Take 1 tablet (20 mg) by mouth daily 90 tablet 1     carvedilol (COREG) 12.5 MG tablet Take  1 tablet (12.5 mg) by mouth 2 times daily (with meals) for 180 days 180 tablet 1     hydrochlorothiazide (HYDRODIURIL) 25 MG tablet Take 1 tablet (25 mg) by mouth daily 90 tablet 3     losartan (COZAAR) 100 MG tablet Take 1 tablet (100 mg) by mouth daily 90 tablet 1     oxybutynin ER (DITROPAN XL) 15 MG 24 hr tablet Take 1 tablet (15 mg) by mouth daily Call clinic to schedule follow up appointment. (Patient not taking: Reported on 4/25/2023) 30 tablet 0       PAST SURGICAL HISTORY:  Past Surgical History:   Procedure Laterality Date     COLONOSCOPY N/A 9/26/2022    Procedure: COLONOSCOPY, WITH POLYPECTOMY;  Surgeon: Long Silver MD;  Location: [facility] OR     CYSTOSCOPY, BLADDER NECK CUTS, COMBINED N/A 2/16/2023    Procedure: CYSTOSCOPY, WITH MULTIPLE INCISIONS OF BLADDER NECK WITH HOLMIUM LASER;  Surgeon: Cresencio Foote MD;  Location: [facility] OR     ESOPHAGOSCOPY, GASTROSCOPY, DUODENOSCOPY (EGD), COMBINED N/A 9/26/2022    Procedure: ESOPHAGOGASTRODUODENOSCOPY, WITH BIOPSY;  Surgeon: Long Silver MD;  Location: [facility] OR     LASER HOLMIUM ENUCLEATION PROSTATE N/A 4/27/2017    Procedure: LASER HOLMIUM ENUCLEATION PROSTATE;  Holmium Laser Enucleation Of The Prostate ;  Surgeon: Cresencio Foote MD;  Location: [facility] OR     REMOVAL OF SPERM DUCT(S)         ALLERGIES  No known drug allergy    FAMILY HX:  Family History   Problem Relation Age of Onset     Pulmonary Embolism Mother      Diabetes Sister      Peripheral Vascular Disease Sister      Diabetes Sister      Diabetes Brother      C.A.D. No family hx of      Hypertension No family hx of      Cerebrovascular Disease No family hx of      Breast Cancer No family hx of      Cancer - colorectal No family hx of      Prostate Cancer No family hx of        SOCIAL HX:  Social History     Tobacco Use     Smoking status: Former     Packs/day: 0.50     Years: 25.00     Pack years: 12.50     Types: Cigarettes     Quit date: 3/23/2007     Years since  "quittin.2     Smokeless tobacco: Never   Vaping Use     Vaping Use: Never used   Substance Use Topics     Alcohol use: Yes     Comment: rare- social drinker     Drug use: No        VITAL SIGNS:  BP (!) 152/70 (BP Location: Right arm, Patient Position: Sitting, Cuff Size: Adult Regular)   Pulse 62   Ht 1.956 m (6' 5.01\")   Wt 97 kg (213 lb 12.8 oz)   SpO2 97%   BMI 25.35 kg/m    Body mass index is 25.35 kg/m .  Wt Readings from Last 2 Encounters:   23 97 kg (213 lb 12.8 oz)   23 98 kg (216 lb 1.6 oz)       PHYSICAL EXAM  Gen: pleasant man sitting comfortably in NAD  Head: nc/at  CV: nml s1/s2, 2/6 systolic murmur and 2/6 holodiastolic murmur at upper LSB; no JVD  Chest: clear lungs  Abd: soft, NT, NABS  Ext: warm, no LE edema  Skin: no rash on limited exam; no pulsating nails  Neuro: awake, alert, oriented, nml speech and gait  Vasc: carotid pulse is brisk, no bruit; 1+ DP and PT pulses bilaterally     LABS: personally reviewed  CMP  Recent Labs   Lab Test 23  0948 23  0845 22  0853 20  1711 19  1440 19  1737 18  1813     --  141 140 140 137 141   POTASSIUM 4.3  --  3.8 3.8 4.0 3.8 4.5   CHLORIDE 105  --  107 107 106 104 107   CO2 26  --  23 28 28 24 25   ANIONGAP 12  --  11 5 6 9 9   *  --  113* 85 84 88 87   BUN 16.0  --  15 20 18 29 17   CR 1.10 1.1 1.05 1.38* 1.23 1.31* 1.20   GFRESTIMATED 74 >60 79 54* 62 58* 62   GFRESTBLACK  --   --   --  62 72 67 74   DORIS 9.9  --  9.3 9.5 9.1 9.5 9.3   PROTTOTAL  --   --  8.2  --   --  9.3*  --    ALBUMIN  --   --  4.2  --   --  4.8  --    BILITOTAL  --   --  1.0  --   --  1.3  --    ALKPHOS  --   --  62  --   --  67  --    AST  --   --  23  --   --  36  --    ALT  --   --  42  --   --  38  --      CBC  Recent Labs   Lab Test 22  0853 19  1440 19  1737 17  1511   WBC 3.5* 4.6 4.6 3.6*   RBC 4.60 5.17 5.91* 4.38*   HGB 14.1 15.7 17.8* 12.5*   HCT 41.8 47.0 52.7 38.2*   MCV 91 " 91 89 87   MCH 30.7 30.4 30.1 28.5   MCHC 33.7 33.4 33.8 32.7   RDW 13.3 14.3 13.6 13.7    152 166 208     INRNo lab results found.  Recent Labs   Lab Test 04/09/19  1737   CHOL 257*   HDL 41   *   TRIG 102     Recent Labs   Lab Test 06/16/23  0948 07/08/22  0853   A1C 6.1* 6.0*       Most recent EKG: reviewed and discussed with the patient  6/30/23 Sinus rhythm, 1st degree AVB, LVH    Most recent ECHO: reviewed and discussed with the patient  6/1/30 TTE      2/23/22 [facility] TTE    * Normal left ventricular size and systolic function, quantified ejection   fraction is 61%.     * Left ventricular wall motion is normal.     * There is moderate septal hypertrophy.     * Low normal right ventricular systolic function.     * The aortic valve is trileaflet; there is moderate to severe aortic   regurgitation with a vena contracta of 0.5 cm; pressure half time of 342 ms;   jet is broad based and takes up >60% of the LVOT; incomplete assessment for   diastolic flow reversal in the descending aorta.     * Due to inadequate tricuspid regurgitant velocity, unable to calculate   right ventricular systolic pressure.     * The sinuses of Valsalva is severely dilated measuring 5.2 cm; the proximal   ascending aorta is moderately dilated measuring 4.9 cm.     * Compared to prior study on 10/15/2020, the sinuses of Valsalva has   increased slightly in size from 5.1 cm to now 5.2 cm; the proximal ascending   aorta has also increased in size from 4.5 cm to 4.9 cm; the degree of aortic   regurgitation remains moderate to severe; no significant change in left   ventricular systolic function with side by side comparison.    4/23/19 TTE  Left ventricular size is normal. Global and regional left ventricular function  is normal with an EF of 55-60%.  Global right ventricular function is normal.  The aortic valve is tricuspid. Moderate aortic insufficiency is present. P1/2  t = 416 ms and EROA = 0.22 cm2  Pulmonary artery  systolic pressure is normal.  Moderate dilatation of the aorta is present. Sinuses of Valsalva 4.9 cm.  Ascending aorta 4.5 cm.  No pericardial effusion is present.    Other Imaging: reviewed and discussed with the patient  5/31/22 CT in [place]           Please do not hesitate to contact me if you have any questions/concerns.     Sincerely,     Wilton Lan MD

## 2023-06-30 NOTE — PATIENT INSTRUCTIONS
"You were seen today in the Cardiovascular Clinic at the [facility].     Cardiology Providers you saw during your visit: Dr. Bao Lan     Diagnosis:   Encounter Diagnoses   Name Primary?    Moderate aortic insufficiency     Aneurysm of ascending aorta without rupture (H)     Essential hypertension Yes        Recommendations:   1. Check with the [facility] to see if they did a renal (kidney) ultrasound.  2. Get your blood drawn first thing in the morning for the kidney hormone, cholesterol, metabolic panel and thyroid tests.  3. Check [email or web address].[email or web address] to see if your blood pressure cuff is validated.   4. Look out for signs of heart failure including fluid retention and swelling in your legs or belly, constant shortness of breath with activities, difficulty lying flat because you get short of breath, waking up in the middle of the night short of breath.   5. Start hydrochlorothiazide 25mg daily.  6. After about 2 weeks start monitoring your blood pressure daily at different times of the day. Send us the results after another 2 weeks.  7. Follow up with Dr. Bao Lan in 3 months.       Please feel free to call me with any questions or concerns.       Zeny Goel RN     Questions: [phone].   First press #1 for the [employer] and then press #4 for \"Medical Questions\" to reach us Cardiology Nurses.     [phone].   First press #1 for the [employer] and then press #1     On Call Cardiologist for after hours or on weekends: [phone]   option #4 and ask to speak to the on-call Cardiologist.          If you need a medication refill please contact your pharmacy.  Please allow 3 business days for your refill to be completed.  ________________________________________________________________________________________________________________________________         "

## 2023-07-02 PROBLEM — I1A.0 RESISTANT HYPERTENSION: Status: ACTIVE | Noted: 2023-07-02

## 2023-07-02 PROBLEM — I25.84 CORONARY ARTERY DISEASE DUE TO CALCIFIED CORONARY LESION: Status: ACTIVE | Noted: 2023-07-02

## 2023-07-02 PROBLEM — E78.5 HYPERLIPIDEMIA LDL GOAL <70: Status: ACTIVE | Noted: 2023-07-02

## 2023-07-02 PROBLEM — I25.10 CORONARY ARTERY DISEASE DUE TO CALCIFIED CORONARY LESION: Status: ACTIVE | Noted: 2023-07-02

## 2023-07-02 PROBLEM — I73.9 PAD (PERIPHERAL ARTERY DISEASE) (H): Status: ACTIVE | Noted: 2023-07-02

## 2023-07-04 LAB
ATRIAL RATE - MUSE: 61 BPM
DIASTOLIC BLOOD PRESSURE - MUSE: NORMAL MMHG
INTERPRETATION ECG - MUSE: NORMAL
P AXIS - MUSE: 63 DEGREES
PR INTERVAL - MUSE: 234 MS
QRS DURATION - MUSE: 104 MS
QT - MUSE: 422 MS
QTC - MUSE: 424 MS
R AXIS - MUSE: 39 DEGREES
SYSTOLIC BLOOD PRESSURE - MUSE: NORMAL MMHG
T AXIS - MUSE: 61 DEGREES
VENTRICULAR RATE- MUSE: 61 BPM

## 2023-07-05 NOTE — TELEPHONE ENCOUNTER
7/5 lvm and sent letter to [facility] fasting lab when able , 3 months follow up with dr em conte sb

## 2023-07-19 ENCOUNTER — OFFICE VISIT (OUTPATIENT)
Dept: FAMILY MEDICINE | Facility: CLINIC | Age: 68
End: 2023-07-19
Payer: COMMERCIAL

## 2023-07-19 VITALS
SYSTOLIC BLOOD PRESSURE: 118 MMHG | DIASTOLIC BLOOD PRESSURE: 58 MMHG | WEIGHT: 209 LBS | HEIGHT: 77 IN | TEMPERATURE: 97.2 F | HEART RATE: 65 BPM | BODY MASS INDEX: 24.68 KG/M2 | OXYGEN SATURATION: 97 % | RESPIRATION RATE: 18 BRPM

## 2023-07-19 DIAGNOSIS — N18.2 CKD (CHRONIC KIDNEY DISEASE) STAGE 2, GFR 60-89 ML/MIN: ICD-10-CM

## 2023-07-19 DIAGNOSIS — Z23 NEED FOR SHINGLES VACCINE: ICD-10-CM

## 2023-07-19 DIAGNOSIS — H61.23 BILATERAL IMPACTED CERUMEN: ICD-10-CM

## 2023-07-19 DIAGNOSIS — R21 RASH AND NONSPECIFIC SKIN ERUPTION: ICD-10-CM

## 2023-07-19 DIAGNOSIS — Z00.00 ENCOUNTER FOR MEDICARE ANNUAL WELLNESS EXAM: ICD-10-CM

## 2023-07-19 DIAGNOSIS — Z23 HIGH PRIORITY FOR 2019-NCOV VACCINE: ICD-10-CM

## 2023-07-19 LAB
CREAT UR-MCNC: 243 MG/DL
MICROALBUMIN UR-MCNC: <12 MG/L
MICROALBUMIN/CREAT UR: NORMAL MG/G{CREAT}

## 2023-07-19 PROCEDURE — 82570 ASSAY OF URINE CREATININE: CPT | Performed by: FAMILY MEDICINE

## 2023-07-19 PROCEDURE — 82043 UR ALBUMIN QUANTITATIVE: CPT | Performed by: FAMILY MEDICINE

## 2023-07-19 PROCEDURE — 91312 COVID-19 BIVALENT 12+ (PFIZER): CPT | Performed by: FAMILY MEDICINE

## 2023-07-19 PROCEDURE — 0124A COVID-19 BIVALENT 12+ (PFIZER): CPT | Performed by: FAMILY MEDICINE

## 2023-07-19 PROCEDURE — G0438 PPPS, INITIAL VISIT: HCPCS | Performed by: FAMILY MEDICINE

## 2023-07-19 ASSESSMENT — ENCOUNTER SYMPTOMS
CONSTIPATION: 0
SORE THROAT: 0
NAUSEA: 0
SHORTNESS OF BREATH: 0
PALPITATIONS: 0
DYSURIA: 0
FEVER: 0
COUGH: 0
HEARTBURN: 0
PARESTHESIAS: 0
EYE PAIN: 0
HEMATURIA: 0
HEADACHES: 0
WEAKNESS: 0
ARTHRALGIAS: 0
DIZZINESS: 0
CHILLS: 0
JOINT SWELLING: 0
HEMATOCHEZIA: 0
MYALGIAS: 0
ABDOMINAL PAIN: 0
DIARRHEA: 0
FREQUENCY: 1
NERVOUS/ANXIOUS: 0

## 2023-07-19 ASSESSMENT — PAIN SCALES - GENERAL: PAINLEVEL: NO PAIN (0)

## 2023-07-19 ASSESSMENT — ACTIVITIES OF DAILY LIVING (ADL): CURRENT_FUNCTION: NO ASSISTANCE NEEDED

## 2023-07-19 NOTE — PROGRESS NOTES
"SUBJECTIVE:   Sukh is a 68 year old who presents for Preventive Visit.      7/19/2023     9:12 AM   Additional Questions   Roomed by Yesenia LAGUNA.     Are you in the first 12 months of your Medicare coverage?  No    Healthy Habits:     In general, how would you rate your overall health?  Fair    Frequency of exercise:  1 day/week    Duration of exercise:  15-30 minutes    Do you usually eat at least 4 servings of fruit and vegetables a day, include whole grains    & fiber and avoid regularly eating high fat or \"junk\" foods?  No    Taking medications regularly:  Yes    Medication side effects:  None    Ability to successfully perform activities of daily living:  No assistance needed    Home Safety:  No safety concerns identified    Hearing Impairment:  No hearing concerns    In the past 6 months, have you been bothered by leaking of urine?  No    In general, how would you rate your overall mental or emotional health?  Good    Additional concerns today:  No    Rash on his back for around one month month. It is on right upper back and neck. Rash was itchy and now mild itching. He thinks it might be clearing up. No new products - soap, shampoo/conditioner, laundry detergent     Have you ever done Advance Care Planning? (For example, a Health Directive, POLST, or a discussion with a medical provider or your loved ones about your wishes): No, advance care planning information given to patient to review.  Patient declined advance care planning discussion at this time.       Fall risk  Fallen 2 or more times in the past year?: No  Any fall with injury in the past year?: No    Cognitive Screening   1) Repeat 3 items (Leader, Season, Table)    2) Clock draw: NORMAL  3) 3 item recall: Recalls 1 object   Results: NORMAL clock, 1-2 items recalled: COGNITIVE IMPAIRMENT LESS LIKELY    Mini-CogTM Copyright S Donald. Licensed by the author for use in [facility]; reprinted with permission (carlotta@.[facility]). All rights reserved. "      Do you have sleep apnea, excessive snoring or daytime drowsiness?: no    Reviewed and updated as needed this visit by clinical staff   Tobacco  Allergies  Meds              Reviewed and updated as needed this visit by Provider                 Social History     Tobacco Use     Smoking status: Former     Packs/day: 0.50     Years: 25.00     Pack years: 12.50     Types: Cigarettes     Quit date: 3/23/2007     Years since quittin.3     Smokeless tobacco: Never   Substance Use Topics     Alcohol use: Yes     Comment: rare- social drinker             2023     7:23 AM   Alcohol Use   Prescreen: >3 drinks/day or >7 drinks/week? Not Applicable          No data to display              Do you have a current opioid prescription? No  Do you use any other controlled substances or medications that are not prescribed by a provider? None      Current providers sharing in care for this patient include:   Patient Care Team:  Clinic - [facility] as PCP - General (Clinic)  Teresa Marquis, RN as Registered Nurse  Ned Martinez PA-C as Referring Physician (Physician Assistant)  Rena Nickerson CNP as Nurse Practitioner (Urology)  [facility] (Pharmacist)  Misha Greenberg MD as Assigned PCP  Rena Nickerson CNP as Nurse Practitioner (Urology)  Cresencio Foote MD as Assigned Surgical Provider  Wilton Headley MD as MD (Cardiovascular Disease)  Zeny Goel RN as Specialty Care Coordinator (Cardiology)    The following health maintenance items are reviewed in Epic and correct as of today:  Health Maintenance   Topic Date Due     ZOSTER IMMUNIZATION (1 of 2) Never done     LIPID  2020     COVID-19 Vaccine (5 - Pfizer series) 08/10/2022     ANNUAL REVIEW OF HM ORDERS  06/15/2023     MICROALBUMIN  2023     MEDICARE ANNUAL WELLNESS VISIT  2023     INFLUENZA VACCINE (1) 2023     BMP  2023     FALL RISK ASSESSMENT   "07/19/2024     ADVANCE CARE PLANNING  07/08/2027     DTAP/TDAP/TD IMMUNIZATION (3 - Td or Tdap) 07/30/2029     COLORECTAL CANCER SCREENING  09/26/2029     HEPATITIS C SCREENING  Completed     PHQ-2 (once per calendar year)  Completed     Pneumococcal Vaccine: 65+ Years  Completed     URINALYSIS  Completed     AORTIC ANEURYSM SCREENING (SYSTEM ASSIGNED)  Completed     IPV IMMUNIZATION  Aged Out     MENINGITIS IMMUNIZATION  Aged Out       Review of Systems   Constitutional: Negative for chills and fever.   HENT: Negative for congestion, ear pain, hearing loss and sore throat.    Eyes: Negative for pain and visual disturbance.   Respiratory: Negative for cough and shortness of breath.    Cardiovascular: Negative for chest pain, palpitations and peripheral edema.   Gastrointestinal: Negative for abdominal pain, constipation, diarrhea, heartburn, hematochezia and nausea.   Genitourinary: Positive for frequency and impotence. Negative for dysuria, genital sores, hematuria, penile discharge and urgency.   Musculoskeletal: Negative for arthralgias, joint swelling and myalgias.   Skin: Negative for rash.   Neurological: Negative for dizziness, weakness, headaches and paresthesias.   Psychiatric/Behavioral: Negative for mood changes. The patient is not nervous/anxious.      He was previously prescribed oxybutynin for overactive bladder which helped initially. He gets up to use restroom every 1-1.5 hour. He hasn't slept well at night for years due bladder symptoms. No constipation, daily BM which are soft. Advised to discuss with urologist symptoms of OAB and erectile dysfunction.     OBJECTIVE:   /58 (BP Location: Right arm, Patient Position: Sitting, Cuff Size: Adult Regular)   Pulse 65   Temp 97.2  F (36.2  C) (Temporal)   Resp 18   Ht 1.956 m (6' 5\")   Wt 94.8 kg (209 lb)   SpO2 97%   BMI 24.78 kg/m   Estimated body mass index is 24.78 kg/m  as calculated from the following:    Height as of this encounter: " "1.956 m (6' 5\").    Weight as of this encounter: 94.8 kg (209 lb).  Physical Exam  GENERAL: healthy, alert and no distress  EYES: Eyes grossly normal to inspection  HENT: ear canals with cerumen TM's normal  NECK: no adenopathy, no asymmetry, masses, or scars and thyroid normal to palpation  RESP: lungs clear to auscultation - no rales, rhonchi or wheezes  CV: regular rate and rhythm, normal S1 S2  ABDOMEN: soft, nontender, no hepatosplenomegaly, no masses and bowel sounds normal  MS: no gross musculoskeletal defects noted, no edema  SKIN: occipital region with small papules which are non-inflamed  NEURO: Normal strength and tone, mentation intact and speech normal  PSYCH: mentation appears normal, affect normal    Diagnostic Test Results:  Labs reviewed in Epic    ASSESSMENT / PLAN:     1. Encounter for Medicare annual wellness exam  - repeat colonoscopy due 9/2029    2. CKD (chronic kidney disease) stage 2, GFR 60-89 ml/min  - Albumin Random Urine Quantitative with Creat Ratio; Future  - Albumin Random Urine Quantitative with Creat Ratio    3. High priority for 2019-nCoV vaccine  - received vaccination    4. Need for shingles vaccine  - shingles vaccine at pharmacy     5. Bilateral impacted cerumen  - will complete at next visit     6. Rash   - concerning for folliculitis but area healing and no itching  - declined treatment  - continue to monitor     Patient has been advised of split billing requirements and indicates understanding: Yes      COUNSELING:  Reviewed preventive health counseling, as reflected in patient instructions        He reports that he quit smoking about 16 years ago. His smoking use included cigarettes. He has a 12.50 pack-year smoking history. He has never used smokeless tobacco.      Appropriate preventive services were discussed with this patient, including applicable screening as appropriate for cardiovascular disease, diabetes, osteopenia/osteoporosis, and glaucoma.  As appropriate for " age/gender, discussed screening for colorectal cancer, prostate cancer, breast cancer, and cervical cancer. Checklist reviewing preventive services available has been given to the patient.    Reviewed patients plan of care and provided an AVS. The Basic Care Plan (routine screening as documented in Health Maintenance) for Sukh meets the Care Plan requirement. This Care Plan has been established and reviewed with the Patient.          Parrish Stephenson MD  [facility]    Identified Health Risks:    I have reviewed Opioid Use Disorder and Substance Use Disorder risk factors and made any needed referrals.

## 2023-07-19 NOTE — PATIENT INSTRUCTIONS
Patient Education   Personalized Prevention Plan  You are due for the preventive services outlined below.  Your care team is available to assist you in scheduling these services.  If you have already completed any of these items, please share that information with your care team to update in your medical record.  Health Maintenance Due   Topic Date Due     Zoster (Shingles) Vaccine (1 of 2) Never done     Cholesterol Lab  04/09/2020     COVID-19 Vaccine (5 - Pfizer series) 08/10/2022     ANNUAL REVIEW OF HM ORDERS  06/15/2023     Kidney Microalbumin Urine Test  07/08/2023     Annual Wellness Visit  07/08/2023

## 2023-08-14 ENCOUNTER — APPOINTMENT (OUTPATIENT)
Dept: CT IMAGING | Facility: CLINIC | Age: 68
End: 2023-08-14
Attending: EMERGENCY MEDICINE
Payer: COMMERCIAL

## 2023-08-14 ENCOUNTER — MYC MEDICAL ADVICE (OUTPATIENT)
Dept: CARDIOLOGY | Facility: CLINIC | Age: 68
End: 2023-08-14

## 2023-08-14 ENCOUNTER — HOSPITAL ENCOUNTER (OUTPATIENT)
Facility: CLINIC | Age: 68
Setting detail: OBSERVATION
Discharge: HOME | End: 2023-08-15
Attending: EMERGENCY MEDICINE | Admitting: INTERNAL MEDICINE
Payer: COMMERCIAL

## 2023-08-14 ENCOUNTER — APPOINTMENT (OUTPATIENT)
Dept: GENERAL RADIOLOGY | Facility: CLINIC | Age: 68
End: 2023-08-14
Payer: COMMERCIAL

## 2023-08-14 ENCOUNTER — APPOINTMENT (OUTPATIENT)
Dept: ULTRASOUND IMAGING | Facility: CLINIC | Age: 68
End: 2023-08-14
Attending: EMERGENCY MEDICINE
Payer: COMMERCIAL

## 2023-08-14 ENCOUNTER — NURSE TRIAGE (OUTPATIENT)
Dept: NURSING | Facility: CLINIC | Age: 68
End: 2023-08-14
Payer: COMMERCIAL

## 2023-08-14 DIAGNOSIS — R07.9 ACUTE CHEST PAIN: ICD-10-CM

## 2023-08-14 DIAGNOSIS — I49.1 PREMATURE ATRIAL CONTRACTIONS: ICD-10-CM

## 2023-08-14 DIAGNOSIS — I25.84 CORONARY ARTERY DISEASE DUE TO CALCIFIED CORONARY LESION: Primary | ICD-10-CM

## 2023-08-14 DIAGNOSIS — I25.10 CORONARY ARTERY DISEASE DUE TO CALCIFIED CORONARY LESION: Primary | ICD-10-CM

## 2023-08-14 DIAGNOSIS — E78.5 HYPERLIPIDEMIA LDL GOAL <70: ICD-10-CM

## 2023-08-14 DIAGNOSIS — R06.02 SHORTNESS OF BREATH: ICD-10-CM

## 2023-08-14 DIAGNOSIS — I1A.0 RESISTANT HYPERTENSION: ICD-10-CM

## 2023-08-14 LAB
ALBUMIN SERPL BCG-MCNC: 4.9 G/DL (ref 3.5–5.2)
ALP SERPL-CCNC: 80 U/L (ref 40–129)
ALT SERPL W P-5'-P-CCNC: 18 U/L (ref 0–70)
ANION GAP SERPL CALCULATED.3IONS-SCNC: 15 MMOL/L (ref 7–15)
AST SERPL W P-5'-P-CCNC: 21 U/L (ref 0–45)
ATRIAL RATE - MUSE: 82 BPM
BASOPHILS # BLD AUTO: 0 10E3/UL (ref 0–0.2)
BASOPHILS NFR BLD AUTO: 1 %
BILIRUB SERPL-MCNC: 1.4 MG/DL
BUN SERPL-MCNC: 19.8 MG/DL (ref 8–23)
CALCIUM SERPL-MCNC: 10.3 MG/DL (ref 8.8–10.2)
CHLORIDE SERPL-SCNC: 104 MMOL/L (ref 98–107)
CREAT SERPL-MCNC: 1.43 MG/DL (ref 0.67–1.17)
DEPRECATED HCO3 PLAS-SCNC: 24 MMOL/L (ref 22–29)
DIASTOLIC BLOOD PRESSURE - MUSE: NORMAL MMHG
EOSINOPHIL # BLD AUTO: 0 10E3/UL (ref 0–0.7)
EOSINOPHIL NFR BLD AUTO: 1 %
ERYTHROCYTE [DISTWIDTH] IN BLOOD BY AUTOMATED COUNT: 13.5 % (ref 10–15)
GFR SERPL CREATININE-BSD FRML MDRD: 54 ML/MIN/1.73M2
GLUCOSE SERPL-MCNC: 128 MG/DL (ref 70–99)
HCT VFR BLD AUTO: 51.1 % (ref 40–53)
HGB BLD-MCNC: 16.9 G/DL (ref 13.3–17.7)
HOLD SPECIMEN: NORMAL
IMM GRANULOCYTES # BLD: 0 10E3/UL
IMM GRANULOCYTES NFR BLD: 0 %
INTERPRETATION ECG - MUSE: NORMAL
LIPASE SERPL-CCNC: 17 U/L (ref 13–60)
LYMPHOCYTES # BLD AUTO: 1.8 10E3/UL (ref 0.8–5.3)
LYMPHOCYTES NFR BLD AUTO: 36 %
MAGNESIUM SERPL-MCNC: 1.8 MG/DL (ref 1.7–2.3)
MCH RBC QN AUTO: 29.3 PG (ref 26.5–33)
MCHC RBC AUTO-ENTMCNC: 33.1 G/DL (ref 31.5–36.5)
MCV RBC AUTO: 89 FL (ref 78–100)
MONOCYTES # BLD AUTO: 0.5 10E3/UL (ref 0–1.3)
MONOCYTES NFR BLD AUTO: 9 %
NEUTROPHILS # BLD AUTO: 2.8 10E3/UL (ref 1.6–8.3)
NEUTROPHILS NFR BLD AUTO: 53 %
NRBC # BLD AUTO: 0 10E3/UL
NRBC BLD AUTO-RTO: 0 /100
NT-PROBNP SERPL-MCNC: 1030 PG/ML (ref 0–900)
P AXIS - MUSE: 81 DEGREES
PLATELET # BLD AUTO: 214 10E3/UL (ref 150–450)
POTASSIUM SERPL-SCNC: 3.4 MMOL/L (ref 3.4–5.3)
PR INTERVAL - MUSE: 198 MS
PROT SERPL-MCNC: 8.5 G/DL (ref 6.4–8.3)
QRS DURATION - MUSE: 98 MS
QT - MUSE: 406 MS
QTC - MUSE: 474 MS
R AXIS - MUSE: 73 DEGREES
RBC # BLD AUTO: 5.76 10E6/UL (ref 4.4–5.9)
SODIUM SERPL-SCNC: 143 MMOL/L (ref 136–145)
SYSTOLIC BLOOD PRESSURE - MUSE: NORMAL MMHG
T AXIS - MUSE: 76 DEGREES
TOTAL PROTEIN SERUM FOR ELP: 7.8 G/DL (ref 6.4–8.3)
TROPONIN T SERPL HS-MCNC: 17 NG/L
TROPONIN T SERPL HS-MCNC: 18 NG/L
TSH SERPL DL<=0.005 MIU/L-ACNC: 1.33 UIU/ML (ref 0.3–4.2)
VENTRICULAR RATE- MUSE: 82 BPM
WBC # BLD AUTO: 5.2 10E3/UL (ref 4–11)

## 2023-08-14 PROCEDURE — 80053 COMPREHEN METABOLIC PANEL: CPT | Performed by: EMERGENCY MEDICINE

## 2023-08-14 PROCEDURE — 76705 ECHO EXAM OF ABDOMEN: CPT

## 2023-08-14 PROCEDURE — 84443 ASSAY THYROID STIM HORMONE: CPT

## 2023-08-14 PROCEDURE — 71275 CT ANGIOGRAPHY CHEST: CPT

## 2023-08-14 PROCEDURE — 85025 COMPLETE CBC W/AUTO DIFF WBC: CPT | Performed by: EMERGENCY MEDICINE

## 2023-08-14 PROCEDURE — 36415 COLL VENOUS BLD VENIPUNCTURE: CPT

## 2023-08-14 PROCEDURE — 250N000011 HC RX IP 250 OP 636: Performed by: RADIOLOGY

## 2023-08-14 PROCEDURE — 71046 X-RAY EXAM CHEST 2 VIEWS: CPT | Mod: 26 | Performed by: RADIOLOGY

## 2023-08-14 PROCEDURE — 99222 1ST HOSP IP/OBS MODERATE 55: CPT | Mod: 25 | Performed by: INTERNAL MEDICINE

## 2023-08-14 PROCEDURE — 71046 X-RAY EXAM CHEST 2 VIEWS: CPT

## 2023-08-14 PROCEDURE — 83735 ASSAY OF MAGNESIUM: CPT

## 2023-08-14 PROCEDURE — 93005 ELECTROCARDIOGRAM TRACING: CPT | Performed by: EMERGENCY MEDICINE

## 2023-08-14 PROCEDURE — 250N000013 HC RX MED GY IP 250 OP 250 PS 637

## 2023-08-14 PROCEDURE — 99285 EMERGENCY DEPT VISIT HI MDM: CPT | Mod: 25 | Performed by: EMERGENCY MEDICINE

## 2023-08-14 PROCEDURE — 71275 CT ANGIOGRAPHY CHEST: CPT | Mod: 26 | Performed by: RADIOLOGY

## 2023-08-14 PROCEDURE — 84155 ASSAY OF PROTEIN SERUM: CPT | Mod: 91

## 2023-08-14 PROCEDURE — 84165 PROTEIN E-PHORESIS SERUM: CPT | Mod: 26

## 2023-08-14 PROCEDURE — 76705 ECHO EXAM OF ABDOMEN: CPT | Mod: 26 | Performed by: RADIOLOGY

## 2023-08-14 PROCEDURE — 86334 IMMUNOFIX E-PHORESIS SERUM: CPT | Mod: 26

## 2023-08-14 PROCEDURE — 84165 PROTEIN E-PHORESIS SERUM: CPT | Mod: TC | Performed by: STUDENT IN AN ORGANIZED HEALTH CARE EDUCATION/TRAINING PROGRAM

## 2023-08-14 PROCEDURE — G0378 HOSPITAL OBSERVATION PER HR: HCPCS

## 2023-08-14 PROCEDURE — 36415 COLL VENOUS BLD VENIPUNCTURE: CPT | Performed by: EMERGENCY MEDICINE

## 2023-08-14 PROCEDURE — 83880 ASSAY OF NATRIURETIC PEPTIDE: CPT | Performed by: EMERGENCY MEDICINE

## 2023-08-14 PROCEDURE — 83690 ASSAY OF LIPASE: CPT

## 2023-08-14 PROCEDURE — 93010 ELECTROCARDIOGRAM REPORT: CPT | Performed by: EMERGENCY MEDICINE

## 2023-08-14 PROCEDURE — 84484 ASSAY OF TROPONIN QUANT: CPT | Performed by: EMERGENCY MEDICINE

## 2023-08-14 PROCEDURE — 86334 IMMUNOFIX E-PHORESIS SERUM: CPT | Performed by: STUDENT IN AN ORGANIZED HEALTH CARE EDUCATION/TRAINING PROGRAM

## 2023-08-14 RX ORDER — LIDOCAINE 40 MG/G
CREAM TOPICAL
Status: DISCONTINUED | OUTPATIENT
Start: 2023-08-14 | End: 2023-08-15 | Stop reason: HOSPADM

## 2023-08-14 RX ORDER — LOSARTAN POTASSIUM 100 MG/1
100 TABLET ORAL DAILY
Status: DISCONTINUED | OUTPATIENT
Start: 2023-08-15 | End: 2023-08-15 | Stop reason: HOSPADM

## 2023-08-14 RX ORDER — ONDANSETRON 2 MG/ML
4 INJECTION INTRAMUSCULAR; INTRAVENOUS EVERY 6 HOURS PRN
Status: DISCONTINUED | OUTPATIENT
Start: 2023-08-14 | End: 2023-08-15 | Stop reason: HOSPADM

## 2023-08-14 RX ORDER — IOPAMIDOL 755 MG/ML
100 INJECTION, SOLUTION INTRAVASCULAR ONCE
Status: COMPLETED | OUTPATIENT
Start: 2023-08-14 | End: 2023-08-14

## 2023-08-14 RX ORDER — ATORVASTATIN CALCIUM 20 MG/1
20 TABLET, FILM COATED ORAL DAILY
Status: DISCONTINUED | OUTPATIENT
Start: 2023-08-15 | End: 2023-08-15

## 2023-08-14 RX ORDER — HYDROCHLOROTHIAZIDE 25 MG/1
25 TABLET ORAL DAILY
Status: DISCONTINUED | OUTPATIENT
Start: 2023-08-15 | End: 2023-08-15 | Stop reason: HOSPADM

## 2023-08-14 RX ORDER — CARVEDILOL 12.5 MG/1
12.5 TABLET ORAL 2 TIMES DAILY WITH MEALS
Status: DISCONTINUED | OUTPATIENT
Start: 2023-08-14 | End: 2023-08-15 | Stop reason: HOSPADM

## 2023-08-14 RX ORDER — ONDANSETRON 4 MG/1
4 TABLET, ORALLY DISINTEGRATING ORAL EVERY 6 HOURS PRN
Status: DISCONTINUED | OUTPATIENT
Start: 2023-08-14 | End: 2023-08-15 | Stop reason: HOSPADM

## 2023-08-14 RX ORDER — MAGNESIUM HYDROXIDE/ALUMINUM HYDROXICE/SIMETHICONE 120; 1200; 1200 MG/30ML; MG/30ML; MG/30ML
30 SUSPENSION ORAL EVERY 4 HOURS PRN
Status: DISCONTINUED | OUTPATIENT
Start: 2023-08-14 | End: 2023-08-15 | Stop reason: HOSPADM

## 2023-08-14 RX ORDER — ACETAMINOPHEN 325 MG/1
650 TABLET ORAL EVERY 4 HOURS PRN
Status: DISCONTINUED | OUTPATIENT
Start: 2023-08-14 | End: 2023-08-15 | Stop reason: HOSPADM

## 2023-08-14 RX ORDER — AMLODIPINE BESYLATE 5 MG/1
10 TABLET ORAL DAILY
Status: DISCONTINUED | OUTPATIENT
Start: 2023-08-15 | End: 2023-08-15 | Stop reason: HOSPADM

## 2023-08-14 RX ADMIN — IOPAMIDOL 100 ML: 755 INJECTION, SOLUTION INTRAVENOUS at 16:17

## 2023-08-14 RX ADMIN — CARVEDILOL 12.5 MG: 12.5 TABLET, FILM COATED ORAL at 23:35

## 2023-08-14 ASSESSMENT — ACTIVITIES OF DAILY LIVING (ADL)
ADLS_ACUITY_SCORE: 37

## 2023-08-14 NOTE — TELEPHONE ENCOUNTER
Nurse Triage SBAR    Is this a 2nd Level Triage? NO    Situation: Patient calling re: difficulty breathing.    Background: Has been feeling short of breath since Thursday. Says this happened once last month while he was in [place] and he was hospitalized for it. Followed up with cardiologist when he returned to [place]. Dx with a aneurysm of ascending aorta without rupture.     Assessment: Hurts to take a deep breath, but otherwise denies chest pain. Gets episodes of severe dizziness, but has not passed out. Hasn't been able to eat solid food or had a BM since Thursday.     Protocol Recommended Disposition:   Go to ED now.    Recommendation: Advised pt to go to nearest ED, which is [facility]. Address provided. Also advised pt to bring any medications he may need with him.    No need to route to provider- not 2LT.    Does the patient meet one of the following criteria for ADS visit consideration? No    Reason for Disposition   MODERATE difficulty breathing (e.g., speaks in phrases, SOB even at rest, pulse 100-120) of new-onset or worse than normal    Additional Information   Negative: SEVERE difficulty breathing (e.g., struggling for each breath, speaks in single words, pulse > 120)   Negative: Breathing stopped and hasn't returned   Negative: Choking on something   Negative: Bluish (or gray) lips or face   Negative: Difficult to awaken or acting confused (e.g., disoriented, slurred speech)   Negative: Passed out (i.e., fainted, collapsed and was not responding)   Negative: Wheezing started suddenly after medicine, an allergic food, or bee sting   Negative: Stridor   Negative: Slow, shallow and weak breathing   Negative: Sounds like a life-threatening emergency to the triager   Negative: Chest pain   Negative: Wheezing (high pitched whistling sound) and previous asthma attacks or use of asthma medicines   Negative: Difficulty breathing and within 14 days of COVID-19 Exposure   Negative: Difficulty breathing and only  present when coughing   Negative: Difficulty breathing and only from stuffy nose   Negative: Difficulty breathing and only from stuffy nose or runny nose from common cold    Protocols used: Breathing Difficulty-A-OH

## 2023-08-14 NOTE — ED TRIAGE NOTES
Coming in with increase SOA and CP since Thursday. Unable to sleep related to CP and SOA.     Triage Assessment       Row Name 08/14/23 1137       Triage Assessment (Adult)    Airway WDL WDL       Respiratory WDL    Respiratory WDL rhythm/pattern    Rhythm/Pattern, Respiratory shortness of breath       Cardiac WDL    Cardiac WDL chest pain       Chest Pain Assessment    Chest Pain Location midsternal       Peripheral/Neurovascular WDL    Peripheral Neurovascular WDL WDL       Cognitive/Neuro/Behavioral WDL    Cognitive/Neuro/Behavioral WDL WDL

## 2023-08-14 NOTE — ED PROVIDER NOTES
"  History     Chief Complaint   Patient presents with    Chest Pain    Shortness of Breath     HPI  Sukh Craven is a 68 year old male with a past medical history of BPH, hyperlipidemia, hypertension, prediabetes, peripheral arterial disease who presents to the emergency department with a chief complaint of chest pain.  Associated with shortness of breath.  Present since Thursday.  The patient notes he has been unable to sleep due to the symptoms.  He notes that he had similar symptoms when he was hospitalized about a month ago in [place] where he was traveling to visit his daughter.  He notes that at that time he was told he had a \"swelling\" in his chest (the patient thinks that he may have been told this was an aortic aneurysm).  He notes that they were planning to schedule him for surgery in [place] until they learned that he was not originally from that area, they advised him to follow-up with a physician here to discuss possible surgical intervention.  The patient reports he does have a cardiologist here that he follows with and they have been discussing the best plan to manage his aneurysm.  They had considered monitoring it for stability, with plans to intervene surgically if it increases in size.    Per chart review, the patient was previously followed at [facility], but is now moving his care to [place] after an insurance change.  He was hospitalized in June for hypertensive emergency in [place].  Per cardiology notes, they reviewed the records from there which indicated an aortic root measurement of 5 cm.  This is consistent with a measurement from a TTE done at [facility] in February 2022 which showed root diameter of 5.2 cm and proximal ascending aorta diameter of 4.9 cm.  Echocardiogram in [place] reportedly showed a normal LVEF of 60 to 65% with end-diastolic diameter of 5.6 cm and severe AI.    I have reviewed the Medications, Allergies, Past Medical and Surgical History, and Social " History in the Epic system.    ECHO 4/23/19     Interpretation Summary  Left ventricular size is normal. Global and regional left ventricular function  is normal with an EF of 55-60%.  Global right ventricular function is normal.  The aortic valve is tricuspid. Moderate aortic insufficiency is present. P1/2  t = 416 ms and EROA = 0.22 cm2  Pulmonary artery systolic pressure is normal.  Moderate dilatation of the aorta is present. Sinuses of Valsalva 4.9 cm.  Ascending aorta 4.5 cm.  No pericardial effusion is present.    Past Medical History:   Diagnosis Date    BPH (benign prostatic hyperplasia)     Dyslipidemia     HTN (hypertension)      Past Surgical History:   Procedure Laterality Date    COLONOSCOPY N/A 9/26/2022    Procedure: COLONOSCOPY, WITH POLYPECTOMY;  Surgeon: Long Silver MD;  Location: [facility] OR    CYSTOSCOPY, BLADDER NECK CUTS, COMBINED N/A 2/16/2023    Procedure: CYSTOSCOPY, WITH MULTIPLE INCISIONS OF BLADDER NECK WITH HOLMIUM LASER;  Surgeon: Cresencio Foote MD;  Location: [facility] OR    ESOPHAGOSCOPY, GASTROSCOPY, DUODENOSCOPY (EGD), COMBINED N/A 9/26/2022    Procedure: ESOPHAGOGASTRODUODENOSCOPY, WITH BIOPSY;  Surgeon: Long Silver MD;  Location: [facility] OR    LASER HOLMIUM ENUCLEATION PROSTATE N/A 4/27/2017    Procedure: LASER HOLMIUM ENUCLEATION PROSTATE;  Holmium Laser Enucleation Of The Prostate ;  Surgeon: Cresencio Foote MD;  Location: [facility] OR    REMOVAL OF SPERM DUCT(S)       No current facility-administered medications for this encounter.     Current Outpatient Medications   Medication    amLODIPine (NORVASC) 10 MG tablet    atorvastatin (LIPITOR) 20 MG tablet    carvedilol (COREG) 12.5 MG tablet    hydrochlorothiazide (HYDRODIURIL) 25 MG tablet    losartan (COZAAR) 100 MG tablet     Allergies   Allergen Reactions    No Known Drug Allergy      Past medical history, past surgical history, medications, and allergies were reviewed with the patient. Additional  "pertinent items: None    Social History     Socioeconomic History    Marital status: Single     Spouse name: Ricardo    Number of children: 5    Years of education: 14    Highest education level: Not on file   Occupational History    Occupation:      Employer: [employer]   Tobacco Use    Smoking status: Former     Packs/day: 0.50     Years: 25.00     Pack years: 12.50     Types: Cigarettes     Quit date: 3/23/2007     Years since quittin.4    Smokeless tobacco: Never   Vaping Use    Vaping Use: Never used   Substance and Sexual Activity    Alcohol use: Yes     Comment: rare- social drinker    Drug use: No    Sexual activity: Yes     Partners: Female   Other Topics Concern     Service Yes     Comment: [employer]- 6 years    Blood Transfusions No    Caffeine Concern No    Occupational Exposure No    Hobby Hazards No    Sleep Concern No    Stress Concern No    Weight Concern No    Special Diet No    Back Care No    Exercise Yes     Comment: 4 times a week    Bike Helmet Yes    Seat Belt Yes    Self-Exams Yes    Parent/sibling w/ CABG, MI or angioplasty before 65F 55M? No   Social History Narrative    Not on file     Social Determinants of Health     Financial Resource Strain: Not on file   Food Insecurity: Not on file   Transportation Needs: Not on file   Physical Activity: Not on file   Stress: Not on file   Social Connections: Not on file   Intimate Partner Violence: Not on file   Housing Stability: Not on file     Social history was reviewed with the patient. Additional pertinent items: None    Review of Systems  A medically appropriate review of systems was performed with pertinent positives and negatives noted in the HPI, and all other systems negative.    Physical Exam   BP: 122/69  Pulse: 78  Temp: 98.3  F (36.8  C)  Resp: 16  Height: 195.6 cm (6' 5\")  Weight: 95.3 kg (210 lb)  SpO2: 99 %      General: Well nourished, well developed, NAD  HEENT: EOMI, anicteric. NCAT, MMM  Neck: no " jugular venous distension, supple, nl ROM  Cardiac: Regular rate and rhythm. No murmurs, rubs, or gallops. Normal S1, S2.  Intact peripheral pulses  Pulm: CTAB, no stridor, wheezes, rales, rhonchi  Abd: Soft, nontender, nondistended.  No masses palpated.    Skin: Warm and dry to the touch.  No rash  Extremities: No LE edema, no cyanosis, w/w/p  Neuro: A&Ox3, no gross focal deficits    ED Course        Procedures                 ED Course Selections:        EKG Interpretation:      Interpreted by Venita Parrish MD  Time reviewed: 1149  Symptoms at time of EKG: Shortness of breath  Rhythm: normal sinus rhythm with PACs  Rate: normal, 82 bpm  Axis: normal  LVH  Ectopy: none  Conduction: normal  ST Segments/ T Waves: Nonspecific T wave abnormality, no ST-T wave changes  Q Waves: none  Comparison to prior: Unchanged from EKG dated June 30, 2023 although interpretation is slightly limited due to apparent artifact, particularly present in anterior lateral leads    Clinical Impression: abnormal EKG            Labs Ordered and Resulted from Time of ED Arrival to Time of ED Departure   COMPREHENSIVE METABOLIC PANEL - Abnormal       Result Value    Sodium 143      Potassium 3.4      Chloride 104      Carbon Dioxide (CO2) 24      Anion Gap 15      Urea Nitrogen 19.8      Creatinine 1.43 (*)     Calcium 10.3 (*)     Glucose 128 (*)     Alkaline Phosphatase 80      AST 21      ALT 18      Protein Total 8.5 (*)     Albumin 4.9      Bilirubin Total 1.4 (*)     GFR Estimate 54 (*)    NT PROBNP INPATIENT - Abnormal    N terminal Pro BNP Inpatient 1,030 (*)    TROPONIN T, HIGH SENSITIVITY - Normal    Troponin T, High Sensitivity 18     TROPONIN T, HIGH SENSITIVITY - Normal    Troponin T, High Sensitivity 17     CBC WITH PLATELETS AND DIFFERENTIAL    WBC Count 5.2      RBC Count 5.76      Hemoglobin 16.9      Hematocrit 51.1      MCV 89      MCH 29.3      MCHC 33.1      RDW 13.5      Platelet Count 214      % Neutrophils 53       % Lymphocytes 36      % Monocytes 9      % Eosinophils 1      % Basophils 1      % Immature Granulocytes 0      NRBCs per 100 WBC 0      Absolute Neutrophils 2.8      Absolute Lymphocytes 1.8      Absolute Monocytes 0.5      Absolute Eosinophils 0.0      Absolute Basophils 0.0      Absolute Immature Granulocytes 0.0      Absolute NRBCs 0.0              Results for orders placed or performed during the hospital encounter of 08/14/23 (from the past 24 hour(s))   EKG 12-lead, tracing only   Result Value Ref Range    Systolic Blood Pressure  mmHg    Diastolic Blood Pressure  mmHg    Ventricular Rate 82 BPM    Atrial Rate 82 BPM    AR Interval 198 ms    QRS Duration 98 ms     ms    QTc 474 ms    P Axis 81 degrees    R AXIS 73 degrees    T Axis 76 degrees    Interpretation ECG       Sinus rhythm with Premature atrial complexes  Voltage criteria for left ventricular hypertrophy  Nonspecific T wave abnormality  Abnormal ECG  Unconfirmed report - interpretation of this ECG is computer generated - see medical record for final interpretation  Confirmed by - EMERGENCY ROOM, PHYSICIAN ([id number]),  EVANGELISTA KELLY ([id number]) on 8/14/2023 3:03:43 PM     [place] Draw    Narrative    The following orders were created for panel order [place] Draw.  Procedure                               Abnormality         Status                     ---------                               -----------         ------                     Extra Blue Top Tube[[id number]]                              Final result               Extra Red Top Tube[[id number]]                               Final result               Extra Green Top (Lithium...[[id number]]                      Final result               Extra Purple Top Tube[[id number]]                            Final result                 Please view results for these tests on the individual orders.   Extra Blue Top Tube   Result Value Ref Range    Hold Specimen JIC    Extra Red Top Tube   Result Value  Ref Range    Hold Specimen JIC    Extra Green Top (Lithium Heparin) Tube   Result Value Ref Range    Hold Specimen JIC    Extra Purple Top Tube   Result Value Ref Range    Hold Specimen JIC    CBC with platelets differential    Narrative    The following orders were created for panel order CBC with platelets differential.  Procedure                               Abnormality         Status                     ---------                               -----------         ------                     CBC with platelets and d...[[id number]]                      Final result                 Please view results for these tests on the individual orders.   Comprehensive metabolic panel   Result Value Ref Range    Sodium 143 136 - 145 mmol/L    Potassium 3.4 3.4 - 5.3 mmol/L    Chloride 104 98 - 107 mmol/L    Carbon Dioxide (CO2) 24 22 - 29 mmol/L    Anion Gap 15 7 - 15 mmol/L    Urea Nitrogen 19.8 8.0 - 23.0 mg/dL    Creatinine 1.43 (H) 0.67 - 1.17 mg/dL    Calcium 10.3 (H) 8.8 - 10.2 mg/dL    Glucose 128 (H) 70 - 99 mg/dL    Alkaline Phosphatase 80 40 - 129 U/L    AST 21 0 - 45 U/L    ALT 18 0 - 70 U/L    Protein Total 8.5 (H) 6.4 - 8.3 g/dL    Albumin 4.9 3.5 - 5.2 g/dL    Bilirubin Total 1.4 (H) <=1.2 mg/dL    GFR Estimate 54 (L) >60 mL/min/1.73m2   Troponin T, High Sensitivity   Result Value Ref Range    Troponin T, High Sensitivity 18 <=22 ng/L   Nt probnp inpatient (BNP)   Result Value Ref Range    N terminal Pro BNP Inpatient 1,030 (H) 0 - 900 pg/mL   CBC with platelets and differential   Result Value Ref Range    WBC Count 5.2 4.0 - 11.0 10e3/uL    RBC Count 5.76 4.40 - 5.90 10e6/uL    Hemoglobin 16.9 13.3 - 17.7 g/dL    Hematocrit 51.1 40.0 - 53.0 %    MCV 89 78 - 100 fL    MCH 29.3 26.5 - 33.0 pg    MCHC 33.1 31.5 - 36.5 g/dL    RDW 13.5 10.0 - 15.0 %    Platelet Count 214 150 - 450 10e3/uL    % Neutrophils 53 %    % Lymphocytes 36 %    % Monocytes 9 %    % Eosinophils 1 %    % Basophils 1 %    % Immature Granulocytes  0 %    NRBCs per 100 WBC 0 <1 /100    Absolute Neutrophils 2.8 1.6 - 8.3 10e3/uL    Absolute Lymphocytes 1.8 0.8 - 5.3 10e3/uL    Absolute Monocytes 0.5 0.0 - 1.3 10e3/uL    Absolute Eosinophils 0.0 0.0 - 0.7 10e3/uL    Absolute Basophils 0.0 0.0 - 0.2 10e3/uL    Absolute Immature Granulocytes 0.0 <=0.4 10e3/uL    Absolute NRBCs 0.0 10e3/uL   CTA Chest with Contrast    Impression    RESIDENT PRELIMINARY INTERPRETATION  Impression:  1. No aortic dissection.  2. No acute intrathoracic process.  3. Multiple sub-6 mm pulmonary nodules. Recommend optional CT chest in  one year, patient has high risk per Fleischner criteria.     Troponin T, High Sensitivity   Result Value Ref Range    Troponin T, High Sensitivity 17 <=22 ng/L   Abdomen US, limited (RUQ only)    Narrative    EXAMINATION: Limited Abdominal Ultrasound, 8/14/2023 5:14 PM     COMPARISON: CT 1/14/2023    HISTORY: Elevated bilirubin, vomiting, chest pain.    FINDINGS:   Fluid: No evidence of ascites or pleural effusions.    Liver: The liver demonstrates diffusely increased echogenicity,  measuring 10.2 cm in craniocaudal dimension. There is no focal mass.     Gallbladder: There is no wall thickening, pericholecystic fluid,  positive sonographic Pritchard's sign or evidence for cholelithiasis.    Bile Ducts: Both the intra- and extrahepatic biliary system are of  normal caliber.  The common bile duct is not visualized.    Pancreas: Not visualized.    Kidney: The right kidney measures 11.7 cm long. There is no  hydronephrosis.       Impression    IMPRESSION:   1.  Hepatic steatosis.  2.  Common bile duct is not visualized. No sonographic finding to  suggest acute cholecystitis.    I have personally reviewed the examination and initial interpretation  and I agree with the findings.    DUANE DONOVAN MD         SYSTEM ID:  [id number]       Labs, vital signs, and imaging studies were reviewed by me.    Medications   sodium chloride (PF) 0.9% PF flush 90 mL (90 mLs  Intravenous $Given 8/14/23 1618)   iopamidol (ISOVUE-370) solution 100 mL (100 mLs Intravenous $Given 8/14/23 1617)       Assessments & Plan (with Medical Decision Making)   Sukh Craven is a 68 year old male who presents to the emergency department shortness of breath and chest pain. Differential diagnosis includes ACS, musculoskeletal chest wall pain, CHF, pneumonia, bronchitis, viral syndrome, anxiety.  Labs, EKG, chest x-ray ordered to further evaluate the patient.    EKG shows normal sinus rhythm.    Laboratory work-up was remarkable for elevated BNP at 1030, troponin within normal limits at 18, creatinine elevated from baseline to 1.43 (previous value 1.1 when it was last checked 1 month ago, although patient has had a value as high as 1.38 when it was checked 3 years ago), bilirubin slightly elevated at 1.4.    CT of the chest shows no aortic dissection    Critical care was not performed.     Medical Decision Making  The patient's presentation was of high complexity (an acute health issue posing potential threat to life or bodily function).    The patient's evaluation involved:  ordering and/or review of 3+ test(s) in this encounter (see separate area of note for details)  independent interpretation of testing performed by another health professional (CT)  discussion of management or test interpretation with another health professional (see separate area of note for details)    The patient's management necessitated high risk (a decision regarding hospitalization).    CT images were personally reviewed by me, I agree with the radiology reads.    1800 patient was discussed with cardiology, they recommend admission to observation either under their service or ED observation service for echocardiogram and further discussion of aortic aneurysm management.    I have reviewed the nursing notes.    I have reviewed the findings, diagnosis, plan and need for follow up with the patient.    Patient and their further  management were discussed with ED observation and cardiology, to be admitted to cardiology service for further management. Plan was discussed with patient who understands and agrees with plan.    New Prescriptions    No medications on file       Final diagnoses:   Acute chest pain       CAROLANN PARRISH MD  8/14/2023   [facility] EMERGENCY DEPARTMENT       Carolann Parrish MD  08/14/23 7119

## 2023-08-15 ENCOUNTER — APPOINTMENT (OUTPATIENT)
Dept: CARDIOLOGY | Facility: CLINIC | Age: 68
End: 2023-08-15
Payer: COMMERCIAL

## 2023-08-15 ENCOUNTER — TELEPHONE (OUTPATIENT)
Dept: CARDIOLOGY | Facility: CLINIC | Age: 68
End: 2023-08-15

## 2023-08-15 VITALS
TEMPERATURE: 98.3 F | HEIGHT: 77 IN | SYSTOLIC BLOOD PRESSURE: 118 MMHG | WEIGHT: 210 LBS | BODY MASS INDEX: 24.79 KG/M2 | HEART RATE: 72 BPM | DIASTOLIC BLOOD PRESSURE: 64 MMHG | OXYGEN SATURATION: 100 % | RESPIRATION RATE: 18 BRPM

## 2023-08-15 LAB
ALBUMIN UR-MCNC: 30 MG/DL
ANION GAP SERPL CALCULATED.3IONS-SCNC: 14 MMOL/L (ref 7–15)
ANION GAP SERPL CALCULATED.3IONS-SCNC: 15 MMOL/L (ref 7–15)
APPEARANCE UR: CLEAR
BILIRUB UR QL STRIP: NEGATIVE
BUN SERPL-MCNC: 25.8 MG/DL (ref 8–23)
BUN SERPL-MCNC: 28.6 MG/DL (ref 8–23)
CALCIUM SERPL-MCNC: 9.4 MG/DL (ref 8.8–10.2)
CALCIUM SERPL-MCNC: 9.7 MG/DL (ref 8.8–10.2)
CHLORIDE SERPL-SCNC: 101 MMOL/L (ref 98–107)
CHLORIDE SERPL-SCNC: 102 MMOL/L (ref 98–107)
CHOLEST SERPL-MCNC: 173 MG/DL
COLOR UR AUTO: YELLOW
CREAT SERPL-MCNC: 1.77 MG/DL (ref 0.67–1.17)
CREAT SERPL-MCNC: 1.79 MG/DL (ref 0.67–1.17)
CREAT SERPL-MCNC: 1.79 MG/DL (ref 0.67–1.17)
CREAT UR-MCNC: 353 MG/DL
DEPRECATED HCO3 PLAS-SCNC: 23 MMOL/L (ref 22–29)
DEPRECATED HCO3 PLAS-SCNC: 24 MMOL/L (ref 22–29)
FRACT EXCRET NA UR+SERPL-RTO: NORMAL %
GFR SERPL CREATININE-BSD FRML MDRD: 41 ML/MIN/1.73M2
GFR SERPL CREATININE-BSD FRML MDRD: 42 ML/MIN/1.73M2
GLUCOSE SERPL-MCNC: 106 MG/DL (ref 70–99)
GLUCOSE SERPL-MCNC: 112 MG/DL (ref 70–99)
GLUCOSE UR STRIP-MCNC: NEGATIVE MG/DL
HDLC SERPL-MCNC: 31 MG/DL
HGB UR QL STRIP: ABNORMAL
KETONES UR STRIP-MCNC: NEGATIVE MG/DL
LDLC SERPL CALC-MCNC: 116 MG/DL
LEUKOCYTE ESTERASE UR QL STRIP: NEGATIVE
LVEF ECHO: NORMAL
NITRATE UR QL: NEGATIVE
NONHDLC SERPL-MCNC: 142 MG/DL
PH UR STRIP: 5.5 [PH] (ref 5–7)
POTASSIUM SERPL-SCNC: 3.1 MMOL/L (ref 3.4–5.3)
POTASSIUM SERPL-SCNC: 3.7 MMOL/L (ref 3.4–5.3)
POTASSIUM SERPL-SCNC: 3.7 MMOL/L (ref 3.4–5.3)
RBC URINE: 6 /HPF
SODIUM SERPL-SCNC: 139 MMOL/L (ref 136–145)
SODIUM SERPL-SCNC: 140 MMOL/L (ref 136–145)
SODIUM SERPL-SCNC: 140 MMOL/L (ref 136–145)
SODIUM UR-SCNC: <20 MMOL/L
SP GR UR STRIP: 1.01 (ref 1–1.03)
SQUAMOUS EPITHELIAL: 1 /HPF
TRIGL SERPL-MCNC: 131 MG/DL
UROBILINOGEN UR STRIP-MCNC: 2 MG/DL
WBC URINE: 4 /HPF

## 2023-08-15 PROCEDURE — 250N000013 HC RX MED GY IP 250 OP 250 PS 637

## 2023-08-15 PROCEDURE — G0378 HOSPITAL OBSERVATION PER HR: HCPCS

## 2023-08-15 PROCEDURE — 84295 ASSAY OF SERUM SODIUM: CPT

## 2023-08-15 PROCEDURE — 80061 LIPID PANEL: CPT

## 2023-08-15 PROCEDURE — 84166 PROTEIN E-PHORESIS/URINE/CSF: CPT | Performed by: STUDENT IN AN ORGANIZED HEALTH CARE EDUCATION/TRAINING PROGRAM

## 2023-08-15 PROCEDURE — 99204 OFFICE O/P NEW MOD 45 MIN: CPT | Mod: FS | Performed by: PHYSICIAN ASSISTANT

## 2023-08-15 PROCEDURE — 81001 URINALYSIS AUTO W/SCOPE: CPT

## 2023-08-15 PROCEDURE — 36415 COLL VENOUS BLD VENIPUNCTURE: CPT

## 2023-08-15 PROCEDURE — 80048 BASIC METABOLIC PNL TOTAL CA: CPT

## 2023-08-15 PROCEDURE — 93306 TTE W/DOPPLER COMPLETE: CPT

## 2023-08-15 PROCEDURE — 84132 ASSAY OF SERUM POTASSIUM: CPT

## 2023-08-15 PROCEDURE — 84300 ASSAY OF URINE SODIUM: CPT

## 2023-08-15 PROCEDURE — 99207 PR NO CHARGE LOS: CPT

## 2023-08-15 PROCEDURE — 93306 TTE W/DOPPLER COMPLETE: CPT | Mod: 26 | Performed by: INTERNAL MEDICINE

## 2023-08-15 PROCEDURE — 99239 HOSP IP/OBS DSCHRG MGMT >30: CPT | Mod: FS

## 2023-08-15 PROCEDURE — 82310 ASSAY OF CALCIUM: CPT

## 2023-08-15 PROCEDURE — 84166 PROTEIN E-PHORESIS/URINE/CSF: CPT | Mod: 26

## 2023-08-15 RX ORDER — ATORVASTATIN CALCIUM 40 MG/1
80 TABLET, FILM COATED ORAL DAILY
Status: DISCONTINUED | OUTPATIENT
Start: 2023-08-16 | End: 2023-08-15 | Stop reason: HOSPADM

## 2023-08-15 RX ORDER — HYDRALAZINE HYDROCHLORIDE 25 MG/1
25 TABLET, FILM COATED ORAL 3 TIMES DAILY
Qty: 245 TABLET | Refills: 3 | Status: ON HOLD | OUTPATIENT
Start: 2023-08-15 | End: 2023-10-27

## 2023-08-15 RX ORDER — POTASSIUM CHLORIDE 750 MG/1
40 TABLET, EXTENDED RELEASE ORAL ONCE
Status: COMPLETED | OUTPATIENT
Start: 2023-08-15 | End: 2023-08-15

## 2023-08-15 RX ADMIN — AMLODIPINE BESYLATE 10 MG: 5 TABLET ORAL at 09:07

## 2023-08-15 RX ADMIN — ATORVASTATIN CALCIUM 20 MG: 20 TABLET, FILM COATED ORAL at 09:10

## 2023-08-15 RX ADMIN — POTASSIUM CHLORIDE 40 MEQ: 750 TABLET, EXTENDED RELEASE ORAL at 09:10

## 2023-08-15 RX ADMIN — CARVEDILOL 12.5 MG: 12.5 TABLET, FILM COATED ORAL at 09:07

## 2023-08-15 ASSESSMENT — ACTIVITIES OF DAILY LIVING (ADL)
ADLS_ACUITY_SCORE: 37

## 2023-08-15 NOTE — ED NOTES
"Paged Cards 1. (CARD STAFF CARDS 1 ATTENDING IN/OUTPTS MEDICINE [4545])    \"ED 10. JANA Craven  0500 Labs resulted. K+ : 3.1  Please advise.    ED RN.  Karoline VENTURA ([place])  [phone]\"  "

## 2023-08-15 NOTE — MEDICATION SCRIBE - ADMISSION MEDICATION HISTORY
Medication Scribe Admission Medication History    Admission medication history is complete. The information provided in this note is only as accurate as the sources available at the time of the update.    Medication reconciliation/reorder completed by provider prior to medication history? No    Information Source(s): Patient and Prescription bottles via in-person    Pertinent Information: None    Changes made to PTA medication list:  Added: None  Deleted: None  Changed: None    Medication Affordability:  Not including over the counter (OTC) medications, was there a time in the past 3 months when you did not take your medications as prescribed because of cost?: No    Allergies reviewed with patient and updates made in EHR: yes    Medication History Completed By: Alla Santos 8/14/2023 9:17 PM    Prior to Admission medications    Medication Sig Last Dose Taking? Auth Provider Long Term End Date   amLODIPine (NORVASC) 10 MG tablet Take 1 tablet (10 mg) by mouth daily 8/14/2023 at am Yes Parrish Stephenson MD Yes    atorvastatin (LIPITOR) 20 MG tablet Take 1 tablet (20 mg) by mouth daily 8/14/2023 at am Yes Parrish Stephenson MD Yes    carvedilol (COREG) 12.5 MG tablet Take 1 tablet (12.5 mg) by mouth 2 times daily (with meals) for 180 days 8/14/2023 at am Yes Parrish Stephenson MD Yes 12/13/23   hydrochlorothiazide (HYDRODIURIL) 25 MG tablet Take 1 tablet (25 mg) by mouth daily 8/14/2023 at am Yes Wilton Headley MD Yes    losartan (COZAAR) 100 MG tablet Take 1 tablet (100 mg) by mouth daily 8/14/2023 at am Yes Parrish Stephenson MD Yes

## 2023-08-15 NOTE — DISCHARGE INSTRUCTIONS
- We are arranging for you to see Dr. Lockett in Clinic to discuss possible surgery. He would also like you to have a coronary angiogram done to look at the vessels in your heart (to see if these would need to be fixed as well). This appointment is scheduled for 8/21/23.     - PLEASE MAKE SURE YOUR BLOOD PRESSURE AND LAB WORK IS CHECKED AT YOUR UPCOMING APPOINTMENT!      - Our cath lab  will call you with the date and time of coronary angiogram. Please make sure to come with a  the day of your angiogram. Also no food 6 hours before procedure.     - Monitor your blood pressures at home. If you are consistently > 140/80 then take Hydralazine FOUR times daily instead of THREE.

## 2023-08-15 NOTE — PROGRESS NOTES
Care Management Follow Up    Length of Stay (days): 0    Expected Discharge Date: 08/15/2023     Concerns to be Addressed:     RUDOLPH  Patient plan of care discussed at interdisciplinary rounds: No    Anticipated Discharge Disposition:  N/A     Anticipated Discharge Services:  N/A  Anticipated Discharge DME:  N/A    Patient/family educated on Medicare website which has current facility and service quality ratings:  N/A  Education Provided on the Discharge Plan:  N/A  Patient/Family in Agreement with the Plan:  N/A    Referrals Placed by CM/SW:  N/A  Private pay costs discussed: insurance costs out of pocket expenses, co-pays, and deductibles    Additional Information:  Writer met with patient to discuss and complete RUDOLPH paperwork. Writer answered any of patient's questions regarding insurance coverage. Writer encouraged patient to follow up with their insurance plan directly to receive the most accurate information. Patient signed RUDOLPH form and the form was placed on the patient's chart. A copy of the signed RUDOLPH was offered to patient, which was declined.      ________________    DONN Aguirre, [facility]  ED/Observation   [facility]  Phone: [phone]  Pager: [phone]  Fax: [phone]    On-call pager, [phone], 4:00pm to midnight

## 2023-08-15 NOTE — DISCHARGE SUMMARY
[address] [address]  p: [phone]    Discharge Summary: Cardiology Service    Sukh Craven MRN# [id number]   YOB: 1955 Age: 68 year old       Admission Date: 08/14/2023  Discharge Date: 08/15/2023    Discharge Diagnoses:  # Aortic Root Aneurysm  # Severe Aortic Insufficiency  # Coronary Artery Disease  # Resistant Hypertension  # Hyperlipidemia  # Hx of Atrial Fibrillation   # Acute Kidney Injury  # Pulmonary Nodules   # Peripheral Artery Disease    Brief HPI:  Sukh Craven is a 68 year old year old male with a medical history significant for HTN, HLD, PAD, recently diagnosed aortic root aneurysm and AI admitted for observation for atypical chest pain. There was concern for aortic dissection but this was ruled out with CTA. CVTS was consulted and recommended outpatient angiogram and follow up with the surgeon in one week to discuss plan. Patient was discharged in stable condition.     Hospital Course by Diagnosis:  # Aortic Root Aneurysm  # Severe Aortic Insufficiency  Had TTE in [facility] in 5/2023 which showed normal EF, ascending aorta diameter of at least 5cm, and severe AI. Prior TTE at [facility] 2/2022 with root diameter 5.2cm and moderate AI. Had planned for MRI chest/abdomen in December 2023. Presented with reports of chest pain.   - Dissection ruled out with CTA; ascending aorta measuring 4.8cm, sinus of Valsalva 5.0cm, and 5.4cm at the sinotubular junction  - CVTS consult; plan for outpatient coronary angiogram and follow up with Dr. Bonilla in one week       # Coronary Artery Disease  # Resistant Hypertension  # Hyperlipidemia  Reports infrequent, sharp chest pain lasting seconds. Not consistent with angina. EKG without ischemic changes, Troponin T negative. Calcification noted on non-gated CT of chest. BP acceptable on admission 122/69. Recent admission for hypertensive urgency at OSH with Bps 220s/140s.  Patient has been compliant with PTA regimen and notes marked improvement in Bps at home. Does have aortic root aneurysmal dilation but without evidence of aortic dissection, BP goal <130/80. Renin/aldosterone pending from cardiology clinic, TSH WNL. Echocardiogram 8/15: Normal EF 60-65%, severe AI, and sever dilation of the aorta  - Continue PTA Amlodipine 10mg daily  - Continue PTA Coreg 12.5mg BID  - Increase PTA Lipitor to 80mg daily   - Holding PTA Losartan 100mg daily w/ EFFIE  - Holding hydrochlorothiazide 25mg daily w/ EFFIE  - Start Hydralazine 25mg TID; BP check in one week at 8/21/23 Dr. Craig appointment       # Hx of Atrial Fibrillation   Noted on OSH admission in 2020, no evidence of recurrence, not on anticoagulation. [id number] 3 (age, HTN, PAD).  - Can consider outpatient Zio Patch       # Acute Kidney Injury  Creatinine on admission 1.43. Baseline appears ~ 1.5-1.2.    - BMP in one week at CVTS follow up   - Hold Losartan and hydrochlorothiazide per above     # Pulmonary Nodules   Multiple sub-6mm pulmonary nodules noted on recent CTA.   - Repeat CT chest in one year; high risk per Fleischner criteria      # Peripheral Artery Disease  Abnormal ALFONSO noted on left side from 6/2023.   - Outpatient vascular clinic recommended but patient has not yet followed up  - Statin per above     Consults:  - Cardiovascular Surgery    Medication Changes:  - INCREASE Lipitor 80 mg daily  - HOLD Losartan 100 mg daily  - HOLD hydrochlorothiazide 25 mg daily  - START Hydralazine 25mg TID     Discharge medications:   Current Discharge Medication List        START taking these medications    Details   hydrALAZINE (APRESOLINE) 25 MG tablet Take 1 tablet (25 mg) by mouth 3 times daily  Qty: 245 tablet, Refills: 3    Associated Diagnoses: Resistant hypertension           CONTINUE these medications which have NOT CHANGED    Details   amLODIPine (NORVASC) 10 MG tablet Take 1 tablet (10 mg) by mouth daily  Qty: 90 tablet,  Refills: 1    Associated Diagnoses: Hypertension goal BP (blood pressure) < 140/90      atorvastatin (LIPITOR) 20 MG tablet Take 1 tablet (20 mg) by mouth daily  Qty: 90 tablet, Refills: 1    Associated Diagnoses: Hyperlipidemia, unspecified hyperlipidemia type      carvedilol (COREG) 12.5 MG tablet Take 1 tablet (12.5 mg) by mouth 2 times daily (with meals) for 180 days  Qty: 180 tablet, Refills: 1    Associated Diagnoses: Hypertension goal BP (blood pressure) < 140/90           STOP taking these medications       hydrochlorothiazide (HYDRODIURIL) 25 MG tablet Comments:   Reason for Stopping:         losartan (COZAAR) 100 MG tablet Comments:   Reason for Stopping:               Follow-up:  - PCP in one week for labs and BP check  - Follow-up with Dr. Bonilla of CVTS in one week    Labs or imaging requiring follow-up after discharge:  - BMP 1 week  - Outpatient coronary angiogram     Code status:  Full    Condition on discharge  Temp:  [98.3  F (36.8  C)] 98.3  F (36.8  C)  Pulse:  [68-81] 70  Resp:  [11-16] 11  BP: (102-132)/(51-81) 126/61  SpO2:  [96 %-100 %] 100 %    General: NAD  HEENT:  PERRLA, EOMI.   Neck: JVD at clavicle.   CV: RRR. No murmur appreciated. No rubs or gallops. Peripheral radial pulse intact.  Resp: No increased work of breathing or use of accessory muscles, breathing comfortably on room air.  Lung sounds clear throughout/bilaterally  Abdomen:  Normal active bowel sounds.  Abdomen is soft. No distension, non-tender to palpation.    Extremities: Warm. Capillary refill less than 3 sec. 3/4 radial pulses bilaterally.  3/4 pedal pulses bilaterally. No pre-tibial edema. No cyanosis or clubbing.  Skin:  Warm and dry. No erythema, rashes, ulceration or diaphoresis.  Neuro: Alert and oriented x3.      Imaging with results:  Echocardiogram 8/15/2023:  Global and regional left ventricular function is normal with an EF of 60-65%.  Global right ventricular function is normal.  Severe aortic  insufficiency. EROA 0.66 cm2. Regurgitant volume 107 mL. LVESD  3.5 cm. The etiology of the AI is sinotubular aortic root dilatation.  Severe dilatation of the aorta, Sinuses of Valsalva 5.3 cm.     This study was compared with the study from 4/23/2019. The aortic  insufficiency has progressed and the aortic root has further dilated.    CT Chest 8/14/2023  IMPRESSION:  1. No pulmonary embolus. No aortic dissection.     2. Enlarged aortic root. Aorta measures 50 mm in diameter at the  sinuses of Valsalva and 54 mm at the sinotubular junction. Ascending  aorta measures 48 mm in diameter.     3. Sub 6 mm pulmonary nodules. Per Fleischner Society recommendations,  if the patient is at low risk for lung cancer, no further follow up is  needed. If the patient is at high risk for lung cancer, an optional CT  at 12 months could be performed to reevaluate.    Other imaging studies:  EKG 12 Lead 8/14/2023: NSR, HR 82, with [facility]'s      Patient Care Team:  Clinic - [facility] as PCP - General (Clinic)  Teresa Marquis RN as Registered Nurse  Ned Martinez PA-C as Referring Physician (Physician Assistant)  Rena Nickerson CNP as Nurse Practitioner (Urology)  Paulo Cheung, [facility] as Pharmacist (Pharmacist)  Rena Nickerson CNP as Nurse Practitioner (Urology)  Cresencio Foote MD as Assigned Surgical Provider  Wilton Headley MD as MD (Cardiovascular Disease)  Zeny Goel RN as Specialty Care Coordinator (Cardiology)  Wilton Headley MD as Assigned Heart and Vascular Provider  Parrish Stephenson MD as Assigned PCP    HEMAL Callejas CNP on 8/15/2023 at 1:28 PM  [facility] Cardiology  Patient discussed with staff cardiologist, Dr. Estrella, who agrees with the above documentation and plan. Documentation represents joint decision making.     Time Spent on this Encounter   IYolis APRN CNP, personally saw the patient today and spent greater than 30

## 2023-08-15 NOTE — CONSULTS
Cardiothoracic Surgery Consult Note    Consult Reason: enlarged aorta    HPI: Sukh Craven is a 68 year old gentleman with a PMH significant for PAD, HTN, HLD, CAD, atrial fibrillation, pulmonary nodules, BPH and recently diagnosed aortic root aneurysm with severe AI admitted for observation for atypical chest pain. TTE in May demonstrated ascending aorta diameter of 5 cm with severe AI. CTA chest demonstrated ascending aorta measuring 4.8 cm, sinus of valsalva 5 cm and 5.4 cm at the sinotubular junction. CVTS was consulted for consideration of surgical intervention.     Pertinent Positives: PND, orthopnea, GUNTER, significant weight loss, orthostasis, chronic cough  Pertinent Negatives: edema, syncope ,abdominal pain, fevers, chills, palpitations    PMH:  Past Medical History:   Diagnosis Date    BPH (benign prostatic hyperplasia)     Dyslipidemia     HTN (hypertension)        PSH:  Past Surgical History:   Procedure Laterality Date    COLONOSCOPY N/A 9/26/2022    Procedure: COLONOSCOPY, WITH POLYPECTOMY;  Surgeon: Long Silver MD;  Location: [facility] OR    CYSTOSCOPY, BLADDER NECK CUTS, COMBINED N/A 2/16/2023    Procedure: CYSTOSCOPY, WITH MULTIPLE INCISIONS OF BLADDER NECK WITH HOLMIUM LASER;  Surgeon: Cresencio Foote MD;  Location: [facility] OR    ESOPHAGOSCOPY, GASTROSCOPY, DUODENOSCOPY (EGD), COMBINED N/A 9/26/2022    Procedure: ESOPHAGOGASTRODUODENOSCOPY, WITH BIOPSY;  Surgeon: Long Silver MD;  Location: [facility] OR    LASER HOLMIUM ENUCLEATION PROSTATE N/A 4/27/2017    Procedure: LASER HOLMIUM ENUCLEATION PROSTATE;  Holmium Laser Enucleation Of The Prostate ;  Surgeon: Cresencio Foote MD;  Location: [facility] OR    REMOVAL OF SPERM DUCT(S)       Past Surgical History:   Procedure Laterality Date    COLONOSCOPY N/A 9/26/2022    Procedure: COLONOSCOPY, WITH POLYPECTOMY;  Surgeon: Long Silver MD;  Location: [facility] OR    CYSTOSCOPY, BLADDER NECK CUTS, COMBINED N/A 2/16/2023     Procedure: CYSTOSCOPY, WITH MULTIPLE INCISIONS OF BLADDER NECK WITH HOLMIUM LASER;  Surgeon: Cresencio Foote MD;  Location: [facility] OR    ESOPHAGOSCOPY, GASTROSCOPY, DUODENOSCOPY (EGD), COMBINED N/A 2022    Procedure: ESOPHAGOGASTRODUODENOSCOPY, WITH BIOPSY;  Surgeon: Long Silver MD;  Location: [facility] OR    LASER HOLMIUM ENUCLEATION PROSTATE N/A 2017    Procedure: LASER HOLMIUM ENUCLEATION PROSTATE;  Holmium Laser Enucleation Of The Prostate ;  Surgeon: Cresencio Foote MD;  Location: [facility] OR    REMOVAL OF SPERM DUCT(S)       FH:  Family History   Problem Relation Age of Onset    Pulmonary Embolism Mother     Diabetes Sister     Peripheral Vascular Disease Sister     Diabetes Sister     Diabetes Brother     C.A.D. No family hx of     Hypertension No family hx of     Cerebrovascular Disease No family hx of     Breast Cancer No family hx of     Cancer - colorectal No family hx of     Prostate Cancer No family hx of        SH:  Social History     Socioeconomic History    Marital status: Single     Spouse name: Ricardo    Number of children: 5    Years of education: 14    Highest education level: None   Occupational History    Occupation:      Employer: [employer]   Tobacco Use    Smoking status: Former     Packs/day: 0.50     Years: 25.00     Pack years: 12.50     Types: Cigarettes     Quit date: 3/23/2007     Years since quittin.4    Smokeless tobacco: Never   Vaping Use    Vaping Use: Never used   Substance and Sexual Activity    Alcohol use: Yes     Comment: rare- social drinker    Drug use: No    Sexual activity: Yes     Partners: Female   Other Topics Concern     Service Yes     Comment: [employer]- 6 years    Blood Transfusions No    Caffeine Concern No    Occupational Exposure No    Hobby Hazards No    Sleep Concern No    Stress Concern No    Weight Concern No    Special Diet No    Back Care No    Exercise Yes     Comment: 4 times a week    Bike Helmet Yes     Seat Belt Yes    Self-Exams Yes    Parent/sibling w/ CABG, MI or angioplasty before 65F 55M? No       Home Meds:  (Not in a hospital admission)      Allergies:  Allergies   Allergen Reactions    No Known Drug Allergy        ROS:  ROS: A complete 10 point ROS neg other than the symptoms noted above in the HPI.     Physical Exam:  Pulse:  [68-81] 70  Resp:  [11] 11  BP: (102-132)/(51-81) 126/61  SpO2:  [96 %-100 %] 100 %  Gen: NAD, resting comfortably in bed, conversational  HEENT: normocephalic, atraumatic cranium, EOMI, sclerae anicteric. Oral mucosa pink and moist, no tonsillar edema or erythema, midline trachea, nonpalpable thyroid  Lungs: CTA in all fields, no wheezing or rhonchi. Junky sounding cough while in room visiting with patient. Breathing stable on RA.   CV: RRR, S1S2 normal, no murmur. Radial pulses and DP pulses symmetric. No dependent edema.   Abd: no scars, positive normal pitched bowel sounds, overall soft and non distended, nontender  Musculoskeletal: grossly intact, strength 5/5 upper and lower extremities  Neuro: AOx3, CN II-VII grossly intact, sensation/motor intact in upper and lower extremities. Decreased appetite since ~Thursday per patient and also little sleep since this time d/t his SOB he was having (now improving)  Mental: normal mood and affect, regular rate of speech    Labs:  ABG No lab results found in last 7 days.  CBC  Recent Labs   Lab 23  1221   WBC 5.2   HGB 16.9        BMP  Recent Labs   Lab 08/15/23  0504 23  1221     140 143   POTASSIUM 3.1* 3.4   CHLORIDE 102 104   CO2 23 24   BUN 25.8* 19.8   CR 1.79*  1.79* 1.43*   * 128*     LFT  Recent Labs   Lab 23  1221   AST 21   ALT 18   ALKPHOS 80   BILITOTAL 1.4*   ALBUMIN 4.9     Pancreas  Recent Labs   Lab 23  1655   LIPASE 17       Imagin23 CTA Chest:  1. No pulmonary embolus. No aortic dissection.     2. Enlarged aortic root. Aorta measures 50 mm in diameter at the  sinuses  of Valsalva and 54 mm at the sinotubular junction. Ascending  aorta measures 48 mm in diameter.     3. Sub 6 mm pulmonary nodules. Per Fleischner Society recommendations,  if the patient is at low risk for lung cancer, no further follow up is  needed. If the patient is at high risk for lung cancer, an optional CT  at 12 months could be performed to reevaluate.    8/15/23 TTE:  Interpretation Summary  Global and regional left ventricular function is normal with an EF of 60-65%.  Global right ventricular function is normal.  Severe aortic insufficiency. EROA 0.66 cm2. Regurgitant volume 107 mL. LVESD  3.5 cm. The etiology of the AI is sinotubular aortic root dilatation.  Severe dilatation of the aorta, Sinuses of Valsalva 5.3 cm.     This study was compared with the study from 4/23/2019. The aortic  insufficiency has progressed and the aortic root has further dilated.    A/P: Sukh Craven is a 68 year old gentleman with a PMH significant for PAD, HTN, HLD, CAD, atrial fibrillation, pulmonary nodules, BPH and recently diagnosed aortic root aneurysm with severe AI admitted for observation for atypical chest pain. TTE in May demonstrated ascending aorta diameter of 5 cm with severe AI. CTA chest demonstrated ascending aorta measuring 4.8 cm, sinus of valsalva 5 cm and 5.4 cm at the sinotubular junction. CVTS was consulted for consideration of surgical intervention.     - Discussed and reviewed imaging with Dr. Lockett, from CVTS standpoint ok to discharge and follow up in clinic with surgeon. Will get patients info to Dr. Hein RNCC to get patient scheduled in the next week if he is to discharge to home.   - Dr. Lockett would like a coronary angiogram when able for pre-operative imaging eval. Ok to complete this outpatient.   - Thank you for the opportunity to participate in the care of this patient.    Patient and plan discussed with attending, Dr. Lockett.     Mariola Pritchard PA-C     11:48 AM  August 15,  2023

## 2023-08-15 NOTE — PROGRESS NOTES
[facility]   Cardiology   Progress Note     ASSESSMENT/PLAN:  Sukh Craven is a 68 year old year old male with a medical history significant for HTN, HLD, PAD, recently diagnosed aortic root aneurysm and AI admitted for observation for atypical chest pain.     Interval History: No acute events overnight. Patient hemodynamically stable, on room air. Denies any further chest pain, palpitations, or dizziness.     Changes Today:  - Increase PTA Lipitor to 80mg q HS   - Echocardiogram today  - CVTS consult for aortic aneurysm    # Aortic Root Aneurysm  # Severe Aortic Insufficiency  Had TTE in [facility] in 5/2023 which showed normal EF, ascending aorta diameter of at least 5cm, and severe AI. Prior TTE at [facility] 2/2022 with root diameter 5.2cm and moderate AI. Had planned for MRI chest/abdomen in December 2023. Presented with reports of chest pain.   - Dissection ruled out with CTA; ascending aorta measuring 4.8cm, sinus of Valsalva 5.0cm, and 5.4cm at the sinotubular junction  - CVTS consult; appreciate recommendations     # Coronary Artery Disease  # Resistant Hypertension  # Hyperlipidemia  Reports infrequent, sharp chest pain lasting seconds. Not consistent with angina. EKG without ischemic changes, Troponin T negative. Calcification noted on non-gated CT of chest. BP acceptable on admission 122/69. Recent admission for hypertensive urgency at OSH with Bps 220s/140s. Patient has been compliant with PTA regimen and notes marked improvement in Bps at home. Does have aortic root aneurysmal dilation but without evidence of aortic dissection, BP goal <130/80. Renin/aldosterone pending from cardiology clinic, TSH WNL.    - Echocardiogram today   - Continue PTA Amlodipine 10mg daily  - Continue PTA Coreg 12.5mg BID  - Increase PTA Lipitor to 80mg daily   - Holding PTA Losartan 100mg daily w/ EFFIE  - Holding hydrochlorothiazide 25mg daily w/ EFFIE     # Hx of Atrial Fibrillation   Noted  on OSH admission in 2020, no evidence of recurrence, not on anticoagulation. [id number] 3 (age, HTN, PAD).  - Telemetry  - ZioPatch at discharge     # Acute Kidney Injury  Creatinine on admission 1.43. Baseline appears ~ 1.5-1.2.    - Daily BMP  - Hold Losartan and hydrochlorothiazide per above     # Pulmonary Nodules   Multiple sub-6mm pulmonary nodules noted on recent CTA.   - Repeat CT chest in one year; high risk per Fleischner criteria     # Peripheral Artery Disease  Abnormal ALFONSO noted on left side from 6/2023.   - Outpatient vascular clinic recommended but patient has not yet followed up  - Statin per above     FEN: Low Sat Fat  Code status: Full  Prophylaxis:  Ambulation  Isolation: None  Disposition: 1-2 days pending work up     Patient seen and discussed with Dr. Estrella, who agrees with above plan.    Yolis RECIO, CNP  [facility] Cardiology Team    Physical Exam:  Temp:  [98.3  F (36.8  C)] 98.3  F (36.8  C)  Pulse:  [68-81] 79  Resp:  [16] 16  BP: (102-132)/(52-81) 102/52  SpO2:  [96 %-99 %] 97 %    I/O: No intake or output data in the 24 hours ending 08/15/23 0744    Wt:   Wt Readings from Last 5 Encounters:   08/14/23 95.3 kg (210 lb)   07/19/23 94.8 kg (209 lb)   06/30/23 97 kg (213 lb 12.8 oz)   06/16/23 98 kg (216 lb 1.6 oz)   04/25/23 90.7 kg (200 lb)       General: NAD  HEENT:  PERRLA, EOMI.   Neck: JVD at clavicle.   CV: RRR. No murmur appreciated. No rubs or gallops. Peripheral radial pulse intact.  Resp: No increased work of breathing or use of accessory muscles, breathing comfortably on room air.  Lung sounds clear throughout/bilaterally  Abdomen:  Normal active bowel sounds.  Abdomen is soft. No distension, non-tender to palpation.    Extremities: Warm. Capillary refill less than 3 sec. 3/4 radial pulses bilaterally.  3/4 pedal pulses bilaterally. No pre-tibial edema. No cyanosis or clubbing.  Skin:  Warm and dry. No erythema, rashes, ulceration or diaphoresis.  Neuro: Alert and oriented  x3.      Medications:   amLODIPine  10 mg Oral Daily    atorvastatin  20 mg Oral Daily    carvedilol  12.5 mg Oral BID w/meals    [Held by provider] hydrochlorothiazide  25 mg Oral Daily    [Held by provider] losartan  100 mg Oral Daily    sodium chloride (PF)  3 mL Intracatheter Q8H      - MEDICATION INSTRUCTIONS -         Labs:   CMP  Recent Labs   Lab 08/15/23  0504 23  1655 23  1221     140  --  143   POTASSIUM 3.1*  --  3.4   CHLORIDE 102  --  104   CO2 23  --  24   ANIONGAP 15  --  15   *  --  128*   BUN 25.8*  --  19.8   CR 1.79*  1.79*  --  1.43*   GFRESTIMATED 41*  --  54*   DORIS 9.4  --  10.3*   MAG  --  1.8  --    PROTTOTAL  --   --  8.5*   ALBUMIN  --   --  4.9   BILITOTAL  --   --  1.4*   ALKPHOS  --   --  80   AST  --   --  21   ALT  --   --  18     CBC  Recent Labs   Lab 23  1221   WBC 5.2   RBC 5.76   HGB 16.9   HCT 51.1   MCV 89   MCH 29.3   MCHC 33.1   RDW 13.5        INRNo lab results found in last 7 days.  Arterial Blood GasNo lab results found in last 7 days.    Diagnostics:  23 EC/14/23 CTA Chest:  1. No pulmonary embolus. No aortic dissection.     2. Enlarged aortic root. Aorta measures 50 mm in diameter at the  sinuses of Valsalva and 54 mm at the sinotubular junction. Ascending  aorta measures 48 mm in diameter.     3. Sub 6 mm pulmonary nodules. Per Fleischner Society recommendations,  if the patient is at low risk for lung cancer, no further follow up is  needed. If the patient is at high risk for lung cancer, an optional CT  at 12 months could be performed to reevaluate.     I have personally reviewed the examination and initial interpretation  and I agree with the findings.    Coronary angiogram:    Recent Results (from the past 24 hour(s))   CTA Chest with Contrast    Impression    RESIDENT PRELIMINARY INTERPRETATION  Impression:  1. No aortic dissection.  2. No acute intrathoracic process.  3. Multiple sub-6 mm pulmonary nodules.  Recommend optional CT chest in  one year, patient has high risk per Fleischner criteria.     Abdomen US, limited (RUQ only)    Narrative    EXAMINATION: Limited Abdominal Ultrasound, 8/14/2023 5:14 PM     COMPARISON: CT 1/14/2023    HISTORY: Elevated bilirubin, vomiting, chest pain.    FINDINGS:   Fluid: No evidence of ascites or pleural effusions.    Liver: The liver demonstrates diffusely increased echogenicity,  measuring 10.2 cm in craniocaudal dimension. There is no focal mass.     Gallbladder: There is no wall thickening, pericholecystic fluid,  positive sonographic Pritchard's sign or evidence for cholelithiasis.    Bile Ducts: Both the intra- and extrahepatic biliary system are of  normal caliber.  The common bile duct is not visualized.    Pancreas: Not visualized.    Kidney: The right kidney measures 11.7 cm long. There is no  hydronephrosis.       Impression    IMPRESSION:   1.  Hepatic steatosis.  2.  Common bile duct is not visualized. No sonographic finding to  suggest acute cholecystitis.    I have personally reviewed the examination and initial interpretation  and I agree with the findings.    DUANE DONOVAN MD         SYSTEM ID:  [id number]   XR Chest 2 Views    Narrative    EXAM: XR CHEST 2 VIEWS  LOCATION: [facility]  DATE: 8/14/2023    INDICATION: shortness of breath, chest pain  COMPARISON: 07/08/2022      Impression    IMPRESSION: No change. Moderate elevation right hemidiaphragm with slight linear atelectasis at right lung base. Lungs otherwise clear. Heart size and pulmonary vessels are normal.       Medical Decision Making       35 MINUTES SPENT BY ME on the date of service doing chart review, history, exam, documentation & further activities per the note.        HEMAL Callejas CNP on 8/15/2023 at 9:40 AM

## 2023-08-15 NOTE — H&P
[facility]    Cardiology History and Physical - Cardiology         Date of Admission:  8/14/2023    Assessment & Plan: SL    Sukh Craven is a 68 year old male with history of HTN, HLD, PAD, recently diagnosed aortic root aneurysm and AI admitted for observation for atypical chest pain.       Aortic root aneurysm  Aortic insufficiency  Shortness of breath  Likely degenerative with age and CV risk factors. Had TTE in [place] in 5/2023 with reported normal EF, and ascending aortia diameter (at least 5cm) with severe AI (TTE not available). Prior TTE at [facility] 2/2022 with root diameter 5.2cm with moderate AI. Had planned for MRI chest/abdomen in 6 months from cardiology clinic visit 6/2023. Overall has symptoms of heart failure with PND, orthopnea, with mildly elevated BNP but appears euvolemic on exam and noted breathing comfortably laying flat, defer diuresis for now and obtain TTE.    - TTE   - Telemetry     EFFIE   Creatinine on admission 1.43 from 1.1 2 months ago- was 1.4 at OSH in 5/2023. Possibly prerenal in the setting of poor PO intake and continuing to take HTN medications, vs effective prerenal/cardiorenal if CHF. Given hypercalcemia, elevated total protein and renal dysfunction will screen for MM.   - UA, FeNA  - SPEP/UPEP, immunofixation   - Avoid nephrotoxic agents     Nausea/vomiting  Unclear etiology, no associated abdominal pain. RUQ US without cholecystitis, CBD not visualized, lipase normal. Mildly hypercalcemic but less likely etiology. Continue to monitor PO intake, consider fluid bolus if unable to keep fluids down. MM workup as above.     Chest pain   Infrequent, lasts seconds, sharp, not consistent with angina. EKG without acute ischemic changes, troponin negative, not ACS. No dissection on CTA. Continue to monitor.     Resistant Hypertension  BP acceptable on admission 122/69. Recent admission for hypertensive urgency at OSH  with Bps 220s/140s. Patient has been compliant with PTA regimen and notes marked improvement in Bps at home. Does have aortic root aneurysmal dilation but without evidence of aortic dissection, BP goal <130/80. Renin/aldosterone pending from cardiology clinic, TSH WNL.   - PTA amlodipine 10mg daily   - PTA coreg 12.5mg BID   - PTA losartan 100mg daily- HOLD with EFFIE  - PTA hydrochlorothiazide 25mg daily - HOLD with EFFIE    History of atrial fibrillation- noted on OSH admission in 2020, no evidence of recurrence, not on anticoagulation. [id number] 3 (age, HTN, PAD) Recommend considering ziopatch at discharge.     Pulmonary nodules   Incidentally found on CTA chest- multiple, <6mm each- optional CT chest in one year to be followed up by PCP.      HLD- PTA atorvastatin 20mg daily   BPH- not on PTA medications  CAD- on non-gated CT chest - discuss starting aspirin  PAD- abnormal ALFONSO on left        Diet:  Cardiac  DVT Prophylaxis: Ambulate every shift, low risk PADUA 0  Rodriguez Catheter: Not present  Cardiac Monitoring: None  Code Status:  Full, confirmed on admission        Clinically Significant Risk Factors Present on Admission           # Hypercalcemia: Highest Ca = 10.3 mg/dL in last 2 days, will monitor as appropriate        # Hypertension: Noted on problem list                Disposition Plan   Expected discharge:  <2 midnights , recommended to prior living arrangement once  evaluation of aortic aneurysm complete .    Entered: Vish Shane MD 08/14/2023, 7:01 PM     Patient to be staffed in AM.    Tejal Shane MD  Internal Medicine PGY3  [facility]    ______________________________________________________________________    Chief Complaint   Chest pain     History is obtained from the patient and Care Everywhere    History of Present Illness   Sukh Craven is a 68 year old male with a history of HTN, HLD, PAD, recently diagnosed aortic root aneurym without dissection  and severe AI presenting with difficulties breathing while asleep. Patient reports since Thursday night (4 days ago) he has had frequent PND and orthopnea and associated dyspnea on exertion that has been keeping him awake but sometimes completely resolves. Has been using 3 pillows at night with baseline 1-2. Denies any lower extremity swelling or abdominal bloating but has had some early satiety and daily nausea and vomiting without being able to hold much food down. He notes a significant weight loss of >20 pounds in the last 6 months. Denies associated abdominal pain. Has had some orthostasis symptoms but no syncope. Also has a chronic cough productive of white sputum that hasn't really changed. Denies fevers/chills, palpitations, or other concerns. Of note, he says he feels pretty good here today- is able to walk without significant dyspnea and can lay down flat on hospital bed without shortness of breath.     Notes he had all of these same symptoms when he was hospitalized in 5/2023 while on a vacation in [place] where he sought medical care and was admitted for hypertensive urgency with aortic root measurement of 5cm normal EF, and severe AI- similar to 2/2022 TTE from [facility] with aortic root diameter 5.2cm, proximal ascending aorta diameter of 4.9cm. His symptoms resolved with improvement in blood pressures. Has been monitoring at home and typically 120s-140s but 150s-160s systolic prior to AM medications. He was seen by Dr. Bao Lan in cardiology clinic and planned for MRI chest/abdomen to evaluate aorta in 6 months.     ROS: 10 point ROS otherwise negative than in above HPI    ED course:   VS: WNL (/69)  Workup:   - Cr 1.43, troponin normal x2, BNP 1030 (no prior), elevated total protein and calcium  - EKG with sinus, PACs, Q waves in inferior leads but <40ms, <2mm deep  - CTA prelim read without aortic dissection   Interventions: none     Social History:  Tobacco: None- remote  history  Alcohol: Rare  Illicit drugs: Occasional marijuana use- none recently with feeling sick   Living situation: Lives in a house alone in [facility] in [facility] with son above him, gets around okay at home independently       Past Medical History    Past Medical History:   Diagnosis Date    BPH (benign prostatic hyperplasia)     Dyslipidemia     HTN (hypertension)        Past Surgical History   Past Surgical History:   Procedure Laterality Date    COLONOSCOPY N/A 9/26/2022    Procedure: COLONOSCOPY, WITH POLYPECTOMY;  Surgeon: Long Silver MD;  Location: [facility] OR    CYSTOSCOPY, BLADDER NECK CUTS, COMBINED N/A 2/16/2023    Procedure: CYSTOSCOPY, WITH MULTIPLE INCISIONS OF BLADDER NECK WITH HOLMIUM LASER;  Surgeon: Cresencio Foote MD;  Location: [facility] OR    ESOPHAGOSCOPY, GASTROSCOPY, DUODENOSCOPY (EGD), COMBINED N/A 9/26/2022    Procedure: ESOPHAGOGASTRODUODENOSCOPY, WITH BIOPSY;  Surgeon: Long Silver MD;  Location: [facility] OR    LASER HOLMIUM ENUCLEATION PROSTATE N/A 4/27/2017    Procedure: LASER HOLMIUM ENUCLEATION PROSTATE;  Holmium Laser Enucleation Of The Prostate ;  Surgeon: Cresencio Foote MD;  Location: [facility] OR    REMOVAL OF SPERM DUCT(S)         Prior to Admission Medications   Prior to Admission Medications   Prescriptions Last Dose Informant Patient Reported? Taking?   amLODIPine (NORVASC) 10 MG tablet   No No   Sig: Take 1 tablet (10 mg) by mouth daily   atorvastatin (LIPITOR) 20 MG tablet   No No   Sig: Take 1 tablet (20 mg) by mouth daily   carvedilol (COREG) 12.5 MG tablet   No No   Sig: Take 1 tablet (12.5 mg) by mouth 2 times daily (with meals) for 180 days   hydrochlorothiazide (HYDRODIURIL) 25 MG tablet   No No   Sig: Take 1 tablet (25 mg) by mouth daily   losartan (COZAAR) 100 MG tablet   No No   Sig: Take 1 tablet (100 mg) by mouth daily      Facility-Administered Medications: None          Physical Exam   Vital Signs: Temp: 98.3  F (36.8  C) Temp src: Oral  BP: 122/69 Pulse: 78   Resp: 16 SpO2: 99 % O2 Device: None (Room air)    Weight: 210 lbs 0 oz    Constitutional: awake, alert, cooperative, NAD  HEENT: normocephalic, anicteric sclerae, MMM   Pulmonary: no increased work of breathing on room air, lungs clear to auscultation bilaterally without wheezes or rales   Cardiovascular: No JVD sitting upright, RRR, 2/6 systolic and diastolic murmurs   GI: BS+, soft, non-tender, non-distended, without guarding or rigidity  Skin: warm, dry  MSK: no peripheral edema bilaterally   Neuro: AAOx3, no gross focal neurologic deficits noted       Medical Decision Making         Data   TTE 6/1/2023 (see Dr. Bao Lan's note with image from outside hospital)    CT Angio CAP 5/31/2023 ([place])  FINDINGS:  LUNGS/PLEURA: No consolidation, effusion, or pneumothorax. Right   basilar atelectasis. 3 mm ground-glass opacity nodules at the   periphery of the right upper lung, favor infectious or inflammatory in   etiology.   STEPHANIE/MEDIASTINUM: Lymph node enlargement.   VASCULATURE: Aneurysmal dilation of the aortic root up to 5.0 cm.  CARDIAC: Mild multichamber cardiomegaly. No pericardial effusion.   Mild to moderate calcified coronary atherosclerosis.   CHEST WALL: Elevated right hemidiaphragm.     2/23/22 [facility] TTE    * Normal left ventricular size and systolic function, quantified ejection   fraction is 61%.     * Left ventricular wall motion is normal.     * There is moderate septal hypertrophy.     * Low normal right ventricular systolic function.     * The aortic valve is trileaflet; there is moderate to severe aortic   regurgitation with a vena contracta of 0.5 cm; pressure half time of 342 ms;   jet is broad based and takes up >60% of the LVOT; incomplete assessment for   diastolic flow reversal in the descending aorta.     * Due to inadequate tricuspid regurgitant velocity, unable to calculate   right ventricular systolic pressure.     * The sinuses of Valsalva is severely  dilated measuring 5.2 cm; the proximal   ascending aorta is moderately dilated measuring 4.9 cm.     * Compared to prior study on 10/15/2020, the sinuses of Valsalva has   increased slightly in size from 5.1 cm to now 5.2 cm; the proximal ascending   aorta has also increased in size from 4.5 cm to 4.9 cm; the degree of aortic   regurgitation remains moderate to severe; no significant change in left   ventricular systolic function with side by side comparison.        I have personally reviewed the following data over the past 24 hrs:    5.2  \   16.9   / 214     143 104 19.8 /  128 (H)   3.4 24 1.43 (H) \     ALT: 18 AST: 21 AP: 80 TBILI: 1.4 (H)   ALB: 4.9 TOT PROTEIN: 8.5 (H) LIPASE: N/A     Trop: 17 BNP: 1,030 (H)       Imaging results reviewed over the past 24 hrs:   Recent Results (from the past 24 hour(s))   CTA Chest with Contrast    Impression    RESIDENT PRELIMINARY INTERPRETATION  Impression:  1. No aortic dissection.  2. No acute intrathoracic process.  3. Multiple sub-6 mm pulmonary nodules. Recommend optional CT chest in  one year, patient has high risk per Fleischner criteria.     Abdomen US, limited (RUQ only)    Narrative    EXAMINATION: Limited Abdominal Ultrasound, 8/14/2023 5:14 PM     COMPARISON: CT 1/14/2023    HISTORY: Elevated bilirubin, vomiting, chest pain.    FINDINGS:   Fluid: No evidence of ascites or pleural effusions.    Liver: The liver demonstrates diffusely increased echogenicity,  measuring 10.2 cm in craniocaudal dimension. There is no focal mass.     Gallbladder: There is no wall thickening, pericholecystic fluid,  positive sonographic Pritchard's sign or evidence for cholelithiasis.    Bile Ducts: Both the intra- and extrahepatic biliary system are of  normal caliber.  The common bile duct is not visualized.    Pancreas: Not visualized.    Kidney: The right kidney measures 11.7 cm long. There is no  hydronephrosis.       Impression    IMPRESSION:   1.  Hepatic steatosis.  2.  Common  bile duct is not visualized. No sonographic finding to  suggest acute cholecystitis.    I have personally reviewed the examination and initial interpretation  and I agree with the findings.    DUANE DONOVAN MD         SYSTEM ID:  [id number]

## 2023-08-16 LAB
ALBUMIN MFR UR ELPH: 65.4 %
ALBUMIN SERPL ELPH-MCNC: 4.5 G/DL (ref 3.7–5.1)
ALPHA1 GLOB MFR UR ELPH: 8.6 %
ALPHA1 GLOB SERPL ELPH-MCNC: 0.4 G/DL (ref 0.2–0.4)
ALPHA2 GLOB MFR UR ELPH: 15.3 %
ALPHA2 GLOB SERPL ELPH-MCNC: 0.7 G/DL (ref 0.5–0.9)
B-GLOBULIN MFR UR ELPH: 9 %
B-GLOBULIN SERPL ELPH-MCNC: 0.9 G/DL (ref 0.6–1)
GAMMA GLOB MFR UR ELPH: 1.7 %
GAMMA GLOB SERPL ELPH-MCNC: 1.4 G/DL (ref 0.7–1.6)
M PROTEIN MFR UR ELPH: 0 %
M PROTEIN SERPL ELPH-MCNC: 0 G/DL
PROT PATTERN SERPL ELPH-IMP: NORMAL
PROT PATTERN SERPL IFE-IMP: NORMAL
PROT PATTERN UR ELPH-IMP: ABNORMAL

## 2023-08-18 ENCOUNTER — TELEPHONE (OUTPATIENT)
Dept: CARDIOLOGY | Facility: CLINIC | Age: 68
End: 2023-08-18
Payer: COMMERCIAL

## 2023-08-18 NOTE — TELEPHONE ENCOUNTER
[facility] Call Center    Phone Message    May a detailed message be left on voicemail: yes     Reason for Call: Other: Patient states he is supposed to be scheduled for an angiogram and has not heard anything for scheduling. Please call the patient to discuss.      Action Taken: Other: cardiology    Travel Screening: Not Applicable  Thank you!  [facility]

## 2023-08-21 ENCOUNTER — OFFICE VISIT (OUTPATIENT)
Dept: CARDIOLOGY | Facility: CLINIC | Age: 68
End: 2023-08-21
Attending: THORACIC SURGERY (CARDIOTHORACIC VASCULAR SURGERY)
Payer: COMMERCIAL

## 2023-08-21 VITALS
DIASTOLIC BLOOD PRESSURE: 69 MMHG | SYSTOLIC BLOOD PRESSURE: 148 MMHG | HEIGHT: 77 IN | WEIGHT: 205.5 LBS | OXYGEN SATURATION: 100 % | HEART RATE: 67 BPM | BODY MASS INDEX: 24.26 KG/M2

## 2023-08-21 DIAGNOSIS — I35.1 SEVERE AORTIC INSUFFICIENCY: Primary | ICD-10-CM

## 2023-08-21 PROCEDURE — 99212 OFFICE O/P EST SF 10 MIN: CPT | Performed by: THORACIC SURGERY (CARDIOTHORACIC VASCULAR SURGERY)

## 2023-08-21 PROCEDURE — G0463 HOSPITAL OUTPT CLINIC VISIT: HCPCS | Performed by: THORACIC SURGERY (CARDIOTHORACIC VASCULAR SURGERY)

## 2023-08-21 ASSESSMENT — PAIN SCALES - GENERAL: PAINLEVEL: NO PAIN (0)

## 2023-08-21 NOTE — LETTER
8/21/2023      RE: Sukh Craven  [address]  [place] [address]       Dear Colleague,    Thank you for the opportunity to participate in the care of your patient, Sukh Craven, at the [facility] [place] [facility]. Please see a copy of my visit note below.    CVTS Followup  Patient seen after recent ED visit for chest pain.  CTA shows root aneurysm.  Echo shows severe aortic insufficiency.  Angiogram negative for coronary lesions.    Will plan aortic root replacement with possible valve sparing root, possible mechanical valve replacement.    Alexis Lockett  Cardiothoracic surgery  [phone]

## 2023-08-21 NOTE — NURSING NOTE
Chief Complaint   Patient presents with    New Patient     New CV surgery - aortic aneurysm       Vitals were taken, medications reconciled.    Melissa Rojas, EMT   3:21 PM

## 2023-08-21 NOTE — PROGRESS NOTES
CVTS Followup  Patient seen after recent ED visit for chest pain.  CTA shows root aneurysm.  Echo shows severe aortic insufficiency.  Angiogram negative for coronary lesions.    Will plan aortic root replacement with possible valve sparing root, possible mechanical valve replacement.    Alexis Lockett  Cardiothoracic surgery  [phone]

## 2023-08-22 ENCOUNTER — TELEPHONE (OUTPATIENT)
Dept: CARDIOLOGY | Facility: CLINIC | Age: 68
End: 2023-08-22
Payer: COMMERCIAL

## 2023-08-22 NOTE — TELEPHONE ENCOUNTER
Angiogram ordered when pt in hospital.    Surgery team helping to coordinate, since pt had apt with Dr. Lockett on 8/21.

## 2023-08-23 ENCOUNTER — HOSPITAL ENCOUNTER (OUTPATIENT)
Facility: CLINIC | Age: 68
End: 2023-08-23
Payer: COMMERCIAL

## 2023-08-23 ENCOUNTER — TELEPHONE (OUTPATIENT)
Dept: CARDIOLOGY | Facility: CLINIC | Age: 68
End: 2023-08-23
Payer: COMMERCIAL

## 2023-08-23 DIAGNOSIS — I71.21 ANEURYSM OF ASCENDING AORTA WITHOUT RUPTURE (H): ICD-10-CM

## 2023-08-23 DIAGNOSIS — I25.10 CORONARY ARTERY DISEASE INVOLVING NATIVE CORONARY ARTERY OF NATIVE HEART WITHOUT ANGINA PECTORIS: ICD-10-CM

## 2023-08-23 DIAGNOSIS — E78.5 HYPERLIPIDEMIA LDL GOAL <70: ICD-10-CM

## 2023-08-23 DIAGNOSIS — I35.1 SEVERE AORTIC INSUFFICIENCY: Primary | ICD-10-CM

## 2023-08-23 NOTE — TELEPHONE ENCOUNTER
Cath Lab Case Request/Order    Location: [address] [address] [facility] Waiting Room    Procedure: Coronary Angiogram    Procedure Date: 9/8/2023    Patient Arrival Time: 1100    Procedure Time: 0830 (pending emergency)    Ordering Provider:  HEMAL Gilbert CNP    Performing Cardiologist:  Invasive Cardiologist    Inpatient Bed Needed: Yes    Communicated Patient Instructions:     NPO, nothing to eat 8 hours and drink 2 hours before arrival time: Yes     , need to arrange a ride home - unable to drive post- procedure: Yes     Adult at home, need a responsible adult to stay with patient 24 hours post- procedure: YES    Appointment was scheduled: Over the phone    Patient expressed understanding of above instructions and denied further questions at this time.    Danika Morton

## 2023-08-31 ENCOUNTER — PREP FOR PROCEDURE (OUTPATIENT)
Dept: CARDIOLOGY | Facility: CLINIC | Age: 68
End: 2023-08-31
Payer: COMMERCIAL

## 2023-08-31 ENCOUNTER — HOSPITAL ENCOUNTER (INPATIENT)
Facility: CLINIC | Age: 68
Setting detail: SURGERY ADMIT
End: 2023-08-31
Attending: THORACIC SURGERY (CARDIOTHORACIC VASCULAR SURGERY) | Admitting: THORACIC SURGERY (CARDIOTHORACIC VASCULAR SURGERY)
Payer: COMMERCIAL

## 2023-08-31 ENCOUNTER — TELEPHONE (OUTPATIENT)
Dept: CARDIOLOGY | Facility: CLINIC | Age: 68
End: 2023-08-31
Payer: COMMERCIAL

## 2023-08-31 DIAGNOSIS — I35.1 SEVERE AORTIC INSUFFICIENCY: Primary | ICD-10-CM

## 2023-08-31 DIAGNOSIS — R09.89 OTHER SPECIFIED SYMPTOMS AND SIGNS INVOLVING THE CIRCULATORY AND RESPIRATORY SYSTEMS: ICD-10-CM

## 2023-08-31 NOTE — TELEPHONE ENCOUNTER
Per task, pt needs to schedule surgery with Dr. Lockett after 9/8. Talked with pt and scheduled surgery for 9/29. Will call if anything changes

## 2023-09-01 ENCOUNTER — TELEPHONE (OUTPATIENT)
Dept: CARDIOLOGY | Facility: CLINIC | Age: 68
End: 2023-09-01
Payer: COMMERCIAL

## 2023-09-05 ENCOUNTER — DOCUMENTATION ONLY (OUTPATIENT)
Dept: RESEARCH | Facility: CLINIC | Age: 68
End: 2023-09-05

## 2023-09-05 NOTE — PROGRESS NOTES
The CHIlled Platelet Study  IRB:  [id number]  :  Harriett Branham    During his clinic visit on 8/21/23, Mr Craven was provided a Chilled Platelet Study study flyer by the CV Care Coordinator and asked if he  was open to learning more about study participation.    I was informed by the care coordinator that he gave verbal permission to be contacted by a member of the CHIPS research team. Today, he was approached by phone.  I verified permission to approach by phone and  introduced the CHIPS study, (purpose, voluntary, general overview of participation).  After our discussion,  he/she was agreeable to learning more about the trial, and a copy of the informed consent was provided via email. He confirmed that he had received the email, and he preferred to follow-up at the time of his Pre-op appointment on 9/13/23.

## 2023-09-05 NOTE — TELEPHONE ENCOUNTER
FUTURE VISIT INFORMATION      SURGERY INFORMATION:  Date: 23  Location:  HEART CARDIAC CATH LAB   Surgeon:  Dickson Watson MD   Anesthesia Type:  Moderate Sedation   Procedure: Coronary Angiogram     RECORDS REQUESTED FROM:       Primary Care Provider: [facility]    Pertinent Medical History: hypertension. Moderate aortic insufficiency, CAD    Most recent EKG+ Tracin23    Most recent ECHO: 23    Most recent Coronary Angiogram: 23

## 2023-09-07 DIAGNOSIS — I35.1 SEVERE AORTIC INSUFFICIENCY: Primary | ICD-10-CM

## 2023-09-07 RX ORDER — SODIUM CHLORIDE 9 MG/ML
INJECTION, SOLUTION INTRAVENOUS CONTINUOUS
Status: CANCELLED | OUTPATIENT
Start: 2023-09-07

## 2023-09-07 RX ORDER — LIDOCAINE 40 MG/G
CREAM TOPICAL
Status: CANCELLED | OUTPATIENT
Start: 2023-09-07

## 2023-09-07 RX ORDER — ASPIRIN 81 MG/1
243 TABLET, CHEWABLE ORAL ONCE
Status: CANCELLED | OUTPATIENT
Start: 2023-09-07

## 2023-09-07 RX ORDER — POTASSIUM CHLORIDE 1500 MG/1
20 TABLET, EXTENDED RELEASE ORAL
Status: CANCELLED | OUTPATIENT
Start: 2023-09-07

## 2023-09-07 RX ORDER — ASPIRIN 325 MG
325 TABLET ORAL ONCE
Status: CANCELLED | OUTPATIENT
Start: 2023-09-07 | End: 2023-09-07

## 2023-09-07 RX ORDER — POTASSIUM CHLORIDE 1500 MG/1
40 TABLET, EXTENDED RELEASE ORAL
Status: CANCELLED | OUTPATIENT
Start: 2023-09-07

## 2023-09-08 ENCOUNTER — APPOINTMENT (OUTPATIENT)
Dept: MEDSURG UNIT | Facility: CLINIC | Age: 68
End: 2023-09-08
Attending: INTERNAL MEDICINE
Payer: COMMERCIAL

## 2023-09-08 ENCOUNTER — APPOINTMENT (OUTPATIENT)
Dept: LAB | Facility: CLINIC | Age: 68
End: 2023-09-08
Attending: INTERNAL MEDICINE
Payer: COMMERCIAL

## 2023-09-08 ENCOUNTER — HOSPITAL ENCOUNTER (OUTPATIENT)
Facility: CLINIC | Age: 68
Discharge: HOME | End: 2023-09-08
Attending: INTERNAL MEDICINE | Admitting: INTERNAL MEDICINE
Payer: COMMERCIAL

## 2023-09-08 VITALS
OXYGEN SATURATION: 97 % | SYSTOLIC BLOOD PRESSURE: 134 MMHG | WEIGHT: 206.4 LBS | BODY MASS INDEX: 24.37 KG/M2 | DIASTOLIC BLOOD PRESSURE: 69 MMHG | TEMPERATURE: 97.6 F | RESPIRATION RATE: 16 BRPM | HEIGHT: 77 IN | HEART RATE: 60 BPM

## 2023-09-08 DIAGNOSIS — R07.9 ACUTE CHEST PAIN: Primary | ICD-10-CM

## 2023-09-08 DIAGNOSIS — I71.21 ANEURYSM OF ASCENDING AORTA WITHOUT RUPTURE (H): ICD-10-CM

## 2023-09-08 DIAGNOSIS — I35.1 SEVERE AORTIC INSUFFICIENCY: ICD-10-CM

## 2023-09-08 DIAGNOSIS — I25.10 CORONARY ARTERY DISEASE INVOLVING NATIVE CORONARY ARTERY OF NATIVE HEART WITHOUT ANGINA PECTORIS: ICD-10-CM

## 2023-09-08 PROBLEM — Z98.890 STATUS POST CORONARY ANGIOGRAM: Status: ACTIVE | Noted: 2023-09-08

## 2023-09-08 LAB
ACT BLD: 139 SECONDS (ref 74–150)
ANION GAP SERPL CALCULATED.3IONS-SCNC: 11 MMOL/L (ref 7–15)
APTT PPP: 28 SECONDS (ref 22–38)
BUN SERPL-MCNC: 13.6 MG/DL (ref 8–23)
CALCIUM SERPL-MCNC: 9.6 MG/DL (ref 8.8–10.2)
CHLORIDE SERPL-SCNC: 105 MMOL/L (ref 98–107)
CREAT SERPL-MCNC: 1.26 MG/DL (ref 0.67–1.17)
DEPRECATED HCO3 PLAS-SCNC: 24 MMOL/L (ref 22–29)
EGFRCR SERPLBLD CKD-EPI 2021: 63 ML/MIN/1.73M2
ERYTHROCYTE [DISTWIDTH] IN BLOOD BY AUTOMATED COUNT: 14.2 % (ref 10–15)
GLUCOSE SERPL-MCNC: 104 MG/DL (ref 70–99)
HCT VFR BLD AUTO: 40.5 % (ref 40–53)
HGB BLD-MCNC: 13.1 G/DL (ref 13.3–17.7)
INR PPP: 1.07 (ref 0.85–1.15)
MCH RBC QN AUTO: 29.8 PG (ref 26.5–33)
MCHC RBC AUTO-ENTMCNC: 32.3 G/DL (ref 31.5–36.5)
MCV RBC AUTO: 92 FL (ref 78–100)
PLATELET # BLD AUTO: 173 10E3/UL (ref 150–450)
POTASSIUM SERPL-SCNC: 4.4 MMOL/L (ref 3.4–5.3)
RBC # BLD AUTO: 4.39 10E6/UL (ref 4.4–5.9)
SODIUM SERPL-SCNC: 140 MMOL/L (ref 136–145)
WBC # BLD AUTO: 3.9 10E3/UL (ref 4–11)

## 2023-09-08 PROCEDURE — 85610 PROTHROMBIN TIME: CPT | Performed by: THORACIC SURGERY (CARDIOTHORACIC VASCULAR SURGERY)

## 2023-09-08 PROCEDURE — 93458 L HRT ARTERY/VENTRICLE ANGIO: CPT | Mod: 26 | Performed by: INTERNAL MEDICINE

## 2023-09-08 PROCEDURE — 272N000001 HC OR GENERAL SUPPLY STERILE: Performed by: INTERNAL MEDICINE

## 2023-09-08 PROCEDURE — 36415 COLL VENOUS BLD VENIPUNCTURE: CPT | Performed by: THORACIC SURGERY (CARDIOTHORACIC VASCULAR SURGERY)

## 2023-09-08 PROCEDURE — 82310 ASSAY OF CALCIUM: CPT | Performed by: THORACIC SURGERY (CARDIOTHORACIC VASCULAR SURGERY)

## 2023-09-08 PROCEDURE — 999N000142 HC STATISTIC PROCEDURE PREP ONLY

## 2023-09-08 PROCEDURE — 999N000054 HC STATISTIC EKG NON-CHARGEABLE

## 2023-09-08 PROCEDURE — 85347 COAGULATION TIME ACTIVATED: CPT

## 2023-09-08 PROCEDURE — 85027 COMPLETE CBC AUTOMATED: CPT | Performed by: THORACIC SURGERY (CARDIOTHORACIC VASCULAR SURGERY)

## 2023-09-08 PROCEDURE — 85730 THROMBOPLASTIN TIME PARTIAL: CPT | Performed by: THORACIC SURGERY (CARDIOTHORACIC VASCULAR SURGERY)

## 2023-09-08 PROCEDURE — 258N000003 HC RX IP 258 OP 636: Performed by: THORACIC SURGERY (CARDIOTHORACIC VASCULAR SURGERY)

## 2023-09-08 PROCEDURE — 93799 UNLISTED CV SVC/PROCEDURE: CPT | Performed by: INTERNAL MEDICINE

## 2023-09-08 PROCEDURE — 250N000013 HC RX MED GY IP 250 OP 250 PS 637: Performed by: THORACIC SURGERY (CARDIOTHORACIC VASCULAR SURGERY)

## 2023-09-08 PROCEDURE — 99152 MOD SED SAME PHYS/QHP 5/>YRS: CPT | Performed by: INTERNAL MEDICINE

## 2023-09-08 PROCEDURE — C1894 INTRO/SHEATH, NON-LASER: HCPCS | Performed by: INTERNAL MEDICINE

## 2023-09-08 PROCEDURE — 93005 ELECTROCARDIOGRAM TRACING: CPT

## 2023-09-08 PROCEDURE — C1769 GUIDE WIRE: HCPCS | Performed by: INTERNAL MEDICINE

## 2023-09-08 PROCEDURE — 999N000134 HC STATISTIC PP CARE STAGE 3

## 2023-09-08 PROCEDURE — 250N000011 HC RX IP 250 OP 636: Mod: JZ | Performed by: INTERNAL MEDICINE

## 2023-09-08 PROCEDURE — 99153 MOD SED SAME PHYS/QHP EA: CPT | Performed by: INTERNAL MEDICINE

## 2023-09-08 PROCEDURE — 93458 L HRT ARTERY/VENTRICLE ANGIO: CPT | Performed by: INTERNAL MEDICINE

## 2023-09-08 PROCEDURE — 250N000009 HC RX 250: Performed by: INTERNAL MEDICINE

## 2023-09-08 PROCEDURE — 99152 MOD SED SAME PHYS/QHP 5/>YRS: CPT | Mod: GC | Performed by: INTERNAL MEDICINE

## 2023-09-08 PROCEDURE — 93571 IV DOP VEL&/PRESS C FLO 1ST: CPT | Mod: 26 | Performed by: INTERNAL MEDICINE

## 2023-09-08 RX ORDER — ASPIRIN 325 MG
325 TABLET ORAL ONCE
Status: COMPLETED | OUTPATIENT
Start: 2023-09-08 | End: 2023-09-08

## 2023-09-08 RX ORDER — LIDOCAINE 40 MG/G
CREAM TOPICAL
Status: DISCONTINUED | OUTPATIENT
Start: 2023-09-08 | End: 2023-09-08 | Stop reason: HOSPADM

## 2023-09-08 RX ORDER — HEPARIN SODIUM 1000 [USP'U]/ML
INJECTION, SOLUTION INTRAVENOUS; SUBCUTANEOUS
Status: DISCONTINUED | OUTPATIENT
Start: 2023-09-08 | End: 2023-09-08 | Stop reason: HOSPADM

## 2023-09-08 RX ORDER — POTASSIUM CHLORIDE 750 MG/1
20 TABLET, EXTENDED RELEASE ORAL
Status: COMPLETED | OUTPATIENT
Start: 2023-09-08 | End: 2023-09-08

## 2023-09-08 RX ORDER — POTASSIUM CHLORIDE 750 MG/1
40 TABLET, EXTENDED RELEASE ORAL
Status: DISCONTINUED | OUTPATIENT
Start: 2023-09-08 | End: 2023-09-08 | Stop reason: HOSPADM

## 2023-09-08 RX ORDER — NALOXONE HYDROCHLORIDE 0.4 MG/ML
0.4 INJECTION, SOLUTION INTRAMUSCULAR; INTRAVENOUS; SUBCUTANEOUS
Status: DISCONTINUED | OUTPATIENT
Start: 2023-09-08 | End: 2023-09-08 | Stop reason: HOSPADM

## 2023-09-08 RX ORDER — NALOXONE HYDROCHLORIDE 0.4 MG/ML
0.2 INJECTION, SOLUTION INTRAMUSCULAR; INTRAVENOUS; SUBCUTANEOUS
Status: DISCONTINUED | OUTPATIENT
Start: 2023-09-08 | End: 2023-09-08 | Stop reason: HOSPADM

## 2023-09-08 RX ORDER — SODIUM CHLORIDE 9 MG/ML
INJECTION, SOLUTION INTRAVENOUS CONTINUOUS
Status: DISCONTINUED | OUTPATIENT
Start: 2023-09-08 | End: 2023-09-08 | Stop reason: HOSPADM

## 2023-09-08 RX ORDER — FENTANYL CITRATE 50 UG/ML
25 INJECTION, SOLUTION INTRAMUSCULAR; INTRAVENOUS
Status: DISCONTINUED | OUTPATIENT
Start: 2023-09-08 | End: 2023-09-08 | Stop reason: HOSPADM

## 2023-09-08 RX ORDER — PROTAMINE SULFATE 10 MG/ML
INJECTION, SOLUTION INTRAVENOUS
Status: DISCONTINUED | OUTPATIENT
Start: 2023-09-08 | End: 2023-09-08 | Stop reason: HOSPADM

## 2023-09-08 RX ORDER — ASPIRIN 81 MG/1
81 TABLET ORAL DAILY
Status: DISCONTINUED | OUTPATIENT
Start: 2023-09-09 | End: 2023-09-08 | Stop reason: HOSPADM

## 2023-09-08 RX ORDER — FLUMAZENIL 0.1 MG/ML
0.2 INJECTION, SOLUTION INTRAVENOUS
Status: DISCONTINUED | OUTPATIENT
Start: 2023-09-08 | End: 2023-09-08 | Stop reason: HOSPADM

## 2023-09-08 RX ORDER — ASPIRIN 81 MG/1
81 TABLET ORAL DAILY
Qty: 90 TABLET | Refills: 3 | Status: SHIPPED | OUTPATIENT
Start: 2023-09-09

## 2023-09-08 RX ORDER — ASPIRIN 81 MG/1
243 TABLET, CHEWABLE ORAL ONCE
Status: COMPLETED | OUTPATIENT
Start: 2023-09-08 | End: 2023-09-08

## 2023-09-08 RX ORDER — IOPAMIDOL 755 MG/ML
INJECTION, SOLUTION INTRAVASCULAR
Status: DISCONTINUED | OUTPATIENT
Start: 2023-09-08 | End: 2023-09-08 | Stop reason: HOSPADM

## 2023-09-08 RX ORDER — FENTANYL CITRATE 50 UG/ML
INJECTION, SOLUTION INTRAMUSCULAR; INTRAVENOUS
Status: DISCONTINUED | OUTPATIENT
Start: 2023-09-08 | End: 2023-09-08 | Stop reason: HOSPADM

## 2023-09-08 RX ORDER — ATROPINE SULFATE 0.1 MG/ML
0.5 INJECTION INTRAVENOUS
Status: DISCONTINUED | OUTPATIENT
Start: 2023-09-08 | End: 2023-09-08 | Stop reason: HOSPADM

## 2023-09-08 RX ORDER — OXYCODONE HYDROCHLORIDE 10 MG/1
10 TABLET ORAL EVERY 4 HOURS PRN
Status: DISCONTINUED | OUTPATIENT
Start: 2023-09-08 | End: 2023-09-08 | Stop reason: HOSPADM

## 2023-09-08 RX ORDER — OXYCODONE HYDROCHLORIDE 5 MG/1
5 TABLET ORAL EVERY 4 HOURS PRN
Status: DISCONTINUED | OUTPATIENT
Start: 2023-09-08 | End: 2023-09-08 | Stop reason: HOSPADM

## 2023-09-08 RX ORDER — ATORVASTATIN CALCIUM 40 MG/1
40 TABLET, FILM COATED ORAL DAILY
Qty: 90 TABLET | Refills: 3 | Status: SHIPPED | OUTPATIENT
Start: 2023-09-08

## 2023-09-08 RX ADMIN — SODIUM CHLORIDE: 9 INJECTION, SOLUTION INTRAVENOUS at 11:30

## 2023-09-08 ASSESSMENT — ACTIVITIES OF DAILY LIVING (ADL)
ADLS_ACUITY_SCORE: 37

## 2023-09-08 NOTE — DISCHARGE SUMMARY
Pt discharged to home. VSS on RA. Right groin site C/D/I, negative for a hematoma. Up ambulating and voiding w/o difficulty. PIV access removed. Tolerating PO intake. Discharge instructions reviewed and all questions answered. Pt escorted to the front entrance via wheelchair where his spouse picked him up.

## 2023-09-08 NOTE — PROGRESS NOTES
Arrived to [address] for CORS procedure in CCL. AFVSS. Denies pain. ECG done. Labs resulted. Pt states he took Aspirin 325 mg this morning. Bilat pp palpable/marked. Contrast reviewed. Pt's SO, Enedina, to drive him home post procedure #[phone]. Pt stable; ready for consent.

## 2023-09-08 NOTE — PROVIDER NOTIFICATION
Paged [phone] and Joey to clarify pt's bedrest time. Orders say 3 hours, but pt received 9k of heparin, was reversed with protamine, and only a 4Fr was used in the RFA. Clarifying if its a three or two hour bedrest.     1800: Spoke with [facility] and they changed the bedrest from 3 hours to 2 hours. Pt will be off bedrest at 1830.

## 2023-09-08 NOTE — DISCHARGE INSTRUCTIONS
Post-Ablation Discharge Instructions    Care of groin site:        Remove the Band-Aid after 24 hours. If there is minor oozing, apply another Band-aid and remove it after 12 hours.         Do NOT take a bath, use a hot tub, pool, or submerse in water for at least 3 days You may shower.         It is normal to have a small bruise or lump at the site.        Do not scrub the site.        Do not use lotion or powder near the puncture site for 3 days.        For the first 2 days: Do not stoop or squat. When you cough, sneeze or move your bowels, hold your hand over the puncture site and press gently.        Do not lift more than 10 pounds or exertional activity for 10 days.      If you start bleeding from the site in your groin:  Lie down flat and press firmly on the site.  Call your physician immediately, or, come to the emergency room.    Call 911 right away if you have bleeding that is heavy or does not stop.     Call your doctor/provider if:        You have a large or growing hard lump around the site.        The site is red, swollen, hot or tender.        Blood or fluid is draining from the site.        You have chills or a fever greater than 101 F (38 C).        Your leg or arm turns bluish, feels numb or cool.        You have hives, a rash or unusual itching.     Cardiovascular Clinic:   [address] [place] [address]. [place] [address]    Your Care Team:  EP Cardiology   Telephone Number     Nurses:   Carolina POLO [phone]    After business hours: [phone], select option 4, and ask for EP Fellow on-call to be paged.   Non-procedure scheduling:    Jennifer SEGURA   [phone]   Procedure scheduling:    Eloisa Fuentes   [phone]   Device Clinic (Pacemakers, ICDs, Loop Recorders)    During business hours: [phone]  After business hours:   [phone]- select option 4 and ask for job code [id number].       As always, Thank you for trusting us with your health care needs

## 2023-09-08 NOTE — PROGRESS NOTES
D/I/A: Pt roomed on 3C in bay 31.  Arrived via litter and accompanied by CCL RN. On/Off: On monitor.  VSSA.  Rhythm upon arrival SR on monitor.  Denies pain or sob.  Reviewed activity restrictions and when to notify RN, ie-changes to breathing or increased chest pressure or chest pain.  CCL access:  Right groin with [place] in place. Site C/D/I, negative for a hematoma. Off bedrest at 1830.  P: Continue to monitor status.  Discharge to home once meeting criteria.

## 2023-09-08 NOTE — PROGRESS NOTES
Pt's prescriptions were sent to [address] [place] [address] by mistake per pt. RN advised pt to take two tablets of his 20 mg lipitor dose to fulfill his 40 mg requirement by MD. RN also advised pt to stop and  81 mg of ASA at any pharmacy on the way home and to start tomorrow AM per MD.

## 2023-09-11 ENCOUNTER — TELEPHONE (OUTPATIENT)
Dept: CARDIOLOGY | Facility: CLINIC | Age: 68
End: 2023-09-11

## 2023-09-11 DIAGNOSIS — I25.10 CORONARY ARTERY DISEASE INVOLVING NATIVE CORONARY ARTERY OF NATIVE HEART WITHOUT ANGINA PECTORIS: Primary | ICD-10-CM

## 2023-09-11 DIAGNOSIS — Z01.810 ENCOUNTER FOR PREPROCEDURAL CARDIOVASCULAR EXAMINATION: ICD-10-CM

## 2023-09-11 LAB
ATRIAL RATE - MUSE: 56 BPM
DIASTOLIC BLOOD PRESSURE - MUSE: NORMAL MMHG
INTERPRETATION ECG - MUSE: NORMAL
P AXIS - MUSE: 46 DEGREES
PR INTERVAL - MUSE: 238 MS
QRS DURATION - MUSE: 104 MS
QT - MUSE: 446 MS
QTC - MUSE: 430 MS
R AXIS - MUSE: -25 DEGREES
SYSTOLIC BLOOD PRESSURE - MUSE: NORMAL MMHG
T AXIS - MUSE: 39 DEGREES
VENTRICULAR RATE- MUSE: 56 BPM

## 2023-09-11 NOTE — TELEPHONE ENCOUNTER
[facility] called patient regarding angiogram results. It's recommended that he have a single artery bypass graft during his AVR procedure. This was also discussed as a possibility during clinic visit. Patient verbalized understanding and had no further questions or concerns at this time.

## 2023-09-12 ENCOUNTER — PREP FOR PROCEDURE (OUTPATIENT)
Dept: CARDIOLOGY | Facility: CLINIC | Age: 68
End: 2023-09-12

## 2023-09-12 LAB
ABO/RH(D): NORMAL
ANTIBODY SCREEN: NEGATIVE
SPECIMEN EXPIRATION DATE: NORMAL

## 2023-09-12 RX ORDER — ASPIRIN 81 MG/1
162 TABLET, CHEWABLE ORAL
Status: CANCELLED | OUTPATIENT
Start: 2023-09-12

## 2023-09-12 RX ORDER — ASPIRIN 81 MG/1
81 TABLET, CHEWABLE ORAL
Status: CANCELLED | OUTPATIENT
Start: 2023-09-12

## 2023-09-12 RX ORDER — DEXMEDETOMIDINE HYDROCHLORIDE 4 UG/ML
.1-1.2 INJECTION, SOLUTION INTRAVENOUS CONTINUOUS
Status: CANCELLED | OUTPATIENT
Start: 2023-09-12

## 2023-09-12 RX ORDER — GABAPENTIN 100 MG/1
100 CAPSULE ORAL
Status: CANCELLED | OUTPATIENT
Start: 2023-09-12

## 2023-09-12 RX ORDER — LIDOCAINE 40 MG/G
CREAM TOPICAL
Status: CANCELLED | OUTPATIENT
Start: 2023-09-12

## 2023-09-12 RX ORDER — CEFAZOLIN SODIUM/WATER 2 G/20 ML
2 SYRINGE (ML) INTRAVENOUS SEE ADMIN INSTRUCTIONS
Status: CANCELLED | OUTPATIENT
Start: 2023-09-12

## 2023-09-12 RX ORDER — FAMOTIDINE 20 MG/1
20 TABLET, FILM COATED ORAL
Status: CANCELLED | OUTPATIENT
Start: 2023-09-12

## 2023-09-12 RX ORDER — PHENYLEPHRINE HCL IN 0.9% NACL 50MG/250ML
.1-6 PLASTIC BAG, INJECTION (ML) INTRAVENOUS CONTINUOUS
Status: CANCELLED | OUTPATIENT
Start: 2023-10-09

## 2023-09-12 RX ORDER — CEFAZOLIN SODIUM/WATER 2 G/20 ML
2 SYRINGE (ML) INTRAVENOUS
Status: CANCELLED | OUTPATIENT
Start: 2023-09-12

## 2023-09-12 RX ORDER — ACETAMINOPHEN 325 MG/1
975 TABLET ORAL ONCE
Status: CANCELLED | OUTPATIENT
Start: 2023-09-12 | End: 2023-09-12

## 2023-09-12 RX ORDER — CHLORHEXIDINE GLUCONATE ORAL RINSE 1.2 MG/ML
10 SOLUTION DENTAL ONCE
Status: CANCELLED | OUTPATIENT
Start: 2023-09-12 | End: 2023-09-12

## 2023-09-13 ENCOUNTER — ANCILLARY PROCEDURE (OUTPATIENT)
Dept: ULTRASOUND IMAGING | Facility: CLINIC | Age: 68
End: 2023-09-13
Attending: THORACIC SURGERY (CARDIOTHORACIC VASCULAR SURGERY)
Payer: COMMERCIAL

## 2023-09-13 ENCOUNTER — PRE VISIT (OUTPATIENT)
Dept: SURGERY | Facility: CLINIC | Age: 68
End: 2023-09-13

## 2023-09-13 ENCOUNTER — ANCILLARY PROCEDURE (OUTPATIENT)
Dept: GENERAL RADIOLOGY | Facility: CLINIC | Age: 68
End: 2023-09-13
Attending: THORACIC SURGERY (CARDIOTHORACIC VASCULAR SURGERY)
Payer: COMMERCIAL

## 2023-09-13 ENCOUNTER — LAB (OUTPATIENT)
Dept: LAB | Facility: CLINIC | Age: 68
End: 2023-09-13
Payer: COMMERCIAL

## 2023-09-13 DIAGNOSIS — I35.1 SEVERE AORTIC INSUFFICIENCY: ICD-10-CM

## 2023-09-13 DIAGNOSIS — I10 ESSENTIAL HYPERTENSION: ICD-10-CM

## 2023-09-13 DIAGNOSIS — N32.81 OVERACTIVE BLADDER: ICD-10-CM

## 2023-09-13 DIAGNOSIS — R09.89 OTHER SPECIFIED SYMPTOMS AND SIGNS INVOLVING THE CIRCULATORY AND RESPIRATORY SYSTEMS: ICD-10-CM

## 2023-09-13 LAB
ALBUMIN SERPL BCG-MCNC: 4.8 G/DL (ref 3.5–5.2)
ALBUMIN UR-MCNC: NEGATIVE MG/DL
ALP SERPL-CCNC: 76 U/L (ref 40–129)
ALT SERPL W P-5'-P-CCNC: 21 U/L (ref 0–70)
ANION GAP SERPL CALCULATED.3IONS-SCNC: 9 MMOL/L (ref 7–15)
APPEARANCE UR: CLEAR
APTT PPP: 32 SECONDS (ref 22–38)
AST SERPL W P-5'-P-CCNC: 21 U/L (ref 0–45)
BILIRUB SERPL-MCNC: 0.8 MG/DL
BILIRUB UR QL STRIP: NEGATIVE
BUN SERPL-MCNC: 11.1 MG/DL (ref 8–23)
CALCIUM SERPL-MCNC: 9.8 MG/DL (ref 8.8–10.2)
CHLORIDE SERPL-SCNC: 103 MMOL/L (ref 98–107)
CHOLEST SERPL-MCNC: 157 MG/DL
COLOR UR AUTO: ABNORMAL
CREAT SERPL-MCNC: 1.07 MG/DL (ref 0.67–1.17)
DEPRECATED HCO3 PLAS-SCNC: 28 MMOL/L (ref 22–29)
EGFRCR SERPLBLD CKD-EPI 2021: 77 ML/MIN/1.73M2
ERYTHROCYTE [DISTWIDTH] IN BLOOD BY AUTOMATED COUNT: 14.1 % (ref 10–15)
GLUCOSE SERPL-MCNC: 81 MG/DL (ref 70–99)
GLUCOSE UR STRIP-MCNC: NEGATIVE MG/DL
HBA1C MFR BLD: 6.8 %
HCT VFR BLD AUTO: 42.6 % (ref 40–53)
HDLC SERPL-MCNC: 44 MG/DL
HGB BLD-MCNC: 14.1 G/DL (ref 13.3–17.7)
HGB UR QL STRIP: NEGATIVE
INR PPP: 1.08 (ref 0.85–1.15)
KETONES UR STRIP-MCNC: NEGATIVE MG/DL
LDLC SERPL CALC-MCNC: 92 MG/DL
LEUKOCYTE ESTERASE UR QL STRIP: NEGATIVE
MAGNESIUM SERPL-MCNC: 2.1 MG/DL (ref 1.7–2.3)
MCH RBC QN AUTO: 29.7 PG (ref 26.5–33)
MCHC RBC AUTO-ENTMCNC: 33.1 G/DL (ref 31.5–36.5)
MCV RBC AUTO: 90 FL (ref 78–100)
MUCOUS THREADS #/AREA URNS LPF: PRESENT /LPF
NITRATE UR QL: NEGATIVE
NONHDLC SERPL-MCNC: 113 MG/DL
PH UR STRIP: 7 [PH] (ref 5–7)
PLATELET # BLD AUTO: 183 10E3/UL (ref 150–450)
POTASSIUM SERPL-SCNC: 4.2 MMOL/L (ref 3.4–5.3)
PROT SERPL-MCNC: 8.1 G/DL (ref 6.4–8.3)
RBC # BLD AUTO: 4.74 10E6/UL (ref 4.4–5.9)
RBC URINE: 1 /HPF
SODIUM SERPL-SCNC: 140 MMOL/L (ref 136–145)
SP GR UR STRIP: 1.01 (ref 1–1.03)
SQUAMOUS EPITHELIAL: 1 /HPF
TRIGL SERPL-MCNC: 103 MG/DL
TSH SERPL DL<=0.005 MIU/L-ACNC: 1.34 UIU/ML (ref 0.3–4.2)
UROBILINOGEN UR STRIP-MCNC: NORMAL MG/DL
WBC # BLD AUTO: 3.3 10E3/UL (ref 4–11)
WBC URINE: <1 /HPF

## 2023-09-13 PROCEDURE — 86850 RBC ANTIBODY SCREEN: CPT | Performed by: NURSE PRACTITIONER

## 2023-09-13 PROCEDURE — 81001 URINALYSIS AUTO W/SCOPE: CPT | Performed by: PATHOLOGY

## 2023-09-13 PROCEDURE — 83036 HEMOGLOBIN GLYCOSYLATED A1C: CPT | Performed by: THORACIC SURGERY (CARDIOTHORACIC VASCULAR SURGERY)

## 2023-09-13 PROCEDURE — 83735 ASSAY OF MAGNESIUM: CPT | Performed by: PATHOLOGY

## 2023-09-13 PROCEDURE — 85610 PROTHROMBIN TIME: CPT | Performed by: PATHOLOGY

## 2023-09-13 PROCEDURE — 93880 EXTRACRANIAL BILAT STUDY: CPT | Mod: GC | Performed by: STUDENT IN AN ORGANIZED HEALTH CARE EDUCATION/TRAINING PROGRAM

## 2023-09-13 PROCEDURE — 71046 X-RAY EXAM CHEST 2 VIEWS: CPT | Mod: GC | Performed by: RADIOLOGY

## 2023-09-13 PROCEDURE — 36415 COLL VENOUS BLD VENIPUNCTURE: CPT | Performed by: PATHOLOGY

## 2023-09-13 PROCEDURE — 99000 SPECIMEN HANDLING OFFICE-LAB: CPT | Performed by: PATHOLOGY

## 2023-09-13 PROCEDURE — 82088 ASSAY OF ALDOSTERONE: CPT | Performed by: INTERNAL MEDICINE

## 2023-09-13 PROCEDURE — 84134 ASSAY OF PREALBUMIN: CPT | Performed by: THORACIC SURGERY (CARDIOTHORACIC VASCULAR SURGERY)

## 2023-09-13 PROCEDURE — 84443 ASSAY THYROID STIM HORMONE: CPT | Performed by: PATHOLOGY

## 2023-09-13 PROCEDURE — 80053 COMPREHEN METABOLIC PANEL: CPT | Performed by: PATHOLOGY

## 2023-09-13 PROCEDURE — 84244 ASSAY OF RENIN: CPT | Mod: 90 | Performed by: PATHOLOGY

## 2023-09-13 PROCEDURE — 85730 THROMBOPLASTIN TIME PARTIAL: CPT | Performed by: PATHOLOGY

## 2023-09-13 PROCEDURE — 86901 BLOOD TYPING SEROLOGIC RH(D): CPT | Performed by: NURSE PRACTITIONER

## 2023-09-13 PROCEDURE — 85027 COMPLETE CBC AUTOMATED: CPT | Performed by: PATHOLOGY

## 2023-09-13 PROCEDURE — 80061 LIPID PANEL: CPT | Performed by: PATHOLOGY

## 2023-09-14 ENCOUNTER — ANCILLARY PROCEDURE (OUTPATIENT)
Dept: ULTRASOUND IMAGING | Facility: CLINIC | Age: 68
End: 2023-09-14
Attending: THORACIC SURGERY (CARDIOTHORACIC VASCULAR SURGERY)
Payer: COMMERCIAL

## 2023-09-14 DIAGNOSIS — Z01.810 ENCOUNTER FOR PREPROCEDURAL CARDIOVASCULAR EXAMINATION: ICD-10-CM

## 2023-09-14 DIAGNOSIS — I25.10 CORONARY ARTERY DISEASE INVOLVING NATIVE CORONARY ARTERY OF NATIVE HEART WITHOUT ANGINA PECTORIS: ICD-10-CM

## 2023-09-14 PROBLEM — E11.9 DIABETES MELLITUS, TYPE 2 (H): Status: ACTIVE | Noted: 2023-09-14

## 2023-09-14 LAB
PREALB SERPL IA-MCNC: 24 MG/DL (ref 15–45)
RADIOLOGIST FLAGS: ABNORMAL

## 2023-09-14 PROCEDURE — 93970 EXTREMITY STUDY: CPT | Performed by: RADIOLOGY

## 2023-09-14 NOTE — TELEPHONE ENCOUNTER
FUTURE VISIT INFORMATION        SURGERY INFORMATION:  Date: 23  Location: [facility] or  Surgeon:  Dickson Watson MD   Anesthesia Type:  Moderate Sedation   Procedure: aortic root replacement with possible valve sparing root, possible mechanical valve replacement and indicated procedures Bypass graft artery coronary      RECORDS REQUESTED FROM:         Primary Care Provider: [facility]     Pertinent Medical History: hypertension. Moderate aortic insufficiency, CAD     Most recent EKG+ Tracin23     Most recent ECHO: 23     Most recent Coronary Angiogram: 23

## 2023-09-15 ENCOUNTER — TELEPHONE (OUTPATIENT)
Dept: CARDIOLOGY | Facility: CLINIC | Age: 68
End: 2023-09-15
Payer: COMMERCIAL

## 2023-09-15 LAB — ALDOST SERPL-MCNC: 10.8 NG/DL (ref 0–31)

## 2023-09-15 NOTE — TELEPHONE ENCOUNTER
Health Call Center    Phone Message    May a detailed message be left on voicemail: no     Reason for Call: Other: Akbar from [facility] called to pass on urgent US findings for Sukh. Attempted to reach care team but was unable. Akbar wanted to pass along an urgent finding of popliteal artery aneurysm. No further action is necessary or reach out to Sukh to discuss care based on findings. Thank you!     Action Taken: Other: Cardiology    Travel Screening: Not Applicable    Thank you!  [facility]

## 2023-09-15 NOTE — TELEPHONE ENCOUNTER
Results sent to vascular surgery for review. Will request guidance from them on management/if there is a need to delay cardiac surgery at this point. They will work on getting patient scheduled for an appointment with their team.

## 2023-09-16 LAB — RENIN PLAS-CCNC: 0.2 NG/ML/HR

## 2023-09-18 ENCOUNTER — TELEPHONE (OUTPATIENT)
Dept: VASCULAR SURGERY | Facility: CLINIC | Age: 68
End: 2023-09-18

## 2023-09-18 DIAGNOSIS — I72.4 POPLITEAL ARTERY ANEURYSM (H): ICD-10-CM

## 2023-09-18 DIAGNOSIS — I73.9 PAD (PERIPHERAL ARTERY DISEASE) (H): Primary | ICD-10-CM

## 2023-09-18 LAB — ALDOST/RENIN PLAS-RTO: 54 {RATIO} (ref 0–25)

## 2023-09-18 NOTE — TELEPHONE ENCOUNTER
----- Message from Montse Gunderson RN sent at 9/18/2023  9:13 AM CDT -----  Regarding: FW: Pop aneurysm  Hi Tigre,    Can you please schedule this pt with Dr BRICE next Monday w/ CTA w/runoff prior? I placed the order.    Thank you!    Montse  ----- Message -----  From: Yonatan Franco  Sent: 9/15/2023   4:35 PM CDT  To: Mayela Velasquez RN; Montse Gunderson RN  Subject: Pop aneurysm                                     Spoke briefly to Dr. Lockett to let him know I'll take over the pop aneurysms.     We should schedule him to see me with a CTA abdomen/pelvis with lower extremity runoff bilaterally.  Ideally I can see him before Dr. Lockett gets back. Montse not sure if I have time but you're welcome to add on to any free spot I have to make it happen.    Only thing that is not on my calendar yet that's pending is a pending combined case with neurosurgery on this coming Tuesday

## 2023-09-19 LAB
ABO/RH(D): NORMAL
ANTIBODY SCREEN: NEGATIVE
SPECIMEN EXPIRATION DATE: NORMAL

## 2023-09-20 ENCOUNTER — OFFICE VISIT (OUTPATIENT)
Dept: SURGERY | Facility: CLINIC | Age: 68
End: 2023-09-20
Payer: COMMERCIAL

## 2023-09-20 ENCOUNTER — DOCUMENTATION ONLY (OUTPATIENT)
Dept: RESEARCH | Facility: CLINIC | Age: 68
End: 2023-09-20

## 2023-09-20 ENCOUNTER — LAB (OUTPATIENT)
Dept: LAB | Facility: CLINIC | Age: 68
End: 2023-09-20
Payer: COMMERCIAL

## 2023-09-20 ENCOUNTER — PRE VISIT (OUTPATIENT)
Dept: SURGERY | Facility: CLINIC | Age: 68
End: 2023-09-20

## 2023-09-20 ENCOUNTER — ANCILLARY PROCEDURE (OUTPATIENT)
Dept: CT IMAGING | Facility: CLINIC | Age: 68
End: 2023-09-20
Attending: SURGERY
Payer: COMMERCIAL

## 2023-09-20 VITALS
BODY MASS INDEX: 25.42 KG/M2 | HEART RATE: 63 BPM | OXYGEN SATURATION: 99 % | TEMPERATURE: 97.8 F | HEIGHT: 77 IN | RESPIRATION RATE: 16 BRPM | WEIGHT: 215.3 LBS | SYSTOLIC BLOOD PRESSURE: 168 MMHG | DIASTOLIC BLOOD PRESSURE: 69 MMHG

## 2023-09-20 DIAGNOSIS — I35.1 SEVERE AORTIC INSUFFICIENCY: ICD-10-CM

## 2023-09-20 DIAGNOSIS — Z01.818 PREOP EXAMINATION: Primary | ICD-10-CM

## 2023-09-20 DIAGNOSIS — I72.4 POPLITEAL ARTERY ANEURYSM (H): ICD-10-CM

## 2023-09-20 LAB
ANION GAP SERPL CALCULATED.3IONS-SCNC: 12 MMOL/L (ref 7–15)
BUN SERPL-MCNC: 13.6 MG/DL (ref 8–23)
CALCIUM SERPL-MCNC: 9.6 MG/DL (ref 8.8–10.2)
CHLORIDE SERPL-SCNC: 103 MMOL/L (ref 98–107)
CREAT SERPL-MCNC: 1.01 MG/DL (ref 0.67–1.17)
DEPRECATED HCO3 PLAS-SCNC: 25 MMOL/L (ref 22–29)
EGFRCR SERPLBLD CKD-EPI 2021: 82 ML/MIN/1.73M2
ERYTHROCYTE [DISTWIDTH] IN BLOOD BY AUTOMATED COUNT: 14.3 % (ref 10–15)
GLUCOSE SERPL-MCNC: 101 MG/DL (ref 70–99)
HCT VFR BLD AUTO: 41.9 % (ref 40–53)
HGB BLD-MCNC: 13.9 G/DL (ref 13.3–17.7)
MCH RBC QN AUTO: 30 PG (ref 26.5–33)
MCHC RBC AUTO-ENTMCNC: 33.2 G/DL (ref 31.5–36.5)
MCV RBC AUTO: 91 FL (ref 78–100)
PLATELET # BLD AUTO: 179 10E3/UL (ref 150–450)
POTASSIUM SERPL-SCNC: 4 MMOL/L (ref 3.4–5.3)
RBC # BLD AUTO: 4.63 10E6/UL (ref 4.4–5.9)
SODIUM SERPL-SCNC: 140 MMOL/L (ref 136–145)
WBC # BLD AUTO: 4.6 10E3/UL (ref 4–11)

## 2023-09-20 PROCEDURE — 36415 COLL VENOUS BLD VENIPUNCTURE: CPT | Performed by: PATHOLOGY

## 2023-09-20 PROCEDURE — 85027 COMPLETE CBC AUTOMATED: CPT | Performed by: PATHOLOGY

## 2023-09-20 PROCEDURE — 75635 CT ANGIO ABDOMINAL ARTERIES: CPT | Mod: GC | Performed by: RADIOLOGY

## 2023-09-20 PROCEDURE — 80048 BASIC METABOLIC PNL TOTAL CA: CPT | Performed by: PATHOLOGY

## 2023-09-20 PROCEDURE — 99204 OFFICE O/P NEW MOD 45 MIN: CPT | Performed by: NURSE PRACTITIONER

## 2023-09-20 PROCEDURE — 86901 BLOOD TYPING SEROLOGIC RH(D): CPT | Performed by: NURSE PRACTITIONER

## 2023-09-20 PROCEDURE — 86850 RBC ANTIBODY SCREEN: CPT | Performed by: NURSE PRACTITIONER

## 2023-09-20 RX ORDER — IOPAMIDOL 755 MG/ML
135 INJECTION, SOLUTION INTRAVASCULAR ONCE
Status: COMPLETED | OUTPATIENT
Start: 2023-09-20 | End: 2023-09-20

## 2023-09-20 RX ADMIN — IOPAMIDOL 135 ML: 755 INJECTION, SOLUTION INTRAVASCULAR at 07:41

## 2023-09-20 ASSESSMENT — PAIN SCALES - GENERAL: PAINLEVEL: NO PAIN (0)

## 2023-09-20 ASSESSMENT — LIFESTYLE VARIABLES: TOBACCO_USE: 1

## 2023-09-20 NOTE — H&P
Pre-Operative H & P     CC:  Preoperative exam to assess for increased cardiopulmonary risk while undergoing surgery and anesthesia.    Date of Encounter: 9/20/2023  Primary Care Physician:  [facility]     Reason for visit:   Encounter Diagnoses   Name Primary?    Preop examination Yes    Severe aortic insufficiency        HPI  Sukh Craven is a 68 year old male who presents for pre-operative H & P in preparation for  Procedure Information       Case: [id number] Date/Time: 09/29/23 0730    Procedures:       aortic root replacement with possible valve sparing root, possible mechanical valve replacement and indicated procedures (Chest)      Bypass graft artery coronary (Chest)    Anesthesia type: General    Diagnosis: Severe aortic insufficiency [I35.1]    Pre-op diagnosis: Severe aortic insufficiency [I35.1]    Location:  OR [address] [place] [address] OR    Providers: Alexis Lockett MD            Mr. Craven was seen by Dr. Lockett in [facility] for evaluation and treatment of symptomatic aortic root aneurysm without rupture or dissection and aortic insufficiency. The patient was counseled by the surgical team of the findings on the work up and treatment options. The patient has now been scheduled for the procedure as listed above.       The patient presents to the PAC in-person today in preparation for the above scheduled procedure with comorbid conditions including HTN, HLD and PAD.         History is obtained from the patient and chart review    Hx of abnormal bleeding or anti-platelet use: ASA      Past Medical History  Past Medical History:   Diagnosis Date    BPH (benign prostatic hyperplasia)     Dyslipidemia     HTN (hypertension)     PAD (peripheral artery disease) (H)     Severe aortic insufficiency        Past Surgical History  Past Surgical History:   Procedure Laterality Date    COLONOSCOPY N/A 9/26/2022    Procedure: COLONOSCOPY, WITH  POLYPECTOMY;  Surgeon: Long Silver MD;  Location: [facility] OR    CV CORONARY ANGIOGRAM N/A 9/8/2023    Procedure: Coronary Angiogram;  Surgeon: Dickson Watson MD;  Location:  [facility]    CYSTOSCOPY, BLADDER NECK CUTS, COMBINED N/A 2/16/2023    Procedure: CYSTOSCOPY, WITH MULTIPLE INCISIONS OF BLADDER NECK WITH HOLMIUM LASER;  Surgeon: Cresencio Foote MD;  Location: [facility] OR    ESOPHAGOSCOPY, GASTROSCOPY, DUODENOSCOPY (EGD), COMBINED N/A 9/26/2022    Procedure: ESOPHAGOGASTRODUODENOSCOPY, WITH BIOPSY;  Surgeon: Long Silver MD;  Location: [facility] OR    LASER HOLMIUM ENUCLEATION PROSTATE N/A 4/27/2017    Procedure: LASER HOLMIUM ENUCLEATION PROSTATE;  Holmium Laser Enucleation Of The Prostate ;  Surgeon: Cresencio Foote MD;  Location: [facility] OR    REMOVAL OF SPERM DUCT(S)         Prior to Admission Medications  Current Outpatient Medications   Medication Sig Dispense Refill    amLODIPine (NORVASC) 10 MG tablet Take 1 tablet (10 mg) by mouth daily (Patient taking differently: Take 10 mg by mouth every morning) 90 tablet 1    aspirin 81 MG EC tablet Take 1 tablet (81 mg) by mouth daily Start tomorrow morning. (Patient taking differently: Take 81 mg by mouth every morning Start tomorrow morning.) 90 tablet 3    atorvastatin (LIPITOR) 40 MG tablet Take 1 tablet (40 mg) by mouth daily (Patient taking differently: Take 40 mg by mouth every morning) 90 tablet 3    carvedilol (COREG) 12.5 MG tablet Take 1 tablet (12.5 mg) by mouth 2 times daily (with meals) for 180 days 180 tablet 1    hydrALAZINE (APRESOLINE) 25 MG tablet Take 1 tablet (25 mg) by mouth 3 times daily 245 tablet 3       Allergies  Allergies   Allergen Reactions    No Known Drug Allergy        Social History  Social History     Socioeconomic History    Marital status: Single     Spouse name: Ricardo    Number of children: 5    Years of education: 14    Highest education level: Not on file   Occupational History     Occupation:      Employer: [employer]   Tobacco Use    Smoking status: Former     Packs/day: 0.50     Years: 25.00     Pack years: 12.50     Types: Cigarettes     Quit date: 3/23/2007     Years since quittin.5    Smokeless tobacco: Never   Vaping Use    Vaping Use: Never used   Substance and Sexual Activity    Alcohol use: Yes     Comment: rare- social drinker    Drug use: No    Sexual activity: Yes     Partners: Female   Other Topics Concern     Service Yes     Comment: [employer]- 6 years    Blood Transfusions No    Caffeine Concern No    Occupational Exposure No    Hobby Hazards No    Sleep Concern No    Stress Concern No    Weight Concern No    Special Diet No    Back Care No    Exercise Yes     Comment: 4 times a week    Bike Helmet Yes    Seat Belt Yes    Self-Exams Yes    Parent/sibling w/ CABG, MI or angioplasty before 65F 55M? No   Social History Narrative    Not on file     Social Determinants of Health     Financial Resource Strain: Not on file   Food Insecurity: Not on file   Transportation Needs: Not on file   Physical Activity: Not on file   Stress: Not on file   Social Connections: Not on file   Interpersonal Safety: Not on file   Housing Stability: Not on file       Family History  Family History   Problem Relation Age of Onset    Pulmonary Embolism Mother     Diabetes Sister     Peripheral Vascular Disease Sister     Diabetes Sister     Diabetes Brother     C.A.D. No family hx of     Hypertension No family hx of     Cerebrovascular Disease No family hx of     Breast Cancer No family hx of     Cancer - colorectal No family hx of     Prostate Cancer No family hx of        Review of Systems  The complete review of systems is negative other than noted in the HPI or here.   Anesthesia Evaluation   Pt has had prior anesthetic. Type: General and MAC.    No history of anesthetic complications       ROS/MED HX  ENT/Pulmonary:     (+)     DALLIN risk factors,  hypertension,          "tobacco use (Quit many years ago.), Past use,                      Neurologic:  - neg neurologic ROS     Cardiovascular:     (+) Dyslipidemia hypertension- Peripheral Vascular Disease-  CAD -  - -   Taking blood thinners Pt has received instructions:                       valvular problems/murmurs type: AI     Previous cardiac testing     METS/Exercise Tolerance: >4 METS Comment: Easily can walk 2 blocks or mow his yard without symptoms.   Hematologic:  - neg hematologic  ROS     Musculoskeletal:  - neg musculoskeletal ROS     GI/Hepatic:  - neg GI/hepatic ROS     Renal/Genitourinary:     (+) renal disease, type: CRI,         (-) nephrolithiasis   Endo: Comment: Elevated A1c of 6.1 June 2023.   (-) Type II DM   Psychiatric/Substance Use:     (+)    H/O chronic opiod use  (Smokes marijuana most days of the week.). Recreational drug usage: Cannabis.    Infectious Disease:  - neg infectious disease ROS     Malignancy:  - neg malignancy ROS     Other:  - neg other ROS          BP (!) 177/72 (BP Location: Right arm, Patient Position: Sitting, Cuff Size: Adult Regular)   Pulse 63   Temp 97.8  F (36.6  C) (Oral)   Resp 16   Ht 1.956 m (6' 5\")   Wt 97.7 kg (215 lb 4.8 oz)   SpO2 99%   BMI 25.53 kg/m      Physical Exam   Constitutional: Awake, alert, cooperative, no apparent distress, and appears stated age.  Eyes: Pupils equal, round and reactive to light, extra ocular muscles intact, sclera clear, conjunctiva normal.  HENT: Normocephalic, oral pharynx with moist mucus membranes, upper edentulous, bottom partial plate.  No goiter appreciated.   Respiratory: Clear to auscultation bilaterally, no crackles or wheezing.  Cardiovascular: Regular rate and rhythm, normal S1 and S2, and III/VI murmur heard across the precordium.  Carotids +2, no bruits. No edema. Palpable pulses to radial  DP and PT arteries.   GI: Normal bowel sounds, soft, non-distended, non-tender, no masses palpated, no hepatosplenomegaly.  Surgical " scars: well healed  Lymph/Hematologic: No cervical lymphadenopathy and no supraclavicular lymphadenopathy.  Skin: Warm and dry.  No rashes at anticipated surgical site.   Musculoskeletal: Full ROM of neck. There is no redness, warmth, or swelling of the joints. Gross motor strength is normal.    Neurologic: Awake, alert, oriented to name, place and time. Cranial nerves II-XII are grossly intact. Gait is normal.   Neuropsychiatric: Calm, cooperative. Normal affect.     Prior Labs/Diagnostic Studies   All labs and imaging personally reviewed    Latest Reference Range & Units 09/13/23 14:53   Sodium 136 - 145 mmol/L 140   Potassium 3.4 - 5.3 mmol/L 4.2   Chloride 98 - 107 mmol/L 103   Carbon Dioxide (CO2) 22 - 29 mmol/L 28   Urea Nitrogen 8.0 - 23.0 mg/dL 11.1   Creatinine 0.67 - 1.17 mg/dL 1.07   GFR Estimate >60 mL/min/1.73m2 77   Calcium 8.8 - 10.2 mg/dL 9.8   Anion Gap 7 - 15 mmol/L 9   Magnesium 1.7 - 2.3 mg/dL 2.1   Albumin 3.5 - 5.2 g/dL 4.8   Prealbumin 15 - 45 mg/dL 24   Protein Total 6.4 - 8.3 g/dL 8.1   Alkaline Phosphatase 40 - 129 U/L 76   ALT 0 - 70 U/L 21   AST 0 - 45 U/L 21   Aldosterone 0.0 - 31.0 ng/dL 10.8   Bilirubin Total <=1.2 mg/dL 0.8   Cholesterol <200 mg/dL 157   Glucose 70 - 99 mg/dL 81   HDL Cholesterol >=40 mg/dL 44   Hemoglobin A1C <5.7 % 6.8 (H)   LDL Cholesterol Calculated <=100 mg/dL 92   Non HDL Cholesterol <130 mg/dL 113   Renin Activity ng/mL/hr 0.2   Triglycerides <150 mg/dL 103   TSH 0.30 - 4.20 uIU/mL 1.34   WBC 4.0 - 11.0 10e3/uL 3.3 (L)   Hemoglobin 13.3 - 17.7 g/dL 14.1   Hematocrit 40.0 - 53.0 % 42.6   Platelet Count 150 - 450 10e3/uL 183   RBC Count 4.40 - 5.90 10e6/uL 4.74   MCV 78 - 100 fL 90   MCH 26.5 - 33.0 pg 29.7   MCHC 31.5 - 36.5 g/dL 33.1   RDW 10.0 - 15.0 % 14.1   INR 0.85 - 1.15  1.08   PTT 22 - 38 Seconds 32   ABO/Rh(D)  AB POS   Antibody Screen Negative  Negative   SPECIMEN EXPIRATION DATE  [id number]   Aldosterone Renin Ratio 0.0 - 25.0  54.0 (H)   (H):  Data is abnormally high  (L): Data is abnormally low      PROCEDURES   Coronary angiogram 9/8/2023   Conclusion           Mid RCA lesion is 90% stenosed.      Dist Cx lesion is 35% stenosed.      Mid LAD lesion is 70% stenosed.      1. Normal coronary anatomy in the left dominant system.   2. Significant obstructive coronary artery disease as detailed above.   3. Severe diffuse disease involving right coronary artery.   4. Obstructive coronary artery disease involving mid LAD, which is hemodynamically significant with IFR of 0.85.          Plan        Follow bedrest per protocol     Continued medical management and lifestyle modifications for cardiovascular risk factor optimizations.     Discharge today per protocol         EKG 9/8/2023   Sinus bradycardia with 1st degree A-V block   Otherwise normal ECG   When compared with ECG of 14-AUG-2023 11:40,   Premature atrial complexes are no longer Present   WA interval has increased   QRS axis Shifted left   Confirmed by MD CHAKA, PATRICIA ([id number]) on 9/11/2023      Echo 8/14/23   Complete Portable Echo Adult. Cards 1 team updated with abnormal results.   _________________   Interpretation Summary   Global and regional left ventricular function is normal with an EF of 60-65%.   Global right ventricular function is normal.   Severe aortic insufficiency. EROA 0.66 cm2. Regurgitant volume 107 mL. LVESD   3.5 cm. The etiology of the AI is sinotubular aortic root dilatation.   Severe dilatation of the aorta, Sinuses of Valsalva 5.3 cm.      This study was compared with the study from 4/23/2019. The aortic   insufficiency has progressed and the aortic root has further dilated.   ______________   Left Ventricle   Left ventricular size is normal. LVESD 3.51 cm. Left ventricular wall   thickness is normal. Global and regional left ventricular function is normal   with an EF of 60-65%. Left ventricular diastolic function is normal. No   regional wall motion abnormalities are seen.       Right Ventricle   The right ventricle is normal size. Global right ventricular function is   normal.      Atria   Both atria appear normal.      Mitral Valve   The mitral valve is normal.      Aortic Valve   The aortic valve is tricuspid. Severe aortic insufficiency is present. EROA   0.66 cm2. Regurgitant volume 107 mL. The etiology of the AI is sinotubular   aortic root dilatation.      Tricuspid Valve   Mild tricuspid insufficiency is present. Right ventricular systolic pressure   is 17mmHg above the right atrial pressure. Pulmonary artery systolic pressure   is normal.      Pulmonic Valve   The pulmonic valve is normal.      Vessels   The inferior vena cava is normal. Severe dilatation of the aorta is present.   Sinuses of Valsalva 5.3 cm. Ascending aorta 4.8 cm.      Pericardium   No pericardial effusion is present.         CTA Chest 2023                                                                  IMPRESSION:   1. No pulmonary embolus. No aortic dissection.      2. Enlarged aortic root. Aorta measures 50 mm in diameter at the   sinuses of Valsalva and 54 mm at the sinotubular junction. Ascending   aorta measures 48 mm in diameter.      3. Sub 6 mm pulmonary nodules. Per Fleischner Society recommendations,   if the patient is at low risk for lung cancer, no further follow up is   needed. If the patient is at high risk for lung cancer, an optional CT   at 12 months could be performed to reevaluate.      I have personally reviewed the examination and initial interpretation   and I agree with the findings.      ANTWAN PATHAK MD      B/l LE ABIs 2023                                                                    Impression:      Right le. Resting ALFONSO is 1.13. Normal. Normal PVR waveforms.   2. Exercise study: Negative      Left le. Resting ALFONSO is 0.83, mild to moderate peripheral arterial disease.   PVR waveforms suggest disease between low thigh and calf as well as   between  "calf and ankle.   2. Exercise study: Negative      Exercise discontinue due to fatigue and bilateral calf pain.        The patient's records and results personally reviewed by this provider.     Outside records reviewed from: Care Everywhere    LAB/DIAGNOSTIC STUDIES TODAY:     Latest Reference Range & Units 09/20/23 10:52   Sodium 136 - 145 mmol/L 140   Potassium 3.4 - 5.3 mmol/L 4.0   Chloride 98 - 107 mmol/L 103   Carbon Dioxide (CO2) 22 - 29 mmol/L 25   Urea Nitrogen 8.0 - 23.0 mg/dL 13.6   Creatinine 0.67 - 1.17 mg/dL 1.01   GFR Estimate >60 mL/min/1.73m2 82   Calcium 8.8 - 10.2 mg/dL 9.6   Anion Gap 7 - 15 mmol/L 12   Glucose 70 - 99 mg/dL 101 (H)   WBC 4.0 - 11.0 10e3/uL 4.6   Hemoglobin 13.3 - 17.7 g/dL 13.9   Hematocrit 40.0 - 53.0 % 41.9   Platelet Count 150 - 450 10e3/uL 179   RBC Count 4.40 - 5.90 10e6/uL 4.63   MCV 78 - 100 fL 91   MCH 26.5 - 33.0 pg 30.0   MCHC 31.5 - 36.5 g/dL 33.2   RDW 10.0 - 15.0 % 14.3   (H): Data is abnormally high    Assessment    Sukh Craven is a 68 year old male seen as a PAC referral for risk assessment and optimization for anesthesia.    Plan/Recommendations  Pt will be optimized for the proposed procedure.  See below for details on the assessment, risk, and preoperative recommendations    NEUROLOGY  - No history of TIA, CVA or seizure    -Post Op delirium risk factors:  No risk identified    ENT  - No current airway concerns.  Will need to be reassessed day of surgery.  Mallampati: III  TM: > 3    CARDIAC  - Symptomatic aortic aneurysm with aortic insufficiency               ~ above procedure scheduled      - Coronary artery disease               ~ see coronary angiogram results above               ~ ASA  and statin   ~ above procedure scheduled.     - HTN              ~ amlodipine, Coreg and Hydralazine   ~ Blood pressure elevated at today's exam. Reports that he took his antihypertensive medications this morning.  Reports home BP typically <140/90 (\"127/65\").  " "Recommended checking BP in days leading up to seeing PCP on 9/27/2023 for further evaluation.        - PAD of left lower extremity               ~ ALFONSO results above       - METS (Metabolic Equivalents)  Patient performs 4 or more METS exercise without symptoms            Total Score: 0      PULMONARY  - DALLIN Medium Risk            Total Score: 3    DALLIN: Hypertension    DALLIN: Over 50 ys old    DALLIN: Male      - Denies asthma or inhaler use    - Tobacco History    History   Smoking Status    Former    Packs/day: 0.50    Years: 25.00    Types: Cigarettes    Quit date: 3/23/2007   Smokeless Tobacco    Never       GI  - PONV Medium Risk  Total Score: 2           1 AN PONV: Patient is not a current smoker    1 AN PONV: Intended Post Op Opioids        /RENAL  - Baseline Creatinine  see above     ENDOCRINE    - BMI: Estimated body mass index is 25.53 kg/m  as calculated from the following:    Height as of this encounter: 1.956 m (6' 5\").    Weight as of this encounter: 97.7 kg (215 lb 4.8 oz).  Healthy Weight (BMI 18.5-24.9)    Diabetes Mellitus, non-insulin dependent with last A1C 6.8 (previously 6.1).  ~ patient was not aware of elevated A1C  ~ Instructed to discuss with PCP at his previously scheduled appointment on 9/27/2023  ~ Do not delay above surgery   ~ Recommend close monitoring of the patient's blood glucose levels throughout the perioperative period and treat per Mason guidelines.     HEME  - VTE Low Risk 0.5%            Total Score: 3    VTE: Greater than 59 yrs old    VTE: Male      - Platelet dysfunction second to Aspirin (Nanci, many others)   ~ ASA hold per cardiology    - Denies h/o anemia or previous blood transfusion    MSKL    Patient is NOT Frail            Total Score:  2       Criteria with incomplete data:    Frailty: Weight loss 10 lbs or greater yes   Frailty: Slower walking speed No   Frailty: Decrease in strength yes   Frailty: Increased exhaustion No   Frailty: Overall lack of energy No      "   Different anesthesia methods/types have been discussed with the patient, but they are aware that the final plan will be decided by the assigned anesthesia provider on the date of service.    Discussed above with Dr. Shah.    The patient is optimized for their procedure. AVS with information on surgery time/arrival time, meds and NPO status given by nursing staff. No further diagnostic testing indicated.      On the day of service:     Prep time: 8 minutes  Visit time: 17 minutes  Documentation time: 14 minutes  ------------------------------------------  Total time: 39 minutes      HEMAL Hathaway CNP  Preoperative Assessment Center  [facility]  Clinic and Surgery Center  Phone: [phone]  Fax: [phone]

## 2023-09-20 NOTE — PATIENT INSTRUCTIONS
Preparing for Your Surgery      Name:  Sukh Craven   MRN:  [id number]   :  1955   Today's Date:  2023       Arriving for surgery:  Surgery date:  23  Arrival time:  5.00AM    Please come to:     Please come to:      [facility] [address]  [address] [place]  [address]      The [facility] Patient /Visitor Ramp is located at [address]. Patients and visitors who self-park will receive the reduced hospital parking rate. If the Patient /Visitor Ramp is full, please follow the signs to the  parking located at the main hospital entrance.     parking is available ( 24 hours/ 7 days a week)    Discounted parking pass options are available for patients and visitors. They can be purchased at the [employer] desk at the main hospital entrance.    -    Stop at the security desk and they will direct surgery patients to the 3rd floor Surgery Waiting Room. [phone]     -  If you are in need of directions, wheelchair or escort please stop at the Information/security desk in the lobby.       What can I eat or drink?  -  You may eat and drink normally up to 8 hours prior to arrival time. (Until 9.00PM)  -  You may have clear liquids until 2 hours prior to arrival time. (Until 3.00AM)    Examples of clear liquids:  Water  Clear broth  Juices (apple, white grape, white cranberry  and cider) without pulp  Noncarbonated, powder based beverages  (lemonade and Chava-Aid)  Sodas (Sprite, 7-Up, ginger ale and seltzer)  Coffee or tea (without milk or cream)  Gatorade    -  No Alcohol or cannabis products for at least 24 hours before surgery.     Which medicines can I take?    Hold Multivitamins for 10 days before surgery.  Hold Supplements for 10 days before surgery.  Hold Ibuprofen (Advil, Motrin) for 10 day(s) before surgery--unless otherwise directed by surgeon.  Hold Naproxen (Aleve) for 10 days before surgery.    -   DO NOT take these medications the day of surgery:  Aspirin.    -  PLEASE TAKE these medications the day of surgery:  Amlodipine (Norvasc), Atorvastatin (Lipitor), Carvedilol (Coreg), Hydralazine (Apresoline).      How do I prepare myself?  - Please take 2 showers (one the night prior to surgery and one the morning of surgery) using Scrubcare or Hibiclens soap.    Use this soap only from the neck to your toes.     Leave the soap on your skin for one minute--then rinse thoroughly.      You may use your own shampoo and conditioner. No other hair products.   - Please remove all jewelry and body piercings.  - No lotions, deodorants or fragrance.  - No makeup or fingernail polish.   - Bring your ID and insurance card.    -If you have a Deep Brain Stimulator, Spinal Cord Stimulator, or any Neuro Stimulator device---you must bring the remote control to the hospital.      ALL PATIENTS GOING HOME THE SAME DAY OF SURGERY ARE REQUIRED TO HAVE A RESPONSIBLE ADULT TO DRIVE AND BE IN ATTENDANCE WITH THEM FOR 24 HOURS FOLLOWING SURGERY.    Covid testing policy as of 12/06/2022  Your surgeon will notify and schedule you for a COVID test if one is needed before surgery--please direct any questions or COVID symptoms to your surgeon      Questions or Concerns:    - For any questions regarding the day of surgery or your hospital stay, please contact the Pre Admission Nursing Office at [phone].       - If you have health changes between today and your surgery, please call your surgeon.       - For questions after surgery, please call your surgeons office.           Current Visitor Guidelines    You may have 2 visitors in the pre op area.    Visiting hours: 8 a.m. to 8:30 p.m.    You may have four visitors during your inpatient hospital stay.    Patients confirmed or suspected to have symptoms of COVID 19 or flu:     No visitors allowed for adult patients.   Children (under age 18) can have 1 named visitor.     People who are sick  or showing symptoms of COVID 19 or flu:    Are not allowed to visit patients--we can only make exceptions in special situations.       Please follow these guidelines for your visit:          Please maintain social distance          Masking is optional--however at times you may be asked to wear a mask for the safety of yourself and others     Clean your hands with alcohol hand . Do this when you arrive at and leave the building and patient room,    And again after you touch your mask or anything in the room.     Go directly to and from the room you are visiting.     Stay in the patient s room during your visit. Limit going to other places in the hospital as much as possible     Leave bags and jackets at home or in the car.     For everyone s health, please don t come and go during your visit. That includes for smoking   during your visit.

## 2023-09-20 NOTE — DISCHARGE INSTRUCTIONS

## 2023-09-21 NOTE — PROGRESS NOTES
The Chilled Platelet Study (CHIPS)  IRB: [id number]  : Harriett Branham MD      Following his pre-op clinic appt on 9/20/23, I asked Sukh Craven if he had  read over the CHilled Platelet Study consent form, and if he was interested in participating.  He told me that he had read the consent, was interested, and had some questions.  The entire consent document was then reviewed in detail together, pausing intermittently and explaining/answering questions for each section    The following items were covered in the discussion:    Introduction/Key Information/CHIlled Platelets    Purpose of the Study    Procedure information/Randomization/Study Labs    Post Platelet Transfusion follow up    Risks    Potential future benefits, no direct benefits    Alternatives    Costs    Injury coverage    Legal rights    Privacy    Contact information      At the conclusion , Mr Craven stated that he would like to participate in the CHIPS study, and signed the consent form.  I then reminded him that he may change his mind at any time and can call the number on the consent with any questions or concerns.    I provided him with a signed copy of the consent form.  I also told him we will check a lab test result on the morning of surgery, and check in with him to confirm all study participation requirements have been met and his willingness to participate.     .me2

## 2023-09-22 ENCOUNTER — LAB (OUTPATIENT)
Dept: LAB | Facility: CLINIC | Age: 68
End: 2023-09-22
Payer: COMMERCIAL

## 2023-09-22 ENCOUNTER — OFFICE VISIT (OUTPATIENT)
Dept: CARDIOLOGY | Facility: CLINIC | Age: 68
End: 2023-09-22
Attending: INTERNAL MEDICINE
Payer: COMMERCIAL

## 2023-09-22 VITALS
DIASTOLIC BLOOD PRESSURE: 66 MMHG | HEART RATE: 63 BPM | BODY MASS INDEX: 26.31 KG/M2 | SYSTOLIC BLOOD PRESSURE: 157 MMHG | WEIGHT: 216.1 LBS | HEIGHT: 76 IN | OXYGEN SATURATION: 97 %

## 2023-09-22 DIAGNOSIS — I71.21 ANEURYSM OF ASCENDING AORTA WITHOUT RUPTURE (H): Primary | ICD-10-CM

## 2023-09-22 DIAGNOSIS — I10 ESSENTIAL HYPERTENSION: ICD-10-CM

## 2023-09-22 DIAGNOSIS — I73.9 PAD (PERIPHERAL ARTERY DISEASE) (H): ICD-10-CM

## 2023-09-22 DIAGNOSIS — I35.1 SEVERE AORTIC INSUFFICIENCY: ICD-10-CM

## 2023-09-22 DIAGNOSIS — I1A.0 RESISTANT HYPERTENSION: ICD-10-CM

## 2023-09-22 DIAGNOSIS — E78.5 HYPERLIPIDEMIA LDL GOAL <70: ICD-10-CM

## 2023-09-22 DIAGNOSIS — I25.10 CORONARY ARTERY DISEASE INVOLVING NATIVE CORONARY ARTERY OF NATIVE HEART WITHOUT ANGINA PECTORIS: ICD-10-CM

## 2023-09-22 LAB
ALDOST SERPL-MCNC: 10.7 NG/DL (ref 0–31)
ANION GAP SERPL CALCULATED.3IONS-SCNC: 9 MMOL/L (ref 7–15)
BUN SERPL-MCNC: 12.6 MG/DL (ref 8–23)
CALCIUM SERPL-MCNC: 9.2 MG/DL (ref 8.8–10.2)
CHLORIDE SERPL-SCNC: 105 MMOL/L (ref 98–107)
CREAT SERPL-MCNC: 1.12 MG/DL (ref 0.67–1.17)
DEPRECATED HCO3 PLAS-SCNC: 26 MMOL/L (ref 22–29)
EGFRCR SERPLBLD CKD-EPI 2021: 72 ML/MIN/1.73M2
GLUCOSE SERPL-MCNC: 106 MG/DL (ref 70–99)
POTASSIUM SERPL-SCNC: 3.8 MMOL/L (ref 3.4–5.3)
SODIUM SERPL-SCNC: 140 MMOL/L (ref 136–145)

## 2023-09-22 PROCEDURE — 84244 ASSAY OF RENIN: CPT | Mod: 90 | Performed by: PATHOLOGY

## 2023-09-22 PROCEDURE — G0463 HOSPITAL OUTPT CLINIC VISIT: HCPCS | Performed by: INTERNAL MEDICINE

## 2023-09-22 PROCEDURE — 36415 COLL VENOUS BLD VENIPUNCTURE: CPT | Performed by: PATHOLOGY

## 2023-09-22 PROCEDURE — 99000 SPECIMEN HANDLING OFFICE-LAB: CPT | Performed by: PATHOLOGY

## 2023-09-22 PROCEDURE — 99215 OFFICE O/P EST HI 40 MIN: CPT | Performed by: INTERNAL MEDICINE

## 2023-09-22 PROCEDURE — 82088 ASSAY OF ALDOSTERONE: CPT | Performed by: INTERNAL MEDICINE

## 2023-09-22 RX ORDER — LOSARTAN POTASSIUM 50 MG/1
50 TABLET ORAL DAILY
Qty: 90 TABLET | Refills: 3 | Status: SHIPPED | OUTPATIENT
Start: 2023-09-22

## 2023-09-22 ASSESSMENT — PAIN SCALES - GENERAL: PAINLEVEL: NO PAIN (0)
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
## 2023-09-22 NOTE — PATIENT INSTRUCTIONS
"You were seen today in the Cardiovascular Clinic at the [facility].     Cardiology Providers you saw during your visit: Dr. Bao Lan     Diagnosis:   Encounter Diagnoses   Name Primary?    Severe aortic insufficiency Yes    Aneurysm of ascending aorta without rupture (H)     Essential hypertension     Coronary artery disease involving native coronary artery of native heart without angina pectoris     PAD (peripheral artery disease) (H)         Recommendations:   1. Start losartan 50mg daily in the morning.  2. Move amlodipine 10mg daily to the evening.  3. Get a blood draw today to check renin and aldosterone.  4. Get a blood draw in 1 week to check kidney function and electrolytes.  5. Schedule a consultation with our genetic counselor.  6. Follow up with Dr. Bao Lan in 3 months.       Please feel free to call me with any questions or concerns.       Zeny Goel RN     Questions: [phone].   First press #1 for the [employer]ate and then press #4 for \"Medical Questions\" to reach us Cardiology Nurses.     [phone].   First press #1 for the ReInn[employer]ate and then press #1     On Call Cardiologist for after hours or on weekends: [phone]   option #4 and ask to speak to the on-call Cardiologist.          If you need a medication refill please contact your pharmacy.  Please allow 3 business days for your refill to be completed.  ________________________________________________________________________________________________________________________________         "

## 2023-09-22 NOTE — NURSING NOTE
Start losartan 50mg daily in the morning. Check BMP next week. Pt also instruced to move amlodipine 10mg daily to the evening.    Check renin and aldosterone at the lab today.     Referred to genetic counselor d/t vascular dilation. Follow up with Dr. Bao Lan in 3 months.    Julián Singh, RN   Cardiology Nurse Coordinator
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
## 2023-09-22 NOTE — NURSING NOTE
Chief Complaint   Patient presents with    Follow Up     [place] prevention F/U         Vitals were taken, medications reconciled.    Reyna Triplett, Facilitator   7:51 AM  Vitals were taken. Medications reconciled.   Reyna Triplett - Visit Facilitator

## 2023-09-25 ENCOUNTER — OFFICE VISIT (OUTPATIENT)
Dept: VASCULAR SURGERY | Facility: CLINIC | Age: 68
End: 2023-09-25
Payer: COMMERCIAL

## 2023-09-25 VITALS — SYSTOLIC BLOOD PRESSURE: 139 MMHG | DIASTOLIC BLOOD PRESSURE: 66 MMHG | OXYGEN SATURATION: 98 % | HEART RATE: 64 BPM

## 2023-09-25 DIAGNOSIS — I72.4 BILATERAL POPLITEAL ARTERY ANEURYSM (H): Primary | Chronic | ICD-10-CM

## 2023-09-25 LAB
ALDOST/RENIN PLAS-RTO: 35.7 {RATIO} (ref 0–25)
RENIN PLAS-CCNC: 0.3 NG/ML/HR

## 2023-09-25 PROCEDURE — 99204 OFFICE O/P NEW MOD 45 MIN: CPT | Performed by: SURGERY
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
## 2023-09-25 NOTE — PATIENT INSTRUCTIONS
Thank you so much for choosing us for your care. It was a pleasure to see you at the vascular clinic today.     Follow-up recommendations: We will plan for on surgery on 9/29 as previously scheduled.    Additional testing/imaging ordered today: None      Our scheduling team will get in touch with you to set up any follow-up testing/imaging and/or appointments. Please be aware that any testing/imaging recommended today will need to completed prior to your next visit with the provider. If testing/imaging is not completed prior to your next visit, your visit may be rescheduled.        If you have any questions, please contact our clinic directly at [phone] and ask for the nurse. We also encourage the use of [employer] to communicate with your HealthCare Provider.      If you have an urgent need after business hours (8:00 am to 4:30 pm) please call [phone], option 4, and ask for the vascular attending on call. For non-urgent after hours needs, please call the vascular clinic at [phone]. For scheduling needs, please call our clinic directly at [phone].

## 2023-09-26 NOTE — TELEPHONE ENCOUNTER
Provider would like to do this surgery with Dr. Hanson and Vascular. Surgery will need to r/s to a date where all three are free. Talked with pt and explained that their 9/29 surgery will need to be r/s . Explained that I will call back once we have a date will all providers free. Currently working on 10/10. Pt understands

## 2023-09-27 ENCOUNTER — OFFICE VISIT (OUTPATIENT)
Dept: FAMILY MEDICINE | Facility: CLINIC | Age: 68
End: 2023-09-27
Payer: COMMERCIAL

## 2023-09-27 VITALS
DIASTOLIC BLOOD PRESSURE: 60 MMHG | HEART RATE: 61 BPM | WEIGHT: 215 LBS | SYSTOLIC BLOOD PRESSURE: 114 MMHG | TEMPERATURE: 97.2 F | HEIGHT: 76 IN | BODY MASS INDEX: 26.18 KG/M2 | RESPIRATION RATE: 16 BRPM | OXYGEN SATURATION: 96 %

## 2023-09-27 DIAGNOSIS — R21 RASH AND NONSPECIFIC SKIN ERUPTION: ICD-10-CM

## 2023-09-27 DIAGNOSIS — E11.9 TYPE 2 DIABETES MELLITUS WITHOUT COMPLICATION, WITHOUT LONG-TERM CURRENT USE OF INSULIN (H): Primary | ICD-10-CM

## 2023-09-27 DIAGNOSIS — Z23 NEED FOR VACCINATION: ICD-10-CM

## 2023-09-27 PROCEDURE — 99214 OFFICE O/P EST MOD 30 MIN: CPT | Mod: 25 | Performed by: FAMILY MEDICINE

## 2023-09-27 PROCEDURE — 90662 IIV NO PRSV INCREASED AG IM: CPT | Performed by: FAMILY MEDICINE

## 2023-09-27 PROCEDURE — G0008 ADMIN INFLUENZA VIRUS VAC: HCPCS | Performed by: FAMILY MEDICINE

## 2023-09-27 RX ORDER — TRIAMCINOLONE ACETONIDE 1 MG/G
CREAM TOPICAL 2 TIMES DAILY
Qty: 45 G | Refills: 0 | Status: SHIPPED | OUTPATIENT
Start: 2023-09-27 | End: 2023-10-17

## 2023-09-27 RX ORDER — LANCETS
EACH MISCELLANEOUS
Qty: 100 EACH | Refills: 6 | Status: SHIPPED | OUTPATIENT
Start: 2023-09-27 | End: 2023-09-28

## 2023-09-27 ASSESSMENT — PAIN SCALES - GENERAL: PAINLEVEL: NO PAIN (0)

## 2023-09-27 NOTE — PROGRESS NOTES
Assessment & Plan   Problem List Items Addressed This Visit          Endocrine    Diabetes mellitus, type 2 (H) - Primary    Relevant Medications    blood glucose monitoring (NO BRAND SPECIFIED) meter device kit    blood glucose (NO BRAND SPECIFIED) test strip    thin (NO BRAND SPECIFIED) lancets    Other Relevant Orders    Adult Eye  Referral    AMB Adult Diabetes Educator Referral     Other Visit Diagnoses       Need for vaccination        Relevant Orders    INFLUENZA VACCINE 65+ (FLUZONE HD) (Completed)    Rash and nonspecific skin eruption                 New diagnosis of DM type 2, HbA1C 6.8 in September 2023, 6.1 in June 2023.  Need referral to ophthalmologist, foot exam done and is unremarkable. Polyuria is present, no polydipsia - likely 2/2 BPH and bladder neck obstruction. Tingling in both legs is present, more likely claudication 2/2 macroangiopathy.  For now the patient started on diet (cut down pop drinks and sweets), will recheck HbA1C in 3 months. Referred to diabetic educator.     Aortal insufficiency, aneurism of ascending aorta  Secondary hypertension  Aortal valve replacement surgery was rescheduled, exact reasons unknown. The patient had bilateral popliteal thrombosis (not acute), will likely need intensive anticoagulation after the surgery in the setting of mechanical valve.    PAD  Most likely reason for claudication and tingling. No concerns for neuropathy at this time.    Eczema?  Rash on upper back. Triamcinolone trial for 2 weeks.    Health maintenance  - Flu vaccine today,      Parrish Stephenson MD  [facility]    Eva Luz is a 68 year old, presenting for the following health issues:  Diabetes (Pre check up )      9/27/2023    11:29 AM   Additional Questions   Roomed by Yusef Verma   Accompanied by Self       History of Present Illness       Hypertension: He presents for follow up of hypertension.  He does check blood pressure  regularly  "outside of the clinic. Outpatient blood pressures have not been over 140/90. He follows a low salt diet. He consumes 4 sweetened beverage(s) daily.He exercises with enough effort to increase his heart rate 9 or less minutes per day.  He exercises with enough effort to increase his heart rate 3 or less days per week. He is missing 1 dose(s) of medications per week.  He is not taking prescribed medications regularly due to remembering to take.           Objective    /60 (BP Location: Right arm, Patient Position: Sitting, Cuff Size: Adult Regular)   Pulse 61   Temp 97.2  F (36.2  C) (Temporal)   Resp 16   Ht 1.93 m (6' 4\")   Wt 97.5 kg (215 lb)   SpO2 96%   BMI 26.17 kg/m    Body mass index is 26.17 kg/m .    Physical Exam       Santy GOMEZ student  Dr. Stephenson   Physician Attestation   I, , saw this patient and agree with the findings and plan of care as documented in the note.                    Answers submitted by the patient for this visit:  Hypertension Visit (Submitted on 9/20/2023)  Chief Complaint: Chronic problems general questions HPI Form  Do you check your blood pressure regularly outside of the clinic?: Yes  Are your blood pressures ever more than 140 on the top number (systolic) OR more than 90 on the bottom number (diastolic)? (For example, greater than 140/90): No  Are you following a low salt diet?: Yes  General Questionnaire (Submitted on 9/20/2023)  Chief Complaint: Chronic problems general questions HPI Form  On average, how many sweetened beverages do you drink each day (Examples: soda, juice, sweet tea, etc.  Do NOT count diet or artificially sweetened beverages)?: 4  How many minutes a day do you exercise enough to make your heart beat faster?: 9 or less  How many days a week do you exercise enough to make your heart beat faster?: 3 or less  How many days per week do you miss taking your medication?: 1  What makes it hard for you to take your medication every day?: " remembering to take

## 2023-09-28 ENCOUNTER — TELEPHONE (OUTPATIENT)
Dept: CARDIOLOGY | Facility: CLINIC | Age: 68
End: 2023-09-28
Payer: COMMERCIAL

## 2023-09-28 RX ORDER — LANCETS
EACH MISCELLANEOUS
Qty: 100 EACH | Refills: 6 | Status: SHIPPED | OUTPATIENT
Start: 2023-09-28

## 2023-09-28 NOTE — TELEPHONE ENCOUNTER
Health Call Center    Phone Message    May a detailed message be left on voicemail: yes     Reason for Call: Other: Patient states he is returning a phone call to schedule a surgery that was originally scheduled 9/29. Please call the patient back     Action Taken: Other: cardiology    Travel Screening: Not Applicable  Thank you!  [facility]

## 2023-10-02 DIAGNOSIS — I10 ESSENTIAL HYPERTENSION: Primary | ICD-10-CM

## 2023-10-02 DIAGNOSIS — I72.4 BILATERAL POPLITEAL ARTERY ANEURYSM (H): Primary | ICD-10-CM

## 2023-10-08 RX ORDER — LIDOCAINE 40 MG/G
CREAM TOPICAL
Status: CANCELLED | OUTPATIENT
Start: 2023-10-08

## 2023-10-08 RX ORDER — SODIUM CHLORIDE, SODIUM LACTATE, POTASSIUM CHLORIDE, CALCIUM CHLORIDE 600; 310; 30; 20 MG/100ML; MG/100ML; MG/100ML; MG/100ML
INJECTION, SOLUTION INTRAVENOUS CONTINUOUS
Status: CANCELLED | OUTPATIENT
Start: 2023-10-08
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
## 2023-10-10 ENCOUNTER — PREP FOR PROCEDURE (OUTPATIENT)
Dept: CARDIOLOGY | Facility: CLINIC | Age: 68
End: 2023-10-10
Payer: COMMERCIAL

## 2023-10-10 DIAGNOSIS — I35.1 SEVERE AORTIC INSUFFICIENCY: Primary | ICD-10-CM

## 2023-10-12 ENCOUNTER — ANESTHESIA EVENT (OUTPATIENT)
Dept: SURGERY | Facility: CLINIC | Age: 68
DRG: 220 | End: 2023-10-12
Payer: COMMERCIAL

## 2023-10-12 ASSESSMENT — LIFESTYLE VARIABLES: TOBACCO_USE: 1

## 2023-10-13 ENCOUNTER — APPOINTMENT (OUTPATIENT)
Dept: GENERAL RADIOLOGY | Facility: CLINIC | Age: 68
DRG: 220 | End: 2023-10-13
Attending: PHYSICIAN ASSISTANT
Payer: COMMERCIAL

## 2023-10-13 ENCOUNTER — HOSPITAL ENCOUNTER (INPATIENT)
Facility: CLINIC | Age: 68
LOS: 14 days | Discharge: HOME | DRG: 220 | End: 2023-10-27
Attending: THORACIC SURGERY (CARDIOTHORACIC VASCULAR SURGERY) | Admitting: THORACIC SURGERY (CARDIOTHORACIC VASCULAR SURGERY)
Payer: COMMERCIAL

## 2023-10-13 ENCOUNTER — ANESTHESIA (OUTPATIENT)
Dept: SURGERY | Facility: CLINIC | Age: 68
DRG: 220 | End: 2023-10-13
Payer: COMMERCIAL

## 2023-10-13 ENCOUNTER — APPOINTMENT (OUTPATIENT)
Dept: GENERAL RADIOLOGY | Facility: CLINIC | Age: 68
DRG: 220 | End: 2023-10-13
Attending: THORACIC SURGERY (CARDIOTHORACIC VASCULAR SURGERY)
Payer: COMMERCIAL

## 2023-10-13 DIAGNOSIS — Z95.2 S/P AVR (AORTIC VALVE REPLACEMENT): ICD-10-CM

## 2023-10-13 DIAGNOSIS — Z95.828 S/P ASCENDING AORTIC REPLACEMENT: ICD-10-CM

## 2023-10-13 DIAGNOSIS — I35.1 SEVERE AORTIC INSUFFICIENCY: Primary | ICD-10-CM

## 2023-10-13 DIAGNOSIS — Z95.1 S/P CABG (CORONARY ARTERY BYPASS GRAFT): ICD-10-CM

## 2023-10-13 LAB
ABO/RH(D): NORMAL
ALBUMIN SERPL BCG-MCNC: 3.3 G/DL (ref 3.5–5.2)
ALLEN'S TEST: ABNORMAL
ALLEN'S TEST: NORMAL
ALP SERPL-CCNC: 46 U/L (ref 40–129)
ALT SERPL W P-5'-P-CCNC: 15 U/L (ref 0–70)
ANION GAP SERPL CALCULATED.3IONS-SCNC: 13 MMOL/L (ref 7–15)
ANION GAP SERPL CALCULATED.3IONS-SCNC: 8 MMOL/L (ref 7–15)
ANTIBODY SCREEN: NEGATIVE
APTT PPP: 29 SECONDS (ref 22–38)
APTT PPP: 38 SECONDS (ref 22–38)
AST SERPL W P-5'-P-CCNC: 53 U/L (ref 0–45)
BASE EXCESS BLDA CALC-SCNC: -0.1 MMOL/L (ref -9–1.8)
BASE EXCESS BLDA CALC-SCNC: -0.6 MMOL/L (ref -9.6–2)
BASE EXCESS BLDA CALC-SCNC: -0.8 MMOL/L (ref -9–1.8)
BASE EXCESS BLDA CALC-SCNC: -1.1 MMOL/L (ref -9.6–2)
BASE EXCESS BLDA CALC-SCNC: 0.2 MMOL/L (ref -9.6–2)
BASE EXCESS BLDA CALC-SCNC: 0.5 MMOL/L (ref -9.6–2)
BASE EXCESS BLDA CALC-SCNC: 0.5 MMOL/L (ref -9.6–2)
BASE EXCESS BLDA CALC-SCNC: 0.8 MMOL/L (ref -9.6–2)
BASE EXCESS BLDA CALC-SCNC: 1 MMOL/L (ref -9.6–2)
BASE EXCESS BLDA CALC-SCNC: 1.1 MMOL/L (ref -9.6–2)
BASE EXCESS BLDA CALC-SCNC: 1.1 MMOL/L (ref -9.6–2)
BASE EXCESS BLDA CALC-SCNC: 1.6 MMOL/L (ref -9.6–2)
BASE EXCESS BLDA CALC-SCNC: 2.1 MMOL/L (ref -9.6–2)
BASE EXCESS BLDV CALC-SCNC: -0.3 MMOL/L (ref -8.1–1.9)
BASE EXCESS BLDV CALC-SCNC: 0.2 MMOL/L (ref -7.7–1.9)
BASE EXCESS BLDV CALC-SCNC: 0.9 MMOL/L (ref -7.7–1.9)
BASE EXCESS BLDV CALC-SCNC: 2.7 MMOL/L (ref -8.1–1.9)
BILIRUB SERPL-MCNC: 0.6 MG/DL
BLD PROD TYP BPU: NORMAL
BLD PROD TYP BPU: NORMAL
BLOOD COMPONENT TYPE: NORMAL
BLOOD COMPONENT TYPE: NORMAL
BUN SERPL-MCNC: 11.5 MG/DL (ref 8–23)
BUN SERPL-MCNC: 12.2 MG/DL (ref 8–23)
CA-I BLD-MCNC: 3.9 MG/DL (ref 4.4–5.2)
CA-I BLD-MCNC: 4 MG/DL (ref 4.4–5.2)
CA-I BLD-MCNC: 4.1 MG/DL (ref 4.4–5.2)
CA-I BLD-MCNC: 4.2 MG/DL (ref 4.4–5.2)
CA-I BLD-MCNC: 4.4 MG/DL (ref 4.4–5.2)
CA-I BLD-MCNC: 4.5 MG/DL (ref 4.4–5.2)
CA-I BLD-MCNC: 4.6 MG/DL (ref 4.4–5.2)
CA-I BLD-MCNC: 4.9 MG/DL (ref 4.4–5.2)
CA-I BLD-MCNC: 4.9 MG/DL (ref 4.4–5.2)
CA-I BLD-MCNC: 5 MG/DL (ref 4.4–5.2)
CA-I BLD-MCNC: 5.5 MG/DL (ref 4.4–5.2)
CALCIUM SERPL-MCNC: 8.4 MG/DL (ref 8.8–10.2)
CALCIUM SERPL-MCNC: 8.6 MG/DL (ref 8.8–10.2)
CF REDUC 60M P MA LENFR BLD TEG: 5.6 % (ref 0–15)
CFT BLD TEG: 1.2 MINUTE (ref 1–3)
CHLORIDE SERPL-SCNC: 106 MMOL/L (ref 98–107)
CHLORIDE SERPL-SCNC: 109 MMOL/L (ref 98–107)
CI (COAGULATION INDEX)(Z) NON NATIVE: 2.9 (ref -3–3)
CLOT ANGLE BLD TEG: 73 DEGREES (ref 53–72)
CLOT INIT BLD TEG: 4.1 MINUTE (ref 5–10)
CLOT INIT KAOL IND TO POST HEP NEUT TRTO: 1 {RATIO}
CLOT INIT KAOL IND TO POST HEP NEUT TRTO: 1 {RATIO}
CLOT INIT KAOLIN IND BLD US: 122 SEC (ref 113–166)
CLOT INIT KAOLIN IND BLD US: 130 SEC (ref 113–166)
CLOT INIT KAOLIN IND P HEP NEUT BLD US: 119 SEC (ref 103–153)
CLOT INIT KAOLIN IND P HEP NEUT BLD US: 127 SEC (ref 103–153)
CLOT LYSIS 30M P MA LENFR BLD TEG: 2 % (ref 0–8)
CLOT STIFF PLT CONT BLD CALC: 15.5 HPA (ref 11.9–29.8)
CLOT STIFF PLT CONT BLD CALC: 21.6 HPA (ref 11.9–29.8)
CLOT STIFF TF IND P HEP NEUT BLD US: 17.8 HPA (ref 13–33.2)
CLOT STIFF TF IND P HEP NEUT BLD US: 25.4 HPA (ref 13–33.2)
CLOT STIFF TF IND+IIB-IIIA INH P HEP NEU: 2.3 HPA (ref 1–3.7)
CLOT STIFF TF IND+IIB-IIIA INH P HEP NEU: 3.8 HPA (ref 1–3.7)
CLOT STRENGTH BLD TEG: 10.5 KD/SC (ref 4.5–11)
CODING SYSTEM: NORMAL
CODING SYSTEM: NORMAL
CREAT SERPL-MCNC: 1.03 MG/DL (ref 0.67–1.17)
CREAT SERPL-MCNC: 1.34 MG/DL (ref 0.67–1.17)
CROSSMATCH: NORMAL
CROSSMATCH: NORMAL
DEPRECATED HCO3 PLAS-SCNC: 21 MMOL/L (ref 22–29)
DEPRECATED HCO3 PLAS-SCNC: 24 MMOL/L (ref 22–29)
EGFRCR SERPLBLD CKD-EPI 2021: 58 ML/MIN/1.73M2
EGFRCR SERPLBLD CKD-EPI 2021: 80 ML/MIN/1.73M2
ERYTHROCYTE [DISTWIDTH] IN BLOOD BY AUTOMATED COUNT: 14.1 % (ref 10–15)
ERYTHROCYTE [DISTWIDTH] IN BLOOD BY AUTOMATED COUNT: 14.3 % (ref 10–15)
ERYTHROCYTE [DISTWIDTH] IN BLOOD BY AUTOMATED COUNT: 14.4 % (ref 10–15)
FIBRINOGEN PPP-MCNC: 229 MG/DL (ref 170–490)
GLUCOSE BLD-MCNC: 114 MG/DL (ref 70–99)
GLUCOSE BLD-MCNC: 120 MG/DL (ref 70–99)
GLUCOSE BLD-MCNC: 121 MG/DL (ref 70–99)
GLUCOSE BLD-MCNC: 131 MG/DL (ref 70–99)
GLUCOSE BLD-MCNC: 132 MG/DL (ref 70–99)
GLUCOSE BLD-MCNC: 139 MG/DL (ref 70–99)
GLUCOSE BLD-MCNC: 140 MG/DL (ref 70–99)
GLUCOSE BLD-MCNC: 142 MG/DL (ref 70–99)
GLUCOSE BLD-MCNC: 145 MG/DL (ref 70–99)
GLUCOSE BLD-MCNC: 154 MG/DL (ref 70–99)
GLUCOSE BLD-MCNC: 167 MG/DL (ref 70–99)
GLUCOSE BLD-MCNC: 169 MG/DL (ref 70–99)
GLUCOSE BLD-MCNC: 174 MG/DL (ref 70–99)
GLUCOSE BLDC GLUCOMTR-MCNC: 107 MG/DL (ref 70–99)
GLUCOSE BLDC GLUCOMTR-MCNC: 118 MG/DL (ref 70–99)
GLUCOSE BLDC GLUCOMTR-MCNC: 120 MG/DL (ref 70–99)
GLUCOSE BLDC GLUCOMTR-MCNC: 129 MG/DL (ref 70–99)
GLUCOSE BLDC GLUCOMTR-MCNC: 171 MG/DL (ref 70–99)
GLUCOSE BLDC GLUCOMTR-MCNC: 175 MG/DL (ref 70–99)
GLUCOSE BLDC GLUCOMTR-MCNC: 91 MG/DL (ref 70–99)
GLUCOSE SERPL-MCNC: 129 MG/DL (ref 70–99)
GLUCOSE SERPL-MCNC: 178 MG/DL (ref 70–99)
HCO3 BLD-SCNC: 25 MMOL/L (ref 21–28)
HCO3 BLD-SCNC: 26 MMOL/L (ref 21–28)
HCO3 BLDA-SCNC: 25 MMOL/L (ref 21–28)
HCO3 BLDA-SCNC: 26 MMOL/L (ref 21–28)
HCO3 BLDA-SCNC: 27 MMOL/L (ref 21–28)
HCO3 BLDA-SCNC: 27 MMOL/L (ref 21–28)
HCO3 BLDV-SCNC: 26 MMOL/L (ref 21–28)
HCO3 BLDV-SCNC: 27 MMOL/L (ref 21–28)
HCO3 BLDV-SCNC: 28 MMOL/L (ref 21–28)
HCO3 BLDV-SCNC: 28 MMOL/L (ref 21–28)
HCT VFR BLD AUTO: 28.8 % (ref 40–53)
HCT VFR BLD AUTO: 32.7 % (ref 40–53)
HCT VFR BLD AUTO: 38.3 % (ref 40–53)
HGB BLD-MCNC: 11.1 G/DL (ref 13.3–17.7)
HGB BLD-MCNC: 11.1 G/DL (ref 13.3–17.7)
HGB BLD-MCNC: 11.2 G/DL (ref 13.3–17.7)
HGB BLD-MCNC: 12.2 G/DL (ref 13.3–17.7)
HGB BLD-MCNC: 8.4 G/DL (ref 13.3–17.7)
HGB BLD-MCNC: 8.7 G/DL (ref 13.3–17.7)
HGB BLD-MCNC: 8.7 G/DL (ref 13.3–17.7)
HGB BLD-MCNC: 8.8 G/DL (ref 13.3–17.7)
HGB BLD-MCNC: 8.9 G/DL (ref 13.3–17.7)
HGB BLD-MCNC: 9 G/DL (ref 13.3–17.7)
HGB BLD-MCNC: 9.3 G/DL (ref 13.3–17.7)
HGB BLD-MCNC: 9.7 G/DL (ref 13.3–17.7)
HGB BLD-MCNC: 9.8 G/DL (ref 13.3–17.7)
INR PPP: 1.17 (ref 0.85–1.15)
INR PPP: 1.51 (ref 0.85–1.15)
LACTATE BLD-SCNC: 1.1 MMOL/L
LACTATE BLD-SCNC: 1.2 MMOL/L
LACTATE BLD-SCNC: 1.3 MMOL/L
LACTATE BLD-SCNC: 1.3 MMOL/L
LACTATE BLD-SCNC: 1.6 MMOL/L
LACTATE BLD-SCNC: 1.7 MMOL/L
LACTATE BLD-SCNC: 1.8 MMOL/L
LACTATE BLD-SCNC: 1.9 MMOL/L
LACTATE BLD-SCNC: 2 MMOL/L
LACTATE SERPL-SCNC: 1.6 MMOL/L (ref 0.7–2)
MAGNESIUM SERPL-MCNC: 2.4 MG/DL (ref 1.7–2.3)
MAGNESIUM SERPL-MCNC: 2.8 MG/DL (ref 1.7–2.3)
MCF BLD TEG: 67.6 MM (ref 50–70)
MCH RBC QN AUTO: 30.1 PG (ref 26.5–33)
MCH RBC QN AUTO: 30.2 PG (ref 26.5–33)
MCH RBC QN AUTO: 30.6 PG (ref 26.5–33)
MCHC RBC AUTO-ENTMCNC: 31.9 G/DL (ref 31.5–36.5)
MCHC RBC AUTO-ENTMCNC: 33.9 G/DL (ref 31.5–36.5)
MCHC RBC AUTO-ENTMCNC: 34 G/DL (ref 31.5–36.5)
MCV RBC AUTO: 89 FL (ref 78–100)
MCV RBC AUTO: 90 FL (ref 78–100)
MCV RBC AUTO: 95 FL (ref 78–100)
O2/TOTAL GAS SETTING VFR VENT: 100 %
O2/TOTAL GAS SETTING VFR VENT: 40 %
O2/TOTAL GAS SETTING VFR VENT: 80 %
O2/TOTAL GAS SETTING VFR VENT: 90 %
O2/TOTAL GAS SETTING VFR VENT: 90 %
OXYHGB MFR BLD: 95 % (ref 92–100)
OXYHGB MFR BLD: 96 % (ref 92–100)
OXYHGB MFR BLDV: 59 % (ref 70–75)
OXYHGB MFR BLDV: 62 % (ref 70–75)
PCO2 BLD: 43 MM HG (ref 35–45)
PCO2 BLD: 46 MM HG (ref 35–45)
PCO2 BLDA: 41 MM HG (ref 35–45)
PCO2 BLDA: 42 MM HG (ref 35–45)
PCO2 BLDA: 43 MM HG (ref 35–45)
PCO2 BLDA: 44 MM HG (ref 35–45)
PCO2 BLDA: 46 MM HG (ref 35–45)
PCO2 BLDA: 49 MM HG (ref 35–45)
PCO2 BLDA: 49 MM HG (ref 35–45)
PCO2 BLDV: 46 MM HG (ref 40–50)
PCO2 BLDV: 51 MM HG (ref 40–50)
PCO2 BLDV: 53 MM HG (ref 40–50)
PCO2 BLDV: 54 MM HG (ref 40–50)
PH BLD: 7.36 [PH] (ref 7.35–7.45)
PH BLD: 7.37 [PH] (ref 7.35–7.45)
PH BLDA: 7.33 [PH] (ref 7.35–7.45)
PH BLDA: 7.34 [PH] (ref 7.35–7.45)
PH BLDA: 7.34 [PH] (ref 7.35–7.45)
PH BLDA: 7.38 [PH] (ref 7.35–7.45)
PH BLDA: 7.38 [PH] (ref 7.35–7.45)
PH BLDA: 7.39 [PH] (ref 7.35–7.45)
PH BLDA: 7.4 [PH] (ref 7.35–7.45)
PH BLDA: 7.41 [PH] (ref 7.35–7.45)
PH BLDA: 7.42 [PH] (ref 7.35–7.45)
PH BLDV: 7.3 [PH] (ref 7.32–7.43)
PH BLDV: 7.32 [PH] (ref 7.32–7.43)
PH BLDV: 7.33 [PH] (ref 7.32–7.43)
PH BLDV: 7.39 [PH] (ref 7.32–7.43)
PHOSPHATE SERPL-MCNC: 1.7 MG/DL (ref 2.5–4.5)
PHOSPHATE SERPL-MCNC: 3.6 MG/DL (ref 2.5–4.5)
PLATELET # BLD AUTO: 103 10E3/UL (ref 150–450)
PLATELET # BLD AUTO: 121 10E3/UL (ref 150–450)
PLATELET # BLD AUTO: 139 10E3/UL (ref 150–450)
PLATELET # BLD AUTO: 166 10E3/UL (ref 150–450)
PO2 BLD: 81 MM HG (ref 80–105)
PO2 BLD: 95 MM HG (ref 80–105)
PO2 BLDA: 119 MM HG (ref 80–105)
PO2 BLDA: 250 MM HG (ref 80–105)
PO2 BLDA: 370 MM HG (ref 80–105)
PO2 BLDA: 375 MM HG (ref 80–105)
PO2 BLDA: 386 MM HG (ref 80–105)
PO2 BLDA: 394 MM HG (ref 80–105)
PO2 BLDA: 395 MM HG (ref 80–105)
PO2 BLDA: 400 MM HG (ref 80–105)
PO2 BLDA: 405 MM HG (ref 80–105)
PO2 BLDA: 457 MM HG (ref 80–105)
PO2 BLDA: 93 MM HG (ref 80–105)
PO2 BLDV: 35 MM HG (ref 25–47)
PO2 BLDV: 37 MM HG (ref 25–47)
PO2 BLDV: 38 MM HG (ref 25–47)
PO2 BLDV: 49 MM HG (ref 25–47)
POTASSIUM BLD-SCNC: 4.2 MMOL/L (ref 3.5–5)
POTASSIUM BLD-SCNC: 4.4 MMOL/L (ref 3.5–5)
POTASSIUM BLD-SCNC: 4.5 MMOL/L (ref 3.5–5)
POTASSIUM BLD-SCNC: 4.5 MMOL/L (ref 3.5–5)
POTASSIUM BLD-SCNC: 4.6 MMOL/L (ref 3.5–5)
POTASSIUM BLD-SCNC: 4.7 MMOL/L (ref 3.5–5)
POTASSIUM BLD-SCNC: 4.7 MMOL/L (ref 3.5–5)
POTASSIUM BLD-SCNC: 4.9 MMOL/L (ref 3.5–5)
POTASSIUM BLD-SCNC: 5 MMOL/L (ref 3.5–5)
POTASSIUM BLD-SCNC: 5.1 MMOL/L (ref 3.5–5)
POTASSIUM BLD-SCNC: 5.3 MMOL/L (ref 3.5–5)
POTASSIUM BLD-SCNC: 5.8 MMOL/L (ref 3.5–5)
POTASSIUM BLD-SCNC: 5.9 MMOL/L (ref 3.5–5)
POTASSIUM SERPL-SCNC: 4.3 MMOL/L (ref 3.4–5.3)
POTASSIUM SERPL-SCNC: 4.3 MMOL/L (ref 3.4–5.3)
PROT SERPL-MCNC: 5.1 G/DL (ref 6.4–8.3)
RBC # BLD AUTO: 3.2 10E6/UL (ref 4.4–5.9)
RBC # BLD AUTO: 3.68 10E6/UL (ref 4.4–5.9)
RBC # BLD AUTO: 4.05 10E6/UL (ref 4.4–5.9)
SODIUM BLD-SCNC: 138 MMOL/L (ref 135–145)
SODIUM BLD-SCNC: 139 MMOL/L (ref 135–145)
SODIUM BLD-SCNC: 140 MMOL/L (ref 135–145)
SODIUM BLD-SCNC: 141 MMOL/L (ref 135–145)
SODIUM SERPL-SCNC: 140 MMOL/L (ref 135–145)
SODIUM SERPL-SCNC: 141 MMOL/L (ref 135–145)
SPECIMEN EXPIRATION DATE: NORMAL
UNIT ABO/RH: NORMAL
UNIT ABO/RH: NORMAL
UNIT NUMBER: NORMAL
UNIT NUMBER: NORMAL
UNIT STATUS: NORMAL
UNIT STATUS: NORMAL
UNIT TYPE ISBT: 8400
UNIT TYPE ISBT: 8400
WBC # BLD AUTO: 12.4 10E3/UL (ref 4–11)
WBC # BLD AUTO: 12.4 10E3/UL (ref 4–11)
WBC # BLD AUTO: 4.7 10E3/UL (ref 4–11)

## 2023-10-13 PROCEDURE — 85049 AUTOMATED PLATELET COUNT: CPT | Performed by: PHYSICIAN ASSISTANT

## 2023-10-13 PROCEDURE — 82805 BLOOD GASES W/O2 SATURATION: CPT | Performed by: PHYSICIAN ASSISTANT

## 2023-10-13 PROCEDURE — 200N000002 HC R&B ICU UMMC

## 2023-10-13 PROCEDURE — 36415 COLL VENOUS BLD VENIPUNCTURE: CPT | Performed by: THORACIC SURGERY (CARDIOTHORACIC VASCULAR SURGERY)

## 2023-10-13 PROCEDURE — 85027 COMPLETE CBC AUTOMATED: CPT | Performed by: NURSE ANESTHETIST, CERTIFIED REGISTERED

## 2023-10-13 PROCEDURE — 88305 TISSUE EXAM BY PATHOLOGIST: CPT | Mod: 26 | Performed by: PATHOLOGY

## 2023-10-13 PROCEDURE — 250N000013 HC RX MED GY IP 250 OP 250 PS 637: Performed by: THORACIC SURGERY (CARDIOTHORACIC VASCULAR SURGERY)

## 2023-10-13 PROCEDURE — 84132 ASSAY OF SERUM POTASSIUM: CPT | Performed by: PHYSICIAN ASSISTANT

## 2023-10-13 PROCEDURE — 33863 ASCENDING AORTIC GRAFT: CPT | Performed by: THORACIC SURGERY (CARDIOTHORACIC VASCULAR SURGERY)

## 2023-10-13 PROCEDURE — 84132 ASSAY OF SERUM POTASSIUM: CPT | Performed by: THORACIC SURGERY (CARDIOTHORACIC VASCULAR SURGERY)

## 2023-10-13 PROCEDURE — 84100 ASSAY OF PHOSPHORUS: CPT | Performed by: THORACIC SURGERY (CARDIOTHORACIC VASCULAR SURGERY)

## 2023-10-13 PROCEDURE — 74018 RADEX ABDOMEN 1 VIEW: CPT | Mod: 26 | Performed by: RADIOLOGY

## 2023-10-13 PROCEDURE — 84100 ASSAY OF PHOSPHORUS: CPT | Performed by: PHYSICIAN ASSISTANT

## 2023-10-13 PROCEDURE — 84132 ASSAY OF SERUM POTASSIUM: CPT

## 2023-10-13 PROCEDURE — 02S10ZZ REPOSITION CORONARY ARTERY, TWO ARTERIES, OPEN APPROACH: ICD-10-PCS | Performed by: THORACIC SURGERY (CARDIOTHORACIC VASCULAR SURGERY)

## 2023-10-13 PROCEDURE — 82330 ASSAY OF CALCIUM: CPT

## 2023-10-13 PROCEDURE — 370N000017 HC ANESTHESIA TECHNICAL FEE, PER MIN: Performed by: THORACIC SURGERY (CARDIOTHORACIC VASCULAR SURGERY)

## 2023-10-13 PROCEDURE — 85610 PROTHROMBIN TIME: CPT | Performed by: PHYSICIAN ASSISTANT

## 2023-10-13 PROCEDURE — 85396 CLOTTING ASSAY WHOLE BLOOD: CPT | Performed by: THORACIC SURGERY (CARDIOTHORACIC VASCULAR SURGERY)

## 2023-10-13 PROCEDURE — 250N000011 HC RX IP 250 OP 636: Performed by: PHYSICIAN ASSISTANT

## 2023-10-13 PROCEDURE — 02RF0JZ REPLACEMENT OF AORTIC VALVE WITH SYNTHETIC SUBSTITUTE, OPEN APPROACH: ICD-10-PCS | Performed by: THORACIC SURGERY (CARDIOTHORACIC VASCULAR SURGERY)

## 2023-10-13 PROCEDURE — 250N000024 HC ISOFLURANE, PER MIN: Performed by: THORACIC SURGERY (CARDIOTHORACIC VASCULAR SURGERY)

## 2023-10-13 PROCEDURE — 02UX0JZ SUPPLEMENT THORACIC AORTA, ASCENDING/ARCH WITH SYNTHETIC SUBSTITUTE, OPEN APPROACH: ICD-10-PCS | Performed by: THORACIC SURGERY (CARDIOTHORACIC VASCULAR SURGERY)

## 2023-10-13 PROCEDURE — 85396 CLOTTING ASSAY WHOLE BLOOD: CPT

## 2023-10-13 PROCEDURE — 360N000079 HC SURGERY LEVEL 6, PER MIN: Performed by: THORACIC SURGERY (CARDIOTHORACIC VASCULAR SURGERY)

## 2023-10-13 PROCEDURE — 250N000011 HC RX IP 250 OP 636: Performed by: THORACIC SURGERY (CARDIOTHORACIC VASCULAR SURGERY)

## 2023-10-13 PROCEDURE — 83735 ASSAY OF MAGNESIUM: CPT | Performed by: PHYSICIAN ASSISTANT

## 2023-10-13 PROCEDURE — 82330 ASSAY OF CALCIUM: CPT | Performed by: PHYSICIAN ASSISTANT

## 2023-10-13 PROCEDURE — 87176 TISSUE HOMOGENIZATION CULTR: CPT | Performed by: THORACIC SURGERY (CARDIOTHORACIC VASCULAR SURGERY)

## 2023-10-13 PROCEDURE — 258N000003 HC RX IP 258 OP 636: Performed by: NURSE ANESTHETIST, CERTIFIED REGISTERED

## 2023-10-13 PROCEDURE — 258N000003 HC RX IP 258 OP 636: Performed by: ANESTHESIOLOGY

## 2023-10-13 PROCEDURE — 250N000009 HC RX 250: Performed by: THORACIC SURGERY (CARDIOTHORACIC VASCULAR SURGERY)

## 2023-10-13 PROCEDURE — 88305 TISSUE EXAM BY PATHOLOGIST: CPT | Mod: TC | Performed by: THORACIC SURGERY (CARDIOTHORACIC VASCULAR SURGERY)

## 2023-10-13 PROCEDURE — 86850 RBC ANTIBODY SCREEN: CPT | Performed by: STUDENT IN AN ORGANIZED HEALTH CARE EDUCATION/TRAINING PROGRAM

## 2023-10-13 PROCEDURE — 250N000011 HC RX IP 250 OP 636: Mod: JZ | Performed by: NURSE ANESTHETIST, CERTIFIED REGISTERED

## 2023-10-13 PROCEDURE — 5A1221Z PERFORMANCE OF CARDIAC OUTPUT, CONTINUOUS: ICD-10-PCS | Performed by: THORACIC SURGERY (CARDIOTHORACIC VASCULAR SURGERY)

## 2023-10-13 PROCEDURE — 85610 PROTHROMBIN TIME: CPT | Performed by: NURSE ANESTHETIST, CERTIFIED REGISTERED

## 2023-10-13 PROCEDURE — 999N000065 XR CHEST PORT 1 VIEW

## 2023-10-13 PROCEDURE — 410N000004: Performed by: THORACIC SURGERY (CARDIOTHORACIC VASCULAR SURGERY)

## 2023-10-13 PROCEDURE — 999N000141 HC STATISTIC PRE-PROCEDURE NURSING ASSESSMENT: Performed by: THORACIC SURGERY (CARDIOTHORACIC VASCULAR SURGERY)

## 2023-10-13 PROCEDURE — C1889 IMPLANT/INSERT DEVICE, NOC: HCPCS | Performed by: THORACIC SURGERY (CARDIOTHORACIC VASCULAR SURGERY)

## 2023-10-13 PROCEDURE — 999N000157 HC STATISTIC RCP TIME EA 10 MIN

## 2023-10-13 PROCEDURE — 85049 AUTOMATED PLATELET COUNT: CPT | Performed by: NURSE ANESTHETIST, CERTIFIED REGISTERED

## 2023-10-13 PROCEDURE — 85384 FIBRINOGEN ACTIVITY: CPT | Performed by: NURSE ANESTHETIST, CERTIFIED REGISTERED

## 2023-10-13 PROCEDURE — 3E043XZ INTRODUCTION OF VASOPRESSOR INTO CENTRAL VEIN, PERCUTANEOUS APPROACH: ICD-10-PCS | Performed by: THORACIC SURGERY (CARDIOTHORACIC VASCULAR SURGERY)

## 2023-10-13 PROCEDURE — 33533 CABG ARTERIAL SINGLE: CPT | Mod: 51 | Performed by: THORACIC SURGERY (CARDIOTHORACIC VASCULAR SURGERY)

## 2023-10-13 PROCEDURE — 33517 CABG ARTERY-VEIN SINGLE: CPT | Performed by: THORACIC SURGERY (CARDIOTHORACIC VASCULAR SURGERY)

## 2023-10-13 PROCEDURE — 06BQ4ZZ EXCISION OF LEFT SAPHENOUS VEIN, PERCUTANEOUS ENDOSCOPIC APPROACH: ICD-10-PCS | Performed by: THORACIC SURGERY (CARDIOTHORACIC VASCULAR SURGERY)

## 2023-10-13 PROCEDURE — 250N000011 HC RX IP 250 OP 636: Performed by: ANESTHESIOLOGY

## 2023-10-13 PROCEDURE — C1898 LEAD, PMKR, OTHER THAN TRANS: HCPCS | Performed by: THORACIC SURGERY (CARDIOTHORACIC VASCULAR SURGERY)

## 2023-10-13 PROCEDURE — 94002 VENT MGMT INPAT INIT DAY: CPT

## 2023-10-13 PROCEDURE — 250N000011 HC RX IP 250 OP 636: Mod: JZ | Performed by: THORACIC SURGERY (CARDIOTHORACIC VASCULAR SURGERY)

## 2023-10-13 PROCEDURE — 250N000011 HC RX IP 250 OP 636: Performed by: STUDENT IN AN ORGANIZED HEALTH CARE EDUCATION/TRAINING PROGRAM

## 2023-10-13 PROCEDURE — 258N000003 HC RX IP 258 OP 636: Performed by: PHYSICIAN ASSISTANT

## 2023-10-13 PROCEDURE — 258N000003 HC RX IP 258 OP 636: Performed by: THORACIC SURGERY (CARDIOTHORACIC VASCULAR SURGERY)

## 2023-10-13 PROCEDURE — 272N000002 HC OR SUPPLY OTHER OPNP: Performed by: THORACIC SURGERY (CARDIOTHORACIC VASCULAR SURGERY)

## 2023-10-13 PROCEDURE — 82805 BLOOD GASES W/O2 SATURATION: CPT | Performed by: THORACIC SURGERY (CARDIOTHORACIC VASCULAR SURGERY)

## 2023-10-13 PROCEDURE — 85049 AUTOMATED PLATELET COUNT: CPT | Performed by: THORACIC SURGERY (CARDIOTHORACIC VASCULAR SURGERY)

## 2023-10-13 PROCEDURE — 83735 ASSAY OF MAGNESIUM: CPT | Performed by: THORACIC SURGERY (CARDIOTHORACIC VASCULAR SURGERY)

## 2023-10-13 PROCEDURE — 85730 THROMBOPLASTIN TIME PARTIAL: CPT | Performed by: PHYSICIAN ASSISTANT

## 2023-10-13 PROCEDURE — 410N000003 HC PER-PERFUSION 1ST 30 MIN: Performed by: THORACIC SURGERY (CARDIOTHORACIC VASCULAR SURGERY)

## 2023-10-13 PROCEDURE — 999N000065 XR ABDOMEN PORT 1 VIEW

## 2023-10-13 PROCEDURE — 250N000009 HC RX 250: Performed by: PHYSICIAN ASSISTANT

## 2023-10-13 PROCEDURE — 85027 COMPLETE CBC AUTOMATED: CPT | Performed by: PHYSICIAN ASSISTANT

## 2023-10-13 PROCEDURE — 250N000009 HC RX 250: Performed by: NURSE ANESTHETIST, CERTIFIED REGISTERED

## 2023-10-13 PROCEDURE — 85027 COMPLETE CBC AUTOMATED: CPT | Performed by: STUDENT IN AN ORGANIZED HEALTH CARE EDUCATION/TRAINING PROGRAM

## 2023-10-13 PROCEDURE — 82803 BLOOD GASES ANY COMBINATION: CPT

## 2023-10-13 PROCEDURE — 85730 THROMBOPLASTIN TIME PARTIAL: CPT | Performed by: NURSE ANESTHETIST, CERTIFIED REGISTERED

## 2023-10-13 PROCEDURE — 71045 X-RAY EXAM CHEST 1 VIEW: CPT | Mod: 26 | Performed by: RADIOLOGY

## 2023-10-13 PROCEDURE — C1763 CONN TISS, NON-HUMAN: HCPCS | Performed by: THORACIC SURGERY (CARDIOTHORACIC VASCULAR SURGERY)

## 2023-10-13 PROCEDURE — C1781 MESH (IMPLANTABLE): HCPCS | Performed by: THORACIC SURGERY (CARDIOTHORACIC VASCULAR SURGERY)

## 2023-10-13 PROCEDURE — 93005 ELECTROCARDIOGRAM TRACING: CPT

## 2023-10-13 PROCEDURE — 83605 ASSAY OF LACTIC ACID: CPT | Performed by: PHYSICIAN ASSISTANT

## 2023-10-13 PROCEDURE — 021009W BYPASS CORONARY ARTERY, ONE ARTERY FROM AORTA WITH AUTOLOGOUS VENOUS TISSUE, OPEN APPROACH: ICD-10-PCS | Performed by: THORACIC SURGERY (CARDIOTHORACIC VASCULAR SURGERY)

## 2023-10-13 PROCEDURE — 02100Z9 BYPASS CORONARY ARTERY, ONE ARTERY FROM LEFT INTERNAL MAMMARY, OPEN APPROACH: ICD-10-PCS | Performed by: THORACIC SURGERY (CARDIOTHORACIC VASCULAR SURGERY)

## 2023-10-13 PROCEDURE — 272N000001 HC OR GENERAL SUPPLY STERILE: Performed by: THORACIC SURGERY (CARDIOTHORACIC VASCULAR SURGERY)

## 2023-10-13 PROCEDURE — 82248 BILIRUBIN DIRECT: CPT

## 2023-10-13 PROCEDURE — 272N000085 HC PACK CELL SAVER CSP: Performed by: THORACIC SURGERY (CARDIOTHORACIC VASCULAR SURGERY)

## 2023-10-13 PROCEDURE — 87205 SMEAR GRAM STAIN: CPT | Performed by: THORACIC SURGERY (CARDIOTHORACIC VASCULAR SURGERY)

## 2023-10-13 PROCEDURE — 82330 ASSAY OF CALCIUM: CPT | Performed by: THORACIC SURGERY (CARDIOTHORACIC VASCULAR SURGERY)

## 2023-10-13 PROCEDURE — 86923 COMPATIBILITY TEST ELECTRIC: CPT | Performed by: STUDENT IN AN ORGANIZED HEALTH CARE EDUCATION/TRAINING PROGRAM

## 2023-10-13 PROCEDURE — 258N000003 HC RX IP 258 OP 636: Performed by: STUDENT IN AN ORGANIZED HEALTH CARE EDUCATION/TRAINING PROGRAM

## 2023-10-13 PROCEDURE — 86901 BLOOD TYPING SEROLOGIC RH(D): CPT | Performed by: STUDENT IN AN ORGANIZED HEALTH CARE EDUCATION/TRAINING PROGRAM

## 2023-10-13 PROCEDURE — 250N000009 HC RX 250: Performed by: ANESTHESIOLOGY

## 2023-10-13 PROCEDURE — 272N000088 HC PUMP APP ADULT PERFUSION: Performed by: THORACIC SURGERY (CARDIOTHORACIC VASCULAR SURGERY)

## 2023-10-13 PROCEDURE — 250N000009 HC RX 250: Performed by: STUDENT IN AN ORGANIZED HEALTH CARE EDUCATION/TRAINING PROGRAM

## 2023-10-13 DEVICE — BILEAFLET MECHANICAL HEART VALVE ATTACHED TO A VASCULAR PROSTHESIS IMPLANTED IN THE AORTIC POSITION
Type: IMPLANTABLE DEVICE | Site: HEART | Status: FUNCTIONAL
Brand: ON-X ASCENDING AORTIC PROSTHESIS

## 2023-10-13 DEVICE — IMP PATCH PERICARDIUM PHOTOFIX BOVINE 6X8CM PFP6X8: Type: IMPLANTABLE DEVICE | Site: HEART | Status: FUNCTIONAL

## 2023-10-13 RX ORDER — NOREPINEPHRINE BITARTRATE 0.06 MG/ML
.01-.1 INJECTION, SOLUTION INTRAVENOUS CONTINUOUS
Status: DISCONTINUED | OUTPATIENT
Start: 2023-10-13 | End: 2023-10-13

## 2023-10-13 RX ORDER — DEXTROSE MONOHYDRATE 25 G/50ML
25-50 INJECTION, SOLUTION INTRAVENOUS
Status: DISCONTINUED | OUTPATIENT
Start: 2023-10-13 | End: 2023-10-27 | Stop reason: HOSPADM

## 2023-10-13 RX ORDER — LIDOCAINE 40 MG/G
CREAM TOPICAL
Status: DISCONTINUED | OUTPATIENT
Start: 2023-10-13 | End: 2023-10-13

## 2023-10-13 RX ORDER — NALOXONE HYDROCHLORIDE 0.4 MG/ML
0.2 INJECTION, SOLUTION INTRAMUSCULAR; INTRAVENOUS; SUBCUTANEOUS
Status: DISCONTINUED | OUTPATIENT
Start: 2023-10-13 | End: 2023-10-27 | Stop reason: HOSPADM

## 2023-10-13 RX ORDER — FAMOTIDINE 20 MG/1
20 TABLET, FILM COATED ORAL
Status: COMPLETED | OUTPATIENT
Start: 2023-10-13 | End: 2023-10-13

## 2023-10-13 RX ORDER — PHENYLEPHRINE HCL IN 0.9% NACL 50MG/250ML
.1-6 PLASTIC BAG, INJECTION (ML) INTRAVENOUS CONTINUOUS
Status: DISCONTINUED | OUTPATIENT
Start: 2023-10-13 | End: 2023-10-13

## 2023-10-13 RX ORDER — CHLORHEXIDINE GLUCONATE ORAL RINSE 1.2 MG/ML
10 SOLUTION DENTAL ONCE
Status: COMPLETED | OUTPATIENT
Start: 2023-10-13 | End: 2023-10-13

## 2023-10-13 RX ORDER — HYDROMORPHONE HYDROCHLORIDE 1 MG/ML
0.4 INJECTION, SOLUTION INTRAMUSCULAR; INTRAVENOUS; SUBCUTANEOUS
Status: DISCONTINUED | OUTPATIENT
Start: 2023-10-13 | End: 2023-10-18

## 2023-10-13 RX ORDER — CEFAZOLIN SODIUM/WATER 2 G/20 ML
2 SYRINGE (ML) INTRAVENOUS SEE ADMIN INSTRUCTIONS
Status: DISCONTINUED | OUTPATIENT
Start: 2023-10-13 | End: 2023-10-13

## 2023-10-13 RX ORDER — SODIUM CHLORIDE, SODIUM LACTATE, POTASSIUM CHLORIDE, CALCIUM CHLORIDE 600; 310; 30; 20 MG/100ML; MG/100ML; MG/100ML; MG/100ML
INJECTION, SOLUTION INTRAVENOUS CONTINUOUS
Status: DISCONTINUED | OUTPATIENT
Start: 2023-10-13 | End: 2023-10-13

## 2023-10-13 RX ORDER — GABAPENTIN 100 MG/1
100 CAPSULE ORAL
Status: COMPLETED | OUTPATIENT
Start: 2023-10-13 | End: 2023-10-13

## 2023-10-13 RX ORDER — DEXMEDETOMIDINE HYDROCHLORIDE 4 UG/ML
.2-.7 INJECTION, SOLUTION INTRAVENOUS CONTINUOUS
Status: DISCONTINUED | OUTPATIENT
Start: 2023-10-13 | End: 2023-10-14

## 2023-10-13 RX ORDER — ONDANSETRON 2 MG/ML
4 INJECTION INTRAMUSCULAR; INTRAVENOUS EVERY 6 HOURS PRN
Status: DISCONTINUED | OUTPATIENT
Start: 2023-10-13 | End: 2023-10-27 | Stop reason: HOSPADM

## 2023-10-13 RX ORDER — CALCIUM GLUCONATE 20 MG/ML
1 INJECTION, SOLUTION INTRAVENOUS
Status: DISCONTINUED | OUTPATIENT
Start: 2023-10-13 | End: 2023-10-18

## 2023-10-13 RX ORDER — PROPOFOL 10 MG/ML
INJECTION, EMULSION INTRAVENOUS CONTINUOUS PRN
Status: DISCONTINUED | OUTPATIENT
Start: 2023-10-13 | End: 2023-10-13

## 2023-10-13 RX ORDER — AMOXICILLIN 250 MG
1 CAPSULE ORAL 2 TIMES DAILY
Status: DISCONTINUED | OUTPATIENT
Start: 2023-10-13 | End: 2023-10-15

## 2023-10-13 RX ORDER — FENTANYL CITRATE 50 UG/ML
INJECTION, SOLUTION INTRAMUSCULAR; INTRAVENOUS PRN
Status: DISCONTINUED | OUTPATIENT
Start: 2023-10-13 | End: 2023-10-13

## 2023-10-13 RX ORDER — CEFAZOLIN SODIUM/WATER 2 G/20 ML
2 SYRINGE (ML) INTRAVENOUS
Status: COMPLETED | OUTPATIENT
Start: 2023-10-13 | End: 2023-10-13

## 2023-10-13 RX ORDER — HEPARIN SODIUM 1000 [USP'U]/ML
INJECTION, SOLUTION INTRAVENOUS; SUBCUTANEOUS PRN
Status: DISCONTINUED | OUTPATIENT
Start: 2023-10-13 | End: 2023-10-13

## 2023-10-13 RX ORDER — EPHEDRINE SULFATE 50 MG/ML
INJECTION, SOLUTION INTRAMUSCULAR; INTRAVENOUS; SUBCUTANEOUS PRN
Status: DISCONTINUED | OUTPATIENT
Start: 2023-10-13 | End: 2023-10-13

## 2023-10-13 RX ORDER — ACETAMINOPHEN 325 MG/1
975 TABLET ORAL EVERY 8 HOURS
Status: DISPENSED | OUTPATIENT
Start: 2023-10-13 | End: 2023-10-16

## 2023-10-13 RX ORDER — ACETAMINOPHEN 325 MG/1
975 TABLET ORAL ONCE
Status: COMPLETED | OUTPATIENT
Start: 2023-10-13 | End: 2023-10-13

## 2023-10-13 RX ORDER — SODIUM CHLORIDE, SODIUM GLUCONATE, SODIUM ACETATE, POTASSIUM CHLORIDE AND MAGNESIUM CHLORIDE 526; 502; 368; 37; 30 MG/100ML; MG/100ML; MG/100ML; MG/100ML; MG/100ML
INJECTION, SOLUTION INTRAVENOUS CONTINUOUS PRN
Status: DISCONTINUED | OUTPATIENT
Start: 2023-10-13 | End: 2023-10-13

## 2023-10-13 RX ORDER — NICOTINE POLACRILEX 4 MG
15-30 LOZENGE BUCCAL
Status: DISCONTINUED | OUTPATIENT
Start: 2023-10-13 | End: 2023-10-27 | Stop reason: HOSPADM

## 2023-10-13 RX ORDER — HEPARIN SODIUM 5000 [USP'U]/.5ML
5000 INJECTION, SOLUTION INTRAVENOUS; SUBCUTANEOUS EVERY 8 HOURS
Status: DISCONTINUED | OUTPATIENT
Start: 2023-10-14 | End: 2023-10-15

## 2023-10-13 RX ORDER — POLYETHYLENE GLYCOL 3350 17 G/17G
17 POWDER, FOR SOLUTION ORAL DAILY
Status: DISCONTINUED | OUTPATIENT
Start: 2023-10-14 | End: 2023-10-15

## 2023-10-13 RX ORDER — PROCHLORPERAZINE MALEATE 5 MG
5 TABLET ORAL EVERY 6 HOURS PRN
Status: DISCONTINUED | OUTPATIENT
Start: 2023-10-13 | End: 2023-10-18

## 2023-10-13 RX ORDER — HYDRALAZINE HYDROCHLORIDE 20 MG/ML
10 INJECTION INTRAMUSCULAR; INTRAVENOUS EVERY 30 MIN PRN
Status: DISCONTINUED | OUTPATIENT
Start: 2023-10-13 | End: 2023-10-27 | Stop reason: HOSPADM

## 2023-10-13 RX ORDER — PROTAMINE SULFATE 10 MG/ML
INJECTION, SOLUTION INTRAVENOUS PRN
Status: DISCONTINUED | OUTPATIENT
Start: 2023-10-13 | End: 2023-10-13

## 2023-10-13 RX ORDER — CEFAZOLIN SODIUM 2 G/100ML
2 INJECTION, SOLUTION INTRAVENOUS EVERY 8 HOURS
Qty: 300 ML | Refills: 0 | Status: COMPLETED | OUTPATIENT
Start: 2023-10-14 | End: 2023-10-14

## 2023-10-13 RX ORDER — CALCIUM GLUCONATE 20 MG/ML
2 INJECTION, SOLUTION INTRAVENOUS
Status: DISCONTINUED | OUTPATIENT
Start: 2023-10-13 | End: 2023-10-18

## 2023-10-13 RX ORDER — ASPIRIN 81 MG/1
81 TABLET, CHEWABLE ORAL DAILY
Status: DISCONTINUED | OUTPATIENT
Start: 2023-10-14 | End: 2023-10-27 | Stop reason: HOSPADM

## 2023-10-13 RX ORDER — NOREPINEPHRINE BITARTRATE 0.06 MG/ML
.01-.4 INJECTION, SOLUTION INTRAVENOUS CONTINUOUS
Status: DISCONTINUED | OUTPATIENT
Start: 2023-10-13 | End: 2023-10-14

## 2023-10-13 RX ORDER — ONDANSETRON 4 MG/1
4 TABLET, ORALLY DISINTEGRATING ORAL EVERY 6 HOURS PRN
Status: DISCONTINUED | OUTPATIENT
Start: 2023-10-13 | End: 2023-10-27 | Stop reason: HOSPADM

## 2023-10-13 RX ORDER — PANTOPRAZOLE SODIUM 40 MG/1
40 TABLET, DELAYED RELEASE ORAL DAILY
Status: DISCONTINUED | OUTPATIENT
Start: 2023-10-14 | End: 2023-10-27 | Stop reason: HOSPADM

## 2023-10-13 RX ORDER — CALCIUM CHLORIDE 100 MG/ML
INJECTION INTRAVENOUS; INTRAVENTRICULAR PRN
Status: DISCONTINUED | OUTPATIENT
Start: 2023-10-13 | End: 2023-10-13

## 2023-10-13 RX ORDER — NALOXONE HYDROCHLORIDE 0.4 MG/ML
0.4 INJECTION, SOLUTION INTRAMUSCULAR; INTRAVENOUS; SUBCUTANEOUS
Status: DISCONTINUED | OUTPATIENT
Start: 2023-10-13 | End: 2023-10-27 | Stop reason: HOSPADM

## 2023-10-13 RX ORDER — OXYCODONE HYDROCHLORIDE 10 MG/1
10 TABLET ORAL EVERY 4 HOURS PRN
Status: DISCONTINUED | OUTPATIENT
Start: 2023-10-13 | End: 2023-10-20

## 2023-10-13 RX ORDER — LIDOCAINE 40 MG/G
CREAM TOPICAL
Status: DISCONTINUED | OUTPATIENT
Start: 2023-10-13 | End: 2023-10-25

## 2023-10-13 RX ORDER — OXYCODONE HYDROCHLORIDE 5 MG/1
5 TABLET ORAL EVERY 4 HOURS PRN
Status: DISCONTINUED | OUTPATIENT
Start: 2023-10-13 | End: 2023-10-20

## 2023-10-13 RX ORDER — BISACODYL 10 MG
10 SUPPOSITORY, RECTAL RECTAL DAILY PRN
Status: DISCONTINUED | OUTPATIENT
Start: 2023-10-13 | End: 2023-10-27 | Stop reason: HOSPADM

## 2023-10-13 RX ORDER — LIDOCAINE HYDROCHLORIDE 20 MG/ML
INJECTION, SOLUTION INFILTRATION; PERINEURAL PRN
Status: DISCONTINUED | OUTPATIENT
Start: 2023-10-13 | End: 2023-10-13

## 2023-10-13 RX ORDER — ACETAMINOPHEN 325 MG/1
650 TABLET ORAL EVERY 4 HOURS PRN
Status: DISCONTINUED | OUTPATIENT
Start: 2023-10-16 | End: 2023-10-27 | Stop reason: HOSPADM

## 2023-10-13 RX ORDER — DEXMEDETOMIDINE HYDROCHLORIDE 4 UG/ML
.2-1.2 INJECTION, SOLUTION INTRAVENOUS CONTINUOUS
Status: DISCONTINUED | OUTPATIENT
Start: 2023-10-13 | End: 2023-10-13

## 2023-10-13 RX ORDER — METHOCARBAMOL 500 MG/1
500 TABLET, FILM COATED ORAL EVERY 6 HOURS PRN
Status: DISCONTINUED | OUTPATIENT
Start: 2023-10-13 | End: 2023-10-18

## 2023-10-13 RX ORDER — HYDROMORPHONE HYDROCHLORIDE 1 MG/ML
0.2 INJECTION, SOLUTION INTRAMUSCULAR; INTRAVENOUS; SUBCUTANEOUS
Status: DISCONTINUED | OUTPATIENT
Start: 2023-10-13 | End: 2023-10-18

## 2023-10-13 RX ORDER — NICARDIPINE HCL-0.9% SOD CHLOR 1 MG/10 ML
SYRINGE (ML) INTRAVENOUS PRN
Status: DISCONTINUED | OUTPATIENT
Start: 2023-10-13 | End: 2023-10-13

## 2023-10-13 RX ORDER — VASOPRESSIN IN 0.9 % NACL 2 UNIT/2ML
SYRINGE (ML) INTRAVENOUS PRN
Status: DISCONTINUED | OUTPATIENT
Start: 2023-10-13 | End: 2023-10-13

## 2023-10-13 RX ADMIN — EPHEDRINE SULFATE 10 MG: 5 INJECTION INTRAVENOUS at 16:02

## 2023-10-13 RX ADMIN — Medication 100 MCG: at 16:08

## 2023-10-13 RX ADMIN — SODIUM CHLORIDE, POTASSIUM CHLORIDE, SODIUM LACTATE AND CALCIUM CHLORIDE: 600; 310; 30; 20 INJECTION, SOLUTION INTRAVENOUS at 10:15

## 2023-10-13 RX ADMIN — FENTANYL CITRATE 250 MCG: 50 INJECTION INTRAMUSCULAR; INTRAVENOUS at 12:02

## 2023-10-13 RX ADMIN — GABAPENTIN 100 MG: 100 CAPSULE ORAL at 08:04

## 2023-10-13 RX ADMIN — INSULIN HUMAN 1 UNITS/HR: 1 INJECTION, SOLUTION INTRAVENOUS at 17:32

## 2023-10-13 RX ADMIN — DEXMEDETOMIDINE HYDROCHLORIDE 0.2 MCG/KG/HR: 400 INJECTION INTRAVENOUS at 19:54

## 2023-10-13 RX ADMIN — EPHEDRINE SULFATE 5 MG: 5 INJECTION INTRAVENOUS at 16:19

## 2023-10-13 RX ADMIN — Medication 2 G: at 16:08

## 2023-10-13 RX ADMIN — Medication 50 MG: at 13:41

## 2023-10-13 RX ADMIN — EPHEDRINE SULFATE 5 MG: 5 INJECTION INTRAVENOUS at 09:25

## 2023-10-13 RX ADMIN — Medication 7.5 G: at 10:15

## 2023-10-13 RX ADMIN — CHLORHEXIDINE GLUCONATE 0.12% ORAL RINSE 10 ML: 1.2 LIQUID ORAL at 08:25

## 2023-10-13 RX ADMIN — FENTANYL CITRATE 250 MCG: 50 INJECTION INTRAMUSCULAR; INTRAVENOUS at 14:30

## 2023-10-13 RX ADMIN — EPINEPHRINE 0.05 MCG/KG/MIN: 1 INJECTION INTRAMUSCULAR; INTRAVENOUS; SUBCUTANEOUS at 15:01

## 2023-10-13 RX ADMIN — PROTHROMBIN, COAGULATION FACTOR VII HUMAN, COAGULATION FACTOR IX HUMAN, COAGULATION FACTOR X HUMAN, PROTEIN C, PROTEIN S HUMAN, AND WATER 1100 UNITS: KIT at 15:50

## 2023-10-13 RX ADMIN — SODIUM CHLORIDE, SODIUM GLUCONATE, SODIUM ACETATE, POTASSIUM CHLORIDE AND MAGNESIUM CHLORIDE: 526; 502; 368; 37; 30 INJECTION, SOLUTION INTRAVENOUS at 08:53

## 2023-10-13 RX ADMIN — FENTANYL CITRATE 250 MCG: 50 INJECTION INTRAMUSCULAR; INTRAVENOUS at 15:16

## 2023-10-13 RX ADMIN — NOREPINEPHRINE BITARTRATE 0.03 MCG/KG/MIN: 0.06 INJECTION, SOLUTION INTRAVENOUS at 10:32

## 2023-10-13 RX ADMIN — EPHEDRINE SULFATE 5 MG: 5 INJECTION INTRAVENOUS at 09:57

## 2023-10-13 RX ADMIN — PROTHROMBIN, COAGULATION FACTOR VII HUMAN, COAGULATION FACTOR IX HUMAN, COAGULATION FACTOR X HUMAN, PROTEIN C, PROTEIN S HUMAN, AND WATER 542 UNITS: KIT at 15:51

## 2023-10-13 RX ADMIN — PROTAMINE SULFATE 10 MG: 10 INJECTION, SOLUTION INTRAVENOUS at 15:15

## 2023-10-13 RX ADMIN — Medication 2 G: at 14:09

## 2023-10-13 RX ADMIN — HYDROMORPHONE HYDROCHLORIDE 0.4 MG: 1 INJECTION, SOLUTION INTRAMUSCULAR; INTRAVENOUS; SUBCUTANEOUS at 23:21

## 2023-10-13 RX ADMIN — FENTANYL CITRATE 500 MCG: 50 INJECTION INTRAMUSCULAR; INTRAVENOUS at 10:44

## 2023-10-13 RX ADMIN — SODIUM CHLORIDE 1.25 G/HR: 900 INJECTION, SOLUTION INTRAVENOUS at 11:15

## 2023-10-13 RX ADMIN — CALCIUM CHLORIDE INJECTION 500 MG: 100 INJECTION, SOLUTION INTRAVENOUS at 15:34

## 2023-10-13 RX ADMIN — PROPOFOL 50 MCG/KG/MIN: 10 INJECTION, EMULSION INTRAVENOUS at 16:44

## 2023-10-13 RX ADMIN — Medication 100 MG: at 09:26

## 2023-10-13 RX ADMIN — Medication 150 MCG: at 15:16

## 2023-10-13 RX ADMIN — Medication 150 MCG: at 15:14

## 2023-10-13 RX ADMIN — SODIUM CHLORIDE, SODIUM GLUCONATE, SODIUM ACETATE, POTASSIUM CHLORIDE AND MAGNESIUM CHLORIDE: 526; 502; 368; 37; 30 INJECTION, SOLUTION INTRAVENOUS at 12:06

## 2023-10-13 RX ADMIN — HYDRALAZINE HYDROCHLORIDE 10 MG: 20 INJECTION INTRAMUSCULAR; INTRAVENOUS at 19:56

## 2023-10-13 RX ADMIN — PROTAMINE SULFATE 140 MG: 10 INJECTION, SOLUTION INTRAVENOUS at 15:16

## 2023-10-13 RX ADMIN — HEPARIN SODIUM 34000 UNITS: 1000 INJECTION INTRAVENOUS; SUBCUTANEOUS at 11:25

## 2023-10-13 RX ADMIN — SUGAMMADEX 200 MG: 100 INJECTION, SOLUTION INTRAVENOUS at 17:10

## 2023-10-13 RX ADMIN — INSULIN HUMAN 12 UNITS/HR: 1 INJECTION, SOLUTION INTRAVENOUS at 11:30

## 2023-10-13 RX ADMIN — SODIUM PHOSPHATE, MONOBASIC, MONOHYDRATE AND SODIUM PHOSPHATE, DIBASIC, ANHYDROUS 15 MMOL: 142; 276 INJECTION, SOLUTION INTRAVENOUS at 19:50

## 2023-10-13 RX ADMIN — FENTANYL CITRATE 250 MCG: 50 INJECTION INTRAMUSCULAR; INTRAVENOUS at 13:15

## 2023-10-13 RX ADMIN — Medication 150 MCG: at 15:09

## 2023-10-13 RX ADMIN — VASOPRESSIN 1 UNITS/HR: 20 INJECTION, SOLUTION INTRAMUSCULAR; SUBCUTANEOUS at 15:16

## 2023-10-13 RX ADMIN — NOREPINEPHRINE BITARTRATE 3.2 MCG: 1 INJECTION, SOLUTION, CONCENTRATE INTRAVENOUS at 11:43

## 2023-10-13 RX ADMIN — FENTANYL CITRATE 250 MCG: 50 INJECTION INTRAMUSCULAR; INTRAVENOUS at 16:08

## 2023-10-13 RX ADMIN — LIDOCAINE HYDROCHLORIDE 60 MG: 20 INJECTION, SOLUTION INFILTRATION; PERINEURAL at 09:25

## 2023-10-13 RX ADMIN — Medication 2 G: at 10:15

## 2023-10-13 RX ADMIN — ACETAMINOPHEN 975 MG: 325 TABLET, FILM COATED ORAL at 08:02

## 2023-10-13 RX ADMIN — MIDAZOLAM 2 MG: 1 INJECTION INTRAMUSCULAR; INTRAVENOUS at 09:12

## 2023-10-13 RX ADMIN — CALCIUM CHLORIDE INJECTION 500 MG: 100 INJECTION, SOLUTION INTRAVENOUS at 15:19

## 2023-10-13 RX ADMIN — PROPOFOL 40 MG: 10 INJECTION, EMULSION INTRAVENOUS at 09:28

## 2023-10-13 RX ADMIN — EPHEDRINE SULFATE 5 MG: 5 INJECTION INTRAVENOUS at 16:03

## 2023-10-13 RX ADMIN — SODIUM PHOSPHATE, MONOBASIC, MONOHYDRATE AND SODIUM PHOSPHATE, DIBASIC, ANHYDROUS 15 MMOL: 142; 276 INJECTION, SOLUTION INTRAVENOUS at 21:19

## 2023-10-13 RX ADMIN — FENTANYL CITRATE 250 MCG: 50 INJECTION INTRAMUSCULAR; INTRAVENOUS at 12:53

## 2023-10-13 RX ADMIN — Medication 0.5 UNITS: at 10:53

## 2023-10-13 RX ADMIN — HYDROMORPHONE HYDROCHLORIDE 0.4 MG: 1 INJECTION, SOLUTION INTRAMUSCULAR; INTRAVENOUS; SUBCUTANEOUS at 19:32

## 2023-10-13 RX ADMIN — FENTANYL CITRATE 500 MCG: 50 INJECTION INTRAMUSCULAR; INTRAVENOUS at 09:25

## 2023-10-13 RX ADMIN — VANCOMYCIN HYDROCHLORIDE 1500 MG: 10 INJECTION, POWDER, LYOPHILIZED, FOR SOLUTION INTRAVENOUS at 10:20

## 2023-10-13 RX ADMIN — LIDOCAINE HYDROCHLORIDE 20 MG: 20 INJECTION, SOLUTION INFILTRATION; PERINEURAL at 09:09

## 2023-10-13 RX ADMIN — EPHEDRINE SULFATE 5 MG: 5 INJECTION INTRAVENOUS at 09:28

## 2023-10-13 RX ADMIN — NOREPINEPHRINE BITARTRATE 6.4 MCG: 1 INJECTION, SOLUTION, CONCENTRATE INTRAVENOUS at 11:45

## 2023-10-13 RX ADMIN — VANCOMYCIN HYDROCHLORIDE 1500 MG: 10 INJECTION, POWDER, LYOPHILIZED, FOR SOLUTION INTRAVENOUS at 23:21

## 2023-10-13 RX ADMIN — PROPOFOL 80 MG: 10 INJECTION, EMULSION INTRAVENOUS at 09:25

## 2023-10-13 RX ADMIN — NOREPINEPHRINE BITARTRATE 6.4 MCG: 1 INJECTION, SOLUTION, CONCENTRATE INTRAVENOUS at 11:47

## 2023-10-13 RX ADMIN — FAMOTIDINE 20 MG: 20 TABLET ORAL at 08:04

## 2023-10-13 RX ADMIN — NOREPINEPHRINE BITARTRATE 3.2 MCG: 1 INJECTION, SOLUTION, CONCENTRATE INTRAVENOUS at 10:35

## 2023-10-13 ASSESSMENT — ACTIVITIES OF DAILY LIVING (ADL)
ADLS_ACUITY_SCORE: 37
ADLS_ACUITY_SCORE: 41
ADLS_ACUITY_SCORE: 41
ADLS_ACUITY_SCORE: 37
ADLS_ACUITY_SCORE: 41
ADLS_ACUITY_SCORE: 37

## 2023-10-13 NOTE — ANESTHESIA PROCEDURE NOTES
Central Line/PA Catheter Placement    Pre-Procedure   Staff -        Anesthesiologist:  Leroy Nice MD       Resident/Fellow: Guido Tesfaye MD       Performed By: resident       Location: OR       Pre-Anesthestic Checklist: patient identified, IV checked, site marked, risks and benefits discussed, informed consent, monitors and equipment checked, pre-op evaluation and at physician/surgeon's request  Timeout:       Correct Patient: Yes        Correct Procedure: Yes        Correct Site: Yes        Correct Position: Yes        Correct Laterality: Yes   Line Placement:   This line was placed Post Induction    Procedure   Procedure: central line       Laterality: right       Insertion Site: internal jugular.       Patient Position: supine  Sterile Prep        All elements of maximal sterile barrier technique followed       Patient Prep/Sterile Barriers: draped, hand hygiene, gloves , hat , mask , draped, gown, sterile gel and probe cover       Skin prep: Chloraprep  Insertion/Injection        Technique: ultrasound guided and Seldinger Technique        1. Ultrasound was used to evaluate the access site.       2. Vein evaluated via ultrasound for patency/adequacy.       3. Using real-time ultrasound the needle/catheter was observed entering the artery/vein.       Introducer Type: 9 Fr, 2-lumen MAC        Type: PA/CVC with Introducer       Catheter Size: 9 Fr       Catheter Length: 11.5       Number of Lumens: double lumen       PA Catheter Type: Thermodilution         Appropriate RV, RA and PA waveforms noted:  Yes            Withdrawn and Locked at cm: 55            Balloon down at end of the procedure:   Narrative         Secured by: suture       Tegaderm and Biopatch dressing used.       blood aspirated from all lumens,        All lumens flushed: Yes       Verification method: Ultrasound       Tip termination: right atrium

## 2023-10-13 NOTE — PROGRESS NOTES
Pt arrived from OR to unit. Pt settled and put on Drager ventilator on the following settings per Anesthesia:  Vent Mode: CMV/AC  (Continuous Mandatory Ventilation/ Assist Control)  Resp Rate (Set): 14 breaths/min  Tidal Volume (Set, mL): 500 mL  PEEP (cm H2O): 5 cmH2O  Resp: 16    8.0 ETT secured with ETAD 23 @ lips. No issues at this time.    Erick Coronado, RRT

## 2023-10-13 NOTE — ANESTHESIA PROCEDURE NOTES
Perioperative IHSAN Procedure Note    Staff -        Anesthesiologist:  Leroy Nice MD       Performed By: anesthesiologist  Preanesthesia Checklist:  Patient identified, IV assessed, risks and benefits discussed, monitors and equipment assessed, procedure being performed at surgeon's request and anesthesia consent obtained.    IHSAN Probe Insertion    Probe Status PRE Insertion: NO obvious damage  Probe type:  Adult 3D  Bite block used:   None           Reason: Edentulous  Insertion Technique: Easy, no oropharyngeal manipulation  Insertion complications: None obvious  Billing Report:IHSAN report by Anesthesiologist (See Separate Report note)  Probe Status POST Removal: NO obvious damage    IHSAN Report  General Procedure Information  Images for this study have been archived.  Echocardiographic and Doppler Measurements  Right Ventricle:  Cavity size normal.    Hypertrophy not present.   Thrombus not present.    Global function normal.     Left Ventricle:  Cavity size normal.    Hypertrophy not present.   Thrombus not present.   Global Function normal.       Ventricular Regional Function:  1- Basal Anteroseptal:  normal  2- Basal Anterior:  normal  3- Basal Anterolateral:  normal  4- Basal Inferolateral:  normal  5- Basal Inferior:  normal  6- Basal Inferoseptal:  normal  7- Mid Anteroseptal:  normal  8- Mid Anterior:  normal  9- Mid Anterolateral:  normal  10- Mid Inferolateral:  normal  11- Mid Inferior:  normal  12- Mid Inferoseptal:  normal  13- Apical Anterior:  normal  14- Apical Lateral:  normal  15- Apical Inferior:  normal  16- Apical Septal:  normal  17- [place]:  normal    Valves  Aortic Valve: Annulus normal.  Stenosis not present.  Regurgitation absent.  Leaflets normal.  Leaflet motions normal.    Mitral Valve: Annulus normal.  Stenosis not present.  Regurgitation absent.  Leaflets normal.  Leaflet motions normal.    Tricuspid Valve: Annulus normal.  Stenosis not present.  Regurgitation absent.  Leaflets  normal.  Leaflet motions normal.    Pulmonic Valve: Annulus normal.  Stenosis not present.  Regurgitation absent.      Aorta: Ascending Aorta: Size normal.  Dissection not present.  Plaque thickness less than 3 mm.  Mobile plaque not present.    Aortic Arch: Size normal.    Dissection not present.   Plaque thickness less than 3 mm.   Mobile plaque not present.    Descending Aorta: Size normal.    Dissection not present.   Plaque thickness less than 3 mm.   Mobile plaque not present.      Right Atrium:  Size normal.   Spontaneous echo contrast not present.   Thrombus not present.   Tumor not present.   Device not present.     Left Atrium: Size normal.  Spontaneous echo contrast not present.  Thrombus not present.  Tumor not present.  Device not present.    Left atrial appendage normal.     Atrial Septum: Intra-atrial septal morphology normal.       Ventricular Septum: Intra-ventricular septum morphology normal.          Post Intervention Findings  Procedure(s) performed:  CABG, Aortic Valve Repair/Replace and Ascending Aorta Repair.  Regional wall motion:. Surgeon(s) notified of all postintervention findings: Yes.             Post Intervention comments: Post. Mod LV dysfx without iontropes. Normal with 0.07 epi infusion. Nml RV. Trace MR/TR, no AI, however washing jets presents. Normal aorta. Volume responsive. .    Echocardiogram Comments  Echocardiogram comments: Pre: Borderline normal LVEF with borderline dilated LV. Normal RV Fx. Moderate AI by LVOT colorflow m mode, P 1/2 time. Trace TR/MR/PI. Normal descending aorta and aortic arch. Dilated aortic root and effaced sinus of valsalva ~ 48 mm. No  PFO..
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
## 2023-10-13 NOTE — ANESTHESIA POSTPROCEDURE EVALUATION
Patient: Sukh Craven    Procedure: Procedure(s):  Median Sternotomy, Endoscopic harvest of left great saphenous vein, Left Internal mammary artery harvest, Coronary Artery Bypass Graft x 2, Aortic root and valve replacement using On-X Ascending Aortic Prosthesis with Gelweave Valsalva Graft 25mm, on Cardiopulmonary Bypass, Intraoperative Transesophageal Echocardiogram by Anesthesia Provider       Anesthesia Type:  General    Note:  Disposition: ICU            ICU Sign Out: Anesthesiologist/ICU physician sign out WAS performed   Postop Pain Control: Uneventful            Sign Out: Well controlled pain   PONV: No   Neuro/Psych: Uneventful            Sign Out: Acceptable/Baseline neuro status   Airway/Respiratory: Uneventful            Sign Out: AIRWAY IN SITU/Resp. Support               Airway in situ/Resp. Support: ETT   CV/Hemodynamics: Uneventful            Sign Out: Acceptable CV status   Other NRE: NONE   DID A NON-ROUTINE EVENT OCCUR? No           Last vitals:  Vitals:    10/13/23 0742   BP: 139/61   Pulse: 65   Resp: 16   Temp: 36.7  C (98.1  F)   SpO2: 100%       Electronically Signed By: Leroy Nice MD  October 13, 2023  5:29 PM

## 2023-10-13 NOTE — ANESTHESIA CARE TRANSFER NOTE
Patient: Sukh Craven    Procedure: Procedure(s):  Median Sternotomy, Endoscopic harvest of left great saphenous vein, Left Internal mammary artery harvest, Coronary Artery Bypass Graft x 2, Aortic root and valve replacement using On-X Ascending Aortic Prosthesis with Gelweave Valsalva Graft 25mm, on Cardiopulmonary Bypass, Intraoperative Transesophageal Echocardiogram by Anesthesia Provider       Diagnosis: Severe aortic insufficiency [I35.1]  Diagnosis Additional Information: No value filed.    Anesthesia Type:   General     Note:    Oropharynx: ventilatory support  Level of Consciousness: iatrogenic sedation  Patient oxygen source: ambu.      Dentition: dentition unchanged  Vital Signs Stable: post-procedure vital signs reviewed and stable  Report to RN Given: handoff report given  Patient transferred to: ICU    ICU Handoff: Call for PAUSE to initiate/utilize ICU HANDOFF, Identified Patient, Identified Responsible Provider, Reviewed the Pertinent Medical History, Discussed Surgical Course, Reviewed Intra-OP Anesthesia Management and Issues during Anesthesia, Set Expectations for Post Procedure Period and Allowed Opportunity for Questions and Acknowledgement of Understanding      Vitals:  Vitals Value Taken Time   BP     Temp     Pulse 80 10/13/23 1716   Resp 14 10/13/23 1716   SpO2     Vitals shown include unfiled device data.    Electronically Signed By: HEMAL West CRNA  October 13, 2023  5:17 PM

## 2023-10-13 NOTE — PROGRESS NOTES
Vascular Surgery Progress Note    Mr. Craven is a pleasant 68 year old gentleman who is a patient of Dr. Franco's who has bilateral popliteal aneurysms. His LLE popliteal aneurysm has thrombosed with reconstitution of distal flow, RLE however has 14mm patent aneurysm with some thrombus present. In setting of long procedure with time on bypass, there was possibility, although small given systemic heparinization, that the RLE popliteal aneurysm could thrombose. Therefore, examine patient postop to ensure adequate perfusion of RLE.     Examined patient postoperatively  BLE mildly edematous.  Has palpable R DP pulse, with strong multiphasic signal PT, indicating adequately perfused RLE, highly unlikely that popliteal aneurysm thrombosed.     Discussed this with family at bedside.     Please do not hesitate to call vascular surgery if there are any changes in vascular exam.     Jorge Luis Sethi MD   Vascular Surgery PGY3

## 2023-10-13 NOTE — BRIEF OP NOTE
[facility]    Brief Operative Note    Pre-operative diagnosis: Severe aortic insufficiency [I35.1]  Post-operative diagnosis Same as pre-operative diagnosis    Procedure: Median Sternotomy, Endoscopic harvest of left great saphenous vein, Left Internal mammary artery harvest, Coronary Artery Bypass Graft x 2, Aortic root and valve replacement using On-X Ascending Aortic Prosthesis with Gelweave Valsalva Graft 25mm, on Cardiopulmonary Bypass, Intraoperative Transesophageal Echocardiogram by Anesthesia Provider, N/A - Heart    Surgeon: Surgeon(s) and Role:     * Alexis Lockett MD - Primary     * Oscar Hanson MD - Assisting     * Shane Reid PA-C - Assisting  Anesthesia: General   Estimated Blood Loss: 1000cc    Drains: Mediastinal x2, bilateral pleural opal drains  Specimens:   ID Type Source Tests Collected by Time Destination   1 : Aortic valve leaflets (please assess nodule at stitch) Tissue Other SURGICAL PATHOLOGY EXAM Alexis Lockett MD 10/13/2023 12:11 PM    2 : Ascending Aorta Tissue Aorta SURGICAL PATHOLOGY EXAM Alexis Lockett MD 10/13/2023 12:17 PM      Findings:   See op note .  Complications: None.  Implants:   Implant Name Type Inv. Item Serial No.  Lot No. LRB No. Used Action   IMP PATCH PERICARDIUM PHOTOFIX BOVINE [id number] [id number] - [id number] Bone/Tissue/Biologic IMP PATCH PERICARDIUM PHOTOFIX BOVINE 6X8CM [id number]  [employer] [id number] N/A 1 Implanted   VALVE AORTIC PROSTH ON-X W/VALSALVA GRAFT 25MM [id number] - [id number] Valve VALVE AORTIC PROSTH ON-X W/VALSALVA GRAFT 25MM [id number]-[id number] [employer]  N/A 1 Implanted

## 2023-10-13 NOTE — ANESTHESIA PROCEDURE NOTES
Arterial Line Procedure Note    Pre-Procedure   Staff -        Resident/Fellow: Guido Tesfaye MD       Performed By: resident       Location: OR       Pre-Anesthestic Checklist: patient identified, IV checked, risks and benefits discussed, informed consent, monitors and equipment checked, pre-op evaluation and at physician/surgeon's request  Timeout:       Correct Patient: Yes        Correct Procedure: Yes        Correct Site: Yes        Correct Position: Yes   Line Placement:   This line was placed Pre Induction starting at 10/13/2023 9:07 AM and ending at 10/13/2023 9:17 AM  Procedure   Procedure: arterial line       Laterality: right       Insertion Site: radial.  Sterile Prep        Standard elements of sterile barrier followed       Skin prep: Chloraprep  Insertion/Injection        Technique: ultrasound guided        1. Ultrasound was used to evaluate the access site.       2. Artery evaluated via ultrasound for patency/adequacy.       3. Using real-time ultrasound the needle/catheter was observed entering the artery/vein.       5. The visualized structures were anatomically normal.       6. There were no apparent abnormal pathologic findings.       Catheter Type/Size: 20 G, 12 cm  Narrative         Secured by: suture       Tegaderm dressing used.       Complications: None apparent,        Arterial waveform: Yes

## 2023-10-13 NOTE — PROGRESS NOTES
The Chilled Platelet Study (CHIPS)  IRB: [id number]  : Harriett Branham MD     A pre-procedure CBC was collected in pre-op.  I spoke with Mr Craven after the lab was collected, but prior to available results. He confirmed his willingness to participate.  The pre-op platelet count met treatment eligibility criteria, and Mr Craven is determined to be eligible to participate and in the CHIPS study.

## 2023-10-13 NOTE — ANESTHESIA PROCEDURE NOTES
Airway       Patient location during procedure: OR       Procedure Start/Stop Times: 10/13/2023 9:32 AM  Staff -        Anesthesiologist:  Leroy Nice MD       Resident/Fellow: Guido Tesfaye MD       Performed By: resident  Consent for Airway        Urgency: elective  Indications and Patient Condition       Indications for airway management: niki-procedural       Induction type:intravenous       Mask difficulty assessment: 1 - vent by mask    Final Airway Details       Final airway type: endotracheal airway       Successful airway: ETT - single  Endotracheal Airway Details        ETT size (mm): 8.0       Cuffed: yes       Cuff volume (mL): 8       Successful intubation technique: direct laryngoscopy       DL Blade Type: MAC 4       Grade View of Cords: 2       Adjucts: stylet       Position: Right       Measured from: gums/teeth       Secured at (cm): 23       Bite block used: None    Post intubation assessment        Placement verified by: capnometry        Number of attempts at approach: 1       Number of other approaches attempted: 0       Secured with: pink tape       Ease of procedure: easy       Dentition: Intact    Medication(s) Administered   Medication Administration Time: 10/13/2023 9:32 AM

## 2023-10-14 ENCOUNTER — APPOINTMENT (OUTPATIENT)
Dept: OCCUPATIONAL THERAPY | Facility: CLINIC | Age: 68
DRG: 220 | End: 2023-10-14
Attending: PHYSICIAN ASSISTANT
Payer: COMMERCIAL

## 2023-10-14 ENCOUNTER — APPOINTMENT (OUTPATIENT)
Dept: GENERAL RADIOLOGY | Facility: CLINIC | Age: 68
DRG: 220 | End: 2023-10-14
Attending: PHYSICIAN ASSISTANT
Payer: COMMERCIAL

## 2023-10-14 LAB
ACANTHOCYTES BLD QL SMEAR: NORMAL
ALBUMIN SERPL BCG-MCNC: 3.7 G/DL (ref 3.5–5.2)
ALLEN'S TEST: ABNORMAL
ALLEN'S TEST: ABNORMAL
ALLEN'S TEST: NORMAL
ALP SERPL-CCNC: 57 U/L (ref 40–129)
ALT SERPL W P-5'-P-CCNC: 18 U/L (ref 0–70)
ANION GAP SERPL CALCULATED.3IONS-SCNC: 13 MMOL/L (ref 7–15)
AST SERPL W P-5'-P-CCNC: 79 U/L (ref 0–45)
AUER BODIES BLD QL SMEAR: NORMAL
BASE EXCESS BLDA CALC-SCNC: -1.2 MMOL/L (ref -9–1.8)
BASE EXCESS BLDA CALC-SCNC: -1.5 MMOL/L (ref -9–1.8)
BASE EXCESS BLDA CALC-SCNC: -2.3 MMOL/L (ref -9–1.8)
BASE EXCESS BLDV CALC-SCNC: 0.5 MMOL/L (ref -7.7–1.9)
BASE EXCESS BLDV CALC-SCNC: 0.8 MMOL/L (ref -7.7–1.9)
BASO STIPL BLD QL SMEAR: NORMAL
BILIRUB DIRECT SERPL-MCNC: 0.24 MG/DL (ref 0–0.3)
BILIRUB SERPL-MCNC: 0.7 MG/DL
BITE CELLS BLD QL SMEAR: NORMAL
BLISTER CELLS BLD QL SMEAR: NORMAL
BUN SERPL-MCNC: 10.9 MG/DL (ref 8–23)
BURR CELLS BLD QL SMEAR: NORMAL
CA-I BLD-MCNC: 4.5 MG/DL (ref 4.4–5.2)
CALCIUM SERPL-MCNC: 8.1 MG/DL (ref 8.8–10.2)
CHLORIDE SERPL-SCNC: 107 MMOL/L (ref 98–107)
CREAT SERPL-MCNC: 1.01 MG/DL (ref 0.67–1.17)
DACRYOCYTES BLD QL SMEAR: NORMAL
DEPRECATED HCO3 PLAS-SCNC: 23 MMOL/L (ref 22–29)
EGFRCR SERPLBLD CKD-EPI 2021: 82 ML/MIN/1.73M2
ELLIPTOCYTES BLD QL SMEAR: NORMAL
ERYTHROCYTE [DISTWIDTH] IN BLOOD BY AUTOMATED COUNT: 14.6 % (ref 10–15)
FRAGMENTS BLD QL SMEAR: NORMAL
GLUCOSE BLDC GLUCOMTR-MCNC: 117 MG/DL (ref 70–99)
GLUCOSE BLDC GLUCOMTR-MCNC: 119 MG/DL (ref 70–99)
GLUCOSE BLDC GLUCOMTR-MCNC: 122 MG/DL (ref 70–99)
GLUCOSE BLDC GLUCOMTR-MCNC: 124 MG/DL (ref 70–99)
GLUCOSE BLDC GLUCOMTR-MCNC: 129 MG/DL (ref 70–99)
GLUCOSE BLDC GLUCOMTR-MCNC: 132 MG/DL (ref 70–99)
GLUCOSE BLDC GLUCOMTR-MCNC: 135 MG/DL (ref 70–99)
GLUCOSE BLDC GLUCOMTR-MCNC: 136 MG/DL (ref 70–99)
GLUCOSE BLDC GLUCOMTR-MCNC: 139 MG/DL (ref 70–99)
GLUCOSE BLDC GLUCOMTR-MCNC: 144 MG/DL (ref 70–99)
GLUCOSE BLDC GLUCOMTR-MCNC: 146 MG/DL (ref 70–99)
GLUCOSE BLDC GLUCOMTR-MCNC: 148 MG/DL (ref 70–99)
GLUCOSE BLDC GLUCOMTR-MCNC: 152 MG/DL (ref 70–99)
GLUCOSE BLDC GLUCOMTR-MCNC: 156 MG/DL (ref 70–99)
GLUCOSE BLDC GLUCOMTR-MCNC: 164 MG/DL (ref 70–99)
GLUCOSE BLDC GLUCOMTR-MCNC: 176 MG/DL (ref 70–99)
GLUCOSE BLDC GLUCOMTR-MCNC: 85 MG/DL (ref 70–99)
GLUCOSE SERPL-MCNC: 131 MG/DL (ref 70–99)
HCO3 BLD-SCNC: 22 MMOL/L (ref 21–28)
HCO3 BLD-SCNC: 23 MMOL/L (ref 21–28)
HCO3 BLD-SCNC: 23 MMOL/L (ref 21–28)
HCO3 BLDV-SCNC: 26 MMOL/L (ref 21–28)
HCO3 BLDV-SCNC: 27 MMOL/L (ref 21–28)
HCT VFR BLD AUTO: 24.6 % (ref 40–53)
HGB BLD-MCNC: 10.6 G/DL (ref 13.3–17.7)
HGB BLD-MCNC: 8.2 G/DL (ref 13.3–17.7)
HGB C CRYSTALS: NORMAL
HOWELL-JOLLY BOD BLD QL SMEAR: NORMAL
MAGNESIUM SERPL-MCNC: 1.3 MG/DL (ref 1.7–2.3)
MAGNESIUM SERPL-MCNC: 3.2 MG/DL (ref 1.7–2.3)
MCH RBC QN AUTO: 30.1 PG (ref 26.5–33)
MCHC RBC AUTO-ENTMCNC: 33.3 G/DL (ref 31.5–36.5)
MCV RBC AUTO: 90 FL (ref 78–100)
NEUTS HYPERSEG BLD QL SMEAR: NORMAL
O2/TOTAL GAS SETTING VFR VENT: 21 %
O2/TOTAL GAS SETTING VFR VENT: 21 %
O2/TOTAL GAS SETTING VFR VENT: 28 %
O2/TOTAL GAS SETTING VFR VENT: 32 %
O2/TOTAL GAS SETTING VFR VENT: 40 %
OXYHGB MFR BLD: 93 % (ref 92–100)
OXYHGB MFR BLD: 97 % (ref 92–100)
OXYHGB MFR BLD: 99 % (ref 92–100)
OXYHGB MFR BLDV: 57 % (ref 70–75)
OXYHGB MFR BLDV: 68 % (ref 70–75)
PCO2 BLD: 37 MM HG (ref 35–45)
PCO2 BLD: 38 MM HG (ref 35–45)
PCO2 BLD: 39 MM HG (ref 35–45)
PCO2 BLDV: 46 MM HG (ref 40–50)
PCO2 BLDV: 49 MM HG (ref 40–50)
PH BLD: 7.38 [PH] (ref 7.35–7.45)
PH BLD: 7.39 [PH] (ref 7.35–7.45)
PH BLD: 7.41 [PH] (ref 7.35–7.45)
PH BLDV: 7.35 [PH] (ref 7.32–7.43)
PH BLDV: 7.36 [PH] (ref 7.32–7.43)
PHOSPHATE SERPL-MCNC: 2.1 MG/DL (ref 2.5–4.5)
PLAT MORPH BLD: NORMAL
PLATELET # BLD AUTO: 96 10E3/UL (ref 150–450)
PO2 BLD: 131 MM HG (ref 80–105)
PO2 BLD: 69 MM HG (ref 80–105)
PO2 BLD: 96 MM HG (ref 80–105)
PO2 BLDV: 34 MM HG (ref 25–47)
PO2 BLDV: 39 MM HG (ref 25–47)
POLYCHROMASIA BLD QL SMEAR: NORMAL
POTASSIUM SERPL-SCNC: 4.2 MMOL/L (ref 3.4–5.3)
PROT SERPL-MCNC: 6 G/DL (ref 6.4–8.3)
RBC # BLD AUTO: 2.72 10E6/UL (ref 4.4–5.9)
RBC AGGLUT BLD QL: NORMAL
RBC MORPH BLD: NORMAL
ROULEAUX BLD QL SMEAR: NORMAL
SICKLE CELLS BLD QL SMEAR: NORMAL
SMUDGE CELLS BLD QL SMEAR: NORMAL
SODIUM SERPL-SCNC: 143 MMOL/L (ref 135–145)
SPHEROCYTES BLD QL SMEAR: NORMAL
STOMATOCYTES BLD QL SMEAR: NORMAL
TARGETS BLD QL SMEAR: NORMAL
TOXIC GRANULES BLD QL SMEAR: NORMAL
VARIANT LYMPHS BLD QL SMEAR: NORMAL
WBC # BLD AUTO: 11.3 10E3/UL (ref 4–11)

## 2023-10-14 PROCEDURE — 83735 ASSAY OF MAGNESIUM: CPT | Performed by: PHYSICIAN ASSISTANT

## 2023-10-14 PROCEDURE — 82805 BLOOD GASES W/O2 SATURATION: CPT | Performed by: THORACIC SURGERY (CARDIOTHORACIC VASCULAR SURGERY)

## 2023-10-14 PROCEDURE — 84100 ASSAY OF PHOSPHORUS: CPT | Performed by: THORACIC SURGERY (CARDIOTHORACIC VASCULAR SURGERY)

## 2023-10-14 PROCEDURE — 250N000011 HC RX IP 250 OP 636: Performed by: THORACIC SURGERY (CARDIOTHORACIC VASCULAR SURGERY)

## 2023-10-14 PROCEDURE — 250N000011 HC RX IP 250 OP 636: Mod: JZ | Performed by: NURSE PRACTITIONER

## 2023-10-14 PROCEDURE — 250N000013 HC RX MED GY IP 250 OP 250 PS 637: Performed by: PHYSICIAN ASSISTANT

## 2023-10-14 PROCEDURE — 97165 OT EVAL LOW COMPLEX 30 MIN: CPT | Mod: GO | Performed by: OCCUPATIONAL THERAPIST

## 2023-10-14 PROCEDURE — 250N000011 HC RX IP 250 OP 636: Mod: JZ | Performed by: PHYSICIAN ASSISTANT

## 2023-10-14 PROCEDURE — 97530 THERAPEUTIC ACTIVITIES: CPT | Mod: GO | Performed by: OCCUPATIONAL THERAPIST

## 2023-10-14 PROCEDURE — 85018 HEMOGLOBIN: CPT

## 2023-10-14 PROCEDURE — 83735 ASSAY OF MAGNESIUM: CPT | Performed by: THORACIC SURGERY (CARDIOTHORACIC VASCULAR SURGERY)

## 2023-10-14 PROCEDURE — 250N000009 HC RX 250: Performed by: THORACIC SURGERY (CARDIOTHORACIC VASCULAR SURGERY)

## 2023-10-14 PROCEDURE — 258N000003 HC RX IP 258 OP 636: Performed by: PHYSICIAN ASSISTANT

## 2023-10-14 PROCEDURE — 82330 ASSAY OF CALCIUM: CPT | Performed by: PHYSICIAN ASSISTANT

## 2023-10-14 PROCEDURE — 258N000003 HC RX IP 258 OP 636: Performed by: THORACIC SURGERY (CARDIOTHORACIC VASCULAR SURGERY)

## 2023-10-14 PROCEDURE — 80048 BASIC METABOLIC PNL TOTAL CA: CPT | Performed by: THORACIC SURGERY (CARDIOTHORACIC VASCULAR SURGERY)

## 2023-10-14 PROCEDURE — 71045 X-RAY EXAM CHEST 1 VIEW: CPT | Mod: 26 | Performed by: RADIOLOGY

## 2023-10-14 PROCEDURE — 250N000011 HC RX IP 250 OP 636: Mod: JZ

## 2023-10-14 PROCEDURE — 200N000002 HC R&B ICU UMMC

## 2023-10-14 PROCEDURE — 97535 SELF CARE MNGMENT TRAINING: CPT | Mod: GO | Performed by: OCCUPATIONAL THERAPIST

## 2023-10-14 PROCEDURE — 93010 ELECTROCARDIOGRAM REPORT: CPT | Performed by: INTERNAL MEDICINE

## 2023-10-14 PROCEDURE — 85027 COMPLETE CBC AUTOMATED: CPT | Performed by: PHYSICIAN ASSISTANT

## 2023-10-14 PROCEDURE — 82805 BLOOD GASES W/O2 SATURATION: CPT | Performed by: PHYSICIAN ASSISTANT

## 2023-10-14 PROCEDURE — 71045 X-RAY EXAM CHEST 1 VIEW: CPT

## 2023-10-14 PROCEDURE — 93005 ELECTROCARDIOGRAM TRACING: CPT

## 2023-10-14 RX ORDER — ACETYLCYSTEINE 200 MG/ML
2 SOLUTION ORAL; RESPIRATORY (INHALATION) EVERY 4 HOURS
Status: DISCONTINUED | OUTPATIENT
Start: 2023-10-15 | End: 2023-10-14

## 2023-10-14 RX ORDER — DEXTROSE MONOHYDRATE 25 G/50ML
25-50 INJECTION, SOLUTION INTRAVENOUS
Status: DISCONTINUED | OUTPATIENT
Start: 2023-10-14 | End: 2023-10-14

## 2023-10-14 RX ORDER — ACETYLCYSTEINE 200 MG/ML
2 SOLUTION ORAL; RESPIRATORY (INHALATION)
Status: DISCONTINUED | OUTPATIENT
Start: 2023-10-15 | End: 2023-10-16

## 2023-10-14 RX ORDER — ALBUTEROL SULFATE 0.83 MG/ML
2.5 SOLUTION RESPIRATORY (INHALATION)
Status: DISCONTINUED | OUTPATIENT
Start: 2023-10-15 | End: 2023-10-16

## 2023-10-14 RX ORDER — MAGNESIUM SULFATE HEPTAHYDRATE 40 MG/ML
4 INJECTION, SOLUTION INTRAVENOUS ONCE
Status: COMPLETED | OUTPATIENT
Start: 2023-10-14 | End: 2023-10-14

## 2023-10-14 RX ORDER — HYDROXYZINE HYDROCHLORIDE 25 MG/1
25 TABLET, FILM COATED ORAL EVERY 6 HOURS PRN
Status: DISCONTINUED | OUTPATIENT
Start: 2023-10-14 | End: 2023-10-24

## 2023-10-14 RX ORDER — NICOTINE POLACRILEX 4 MG
15-30 LOZENGE BUCCAL
Status: DISCONTINUED | OUTPATIENT
Start: 2023-10-14 | End: 2023-10-14

## 2023-10-14 RX ORDER — FUROSEMIDE 10 MG/ML
20 INJECTION INTRAMUSCULAR; INTRAVENOUS ONCE
Status: COMPLETED | OUTPATIENT
Start: 2023-10-14 | End: 2023-10-14

## 2023-10-14 RX ORDER — ALBUTEROL SULFATE 0.83 MG/ML
2.5 SOLUTION RESPIRATORY (INHALATION)
Status: DISCONTINUED | OUTPATIENT
Start: 2023-10-14 | End: 2023-10-14

## 2023-10-14 RX ORDER — POTASSIUM PHOS IN 0.9 % NACL 15MMOL/250
15 PLASTIC BAG, INJECTION (ML) INTRAVENOUS ONCE
Status: COMPLETED | OUTPATIENT
Start: 2023-10-14 | End: 2023-10-14

## 2023-10-14 RX ORDER — HYDROXYZINE HYDROCHLORIDE 25 MG/1
50 TABLET, FILM COATED ORAL EVERY 6 HOURS PRN
Status: DISCONTINUED | OUTPATIENT
Start: 2023-10-14 | End: 2023-10-24

## 2023-10-14 RX ORDER — FUROSEMIDE 10 MG/ML
10 INJECTION INTRAMUSCULAR; INTRAVENOUS ONCE
Status: COMPLETED | OUTPATIENT
Start: 2023-10-14 | End: 2023-10-14

## 2023-10-14 RX ADMIN — HEPARIN SODIUM 5000 UNITS: 5000 INJECTION, SOLUTION INTRAVENOUS; SUBCUTANEOUS at 20:12

## 2023-10-14 RX ADMIN — FUROSEMIDE 10 MG: 10 INJECTION, SOLUTION INTRAMUSCULAR; INTRAVENOUS at 04:20

## 2023-10-14 RX ADMIN — CEFAZOLIN SODIUM 2 G: 2 INJECTION, SOLUTION INTRAVENOUS at 07:36

## 2023-10-14 RX ADMIN — POTASSIUM PHOSPHATE, MONOBASIC POTASSIUM PHOSPHATE, DIBASIC 15 MMOL: 224; 236 INJECTION, SOLUTION, CONCENTRATE INTRAVENOUS at 04:17

## 2023-10-14 RX ADMIN — SENNOSIDES AND DOCUSATE SODIUM 1 TABLET: 50; 8.6 TABLET ORAL at 07:36

## 2023-10-14 RX ADMIN — FUROSEMIDE 20 MG: 10 INJECTION, SOLUTION INTRAMUSCULAR; INTRAVENOUS at 16:11

## 2023-10-14 RX ADMIN — HYDROMORPHONE HYDROCHLORIDE 0.4 MG: 1 INJECTION, SOLUTION INTRAMUSCULAR; INTRAVENOUS; SUBCUTANEOUS at 01:09

## 2023-10-14 RX ADMIN — ASPIRIN 81 MG CHEWABLE TABLET 81 MG: 81 TABLET CHEWABLE at 07:36

## 2023-10-14 RX ADMIN — HYDRALAZINE HYDROCHLORIDE 10 MG: 20 INJECTION INTRAMUSCULAR; INTRAVENOUS at 00:57

## 2023-10-14 RX ADMIN — CEFAZOLIN SODIUM 2 G: 2 INJECTION, SOLUTION INTRAVENOUS at 15:27

## 2023-10-14 RX ADMIN — METHOCARBAMOL 500 MG: 500 TABLET ORAL at 07:35

## 2023-10-14 RX ADMIN — HEPARIN SODIUM 5000 UNITS: 5000 INJECTION, SOLUTION INTRAVENOUS; SUBCUTANEOUS at 11:37

## 2023-10-14 RX ADMIN — SENNOSIDES AND DOCUSATE SODIUM 1 TABLET: 50; 8.6 TABLET ORAL at 20:12

## 2023-10-14 RX ADMIN — METHOCARBAMOL 500 MG: 500 TABLET ORAL at 17:02

## 2023-10-14 RX ADMIN — OXYCODONE HYDROCHLORIDE 10 MG: 10 TABLET ORAL at 01:35

## 2023-10-14 RX ADMIN — FUROSEMIDE 20 MG: 10 INJECTION, SOLUTION INTRAMUSCULAR; INTRAVENOUS at 23:14

## 2023-10-14 RX ADMIN — HYDROMORPHONE HYDROCHLORIDE 0.4 MG: 1 INJECTION, SOLUTION INTRAMUSCULAR; INTRAVENOUS; SUBCUTANEOUS at 03:28

## 2023-10-14 RX ADMIN — ACETAMINOPHEN 975 MG: 325 TABLET, FILM COATED ORAL at 09:02

## 2023-10-14 RX ADMIN — HYDROMORPHONE HYDROCHLORIDE 0.4 MG: 1 INJECTION, SOLUTION INTRAMUSCULAR; INTRAVENOUS; SUBCUTANEOUS at 06:15

## 2023-10-14 RX ADMIN — HYDRALAZINE HYDROCHLORIDE 10 MG: 20 INJECTION INTRAMUSCULAR; INTRAVENOUS at 23:15

## 2023-10-14 RX ADMIN — OXYCODONE HYDROCHLORIDE 5 MG: 5 TABLET ORAL at 16:12

## 2023-10-14 RX ADMIN — HYDROMORPHONE HYDROCHLORIDE 0.4 MG: 1 INJECTION, SOLUTION INTRAMUSCULAR; INTRAVENOUS; SUBCUTANEOUS at 09:02

## 2023-10-14 RX ADMIN — OXYCODONE HYDROCHLORIDE 10 MG: 10 TABLET ORAL at 07:36

## 2023-10-14 RX ADMIN — VANCOMYCIN HYDROCHLORIDE 1500 MG: 10 INJECTION, POWDER, LYOPHILIZED, FOR SOLUTION INTRAVENOUS at 10:30

## 2023-10-14 RX ADMIN — PANTOPRAZOLE SODIUM 40 MG: 40 TABLET, DELAYED RELEASE ORAL at 07:36

## 2023-10-14 RX ADMIN — HYDROMORPHONE HYDROCHLORIDE 0.2 MG: 1 INJECTION, SOLUTION INTRAMUSCULAR; INTRAVENOUS; SUBCUTANEOUS at 23:44

## 2023-10-14 RX ADMIN — ONDANSETRON 4 MG: 2 INJECTION INTRAMUSCULAR; INTRAVENOUS at 02:24

## 2023-10-14 RX ADMIN — HYDRALAZINE HYDROCHLORIDE 10 MG: 20 INJECTION INTRAMUSCULAR; INTRAVENOUS at 02:20

## 2023-10-14 RX ADMIN — MAGNESIUM SULFATE IN WATER 4 G: 40 INJECTION, SOLUTION INTRAVENOUS at 03:31

## 2023-10-14 RX ADMIN — POLYETHYLENE GLYCOL 3350 17 G: 17 POWDER, FOR SOLUTION ORAL at 07:36

## 2023-10-14 RX ADMIN — CEFAZOLIN SODIUM 2 G: 2 INJECTION, SOLUTION INTRAVENOUS at 00:44

## 2023-10-14 RX ADMIN — ACETAMINOPHEN 975 MG: 325 TABLET, FILM COATED ORAL at 16:12

## 2023-10-14 RX ADMIN — ACETAMINOPHEN 975 MG: 325 TABLET, FILM COATED ORAL at 01:35

## 2023-10-14 RX ADMIN — OXYCODONE HYDROCHLORIDE 10 MG: 10 TABLET ORAL at 20:12

## 2023-10-14 ASSESSMENT — ACTIVITIES OF DAILY LIVING (ADL)
ADLS_ACUITY_SCORE: 27
PREVIOUS_RESPONSIBILITIES: MEAL PREP;HOUSEKEEPING;LAUNDRY;SHOPPING;YARDWORK;MEDICATION MANAGEMENT;FINANCES;DRIVING
ADLS_ACUITY_SCORE: 42
ADLS_ACUITY_SCORE: 41
ADLS_ACUITY_SCORE: 41
ADLS_ACUITY_SCORE: 27
ADLS_ACUITY_SCORE: 41
ADLS_ACUITY_SCORE: 27
ADLS_ACUITY_SCORE: 41
ADLS_ACUITY_SCORE: 41

## 2023-10-14 NOTE — PROGRESS NOTES
CLINICAL NUTRITION SERVICES - BRIEF NOTE    Received provider consult for nutrition education with comments post op cardiovascular surgery (automatic consult on post-op order set). S/p CABGx2 and AVR on 10/13. Nutrition education to be completed as able/appropriate (as pt s/p CABG and/or initial VAD).    RD will follow per LOS protocol or if re-consulted.     Bernadine Dumont, MS, RD, LD  4A (CVICU) RD pager: [phone]  Ascom: [id number]  Weekend/Holiday RD pager: [phone]

## 2023-10-14 NOTE — PLAN OF CARE
Goal Outcome Evaluation:    Major Shift Events: Pt in irregular rhythm with frequent PVCs/PACs. 12 Lead EKG shows sinus arrhythmia vs. Sinus bradycardia. CVTS turned off pacer as pt having lots of ectopy and not capturing even though pacer set to max mA. Afebrile. Hypertensive SBP ranging from 120-150's. Weaned to room air, pt able to cough up secretions and deep breath with splinting. Coarse lung sounds. Became very lethargic after giving IV dilaudid this AM. Will try to utilize non-narcotic pain management. Up to the chair with 1-2 assist. Rodriguez in place with low UOP this afternoon. CVTS ordered 20mg of lasix. No BM, no flatus. On regular diet, poor appetite. CT x4 with bloody output. Removed swan. Insulin gtt on and off d/t adequate BG's. Family at bedside all day.      Plan: Pain control. Floor tomorrow if continues to progress.    For vital signs and complete assessments, please see documentation flowsheets.

## 2023-10-14 NOTE — PROGRESS NOTES
10/14/23 0900   Appointment Info   Signing Clinician's Name / Credentials (OT) Roselia Luo OTR/L   Rehab Comments (OT) sternal   Living Environment   People in Home other relative(s);sibling(s);child(marietta), adult;significant other  (neice, brother sister, Gf and son)   Current Living Arrangements house   Home Accessibility stairs to enter home   Number of Stairs, Main Entrance 8  (from car (3 are inside))   Stair Railings, Main Entrance none  (3 within the house have a railing)   Transportation Anticipated family or friend will provide   Living Environment Comments 8 [address] then can stay on one level.  Drives but has family to drive for him.  Family from out of town staying with him post op.  Typically lives alone.   Self-Care   Usual Activity Tolerance excellent   Current Activity Tolerance fair   Regular Exercise Yes   Activity/Exercise Type team sports  (basketball)   Exercise Amount/Frequency 3-5 times/wk   Equipment Currently Used at Home none   Fall history within last six months no   Activity/Exercise/Self-Care Comment has good energy, plays basketball 3x a week. Retired   Instrumental Activities of Daily Living (IADL)   Previous Responsibilities meal prep;housekeeping;laundry;shopping;yardwork;medication management;finances;driving   General Information   Onset of Illness/Injury or Date of Surgery 10/13/23   Referring Physician Dr Lockett   Patient/Family Therapy Goal Statement (OT) dc home w family   Additional Occupational Profile Info/Pertinent History of Current Problem 68 year old male with PMH of severe aortic insufficiency 2/2 sinotubular aortic root dilitation (sinus of valsalva 5.3 cm),  CAD (midRCA 90% stenosed, dCx 35% stenosed, midLAD 70% stenosed), PAD (mild-mod left lower extremity), bilateral popliteal aneurysms, HTN, T2DM, and chronic opioid/cannabis use, who is s/p AVR and CABG x2  on 10/13 by Dr. Lockett.   Performance Patterns (Routines, Roles, Habits) retired, plays basketball, close  to family from out of state   Existing Precautions/Restrictions oxygen therapy device and L/min;sternal;cardiac   Limitations/Impairments safety/cognitive   General Observations and Info activity up w A   Cognitive Status Examination   Orientation Status orientation to person, place and time   Affect/Mental Status (Cognitive) low arousal/lethargic   Visual Perception   Visual Impairment/Limitations WFL   Sensory   Sensory Quick Adds sensation intact   Pain Assessment   Patient Currently in Pain Yes, see Vital Sign flowsheet   Posture   Posture Comments hunched posture   Range of Motion Comprehensive   Comment, General Range of Motion WFL NT formally   Strength Comprehensive (MMT)   Comment, General Manual Muscle Testing (MMT) Assessment NT   Coordination   Fine Motor Coordination deficits noted, likely due to alertness   Bed Mobility   Comment (Bed Mobility) per clinical judgement anticipate need for assist due to precautions, pain and lines.   Transfers   Transfer Comments per clinical judgement anticipate need for assist due to precautions, pain and lines.   Balance   Balance Comments per clinical judgement anticipate need for assist due to precautions, pain and lines.   Activities of Daily Living   BADL Assessment/Intervention lower body dressing;grooming;toileting   Lower Body Dressing Assessment/Training   Comment, (Lower Body Dressing) per clinical judgement anticipate need for assist due to precautions, pain and lines.   Grooming Assessment/Training   Comment, (Grooming) per clinical judgement anticipate need for assist due to precautions, pain and lines.   Toileting   Comment, (Toileting) per clinical judgement anticipate need for assist due to precautions, pain and lines.   Clinical Impression   Criteria for Skilled Therapeutic Interventions Met (OT) Yes, treatment indicated   OT Diagnosis new precautions, decreased IND w ADL   OT Problem List-Impairments impacting ADL problems related to;activity tolerance  impaired;balance;cognition;pain;post-surgical precautions;postural control   Assessment of Occupational Performance 5 or more Performance Deficits   Identified Performance Deficits all ADL and IADLsare affected at this time   Planned Therapy Interventions (OT) ADL retraining;risk factor education;progressive activity/exercise;home program guidelines;transfer training;bed mobility training;cognition   Clinical Decision Making Complexity (OT) problem focused assessment/low complexity   Risk & Benefits of therapy have been explained daughter;patient;participants included;participants voiced agreement with care plan;current/potential barriers reviewed;risks/benefits reviewed;care plan/treatment goals reviewed;evaluation/treatment results reviewed   OT Total Evaluation Time   OT Eval, Low Complexity Minutes (60131) 6   OT Discharge Planning   OT Discharge Recommendation (DC Rec) home with assist;home with outpatient cardiac rehab   OT Rationale for DC Rec Pt below baseline with new precautions pain and decreased alertness.  Anticipate when mediclaly ready pt will be able to dc to his home w family assist and OP CR>

## 2023-10-14 NOTE — PROGRESS NOTES
Major shifts updates: Pressors off. Extubated around 2300 to 4-6L via oxy mask. 10mg Lasix x1 for CVP-15. Passed BS, tolerating small sips/PO meds Dex paused. Pain mgmt has been a challenge. VSS. NAD noted at this time. Nursing will cont. To monitor and report to team any changes (see flowsheet for comprehensive assessment and vitals)
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
## 2023-10-15 ENCOUNTER — APPOINTMENT (OUTPATIENT)
Dept: GENERAL RADIOLOGY | Facility: CLINIC | Age: 68
DRG: 220 | End: 2023-10-15
Attending: STUDENT IN AN ORGANIZED HEALTH CARE EDUCATION/TRAINING PROGRAM
Payer: COMMERCIAL

## 2023-10-15 ENCOUNTER — APPOINTMENT (OUTPATIENT)
Dept: OCCUPATIONAL THERAPY | Facility: CLINIC | Age: 68
DRG: 220 | End: 2023-10-15
Attending: THORACIC SURGERY (CARDIOTHORACIC VASCULAR SURGERY)
Payer: COMMERCIAL

## 2023-10-15 ENCOUNTER — APPOINTMENT (OUTPATIENT)
Dept: GENERAL RADIOLOGY | Facility: CLINIC | Age: 68
DRG: 220 | End: 2023-10-15
Attending: NURSE PRACTITIONER
Payer: COMMERCIAL

## 2023-10-15 LAB
ANION GAP SERPL CALCULATED.3IONS-SCNC: 11 MMOL/L (ref 7–15)
BUN SERPL-MCNC: 20.6 MG/DL (ref 8–23)
CA-I BLD-MCNC: 4.4 MG/DL (ref 4.4–5.2)
CALCIUM SERPL-MCNC: 8.3 MG/DL (ref 8.8–10.2)
CHLORIDE SERPL-SCNC: 104 MMOL/L (ref 98–107)
CREAT SERPL-MCNC: 1.54 MG/DL (ref 0.67–1.17)
DEPRECATED HCO3 PLAS-SCNC: 24 MMOL/L (ref 22–29)
EGFRCR SERPLBLD CKD-EPI 2021: 49 ML/MIN/1.73M2
ERYTHROCYTE [DISTWIDTH] IN BLOOD BY AUTOMATED COUNT: 14.8 % (ref 10–15)
GLUCOSE BLDC GLUCOMTR-MCNC: 113 MG/DL (ref 70–99)
GLUCOSE BLDC GLUCOMTR-MCNC: 124 MG/DL (ref 70–99)
GLUCOSE BLDC GLUCOMTR-MCNC: 133 MG/DL (ref 70–99)
GLUCOSE BLDC GLUCOMTR-MCNC: 141 MG/DL (ref 70–99)
GLUCOSE BLDC GLUCOMTR-MCNC: 148 MG/DL (ref 70–99)
GLUCOSE BLDC GLUCOMTR-MCNC: 148 MG/DL (ref 70–99)
GLUCOSE BLDC GLUCOMTR-MCNC: 152 MG/DL (ref 70–99)
GLUCOSE SERPL-MCNC: 155 MG/DL (ref 70–99)
HCT VFR BLD AUTO: 29.5 % (ref 40–53)
HGB BLD-MCNC: 9.8 G/DL (ref 13.3–17.7)
INR PPP: 1.37 (ref 0.85–1.15)
LACTATE SERPL-SCNC: 2.6 MMOL/L (ref 0.7–2)
MAGNESIUM SERPL-MCNC: 2.5 MG/DL (ref 1.7–2.3)
MCH RBC QN AUTO: 29.9 PG (ref 26.5–33)
MCHC RBC AUTO-ENTMCNC: 33.2 G/DL (ref 31.5–36.5)
MCV RBC AUTO: 90 FL (ref 78–100)
PHOSPHATE SERPL-MCNC: 3.8 MG/DL (ref 2.5–4.5)
PLATELET # BLD AUTO: 107 10E3/UL (ref 150–450)
POTASSIUM SERPL-SCNC: 3.9 MMOL/L (ref 3.4–5.3)
RBC # BLD AUTO: 3.28 10E6/UL (ref 4.4–5.9)
SODIUM SERPL-SCNC: 139 MMOL/L (ref 135–145)
WBC # BLD AUTO: 17.8 10E3/UL (ref 4–11)

## 2023-10-15 PROCEDURE — 71045 X-RAY EXAM CHEST 1 VIEW: CPT | Mod: 26 | Performed by: RADIOLOGY

## 2023-10-15 PROCEDURE — 250N000011 HC RX IP 250 OP 636: Performed by: PHYSICIAN ASSISTANT

## 2023-10-15 PROCEDURE — 250N000013 HC RX MED GY IP 250 OP 250 PS 637: Performed by: PHYSICIAN ASSISTANT

## 2023-10-15 PROCEDURE — 83735 ASSAY OF MAGNESIUM: CPT | Performed by: THORACIC SURGERY (CARDIOTHORACIC VASCULAR SURGERY)

## 2023-10-15 PROCEDURE — 94640 AIRWAY INHALATION TREATMENT: CPT | Mod: 76

## 2023-10-15 PROCEDURE — 94640 AIRWAY INHALATION TREATMENT: CPT

## 2023-10-15 PROCEDURE — 250N000011 HC RX IP 250 OP 636: Mod: JZ | Performed by: STUDENT IN AN ORGANIZED HEALTH CARE EDUCATION/TRAINING PROGRAM

## 2023-10-15 PROCEDURE — 120N000003 HC R&B IMCU UMMC

## 2023-10-15 PROCEDURE — 83605 ASSAY OF LACTIC ACID: CPT | Performed by: THORACIC SURGERY (CARDIOTHORACIC VASCULAR SURGERY)

## 2023-10-15 PROCEDURE — 94660 CPAP INITIATION&MGMT: CPT

## 2023-10-15 PROCEDURE — 85610 PROTHROMBIN TIME: CPT

## 2023-10-15 PROCEDURE — 250N000013 HC RX MED GY IP 250 OP 250 PS 637: Performed by: NURSE PRACTITIONER

## 2023-10-15 PROCEDURE — 250N000009 HC RX 250: Performed by: NURSE PRACTITIONER

## 2023-10-15 PROCEDURE — 99222 1ST HOSP IP/OBS MODERATE 55: CPT | Mod: GC | Performed by: INTERNAL MEDICINE

## 2023-10-15 PROCEDURE — 97110 THERAPEUTIC EXERCISES: CPT | Mod: GO | Performed by: OCCUPATIONAL THERAPIST

## 2023-10-15 PROCEDURE — 71045 X-RAY EXAM CHEST 1 VIEW: CPT

## 2023-10-15 PROCEDURE — 250N000013 HC RX MED GY IP 250 OP 250 PS 637: Performed by: THORACIC SURGERY (CARDIOTHORACIC VASCULAR SURGERY)

## 2023-10-15 PROCEDURE — 999N000065 XR CHEST PORT 1 VIEW

## 2023-10-15 PROCEDURE — 82330 ASSAY OF CALCIUM: CPT | Performed by: PHYSICIAN ASSISTANT

## 2023-10-15 PROCEDURE — 80048 BASIC METABOLIC PNL TOTAL CA: CPT

## 2023-10-15 PROCEDURE — 250N000012 HC RX MED GY IP 250 OP 636 PS 637: Performed by: NURSE PRACTITIONER

## 2023-10-15 PROCEDURE — 84100 ASSAY OF PHOSPHORUS: CPT | Performed by: THORACIC SURGERY (CARDIOTHORACIC VASCULAR SURGERY)

## 2023-10-15 PROCEDURE — 250N000013 HC RX MED GY IP 250 OP 250 PS 637

## 2023-10-15 PROCEDURE — 999N000157 HC STATISTIC RCP TIME EA 10 MIN

## 2023-10-15 PROCEDURE — 97535 SELF CARE MNGMENT TRAINING: CPT | Mod: GO | Performed by: OCCUPATIONAL THERAPIST

## 2023-10-15 PROCEDURE — 250N000011 HC RX IP 250 OP 636: Mod: JZ

## 2023-10-15 PROCEDURE — 85027 COMPLETE CBC AUTOMATED: CPT

## 2023-10-15 RX ORDER — CARVEDILOL 6.25 MG/1
6.25 TABLET ORAL 2 TIMES DAILY WITH MEALS
Status: DISCONTINUED | OUTPATIENT
Start: 2023-10-15 | End: 2023-10-18

## 2023-10-15 RX ORDER — FUROSEMIDE 10 MG/ML
40 INJECTION INTRAMUSCULAR; INTRAVENOUS EVERY 6 HOURS
Status: COMPLETED | OUTPATIENT
Start: 2023-10-15 | End: 2023-10-15

## 2023-10-15 RX ORDER — WARFARIN SODIUM 2.5 MG/1
2.5 TABLET ORAL
Status: COMPLETED | OUTPATIENT
Start: 2023-10-15 | End: 2023-10-15

## 2023-10-15 RX ORDER — POLYETHYLENE GLYCOL 3350 17 G/17G
17 POWDER, FOR SOLUTION ORAL 2 TIMES DAILY
Status: DISCONTINUED | OUTPATIENT
Start: 2023-10-15 | End: 2023-10-23

## 2023-10-15 RX ORDER — HEPARIN SODIUM 10000 [USP'U]/100ML
500 INJECTION, SOLUTION INTRAVENOUS CONTINUOUS
Status: DISCONTINUED | OUTPATIENT
Start: 2023-10-15 | End: 2023-10-19

## 2023-10-15 RX ORDER — ATORVASTATIN CALCIUM 40 MG/1
40 TABLET, FILM COATED ORAL EVERY EVENING
Status: DISCONTINUED | OUTPATIENT
Start: 2023-10-15 | End: 2023-10-27 | Stop reason: HOSPADM

## 2023-10-15 RX ORDER — FUROSEMIDE 10 MG/ML
40 INJECTION INTRAMUSCULAR; INTRAVENOUS ONCE
Status: DISCONTINUED | OUTPATIENT
Start: 2023-10-15 | End: 2023-10-15

## 2023-10-15 RX ORDER — AMOXICILLIN 250 MG
2 CAPSULE ORAL 2 TIMES DAILY
Status: DISCONTINUED | OUTPATIENT
Start: 2023-10-15 | End: 2023-10-23

## 2023-10-15 RX ADMIN — HYDRALAZINE HYDROCHLORIDE 10 MG: 20 INJECTION INTRAMUSCULAR; INTRAVENOUS at 08:19

## 2023-10-15 RX ADMIN — METHOCARBAMOL 500 MG: 500 TABLET ORAL at 01:13

## 2023-10-15 RX ADMIN — HEPARIN SODIUM 5000 UNITS: 5000 INJECTION, SOLUTION INTRAVENOUS; SUBCUTANEOUS at 04:13

## 2023-10-15 RX ADMIN — HYDRALAZINE HYDROCHLORIDE 10 MG: 20 INJECTION INTRAMUSCULAR; INTRAVENOUS at 01:13

## 2023-10-15 RX ADMIN — CALCIUM GLUCONATE 1 G: 20 INJECTION, SOLUTION INTRAVENOUS at 06:04

## 2023-10-15 RX ADMIN — FUROSEMIDE 40 MG: 10 INJECTION, SOLUTION INTRAMUSCULAR; INTRAVENOUS at 15:39

## 2023-10-15 RX ADMIN — ACETYLCYSTEINE 2 ML: 200 SOLUTION ORAL; RESPIRATORY (INHALATION) at 19:51

## 2023-10-15 RX ADMIN — ACETYLCYSTEINE 2 ML: 200 SOLUTION ORAL; RESPIRATORY (INHALATION) at 12:50

## 2023-10-15 RX ADMIN — FUROSEMIDE 40 MG: 10 INJECTION, SOLUTION INTRAMUSCULAR; INTRAVENOUS at 10:11

## 2023-10-15 RX ADMIN — CARVEDILOL 6.25 MG: 6.25 TABLET, FILM COATED ORAL at 10:12

## 2023-10-15 RX ADMIN — ALBUTEROL SULFATE 2.5 MG: 2.5 SOLUTION RESPIRATORY (INHALATION) at 03:59

## 2023-10-15 RX ADMIN — OXYCODONE HYDROCHLORIDE 10 MG: 10 TABLET ORAL at 04:13

## 2023-10-15 RX ADMIN — HYDROXYZINE HYDROCHLORIDE 25 MG: 25 TABLET, FILM COATED ORAL at 00:22

## 2023-10-15 RX ADMIN — OXYCODONE HYDROCHLORIDE 10 MG: 10 TABLET ORAL at 13:41

## 2023-10-15 RX ADMIN — POLYETHYLENE GLYCOL 3350 17 G: 17 POWDER, FOR SOLUTION ORAL at 19:50

## 2023-10-15 RX ADMIN — POLYETHYLENE GLYCOL 3350 17 G: 17 POWDER, FOR SOLUTION ORAL at 08:19

## 2023-10-15 RX ADMIN — HYDRALAZINE HYDROCHLORIDE 10 MG: 20 INJECTION INTRAMUSCULAR; INTRAVENOUS at 02:26

## 2023-10-15 RX ADMIN — OXYCODONE HYDROCHLORIDE 10 MG: 10 TABLET ORAL at 00:22

## 2023-10-15 RX ADMIN — ATORVASTATIN CALCIUM 40 MG: 40 TABLET, FILM COATED ORAL at 19:49

## 2023-10-15 RX ADMIN — PANTOPRAZOLE SODIUM 40 MG: 40 TABLET, DELAYED RELEASE ORAL at 08:19

## 2023-10-15 RX ADMIN — ALBUTEROL SULFATE 2.5 MG: 2.5 SOLUTION RESPIRATORY (INHALATION) at 12:50

## 2023-10-15 RX ADMIN — SENNOSIDES AND DOCUSATE SODIUM 1 TABLET: 50; 8.6 TABLET ORAL at 08:19

## 2023-10-15 RX ADMIN — HYDRALAZINE HYDROCHLORIDE 10 MG: 20 INJECTION INTRAMUSCULAR; INTRAVENOUS at 00:22

## 2023-10-15 RX ADMIN — WARFARIN SODIUM 2.5 MG: 2.5 TABLET ORAL at 17:29

## 2023-10-15 RX ADMIN — ALBUTEROL SULFATE 2.5 MG: 2.5 SOLUTION RESPIRATORY (INHALATION) at 00:09

## 2023-10-15 RX ADMIN — OXYCODONE HYDROCHLORIDE 10 MG: 10 TABLET ORAL at 08:19

## 2023-10-15 RX ADMIN — ACETAMINOPHEN 975 MG: 325 TABLET, FILM COATED ORAL at 08:19

## 2023-10-15 RX ADMIN — OXYCODONE HYDROCHLORIDE 5 MG: 5 TABLET ORAL at 20:29

## 2023-10-15 RX ADMIN — SENNOSIDES AND DOCUSATE SODIUM 2 TABLET: 50; 8.6 TABLET ORAL at 19:49

## 2023-10-15 RX ADMIN — ACETAMINOPHEN 975 MG: 325 TABLET, FILM COATED ORAL at 17:28

## 2023-10-15 RX ADMIN — ACETYLCYSTEINE 2 ML: 200 SOLUTION ORAL; RESPIRATORY (INHALATION) at 03:59

## 2023-10-15 RX ADMIN — ACETAMINOPHEN 975 MG: 325 TABLET, FILM COATED ORAL at 00:22

## 2023-10-15 RX ADMIN — CARVEDILOL 6.25 MG: 6.25 TABLET, FILM COATED ORAL at 17:28

## 2023-10-15 RX ADMIN — ACETYLCYSTEINE 2 ML: 200 SOLUTION ORAL; RESPIRATORY (INHALATION) at 16:00

## 2023-10-15 RX ADMIN — INSULIN GLARGINE 5 UNITS: 100 INJECTION, SOLUTION SUBCUTANEOUS at 00:49

## 2023-10-15 RX ADMIN — ACETYLCYSTEINE 2 ML: 200 SOLUTION ORAL; RESPIRATORY (INHALATION) at 00:08

## 2023-10-15 RX ADMIN — ALBUTEROL SULFATE 2.5 MG: 2.5 SOLUTION RESPIRATORY (INHALATION) at 19:51

## 2023-10-15 RX ADMIN — INSULIN GLARGINE 5 UNITS: 100 INJECTION, SOLUTION SUBCUTANEOUS at 19:49

## 2023-10-15 RX ADMIN — HEPARIN SODIUM 500 UNITS/HR: 10000 INJECTION, SOLUTION INTRAVENOUS at 08:56

## 2023-10-15 RX ADMIN — ALBUTEROL SULFATE 2.5 MG: 2.5 SOLUTION RESPIRATORY (INHALATION) at 16:00

## 2023-10-15 RX ADMIN — ASPIRIN 81 MG CHEWABLE TABLET 81 MG: 81 TABLET CHEWABLE at 08:19

## 2023-10-15 ASSESSMENT — ACTIVITIES OF DAILY LIVING (ADL)
ADLS_ACUITY_SCORE: 31
ADLS_ACUITY_SCORE: 27
ADLS_ACUITY_SCORE: 31
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
## 2023-10-15 NOTE — PLAN OF CARE
Major Shift Events: RASS -1 to +1, A+Ox4. JESSICA. PERRL. Pain management controlled w/ oxy, atarax, dilaudid, tylenol. Slept between cares. Afebrile. Irregular SR/Afib alarm (60-90s) w/ PACs/PVCs. Hypertensive-hydralazine x4 and pain management. Art line removed per order. @0000 Pt started to desat and became SOB-nebs, CXR, lasix ordered. Thick secretions. Currently on 2L oxymask. Med CT tidaling (even though hooked up to suction-CVTS notified). -BM. -150s: Insulin gtt d/c'ed. No set diuresis goal: 10/14 even, currently 10/15 -0.4L. Electrolyte protocols run: K 3.7, Mag 2.1, Phos 4.0, iCal 4.4 (1g R). WBC 17.8 (up from 11.3). Family updated before leaving for evening.     Plan: Continue to follow POC.   For vital signs and complete assessments, please see documentation flowsheets.     Goal Outcome Evaluation:      Plan of Care Reviewed With: patient, family    Overall Patient Progress: improvingOverall Patient Progress: improving

## 2023-10-15 NOTE — PLAN OF CARE
Goal Outcome Evaluation:    Major Shift Events: Pt remains in a sinus dysrhythmia/afib with frequent PVC/PAC's. Afebrile. Able to keep SBP < 140 with x1 PRN and restarting oral coreg. Pt on RA, intermittent SOB reporting. Coughing up frothy sputum. Diuresed with 40mg lasix x2. On a regular diet, but no appetite. Removed pleitez this evening and has voided. CT with serosang output. No BM. Giving oxycodone and tylenol for pain control. Pain seems to be better controlled today. Rewired CVC. Heparin gtt started at straight rate of 500un/hr to bridge to coumadin. EP consulted for possible pacemaker.     Plan: Transferred to 6C for ongoing cares.    For vital signs and complete assessments, please see documentation flowsheets.      Transferred to: Unit 6C  at 1700.  Belongings: clothing  Pleitez removed? Yes  Central line removed? No, unable to establish PIVs, rewired CVC  Chart and medications sent with patient Yes  Family notified: Yes

## 2023-10-15 NOTE — PROGRESS NOTES
Unable to place 2 PIV due to poor vasculature with and without ultrasound. Multiple attempts by [facility]. Pt would benefit from a PICC bedside RN aware.
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
## 2023-10-15 NOTE — PROGRESS NOTES
"Initiated BIPAP treatment per NP order, tolerating current ST settings, all BiPAP values are within normal range. No further respiratory suggestions at this time. Will continue to monitor and assess per [facility]AT protcol.    BP (!) 148/111   Pulse 93   Temp 98.1  F (36.7  C)   Resp 18   Ht 1.981 m (6' 6\")   Wt 95.1 kg (209 lb 10.5 oz)   SpO2 96%   BMI 24.23 kg/m      Costa Gill, RT on 10/15/2023 at 12:39 AM       10/5 10 50%    "

## 2023-10-15 NOTE — PROGRESS NOTES
"Procedure Note    Patient's right-sided triple lumen catheter was re-wired to a 7 [place] triple lumen central line secured at 15cm. The procedure was done sterilely. CXR sent for placement confirmation. Patient remained vitally stable throughout.     /66   Pulse 89   Temp 99.7  F (37.6  C)   Resp 19   Ht 1.981 m (6' 6\")   Wt 95.1 kg (209 lb 10.5 oz)   SpO2 91%   BMI 24.23 kg/m        Dip M. MD [facility]  Anesthesia, CA3  October 15, 2023     "

## 2023-10-15 NOTE — PHARMACY-ANTICOAGULATION SERVICE
Clinical Pharmacy - Warfarin Dosing Consult     Pharmacy has been consulted to manage this patient s warfarin therapy.  Indication: Mechanical Aortic Valve Replacement;Atrial Fibrillation  Therapy Goal: INR 2.5-3.5  Warfarin Prior to Admission: No  Significant drug interactions: aspirin (increased bleeding risk)  Recent documented change in oral intake/nutrition: Unknown    INR   Date Value Ref Range Status   10/15/2023 1.37 (H) 0.85 - 1.15 Final   10/13/2023 1.17 (H) 0.85 - 1.15 Final       Recommend warfarin 2.5 mg today given elevated baseline INR.  Pharmacy will monitor Sukh Craven daily and order warfarin doses to achieve specified goal.      Please contact pharmacy as soon as possible if the warfarin needs to be held for a procedure or if the warfarin goals change.

## 2023-10-16 ENCOUNTER — APPOINTMENT (OUTPATIENT)
Dept: GENERAL RADIOLOGY | Facility: CLINIC | Age: 68
DRG: 220 | End: 2023-10-16
Attending: NURSE PRACTITIONER
Payer: COMMERCIAL

## 2023-10-16 ENCOUNTER — APPOINTMENT (OUTPATIENT)
Dept: GENERAL RADIOLOGY | Facility: CLINIC | Age: 68
DRG: 220 | End: 2023-10-16
Attending: PHYSICIAN ASSISTANT
Payer: COMMERCIAL

## 2023-10-16 LAB
ANION GAP SERPL CALCULATED.3IONS-SCNC: 13 MMOL/L (ref 7–15)
ATRIAL RATE - MUSE: 63 BPM
ATRIAL RATE - MUSE: 79 BPM
ATRIAL RATE - MUSE: 84 BPM
BUN SERPL-MCNC: 31.9 MG/DL (ref 8–23)
CALCIUM SERPL-MCNC: 8.7 MG/DL (ref 8.8–10.2)
CHLORIDE SERPL-SCNC: 101 MMOL/L (ref 98–107)
CLOT INIT KAOLIN IND BLD US: 89 SEC (ref 121–175)
CLOT INIT KAOLIN IND P HEP NEUT BLD US: ABNORMAL S
CLOT STIFF PLT CONT BLD CALC: 17.2 HPA (ref 12.8–32.3)
CLOT STIFF TF IND P HEP NEUT BLD US: 20 HPA (ref 14–35.4)
CLOT STIFF TF IND+IIB-IIIA INH P HEP NEU: 2.8 HPA (ref 0.9–4.2)
CREAT SERPL-MCNC: 1.36 MG/DL (ref 0.67–1.17)
DEPRECATED HCO3 PLAS-SCNC: 24 MMOL/L (ref 22–29)
DIASTOLIC BLOOD PRESSURE - MUSE: NORMAL MMHG
EGFRCR SERPLBLD CKD-EPI 2021: 57 ML/MIN/1.73M2
ERYTHROCYTE [DISTWIDTH] IN BLOOD BY AUTOMATED COUNT: 14.8 % (ref 10–15)
GLUCOSE BLDC GLUCOMTR-MCNC: 132 MG/DL (ref 70–99)
GLUCOSE BLDC GLUCOMTR-MCNC: 133 MG/DL (ref 70–99)
GLUCOSE BLDC GLUCOMTR-MCNC: 137 MG/DL (ref 70–99)
GLUCOSE BLDC GLUCOMTR-MCNC: 139 MG/DL (ref 70–99)
GLUCOSE SERPL-MCNC: 159 MG/DL (ref 70–99)
HCT VFR BLD AUTO: 29.8 % (ref 40–53)
HGB BLD-MCNC: 9.9 G/DL (ref 13.3–17.7)
INR PPP: 1.3 (ref 0.85–1.15)
INTERPRETATION ECG - MUSE: NORMAL
MAGNESIUM SERPL-MCNC: 2.4 MG/DL (ref 1.7–2.3)
MCH RBC QN AUTO: 30.6 PG (ref 26.5–33)
MCHC RBC AUTO-ENTMCNC: 33.2 G/DL (ref 31.5–36.5)
MCV RBC AUTO: 92 FL (ref 78–100)
P AXIS - MUSE: 40 DEGREES
P AXIS - MUSE: 80 DEGREES
P AXIS - MUSE: NORMAL DEGREES
PHOSPHATE SERPL-MCNC: 2.3 MG/DL (ref 2.5–4.5)
PLATELET # BLD AUTO: 129 10E3/UL (ref 150–450)
POTASSIUM SERPL-SCNC: 3.9 MMOL/L (ref 3.4–5.3)
PR INTERVAL - MUSE: 200 MS
PR INTERVAL - MUSE: 216 MS
PR INTERVAL - MUSE: 230 MS
QRS DURATION - MUSE: 92 MS
QRS DURATION - MUSE: 94 MS
QRS DURATION - MUSE: 94 MS
QT - MUSE: 228 MS
QT - MUSE: 410 MS
QT - MUSE: 494 MS
QTC - MUSE: 262 MS
QTC - MUSE: 485 MS
QTC - MUSE: 505 MS
R AXIS - MUSE: 11 DEGREES
R AXIS - MUSE: 22 DEGREES
R AXIS - MUSE: 59 DEGREES
RBC # BLD AUTO: 3.24 10E6/UL (ref 4.4–5.9)
SODIUM SERPL-SCNC: 138 MMOL/L (ref 135–145)
SYSTOLIC BLOOD PRESSURE - MUSE: NORMAL MMHG
T AXIS - MUSE: -11 DEGREES
T AXIS - MUSE: -4 DEGREES
T AXIS - MUSE: 58 DEGREES
UFH PPP CHRO-ACNC: <0.1 IU/ML
VENTRICULAR RATE- MUSE: 63 BPM
VENTRICULAR RATE- MUSE: 79 BPM
VENTRICULAR RATE- MUSE: 84 BPM
WBC # BLD AUTO: 13.6 10E3/UL (ref 4–11)

## 2023-10-16 PROCEDURE — 250N000011 HC RX IP 250 OP 636: Mod: JZ | Performed by: NURSE PRACTITIONER

## 2023-10-16 PROCEDURE — 999N000157 HC STATISTIC RCP TIME EA 10 MIN

## 2023-10-16 PROCEDURE — 250N000013 HC RX MED GY IP 250 OP 250 PS 637

## 2023-10-16 PROCEDURE — 250N000013 HC RX MED GY IP 250 OP 250 PS 637: Performed by: NURSE PRACTITIONER

## 2023-10-16 PROCEDURE — 250N000013 HC RX MED GY IP 250 OP 250 PS 637: Performed by: THORACIC SURGERY (CARDIOTHORACIC VASCULAR SURGERY)

## 2023-10-16 PROCEDURE — 36592 COLLECT BLOOD FROM PICC: CPT

## 2023-10-16 PROCEDURE — 85520 HEPARIN ASSAY: CPT

## 2023-10-16 PROCEDURE — 999N000065 XR CHEST PORT 1 VIEW

## 2023-10-16 PROCEDURE — 250N000009 HC RX 250: Performed by: NURSE PRACTITIONER

## 2023-10-16 PROCEDURE — 272N000451 HC KIT SHRLOCK 5FR POWER PICC DOUBLE LUMEN

## 2023-10-16 PROCEDURE — 85610 PROTHROMBIN TIME: CPT

## 2023-10-16 PROCEDURE — 71045 X-RAY EXAM CHEST 1 VIEW: CPT | Mod: 26 | Performed by: RADIOLOGY

## 2023-10-16 PROCEDURE — 84100 ASSAY OF PHOSPHORUS: CPT | Performed by: THORACIC SURGERY (CARDIOTHORACIC VASCULAR SURGERY)

## 2023-10-16 PROCEDURE — 83735 ASSAY OF MAGNESIUM: CPT | Performed by: THORACIC SURGERY (CARDIOTHORACIC VASCULAR SURGERY)

## 2023-10-16 PROCEDURE — 250N000013 HC RX MED GY IP 250 OP 250 PS 637: Performed by: PHYSICIAN ASSISTANT

## 2023-10-16 PROCEDURE — 85014 HEMATOCRIT: CPT

## 2023-10-16 PROCEDURE — 71045 X-RAY EXAM CHEST 1 VIEW: CPT

## 2023-10-16 PROCEDURE — 250N000011 HC RX IP 250 OP 636: Mod: JZ | Performed by: PHYSICIAN ASSISTANT

## 2023-10-16 PROCEDURE — 258N000003 HC RX IP 258 OP 636: Performed by: THORACIC SURGERY (CARDIOTHORACIC VASCULAR SURGERY)

## 2023-10-16 PROCEDURE — 94640 AIRWAY INHALATION TREATMENT: CPT

## 2023-10-16 PROCEDURE — 250N000009 HC RX 250: Performed by: THORACIC SURGERY (CARDIOTHORACIC VASCULAR SURGERY)

## 2023-10-16 PROCEDURE — 250N000009 HC RX 250: Performed by: PHYSICIAN ASSISTANT

## 2023-10-16 PROCEDURE — 80048 BASIC METABOLIC PNL TOTAL CA: CPT

## 2023-10-16 PROCEDURE — 36569 INSJ PICC 5 YR+ W/O IMAGING: CPT

## 2023-10-16 PROCEDURE — 94640 AIRWAY INHALATION TREATMENT: CPT | Mod: 76

## 2023-10-16 PROCEDURE — 120N000003 HC R&B IMCU UMMC

## 2023-10-16 RX ORDER — ACETYLCYSTEINE 200 MG/ML
2 SOLUTION ORAL; RESPIRATORY (INHALATION) 4 TIMES DAILY
Status: DISCONTINUED | OUTPATIENT
Start: 2023-10-16 | End: 2023-10-21

## 2023-10-16 RX ORDER — POTASSIUM CHLORIDE 750 MG/1
20 TABLET, EXTENDED RELEASE ORAL DAILY
Status: DISCONTINUED | OUTPATIENT
Start: 2023-10-16 | End: 2023-10-16

## 2023-10-16 RX ORDER — HEPARIN SODIUM,PORCINE 10 UNIT/ML
5-20 VIAL (ML) INTRAVENOUS
Status: DISCONTINUED | OUTPATIENT
Start: 2023-10-16 | End: 2023-10-20

## 2023-10-16 RX ORDER — FUROSEMIDE 40 MG
40 TABLET ORAL DAILY
Status: DISCONTINUED | OUTPATIENT
Start: 2023-10-16 | End: 2023-10-16

## 2023-10-16 RX ORDER — POTASSIUM PHOS IN 0.9 % NACL 15MMOL/250
15 PLASTIC BAG, INJECTION (ML) INTRAVENOUS ONCE
Status: COMPLETED | OUTPATIENT
Start: 2023-10-16 | End: 2023-10-16

## 2023-10-16 RX ORDER — HEPARIN SODIUM,PORCINE 10 UNIT/ML
5-20 VIAL (ML) INTRAVENOUS EVERY 24 HOURS
Status: DISCONTINUED | OUTPATIENT
Start: 2023-10-16 | End: 2023-10-27 | Stop reason: HOSPADM

## 2023-10-16 RX ORDER — ALBUTEROL SULFATE 0.83 MG/ML
2.5 SOLUTION RESPIRATORY (INHALATION) 4 TIMES DAILY
Status: DISCONTINUED | OUTPATIENT
Start: 2023-10-16 | End: 2023-10-21

## 2023-10-16 RX ORDER — LIDOCAINE 40 MG/G
CREAM TOPICAL
Status: ACTIVE | OUTPATIENT
Start: 2023-10-16 | End: 2023-10-19

## 2023-10-16 RX ORDER — AMLODIPINE BESYLATE 2.5 MG/1
2.5 TABLET ORAL DAILY
Status: DISCONTINUED | OUTPATIENT
Start: 2023-10-16 | End: 2023-10-17

## 2023-10-16 RX ORDER — WARFARIN SODIUM 3 MG/1
3 TABLET ORAL
Status: COMPLETED | OUTPATIENT
Start: 2023-10-16 | End: 2023-10-16

## 2023-10-16 RX ADMIN — POTASSIUM PHOSPHATE, MONOBASIC POTASSIUM PHOSPHATE, DIBASIC 15 MMOL: 224; 236 INJECTION, SOLUTION, CONCENTRATE INTRAVENOUS at 10:41

## 2023-10-16 RX ADMIN — ALBUTEROL SULFATE 2.5 MG: 2.5 SOLUTION RESPIRATORY (INHALATION) at 20:39

## 2023-10-16 RX ADMIN — HYDROXYZINE HYDROCHLORIDE 50 MG: 25 TABLET, FILM COATED ORAL at 00:18

## 2023-10-16 RX ADMIN — OXYCODONE HYDROCHLORIDE 10 MG: 10 TABLET ORAL at 15:46

## 2023-10-16 RX ADMIN — WARFARIN SODIUM 3 MG: 3 TABLET ORAL at 17:25

## 2023-10-16 RX ADMIN — ASPIRIN 81 MG CHEWABLE TABLET 81 MG: 81 TABLET CHEWABLE at 08:21

## 2023-10-16 RX ADMIN — SENNOSIDES AND DOCUSATE SODIUM 2 TABLET: 50; 8.6 TABLET ORAL at 19:33

## 2023-10-16 RX ADMIN — ALBUTEROL SULFATE 2.5 MG: 2.5 SOLUTION RESPIRATORY (INHALATION) at 00:11

## 2023-10-16 RX ADMIN — ACETYLCYSTEINE 2 ML: 200 SOLUTION ORAL; RESPIRATORY (INHALATION) at 07:19

## 2023-10-16 RX ADMIN — CARVEDILOL 6.25 MG: 6.25 TABLET, FILM COATED ORAL at 08:21

## 2023-10-16 RX ADMIN — OXYCODONE HYDROCHLORIDE 10 MG: 10 TABLET ORAL at 23:41

## 2023-10-16 RX ADMIN — OXYCODONE HYDROCHLORIDE 5 MG: 5 TABLET ORAL at 04:11

## 2023-10-16 RX ADMIN — ACETAMINOPHEN 975 MG: 325 TABLET, FILM COATED ORAL at 08:19

## 2023-10-16 RX ADMIN — ACETAMINOPHEN 975 MG: 325 TABLET, FILM COATED ORAL at 00:18

## 2023-10-16 RX ADMIN — SENNOSIDES AND DOCUSATE SODIUM 2 TABLET: 50; 8.6 TABLET ORAL at 08:21

## 2023-10-16 RX ADMIN — OXYCODONE HYDROCHLORIDE 10 MG: 10 TABLET ORAL at 10:42

## 2023-10-16 RX ADMIN — HYDRALAZINE HYDROCHLORIDE 10 MG: 20 INJECTION INTRAMUSCULAR; INTRAVENOUS at 04:25

## 2023-10-16 RX ADMIN — LIDOCAINE HYDROCHLORIDE ANHYDROUS 2 ML: 10 INJECTION, SOLUTION INFILTRATION at 11:38

## 2023-10-16 RX ADMIN — OXYCODONE HYDROCHLORIDE 10 MG: 10 TABLET ORAL at 19:33

## 2023-10-16 RX ADMIN — ACETYLCYSTEINE 2 ML: 200 SOLUTION ORAL; RESPIRATORY (INHALATION) at 20:39

## 2023-10-16 RX ADMIN — SODIUM CHLORIDE, PRESERVATIVE FREE 5 ML: 5 INJECTION INTRAVENOUS at 15:47

## 2023-10-16 RX ADMIN — PANTOPRAZOLE SODIUM 40 MG: 40 TABLET, DELAYED RELEASE ORAL at 08:21

## 2023-10-16 RX ADMIN — OXYCODONE HYDROCHLORIDE 5 MG: 5 TABLET ORAL at 05:19

## 2023-10-16 RX ADMIN — ALBUTEROL SULFATE 2.5 MG: 2.5 SOLUTION RESPIRATORY (INHALATION) at 07:19

## 2023-10-16 RX ADMIN — AMLODIPINE BESYLATE 2.5 MG: 2.5 TABLET ORAL at 08:38

## 2023-10-16 RX ADMIN — ACETAMINOPHEN 650 MG: 325 TABLET, FILM COATED ORAL at 23:42

## 2023-10-16 RX ADMIN — INSULIN GLARGINE 5 UNITS: 100 INJECTION, SOLUTION SUBCUTANEOUS at 19:33

## 2023-10-16 RX ADMIN — METHOCARBAMOL 500 MG: 500 TABLET ORAL at 08:38

## 2023-10-16 RX ADMIN — ATORVASTATIN CALCIUM 40 MG: 40 TABLET, FILM COATED ORAL at 19:33

## 2023-10-16 RX ADMIN — POLYETHYLENE GLYCOL 3350 17 G: 17 POWDER, FOR SOLUTION ORAL at 19:33

## 2023-10-16 RX ADMIN — ACETYLCYSTEINE 2 ML: 200 SOLUTION ORAL; RESPIRATORY (INHALATION) at 00:10

## 2023-10-16 RX ADMIN — ACETAMINOPHEN 650 MG: 325 TABLET, FILM COATED ORAL at 19:33

## 2023-10-16 RX ADMIN — CARVEDILOL 6.25 MG: 6.25 TABLET, FILM COATED ORAL at 17:25

## 2023-10-16 ASSESSMENT — ACTIVITIES OF DAILY LIVING (ADL)
ADLS_ACUITY_SCORE: 31

## 2023-10-16 NOTE — PROCEDURES
[facility]    Double Lumen PICC Placement    Date/Time: 10/16/2023 11:49 AM    Performed by: Abdoulaye Stroud RN  Authorized by: Soraya Patel NP  Indications: Difficult venous access.      UNIVERSAL PROTOCOL   Site Marked: Yes  Prior Images Obtained and Reviewed:  Yes  Required items: Required blood products, implants, devices and special equipment available    Patient identity confirmed:  Verbally with patient, hospital-assigned identification number, arm band and provided demographic data  Patient was reevaluated immediately before administering moderate or deep sedation or anesthesia  Confirmation Checklist:  Patient's identity using two indicators, procedure was appropriate and matched the consent or emergent situation, correct equipment/implants were available and relevant allergies  Time out: Immediately prior to the procedure a time out was called    Universal Protocol: the [facility] Universal Protocol was followed    Preparation: Patient was prepped and draped in usual sterile fashion       ANESTHESIA    Anesthesia:  See MAR for details  Local Anesthetic:  Lidocaine 1% without epinephrine  Anesthetic Total (mL):  2      SEDATION    Patient Sedated: No        Preparation: skin prepped with ChloraPrep  Skin prep agent: skin prep agent completely dried prior to procedure  Sterile barriers: maximum sterile barriers were used: cap, mask, sterile gown, sterile gloves, and large sterile sheet  Hand hygiene: hand hygiene performed prior to central venous catheter insertion  Type of line used: PICC  Catheter type: double lumen  Lumen type: non-valved and power PICC  Lumen Identification: Purple and Red  Catheter size: 4 Fr  Brand: Bard  Lot number: [id number]  Placement method: venipuncture, MST, ultrasound and tip navigation system  Number of attempts: 2  Difficulty threading catheter: yes (Unable to advance catheter via right lateral brachial  vein.)  Successful placement: yes  Orientation: right    Location: brachial vein (medial) (Vein diameter - 0.50 cm)  Tip Location: SVC/RA Junction  Arm circumference: adults 10 cm  Extremity circumference: 31  Visible catheter length: 2  Total catheter length: 45  Dressing and securement: alcohol impregnated caps, transparent dressing, sterile dressing applied, statlock and site cleansed (Stat seal disc)  Post procedure assessment: blood return through all ports, free fluid flow and placement verified by x-ray  PROCEDURE   Patient Tolerance:  Patient tolerated the procedure well with no immediate complicationsDescribe Procedure: PICC is OK to use.  Disposal: sharps and needle count correct at the end of procedure, needles and guidewire disposed in sharps container

## 2023-10-16 NOTE — PLAN OF CARE
Neuro: A&O x4, sternal incision pain 8/10 through shift PRN oxy's given. Aromatherapy also used as well as ice.   Respiratory:  Had coarse, lung sounds bilaterally but mostly through the L side. 4 CT's with decent OP (see flowsheets). Productive cough, pt self suctions.  Cardiac: Pericardial friction rub heart sounds noted.   GI: Denied nausea, no BM but began passing gas this shift.   : Had ok urine output, see I&O flowsheet.  Skin: See PCS for assessment and treatment of wounds and surgical incisions. RIJ dressing changed x2 d/t saturated gauze.   VS: In A-fib and A-flutter rhythm with HR 80's with some short bouts of bradycardia, SBP goal <140 PRN hydralazine given x1, SaO2 90s% on 3L per NC.    Drips:  Heparin gtt continued at 500 units/hr.   Pain: Reported 8/10 pain, was given oxycodone 5mg x2. Patient reported pain management was effective.  Mobility: In bed and up to the chair this shift. Slightly dizzy, but steady.     Plan:  Continue to monitor pain, VS, heart rhythm, bowel status, cardiac and respiratory status. Notify care team of changes in patient condition or other concerns. Continue with postoperative care, and increase PO intake and protein.

## 2023-10-16 NOTE — PROVIDER NOTIFICATION
"   10/15/23 1900   Call Information   Date of Call 10/15/23   Time of Call 1919   Name of person requesting the team Maryanne   Title of person requesting team RN   RRT Arrival time 1922   Time RRT ended 1930   Reason for call   Type of RRT Adult   Primary reason for call Sepsis suspected   Sepsis Suspected Elevated Lactate level   Was patient transferred from the ED, ICU, or PACU within last 24 hours prior to RRT call? Yes   SBAR   Situation LA 2.6   Background Per MD note: \"Sukh Craven is a 68 year old male with PMH of severe aortic insufficiency 2/2 sinotubular aortic root dilitation (sinus of valsalva 5.3 cm),  CAD (midRCA 90% stenosed, dCx 35% stenosed, midLAD 70% stenosed), PAD (mild-mod left lower extremity), bilateral popliteal aneurysms, HTN, T2DM, and chronic opioid/cannabis use, who is s/p AVR and CABG x2  on 10/13 by Dr. Lockett.\"   Notable History/Conditions Cardiac;Hypertension;Recent surgery;Diabetes   Assessment Patient sitting up in bed watching television. AOx4, does not complain of pain, SOB, or nausea/vomiting. HR 70-80s afib, /79 (94) and SpO2 95% on 2LNC.   Interventions No interventions   Patient Outcome   Patient Outcome Stabilized on unit   RRT Team   Attending/Primary/Covering Physician CVTS   Date Attending Physician notified 10/15/23   Time Attending Physician notified 1919   Physician(s) BRITNEY Mejias   Lead RN Zarina CUTLER   RT NA   Post RRT Intervention Assessment   Post RRT Assessment Stable/Improved   Date Follow Up Done 10/15/23   Time Follow Up Done 2330   Comments Patient resting in bed. VSS.       "

## 2023-10-16 NOTE — PLAN OF CARE
Neuro: A&Ox4, able to make needs known; calls appropriately  Cardiac: Afebrile, VSS. A fib w/ CVR; no c/o chest pain/palpitations  Respiratory: sating >90% at room air  GI/: voiding spontaneously. No BM this shift. Last BM:   Diet/appetite: Tolerating regular diet w/ fair appetite. Denies nausea. BG ACHS  Activity: Up with assist of 1 w/ gait belt and walker  Pain: w/ intermittent episodes of severe post-op pain on midsternal incision relieved by PRN Oxycodone x2  Skin: no new skin deficits noted; midsternal incision LLE graft sites x2; WDL; approximated; ALEE; CT dressing; WDL; CDI  Lines: w/ ongoing Heparin drip at 500 unit(s)/hr (fixed rate) infusing well thru R double-lumen PICC (purple line);patent and intact   Drains: w/ R&L pleural and mediastinal x2 Chest tubes w/ serosanguineous output connected to suction at -20 cm H2O; no air leak noted  Plan: Continue POC and notify the provider of any changes

## 2023-10-16 NOTE — PROGRESS NOTES
Patient transferred from . Report from EARL Hooks.    Pain: Denies any pain at this time.  Neuro: Oriented but sleepy  Cardio: Tele sinus dysrhythmia/Afib.   Skin: 4 eyes on skin assessment with EARL Rubio. No issues found  Lines: R triple lumen  Chest tubes x 4. + tidaling. - air leak.   Gtts: heparin@ 500 U/hr  GI: regular diet. Poor appetite  : Voids in urinal    Family very supportive and visiting at this time.

## 2023-10-16 NOTE — PROGRESS NOTES
Cardiovascular Surgery Progress Note  10/16/2023         Assessment and Plan:     Sukh Craven is a 68 year old male with PMH of severe aortic insufficiency 2/2 sinotubular aortic root dilitation (sinus of valsalva 5.3 cm),  CAD (midRCA 90% stenosed, dCx 35% stenosed, midLAD 70% stenosed), PAD (mild-mod left lower extremity), bilateral popliteal aneurysms, HTN, T2DM, and chronic opioid/cannabis use, who is s/p AVR and CABG x2 on 10/13 by Dr. Lockett.     Cardiovascular:   CAD (midRCA 90% stenosed, dCx 35% stenosed, midLAD 70% stenosed) s/p CABG x 2  Severe aortic insufficiency 2/2 sinotubular aortic root dilitation (sinus of valsalva 5.3 cm)  S/P aortic root replacement with mechanical AVR ([facility])  Bilateral lower extremity popliteal aneurysms (Right 1.4cm, Left 2.1 cm)  H/o Afib  Paroxysmal atrial fibrillation overnight, rates controlled, no high degree AVB, HD stable. MAPS   TTE (8/15) notable for LVEF 60-65%, global RV function normal, severe aortic insufficiency, and aortic root dilatation   Post-op IHSAN 10/13: Mod LV dysfx without iontropes. Normal with 0.07 epi infusion. Nml RV. Trace MR/TR, no AI, however washing jets presents. Normal aorta.   - ASA 81 mg daily  - Atorvastatin 40 mg  - BB: Coreg 6.25 mg BID   - Start amlodipine at 2.5 mg daily for BP control (PTA dose 10 mg daily)  - Diuresis as below  - warfarin started 10/15, straight rate heparin 500 ml/hr  - Per ICU, rhythm with 1st degree AVB with episodes of bradycardia and dropped beats  - EP consulted  10/15: no indication for pacemaker   - Will hold off on amiodarone or further up-titration of BB for now given intermittent bradycardia    Chest tubes: Meds x2, left pleural, right pleural- draining too much to remove   TPW: capped - leave today to monitor given concern for bradycardia in the ICU    Pulmonary:  Extubated POD 1 to 4-6 lpm via Oxymask. Now saturating well on 2-3 L O2   - Supplemental O2 PRN to keep sats > 92%. Wean off as  tolerated.  - Pulm hygiene, IS, activity and deep breathing  - CXR with low lung volumes, atelectasis     Neurology / MSK:  Acute post-operative pain   Pain well controlled with current regimen:  - Acetaminophen, PO oxycodone PRN, IV dilaudid PRN, methocarbamol PRN     / Renal / Fluid / Electrolytes:  BPH  EFFIE, improving  BL creat ~ 1-1.12, most recent creatinine 1.36 (from 1.54); adequate UOP.   FLUID STATUS: Pre-op weight 209 lb, weight today 210 lb and down-trending; I/O past 24 hours: -2 L.   - Diuresis IV lasix 40 mg BID 10/16 with robust response, hold further diuresis today, re-assess volume status daily  - Replete lytes per protocol  - Strict I/O, daily weights  - Avoid/limit nephrotoxins as able    GI / Nutrition:   - Tolerating regular diet, poor appetite   - Dietician following, appreciate   - Continue bowel regimen, yet to have a BM post-operatively     Endocrine:  Stress induced hyperglycemia   DM Type II  Hgb A1c 6.8  - Initially managed on insulin drip postop, transitioned to high insulin sliding scale; goal BG <180 for optimal healing    Infectious Disease:  Stress induced leukocytosis  WBC 13.6 and down-trending, remains afebrile, no signs or symptoms of infection  - Completed perioperative antibiotics  - Continue to monitor fever curve, CBC    Hematology:   Acute blood loss anemia and thrombocytopenia  Hgb 9.9; Plt 129, no signs or symptoms of active bleeding  - CBC daily    Anticoagulation:   - ASA 81 mg only  - Coumadin for mechanical AVR with a goal INR of 2.5-3.5  - INR 1.30    MSK/Skin:  Sternotomy  Surgical incision  - Sternal precautions  - Incisional cares per protocol    Prophylaxis:   - Stress ulcer prophylaxis: Pantoprazole 40 mg daily for 30 days  - DVT prophylaxis: Subcutaneous heparin, SCD    Disposition:   - Transferred to  on 10/16  - Therapies recommending discharge to [facility] with assist    Discussed with Dr. Lockett through written communication.    Jorge Luis Peterson  LENIN  Cardiothoracic Surgery    2:52 PM  October 16, 2023  pager [phone]          Interval History:     RRT called overnight due to lactate of 2.6 and leukocytosis (triggered sepsis protocol). Patient was HDS and asymptomatic, no intervention was required.   States pain is well managed on current regimen.   Tolerating diet, but poor appetite, is passing flatus, yet to have a BM post-operatively. No nausea or vomiting. No abdominal pain.   Breathing well without complaints.   Working with therapies, ambulating in room. Reports mild lightheadedness with ambulation.  Denies chest pain, palpitations syncopal symptoms, fevers, chills, myalgias, or sternal popping/clicking.         Physical Exam:     Gen: A&Ox4, NAD  Neuro: no focal deficits   CV: irregular rate and rhythm, valve click, no rubs   Pulm: mild bibasilar crackles, no wheezing, normal breathing on 2-3 L  Abd: nondistended, normal BS, soft, nontender  Ext: no peripheral edema  Incision: clean, dry, intact, no erythema, sternum stable  Tubes/drain sites: dressing clean and dry, serosanguinous output, no air leak. 24 hr output -2147 mL.          Data:    Imaging:  reviewed recent imaging, no acute concerns    Recent Results (from the past 24 hour(s))   XR Chest Port 1 View    Narrative    EXAM: Chest radiograph 10/15/2023 3:23 PM    HISTORY: rewired IJ, placement confirmation.     COMPARISON: Same-day chest radiograph.     TECHNIQUE: Portable AP view of the chest.    FINDINGS: Right IJ central venous catheter tip projects over the  superior cavoatrial junction. No evidence of pneumothorax. Mediastinal  wires are intact and midline. Surgical clips project over the heart.  Aortic valve prosthesis. Two left-sided mediastinal drains are in  stable positioning.   Lung volumes are low. Patchy platelike opacities in the right lung  base. Continued retrocardiac silhouetting. Probable small right  greater than left pleural effusions. Patchy airspace opacities in  the  left lung. No acute osseous abnormality. Metallic flecks project over  the left lower cervical region. Air distended loop of bowel projects  over the right upper abdomen. Surgical clip projects over the left  upper abdomen.      Impression    IMPRESSION: Right IJ CVC tip projects over the superior cavoatrial  junction. Remaining findings are overall stable.    I have personally reviewed the examination and initial interpretation  and I agree with the findings.    DEVIN VIEIRA MD         SYSTEM ID:  [id number]   XR Chest Port 1 View    Narrative    Exam: XR CHEST PORT 1 VIEW, 10/16/2023 9:58 AM    Indication: chest tubes, s/p CABG/[facility]    Comparison: 10/15/2023    Findings:   Right internal jugular central venous catheter tip projects over the  upper to mid SVC. Stable mediastinal drains. Aortic valve prosthesis.  Intact median sternotomy wires. Mediastinal surgical clips. Stable  cardiomediastinal silhouette. The pulmonary vasculature is within  normal limits. No appreciable pleural effusion or pneumothorax.  Continued low lung volumes with streaky perihilar and bibasilar  opacities, left greater than right. Continued asymmetric elevation of  the right hemidiaphragm.      Impression    Impression: Stable low lung volumes with streaky perihilar and  bibasilar opacities, left greater than right, favored to represent  atelectasis.    TAE FLORES DO         SYSTEM ID:  [id number]   XR Chest Port 1 View    Narrative    Exam: XR CHEST PORT 1 VIEW, 10/16/2023 1:08 PM    Indication: PICC    Comparison: Same day    Findings:   New right upper extremity PICC tip at the superior cavoatrial  junction. Right internal jugular central venous catheter tip at the  mid SVC. Stable mediastinal drains. Intact median sternotomy wires.  Aortic valve prosthesis. Stable cardiomediastinal silhouette. No  appreciable pleural effusion or pneumothorax. Left greater than right  streaky perihilar and bibasilar opacities.  Asymmetric elevation of the  right hemidiaphragm.      Impression    Impression: Right upper extremity PICC tip at the superior cavoatrial  junction.    TAE FLORES,          SYSTEM ID:  [id number]        Labs:  Recent Labs   Lab 10/16/23  1144 10/16/23  0718 10/16/23  0528 10/15/23  0855 10/15/23  0357 10/14/23  1309 10/14/23  1308 10/14/23  0804 10/14/23  0623 10/14/23  0313 10/14/23  0205 10/13/23  2220 10/13/23  2217 10/13/23  1823 10/13/23  1712   WBC  --   --  13.6*  --  17.8*  --   --   --   --   --  11.3*  --  12.4*  --  12.4*   HGB  --   --  9.9*  --  9.8*  --  10.6*  --   --   --  8.2*  --  11.1*  --  9.8*   MCV  --   --  92  --  90  --   --   --   --   --  90  --  89  --  90   PLT  --   --  129*  --  107*  --   --   --   --   --  96*  --  139*  --  121*   INR  --   --  1.30*  --  1.37*  --   --   --   --   --   --   --   --   --  1.17*   NA  --   --  138  --  139  --   --   --  143  --   --   --  140  --  141   POTASSIUM  --   --  3.9  --  3.9  --   --   --  4.2  --   --   --  4.3  --  4.3   CHLORIDE  --   --  101  --  104  --   --   --  107  --   --   --  106  --  109*   CO2  --   --  24  --  24  --   --   --  23  --   --   --  21*  --  24   BUN  --   --  31.9*  --  20.6  --   --   --  10.9  --   --   --  11.5  --  12.2   CR  --   --  1.36*  --  1.54*  --   --   --  1.01  --   --   --  1.03  --  1.34*   ANIONGAP  --   --  13  --  11  --   --   --  13  --   --   --  13  --  8   DORIS  --   --  8.7*  --  8.3*  --   --   --  8.1*  --   --   --  8.4*  --  8.6*   * 133* 159*   < > 155*   < >  --    < > 131*   < >  --    < > 178*   < > 129*   ALBUMIN  --   --   --   --   --   --   --   --   --   --   --   --  3.7  --  3.3*   PROTTOTAL  --   --   --   --   --   --   --   --   --   --   --   --  6.0*  --  5.1*   BILITOTAL  --   --   --   --   --   --   --   --   --   --   --   --  0.7  --  0.6   ALKPHOS  --   --   --   --   --   --   --   --   --   --   --   --  57  --  46   ALT  --   --   --   --   --    --   --   --   --   --   --   --  18  --  15   AST  --   --   --   --   --   --   --   --   --   --   --   --  79*  --  53*    < > = values in this interval not displayed.      GLUCOSE:   Recent Labs   Lab 10/16/23  1144 10/16/23  0718 10/16/23  0528 10/15/23  2232 10/15/23  1733 10/15/23  1219   * 133* 159* 124* 148* 133*

## 2023-10-16 NOTE — CODE/RAPID RESPONSE
Rapid Response Team Note    Assessment   In assessment a rapid response was called on Sukh Craven due to  Hyperlactatemia w/ LA 2.6 .    Plan   -  Discussed with Primary team who recommends no repeat LA or intervention at this time unless with changes in clinical status.   -  The General Surgery primary team was able to be reached and they are in agreement with the above plan.  -  Disposition: The patient will remain on the current unit. We will continue to monitor this patient closely.  -  Reassessment and plan follow-up will be performed by the primary team      Nancie Edwards PA-C  [facility] RRT [facility] Job Code Contact #[phone]  [facility] Paging/Directory    Hospital Course   Brief Summary of events leading to rapid response:   RRT called for LA 2.8. Patient resting in bed. Conversant. No reports of CP, sob, HA, cough, abdominal pain.     Admission Diagnosis:   Severe aortic insufficiency [I35.1]    Physical Exam   Temp: 98.4  F (36.9  C) Temp  Min: 97.5  F (36.4  C)  Max: 99.7  F (37.6  C)  Resp: 16 Resp  Min: 15  Max: 34  SpO2: 95 % SpO2  Min: 89 %  Max: 100 %  Pulse: 75 Pulse  Min: 59  Max: 108    No data recorded  BP: 128/79 Systolic (24hrs), Av , Min:100 , Max:166   Diastolic (24hrs), Av, Min:64, Max:138     I/Os: I/O last 3 completed shifts:  In: 2419.91 [P.O.:2000; I.V.:419.91]  Out:  [Urine:1315; Chest Tube:660]     Exam:   General: in no acute distress  Mental Status: AAOx4.  CV: Irregularly Irregular   Resp: breathing non-labored on 2L NC    Significant Results and Procedures   Lactic Acid:   Recent Labs   Lab Test 10/15/23  1842 10/13/23  1717 10/13/23  1559   LACT 2.6* 1.6 1.6     CBC:   Recent Labs   Lab Test 10/15/23  0357 10/14/23  1308 10/14/23  0205 10/13/23  2217   WBC 17.8*  --  11.3* 12.4*   HGB 9.8* 10.6* 8.2* 11.1*   HCT 29.5*  --  24.6* 32.7*   *  --  96* 139*        Sepsis Evaluation   The patient is not known to have an infection.  NO EVIDENCE OF SEPSIS at this  time.  Vital sign, physical exam, and lab findings are due to Afib and recent CABG.

## 2023-10-16 NOTE — PROGRESS NOTES
CLINICAL NUTRITION SERVICES    INTERVENTIONS:  Implementation:  Medical food supplement therapy - Placed order for supplement trials per discussion with provider. Ensure Clear @ 10, Ensure Enlive @ 2, and Magic Cup at HS.      Adele Bojorquez, MS, RD, LD, [facility]   6C (beds [id number] and [id number]) RD pgr: [phone]  Saturday/Sunday/Holiday RD pgr: [phone]

## 2023-10-16 NOTE — PROVIDER NOTIFICATION
10/16/23 1149   PICC 10/16/23 Double Lumen Right Brachial vein medial Difficult venous access. PICC is OK to use.   Placement Date/Time: 10/16/23 (c) 1149   Lumens (Required): Double Lumen  Lumen Identification: Purple;Red  Catheter Brand: Bard  Catheter Size: 4 fr  Lot #: [id number]  Full barrier precautions done: Yes, hand hygiene, sterile gown, sterile gloves, mas...   Site Assessment WDL   External Cath Length (cm) (S)  2 cm   Extremity Circumference (cm) 31 cm   Dressing Transparent;Securement device;Other (Comment)  (Stat seal disc)   Dressing Status clean;dry;intact   Dressing Change Due (S)  10/23/23   Line Necessity Yes, meets criteria   Purple - Status blood return noted;saline locked   Purple - Cap Change Due 10/20/23   Purple - Intervention Flushed   Red - Status blood return noted;heparin locked   Red - Cap Change Due 10/20/23   Red - Intervention Flushed   Phlebitis Scale 0-->no symptoms   Infiltration? no   PICC Comment (S)  Waiting for CXR

## 2023-10-16 NOTE — PLAN OF CARE
Care provided from [id number]    Neuro: A&Ox4. Speech is clear and logical. Pupils equal, round, and reactive to light. Pt denies headache or changes in vision/hearing.  Cardiac: Pt on tele and has intermittent asymptomatic episodes of a-fib/flutter. Primary team aware of this. Pt denies chest pain or palpitations. Vital signs stable. Afebrile.  Respiratory: Sating >92% on 3 L oxygen via nasal canula. Lungs sounds clear/diminished on auscultation. Pt encouraged to use incentive spirometer during commercial breaks. Pt denies shortness of breath or difficulty breathing.   GI/: Adequate urine output via urinal located at bedside. No bowel movement this shift. Bowel sounds audible in all quadrants. Pt verbalizes passing flatus. Pt denies nausea.  Diet/appetite: Tolerating regular diet. Pt verbalizes decreases appetite. Pt encourage to order and have three meals today as goal.   Activity:  Assist of 1/ SBA. Pt was up to edge of bed and worked with PT and OT this shift. Pt encouraged to ambulate and sit at edge of bed when eating.  Pain: Pt verbalizes sternal incision pain rating 5/10. PRN Robaxin administered x1, Oxycodone x1 administered  Skin: Midsternal incision, CT sites x3, right leg/groin graft site, old right CVC site (removed today)  LDA's: Right PICC(purple port infusing Heparin @ 500 units/hr), Chest tubes x4 (2 atrium)    Plan: Continue with POC. Notify CVTS team with changes.

## 2023-10-17 ENCOUNTER — APPOINTMENT (OUTPATIENT)
Dept: GENERAL RADIOLOGY | Facility: CLINIC | Age: 68
DRG: 220 | End: 2023-10-17
Attending: THORACIC SURGERY (CARDIOTHORACIC VASCULAR SURGERY)
Payer: COMMERCIAL

## 2023-10-17 ENCOUNTER — APPOINTMENT (OUTPATIENT)
Dept: GENERAL RADIOLOGY | Facility: CLINIC | Age: 68
DRG: 220 | End: 2023-10-17
Attending: PHYSICIAN ASSISTANT
Payer: COMMERCIAL

## 2023-10-17 ENCOUNTER — APPOINTMENT (OUTPATIENT)
Dept: OCCUPATIONAL THERAPY | Facility: CLINIC | Age: 68
DRG: 220 | End: 2023-10-17
Attending: THORACIC SURGERY (CARDIOTHORACIC VASCULAR SURGERY)
Payer: COMMERCIAL

## 2023-10-17 DIAGNOSIS — Z95.1 S/P CABG (CORONARY ARTERY BYPASS GRAFT): Primary | ICD-10-CM

## 2023-10-17 DIAGNOSIS — R21 RASH AND NONSPECIFIC SKIN ERUPTION: ICD-10-CM

## 2023-10-17 PROBLEM — Z95.2 S/P AVR (AORTIC VALVE REPLACEMENT): Status: ACTIVE | Noted: 2023-10-17

## 2023-10-17 LAB
ALBUMIN SERPL BCG-MCNC: 3.3 G/DL (ref 3.5–5.2)
ALP SERPL-CCNC: 81 U/L (ref 40–129)
ALT SERPL W P-5'-P-CCNC: 18 U/L (ref 0–70)
ANION GAP SERPL CALCULATED.3IONS-SCNC: 11 MMOL/L (ref 7–15)
AST SERPL W P-5'-P-CCNC: 35 U/L (ref 0–45)
BILIRUB SERPL-MCNC: 0.7 MG/DL
BUN SERPL-MCNC: 26.4 MG/DL (ref 8–23)
CALCIUM SERPL-MCNC: 8.5 MG/DL (ref 8.8–10.2)
CHLORIDE SERPL-SCNC: 103 MMOL/L (ref 98–107)
CREAT SERPL-MCNC: 1.09 MG/DL (ref 0.67–1.17)
DEPRECATED HCO3 PLAS-SCNC: 24 MMOL/L (ref 22–29)
EGFRCR SERPLBLD CKD-EPI 2021: 75 ML/MIN/1.73M2
ERYTHROCYTE [DISTWIDTH] IN BLOOD BY AUTOMATED COUNT: 14.7 % (ref 10–15)
GLUCOSE BLDC GLUCOMTR-MCNC: 123 MG/DL (ref 70–99)
GLUCOSE BLDC GLUCOMTR-MCNC: 131 MG/DL (ref 70–99)
GLUCOSE BLDC GLUCOMTR-MCNC: 139 MG/DL (ref 70–99)
GLUCOSE BLDC GLUCOMTR-MCNC: 147 MG/DL (ref 70–99)
GLUCOSE BLDC GLUCOMTR-MCNC: 155 MG/DL (ref 70–99)
GLUCOSE SERPL-MCNC: 157 MG/DL (ref 70–99)
HCT VFR BLD AUTO: 30.3 % (ref 40–53)
HGB BLD-MCNC: 9.9 G/DL (ref 13.3–17.7)
INR PPP: 1.93 (ref 0.85–1.15)
LACTATE SERPL-SCNC: 1.9 MMOL/L (ref 0.7–2)
MAGNESIUM SERPL-MCNC: 2.3 MG/DL (ref 1.7–2.3)
MCH RBC QN AUTO: 30.8 PG (ref 26.5–33)
MCHC RBC AUTO-ENTMCNC: 32.7 G/DL (ref 31.5–36.5)
MCV RBC AUTO: 94 FL (ref 78–100)
PHOSPHATE SERPL-MCNC: 2.1 MG/DL (ref 2.5–4.5)
PLATELET # BLD AUTO: 165 10E3/UL (ref 150–450)
POTASSIUM SERPL-SCNC: 3.8 MMOL/L (ref 3.4–5.3)
PROT SERPL-MCNC: 6.3 G/DL (ref 6.4–8.3)
RBC # BLD AUTO: 3.21 10E6/UL (ref 4.4–5.9)
SODIUM SERPL-SCNC: 138 MMOL/L (ref 135–145)
UFH PPP CHRO-ACNC: <0.1 IU/ML
WBC # BLD AUTO: 9.4 10E3/UL (ref 4–11)

## 2023-10-17 PROCEDURE — 250N000013 HC RX MED GY IP 250 OP 250 PS 637: Performed by: PHYSICIAN ASSISTANT

## 2023-10-17 PROCEDURE — 250N000013 HC RX MED GY IP 250 OP 250 PS 637

## 2023-10-17 PROCEDURE — 36415 COLL VENOUS BLD VENIPUNCTURE: CPT | Performed by: PHYSICIAN ASSISTANT

## 2023-10-17 PROCEDURE — 85610 PROTHROMBIN TIME: CPT

## 2023-10-17 PROCEDURE — 94640 AIRWAY INHALATION TREATMENT: CPT | Mod: 76

## 2023-10-17 PROCEDURE — 97110 THERAPEUTIC EXERCISES: CPT | Mod: GO

## 2023-10-17 PROCEDURE — 250N000011 HC RX IP 250 OP 636: Mod: JZ | Performed by: PHYSICIAN ASSISTANT

## 2023-10-17 PROCEDURE — 250N000013 HC RX MED GY IP 250 OP 250 PS 637: Performed by: NURSE PRACTITIONER

## 2023-10-17 PROCEDURE — 83605 ASSAY OF LACTIC ACID: CPT | Performed by: PHYSICIAN ASSISTANT

## 2023-10-17 PROCEDURE — 120N000003 HC R&B IMCU UMMC

## 2023-10-17 PROCEDURE — 36592 COLLECT BLOOD FROM PICC: CPT | Performed by: PHYSICIAN ASSISTANT

## 2023-10-17 PROCEDURE — 250N000011 HC RX IP 250 OP 636: Mod: JZ | Performed by: NURSE PRACTITIONER

## 2023-10-17 PROCEDURE — 84100 ASSAY OF PHOSPHORUS: CPT | Performed by: THORACIC SURGERY (CARDIOTHORACIC VASCULAR SURGERY)

## 2023-10-17 PROCEDURE — 85027 COMPLETE CBC AUTOMATED: CPT | Performed by: PHYSICIAN ASSISTANT

## 2023-10-17 PROCEDURE — 999N000157 HC STATISTIC RCP TIME EA 10 MIN

## 2023-10-17 PROCEDURE — 250N000011 HC RX IP 250 OP 636: Mod: JZ

## 2023-10-17 PROCEDURE — 71045 X-RAY EXAM CHEST 1 VIEW: CPT | Mod: 26 | Performed by: RADIOLOGY

## 2023-10-17 PROCEDURE — 80053 COMPREHEN METABOLIC PANEL: CPT | Performed by: PHYSICIAN ASSISTANT

## 2023-10-17 PROCEDURE — 250N000009 HC RX 250: Performed by: PHYSICIAN ASSISTANT

## 2023-10-17 PROCEDURE — 71045 X-RAY EXAM CHEST 1 VIEW: CPT

## 2023-10-17 PROCEDURE — 85520 HEPARIN ASSAY: CPT

## 2023-10-17 PROCEDURE — 250N000013 HC RX MED GY IP 250 OP 250 PS 637: Performed by: THORACIC SURGERY (CARDIOTHORACIC VASCULAR SURGERY)

## 2023-10-17 PROCEDURE — 94640 AIRWAY INHALATION TREATMENT: CPT

## 2023-10-17 PROCEDURE — 74018 RADEX ABDOMEN 1 VIEW: CPT

## 2023-10-17 PROCEDURE — 74018 RADEX ABDOMEN 1 VIEW: CPT | Mod: 26 | Performed by: STUDENT IN AN ORGANIZED HEALTH CARE EDUCATION/TRAINING PROGRAM

## 2023-10-17 PROCEDURE — 83735 ASSAY OF MAGNESIUM: CPT | Performed by: THORACIC SURGERY (CARDIOTHORACIC VASCULAR SURGERY)

## 2023-10-17 PROCEDURE — 258N000003 HC RX IP 258 OP 636: Performed by: PHYSICIAN ASSISTANT

## 2023-10-17 RX ORDER — AMLODIPINE BESYLATE 5 MG/1
5 TABLET ORAL DAILY
Status: DISCONTINUED | OUTPATIENT
Start: 2023-10-18 | End: 2023-10-18

## 2023-10-17 RX ORDER — AMLODIPINE BESYLATE 2.5 MG/1
2.5 TABLET ORAL ONCE
Status: COMPLETED | OUTPATIENT
Start: 2023-10-17 | End: 2023-10-17

## 2023-10-17 RX ORDER — POTASSIUM CHLORIDE 750 MG/1
20 TABLET, EXTENDED RELEASE ORAL ONCE
Status: COMPLETED | OUTPATIENT
Start: 2023-10-17 | End: 2023-10-17

## 2023-10-17 RX ORDER — LACTULOSE 10 G/10G
20 SOLUTION ORAL 3 TIMES DAILY
Status: DISCONTINUED | OUTPATIENT
Start: 2023-10-17 | End: 2023-10-18

## 2023-10-17 RX ORDER — TRIAMCINOLONE ACETONIDE 1 MG/G
CREAM TOPICAL
Qty: 45 G | Refills: 0 | Status: SHIPPED | OUTPATIENT
Start: 2023-10-17 | End: 2023-11-10

## 2023-10-17 RX ORDER — SODIUM CHLORIDE 9 MG/ML
INJECTION, SOLUTION INTRAVENOUS CONTINUOUS
Status: DISCONTINUED | OUTPATIENT
Start: 2023-10-17 | End: 2023-10-18

## 2023-10-17 RX ORDER — WARFARIN SODIUM 1 MG/1
1 TABLET ORAL
Status: DISCONTINUED | OUTPATIENT
Start: 2023-10-17 | End: 2023-10-17

## 2023-10-17 RX ADMIN — POTASSIUM CHLORIDE 20 MEQ: 750 TABLET, EXTENDED RELEASE ORAL at 08:30

## 2023-10-17 RX ADMIN — HEPARIN SODIUM 500 UNITS/HR: 10000 INJECTION, SOLUTION INTRAVENOUS at 10:32

## 2023-10-17 RX ADMIN — ALBUTEROL SULFATE 2.5 MG: 2.5 SOLUTION RESPIRATORY (INHALATION) at 11:40

## 2023-10-17 RX ADMIN — SENNOSIDES AND DOCUSATE SODIUM 2 TABLET: 50; 8.6 TABLET ORAL at 20:10

## 2023-10-17 RX ADMIN — ACETYLCYSTEINE 2 ML: 200 SOLUTION ORAL; RESPIRATORY (INHALATION) at 20:49

## 2023-10-17 RX ADMIN — PANTOPRAZOLE SODIUM 40 MG: 40 TABLET, DELAYED RELEASE ORAL at 07:53

## 2023-10-17 RX ADMIN — METHOCARBAMOL 500 MG: 500 TABLET ORAL at 16:01

## 2023-10-17 RX ADMIN — ALBUTEROL SULFATE 2.5 MG: 2.5 SOLUTION RESPIRATORY (INHALATION) at 07:17

## 2023-10-17 RX ADMIN — ACETYLCYSTEINE 2 ML: 200 SOLUTION ORAL; RESPIRATORY (INHALATION) at 07:17

## 2023-10-17 RX ADMIN — POTASSIUM & SODIUM PHOSPHATES POWDER PACK 280-160-250 MG 1 PACKET: 280-160-250 PACK at 08:30

## 2023-10-17 RX ADMIN — AMLODIPINE BESYLATE 2.5 MG: 2.5 TABLET ORAL at 08:31

## 2023-10-17 RX ADMIN — OXYCODONE HYDROCHLORIDE 5 MG: 5 TABLET ORAL at 11:06

## 2023-10-17 RX ADMIN — ASPIRIN 81 MG CHEWABLE TABLET 81 MG: 81 TABLET CHEWABLE at 08:30

## 2023-10-17 RX ADMIN — ACETAMINOPHEN 650 MG: 325 TABLET, FILM COATED ORAL at 16:01

## 2023-10-17 RX ADMIN — SODIUM CHLORIDE: 9 INJECTION, SOLUTION INTRAVENOUS at 16:04

## 2023-10-17 RX ADMIN — OXYCODONE HYDROCHLORIDE 10 MG: 10 TABLET ORAL at 05:37

## 2023-10-17 RX ADMIN — ACETAMINOPHEN 650 MG: 325 TABLET, FILM COATED ORAL at 20:11

## 2023-10-17 RX ADMIN — POTASSIUM & SODIUM PHOSPHATES POWDER PACK 280-160-250 MG 1 PACKET: 280-160-250 PACK at 12:51

## 2023-10-17 RX ADMIN — SENNOSIDES AND DOCUSATE SODIUM 2 TABLET: 50; 8.6 TABLET ORAL at 07:53

## 2023-10-17 RX ADMIN — CARVEDILOL 6.25 MG: 6.25 TABLET, FILM COATED ORAL at 07:53

## 2023-10-17 RX ADMIN — ATORVASTATIN CALCIUM 40 MG: 40 TABLET, FILM COATED ORAL at 20:11

## 2023-10-17 RX ADMIN — LACTULOSE 20 G: 10 POWDER, FOR SOLUTION ORAL at 16:00

## 2023-10-17 RX ADMIN — HYDRALAZINE HYDROCHLORIDE 10 MG: 20 INJECTION INTRAMUSCULAR; INTRAVENOUS at 06:41

## 2023-10-17 RX ADMIN — LACTULOSE 20 G: 10 POWDER, FOR SOLUTION ORAL at 20:18

## 2023-10-17 RX ADMIN — ALBUTEROL SULFATE 2.5 MG: 2.5 SOLUTION RESPIRATORY (INHALATION) at 20:49

## 2023-10-17 RX ADMIN — ACETYLCYSTEINE 2 ML: 200 SOLUTION ORAL; RESPIRATORY (INHALATION) at 11:40

## 2023-10-17 RX ADMIN — HYDROXYZINE HYDROCHLORIDE 50 MG: 25 TABLET, FILM COATED ORAL at 06:00

## 2023-10-17 RX ADMIN — CARVEDILOL 6.25 MG: 6.25 TABLET, FILM COATED ORAL at 18:38

## 2023-10-17 RX ADMIN — AMLODIPINE BESYLATE 2.5 MG: 2.5 TABLET ORAL at 07:53

## 2023-10-17 RX ADMIN — POLYETHYLENE GLYCOL 3350 17 G: 17 POWDER, FOR SOLUTION ORAL at 08:31

## 2023-10-17 RX ADMIN — MAGNESIUM HYDROXIDE 30 ML: 400 SUSPENSION ORAL at 08:32

## 2023-10-17 RX ADMIN — POLYETHYLENE GLYCOL 3350 17 G: 17 POWDER, FOR SOLUTION ORAL at 20:11

## 2023-10-17 RX ADMIN — OXYCODONE HYDROCHLORIDE 5 MG: 5 TABLET ORAL at 20:11

## 2023-10-17 RX ADMIN — ACETAMINOPHEN 650 MG: 325 TABLET, FILM COATED ORAL at 11:05

## 2023-10-17 RX ADMIN — POTASSIUM & SODIUM PHOSPHATES POWDER PACK 280-160-250 MG 1 PACKET: 280-160-250 PACK at 16:00

## 2023-10-17 RX ADMIN — METHOCARBAMOL 500 MG: 500 TABLET ORAL at 05:59

## 2023-10-17 RX ADMIN — HYDRALAZINE HYDROCHLORIDE 10 MG: 20 INJECTION INTRAMUSCULAR; INTRAVENOUS at 04:59

## 2023-10-17 RX ADMIN — SODIUM CHLORIDE, PRESERVATIVE FREE 5 ML: 5 INJECTION INTRAVENOUS at 14:18

## 2023-10-17 RX ADMIN — ACETAMINOPHEN 650 MG: 325 TABLET, FILM COATED ORAL at 05:37

## 2023-10-17 RX ADMIN — ALBUTEROL SULFATE 2.5 MG: 2.5 SOLUTION RESPIRATORY (INHALATION) at 16:46

## 2023-10-17 RX ADMIN — ACETYLCYSTEINE 2 ML: 200 SOLUTION ORAL; RESPIRATORY (INHALATION) at 16:46

## 2023-10-17 ASSESSMENT — ACTIVITIES OF DAILY LIVING (ADL)
ADLS_ACUITY_SCORE: 31
DEPENDENT_IADLS:: INDEPENDENT
ADLS_ACUITY_SCORE: 31

## 2023-10-17 NOTE — PLAN OF CARE
Goal Outcome Evaluation:           Readiness for Transition of Care  Add  Today at 1616 by Sugey Dyer, RN  Add  Flowsheets  Taken today at 1616  Anticipated Changes Related to Illness  none  Transportation Anticipated  family or friend will provide  Concerns to be Addressed  discharge planning  Barriers to Discharge  Plan for discharge to home with OP CR once medically ready. Still has chest tubes in place and subtherapeutic INR.

## 2023-10-17 NOTE — PROGRESS NOTES
"Brief CVTS Crosscover Note    I was asked to see Sukh Craven this afternoon by the CVTS team as there was concern today for ileus.    Subjective:   Patient states that he is feeling tired but overall better than earlier today. He has minimal abdominal pain and some bloating, but is passing gas this afternoon. No BM yet but he is feeling the urge and has requested a bedpan. No nausea, vomiting, fevers, chills, or new concerns. He is voiding spontaneously.    Objective:  BP (!) 137/90 (BP Location: Left arm)   Pulse 90   Temp 98.2  F (36.8  C) (Oral)   Resp 16   Ht 1.981 m (6' 6\")   Wt 93.7 kg (206 lb 9.6 oz)   SpO2 96%   BMI 23.88 kg/m      Resting comfortably in bed  Normal work of breathing on room air  Regular rate  Abdomen is soft, nondistended, nontender  Chest tubes in place, draining serosanguinous fluid    A/P:  Patient is passing gas and his abdominal exam is reassuring. He remains NPO except for ice chips. Will continue to monitor this evening. Continue primary team's plan of care.     Cresencio Leon MD PGY1  General Surgery Resident    "

## 2023-10-17 NOTE — PHARMACY-ADMISSION MEDICATION HISTORY
Pharmacist Admission Medication History    Admission medication history is complete. The information provided in this note is only as accurate as the sources available at the time of the update.    Information Source(s): Patient and CareEverywhere/SureScripts. Last doses per patient interview with nurse prior to procedure    Changes made to PTA medication list:  Added: None  Deleted: None  Changed: None    Allergies reviewed with patient and updates made in EHR: yes    Medication History Completed By: LONG SCHULER [facility] 10/17/2023 8:09 AM    PTA Med List   Medication Sig Last Dose    amLODIPine (NORVASC) 10 MG tablet Take 1 tablet (10 mg) by mouth daily (Patient taking differently: Take 10 mg by mouth every morning) 10/13/2023 at 0645    aspirin 81 MG EC tablet Take 1 tablet (81 mg) by mouth daily Start tomorrow morning. (Patient taking differently: Take 81 mg by mouth every morning Start tomorrow morning.) 10/13/2023 at 0645    atorvastatin (LIPITOR) 40 MG tablet Take 1 tablet (40 mg) by mouth daily (Patient taking differently: Take 40 mg by mouth every morning) 10/13/2023 at 0645    carvedilol (COREG) 12.5 MG tablet Take 1 tablet (12.5 mg) by mouth 2 times daily (with meals) for 180 days 10/13/2023 at 0645    hydrALAZINE (APRESOLINE) 25 MG tablet Take 1 tablet (25 mg) by mouth 3 times daily 10/13/2023 at 0645    losartan (COZAAR) 50 MG tablet Take 1 tablet (50 mg) by mouth daily 10/13/2023 at 0645

## 2023-10-17 NOTE — PROGRESS NOTES
Cardiovascular Surgery Progress Note  10/17/2023         Assessment and Plan:     Sukh Craven is a 68 year old male with PMH of severe aortic insufficiency 2/2 sinotubular aortic root dilitation (sinus of valsalva 5.3 cm),  CAD (midRCA 90% stenosed, dCx 35% stenosed, midLAD 70% stenosed), PAD (mild-mod left lower extremity), bilateral popliteal aneurysms, HTN, T2DM, and chronic opioid/cannabis use, who is s/p AVR and CABG x2 on 10/13 by Dr. Lockett.     Cardiovascular:   CAD (midRCA 90% stenosed, dCx 35% stenosed, midLAD 70% stenosed) s/p CABG x 2  Severe aortic insufficiency 2/2 sinotubular aortic root dilitation (sinus of valsalva 5.3 cm)  S/P aortic root replacement with mechanical AVR ([facility])  Bilateral lower extremity popliteal aneurysms (Right 1.4cm, Left 2.1 cm)  H/o Afib paroxysmal atrial fibrillation  Post-operative atrial fibrillation   1st degree AVB  Ongoing paroxysmal atrial fibrillation overnight, rates controlled ~70-90, no high degree AVB, HD stable. MAPS   TTE (8/15) notable for LVEF 60-65%, global RV function normal, severe aortic insufficiency, and aortic root dilatation   Post-op IHSAN 10/13: Mod LV dysfx without iontropes. Normal with 0.07 epi infusion. Nml RV. Trace MR/TR, no AI, however washing jets presents. Normal aorta.   - ASA 81 mg daily  - Atorvastatin 40 mg  - BB: Coreg 6.25 mg BID   - Amlodipine increased to 5 mg on 10/17 (PTA dose 10 mg daily)  - PRN hydralazine - last given 10/17  - Diuresis as below  - warfarin started 10/15, straight rate heparin 500 ml/hr  - Per ICU, rhythm with 1st degree AVB with episodes of bradycardia and concern for dropped beats  - EP consulted  10/15: no indication for pacemaker, telemetry reviewed by EP without concern for high degree AVB   - discussed paroxysmal afib with EP on 10/17 - plan to hold off on amiodarone given he is rate controlled with baseline intermittent bradycardia   - if rates increase, consider cautious up-titration of  Coreg  - [id number] VASC 3, anticoagulation as below   - EP recommends EP follow up in 2 months with a 2-week Zio prior (to be arranged at discharge)    Chest tubes: Meds x2, left pleural, right pleural- draining too much to remove   TPW: capped - removed 10/17, ok with EP. When TPWs were removed, the coiled end of one of the pacing wires was not present after removal so is retained inpatient (per Dr. Lockett was probably stuck under a sternal wire and broke). Keep at tubes and hold Coumadin and heparin tonight 10/17 per Dr Lockett. Will need to touch base with Cathryn regarding next steps tomorrow 10/18.     Pulmonary:  Extubated POD 1 to 4-6 lpm via Oxymask. Now saturating well on RA  - Supplemental O2 PRN to keep sats > 92%. Wean off as tolerated.  - Pulm hygiene, IS, activity and deep breathing, flutter valve, Mucomyst  - CXR with low lung volumes, atelectasis     Neurology / MSK:  Acute post-operative pain   Pain well controlled with current regimen:  - Acetaminophen, PO oxycodone PRN, IV dilaudid PRN, methocarbamol PRN     / Renal / Fluid / Electrolytes:  BPH  EFFIE, improving  BL creat ~ 1-1.12, most recent creatinine 1.09 (from peak of 1.54 on 10/15); adequate UOP.   FLUID STATUS: Pre-op weight 209 lb, weight today 206 lb and down-trending; I/O past 24 hours: -500 mL.   - Diuresis IV lasix 40 mg BID 10/16 with robust response, hold further diuresis, re-assess volume status daily  - Replete lytes per protocol  - Strict I/O, daily weights  - Avoid/limit nephrotoxins as able    GI / Nutrition:   Moderate Protein-Calorie Malnutrition   Post-operative ileus   - Tolerating regular diet, poor appetite with low PO intake   - Dietician following, appreciate:   If oral intake continues to be inadequate, consider TFs by Fri 10/20   - calorie counts ordered 10/17  - Continue bowel regimen, yet to have a BM post-operatively   - MoM added 10/17  - patient reporting intermittent abdominal pain with respiration, non-tender on  exam, located near chest tube sites, passing flatus  - AXR ordered 10/17 - evidence of ileus  - NPO except for meds/ice chips afternoon 10/17  - lactulose TID ordered afternoon 10/17  - maintenance IVF  ml/hr     Endocrine:  Stress induced hyperglycemia   DM Type II  Hgb A1c 6.8  - Initially managed on insulin drip postop, transitioned to high insulin sliding scale; goal BG <180 for optimal healing  - insulin glargine 5 units HS   - not on insulin prior to admission, consider consult to diabetes eructation if needed at discharge   - hold long acting insulin while NPO     Infectious Disease:  Stress induced leukocytosis  WBC normalized, remains afebrile, no signs or symptoms of infection  - Completed perioperative antibiotics  - Continue to monitor fever curve, CBC    Hematology:   Acute blood loss anemia and thrombocytopenia  Hgb stable; Plt WNL, no signs or symptoms of active bleeding  - CBC daily    Anticoagulation:   - ASA 81 mg only  - Coumadin for mechanical AVR with a goal INR of 2.5-3.5 (per surgeon given concurrent hx of afib)  - INR 1.93 today  - HOLD straight rate heparin drip and coumadin 10/17 per surgeon due to retained pacing wire     MSK/Skin:  Sternotomy  Surgical incision  - Sternal precautions  - Incisional cares per protocol    Prophylaxis:   - Stress ulcer prophylaxis: Pantoprazole 40 mg daily for 30 days  - DVT prophylaxis: Subcutaneous heparin, SCD    Disposition:   - Transferred to  on 10/16  - Therapies recommending discharge to home with assist    Discussed with Dr. Lockett through written communication.    Jorge Luis Peterson PA-C  Cardiothoracic Surgery    12:26 PM  October 17, 2023  pager [phone]          Interval History:     No overnight events.   States pain is well managed on current regimen. Having some intermittent epigastric abdominal pain around his chest tube sites with respiration. Endorses mild nausea since surgery, no vomiting. Denies post-prandial abdominal pain. Lactate  normal, abdomen non-tender on exam. Reports pain resolved on re-examination later in the day. Yet to have a BM post-operatively. Is passing flatus.   Tolerating diet, but poor appetite, and very poor intake.  Reports difficultly taking a full breath since surgery. Encouraging use of IS and flutter valve. Denies cough.   Working with therapies, ambulating in room.   Denies chest pain, palpitations, dizziness, syncopal symptoms, fevers, chills, myalgias, or sternal popping/clicking.         Physical Exam:     Gen: A&Ox4, NAD  Neuro: no focal deficits   CV: irregular rate and rhythm, loud valve click, no rubs   Pulm: mild bibasilar crackles, no wheezing, normal breathing on RA  Abd: nondistended, normal BS, soft, nontender  Ext: no peripheral edema  Incision: clean, dry, intact, no erythema, sternum stable  Tubes/drain sites: dressing clean and dry, serosanguinous output, no air leak. 24 hr output -565 from meds x2, -150 from bilateral pleurals.         Data:    Imaging:  reviewed recent imaging, no acute concerns    Recent Results (from the past 24 hour(s))   XR Chest Port 1 View    Narrative    Portable chest    INDICATION: Chest tubes post CABG an aortic root/valve replacement    COMPARISON: Yesterday    FINDINGS: Elevated right hemidiaphragm again noted. Heart size upper  normal. Prior CABG and aortic valve replacement again present. The  sternotomy remains grossly intact.  Bilateral lower lung zone haziness an bandlike linear densities in the  right mid to lower lung appear similar.  Right PICC tip just into the SVC.      Impression    IMPRESSION: No significant changes radiographically in this post CABG  and aortic root/valve replacement patient.    DEVIN VIEIRA MD         SYSTEM ID:  [id number]        Labs:  Recent Labs   Lab 10/17/23  1232 10/17/23  0755 10/17/23  0529 10/16/23  0718 10/16/23  0528 10/15/23  0855 10/15/23  0357 10/13/23  2220 10/13/23  2217   WBC  --   --  9.4  --  13.6*  --  17.8*   < >  12.4*   HGB  --   --  9.9*  --  9.9*  --  9.8*   < > 11.1*   MCV  --   --  94  --  92  --  90   < > 89   PLT  --   --  165  --  129*  --  107*   < > 139*   INR  --   --  1.93*  --  1.30*  --  1.37*  --   --    NA  --   --  138  --  138  --  139   < > 140   POTASSIUM  --   --  3.8  --  3.9  --  3.9   < > 4.3   CHLORIDE  --   --  103  --  101  --  104   < > 106   CO2  --   --  24  --  24  --  24   < > 21*   BUN  --   --  26.4*  --  31.9*  --  20.6   < > 11.5   CR  --   --  1.09  --  1.36*  --  1.54*   < > 1.03   ANIONGAP  --   --  11  --  13  --  11   < > 13   DORIS  --   --  8.5*  --  8.7*  --  8.3*   < > 8.4*   * 139* 157*   < > 159*   < > 155*   < > 178*   ALBUMIN  --   --  3.3*  --   --   --   --   --  3.7   PROTTOTAL  --   --  6.3*  --   --   --   --   --  6.0*   BILITOTAL  --   --  0.7  --   --   --   --   --  0.7   ALKPHOS  --   --  81  --   --   --   --   --  57   ALT  --   --  18  --   --   --   --   --  18   AST  --   --  35  --   --   --   --   --  79*    < > = values in this interval not displayed.      GLUCOSE:   Recent Labs   Lab 10/17/23  1232 10/17/23  0755 10/17/23  0529 10/16/23  2132 10/16/23  1709 10/16/23  1144   * 139* 157* 137* 139* 132*

## 2023-10-17 NOTE — PHARMACY-ANTICOAGULATION SERVICE
Warfarin Therapy Hold Note  This patient is currently receiving warfarin for  mechanical AVR and Afib .  Goal INR:  2.5-3.5.    Anticoagulation Dose History          Latest Ref Rng & Units 9/8/2023 9/13/2023 10/13/2023 10/15/2023 10/16/2023 10/17/2023   Recent Dosing and Labs   warfarin ANTICOAGULANT (COUMADIN) tablet 2.5 mg - - - - 2.5 mg, $Given - -   warfarin ANTICOAGULANT (COUMADIN) tablet 3 mg - - - - - 3 mg, $Given -   INR 0.85 - 1.15 1.07  1.08  1.17  1.51  1.37  1.30  1.93      Bleeding Signs/Symptoms:  None    Assessment:  Current INR level is subtherapeutic but ashley quickly after just 2 doses of warfarin.  This is most likely due to poor PO intake.     Plan:  HOLD today s warfarin dose as directed by service. Need to pull pacer wires tomorrow and want to ensure INR <2.   An order has been placed in EPIC for  Warfarin- No Dose Today    Do not recommend reversal with vitamin K or FFP at this time.   Recheck next INR tomorrow with AM labs.     The primary team has been contacted about the above plan.   Rachel Santos, PharmD, BCCP, BCPS

## 2023-10-17 NOTE — CONSULTS
Care Management Initial Consult    General Information  Assessment completed with: Patient, Family,    Type of CM/SW Visit: Initial Assessment    Primary Care Provider verified and updated as needed: Yes   Readmission within the last 30 days:        Reason for Consult: discharge planning    Communication Assessment  Patient's communication style: spoken language (English or Bilingual)    Hearing Difficulty or Deaf: no   Wear Glasses or Blind: yes    Cognitive  Cognitive/Neuro/Behavioral: unchanged from my previous assessment  Level of Consciousness: alert  Arousal Level: opens eyes spontaneously  Orientation: oriented x 4  Mood/Behavior: cooperative  Best Language: 0 - No aphasia  Speech: logical    Living Environment:   People in home: alone     Current living Arrangements: house      Able to return to prior arrangements: yes     Family/Social Support:  Care provided by: self  Provides care for:    Marital Status: Single  Sibling(s), Children          Description of Support System: Supportive, Involved         Current Resources:   Patient receiving home care services: No     Community Resources: None  Equipment currently used at home: none  Supplies currently used at home: None    Employment/Financial:  Employment Status: retired        Financial Concerns: none      Functional Status:  Prior to admission patient needed assistance:   Dependent ADLs:: Independent  Dependent IADLs:: Independent     Mental Health Status: No concerns identified          Chemical Dependency Status: No concerns identified    Additional Information:  Patient is s/p CABG and AVR on 10/13/2023. This writer met with pt and family at bedside to introduce self and role of RNCC. Complete care management assessment.    Pt lives alone and was independent with all ADLs/iADLs at baseline. Pt states he is pretty active at baseline and does not use any assistive devices. Pt was not receiving any home services prior to admission. Pt states that he has  excellent support from family. Pt states that he has multiple family members who will be staying with him while he is recovering from surgery. Pt states family is also able to provide transportation to follow up appts.    OT recommending OP CR, discussed cardiac rehab and that scheduling will reach out to him to schedule.     Pt started on warfarin this admission. PLC consult placed for warfarin education. Pt confirmed PCP is Dr. Parrish Stephenson at [facility].     Referral placed for:  [facility]  -Will need to schedule initial initial INR lab draw once discharge date known.     CC will continue to monitor patient's medical condition and progress towards discharge.  Sugey Dyer RN BSN  6C Unit Care Coordinator  Phone number: [phone]  Pager: [phone]

## 2023-10-17 NOTE — PLAN OF CARE
Goal Outcome Evaluation:                  Neuro: A&Ox4, voices needs; calls appropriately  Cardiac: Vital signs stable. Morning blood pressures 140s/90s, after morning medications blood pressure within normal parameters. A fib with rates of 60s-90s converted to sinus rhythm after 3pm. no complaints of palpitations. Pacer wires removed today see CVTS note.  Respiratory: sating >90% at room air. Lung sounds clear; Diminished on right side. Infrequent productive Cough   GI/: voiding spontaneously in urinal. No BM this shift mirilax and lactulose given. Abdominal X-ray taken.   Diet/appetite: Currently NPO.  Activity: Up with assist of 1 w/ gait belt and walker  Pain: Intermittent episodes of post-op pain on midsternal incision relieved by PRN Oxycodone x 1 and acetaminophen x 2  Skin: No new skin deficits noted; midsternal and left lower extremity incisions approximated ; WDL; chest tubes dressing changed. Groin site healing well WDL.  Lines: Double lumen PICC in right arm. 0.9% Sodium Chloride running through red line ; Purple line heparin locked; Both lines patent and intact   Drains: R&L pleural and mediastinal chest tubes x 4 with serosanguineous output connected to suction at -20 cm H2O; no air leak noted  Plan: Continue POC and notify the provider of any changes

## 2023-10-17 NOTE — PROVIDER NOTIFICATION
Provider notified regarding high blood pressure after hydralazine PRN orders, report of difficulty taking full breath, increased respirations, and stomach discomfort. CXR reviewed by provider and amlodipine dose adjusted.     Provider also stated it is okay to ambulate patient with chest tubes on water seal.

## 2023-10-17 NOTE — PLAN OF CARE
Goal Outcome Evaluation:      Plan of Care Reviewed With: patient    Overall Patient Progress: no changeOverall Patient Progress: no change    Outcome Evaluation: See RD note 10/17. Rec intensify bowel regimen. Rec TF if oral intake is not improving.

## 2023-10-17 NOTE — PROGRESS NOTES
CLINICAL NUTRITION SERVICES - ASSESSMENT NOTE     Nutrition Prescription    RECOMMENDATIONS FOR MDs/PROVIDERS TO ORDER:  -If oral intake continues to be inadequate, consider TFs by Fri 10/20. See future/additional recs below.   -Bowel regimen (team aware, pt recent refused).   -Continue to replace phos. Monitor lytes (Phos, Mg++, and K+) for refeeding syndrome. If lytes trend low, aggressively replace. Ensure the lyte Replacement ADULT order set/s is/are implemented and select the option for high replacement. If suspect refeeding, adjust IVFs to non-dextrose-containing (if infusing) and order thiamine x 5-7 days.      Malnutrition Status:    Moderate malnutrition in the context of acute illness/injury    Recommendations already ordered by Registered Dietitian (RD):  Modified oral supplements    Future/Additional Recommendations:  -Rec continue current diet, as ordered (liberalized to encourage oral intake). Rec self-select tolerated foods/beverages (altered dentition). Encourage small, frequent meals and use of oral supplements. Pt with increased protein needs.   -Monitor BG control. Hgb A1c of 6.8 on 9/13/2023. BG was 157 on 10/17.   -Consider scheduling antiemetics/antinauseants ~20 minutes prior to meals to help optimize nausea control.     -Order a multivitamin with minerals to help meet micronutrient needs.   -Consider checking vitamin D status.   -If TF becomes nutrition plan of care, would rec place feeding tube (FT) and initiate TFs, Osmolite 1.5 (concentrated, maintenance TF formula, or equivalent) and order Prosource TF 20 modulars, 1 pkt twice daily. Initiate TFs at 15 mL/hr, advancing rate by 10 mL Q 8 hrs (or per provider discretion, pending hemodynamic stability) to goal rate of 65 mL/hr. Osmolite 1.5 Justino (or equivalent) @ goal of  65ml/hr  (1560ml/day)  + Prosource TF 20 modulars, 1 pkt BID, provides: 2500 kcals, 137g PRO, 1188 ml free H20, 317 g CHO, and 0 g fiber daily.      If begin tube feeds:  "   - Flush FT with 30 mL water Q 4 hrs for patency. Rec provider adjust free water flushes as needed, pending fluid status.   - Ensure K+/Mg++/Phos labs are ordered daily until TFs advance to goal infusion to evaluate for sx of refeeding with nutrition received. If lytes trend low, aggressively replace lytes. Do not advance TF greater than 30 mL/hr unless K+ is = or > 3, Mg++ is = or > 1.5, and Phos is = or > 1.9.   - If not already ordered, order a multivitamin/mineral (certavite or liquid multivitamin/minerals 15 mL/day via FT) to help ensure micronutrient needs being met with suspected hypermetabolic demands and potential interruptions to TF infusions.   - Monitor TF and possible need to adjust nutrition support regimen if necessary, pending medical course and nutrition status.       - If Pivot 1.5 TF is started, order a baseline CRP lab and then CRP labs for the subsequent two Mondays.    - Monitor need to check a metabolic cart study.     - Send a nutrition consult for \"Registered Dietitian to Order TF per Medical Nutrition Therapy Guidelines\" if desire RD to order TFs.       REASON FOR ASSESSMENT  Sukh Craven is a/an 68 year old male assessed by the dietitian for request from provider and LOS    NUTRITION/ADDITIONAL HISTORY  Pt not known to this service PTA.   Per H & P, \"PMH of severe aortic insufficiency 2/2 sinotubular aortic root dilitation (sinus of valsalva 5.3 cm),  CAD (midRCA 90% stenosed, dCx 35% stenosed, midLAD 70% stenosed), PAD (mild-mod left lower extremity), bilateral popliteal aneurysms, HTN, T2DM, and chronic opioid/cannabis use, who is s/p AVR and CABG x2  on 10/13 by Dr. Lockett.\"    Per admission nutrition risk screen, \"Have you been eating poorly because of a decreased appetite?: no.\"   Per discussion with pt, who was lying in bed, his appetite was good PTA and he was eating normally. No known food allergies.     CURRENT NUTRITION ORDERS  Diet: Regular. Ordered to receive Ensure " "Clear @ 10, Ensure Enlive @ 2, and Magic Cup at HS.   Intake/Tolerance: Poor diet tolerance. Per nursing flowsheets (% intake), pt consuming 50% with a fair appetite 10/16. Pt states he is nauseous. Per pt, no need for having a bowel movement as he has not eaten much since surgery. Has consumed broth, orange juice, and applesauce so far this admission. Ensure Enlive and Ensure Clear unopened on his bedside table. [employer] (meal ordering system) indicates food/beverages sent 10/14-10/16 totaled 820 kcals and 24 g protein daily on average. Ordering two \"meals\" daily on average.    LABS  Labs reviewed    MEDICATIONS  Medications reviewed    ANTHROPOMETRICS  Height: 198.1 cm (6' 6\")  Most Recent Weight: 95.3 kg (210 lb 1.6 oz)    IBW: 97.3 kg    BMI: Normal BMI  Weight History: 99.9 kg (10/8/20), 97.5 kg (9/26/2022), 90.7 kg (4/25/2023), 97.7 kg (9/20/2023), 95.1 kg (10/13/2023, admit), 95.3 kg (10/16) - Pt has lost 2.2% of body wt over the last approximate month. On lasix.   Dosing Weight: 95 kg (based on lowest wt so far this admission of 95.1 kg on 10/13)    ASSESSED NUTRITION NEEDS (for inpatient hospital stay)  Estimated Energy Needs: 1397-0438 kcals/day (25 - 30 kcals/kg)  Justification: Increased needs  Estimated Protein Needs: 114-143 grams protein/day (1.2 - 1.5 grams of pro/kg)  Justification: Increased needs pending renal function  Estimated Fluid Needs: 2749-1831 mL/day (25 - 30 mL/kg)   Justification: Maintenance needs or per provider, pending fluid status    PHYSICAL FINDINGS/OTHER FINDINGS  See malnutrition section below.  Resp: Extubated 10/13  GI: Having zero stools daily on average. Last Bowel Movement: (\"a few days ago\" per pt on 10/16). Per discussion with pt, last bowel movement was prior to surgery. Per 10/13 AXR: \"Bowel gas pattern is nonobstructive.Mild amount of stool burden. No gaseous distended loops of bowel.\" On scheduled miralax and senna, being held (pt refusing at times). Prn zofran. "   HEENT: Missing tooth/teeth  WOC: Not following at this time    MALNUTRITION  % Intake: </= 50% for >/= 5 days (severe)  % Weight Loss: Weight loss does not meet criteria  Subcutaneous Fat Loss: Facial region:  Mild - Pt mostly covered and in bed this morning.   Muscle Loss: Temporal:  Mild and Thoracic region (clavicle, acromium bone, deltoid, trapezius, pectoral):  Mild - Pt mostly covered and in bed this morning.   Fluid Accumulation/Edema: None noted  Malnutrition Diagnosis: Moderate malnutrition in the context of acute illness/injury    NUTRITION DIAGNOSIS  Inadequate oral intake related to decreased appetite, nausea, and suspect constipation contributing as evidenced by [employer] (meal ordering system) indicates food/beverages sent 10/14-10/16 totaled 820 kcals and 24 g protein daily on average while estimated needs are 2604-5974 kcals/day (25 - 30 kcals/kg) and 114-143 grams protein/day (1.2 - 1.5 grams of pro/kg).    INTERVENTIONS  Implementation  Nutrition Education: Importance of adequate nutrition intake discussed with pt and gave rationale. Provided suggestions. Encouraged intake of oral supplements.  Medical food supplement therapy: Modified Magic Cup and Ensure Enlive to chocolate and Ensure Clear to apple.     Goals  Patient to consume % of nutritionally adequate meal trays TID, or the equivalent with supplements/snacks.     Monitoring/Evaluation  Progress toward goals will be monitored and evaluated per protocol.         Nutrition will continue to follow.      Adele Bojorquez, MS, RD, LD, [facility]   6C (beds [id number] and [id number]) RD pgr: [phone]  Saturday/Sunday/Holiday RD pgr: [phone]

## 2023-10-17 NOTE — PLAN OF CARE
"Status [id number]    BP (!) 162/103   Pulse 64   Temp 98.4  F (36.9  C) (Oral)   Resp 22   Ht 1.981 m (6' 6\")   Wt 95.3 kg (210 lb 1.6 oz)   SpO2 100%   BMI 24.28 kg/m      A&Ox4. Incisional pain moderately managed with oxy, tylenol, robaxin and atarax. PRN hydralazine x2 for SBP >140. Afib on tele, HR 60's-90's though occasionally dips to high 40's for a few seconds. Satting >90% on RA. Incision and graft sites CDI. Pleural and mediastinal chest tubes to -20 suction, serosanguineous output noted in both. Continues heparin gtt at set rate of 500u/hr. Infrequent, productive cough. Voiding small amounts of darren urine in urinal at bedside, no BM this shift. Continue with plan of care, report changes to CVTS.  "

## 2023-10-18 ENCOUNTER — APPOINTMENT (OUTPATIENT)
Dept: GENERAL RADIOLOGY | Facility: CLINIC | Age: 68
DRG: 220 | End: 2023-10-18
Attending: THORACIC SURGERY (CARDIOTHORACIC VASCULAR SURGERY)
Payer: COMMERCIAL

## 2023-10-18 ENCOUNTER — APPOINTMENT (OUTPATIENT)
Dept: GENERAL RADIOLOGY | Facility: CLINIC | Age: 68
DRG: 220 | End: 2023-10-18
Attending: SURGERY
Payer: COMMERCIAL

## 2023-10-18 ENCOUNTER — APPOINTMENT (OUTPATIENT)
Dept: OCCUPATIONAL THERAPY | Facility: CLINIC | Age: 68
DRG: 220 | End: 2023-10-18
Attending: THORACIC SURGERY (CARDIOTHORACIC VASCULAR SURGERY)
Payer: COMMERCIAL

## 2023-10-18 LAB
ANION GAP SERPL CALCULATED.3IONS-SCNC: 12 MMOL/L (ref 7–15)
BACTERIA TISS BX CULT: NO GROWTH
BUN SERPL-MCNC: 15.6 MG/DL (ref 8–23)
CALCIUM SERPL-MCNC: 8.2 MG/DL (ref 8.8–10.2)
CHLORIDE SERPL-SCNC: 104 MMOL/L (ref 98–107)
CREAT SERPL-MCNC: 0.98 MG/DL (ref 0.67–1.17)
DEPRECATED HCO3 PLAS-SCNC: 24 MMOL/L (ref 22–29)
EGFRCR SERPLBLD CKD-EPI 2021: 85 ML/MIN/1.73M2
ERYTHROCYTE [DISTWIDTH] IN BLOOD BY AUTOMATED COUNT: 14.6 % (ref 10–15)
GLUCOSE BLDC GLUCOMTR-MCNC: 104 MG/DL (ref 70–99)
GLUCOSE BLDC GLUCOMTR-MCNC: 107 MG/DL (ref 70–99)
GLUCOSE BLDC GLUCOMTR-MCNC: 134 MG/DL (ref 70–99)
GLUCOSE BLDC GLUCOMTR-MCNC: 92 MG/DL (ref 70–99)
GLUCOSE SERPL-MCNC: 142 MG/DL (ref 70–99)
GRAM STAIN RESULT: NORMAL
GRAM STAIN RESULT: NORMAL
HCT VFR BLD AUTO: 31.7 % (ref 40–53)
HGB BLD-MCNC: 10.2 G/DL (ref 13.3–17.7)
INR PPP: 1.96 (ref 0.85–1.15)
LACTATE SERPL-SCNC: 1.2 MMOL/L (ref 0.7–2)
MAGNESIUM SERPL-MCNC: 2.2 MG/DL (ref 1.7–2.3)
MCH RBC QN AUTO: 30.5 PG (ref 26.5–33)
MCHC RBC AUTO-ENTMCNC: 32.2 G/DL (ref 31.5–36.5)
MCV RBC AUTO: 95 FL (ref 78–100)
PHOSPHATE SERPL-MCNC: 2 MG/DL (ref 2.5–4.5)
PLATELET # BLD AUTO: 189 10E3/UL (ref 150–450)
POTASSIUM SERPL-SCNC: 3.6 MMOL/L (ref 3.4–5.3)
RBC # BLD AUTO: 3.34 10E6/UL (ref 4.4–5.9)
SODIUM SERPL-SCNC: 140 MMOL/L (ref 135–145)
WBC # BLD AUTO: 9.1 10E3/UL (ref 4–11)

## 2023-10-18 PROCEDURE — 94640 AIRWAY INHALATION TREATMENT: CPT

## 2023-10-18 PROCEDURE — 85027 COMPLETE CBC AUTOMATED: CPT | Performed by: PHYSICIAN ASSISTANT

## 2023-10-18 PROCEDURE — 250N000011 HC RX IP 250 OP 636: Mod: JZ | Performed by: PHYSICIAN ASSISTANT

## 2023-10-18 PROCEDURE — 36592 COLLECT BLOOD FROM PICC: CPT | Performed by: PHYSICIAN ASSISTANT

## 2023-10-18 PROCEDURE — 83605 ASSAY OF LACTIC ACID: CPT | Performed by: SURGERY

## 2023-10-18 PROCEDURE — 258N000003 HC RX IP 258 OP 636: Performed by: PHYSICIAN ASSISTANT

## 2023-10-18 PROCEDURE — 85610 PROTHROMBIN TIME: CPT

## 2023-10-18 PROCEDURE — 93005 ELECTROCARDIOGRAM TRACING: CPT

## 2023-10-18 PROCEDURE — 36592 COLLECT BLOOD FROM PICC: CPT | Performed by: SURGERY

## 2023-10-18 PROCEDURE — 258N000003 HC RX IP 258 OP 636: Performed by: THORACIC SURGERY (CARDIOTHORACIC VASCULAR SURGERY)

## 2023-10-18 PROCEDURE — 250N000009 HC RX 250: Performed by: THORACIC SURGERY (CARDIOTHORACIC VASCULAR SURGERY)

## 2023-10-18 PROCEDURE — 74018 RADEX ABDOMEN 1 VIEW: CPT

## 2023-10-18 PROCEDURE — 97535 SELF CARE MNGMENT TRAINING: CPT | Mod: GO

## 2023-10-18 PROCEDURE — 71045 X-RAY EXAM CHEST 1 VIEW: CPT

## 2023-10-18 PROCEDURE — 93010 ELECTROCARDIOGRAM REPORT: CPT | Performed by: INTERNAL MEDICINE

## 2023-10-18 PROCEDURE — 84100 ASSAY OF PHOSPHORUS: CPT | Performed by: THORACIC SURGERY (CARDIOTHORACIC VASCULAR SURGERY)

## 2023-10-18 PROCEDURE — 250N000011 HC RX IP 250 OP 636: Mod: JZ | Performed by: NURSE PRACTITIONER

## 2023-10-18 PROCEDURE — 250N000013 HC RX MED GY IP 250 OP 250 PS 637: Performed by: THORACIC SURGERY (CARDIOTHORACIC VASCULAR SURGERY)

## 2023-10-18 PROCEDURE — 250N000011 HC RX IP 250 OP 636: Mod: JZ | Performed by: SURGERY

## 2023-10-18 PROCEDURE — 250N000013 HC RX MED GY IP 250 OP 250 PS 637: Performed by: PHYSICIAN ASSISTANT

## 2023-10-18 PROCEDURE — 120N000003 HC R&B IMCU UMMC

## 2023-10-18 PROCEDURE — 999N000157 HC STATISTIC RCP TIME EA 10 MIN

## 2023-10-18 PROCEDURE — 250N000009 HC RX 250: Performed by: PHYSICIAN ASSISTANT

## 2023-10-18 PROCEDURE — 250N000011 HC RX IP 250 OP 636: Mod: JZ | Performed by: THORACIC SURGERY (CARDIOTHORACIC VASCULAR SURGERY)

## 2023-10-18 PROCEDURE — 258N000003 HC RX IP 258 OP 636: Performed by: SURGERY

## 2023-10-18 PROCEDURE — 250N000013 HC RX MED GY IP 250 OP 250 PS 637

## 2023-10-18 PROCEDURE — 83735 ASSAY OF MAGNESIUM: CPT | Performed by: THORACIC SURGERY (CARDIOTHORACIC VASCULAR SURGERY)

## 2023-10-18 PROCEDURE — 250N000013 HC RX MED GY IP 250 OP 250 PS 637: Performed by: SURGERY

## 2023-10-18 PROCEDURE — 71045 X-RAY EXAM CHEST 1 VIEW: CPT | Mod: 26 | Performed by: RADIOLOGY

## 2023-10-18 PROCEDURE — 80048 BASIC METABOLIC PNL TOTAL CA: CPT | Performed by: PHYSICIAN ASSISTANT

## 2023-10-18 PROCEDURE — 250N000013 HC RX MED GY IP 250 OP 250 PS 637: Performed by: NURSE PRACTITIONER

## 2023-10-18 PROCEDURE — 74018 RADEX ABDOMEN 1 VIEW: CPT | Mod: 26 | Performed by: STUDENT IN AN ORGANIZED HEALTH CARE EDUCATION/TRAINING PROGRAM

## 2023-10-18 PROCEDURE — 94640 AIRWAY INHALATION TREATMENT: CPT | Mod: 76

## 2023-10-18 RX ORDER — POTASSIUM PHOS IN 0.9 % NACL 15MMOL/250
15 PLASTIC BAG, INJECTION (ML) INTRAVENOUS ONCE
Qty: 250 ML | Refills: 0 | Status: COMPLETED | OUTPATIENT
Start: 2023-10-18 | End: 2023-10-18

## 2023-10-18 RX ORDER — KETOROLAC TROMETHAMINE 30 MG/ML
15 INJECTION, SOLUTION INTRAMUSCULAR; INTRAVENOUS EVERY 8 HOURS PRN
Status: DISCONTINUED | OUTPATIENT
Start: 2023-10-18 | End: 2023-10-23

## 2023-10-18 RX ORDER — CARVEDILOL 6.25 MG/1
6.25 TABLET ORAL 2 TIMES DAILY WITH MEALS
Status: DISCONTINUED | OUTPATIENT
Start: 2023-10-18 | End: 2023-10-19

## 2023-10-18 RX ORDER — SODIUM CHLORIDE, SODIUM LACTATE, POTASSIUM CHLORIDE, CALCIUM CHLORIDE 600; 310; 30; 20 MG/100ML; MG/100ML; MG/100ML; MG/100ML
INJECTION, SOLUTION INTRAVENOUS CONTINUOUS
Status: DISCONTINUED | OUTPATIENT
Start: 2023-10-18 | End: 2023-10-21

## 2023-10-18 RX ORDER — POTASSIUM CHLORIDE 7.45 MG/ML
10 INJECTION INTRAVENOUS
Status: DISCONTINUED | OUTPATIENT
Start: 2023-10-18 | End: 2023-10-18

## 2023-10-18 RX ORDER — POTASSIUM CHLORIDE 29.8 MG/ML
20 INJECTION INTRAVENOUS ONCE
Status: COMPLETED | OUTPATIENT
Start: 2023-10-18 | End: 2023-10-18

## 2023-10-18 RX ORDER — CARVEDILOL 12.5 MG/1
12.5 TABLET ORAL 2 TIMES DAILY WITH MEALS
Status: DISCONTINUED | OUTPATIENT
Start: 2023-10-18 | End: 2023-10-18

## 2023-10-18 RX ORDER — LIDOCAINE 4 G/G
2 PATCH TOPICAL EVERY 24 HOURS
Status: DISCONTINUED | OUTPATIENT
Start: 2023-10-18 | End: 2023-10-27 | Stop reason: HOSPADM

## 2023-10-18 RX ORDER — METHOCARBAMOL 500 MG/1
500-1000 TABLET, FILM COATED ORAL EVERY 6 HOURS PRN
Status: DISCONTINUED | OUTPATIENT
Start: 2023-10-18 | End: 2023-10-27 | Stop reason: HOSPADM

## 2023-10-18 RX ORDER — AMLODIPINE BESYLATE 10 MG/1
10 TABLET ORAL DAILY
Status: DISCONTINUED | OUTPATIENT
Start: 2023-10-19 | End: 2023-10-27 | Stop reason: HOSPADM

## 2023-10-18 RX ORDER — HYDROMORPHONE HCL IN WATER/PF 6 MG/30 ML
0.2 PATIENT CONTROLLED ANALGESIA SYRINGE INTRAVENOUS EVERY 4 HOURS PRN
Status: DISCONTINUED | OUTPATIENT
Start: 2023-10-18 | End: 2023-10-20

## 2023-10-18 RX ADMIN — POLYETHYLENE GLYCOL 3350 17 G: 17 POWDER, FOR SOLUTION ORAL at 20:49

## 2023-10-18 RX ADMIN — ALBUTEROL SULFATE 2.5 MG: 2.5 SOLUTION RESPIRATORY (INHALATION) at 16:57

## 2023-10-18 RX ADMIN — SENNOSIDES AND DOCUSATE SODIUM 2 TABLET: 50; 8.6 TABLET ORAL at 20:49

## 2023-10-18 RX ADMIN — OXYCODONE HYDROCHLORIDE 5 MG: 5 TABLET ORAL at 20:16

## 2023-10-18 RX ADMIN — ASPIRIN 81 MG CHEWABLE TABLET 81 MG: 81 TABLET CHEWABLE at 08:37

## 2023-10-18 RX ADMIN — OXYCODONE HYDROCHLORIDE 5 MG: 5 TABLET ORAL at 06:23

## 2023-10-18 RX ADMIN — SODIUM CHLORIDE, POTASSIUM CHLORIDE, SODIUM LACTATE AND CALCIUM CHLORIDE: 600; 310; 30; 20 INJECTION, SOLUTION INTRAVENOUS at 16:32

## 2023-10-18 RX ADMIN — SIMETHICONE 226 ML: 125 CAPSULE, LIQUID FILLED ORAL at 20:49

## 2023-10-18 RX ADMIN — CARVEDILOL 6.25 MG: 6.25 TABLET, FILM COATED ORAL at 18:38

## 2023-10-18 RX ADMIN — ALBUTEROL SULFATE 2.5 MG: 2.5 SOLUTION RESPIRATORY (INHALATION) at 08:43

## 2023-10-18 RX ADMIN — SODIUM CHLORIDE, PRESERVATIVE FREE 5 ML: 5 INJECTION INTRAVENOUS at 14:56

## 2023-10-18 RX ADMIN — HYDRALAZINE HYDROCHLORIDE 10 MG: 20 INJECTION INTRAMUSCULAR; INTRAVENOUS at 10:02

## 2023-10-18 RX ADMIN — OXYCODONE HYDROCHLORIDE 5 MG: 5 TABLET ORAL at 15:04

## 2023-10-18 RX ADMIN — HYDROXYZINE HYDROCHLORIDE 50 MG: 25 TABLET, FILM COATED ORAL at 06:22

## 2023-10-18 RX ADMIN — WARFARIN SODIUM 0.5 MG: 1 TABLET ORAL at 20:50

## 2023-10-18 RX ADMIN — LIDOCAINE 2 PATCH: 4 PATCH TOPICAL at 14:53

## 2023-10-18 RX ADMIN — METHOCARBAMOL 500 MG: 500 TABLET ORAL at 08:37

## 2023-10-18 RX ADMIN — ACETYLCYSTEINE 2 ML: 200 SOLUTION ORAL; RESPIRATORY (INHALATION) at 16:57

## 2023-10-18 RX ADMIN — ACETYLCYSTEINE 2 ML: 200 SOLUTION ORAL; RESPIRATORY (INHALATION) at 20:07

## 2023-10-18 RX ADMIN — HYDRALAZINE HYDROCHLORIDE 10 MG: 20 INJECTION INTRAMUSCULAR; INTRAVENOUS at 18:39

## 2023-10-18 RX ADMIN — CARVEDILOL 6.25 MG: 6.25 TABLET, FILM COATED ORAL at 08:37

## 2023-10-18 RX ADMIN — POTASSIUM PHOSPHATE, MONOBASIC POTASSIUM PHOSPHATE, DIBASIC 15 MMOL: 224; 236 INJECTION, SOLUTION, CONCENTRATE INTRAVENOUS at 10:03

## 2023-10-18 RX ADMIN — ACETAMINOPHEN 650 MG: 325 TABLET, FILM COATED ORAL at 10:40

## 2023-10-18 RX ADMIN — ACETAMINOPHEN 650 MG: 325 TABLET, FILM COATED ORAL at 20:16

## 2023-10-18 RX ADMIN — ALBUTEROL SULFATE 2.5 MG: 2.5 SOLUTION RESPIRATORY (INHALATION) at 20:07

## 2023-10-18 RX ADMIN — AMLODIPINE BESYLATE 5 MG: 5 TABLET ORAL at 08:37

## 2023-10-18 RX ADMIN — ATORVASTATIN CALCIUM 40 MG: 40 TABLET, FILM COATED ORAL at 20:49

## 2023-10-18 RX ADMIN — ACETAMINOPHEN 650 MG: 325 TABLET, FILM COATED ORAL at 15:04

## 2023-10-18 RX ADMIN — ONDANSETRON 4 MG: 2 INJECTION INTRAMUSCULAR; INTRAVENOUS at 20:15

## 2023-10-18 RX ADMIN — KETOROLAC TROMETHAMINE 15 MG: 30 INJECTION, SOLUTION INTRAMUSCULAR; INTRAVENOUS at 18:39

## 2023-10-18 RX ADMIN — METHOCARBAMOL 1000 MG: 500 TABLET ORAL at 18:39

## 2023-10-18 RX ADMIN — ACETYLCYSTEINE 2 ML: 200 SOLUTION ORAL; RESPIRATORY (INHALATION) at 08:43

## 2023-10-18 RX ADMIN — PANTOPRAZOLE SODIUM 40 MG: 40 TABLET, DELAYED RELEASE ORAL at 08:37

## 2023-10-18 RX ADMIN — POTASSIUM CHLORIDE 20 MEQ: 29.8 INJECTION, SOLUTION INTRAVENOUS at 08:38

## 2023-10-18 RX ADMIN — OXYCODONE HYDROCHLORIDE 10 MG: 10 TABLET ORAL at 10:40

## 2023-10-18 RX ADMIN — ACETAMINOPHEN 650 MG: 325 TABLET, FILM COATED ORAL at 06:23

## 2023-10-18 RX ADMIN — SENNOSIDES AND DOCUSATE SODIUM 1 TABLET: 50; 8.6 TABLET ORAL at 08:37

## 2023-10-18 ASSESSMENT — ACTIVITIES OF DAILY LIVING (ADL)
ADLS_ACUITY_SCORE: 31

## 2023-10-18 NOTE — PLAN OF CARE
Goal Outcome Evaluation:       Pt having large loose Bowel movements overnight.     Problem: Adult Inpatient Plan of Care  Goal: Plan of Care Review  Description: The Plan of Care Review/Shift note should be completed every shift.  The Outcome Evaluation is a brief statement about your assessment that the patient is improving, declining, or no change.  This information will be displayed automatically on your shift  note.  Outcome: Progressing

## 2023-10-18 NOTE — PROGRESS NOTES
Shift Note 3p-7p    BP elevated again, scheduled carvedilol and PRN hydralazine given this evening.  Mediast CTx2 airleak noted, total 12hr shift output 280cc of serous fluid. Gene pleural CT x2 80cc output of serosanguinous. Surgical incisions open to air. NPO for ileus, abdominal xray done today and enema ordered. PRN robaxin given for pain. Phosphorus and potassium replaced per protocol. Up to chair and up with therapies. Wife at bedside.

## 2023-10-18 NOTE — PROGRESS NOTES
Patient refused to wear BIPAP today and yesterday. Vitals are stable without BIPAP. Patient is on room air. RT will continue to follow.     VITALS:   Temp: 97.8  F (36.6  C) Temp src: Oral BP: (!) 136/98 Pulse: 94   Resp: 20 SpO2: 94 % O2 Device: None (Room air)       Tao Pizarro, RT on 10/17/2023 at 11:29 PM

## 2023-10-18 NOTE — PROGRESS NOTES
Cardiovascular Surgery Progress Note  10/18/2023         Assessment and Plan:     Sukh Craven is a 68 year old male with PMH of severe aortic insufficiency 2/2 sinotubular aortic root dilitation (sinus of valsalva 5.3 cm), CAD (mid RCA 90% stenosed, dCx 35% stenosed, mid LAD 70% stenosed), PAD (mild-mod left lower extremity), bilateral popliteal aneurysms, HTN, T2DM, and chronic opioid/cannabis use, who is s/p AVR and CABG x2 on 10/13 by Dr. Lockett.     Cardiovascular:   CAD s/p CABG x2  Severe AI 2/2 aortic root aneurysm s/p On-X mechanical [place]  PMH PAF  1st degree AV block  Hypertension  Rate-controlled afib, HD stable.   - EP consulted for 1st deg AV block with bradycardia and PAF and have since signed off; rec deferred amiodarone and cautious titration of BB   - ASA 81 mg (reduced from post- CABG protocol in setting of AC for afib)   - PTA atorvastatin resumed   - BB:  Coreg 6.25 mg BID - on 12.5 mg BID PTA, defer escalation of BB given recent 1st deg HB with bradycardia   - Increase amlodipine to PTA dose of 10  mg   - PRN hydralazine available   - AC as below ([id number] VASc 3)   - Will need OP EP follow up at 2 months with 2-wk Zio patch (to be arranged at discharge)    Chest tubes: pleural and mediastinal remain in place, serous output, no air leak; continue CT given monitoring of retained segment of TPW  TPW:  fractured during removal with segment of retained FB    Pulmonary:  - Extubated POD 1; now saturating well on RA  - Supplemental O2 PRN to keep sats > 92%  - Pulm toilet, IS, OOB/activity and deep breathing  - Right hemidiaphragm elevation present prior to surgery    Neurology / MSK:  Acute post-operative pain  - Multimodal analgesia with acetaminophen, methocarbamol PRN (dose range increased), lidocaine patches, oxycodone PRN, IV dilaudid PRN, toradol PRN    S/p sternotomy   - PT/OT/CR consults   - Sternal precautions     / Renal / FE:  EFFIE, resolved  BPH  - Most recent creatinine WNL (BL  ~1-1.12)  - Diuresis: not indicated at this time  - Replace electrolytes prn per protocol  - Strict I/O, daily standing weights per protocol  - Gentle MIVF while NPO    GI / Nutrition:   Adynamic ileus  Moderate malnutrition in context of acute illness/injury  - NPO with clear liquid sips, ice chips, and meds; continue aggressive bowel regimen but discontinue lactulose - having antegrade bowel function  - Daily AXR to monitor  - Ordered gastrograffin enema however not done per Radiology; will order pink lady enema for tonight in lieu  - Dietitian following, appreciate recs; resume matthew counts & supplements with diet advancement    Endocrine:  Stress-induced hyperglycemia, resolved  DM2 - Hgb A1c 6.8    - Initially managed on insulin drip postop, transitioned to high sliding scale   - Long-acting insulin held in setting of NPO status   - BG goal <180 for optimal wound healing   - No antidiabetic agents PTA    Infectious Disease:  Stress-induced leukocytosis, resolved  - WBC WNL, afebrile, no signs or symptoms of infection  - Completed perioperative antibiotics    Hematology:   Acute blood loss anemia  Thrombocytopenia, resolved  Hgb stable; Plt WNL, no signs or symptoms of active bleeding    Anticoagulation:   - ASA 81 mg  - Coumadin for mechanical valve/afib, INR goal 2.5-3.5 per surgeon. Most recent INR subtherapeutic in setting of held doses d/t retained piece of TPW.    - Straight-rated heparin gtt bridge held in setting of fractured/retained piece of TPW; may resume this afternoon.    Prophylaxis:   - Stress ulcer prophylaxis: Pantoprazole 40 mg daily for 30 days post-operatively  - DVT prophylaxis: Subcutaneous heparin, SCD, OOB/activity    Disposition:   - Transferred to  on 10/16  - Therapies recommending discharge to home    Discussed with Dr. Lockett through written communication.      Alce Marc, CNP, ACNPC-  Cardiothoracic Surgery  [employer] Pager [phone]        Interval History:     No  "overnight events.  States pain is comes in waves and is mostly at the level of the diaphragm.   Endorses occasional nausea without vomiting, having antegrade bowel function and is passing gas.   Denies significant abdominal pain.  No dyspnea.  Denies chest pain, dizziness/lightheadedness, and sternal popping/clicking/snapping.         Physical Exam:   Blood pressure (!) 151/79, pulse 84, temperature 99  F (37.2  C), temperature source Oral, resp. rate 14, height 1.981 m (6' 6\"), weight 92.7 kg (204 lb 4.8 oz), SpO2 97%.  Vitals:    10/16/23 1351 10/17/23 0800 10/18/23 0600   Weight: 95.3 kg (210 lb 1.6 oz) 93.7 kg (206 lb 9.6 oz) 92.7 kg (204 lb 4.8 oz)      Admission weight: 95.1 kg  24 hr Fluid status:  +6 mL    Gen: NAD, up in recliner chair visiting with guests  Neuro: A&Ox4, no focal deficits, speech clear  CV: WWP, negligible LE edema  Pulm:  unlabored breathing on RA  Abd: SNDNT, no guarding or peritoneal signs  Ext: negligible peripheral edema, JESSICA, no gross deformities  Incision: clean, dry, intact, no erythema, sternum stable  Tubes/drain sites: serous output, no air leak           Data:    Imaging:  reviewed recent imaging    Recent Results (from the past 24 hour(s))   XR Abdomen Port 1 View    Narrative    Exam: XR ABDOMEN PORT 1 VIEW, 10/17/2023 1:04 PM    Indication: abdominal discomfort    Comparison: Abdomen x-ray 10/13/2023, chest x-ray from same day    Findings:     Portable AP supine views of the abdomen were obtained. Postsurgical  change of median sternotomy. Median sternotomy wires appear intact.  Cardiac valve prosthesis. Surgical clips project over the mid upper  abdomen. Mediastinal drains and left basilar chest tube present.  Increased gaseous distention of the large bowel loops and rectum. No  significant air-filled small bowel dilation. Streaky opacities in the  right lung base, similar compared to earlier today. No definite  pneumatosis or portal venous gas.      Impression    " Impression: Air-filled dilation of large bowel loops, concerning for  adynamic ileus.    I have personally reviewed the examination and initial interpretation  and I agree with the findings.    BRENDA CORREA MD         SYSTEM ID:  [id number]   XR Chest Port 1 View    Narrative    Portable chest    INDICATION: Chest tubes post CABG and aortic root/valve replacement    COMPARISON: Yesterday    FINDINGS: No significant change in this postoperative chest radiograph  with CABG clips another aortic surgical changes including aortic valve  prosthesis. Mediastinal drains again noted. Right costo phrenic angle  blunting greater than left costal phrenic angle blunting unchanged.  Patchy and streaky densities bilaterally, right greater than left,  also unchanged. Degenerative spurring in the thoracic spine. Heart  size upper normal.      Impression    IMPRESSION: Continued edema/atelectasis in the lower lungs, right  greater than left with likely small pleural effusions especially on  the right. Postoperative CABG and aortic valve replacement with other  history of aortic root replacement.    DEVIN VIEIRA MD         SYSTEM ID:  [id number]       Labs:  BMP  Recent Labs   Lab 10/18/23  0607 10/17/23  2334 10/17/23  2127 10/17/23  1735 10/17/23  0755 10/17/23  0529 10/16/23  0718 10/16/23  0528 10/15/23  0855 10/15/23  0357     --   --   --   --  138  --  138  --  139   POTASSIUM 3.6  --   --   --   --  3.8  --  3.9  --  3.9   CHLORIDE 104  --   --   --   --  103  --  101  --  104   DORIS 8.2*  --   --   --   --  8.5*  --  8.7*  --  8.3*   CO2 24  --   --   --   --  24  --  24  --  24   BUN 15.6  --   --   --   --  26.4*  --  31.9*  --  20.6   CR 0.98  --   --   --   --  1.09  --  1.36*  --  1.54*   * 123* 147* 131*   < > 157*   < > 159*   < > 155*    < > = values in this interval not displayed.     CBC  Recent Labs   Lab 10/18/23  0607 10/17/23  0529 10/16/23  0528 10/15/23  0357   WBC 9.1 9.4 13.6* 17.8*   RBC  3.34* 3.21* 3.24* 3.28*   HGB 10.2* 9.9* 9.9* 9.8*   HCT 31.7* 30.3* 29.8* 29.5*   MCV 95 94 92 90   MCH 30.5 30.8 30.6 29.9   MCHC 32.2 32.7 33.2 33.2   RDW 14.6 14.7 14.8 14.8    165 129* 107*     INR  Recent Labs   Lab 10/18/23  0607 10/17/23  0529 10/16/23  0528 10/15/23  0357   INR 1.96* 1.93* 1.30* 1.37*      Hepatic Panel  Recent Labs   Lab 10/17/23  0529 10/13/23  2217 10/13/23  1712   AST 35 79* 53*   ALT 18 18 15   ALKPHOS 81 57 46   BILITOTAL 0.7 0.7 0.6   ALBUMIN 3.3* 3.7 3.3*     GLUCOSE:   Recent Labs   Lab 10/18/23  0607 10/17/23  2334 10/17/23  2127 10/17/23  1735 10/17/23  1232 10/17/23  0755   * 123* 147* 131* 155* 139*

## 2023-10-18 NOTE — PROGRESS NOTES
NEURO: A&O x4  CARDIAC: SR w/ 1st degree AV block - having frequent PACs & PVCs. PRN hydralazine given x1 for elevated BP  RESPIRATORY: Satting 98% on RA  GI/: Adequate urine output in urinal, no BM this shift, passing flatus.   DIET/APPETITE: NPO d/t suspected ileus. Will have occasional ice chips   ACTIVITY: Stand by assist up to chair / commode   PAIN: Given PRN oxy and tylenol x2 for moderate pain, Lidocaine patch placed on chest for sternal pain  SKIN: No new deficits noted. Chest tube sites WDL  Lines / Drains / Airway: R double lumen PICC infusing IVF & heparin locked. Chest tube x4 to suction.     PLAN: Continue with plan of care, notify primary care team with changes.

## 2023-10-19 ENCOUNTER — APPOINTMENT (OUTPATIENT)
Dept: GENERAL RADIOLOGY | Facility: CLINIC | Age: 68
DRG: 220 | End: 2023-10-19
Attending: SURGERY
Payer: COMMERCIAL

## 2023-10-19 ENCOUNTER — APPOINTMENT (OUTPATIENT)
Dept: OCCUPATIONAL THERAPY | Facility: CLINIC | Age: 68
DRG: 220 | End: 2023-10-19
Attending: THORACIC SURGERY (CARDIOTHORACIC VASCULAR SURGERY)
Payer: COMMERCIAL

## 2023-10-19 LAB
ANION GAP SERPL CALCULATED.3IONS-SCNC: 12 MMOL/L (ref 7–15)
BUN SERPL-MCNC: 18.6 MG/DL (ref 8–23)
CALCIUM SERPL-MCNC: 8.2 MG/DL (ref 8.8–10.2)
CHLORIDE SERPL-SCNC: 105 MMOL/L (ref 98–107)
CREAT SERPL-MCNC: 1.23 MG/DL (ref 0.67–1.17)
DEPRECATED HCO3 PLAS-SCNC: 21 MMOL/L (ref 22–29)
EGFRCR SERPLBLD CKD-EPI 2021: 65 ML/MIN/1.73M2
ERYTHROCYTE [DISTWIDTH] IN BLOOD BY AUTOMATED COUNT: 14.9 % (ref 10–15)
ERYTHROCYTE [DISTWIDTH] IN BLOOD BY AUTOMATED COUNT: 15.1 % (ref 10–15)
GLUCOSE SERPL-MCNC: 121 MG/DL (ref 70–99)
HCT VFR BLD AUTO: 28.6 % (ref 40–53)
HCT VFR BLD AUTO: 29.6 % (ref 40–53)
HGB BLD-MCNC: 9.2 G/DL (ref 13.3–17.7)
HGB BLD-MCNC: 9.5 G/DL (ref 13.3–17.7)
INR PPP: 1.98 (ref 0.85–1.15)
MAGNESIUM SERPL-MCNC: 2.1 MG/DL (ref 1.7–2.3)
MCH RBC QN AUTO: 30.3 PG (ref 26.5–33)
MCH RBC QN AUTO: 30.9 PG (ref 26.5–33)
MCHC RBC AUTO-ENTMCNC: 32.1 G/DL (ref 31.5–36.5)
MCHC RBC AUTO-ENTMCNC: 32.2 G/DL (ref 31.5–36.5)
MCV RBC AUTO: 94 FL (ref 78–100)
MCV RBC AUTO: 96 FL (ref 78–100)
PHOSPHATE SERPL-MCNC: 3.6 MG/DL (ref 2.5–4.5)
PLATELET # BLD AUTO: 189 10E3/UL (ref 150–450)
PLATELET # BLD AUTO: 227 10E3/UL (ref 150–450)
POTASSIUM SERPL-SCNC: 3.9 MMOL/L (ref 3.4–5.3)
RBC # BLD AUTO: 2.98 10E6/UL (ref 4.4–5.9)
RBC # BLD AUTO: 3.14 10E6/UL (ref 4.4–5.9)
SODIUM SERPL-SCNC: 138 MMOL/L (ref 135–145)
WBC # BLD AUTO: 10.1 10E3/UL (ref 4–11)
WBC # BLD AUTO: 9 10E3/UL (ref 4–11)

## 2023-10-19 PROCEDURE — 250N000013 HC RX MED GY IP 250 OP 250 PS 637: Performed by: SURGERY

## 2023-10-19 PROCEDURE — 94640 AIRWAY INHALATION TREATMENT: CPT | Mod: 76

## 2023-10-19 PROCEDURE — 85027 COMPLETE CBC AUTOMATED: CPT | Performed by: PHYSICIAN ASSISTANT

## 2023-10-19 PROCEDURE — 97535 SELF CARE MNGMENT TRAINING: CPT | Mod: GO | Performed by: OCCUPATIONAL THERAPIST

## 2023-10-19 PROCEDURE — 74018 RADEX ABDOMEN 1 VIEW: CPT

## 2023-10-19 PROCEDURE — 120N000003 HC R&B IMCU UMMC

## 2023-10-19 PROCEDURE — 36592 COLLECT BLOOD FROM PICC: CPT | Performed by: SURGERY

## 2023-10-19 PROCEDURE — 36592 COLLECT BLOOD FROM PICC: CPT | Performed by: PHYSICIAN ASSISTANT

## 2023-10-19 PROCEDURE — 80048 BASIC METABOLIC PNL TOTAL CA: CPT | Performed by: PHYSICIAN ASSISTANT

## 2023-10-19 PROCEDURE — 74018 RADEX ABDOMEN 1 VIEW: CPT | Mod: 26 | Performed by: RADIOLOGY

## 2023-10-19 PROCEDURE — 250N000013 HC RX MED GY IP 250 OP 250 PS 637: Performed by: NURSE PRACTITIONER

## 2023-10-19 PROCEDURE — 97110 THERAPEUTIC EXERCISES: CPT | Mod: GO | Performed by: OCCUPATIONAL THERAPIST

## 2023-10-19 PROCEDURE — 71046 X-RAY EXAM CHEST 2 VIEWS: CPT

## 2023-10-19 PROCEDURE — 258N000003 HC RX IP 258 OP 636: Performed by: SURGERY

## 2023-10-19 PROCEDURE — 250N000009 HC RX 250: Performed by: PHYSICIAN ASSISTANT

## 2023-10-19 PROCEDURE — 84100 ASSAY OF PHOSPHORUS: CPT | Performed by: THORACIC SURGERY (CARDIOTHORACIC VASCULAR SURGERY)

## 2023-10-19 PROCEDURE — 83735 ASSAY OF MAGNESIUM: CPT | Performed by: THORACIC SURGERY (CARDIOTHORACIC VASCULAR SURGERY)

## 2023-10-19 PROCEDURE — 250N000013 HC RX MED GY IP 250 OP 250 PS 637

## 2023-10-19 PROCEDURE — 999N000157 HC STATISTIC RCP TIME EA 10 MIN

## 2023-10-19 PROCEDURE — 250N000011 HC RX IP 250 OP 636: Mod: JZ | Performed by: SURGERY

## 2023-10-19 PROCEDURE — 71046 X-RAY EXAM CHEST 2 VIEWS: CPT | Mod: 26 | Performed by: RADIOLOGY

## 2023-10-19 PROCEDURE — 85027 COMPLETE CBC AUTOMATED: CPT | Performed by: SURGERY

## 2023-10-19 PROCEDURE — 85610 PROTHROMBIN TIME: CPT

## 2023-10-19 PROCEDURE — 250N000013 HC RX MED GY IP 250 OP 250 PS 637: Performed by: PHYSICIAN ASSISTANT

## 2023-10-19 PROCEDURE — 250N000013 HC RX MED GY IP 250 OP 250 PS 637: Performed by: THORACIC SURGERY (CARDIOTHORACIC VASCULAR SURGERY)

## 2023-10-19 RX ORDER — HEPARIN SODIUM 10000 [USP'U]/100ML
0-5000 INJECTION, SOLUTION INTRAVENOUS CONTINUOUS
Status: DISCONTINUED | OUTPATIENT
Start: 2023-10-19 | End: 2023-10-20

## 2023-10-19 RX ORDER — CARVEDILOL 12.5 MG/1
12.5 TABLET ORAL 2 TIMES DAILY WITH MEALS
Status: DISCONTINUED | OUTPATIENT
Start: 2023-10-19 | End: 2023-10-20

## 2023-10-19 RX ORDER — HEPARIN SODIUM 10000 [USP'U]/100ML
0-5000 INJECTION, SOLUTION INTRAVENOUS CONTINUOUS
Status: DISCONTINUED | OUTPATIENT
Start: 2023-10-19 | End: 2023-10-19

## 2023-10-19 RX ORDER — WARFARIN SODIUM 1 MG/1
1 TABLET ORAL
Status: COMPLETED | OUTPATIENT
Start: 2023-10-19 | End: 2023-10-19

## 2023-10-19 RX ADMIN — ACETAMINOPHEN 650 MG: 325 TABLET, FILM COATED ORAL at 01:01

## 2023-10-19 RX ADMIN — ACETAMINOPHEN 650 MG: 325 TABLET, FILM COATED ORAL at 14:39

## 2023-10-19 RX ADMIN — ACETYLCYSTEINE 2 ML: 200 SOLUTION ORAL; RESPIRATORY (INHALATION) at 20:07

## 2023-10-19 RX ADMIN — PANTOPRAZOLE SODIUM 40 MG: 40 TABLET, DELAYED RELEASE ORAL at 08:29

## 2023-10-19 RX ADMIN — ACETYLCYSTEINE 2 ML: 200 SOLUTION ORAL; RESPIRATORY (INHALATION) at 09:03

## 2023-10-19 RX ADMIN — CARVEDILOL 12.5 MG: 12.5 TABLET, FILM COATED ORAL at 18:28

## 2023-10-19 RX ADMIN — ATORVASTATIN CALCIUM 40 MG: 40 TABLET, FILM COATED ORAL at 20:33

## 2023-10-19 RX ADMIN — ACETYLCYSTEINE 2 ML: 200 SOLUTION ORAL; RESPIRATORY (INHALATION) at 13:09

## 2023-10-19 RX ADMIN — HYDROXYZINE HYDROCHLORIDE 50 MG: 25 TABLET, FILM COATED ORAL at 01:01

## 2023-10-19 RX ADMIN — KETOROLAC TROMETHAMINE 15 MG: 30 INJECTION, SOLUTION INTRAMUSCULAR; INTRAVENOUS at 06:18

## 2023-10-19 RX ADMIN — SENNOSIDES AND DOCUSATE SODIUM 2 TABLET: 50; 8.6 TABLET ORAL at 08:29

## 2023-10-19 RX ADMIN — OXYCODONE HYDROCHLORIDE 5 MG: 5 TABLET ORAL at 06:33

## 2023-10-19 RX ADMIN — AMLODIPINE BESYLATE 10 MG: 10 TABLET ORAL at 08:29

## 2023-10-19 RX ADMIN — CARVEDILOL 6.25 MG: 6.25 TABLET, FILM COATED ORAL at 08:30

## 2023-10-19 RX ADMIN — ACETYLCYSTEINE 2 ML: 200 SOLUTION ORAL; RESPIRATORY (INHALATION) at 16:23

## 2023-10-19 RX ADMIN — WARFARIN SODIUM 1 MG: 1 TABLET ORAL at 18:28

## 2023-10-19 RX ADMIN — ALBUTEROL SULFATE 2.5 MG: 2.5 SOLUTION RESPIRATORY (INHALATION) at 16:23

## 2023-10-19 RX ADMIN — ALBUTEROL SULFATE 2.5 MG: 2.5 SOLUTION RESPIRATORY (INHALATION) at 13:09

## 2023-10-19 RX ADMIN — POLYETHYLENE GLYCOL 3350 17 G: 17 POWDER, FOR SOLUTION ORAL at 08:29

## 2023-10-19 RX ADMIN — ACETAMINOPHEN 650 MG: 325 TABLET, FILM COATED ORAL at 06:34

## 2023-10-19 RX ADMIN — SODIUM CHLORIDE, POTASSIUM CHLORIDE, SODIUM LACTATE AND CALCIUM CHLORIDE: 600; 310; 30; 20 INJECTION, SOLUTION INTRAVENOUS at 22:21

## 2023-10-19 RX ADMIN — ACETAMINOPHEN 650 MG: 325 TABLET, FILM COATED ORAL at 20:33

## 2023-10-19 RX ADMIN — ASPIRIN 81 MG CHEWABLE TABLET 81 MG: 81 TABLET CHEWABLE at 08:30

## 2023-10-19 RX ADMIN — OXYCODONE HYDROCHLORIDE 10 MG: 10 TABLET ORAL at 20:33

## 2023-10-19 RX ADMIN — ALBUTEROL SULFATE 2.5 MG: 2.5 SOLUTION RESPIRATORY (INHALATION) at 20:07

## 2023-10-19 RX ADMIN — POLYETHYLENE GLYCOL 3350 17 G: 17 POWDER, FOR SOLUTION ORAL at 20:33

## 2023-10-19 RX ADMIN — ALBUTEROL SULFATE 2.5 MG: 2.5 SOLUTION RESPIRATORY (INHALATION) at 09:03

## 2023-10-19 RX ADMIN — OXYCODONE HYDROCHLORIDE 5 MG: 5 TABLET ORAL at 01:01

## 2023-10-19 RX ADMIN — OXYCODONE HYDROCHLORIDE 10 MG: 10 TABLET ORAL at 14:39

## 2023-10-19 RX ADMIN — SENNOSIDES AND DOCUSATE SODIUM 2 TABLET: 50; 8.6 TABLET ORAL at 20:33

## 2023-10-19 ASSESSMENT — ACTIVITIES OF DAILY LIVING (ADL)
ADLS_ACUITY_SCORE: 31

## 2023-10-19 NOTE — PLAN OF CARE
Pt is s/p AVR and CABG x2 on 10/13. PMHx of severe aortic insufficiency 2/2 sinotubular aortic root dilitation (sinus of valsalva 5.3 cm), CAD (mid RCA 90% stenosed, dCx 35% stenosed, mid LAD 70% stenosed), PAD (mild-mod left lower extremity), bilateral popliteal aneurysms, HTN, T2DM, and chronic opioid/cannabis use     Code status: Full Code    Team: CVTS     Neuro: AOx4, calm and cooperative, no neuro deficits  Cardiac/Tele: SR/Afib. Pt currently in Afib. HR 70's-90's. Normotensive, Denies CP or palpitations. Other VSS  Respiratory: Room air, O2 sats >  95%. No SOB at rest but GUNTER noted. LS diminished  GI/: Voids spontaneously. BM x1 this shift. Pt had an ileus. Abdominal XR completed today, showed mildly improved ileus.  Diet/Appetite: NPO d/t ileus. Sips with meds and ice chips ok.  Skin: Sternal incision ALEE, CT x2 in place (Mediastinal). 2 pleural CT removed this afternoon. Leave dressing in place for 24hrs. [place] sites intact and ALEE  Endocrine/Electrolytes/Labs: BG checks ACHS (Pt has been NPO). No replacements indicated this shift.   LDAs: Double lumem PICC infusing Heparin at 1100 units/hr (Next Xa at 11pm) and LR at 75 mL/hr  Activity: Up with 1, GB and a walker  Pain: Endorsed pain. Oxycodone and tylenol     Plan: Continue POC. Notify team of concerns

## 2023-10-19 NOTE — OP NOTE
OPERATIVE DATE: 10/12/23    PRE-OPERATIVE DIAGNOSIS:  1. Severe aortic insufficiency  2. Aortic root aneurysm  3. Coronary artery disease  Patient Active Problem List   Diagnosis     Hematuria     BPH (benign prostatic hypertrophy)     Advanced directives, counseling/discussion     Elevated serum creatinine     History of Helicobacter infection     Essential hypertension     CKD (chronic kidney disease) stage 2, GFR 60-89 ml/min     CARDIOVASCULAR SCREENING; LDL GOAL LESS THAN 160     Pre-diabetes     BPH (benign prostatic hypertrophy) with urinary obstruction     Moderate aortic insufficiency     Bladder neck contracture     Aneurysm of ascending aorta without rupture (H24)     Resistant hypertension     Coronary artery disease due to calcified coronary lesion     Hyperlipidemia LDL goal <70     PAD (peripheral artery disease) (H24)     Acute chest pain     Status post coronary angiogram     Severe aortic insufficiency     Coronary artery disease involving native coronary artery of native heart without angina pectoris     Diabetes mellitus, type 2 (H)     S/P AVR (aortic valve replacement)       POST-OPERATIVE DIAGNOSIS:  1. Severe aortic insufficiency  2. Aortic root aneurysm  3. Coronary artery disease  Patient Active Problem List   Diagnosis     Hematuria     BPH (benign prostatic hypertrophy)     Advanced directives, counseling/discussion     Elevated serum creatinine     History of Helicobacter infection     Essential hypertension     CKD (chronic kidney disease) stage 2, GFR 60-89 ml/min     CARDIOVASCULAR SCREENING; LDL GOAL LESS THAN 160     Pre-diabetes     BPH (benign prostatic hypertrophy) with urinary obstruction     Moderate aortic insufficiency     Bladder neck contracture     Aneurysm of ascending aorta without rupture (H24)     Resistant hypertension     Coronary artery disease due to calcified coronary lesion     Hyperlipidemia LDL goal <70     PAD (peripheral artery disease) (H24)     Acute chest  pain     Status post coronary angiogram     Severe aortic insufficiency     Coronary artery disease involving native coronary artery of native heart without angina pectoris     Diabetes mellitus, type 2 (H)     S/P AVR (aortic valve replacement)     PROCEDURE:  1. Aortic root replacement - 25mm OnX aortic valved conduit  2. Coronary artery bypass grafting x 2    - Left internal mammary artery to left anterior descending artery    - Reversed saphenous vein graft to right posterior descending artery  3.   Endoscopic left greater saphenous vein harvest  4.   Transesophageal echocardiography    SURGEON: Alexis Lockett MD    COSURGEON: Rodolfo Hanson MD    ASSISTANT: Shane Reid PA-C    ANESTHESIA: GETA    ESTIMATED BLOOD LOSS: 1000cc    OPERATIVE FINDINGS:  1. Trileaflet aortic valve  2. Aortic root and mid ascending aneurysm  3. Left internal mammary artery 3mm and pulsatile flow  4. Left greater saphenous vein 4-5mm and suitable for bypass  5. Left anterior descending artery 2mm and free of disease  6. Right posterior descending artery 1.5mm and free of disease    INDICATIONS:  LANI QUINTANA is a 68 year old male admitted with aortic root aneurysm and coronary artery disease.  We were asked to evaluate for aortic root replacement, and CABG.  Risks and benefits of the operation were explained to the patient and their family including, but not limited to, bleeding, infection, stroke and even death.  They understood these risks and agreed to proceed electively.    OPERATIVE REPORT:  The patient was transferred to the operating room and positioned supine on the OR table.  General anesthesia was initiated by the anesthesia team.  Endotracheal intubation and central venous access was performed by anesthesia.  The patients neck, chest, abdomen and bilateral lower extremities were clipped, prepped and draped in sterile fashion.  A pre-procedure time-out was performed confirming the correct patient, correct site and  correct procedure.    Median sternotomy skin incision and left lower leg incision were made.  Median sternotomy was made with the saw.  The left internal mammary artery and left greater saphenous vein were harvested for bypass.  Patient was heparinized with 400 mg/kg IV heparin.  Ascending aorta and right atrial appendage were cannulated for bypass.  Cardiopulmonary bypass was initiated with good flows.  Antegrade, retrograde, LV vent catheters were placed.  Ascending aorta was crossclamped and the heart was arrested with 1000 cc antegrade cold blood cardioplegia.  An additional 300 cc of retrograde cold blood cardioplegia was delivered.  Intermittent repeat doses were given every 15 minutes.  The field was flooded with CO2.    Focused our attention on the bypass grafting first.  A reversed segment of saphenous vein was anastomosed in end-to-side fashion to the posterior descending artery using running 7-0 Prolene.  The anastomosis was tested with cold blood cardioplegia and hemostatic.  The vein was sized to fit to the ascending aorta.  Proximal anastomosis was constructed using running 6-0 Prolene.    Next left internal mammary artery was anastomosed in end-to-side fashion to the left anterior descending artery using running 8-0 Prolene.  The anastomosis was tested and hemostatic.  Bulldog clamp was replaced on the mammary artery.    Next we focused our attention on the aortic root replacement.  Aortic root was fashioned.  Everting pledgeted 2-0 Ethibond sutures were placed circumferentially in the aortic root.  Root was sized to fit a 25 mm On-X valve conduit.  Valve stitches were passed through the sewing cuff of the device.  The device was seated and secured with core knots.  Coronary buttons were reimplanted using running 5-0 Prolene and bovine pericardial strips.  Distal anastomosis was fashioned using running 4-0 Prolene in bovine pericardial strips.  Antegrade root vent was placed in the conduit.  A  retrograde hotshot of warm blood was delivered.  The aortic cross-clamp was removed.  The heart was resuscitated.  IV calcium was administered.  The lungs were ventilated bilaterally.  Patient was weaned from cardiopulmonary bypass.  Remaining pump blood volume was returned via the arterial cannula.  Cannulas and catheters were removed and the sites were made hemostatic.  Heparin was reversed with protamine.  Sternum was approximated with stainless steel sternal wires.  The incision was closed with strata fix sutures.    The patient was then transferred from the operating bed to an ICU bed and transferred to the ICU in critical, but stable, condition.    All needle, sponge and instrument counts were correct at the end of the case.    Alexis Lockett  Cardiothoracic Surgery  Pager: [phone]

## 2023-10-19 NOTE — PLAN OF CARE
Goal Outcome Evaluation:         0639: Pt ambulated 300 feet in ortiz with walker. Enema given this morning, sitting commode now, otherwise no BM overnight.

## 2023-10-19 NOTE — PROGRESS NOTES
Cardiovascular Surgery Progress Note  10/19/2023         Assessment and Plan:     Sukh Craven is a 68 year old male with PMH of severe aortic insufficiency 2/2 sinotubular aortic root dilitation (sinus of valsalva 5.3 cm), CAD (mid RCA 90% stenosed, dCx 35% stenosed, mid LAD 70% stenosed), PAD (mild-mod left lower extremity), bilateral popliteal aneurysms, HTN, T2DM, and chronic opioid/cannabis use, who is s/p AVR and CABG x2 on 10/13 by Dr. Lockett.     Cardiovascular:   CAD s/p CABG x2  Severe AI 2/2 aortic root aneurysm s/p On-X mechanical Bentall  PMH PAF  1st degree AV block, resolved  Hypertension  Rate-controlled afib, HD stable.   - EP consulted for 1st deg AV block with bradycardia and PAF and have since signed off; rec deferred amiodarone and cautious titration of BB   - ASA 81 mg (reduced from post-CABG protocol in setting of AC for afib)   - PTA atorvastatin resumed   - BB:  Increased to PTA Coreg dose of 12.5 mg   - Continue amlodipine at PTA dose of 10  mg   - PRN hydralazine available   - AC as below ([id number] VASc 3)   - Will need OP EP follow up at 2 months with 2-wk Zio patch (to be arranged at discharge)    Chest tubes: pleural and mediastinal remain in place, serous output, no air leak; will remove pleural tubes today  TPW:  fractured during removal with segment of retained FB    Pulmonary:  - Extubated POD 1; now saturating well on RA  - Supplemental O2 PRN to keep sats > 92%  - Pulm toilet, IS, OOB/activity and deep breathing  - Right hemidiaphragm elevation present prior to surgery    Neurology / MSK:  Acute post-operative pain  - Multimodal analgesia with acetaminophen, methocarbamol PRN (dose range increased), lidocaine patches, oxycodone PRN, IV dilaudid PRN, toradol PRN    S/p sternotomy   - PT/OT/CR consults   - Sternal precautions     / Renal / FE:  EFFIE  BPH  - Most recent creatinine WNL (BL ~1-1.12)  - Diuresis: not indicated at this time  - Replace electrolytes prn per  protocol  - Strict I/O, daily standing weights per protocol  - Gentle MIVF while NPO    GI / Nutrition:   Adynamic ileus  Moderate malnutrition in context of acute illness/injury  - NPO with clear liquid sips, ice chips, and meds; continue aggressive bowel regimen but discontinue lactulose - having antegrade bowel function  - Daily AXR to monitor; improved distally today following aggressive stimulation from below  - Ordered gastrograffin enema however not done per Radiology; had results following pink lady enema 10/19  - Dietitian following, appreciate recs; resume matthew counts & supplements with diet advancement    Endocrine:  Stress-induced hyperglycemia, resolved  DM2 - Hgb A1c 6.8    - Initially managed on insulin drip postop, transitioned to high sliding scale   - Long-acting insulin held in setting of NPO status   - BG goal <180 for optimal wound healing   - No antidiabetic agents PTA    Infectious Disease:  Stress-induced leukocytosis, resolved  - WBC WNL, afebrile, no signs or symptoms of infection  - Completed perioperative antibiotics    Hematology:   Acute blood loss anemia  Thrombocytopenia, resolved  Hgb stable; Plt WNL, no signs or symptoms of active bleeding    Anticoagulation:   - ASA 81 mg  - Coumadin for mechanical valve/afib, INR goal 2.5-3.5 per surgeon. Most recent INR subtherapeutic in setting of held doses d/t retained piece of TPW.    - Increase straight-rated heparin gtt to LIH bridge    Prophylaxis:   - Stress ulcer prophylaxis: Pantoprazole 40 mg daily for 30 days post-operatively  - DVT prophylaxis: Subcutaneous heparin, SCD, OOB/activity    Disposition:   - Transferred to  on 10/16  - Therapies recommending discharge to home once medically stable, near therapeutic INR, and tolerating diet    Discussed with Dr. Lockett through written communication.      Alec Marc, CNP, ACNPC-AG  Cardiothoracic Surgery  [employer] Pager [phone]        Interval History:     No overnight  "events.  Pain is improved; passing gas and having antegrade bowel function.  Minimal nausea, no vomiting.  Denies abdominal pain.  No dyspnea, occasional productive cough.  Denies chest pain, dizziness/lightheadedness, and sternal popping/clicking/snapping.         Physical Exam:   Blood pressure (!) 141/91, pulse 93, temperature 98.5  F (36.9  C), temperature source Oral, resp. rate 23, height 1.981 m (6' 6\"), weight 93.4 kg (206 lb), SpO2 93%.  Vitals:    10/17/23 0800 10/18/23 0600 10/19/23 0633   Weight: 93.7 kg (206 lb 9.6 oz) 92.7 kg (204 lb 4.8 oz) 93.4 kg (206 lb)      Admission weight: 95.1 kg  24 hr Fluid status:  +324 mL    Gen: NAD, resting quietly in darkened room  Neuro: A&Ox4, no focal deficits, speech clear  CV: WWP, no LE edema  Pulm:  unlabored breathing on RA, occasional productive cough  Abd: SNT, minimally distended, no guarding or peritoneal signs  Ext: no peripheral edema, JESSICA, no gross deformities  Incision: clean, dry, intact, no erythema, sternum stable  Tubes/drain sites: serous output, no air leak           Data:    Imaging:  reviewed recent imaging    Recent Results (from the past 24 hour(s))   XR Abdomen 1 View    Narrative    Examination:  XR ABDOMEN 1 VIEW 10/18/2023 1:35 PM     Comparison: 10/17/2023, 10/13/2023, CT 1/14/2023    History: follow up ileus    Findings: 2 view supine AP radiograph of the abdomen. Postsurgical  changes of the mid chest with sternotomy hardware. Cardiac valve  prosthesis. Surgical clips in the mid left abdomen. Persistent  elevation of the right hemidiaphragm. Bibasilar chest tubes.    The air-filled colon is distended up to 7 cm in diameter with  involvement of the distal colon compared to prior. Redemonstration of  diffuse air-filled distention of the colon, stable to increased  compared to prior, with better distention of the distal colon. No  evidence of pneumatosis. Partially visualized lung bases with enlarged  cardiac silhouette. No pneumatosis or " portal venous gas. Mild to  moderate colonic stool burden      Impression    Impression: Persistent air-fluid dilation of large bowel loops  concerning for adynamic ileus with increased distention of the distal  colon compared to prior radiograph from 10/17/2023.    I have personally reviewed the examination and initial interpretation  and I agree with the findings.    BRENDA CORREA MD         SYSTEM ID:  [id number]       Labs:  BMP  Recent Labs   Lab 10/19/23  0606 10/18/23  2137 10/18/23  2037 10/18/23  1707 10/18/23  1220 10/18/23  0607 10/17/23  0755 10/17/23  0529 10/16/23  0718 10/16/23  0528     --   --   --   --  140  --  138  --  138   POTASSIUM 3.9  --   --   --   --  3.6  --  3.8  --  3.9   CHLORIDE 105  --   --   --   --  104  --  103  --  101   DORIS 8.2*  --   --   --   --  8.2*  --  8.5*  --  8.7*   CO2 21*  --   --   --   --  24  --  24  --  24   BUN 18.6  --   --   --   --  15.6  --  26.4*  --  31.9*   CR 1.23*  --   --   --   --  0.98  --  1.09  --  1.36*   * 134* 92 107*   < > 142*   < > 157*   < > 159*    < > = values in this interval not displayed.     CBC  Recent Labs   Lab 10/19/23  0606 10/18/23  0607 10/17/23  0529 10/16/23  0528   WBC 9.0 9.1 9.4 13.6*   RBC 3.14* 3.34* 3.21* 3.24*   HGB 9.5* 10.2* 9.9* 9.9*   HCT 29.6* 31.7* 30.3* 29.8*   MCV 94 95 94 92   MCH 30.3 30.5 30.8 30.6   MCHC 32.1 32.2 32.7 33.2   RDW 15.1* 14.6 14.7 14.8    189 165 129*     INR  Recent Labs   Lab 10/19/23  0606 10/18/23  0607 10/17/23  0529 10/16/23  0528   INR 1.98* 1.96* 1.93* 1.30*      Hepatic Panel  Recent Labs   Lab 10/17/23  0529 10/13/23  2217 10/13/23  1712   AST 35 79* 53*   ALT 18 18 15   ALKPHOS 81 57 46   BILITOTAL 0.7 0.7 0.6   ALBUMIN 3.3* 3.7 3.3*     GLUCOSE:   Recent Labs   Lab 10/19/23  0606 10/18/23  2137 10/18/23  2037 10/18/23  1707 10/18/23  1220 10/18/23  0607   * 134* 92 107* 104* 142*

## 2023-10-19 NOTE — PROGRESS NOTES
Calorie Count  Intake recorded for: 10/18  Total Kcals: 0 Total Protein: 0g  Kcals from Hospital Food: 0   Protein: 0g  Kcals from Outside Food (average):0 Protein: 0g  # Meals Ordered from Kitchen: no meals ordered from kitchen.   # Meals Recorded: no intake recorded.   # Supplements Recorded: no intake recorded.

## 2023-10-20 ENCOUNTER — APPOINTMENT (OUTPATIENT)
Dept: CARDIOLOGY | Facility: CLINIC | Age: 68
DRG: 220 | End: 2023-10-20
Payer: COMMERCIAL

## 2023-10-20 ENCOUNTER — APPOINTMENT (OUTPATIENT)
Dept: GENERAL RADIOLOGY | Facility: CLINIC | Age: 68
DRG: 220 | End: 2023-10-20
Payer: COMMERCIAL

## 2023-10-20 LAB
ALBUMIN UR-MCNC: NEGATIVE MG/DL
ANION GAP SERPL CALCULATED.3IONS-SCNC: 10 MMOL/L (ref 7–15)
APPEARANCE UR: CLEAR
ATRIAL RATE - MUSE: 89 BPM
ATRIAL RATE - MUSE: NORMAL BPM
BILIRUB UR QL STRIP: NEGATIVE
BUN SERPL-MCNC: 22.1 MG/DL (ref 8–23)
CALCIUM SERPL-MCNC: 8.2 MG/DL (ref 8.8–10.2)
CHLORIDE SERPL-SCNC: 104 MMOL/L (ref 98–107)
COLOR UR AUTO: ABNORMAL
CREAT SERPL-MCNC: 1.29 MG/DL (ref 0.67–1.17)
DEPRECATED HCO3 PLAS-SCNC: 23 MMOL/L (ref 22–29)
DIASTOLIC BLOOD PRESSURE - MUSE: NORMAL MMHG
DIASTOLIC BLOOD PRESSURE - MUSE: NORMAL MMHG
EGFRCR SERPLBLD CKD-EPI 2021: 61 ML/MIN/1.73M2
ERYTHROCYTE [DISTWIDTH] IN BLOOD BY AUTOMATED COUNT: 14.9 % (ref 10–15)
FACT X ACT/NOR PPP CHRO: 31 % (ref 70–130)
GLUCOSE BLDC GLUCOMTR-MCNC: 116 MG/DL (ref 70–99)
GLUCOSE BLDC GLUCOMTR-MCNC: 126 MG/DL (ref 70–99)
GLUCOSE BLDC GLUCOMTR-MCNC: 133 MG/DL (ref 70–99)
GLUCOSE BLDC GLUCOMTR-MCNC: 144 MG/DL (ref 70–99)
GLUCOSE SERPL-MCNC: 120 MG/DL (ref 70–99)
GLUCOSE UR STRIP-MCNC: NEGATIVE MG/DL
HCT VFR BLD AUTO: 27.3 % (ref 40–53)
HGB BLD-MCNC: 8.6 G/DL (ref 13.3–17.7)
HGB UR QL STRIP: NEGATIVE
INR PPP: 2.14 (ref 0.85–1.15)
INTERPRETATION ECG - MUSE: NORMAL
INTERPRETATION ECG - MUSE: NORMAL
KETONES UR STRIP-MCNC: 40 MG/DL
LACTATE SERPL-SCNC: 1.2 MMOL/L (ref 0.7–2)
LEUKOCYTE ESTERASE UR QL STRIP: NEGATIVE
LVEF ECHO: NORMAL
MCH RBC QN AUTO: 30.3 PG (ref 26.5–33)
MCHC RBC AUTO-ENTMCNC: 31.5 G/DL (ref 31.5–36.5)
MCV RBC AUTO: 96 FL (ref 78–100)
MUCOUS THREADS #/AREA URNS LPF: PRESENT /LPF
NITRATE UR QL: NEGATIVE
P AXIS - MUSE: 56 DEGREES
P AXIS - MUSE: NORMAL DEGREES
PH UR STRIP: 5.5 [PH] (ref 5–7)
PLATELET # BLD AUTO: 224 10E3/UL (ref 150–450)
POTASSIUM SERPL-SCNC: 3.8 MMOL/L (ref 3.4–5.3)
PR INTERVAL - MUSE: 200 MS
PR INTERVAL - MUSE: NORMAL MS
QRS DURATION - MUSE: 86 MS
QRS DURATION - MUSE: 88 MS
QT - MUSE: 388 MS
QT - MUSE: 394 MS
QTC - MUSE: 439 MS
QTC - MUSE: 479 MS
R AXIS - MUSE: 29 DEGREES
R AXIS - MUSE: 43 DEGREES
RBC # BLD AUTO: 2.84 10E6/UL (ref 4.4–5.9)
RBC URINE: 0 /HPF
SODIUM SERPL-SCNC: 137 MMOL/L (ref 135–145)
SP GR UR STRIP: 1.02 (ref 1–1.03)
SQUAMOUS EPITHELIAL: <1 /HPF
SYSTOLIC BLOOD PRESSURE - MUSE: NORMAL MMHG
SYSTOLIC BLOOD PRESSURE - MUSE: NORMAL MMHG
T AXIS - MUSE: -31 DEGREES
T AXIS - MUSE: -47 DEGREES
TRANSITIONAL EPI: <1 /HPF
TROPONIN T SERPL HS-MCNC: 390 NG/L
TROPONIN T SERPL HS-MCNC: 396 NG/L
UFH PPP CHRO-ACNC: 0.23 IU/ML
UROBILINOGEN UR STRIP-MCNC: NORMAL MG/DL
VENTRICULAR RATE- MUSE: 77 BPM
VENTRICULAR RATE- MUSE: 89 BPM
WBC # BLD AUTO: 11.2 10E3/UL (ref 4–11)
WBC URINE: <1 /HPF

## 2023-10-20 PROCEDURE — 74018 RADEX ABDOMEN 1 VIEW: CPT | Mod: 26 | Performed by: RADIOLOGY

## 2023-10-20 PROCEDURE — 93306 TTE W/DOPPLER COMPLETE: CPT | Mod: 26 | Performed by: INTERNAL MEDICINE

## 2023-10-20 PROCEDURE — 83605 ASSAY OF LACTIC ACID: CPT

## 2023-10-20 PROCEDURE — 85520 HEPARIN ASSAY: CPT | Performed by: SURGERY

## 2023-10-20 PROCEDURE — 94640 AIRWAY INHALATION TREATMENT: CPT | Mod: 76

## 2023-10-20 PROCEDURE — 250N000011 HC RX IP 250 OP 636: Mod: JZ | Performed by: NURSE PRACTITIONER

## 2023-10-20 PROCEDURE — 84484 ASSAY OF TROPONIN QUANT: CPT

## 2023-10-20 PROCEDURE — 120N000003 HC R&B IMCU UMMC

## 2023-10-20 PROCEDURE — 71045 X-RAY EXAM CHEST 1 VIEW: CPT | Mod: 26 | Performed by: RADIOLOGY

## 2023-10-20 PROCEDURE — 250N000013 HC RX MED GY IP 250 OP 250 PS 637

## 2023-10-20 PROCEDURE — 93010 ELECTROCARDIOGRAM REPORT: CPT | Performed by: INTERNAL MEDICINE

## 2023-10-20 PROCEDURE — 250N000009 HC RX 250: Performed by: PHYSICIAN ASSISTANT

## 2023-10-20 PROCEDURE — 85260 CLOT FACTOR X STUART-POWER: CPT | Performed by: THORACIC SURGERY (CARDIOTHORACIC VASCULAR SURGERY)

## 2023-10-20 PROCEDURE — 250N000013 HC RX MED GY IP 250 OP 250 PS 637: Performed by: THORACIC SURGERY (CARDIOTHORACIC VASCULAR SURGERY)

## 2023-10-20 PROCEDURE — 71045 X-RAY EXAM CHEST 1 VIEW: CPT

## 2023-10-20 PROCEDURE — 250N000011 HC RX IP 250 OP 636: Mod: JZ | Performed by: PHYSICIAN ASSISTANT

## 2023-10-20 PROCEDURE — 93306 TTE W/DOPPLER COMPLETE: CPT

## 2023-10-20 PROCEDURE — 258N000003 HC RX IP 258 OP 636: Performed by: SURGERY

## 2023-10-20 PROCEDURE — 250N000011 HC RX IP 250 OP 636: Mod: JZ | Performed by: SURGERY

## 2023-10-20 PROCEDURE — 999N000157 HC STATISTIC RCP TIME EA 10 MIN

## 2023-10-20 PROCEDURE — 250N000013 HC RX MED GY IP 250 OP 250 PS 637: Performed by: PHYSICIAN ASSISTANT

## 2023-10-20 PROCEDURE — 81001 URINALYSIS AUTO W/SCOPE: CPT

## 2023-10-20 PROCEDURE — 82310 ASSAY OF CALCIUM: CPT | Performed by: PHYSICIAN ASSISTANT

## 2023-10-20 PROCEDURE — 250N000011 HC RX IP 250 OP 636: Mod: JZ

## 2023-10-20 PROCEDURE — 93005 ELECTROCARDIOGRAM TRACING: CPT

## 2023-10-20 PROCEDURE — 250N000013 HC RX MED GY IP 250 OP 250 PS 637: Performed by: NURSE PRACTITIONER

## 2023-10-20 PROCEDURE — 94640 AIRWAY INHALATION TREATMENT: CPT

## 2023-10-20 PROCEDURE — 74018 RADEX ABDOMEN 1 VIEW: CPT

## 2023-10-20 PROCEDURE — 85610 PROTHROMBIN TIME: CPT

## 2023-10-20 PROCEDURE — 250N000013 HC RX MED GY IP 250 OP 250 PS 637: Performed by: SURGERY

## 2023-10-20 PROCEDURE — 85027 COMPLETE CBC AUTOMATED: CPT | Performed by: PHYSICIAN ASSISTANT

## 2023-10-20 RX ORDER — CARVEDILOL 25 MG/1
25 TABLET ORAL 2 TIMES DAILY WITH MEALS
Status: DISCONTINUED | OUTPATIENT
Start: 2023-10-20 | End: 2023-10-27 | Stop reason: HOSPADM

## 2023-10-20 RX ORDER — SIMETHICONE 80 MG
80 TABLET,CHEWABLE ORAL 3 TIMES DAILY
Status: COMPLETED | OUTPATIENT
Start: 2023-10-20 | End: 2023-10-21

## 2023-10-20 RX ORDER — LABETALOL HYDROCHLORIDE 5 MG/ML
20 INJECTION, SOLUTION INTRAVENOUS EVERY 6 HOURS PRN
Status: DISCONTINUED | OUTPATIENT
Start: 2023-10-20 | End: 2023-10-25

## 2023-10-20 RX ORDER — ACETAMINOPHEN 325 MG/1
975 TABLET ORAL EVERY 8 HOURS
Status: DISCONTINUED | OUTPATIENT
Start: 2023-10-20 | End: 2023-10-27 | Stop reason: HOSPADM

## 2023-10-20 RX ORDER — WARFARIN SODIUM 1 MG/1
1 TABLET ORAL
Status: COMPLETED | OUTPATIENT
Start: 2023-10-20 | End: 2023-10-20

## 2023-10-20 RX ORDER — HYDROMORPHONE HCL IN WATER/PF 6 MG/30 ML
0.2 PATIENT CONTROLLED ANALGESIA SYRINGE INTRAVENOUS ONCE
Status: COMPLETED | OUTPATIENT
Start: 2023-10-20 | End: 2023-10-20

## 2023-10-20 RX ADMIN — SODIUM CHLORIDE, POTASSIUM CHLORIDE, SODIUM LACTATE AND CALCIUM CHLORIDE: 600; 310; 30; 20 INJECTION, SOLUTION INTRAVENOUS at 10:33

## 2023-10-20 RX ADMIN — LIDOCAINE 2 PATCH: 4 PATCH TOPICAL at 12:01

## 2023-10-20 RX ADMIN — SODIUM CHLORIDE, PRESERVATIVE FREE 5 ML: 5 INJECTION INTRAVENOUS at 13:15

## 2023-10-20 RX ADMIN — ASPIRIN 81 MG CHEWABLE TABLET 81 MG: 81 TABLET CHEWABLE at 07:59

## 2023-10-20 RX ADMIN — ALBUTEROL SULFATE 2.5 MG: 2.5 SOLUTION RESPIRATORY (INHALATION) at 12:40

## 2023-10-20 RX ADMIN — HYDRALAZINE HYDROCHLORIDE 10 MG: 20 INJECTION INTRAMUSCULAR; INTRAVENOUS at 04:43

## 2023-10-20 RX ADMIN — HYDRALAZINE HYDROCHLORIDE 10 MG: 20 INJECTION INTRAMUSCULAR; INTRAVENOUS at 05:55

## 2023-10-20 RX ADMIN — ACETYLCYSTEINE 2 ML: 200 SOLUTION ORAL; RESPIRATORY (INHALATION) at 20:07

## 2023-10-20 RX ADMIN — HEPARIN SODIUM AND DEXTROSE 1100 UNITS/HR: 10000; 5 INJECTION INTRAVENOUS at 04:47

## 2023-10-20 RX ADMIN — ACETAMINOPHEN 975 MG: 325 TABLET, FILM COATED ORAL at 17:04

## 2023-10-20 RX ADMIN — OXYCODONE HYDROCHLORIDE 10 MG: 10 TABLET ORAL at 01:11

## 2023-10-20 RX ADMIN — ALBUTEROL SULFATE 2.5 MG: 2.5 SOLUTION RESPIRATORY (INHALATION) at 17:17

## 2023-10-20 RX ADMIN — METHOCARBAMOL 500 MG: 500 TABLET ORAL at 12:02

## 2023-10-20 RX ADMIN — ACETAMINOPHEN 975 MG: 325 TABLET, FILM COATED ORAL at 23:44

## 2023-10-20 RX ADMIN — PANTOPRAZOLE SODIUM 40 MG: 40 TABLET, DELAYED RELEASE ORAL at 07:58

## 2023-10-20 RX ADMIN — HYDRALAZINE HYDROCHLORIDE 10 MG: 20 INJECTION INTRAMUSCULAR; INTRAVENOUS at 07:41

## 2023-10-20 RX ADMIN — WARFARIN SODIUM 1 MG: 1 TABLET ORAL at 17:04

## 2023-10-20 RX ADMIN — ALBUTEROL SULFATE 2.5 MG: 2.5 SOLUTION RESPIRATORY (INHALATION) at 09:08

## 2023-10-20 RX ADMIN — ALBUTEROL SULFATE 2.5 MG: 2.5 SOLUTION RESPIRATORY (INHALATION) at 20:07

## 2023-10-20 RX ADMIN — CARVEDILOL 12.5 MG: 12.5 TABLET, FILM COATED ORAL at 07:59

## 2023-10-20 RX ADMIN — ATORVASTATIN CALCIUM 40 MG: 40 TABLET, FILM COATED ORAL at 21:08

## 2023-10-20 RX ADMIN — ACETYLCYSTEINE 2 ML: 200 SOLUTION ORAL; RESPIRATORY (INHALATION) at 09:08

## 2023-10-20 RX ADMIN — SIMETHICONE 80 MG: 80 TABLET, CHEWABLE ORAL at 21:07

## 2023-10-20 RX ADMIN — POLYETHYLENE GLYCOL 3350 17 G: 17 POWDER, FOR SOLUTION ORAL at 21:06

## 2023-10-20 RX ADMIN — SENNOSIDES AND DOCUSATE SODIUM 2 TABLET: 50; 8.6 TABLET ORAL at 21:09

## 2023-10-20 RX ADMIN — ACETYLCYSTEINE 2 ML: 200 SOLUTION ORAL; RESPIRATORY (INHALATION) at 17:16

## 2023-10-20 RX ADMIN — HYDROXYZINE HYDROCHLORIDE 50 MG: 25 TABLET, FILM COATED ORAL at 21:07

## 2023-10-20 RX ADMIN — OXYCODONE HYDROCHLORIDE 10 MG: 10 TABLET ORAL at 04:55

## 2023-10-20 RX ADMIN — CARVEDILOL 25 MG: 25 TABLET, FILM COATED ORAL at 17:04

## 2023-10-20 RX ADMIN — SIMETHICONE 80 MG: 80 TABLET, CHEWABLE ORAL at 08:09

## 2023-10-20 RX ADMIN — AMLODIPINE BESYLATE 10 MG: 10 TABLET ORAL at 07:59

## 2023-10-20 RX ADMIN — METHOCARBAMOL 1000 MG: 500 TABLET ORAL at 21:09

## 2023-10-20 RX ADMIN — SENNOSIDES AND DOCUSATE SODIUM 2 TABLET: 50; 8.6 TABLET ORAL at 07:58

## 2023-10-20 RX ADMIN — ACETYLCYSTEINE 2 ML: 200 SOLUTION ORAL; RESPIRATORY (INHALATION) at 12:40

## 2023-10-20 RX ADMIN — ACETAMINOPHEN 975 MG: 325 TABLET, FILM COATED ORAL at 08:28

## 2023-10-20 RX ADMIN — POLYETHYLENE GLYCOL 3350 17 G: 17 POWDER, FOR SOLUTION ORAL at 07:59

## 2023-10-20 RX ADMIN — HYDROMORPHONE HYDROCHLORIDE 0.2 MG: 0.2 INJECTION, SOLUTION INTRAMUSCULAR; INTRAVENOUS; SUBCUTANEOUS at 08:09

## 2023-10-20 RX ADMIN — SIMETHICONE 80 MG: 80 TABLET, CHEWABLE ORAL at 13:15

## 2023-10-20 ASSESSMENT — ACTIVITIES OF DAILY LIVING (ADL)
ADLS_ACUITY_SCORE: 27
ADLS_ACUITY_SCORE: 31
ADLS_ACUITY_SCORE: 31
ADLS_ACUITY_SCORE: 27
ADLS_ACUITY_SCORE: 27
ADLS_ACUITY_SCORE: 31

## 2023-10-20 NOTE — PROGRESS NOTES
D: 10/13 CABG*2, AVR    I: Monitored vitals and assessed pt status.   Changed: A&O*4. Afebrile. 's systolic this AM, Hydralazine given twice days shift to recheck. Afib/flutter 80-90's. RA clear dim, congested productive cough pt uses yankauer by self. Chest tube*2 mediastinal to -20 suction, small air leak. Pleural chest tubes out. BM 10/20 loose brown in BSC. Voiding appropriately. Sternal incision, L groin and leg incision sites ALEE. Pain to sternotomy incision, Oxycodone given twice. Pt slept most of shift.  Running: Heparin 1100 units/hr, LR @ 75 ml/hr  PRN: Oxycodone    A: Neuro: A&O*4  Tele: Afib/flutter 80-90 with PVC's  Lung: RA clear/dim productive cough  CT*2 mediastinal -20 suction, small air leak  GI: BM 10/20  : Voiding in urinal and commode  Skin: Incisions sternal, L groin site, L leg. Pleural chest tube sites*2 Left and Right.  Pain: Sternal incision   SBA   NPO      Temp:  [98.2  F (36.8  C)-99.2  F (37.3  C)] 98.6  F (37  C)  Pulse:  [72-89] 89  Resp:  [14-27] 19  BP: (114-148)/(62-92) 144/91  SpO2:  [96 %-98 %] 98 %      P: Continue to monitor Pt status and report changes to treatment team.

## 2023-10-20 NOTE — PHARMACY-ANTICOAGULATION SERVICE
Clinical Pharmacy - Warfarin Dosing Consult     Pharmacy has been consulted to manage this patient s warfarin therapy.  Indication: Atrial Fibrillation;Mechanical Aortic Valve Replacement  Therapy Goal: changed per surgeon request to INR 2-3 (correlating CFX = 40 - 20%)  Provider/Team: CVTS  Warfarin Prior to Admission: No  Significant drug interactions: Aspirin and scheduled acetaminophen (increases risk of bleeding but doesn't impact INR)  Recent documented change in oral intake/nutrition: Yes (Poor PO intake since surgery (last 7 days) with ileus)      INR   Date Value Ref Range Status   10/20/2023 2.14 (H) 0.85 - 1.15 Final   10/19/2023 1.98 (H) 0.85 - 1.15 Final     Factor 10 Chromogenic   Date Value Ref Range Status   10/20/2023 31 (L) 70 - 130 % Final     A/P:   INR goal adjusted to 2-3 as above per surgeon request  Chromogenic Factor X is at goal (31%) and is correlating with INR. Will stop heparin infusion and continue to use INR for warfarin monitoring moving forward.   Recommend warfarin 1 mg today.      Pharmacy will monitor Sukh Craven daily and order warfarin doses to achieve specified goal.    Please contact pharmacy as soon as possible if the warfarin needs to be held for a procedure or if the warfarin goals change.       Rachel Santos, PharmD, BCCP, BCPS

## 2023-10-20 NOTE — PROGRESS NOTES
CLINICAL NUTRITION SERVICES     Nutrition Prescription    RECOMMENDATIONS FOR MDs/PROVIDERS TO ORDER:  -If oral intake continues to be inadequate, consider nutrition support by Monday ~10/23. See future/additional recs below.   -Monitor lytes (Phos, Mg++, and K+) for refeeding syndrome. If lytes trend low, aggressively replace. Ensure the lyte Replacement ADULT order set/s is/are implemented and select the option for high replacement. If suspect refeeding, adjust IVFs to non-dextrose-containing (if infusing) and order thiamine x 5-7 days.     Malnutrition Status:    -See prior nutrition notes    Recommendations already ordered by Registered Dietitian (RD):  -See prior nutrition notes    Future/Additional Recommendations:  -See prior nutrition notes for all details    -If TF becomes nutrition plan of care, would rec place feeding tube (FT) and initiate TFs, Osmolite 1.5 (concentrated, maintenance TF formula, or equivalent) and order Prosource TF 20 modulars, 1 pkt twice daily. Initiate TFs at 15 mL/hr, advancing rate by 10 mL Q 8 hrs (or per provider discretion, pending hemodynamic stability) to goal rate of 65 mL/hr. Osmolite 1.5 Justino (or equivalent) @ goal of  65ml/hr  (1560ml/day)  + Prosource TF 20 modulars, 1 pkt BID, provides: 2500 kcals, 137g PRO, 1188 ml free H20, 317 g CHO, and 0 g fiber daily.                 If begin tube feeds:               - Flush FT with 30 mL water Q 4 hrs for patency. Rec provider adjust free water flushes as needed, pending fluid status.              - Ensure K+/Mg++/Phos labs are ordered daily until TFs advance to goal infusion to evaluate for sx of refeeding with nutrition received. If lytes trend low, aggressively replace lytes. Do not advance TF greater than 30 mL/hr unless K+ is = or > 3, Mg++ is = or > 1.5, and Phos is = or > 1.9.              - If not already ordered, order a multivitamin/mineral (certavite or liquid multivitamin/minerals 15 mL/day via FT) to help ensure  "micronutrient needs being met with suspected hypermetabolic demands and potential interruptions to TF infusions.              - Monitor TF and possible need to adjust nutrition support regimen if necessary, pending medical course and nutrition status.                  - If Pivot 1.5 TF is started, order a baseline CRP lab and then CRP labs for the subsequent two Mondays.               - Monitor need to check a metabolic cart study.                - Send a nutrition consult for \"Registered Dietitian to Order TF per Medical Nutrition Therapy Guidelines\" if desire RD to order TFs.      -If parenteral nutrition becomes nutrition plan of care,        *     Dosing weight:  93 kg  Access: If/when central line is in place    Initial parameters (per day)  Volume:  1560 mL  Dextrose: 120 g  AA: 150 g  Lipids: 250 mL 20%, seven days per week     Dextrose titration:   Monitor lytes and if within acceptable parameters (Mg++ > or = 1.5, K+ is > or = 3, and PO4 > or = 1.9), increase dextrose by 80 g/day to goal of 435 g dextrose.     Additives: Infuvite/MTE-4  Goal PN provides 435g dextrose, 150 g AA, and 250 mL 20% lipids daily per week for total provision of 2379 Kcals (26 Kcals/kg), 1.6 g/kg protein, GIR 3.2 mg/kg/minute, and 21% fat kcals on average daily. Check triglycerides at baseline and weekly thereafter.      Checking diet/kcal counts    Diet: Clear liquids since 10/20 (per team, pt with ileus). Regular diet 10/14-10/17, NPO 10/17-10/20. Poor oral intake this admission.   Kcal counts:   10/18: # Meals Ordered from Kitchen: no meals ordered from kitchen.  # Meals Recorded: no intake recorded. # Supplements Recorded: no intake record. Pt NPO    INTERVENTIONS:  Implementation:  Collaboration with other providers: Discussed pt with provider. Consider nutrition support by ~10/23 if inadequate nutrition intake continues and pending GI status.   Medical food supplement therapy: Modified Ensure Clear to send between all meals.  "     Follow up/Monitoring:  Will continue to follow pt.    Adele Bojorquez, MS, RD, LD, CNSC   6C (beds [id number] and [id number]) RD pgr: [phone]  Saturday/Sunday/Holiday RD pgr: [phone]

## 2023-10-20 NOTE — PROGRESS NOTES
Cardiovascular Surgery Progress Note  10/20/2023         Assessment and Plan:     Sukh Craven is a 68 year old male with PMH of severe aortic insufficiency 2/2 sinotubular aortic root dilitation (sinus of valsalva 5.3 cm), CAD (mid RCA 90% stenosed, dCx 35% stenosed, mid LAD 70% stenosed), PAD (mild-mod left lower extremity), bilateral popliteal aneurysms, HTN, T2DM, and chronic opioid/cannabis use, who is s/p AVR and CABG x2 on 10/13 by Dr. Lockett.     Cardiovascular:   CAD s/p CABG x2  Severe AI 2/2 aortic root aneurysm s/p On-X mechanical Bentall  PMH PAF  1st degree AV block, resolved  Hypertension  Rate-controlled afib, HD stable. Hypertensive overnight requiring X3 hydralazine. Suspect lower blood pressures 10/19 AM are due to combination of hydralazine + PO antihypertensives.  Echo 10/20/23: LV EF 60-65%, global RV function, AV MG 13 mmHg, trivial posterior pericardial effusion   - EP consulted for 1st deg AV block with bradycardia and PAF and have since signed off; rec deferred amiodarone and cautious titration of BB   - ASA 81 mg (reduced from post-CABG protocol in setting of AC for afib)   - PTA atorvastatin resumed   - BB: For PM dose, increased to Coreg 25 mg BID   - Continue amlodipine at PTA dose of 10 mg   - PRN hydralazine available, added PRN labetalol 10/20   - AC as below ([id number] VASc 3)   - Will need OP EP follow up at 2 months with 2-wk Zio patch (to be arranged at discharge)    Chest tubes: pleural removed 10/19, mediastinal with 230cc output over 24 hours, removed 10/20/23. Will follow post-procedural chest xray.  TPW:  fractured during removal with segment of retained FB    Pulmonary:  - Extubated POD 1; now saturating well on RA  - Supplemental O2 PRN to keep sats > 92%  - Pulm toilet, IS, OOB/activity and deep breathing  - Right hemidiaphragm elevation present prior to surgery    Neurology / MSK:  Acute post-operative pain  - Multimodal analgesia with scheduled acetaminophen,  methocarbamol PRN (dose range increased), lidocaine patches, Toradol PRN  - Discontinued oxycodone, dilaudid 10/20 in setting of ileus    S/p sternotomy   - PT/OT/CR consults   - Sternal precautions     / Renal / FE:  EFFIE  BPH  - Most recent creatinine slightly elevated at 1.29 (BL ~1-1.12). Likely multifactorial, possible that Toradol is contributory.  - Diuresis: not indicated at this time, will hold off as also remains NPO  - Admit weight 209 lbs; most recent with 206 lbs (updated weight requested)  - Replace electrolytes prn per protocol  - Strict I/O, daily standing weights per protocol  - Gentle MIVF while NPO    GI / Nutrition:   Adynamic ileus  Moderate malnutrition in context of acute illness/injury  - Advanced diet to clear liquids 10/20; continue aggressive bowel regimen to support continued antegrade bowel function. Patient has had 2 large, loose bowel movements in past 24 hours.   - Daily AXR to monitor; 10/20 with decreased distention, non-obstructive gas pattern, support improving ileus  - Ordered gastrograffin enema however not done per Radiology; had results following pink lady enema 10/19  - Dietitian following, appreciate recs; resume matthew counts & supplements with diet advancement  - Increased abdominal pain 10/20 AM which is worse with laying supine; cardiac workup unremarkable, suspect chest tubes & ileus are contributory. Lactic acid WNL, stable.    Endocrine:  Stress-induced hyperglycemia, resolved  DM2 - Hgb A1c 6.8    - Initially managed on insulin drip postop, transitioned to high sliding scale   - Long-acting insulin held in setting of NPO status   - BG goal <180 for optimal wound healing   - No antidiabetic agents PTA    Infectious Disease:  Stress-induced leukocytosis, resolved  - WBC slightly elevated at 11.2, suspect stress response, afebrile, no signs or symptoms of infection  - Completed perioperative antibiotics  - Encourage pulmonary toilet    Hematology:   Acute blood loss  "anemia  Thrombocytopenia, resolved  Hgb stable; Plt WNL, no signs or symptoms of active bleeding  - Daily CBC    Anticoagulation:   Chronic anticoagulation (INR 2.0-3.0) for mechanical AVR, afib  - ASA 81 mg  - Coumadin for mechanical valve/afib, INR goal changed to 2.0-3.0. per surgeon. Most recent INR subtherapeutic in setting of held doses d/t retained piece of TPW.  Chromogenic factor 10 does appear to correlate per discussion with pharmacy.   - Discontinue LIH bridge    Prophylaxis:   - Stress ulcer prophylaxis: Pantoprazole 40 mg daily  - DVT prophylaxis: AC as above, SCD, OOB/activity    Disposition:   - Transferred to  on 10/16  - Therapies recommending discharge to home once medically stable, stable blood pressures, near therapeutic INR, and tolerating diet.     Discussed with Dr. Lockett through verbal & written communication.      Teresita Iglesias PA-C  Cardiothoracic Surgery  Pager [phone]    7:49 AM October 20, 2023        Interval History:   No overnight events.  This AM, patient reporting increased \"chest pain.\" On closer interview & exam, pain is localized to upper abdomen/diaphragm. Pain is significantly worse when lying supine but is relieved with lying on side. It is only tender to very deep palpation and even with this is improved. He denies shortness of breath or true chest pain. He denies radiation of pain. Pain is improved with analgesics & removal of chest tubes.  He reports X3 bowel movements and ongoing flatus over the past 24 hours. Denies any nausea. No abdominal pain with rest.  No dyspnea, occasional productive cough.  Denies chest pain, dizziness/lightheadedness, and sternal popping/clicking/snapping.         Physical Exam:   Blood pressure (!) 80/62, pulse 78, temperature 98.4  F (36.9  C), temperature source Oral, resp. rate 24, height 1.981 m (6' 6\"), weight 93.4 kg (206 lb), SpO2 98%.  Vitals:    10/17/23 0800 10/18/23 0600 10/19/23 0633   Weight: 93.7 kg (206 lb 9.6 oz) 92.7 kg " (204 lb 4.8 oz) 93.4 kg (206 lb)      Gen: NAD, resting quietly in darkened room  Neuro: A&Ox4, no focal deficits, speech clear  CV: irregularly irregular, no murmurs/rubs/gallops appreciated, no LE edema  Pulm:  unlabored breathing on RA  Abd: hyperactive bowel sounds, soft/non-distended/non-tender, very minimally distended, no guarding or peritoneal signs  Ext: no peripheral edema, JESSICA, no gross deformities  Incision: clean, dry, intact, no erythema, sternum stable  Tubes/drain sites: serous output, no air leak           Data:    Imaging:  reviewed recent imaging    Recent Results (from the past 24 hour(s))   XR Chest 2 Views    Narrative    EXAM: Chest radiograph 10/19/2023 3:32 PM    HISTORY: 68 years Male s/p cardiac surgery.     COMPARISON: Chest x-ray 10/18/2023.    TECHNIQUE: Upright PA and lateral views of the chest.    FINDINGS:   Post surgical changes of the chest with intact median sternotomy  wires. Prosthetic aortic valve. Mediastinal surgical drains are in  place and in stable position compared to prior exam. Bilateral  costophrenic angle blunting, right greater than left. Patchy and  streaky perihilar and bibasilar pulmonary opacities, right greater  than left distribution, is similar to prior exam. No appreciable  pneumothorax. The visualized upper abdomen is unremarkable. No acute  or suspicious osseous abnormality. Degenerative changes of thoracic  spine. Soft tissues are unremarkable. Minimally elevated right  hemidiaphragm again present.      Impression    IMPRESSION:   1. Continued perihilar and bibasilar pulmonary opacities, consistent  with postsurgical atelectasis/edema.  2. Postsurgical changes of CABG and aortic valve replacement, hardware  is intact.    I have personally reviewed the examination and initial interpretation  and I agree with the findings.    DEVIN VIEIRA MD         SYSTEM ID:  [id number]   XR Abdomen Upright Only    Narrative    Exam: XR ABDOMEN UPRIGHT ONLY,  10/19/2023 3:33 PM    Indication: follow up ileus    Comparison: 10/18/2023    Findings:   Improving gas distention of multiple small and large bowel loops.  There is a curvilinear gas distended bowel segment within the mid  abdomen, nonspecific. 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 Postsurgical  changes in the chest. No worrisome calcification or subdiaphragmatic  free air.      Impression    Impression: Mildly improved ileus.     I have personally reviewed the examination and initial interpretation  and I agree with the findings.    ITALO MURILLO MD         SYSTEM ID:  [id number]   Echocardiogram Complete   Result Value    LVEF  60-65%    Narrative    [id number]  [id number]  [id number]  [id number]^DYLAN^MANDI     [facility],[place]  Echocardiography Laboratory  [address] [place] [id number]     Name: LANI QUINTANA  MRN: [id number]  : 1955  Study Date: 10/20/2023 08:15 AM  Age: 68 yrs  Gender: Male  [place] [facility] [place] For Study: CABG, Chest Pain  Ordering Physician: MANDI RICHARDSON  Performed By: Jere Collins     BSA: 2.3 m2  Height: 78 in  Weight: 206 lb  HR: 101  BP: 144/91 mmHg  ______________________________________________________________________________  Procedure  Complete Portable Echo Adult. Technically difficult study.  ______________________________________________________________________________  Interpretation Summary  s/p Coronary Artery Bypass Graft x 2, Aortic root and valve replacement using  On-X Ascending Aortic Prosthesis with Gelweave Valsalva Graft 25mm 10/13/2023.     Global and regional left ventricular function is normal with an EF of 60-65%.  Global right ventricular function is normal.  The prosthetic aortic valve is well-seated. Doppler interrogation is normal.  MG is 13 mmHg. No apperant AI noted. Afib is with RVR at times which makes  assessment difficult.      Trivial pericardial effusion is present posterior to the LV.     This study was compared with the study from 8/15/2023 .Rhythm is afib now  (previous study sinus), s/p AVR and root repair for severe AI and severe  aortic root dilation.     ______________________________________________________________________________  Left Ventricle  Left ventricular wall thickness cannot evaluate. Global and regional left  ventricular function is normal with an EF of 60-65%. Mild concentric wall  thickening consistent with left ventricular hypertrophy is present. Diastolic  function not assessed due to atrial fibrillation.     Right Ventricle  The right ventricle is normal size. Global right ventricular function is  normal.     Atria  Both atria appear normal.     Mitral Valve  The mitral valve is normal. Trace mitral insufficiency is present.     Aortic Valve  The mean AoV pressure gradient is 13.7 mmHg. The prosthetic aortic valve is  well-seated.     Tricuspid Valve  Mild tricuspid insufficiency is present. Pulmonary artery systolic pressure  cannot be assessed.     Pulmonic Valve  The pulmonic valve is normal.     Vessels  The aorta root is normal. s/p repair.     Pericardium  Trivial pericardial effusion is present.     Compared to Previous Study  This study was compared with the study from 8/15/2023 . Rhythm is afib, s/p  AVR and root repair for severe AI and severe aortic root dilation.  ______________________________________________________________________________  MMode/2D Measurements & Calculations     IVSd: 1.2 cm  LVIDd: 4.4 cm  LVIDs: 2.9 cm  LVPWd: 1.3 cm  FS: 35.0 %  LV mass(C)d: 212.2 grams  LV mass(C)dI: 92.8 grams/m2  LA Volume (BP): 52.7 ml  LA Volume Index (BP): 23.0 ml/m2  RWT: 0.60     Doppler Measurements & Calculations  Ao V2 max: 260.6 cm/sec  Ao max P.2 mmHg  Ao V2 mean: 170.1 cm/sec  Ao mean P.7 mmHg  Ao V2 VTI: 29.9 cm      ______________________________________________________________________________  Report approved by: Juli PEÑA 10/20/2023 10:00 AM         XR Abdomen Port 1 View    Narrative    Exam: XR ABDOMEN PORT 1 VIEW, 10/20/2023 10:01 AM    Indication: ongoing ileus, new pain    Comparison: 10/19/2023, 10/18/2023    Findings:   Slightly decreased diffuse gas distention of multiple small and large  bowel loops.  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 Postsurgical changes in the chest. No  worrisome calcification or subdiaphragmatic free air.      Impression    Impression: Slightly decreased diffuse gaseous distention of small and  large bowel without obstructive pattern, likely improving adynamic  ileus.    I have personally reviewed the examination and initial interpretation  and I agree with the findings.    TAE FLORES DO         SYSTEM ID:  [id number]       Labs:  BMP  Recent Labs   Lab 10/20/23  0429 10/19/23  0606 10/18/23  2137 10/18/23  2037 10/18/23  1220 10/18/23  0607 10/17/23  0755 10/17/23  0529    138  --   --   --  140  --  138   POTASSIUM 3.8 3.9  --   --   --  3.6  --  3.8   CHLORIDE 104 105  --   --   --  104  --  103   DORIS 8.2* 8.2*  --   --   --  8.2*  --  8.5*   CO2 23 21*  --   --   --  24  --  24   BUN 22.1 18.6  --   --   --  15.6  --  26.4*   CR 1.29* 1.23*  --   --   --  0.98  --  1.09   * 121* 134* 92   < > 142*   < > 157*    < > = values in this interval not displayed.     CBC  Recent Labs   Lab 10/20/23  0429 10/19/23  1705 10/19/23  0606 10/18/23  0607   WBC 11.2* 10.1 9.0 9.1   RBC 2.84* 2.98* 3.14* 3.34*   HGB 8.6* 9.2* 9.5* 10.2*   HCT 27.3* 28.6* 29.6* 31.7*   MCV 96 96 94 95   MCH 30.3 30.9 30.3 30.5   MCHC 31.5 32.2 32.1 32.2   RDW 14.9 14.9 15.1* 14.6    227 189 189     INR  Recent Labs   Lab 10/20/23  0429 10/19/23  0606 10/18/23  0607 10/17/23  0529   INR 2.14* 1.98* 1.96* 1.93*      Hepatic Panel  Recent  Labs   Lab 10/17/23  0529 10/13/23  2217 10/13/23  1712   AST 35 79* 53*   ALT 18 18 15   ALKPHOS 81 57 46   BILITOTAL 0.7 0.7 0.6   ALBUMIN 3.3* 3.7 3.3*     GLUCOSE:   Recent Labs   Lab 10/20/23  0429 10/19/23  0606 10/18/23  2137 10/18/23  2037 10/18/23  1707 10/18/23  1220   * 121* 134* 92 107* 104*

## 2023-10-21 ENCOUNTER — APPOINTMENT (OUTPATIENT)
Dept: OCCUPATIONAL THERAPY | Facility: CLINIC | Age: 68
DRG: 220 | End: 2023-10-21
Attending: THORACIC SURGERY (CARDIOTHORACIC VASCULAR SURGERY)
Payer: COMMERCIAL

## 2023-10-21 ENCOUNTER — APPOINTMENT (OUTPATIENT)
Dept: GENERAL RADIOLOGY | Facility: CLINIC | Age: 68
DRG: 220 | End: 2023-10-21
Payer: COMMERCIAL

## 2023-10-21 LAB
ANION GAP SERPL CALCULATED.3IONS-SCNC: 12 MMOL/L (ref 7–15)
BUN SERPL-MCNC: 12.6 MG/DL (ref 8–23)
CALCIUM SERPL-MCNC: 8 MG/DL (ref 8.8–10.2)
CHLORIDE SERPL-SCNC: 105 MMOL/L (ref 98–107)
CREAT SERPL-MCNC: 0.9 MG/DL (ref 0.67–1.17)
DEPRECATED HCO3 PLAS-SCNC: 20 MMOL/L (ref 22–29)
EGFRCR SERPLBLD CKD-EPI 2021: >90 ML/MIN/1.73M2
ERYTHROCYTE [DISTWIDTH] IN BLOOD BY AUTOMATED COUNT: 15 % (ref 10–15)
GLUCOSE BLDC GLUCOMTR-MCNC: 123 MG/DL (ref 70–99)
GLUCOSE BLDC GLUCOMTR-MCNC: 148 MG/DL (ref 70–99)
GLUCOSE BLDC GLUCOMTR-MCNC: 156 MG/DL (ref 70–99)
GLUCOSE BLDC GLUCOMTR-MCNC: 158 MG/DL (ref 70–99)
GLUCOSE SERPL-MCNC: 108 MG/DL (ref 70–99)
HCT VFR BLD AUTO: 26.3 % (ref 40–53)
HGB BLD-MCNC: 8.4 G/DL (ref 13.3–17.7)
INR PPP: 2.4 (ref 0.85–1.15)
MCH RBC QN AUTO: 30.7 PG (ref 26.5–33)
MCHC RBC AUTO-ENTMCNC: 31.9 G/DL (ref 31.5–36.5)
MCV RBC AUTO: 96 FL (ref 78–100)
PLATELET # BLD AUTO: 261 10E3/UL (ref 150–450)
POTASSIUM SERPL-SCNC: 3.9 MMOL/L (ref 3.4–5.3)
RBC # BLD AUTO: 2.74 10E6/UL (ref 4.4–5.9)
SODIUM SERPL-SCNC: 137 MMOL/L (ref 135–145)
WBC # BLD AUTO: 9.8 10E3/UL (ref 4–11)

## 2023-10-21 PROCEDURE — 85610 PROTHROMBIN TIME: CPT

## 2023-10-21 PROCEDURE — 120N000003 HC R&B IMCU UMMC

## 2023-10-21 PROCEDURE — 999N000157 HC STATISTIC RCP TIME EA 10 MIN

## 2023-10-21 PROCEDURE — 94640 AIRWAY INHALATION TREATMENT: CPT

## 2023-10-21 PROCEDURE — 250N000013 HC RX MED GY IP 250 OP 250 PS 637: Performed by: THORACIC SURGERY (CARDIOTHORACIC VASCULAR SURGERY)

## 2023-10-21 PROCEDURE — 250N000009 HC RX 250: Performed by: PHYSICIAN ASSISTANT

## 2023-10-21 PROCEDURE — 80048 BASIC METABOLIC PNL TOTAL CA: CPT | Performed by: PHYSICIAN ASSISTANT

## 2023-10-21 PROCEDURE — 250N000013 HC RX MED GY IP 250 OP 250 PS 637: Performed by: SURGERY

## 2023-10-21 PROCEDURE — 97110 THERAPEUTIC EXERCISES: CPT | Mod: GO

## 2023-10-21 PROCEDURE — 97535 SELF CARE MNGMENT TRAINING: CPT | Mod: GO

## 2023-10-21 PROCEDURE — 250N000013 HC RX MED GY IP 250 OP 250 PS 637

## 2023-10-21 PROCEDURE — 71046 X-RAY EXAM CHEST 2 VIEWS: CPT

## 2023-10-21 PROCEDURE — 250N000011 HC RX IP 250 OP 636: Mod: JZ | Performed by: NURSE PRACTITIONER

## 2023-10-21 PROCEDURE — 71046 X-RAY EXAM CHEST 2 VIEWS: CPT | Mod: 26 | Performed by: RADIOLOGY

## 2023-10-21 PROCEDURE — 85014 HEMATOCRIT: CPT | Performed by: PHYSICIAN ASSISTANT

## 2023-10-21 PROCEDURE — 258N000003 HC RX IP 258 OP 636: Performed by: SURGERY

## 2023-10-21 PROCEDURE — 36415 COLL VENOUS BLD VENIPUNCTURE: CPT | Performed by: PHYSICIAN ASSISTANT

## 2023-10-21 PROCEDURE — 250N000013 HC RX MED GY IP 250 OP 250 PS 637: Performed by: PHYSICIAN ASSISTANT

## 2023-10-21 PROCEDURE — 94640 AIRWAY INHALATION TREATMENT: CPT | Mod: 76

## 2023-10-21 RX ORDER — HYDRALAZINE HYDROCHLORIDE 25 MG/1
25 TABLET, FILM COATED ORAL 3 TIMES DAILY
Status: DISCONTINUED | OUTPATIENT
Start: 2023-10-22 | End: 2023-10-27

## 2023-10-21 RX ORDER — ALBUTEROL SULFATE 0.83 MG/ML
2.5 SOLUTION RESPIRATORY (INHALATION) EVERY 4 HOURS PRN
Status: DISCONTINUED | OUTPATIENT
Start: 2023-10-21 | End: 2023-10-27 | Stop reason: HOSPADM

## 2023-10-21 RX ADMIN — LIDOCAINE 2 PATCH: 4 PATCH TOPICAL at 12:27

## 2023-10-21 RX ADMIN — METHOCARBAMOL 1000 MG: 500 TABLET ORAL at 20:52

## 2023-10-21 RX ADMIN — ATORVASTATIN CALCIUM 40 MG: 40 TABLET, FILM COATED ORAL at 20:51

## 2023-10-21 RX ADMIN — ALBUTEROL SULFATE 2.5 MG: 2.5 SOLUTION RESPIRATORY (INHALATION) at 08:00

## 2023-10-21 RX ADMIN — SODIUM CHLORIDE, POTASSIUM CHLORIDE, SODIUM LACTATE AND CALCIUM CHLORIDE: 600; 310; 30; 20 INJECTION, SOLUTION INTRAVENOUS at 06:46

## 2023-10-21 RX ADMIN — ACETAMINOPHEN 975 MG: 325 TABLET, FILM COATED ORAL at 17:24

## 2023-10-21 RX ADMIN — ACETAMINOPHEN 650 MG: 325 TABLET, FILM COATED ORAL at 20:51

## 2023-10-21 RX ADMIN — METHOCARBAMOL 1000 MG: 500 TABLET ORAL at 04:39

## 2023-10-21 RX ADMIN — METHOCARBAMOL 500 MG: 500 TABLET ORAL at 12:27

## 2023-10-21 RX ADMIN — SIMETHICONE 80 MG: 80 TABLET, CHEWABLE ORAL at 20:51

## 2023-10-21 RX ADMIN — ACETYLCYSTEINE 2 ML: 200 SOLUTION ORAL; RESPIRATORY (INHALATION) at 16:55

## 2023-10-21 RX ADMIN — ALBUTEROL SULFATE 2.5 MG: 2.5 SOLUTION RESPIRATORY (INHALATION) at 16:55

## 2023-10-21 RX ADMIN — ASPIRIN 81 MG CHEWABLE TABLET 81 MG: 81 TABLET CHEWABLE at 07:40

## 2023-10-21 RX ADMIN — SIMETHICONE 80 MG: 80 TABLET, CHEWABLE ORAL at 14:04

## 2023-10-21 RX ADMIN — ACETYLCYSTEINE 2 ML: 200 SOLUTION ORAL; RESPIRATORY (INHALATION) at 12:31

## 2023-10-21 RX ADMIN — SIMETHICONE 80 MG: 80 TABLET, CHEWABLE ORAL at 07:41

## 2023-10-21 RX ADMIN — SODIUM CHLORIDE, PRESERVATIVE FREE 5 ML: 5 INJECTION INTRAVENOUS at 14:04

## 2023-10-21 RX ADMIN — ACETAMINOPHEN 975 MG: 325 TABLET, FILM COATED ORAL at 07:41

## 2023-10-21 RX ADMIN — CARVEDILOL 25 MG: 25 TABLET, FILM COATED ORAL at 17:25

## 2023-10-21 RX ADMIN — ACETYLCYSTEINE 2 ML: 200 SOLUTION ORAL; RESPIRATORY (INHALATION) at 08:00

## 2023-10-21 RX ADMIN — CARVEDILOL 25 MG: 25 TABLET, FILM COATED ORAL at 07:40

## 2023-10-21 RX ADMIN — PANTOPRAZOLE SODIUM 40 MG: 40 TABLET, DELAYED RELEASE ORAL at 07:41

## 2023-10-21 RX ADMIN — WARFARIN SODIUM 0.5 MG: 1 TABLET ORAL at 17:25

## 2023-10-21 RX ADMIN — ALBUTEROL SULFATE 2.5 MG: 2.5 SOLUTION RESPIRATORY (INHALATION) at 12:31

## 2023-10-21 RX ADMIN — AMLODIPINE BESYLATE 10 MG: 10 TABLET ORAL at 07:41

## 2023-10-21 ASSESSMENT — ACTIVITIES OF DAILY LIVING (ADL)
ADLS_ACUITY_SCORE: 27

## 2023-10-21 NOTE — PLAN OF CARE
Hours of care 2300 - 0730    D: Sukh Craven is a 68 year old male with PMH of severe aortic insufficiency 2/2 sinotubular aortic root dilitation (sinus of valsalva 5.3 cm),  CAD (midRCA 90% stenosed, dCx 35% stenosed, midLAD 70% stenosed), PAD (mild-mod left lower extremity), bilateral popliteal aneurysms, HTN, T2DM, and chronic opioid/cannabis use, who is s/p AVR and CABG x2  on 10/13 by Dr. Lockett.      Changed: No acute events overnight   Running: LR 75ml/hr.   PRN: Robaxin x1     Neuro: A&Ox4. Able to make needs known. PERLLA. CMS intact.   Cardiac: VSS. SR w frequent SVPBs. HR 90s. Denies chest pain/palpitations.   Respiratory: RA, sating >92%. Denies SOB.   GI/: Voids spontaneously. LBM 10/21.  Diet: Clear liquid diet.   Skin: See adult PCS. No new deficits noted.   LDAs: R double lumen PICC.   Activity: SBA.  Pain: Endorsed some incisional pain.      A: VSS. Afebrile. Urinating adequately.      P: Will continue POC and report changes to treatment team.

## 2023-10-22 LAB
GLUCOSE BLDC GLUCOMTR-MCNC: 113 MG/DL (ref 70–99)
GLUCOSE BLDC GLUCOMTR-MCNC: 117 MG/DL (ref 70–99)
GLUCOSE BLDC GLUCOMTR-MCNC: 125 MG/DL (ref 70–99)
GLUCOSE BLDC GLUCOMTR-MCNC: 136 MG/DL (ref 70–99)
INR PPP: 2.25 (ref 0.85–1.15)

## 2023-10-22 PROCEDURE — 250N000013 HC RX MED GY IP 250 OP 250 PS 637: Performed by: PHYSICIAN ASSISTANT

## 2023-10-22 PROCEDURE — 250N000013 HC RX MED GY IP 250 OP 250 PS 637

## 2023-10-22 PROCEDURE — 120N000003 HC R&B IMCU UMMC

## 2023-10-22 PROCEDURE — 36415 COLL VENOUS BLD VENIPUNCTURE: CPT

## 2023-10-22 PROCEDURE — 85610 PROTHROMBIN TIME: CPT

## 2023-10-22 PROCEDURE — 250N000013 HC RX MED GY IP 250 OP 250 PS 637: Performed by: NURSE PRACTITIONER

## 2023-10-22 PROCEDURE — 250N000013 HC RX MED GY IP 250 OP 250 PS 637: Performed by: THORACIC SURGERY (CARDIOTHORACIC VASCULAR SURGERY)

## 2023-10-22 PROCEDURE — 250N000011 HC RX IP 250 OP 636: Performed by: NURSE PRACTITIONER

## 2023-10-22 PROCEDURE — 250N000013 HC RX MED GY IP 250 OP 250 PS 637: Performed by: SURGERY

## 2023-10-22 RX ORDER — WARFARIN SODIUM 1 MG/1
1 TABLET ORAL
Status: COMPLETED | OUTPATIENT
Start: 2023-10-22 | End: 2023-10-22

## 2023-10-22 RX ADMIN — LIDOCAINE 2 PATCH: 4 PATCH TOPICAL at 13:29

## 2023-10-22 RX ADMIN — AMLODIPINE BESYLATE 10 MG: 10 TABLET ORAL at 09:18

## 2023-10-22 RX ADMIN — METFORMIN HYDROCHLORIDE 500 MG: 500 TABLET ORAL at 17:35

## 2023-10-22 RX ADMIN — SODIUM CHLORIDE, PRESERVATIVE FREE 5 ML: 5 INJECTION INTRAVENOUS at 13:31

## 2023-10-22 RX ADMIN — HYDRALAZINE HYDROCHLORIDE 25 MG: 25 TABLET, FILM COATED ORAL at 13:30

## 2023-10-22 RX ADMIN — ASPIRIN 81 MG CHEWABLE TABLET 81 MG: 81 TABLET CHEWABLE at 09:16

## 2023-10-22 RX ADMIN — PANTOPRAZOLE SODIUM 40 MG: 40 TABLET, DELAYED RELEASE ORAL at 09:18

## 2023-10-22 RX ADMIN — HYDRALAZINE HYDROCHLORIDE 25 MG: 25 TABLET, FILM COATED ORAL at 09:17

## 2023-10-22 RX ADMIN — ACETAMINOPHEN 650 MG: 325 TABLET, FILM COATED ORAL at 20:22

## 2023-10-22 RX ADMIN — METHOCARBAMOL 1000 MG: 500 TABLET ORAL at 20:22

## 2023-10-22 RX ADMIN — ACETAMINOPHEN 975 MG: 325 TABLET, FILM COATED ORAL at 23:31

## 2023-10-22 RX ADMIN — ATORVASTATIN CALCIUM 40 MG: 40 TABLET, FILM COATED ORAL at 20:22

## 2023-10-22 RX ADMIN — HYDROXYZINE HYDROCHLORIDE 50 MG: 25 TABLET, FILM COATED ORAL at 23:32

## 2023-10-22 RX ADMIN — CARVEDILOL 25 MG: 25 TABLET, FILM COATED ORAL at 09:17

## 2023-10-22 RX ADMIN — ACETAMINOPHEN 975 MG: 325 TABLET, FILM COATED ORAL at 09:16

## 2023-10-22 RX ADMIN — ACETAMINOPHEN 975 MG: 325 TABLET, FILM COATED ORAL at 17:35

## 2023-10-22 RX ADMIN — CARVEDILOL 25 MG: 25 TABLET, FILM COATED ORAL at 17:35

## 2023-10-22 RX ADMIN — ACETAMINOPHEN 975 MG: 325 TABLET, FILM COATED ORAL at 00:59

## 2023-10-22 RX ADMIN — HYDRALAZINE HYDROCHLORIDE 25 MG: 25 TABLET, FILM COATED ORAL at 20:22

## 2023-10-22 RX ADMIN — POLYETHYLENE GLYCOL 3350 17 G: 17 POWDER, FOR SOLUTION ORAL at 09:16

## 2023-10-22 RX ADMIN — WARFARIN SODIUM 1 MG: 1 TABLET ORAL at 17:35

## 2023-10-22 ASSESSMENT — ACTIVITIES OF DAILY LIVING (ADL)
ADLS_ACUITY_SCORE: 27

## 2023-10-22 NOTE — PROGRESS NOTES
Cardiovascular Surgery Progress Note  10/21/2023         Assessment and Plan:     Sukh Craven is a 68 year old male with PMH of severe aortic insufficiency 2/2 sinotubular aortic root dilitation (sinus of valsalva 5.3 cm), CAD (mid RCA 90% stenosed, dCx 35% stenosed, mid LAD 70% stenosed), PAD (mild-mod left lower extremity), bilateral popliteal aneurysms, HTN, T2DM, and chronic opioid/cannabis use, who is s/p AVR and CABG x2 on 10/13 by Dr. Lockett.     Cardiovascular:   CAD s/p CABG x2  Severe AI 2/2 aortic root aneurysm s/p On-X mechanical Bentall  PMH PAF  1st degree AV block, resolved  Hypertension  Rate-controlled afib, HD stable.   - EP consulted for 1st deg AV block with bradycardia and PAF and have since signed off; rec deferred amiodarone and cautious titration of BB   - ASA 81 mg (reduced from post-CABG protocol in setting of AC for afib)   - PTA atorvastatin resumed   - BB:  Coreg 25 mg BID   - PTA amlodipine dose of 10  mg   - Resume PTA hydralazine tomorrow   - PRN hydralazine available   - AC as below ([id number] VASc 3)   - Will need OP EP follow up at 2 months with 2-wk Zio patch (to be arranged at discharge)    Chest tubes: pleural tubes removed 10/19, meds removed 10/20  TPW:  fractured during removal with segment of retained FB    Pulmonary:  - Extubated POD 1; now saturating well on RA  - Supplemental O2 PRN to keep sats > 92%  - Pulm toilet, IS, OOB/activity and deep breathing  - Right hemidiaphragm elevation present prior to surgery    Neurology / MSK:  Acute post-operative pain  - Multimodal analgesia with acetaminophen, methocarbamol PRN (dose range increased), lidocaine patches, Toradol PRN  - Avoid opiates in s/o ileus    S/p sternotomy   - PT/OT/CR consults   - Sternal precautions     / Renal / FE:  EFFIE, resolved  BPH  - Most recent creatinine WNL (BL ~1-1.12)  - Diuresis: not indicated at this time  - Replace electrolytes prn per protocol  - Strict I/O, daily standing weights per  protocol  - Discontinue MIVF    GI / Nutrition:   Adynamic ileus, improving  Moderate malnutrition in context of acute illness/injury  - Advance diet to full liquids today; continue aggressive bowel regimen but discontinue lactulose - having antegrade bowel function  - AXR showing improvement distally following aggressive stimulation from below (ordered gastrograffin enema however not done per Radiology; had results following pink lady enema 10/19)  - Dietitian following, appreciate recs; resume matthew counts & supplements with diet advancement    Endocrine:  Stress-induced hyperglycemia, resolved  DM2 - Hgb A1c 6.8    - Initially managed on insulin drip postop, transitioned to high sliding scale   - Long-acting insulin held in setting of NPO status   - BG goal <180 for optimal wound healing   - No antidiabetic agents PTA    Infectious Disease:  Stress-induced leukocytosis, resolved  - WBC WNL, afebrile, no signs or symptoms of infection  - Completed perioperative antibiotics    Hematology:   Acute blood loss anemia  Thrombocytopenia, resolved  Hgb stable; Plt WNL, no signs or symptoms of active bleeding    Anticoagulation:   Chronic anticoagulation for mechanical AVR, afib  - ASA 81 mg  - Coumadin for mechanical valve/afib, INR goal 2-3. Most recent INR therapeutic     Prophylaxis:   - Stress ulcer prophylaxis: Pantoprazole 40 mg daily for 30 days post-operatively  - DVT prophylaxis: AC as above, SCD, OOB/activity    Disposition:   - Transferred to  on 10/16  - Therapies recommending discharge to home once medically stable, near therapeutic INR, and tolerating diet    Alec Marc CNP, ACNPC-AG  Cardiothoracic Surgery  [employer] Pager [phone]        Interval History:     No overnight events.  Pain is improved; passing gas and having antegrade bowel function.  Denies nausea, no vomiting; feeling hungry.  No dyspnea.  Denies chest pain, dizziness/lightheadedness, and sternal popping/clicking/snapping.          "Physical Exam:   Blood pressure 128/86, pulse 88, temperature 99.5  F (37.5  C), temperature source Oral, resp. rate 22, height 1.981 m (6' 6\"), weight 94.8 kg (208 lb 14.4 oz), SpO2 93%.  Vitals:    10/18/23 0600 10/19/23 0633 10/21/23 0216   Weight: 92.7 kg (204 lb 4.8 oz) 93.4 kg (206 lb) 94.8 kg (208 lb 14.4 oz)      Admission weight: 95.1 kg  24 hr Fluid status:  +1.6 L    Gen: NAD, sitting quietly in recliner chair in darkened room, calm, pleasant, cooperative on exam  Neuro: A&Ox4, no focal deficits, speech clear, face symmetric  CV: WWP, no LE edema, irregular valvular click  Pulm:  CTA, unlabored breathing on RA  Abd: SNT, minimally distended, no guarding or peritoneal signs  Ext: no peripheral edema, JESSICA, no gross deformities  Incision: clean, dry, intact, no erythema, sternum stable  Tubes/drain sites: dressing C/D         Data:    Imaging:  reviewed recent imaging    Recent Results (from the past 24 hour(s))   XR Chest 2 Views    Narrative    Exam: XR CHEST 2 VIEWS, 10/21/2023 1:04 PM    Indication: s/p chest tube pull    Comparison: 10/20/2023    Findings:   Right upper extremity PICC tip is poorly visualized but likely  projects over the low SVC. Stable enlarged cardiac silhouette with  dilation of the main pulmonary artery. Aortic valve prosthesis. No  appreciable pleural effusion or pneumothorax. Continued streaky/linear  right perihilar and basilar opacities. Decreased streaky left  perihilar opacities. Asymmetric elevation of the right hemidiaphragm  with air-filled colon interposed between the right liver and right  hemidiaphragm, similar to prior. No new focal airspace opacity.  Punctate hyperattenuating foci project over the left neck.      Impression    Impression:   1. No appreciable pneumothorax.  2. Right perihilar and basilar atelectasis associated with asymmetric  elevation of the right hemidiaphragm.    TAE FLORES DO         SYSTEM ID:  [id number]       Labs:  BMP  Recent Labs   Lab " 10/21/23  2049 10/21/23  1653 10/21/23  1216 10/21/23  0747 10/21/23  0658 10/20/23  1200 10/20/23  0429 10/19/23  2223 10/19/23  0606 10/18/23  1220 10/18/23  0607   NA  --   --   --   --  137  --  137  --  138  --  140   POTASSIUM  --   --   --   --  3.9  --  3.8  --  3.9  --  3.6   CHLORIDE  --   --   --   --  105  --  104  --  105  --  104   DORIS  --   --   --   --  8.0*  --  8.2*  --  8.2*  --  8.2*   CO2  --   --   --   --  20*  --  23  --  21*  --  24   BUN  --   --   --   --  12.6  --  22.1  --  18.6  --  15.6   CR  --   --   --   --  0.90  --  1.29*  --  1.23*  --  0.98   * 156* 148* 123* 108*   < > 120*   < > 121*   < > 142*    < > = values in this interval not displayed.     CBC  Recent Labs   Lab 10/21/23  0658 10/20/23  0429 10/19/23  1705 10/19/23  0606   WBC 9.8 11.2* 10.1 9.0   RBC 2.74* 2.84* 2.98* 3.14*   HGB 8.4* 8.6* 9.2* 9.5*   HCT 26.3* 27.3* 28.6* 29.6*   MCV 96 96 96 94   MCH 30.7 30.3 30.9 30.3   MCHC 31.9 31.5 32.2 32.1   RDW 15.0 14.9 14.9 15.1*    224 227 189     INR  Recent Labs   Lab 10/21/23  0658 10/20/23  0429 10/19/23  0606 10/18/23  0607   INR 2.40* 2.14* 1.98* 1.96*      Hepatic Panel  Recent Labs   Lab 10/17/23  0529   AST 35   ALT 18   ALKPHOS 81   BILITOTAL 0.7   ALBUMIN 3.3*     GLUCOSE:   Recent Labs   Lab 10/21/23  2049 10/21/23  1653 10/21/23  1216 10/21/23  0747 10/21/23  0658 10/20/23  2105   * 156* 148* 123* 108* 116*

## 2023-10-23 ENCOUNTER — APPOINTMENT (OUTPATIENT)
Dept: EDUCATION SERVICES | Facility: CLINIC | Age: 68
DRG: 220 | End: 2023-10-23
Attending: THORACIC SURGERY (CARDIOTHORACIC VASCULAR SURGERY)
Payer: COMMERCIAL

## 2023-10-23 ENCOUNTER — APPOINTMENT (OUTPATIENT)
Dept: OCCUPATIONAL THERAPY | Facility: CLINIC | Age: 68
DRG: 220 | End: 2023-10-23
Attending: THORACIC SURGERY (CARDIOTHORACIC VASCULAR SURGERY)
Payer: COMMERCIAL

## 2023-10-23 LAB
ANION GAP SERPL CALCULATED.3IONS-SCNC: 11 MMOL/L (ref 7–15)
ANION GAP SERPL CALCULATED.3IONS-SCNC: 12 MMOL/L (ref 7–15)
BUN SERPL-MCNC: 8.4 MG/DL (ref 8–23)
BUN SERPL-MCNC: 9.7 MG/DL (ref 8–23)
CALCIUM SERPL-MCNC: 8.3 MG/DL (ref 8.8–10.2)
CALCIUM SERPL-MCNC: 8.6 MG/DL (ref 8.8–10.2)
CHLORIDE SERPL-SCNC: 104 MMOL/L (ref 98–107)
CHLORIDE SERPL-SCNC: 104 MMOL/L (ref 98–107)
CREAT SERPL-MCNC: 0.87 MG/DL (ref 0.67–1.17)
CREAT SERPL-MCNC: 0.99 MG/DL (ref 0.67–1.17)
DEPRECATED HCO3 PLAS-SCNC: 21 MMOL/L (ref 22–29)
DEPRECATED HCO3 PLAS-SCNC: 22 MMOL/L (ref 22–29)
EGFRCR SERPLBLD CKD-EPI 2021: 84 ML/MIN/1.73M2
EGFRCR SERPLBLD CKD-EPI 2021: >90 ML/MIN/1.73M2
ERYTHROCYTE [DISTWIDTH] IN BLOOD BY AUTOMATED COUNT: 14.6 % (ref 10–15)
GLUCOSE BLDC GLUCOMTR-MCNC: 115 MG/DL (ref 70–99)
GLUCOSE BLDC GLUCOMTR-MCNC: 118 MG/DL (ref 70–99)
GLUCOSE BLDC GLUCOMTR-MCNC: 119 MG/DL (ref 70–99)
GLUCOSE BLDC GLUCOMTR-MCNC: 134 MG/DL (ref 70–99)
GLUCOSE SERPL-MCNC: 123 MG/DL (ref 70–99)
GLUCOSE SERPL-MCNC: 166 MG/DL (ref 70–99)
HCT VFR BLD AUTO: 27.3 % (ref 40–53)
HGB BLD-MCNC: 8.6 G/DL (ref 13.3–17.7)
INR PPP: 1.95 (ref 0.85–1.15)
MAGNESIUM SERPL-MCNC: 1.8 MG/DL (ref 1.7–2.3)
MCH RBC QN AUTO: 30.1 PG (ref 26.5–33)
MCHC RBC AUTO-ENTMCNC: 31.5 G/DL (ref 31.5–36.5)
MCV RBC AUTO: 96 FL (ref 78–100)
PHOSPHATE SERPL-MCNC: 2.5 MG/DL (ref 2.5–4.5)
PLATELET # BLD AUTO: 345 10E3/UL (ref 150–450)
POTASSIUM SERPL-SCNC: 3.7 MMOL/L (ref 3.4–5.3)
POTASSIUM SERPL-SCNC: 4 MMOL/L (ref 3.4–5.3)
RBC # BLD AUTO: 2.86 10E6/UL (ref 4.4–5.9)
SODIUM SERPL-SCNC: 136 MMOL/L (ref 135–145)
SODIUM SERPL-SCNC: 138 MMOL/L (ref 135–145)
WBC # BLD AUTO: 10.8 10E3/UL (ref 4–11)

## 2023-10-23 PROCEDURE — 250N000011 HC RX IP 250 OP 636: Mod: JZ | Performed by: NURSE PRACTITIONER

## 2023-10-23 PROCEDURE — 80048 BASIC METABOLIC PNL TOTAL CA: CPT | Performed by: SURGERY

## 2023-10-23 PROCEDURE — 120N000003 HC R&B IMCU UMMC

## 2023-10-23 PROCEDURE — 250N000011 HC RX IP 250 OP 636: Mod: JZ | Performed by: PHYSICIAN ASSISTANT

## 2023-10-23 PROCEDURE — 83735 ASSAY OF MAGNESIUM: CPT

## 2023-10-23 PROCEDURE — 93005 ELECTROCARDIOGRAM TRACING: CPT

## 2023-10-23 PROCEDURE — 250N000013 HC RX MED GY IP 250 OP 250 PS 637

## 2023-10-23 PROCEDURE — 250N000013 HC RX MED GY IP 250 OP 250 PS 637: Performed by: NURSE PRACTITIONER

## 2023-10-23 PROCEDURE — 250N000013 HC RX MED GY IP 250 OP 250 PS 637: Performed by: THORACIC SURGERY (CARDIOTHORACIC VASCULAR SURGERY)

## 2023-10-23 PROCEDURE — 36592 COLLECT BLOOD FROM PICC: CPT | Performed by: SURGERY

## 2023-10-23 PROCEDURE — 84100 ASSAY OF PHOSPHORUS: CPT

## 2023-10-23 PROCEDURE — 97535 SELF CARE MNGMENT TRAINING: CPT | Mod: GO

## 2023-10-23 PROCEDURE — 250N000011 HC RX IP 250 OP 636: Performed by: THORACIC SURGERY (CARDIOTHORACIC VASCULAR SURGERY)

## 2023-10-23 PROCEDURE — 85018 HEMOGLOBIN: CPT | Performed by: SURGERY

## 2023-10-23 PROCEDURE — 36592 COLLECT BLOOD FROM PICC: CPT

## 2023-10-23 PROCEDURE — 85610 PROTHROMBIN TIME: CPT

## 2023-10-23 PROCEDURE — 250N000013 HC RX MED GY IP 250 OP 250 PS 637: Performed by: SURGERY

## 2023-10-23 PROCEDURE — 250N000011 HC RX IP 250 OP 636

## 2023-10-23 PROCEDURE — 999N000147 HC STATISTIC PT IP EVAL DEFER: Performed by: PHYSICAL THERAPIST

## 2023-10-23 PROCEDURE — 999N000044 HC STATISTIC CVC DRESSING CHANGE

## 2023-10-23 PROCEDURE — 80048 BASIC METABOLIC PNL TOTAL CA: CPT

## 2023-10-23 PROCEDURE — 93010 ELECTROCARDIOGRAM REPORT: CPT | Performed by: INTERNAL MEDICINE

## 2023-10-23 PROCEDURE — 97110 THERAPEUTIC EXERCISES: CPT | Mod: GO

## 2023-10-23 PROCEDURE — 250N000013 HC RX MED GY IP 250 OP 250 PS 637: Performed by: PHYSICIAN ASSISTANT

## 2023-10-23 RX ORDER — MAGNESIUM SULFATE HEPTAHYDRATE 40 MG/ML
2 INJECTION, SOLUTION INTRAVENOUS ONCE
Status: COMPLETED | OUTPATIENT
Start: 2023-10-23 | End: 2023-10-23

## 2023-10-23 RX ORDER — WARFARIN SODIUM 2.5 MG/1
2.5 TABLET ORAL
Status: COMPLETED | OUTPATIENT
Start: 2023-10-23 | End: 2023-10-23

## 2023-10-23 RX ORDER — POTASSIUM CHLORIDE 750 MG/1
20 TABLET, EXTENDED RELEASE ORAL ONCE
Status: COMPLETED | OUTPATIENT
Start: 2023-10-23 | End: 2023-10-23

## 2023-10-23 RX ORDER — POLYETHYLENE GLYCOL 3350 17 G/17G
17 POWDER, FOR SOLUTION ORAL DAILY PRN
Status: DISCONTINUED | OUTPATIENT
Start: 2023-10-23 | End: 2023-10-27 | Stop reason: HOSPADM

## 2023-10-23 RX ORDER — MULTIPLE VITAMINS W/ MINERALS TAB 9MG-400MCG
1 TAB ORAL DAILY
Status: DISCONTINUED | OUTPATIENT
Start: 2023-10-23 | End: 2023-10-27 | Stop reason: HOSPADM

## 2023-10-23 RX ORDER — AMOXICILLIN 250 MG
2 CAPSULE ORAL
Status: DISCONTINUED | OUTPATIENT
Start: 2023-10-23 | End: 2023-10-27 | Stop reason: HOSPADM

## 2023-10-23 RX ORDER — CALCIUM GLUCONATE 20 MG/ML
1 INJECTION, SOLUTION INTRAVENOUS ONCE
Qty: 50 ML | Refills: 0 | Status: COMPLETED | OUTPATIENT
Start: 2023-10-23 | End: 2023-10-23

## 2023-10-23 RX ADMIN — MAGNESIUM SULFATE IN WATER 2 G: 40 INJECTION, SOLUTION INTRAVENOUS at 20:26

## 2023-10-23 RX ADMIN — HYDRALAZINE HYDROCHLORIDE 25 MG: 25 TABLET, FILM COATED ORAL at 08:39

## 2023-10-23 RX ADMIN — CARVEDILOL 25 MG: 25 TABLET, FILM COATED ORAL at 19:02

## 2023-10-23 RX ADMIN — HYDRALAZINE HYDROCHLORIDE 25 MG: 25 TABLET, FILM COATED ORAL at 20:09

## 2023-10-23 RX ADMIN — HYDROXYZINE HYDROCHLORIDE 50 MG: 25 TABLET, FILM COATED ORAL at 23:20

## 2023-10-23 RX ADMIN — METHOCARBAMOL 500 MG: 500 TABLET ORAL at 22:34

## 2023-10-23 RX ADMIN — HYDRALAZINE HYDROCHLORIDE 25 MG: 25 TABLET, FILM COATED ORAL at 14:32

## 2023-10-23 RX ADMIN — HYDRALAZINE HYDROCHLORIDE 10 MG: 20 INJECTION INTRAMUSCULAR; INTRAVENOUS at 16:27

## 2023-10-23 RX ADMIN — METFORMIN HYDROCHLORIDE 500 MG: 500 TABLET ORAL at 19:02

## 2023-10-23 RX ADMIN — TRAMADOL HYDROCHLORIDE 25 MG: 50 TABLET, COATED ORAL at 22:08

## 2023-10-23 RX ADMIN — ACETAMINOPHEN 975 MG: 325 TABLET, FILM COATED ORAL at 16:23

## 2023-10-23 RX ADMIN — CALCIUM GLUCONATE 1 G: 20 INJECTION, SOLUTION INTRAVENOUS at 20:58

## 2023-10-23 RX ADMIN — Medication 1 TABLET: at 08:37

## 2023-10-23 RX ADMIN — SODIUM CHLORIDE, PRESERVATIVE FREE 5 ML: 5 INJECTION INTRAVENOUS at 16:28

## 2023-10-23 RX ADMIN — METFORMIN HYDROCHLORIDE 500 MG: 500 TABLET ORAL at 08:37

## 2023-10-23 RX ADMIN — ATORVASTATIN CALCIUM 40 MG: 40 TABLET, FILM COATED ORAL at 20:09

## 2023-10-23 RX ADMIN — POTASSIUM CHLORIDE 20 MEQ: 750 TABLET, EXTENDED RELEASE ORAL at 08:38

## 2023-10-23 RX ADMIN — AMLODIPINE BESYLATE 10 MG: 10 TABLET ORAL at 08:38

## 2023-10-23 RX ADMIN — CARVEDILOL 25 MG: 25 TABLET, FILM COATED ORAL at 08:38

## 2023-10-23 RX ADMIN — WARFARIN SODIUM 2.5 MG: 2.5 TABLET ORAL at 19:02

## 2023-10-23 RX ADMIN — ASPIRIN 81 MG CHEWABLE TABLET 81 MG: 81 TABLET CHEWABLE at 08:37

## 2023-10-23 RX ADMIN — PANTOPRAZOLE SODIUM 40 MG: 40 TABLET, DELAYED RELEASE ORAL at 08:38

## 2023-10-23 RX ADMIN — ACETAMINOPHEN 975 MG: 325 TABLET, FILM COATED ORAL at 23:55

## 2023-10-23 ASSESSMENT — ACTIVITIES OF DAILY LIVING (ADL)
ADLS_ACUITY_SCORE: 27
ADLS_ACUITY_SCORE: 27
ADLS_ACUITY_SCORE: 25
ADLS_ACUITY_SCORE: 25
ADLS_ACUITY_SCORE: 27
ADLS_ACUITY_SCORE: 25
ADLS_ACUITY_SCORE: 27
ADLS_ACUITY_SCORE: 25
ADLS_ACUITY_SCORE: 25
ADLS_ACUITY_SCORE: 27
ADLS_ACUITY_SCORE: 25
ADLS_ACUITY_SCORE: 25

## 2023-10-23 NOTE — PROVIDER NOTIFICATION
Pt had 17 beat run of vtach, 160's has not been feeling good since receiving IV hydralazine at 16 27. C/o fatigue, diarrhea, hot flashes and chills. Discussed with . Obtained order for magnesium lab and 12 lead EKG. Continue to monitor.

## 2023-10-23 NOTE — PLAN OF CARE
Physical Therapy: Orders received. Chart reviewed and discussed with care team.? Physical Therapy not indicated due to patient mobilizing well, appropriate for 1 discipline. Defer discharge recommendations to occupational therapy.? Will complete orders. Please re-consult if further needs arise.

## 2023-10-23 NOTE — PROGRESS NOTES
Calorie Count  Intake recorded for: 10/22  Total Kcals: 0 Total Protein: 0g  Kcals from Hospital Food: 0   Protein: 0g  Kcals from Outside Food (average):0 Protein: 0g  # Meals Ordered from Kitchen: 1 meal  # Meals Recorded: No food intake recorded  # Supplements Recorded: No food intake recorded

## 2023-10-23 NOTE — PLAN OF CARE
Goal Outcome Evaluation: Rest and pain management  Shift event: Note  Complaint of pain PRN medication given, Pt appeared asleep after pain medication.                 Problem: Adult Inpatient Plan of Care  Goal: Absence of Hospital-Acquired Illness or Injury  Outcome: Progressing  Intervention: Identify and Manage Fall Risk  Recent Flowsheet Documentation  Taken 10/22/2023 2000 by Rowan Beaulieu RN  Safety Promotion/Fall Prevention: nonskid shoes/slippers when out of bed  Intervention: Prevent Skin Injury  Recent Flowsheet Documentation  Taken 10/23/2023 0351 by Rowan Beaulieu RN  Body Position: position changed independently  Taken 10/22/2023 2000 by Rowan Beaulieu RN  Body Position: position changed independently  Taken 10/22/2023 1903 by Rowan Beaulieu RN  Body Position: position changed independently  Intervention: Prevent and Manage VTE (Venous Thromboembolism) Risk  Recent Flowsheet Documentation  Taken 10/22/2023 2000 by Rowan Beaulieu RN  VTE Prevention/Management: SCDs (sequential compression devices) off  Intervention: Prevent Infection  Recent Flowsheet Documentation  Taken 10/22/2023 2000 by Rowan Beaulieu RN  Infection Prevention: hand hygiene promoted  Goal: Optimal Comfort and Wellbeing  Outcome: Progressing  Intervention: Provide Person-Centered Care  Recent Flowsheet Documentation  Taken 10/22/2023 2000 by Rowan Beaulieu RN  Trust Relationship/Rapport:   care explained   choices provided   emotional support provided   empathic listening provided   questions answered   questions encouraged   reassurance provided   thoughts/feelings acknowledged     Problem: Risk for Delirium  Goal: Optimal Coping  Outcome: Progressing  Intervention: Optimize Psychosocial Adjustment to Delirium  Recent Flowsheet Documentation  Taken 10/22/2023 2000 by Rowan Beaulieu RN  Supportive Measures:   active listening utilized   positive reinforcement  provided  Family/Support System Care: self-care encouraged  Goal: Improved Behavioral Control  Outcome: Progressing  Intervention: Minimize Safety Risk  Recent Flowsheet Documentation  Taken 10/22/2023 2000 by Rowan Beaulieu RN  Trust Relationship/Rapport:   care explained   choices provided   emotional support provided   empathic listening provided   questions answered   questions encouraged   reassurance provided   thoughts/feelings acknowledged  Goal: Improved Attention and Thought Clarity  Outcome: Progressing

## 2023-10-23 NOTE — CONSULTS
"Diabetes Educator Consult    Sukh LUZ Craven is a 68 year old male with PMH of severe aortic insufficiency 2/2 sinotubular aortic root dilation, CAD, PAD, bilateral popliteal aneurysms, HTN, recently-diagnosed T2DM, and chronic opioid/cannabis use who is now s/p AVR and CABG x2 on 10/13/2023.    Diabetes Educator consulted for \"recent diabetes dx, expressing desire for more info.\" Sukh was recently diagnosed with T2DM September 2023. A1c was 6.8% on 9/13/2023. Sukh stated that this was when everything started happening with his heart and he didn't have time to meet with a diabetic educator outpatient. He picked up a glucose meter and supplies from the pharmacy but was never taught how/when to use it.     Initially post op was managed on insulin gtt and transitioned to subcutaneous insulin. Started on metformin 500 mg BID 10/22. BG well controlled and has not required correction insulin since 10/21. He is on a Mod Carb diet with Ensure Clear between meals.     Sukh and sister present at bedside for education. Sukh stated he has not received diabetes education yet due to everything going on with his heart, but remembered his PCP Dr. Stephenson telling him about it and remembered to bring it up during this hospitalization.     Barriers to diabetes management: None stated. Feels motivated to make lifestyle changes to manage diabetes.     Diabetes Education Provided:  Discussed reason for requiring blood glucose monitoring and metformin with T2DM and stress-induced hyperglycemia. Reviewed pathophysiology of T2DM. Discussed importance of glycemic control s/p AVR and CABG for optimal wound healing and lower risk of infection. Discussed long-term complications of diabetes. Provided ADA's \"How to Thrive: A Guide for your Journey with Diabetes\" handout.   Discussed A1c and BG goals and non-pharmacologic strategies to improve such as diet and exercise.   Discussed metformin dose, action, and side effects. Sukh stated he did " not know he was taking metformin.   Discussed Accu Chek Guide Glucose Meter. Reviewed lancing device set up, safe use, and lancet disposal in sharps. Reviewed meter set up, memory storage, and testing. Reviewed fingertip site selection and hygiene. Frequency of testing discussed.     Diabetes education completed. Sukh stated understanding and was appreciative of our discussion. All questions answered.       Duke Jaeger DNP, [facility]  Diabetes Educator  Pager: [phone]  Office: [phone]     Neuro

## 2023-10-23 NOTE — CONSULTS
Discharge Pharmacy Test Claim    Patient has prescription coverage through [employer] advantage plan. This plan covers accu-chek guide, humalog kwikpens, lantus pens, and metformin. Expected monthly copays listed below.    Test Claim Copay   accu-chek guide 0.00   humalog kwikpens 35.00   lantus solostar pens 35.00   metformin 0.00   novolog flexpens not covered     Guadalupe Limon  [facility] Pharmacy Liaison  Ph: [phone]  Fax [phone]  Available on [employer] (learn more here)

## 2023-10-23 NOTE — PLAN OF CARE
Major Shift Events:   No acute events during shift. Aox4. Denies pain. Afebrile. SBP remained <140. SR. Satting well on RA. Tolerating regular diet, poor appetite. Justino counts continued. AUO. 1 BM this shift. K+ replaced.       Plan:  Continue with POC, notify primary team with any changes in pt condition.     For vital signs and complete assessments, please see documentation flowsheets.

## 2023-10-23 NOTE — CONSULTS
10/23/23 1802 Nancie Sebastian, EARL   Patient and family completed Warfarin education. Literature given: Guide to Warfarin Therapy,and Warfarin Therapy Calendar.

## 2023-10-23 NOTE — DISCHARGE SUMMARY
[facility], [place]   [facility] Discharge Summary     Sukh Craven MRN# [id number]   Age: 68 year old YOB: 1955     Admitting Physician:  Alexis Lockett MD  Discharge Physician:  Soraya Patel NP  Primary Care Physician:        Parrish Stephenson      DATE OF ADMISSION: 10/13/2023      DATE OF DISCHARGE: 10/27/23    Admit Wt: 209 lbs 10.5 oz  Discharge Wt: 201 lbs 8 oz         Primary Diagnoses:   Severe double vessel coronary artery disease s/p CABG x2  Severe aortic insufficiency, aortic root aneurysm s/p On-X mechanical AVR and Bentall Procedure  Lifelong anticoagulation: warfarin therapy for mechanical aortic valve with goal INR 2-3          Secondary Diagnoses:   Acute post-operative pain  Stress-induced hyperglycemia  DM 2  Stress-induced leukocytosis  Acute blood loss anemia  Elevated right hemidiaphragm  Anxiety  Chronic cannabis use  Adynamic ileus, resolved  Severe malnutrition in the context of acute illness, improving    PROCEDURES PERFORMED:   Date: 10/13/2023    Surgeon: Dr. Lockett    Procedure/Surgery Information   Procedure: Procedure(s):  Median Sternotomy, Endoscopic harvest of left great saphenous vein, Left Internal mammary artery harvest, Coronary Artery Bypass Graft x 2, Aortic root and valve replacement using On-X Ascending Aortic Prosthesis with Gelweave Valsalva Graft 25mm, on Cardiopulmonary Bypass, Intraoperative Transesophageal Echocardiogram by Anesthesia Provider   Surgeon(s): Surgeon(s) and Role:     * Alexis Lockett MD - Primary     * Oscar Hanson MD - Assisting     * Shane Reid PA-C - Assisting   Specimens: ID Type Source Tests Collected by Time Destination   1 : Aortic valve leaflets (please assess nodule at stitch) Tissue Other SURGICAL PATHOLOGY EXAM Alexis Lockett MD 10/13/2023 12:11 PM    2 : Ascending Aorta Tissue Aorta SURGICAL PATHOLOGY EXAM Alexis Lockett  MD Ramez 10/13/2023 12:17 PM               PATHOLOGY RESULTS:  pending    CONSULTS:    PT/OT  Cardiology  Endocrinology  Psychiatry  Behavioral Health  Health Psychology    BRIEF HISTORY OF ILLNESS:  Sukh Craven is a 68 year old male with PMH of severe aortic insufficiency 2/2 sinotubular aortic root dilitation (sinus of valsalva 5.3 cm), CAD (mid RCA 90% stenosed, dCx 35% stenosed, mid LAD 70% stenosed), PAD (mild-mod left lower extremity), bilateral popliteal aneurysms, HTN, T2DM, and chronic opioid/cannabis use, who is s/p Bentall procedure/ AVR and CABG x2 on 10/13 by Dr. Lockett.    HOSPITAL COURSE: Sukh Craven is a 68 year old male who on 10/13/2023 underwent the above-named procedures and tolerated the operation well.     Postoperatively was admitted to the CVICU.  Patient was extubated on POD 1.  Blood pressure and cardiac index were managed with vasopressors and inotropic agents which were continuously weaned until no longer needed.   Patient was subsequently transferred to the surgical telemetry floor.    While on the surgical unit, the patient continued to progress well. Chest tubes and temporary pacemaker wires were removed when deemed appropriate.      Patient was transiently hyperglycemic and treated with insulin infusion then transitioned to sliding scale insulin per protocol. Blood sugars remained stable with the initiation of metformin. He is a known diabetic and is new to metformin. He should follow up with his PCP ongoing for this.     Patient was fluid overloaded and treated with diuretics. He is below his preoperative weight and will not discharge with diuretic therapy; ongoing need will require re-evaluation during clinic follow-up.     Mr. Craven developed an asymptomatic adynamic ileus postoperatively conservatively managed with return of antegrade bowel function. Is having regular bowel movements at the time of discharge. Also developed moderate protein calorie malnutrition in  "the context of acute illness and was placed on calorie counts and marinol for appetite stimulation. He calorie counts improved and he will discharge on 30 days of marinol. Additionally, Sukh developed anxiety for which health psychology and behavioral health were consulted. He was placed on lexapro and will discharge on this. He should follow up with his PCP for ongoing management and consider outpatient counseling/psychology.     Prior to discharge, his pain was controlled well, he was working well with therapies, able to perform most ADLs, ambulate without difficulty, and had full return of bowel and bladder function.  On 10/27/23 he was discharged home in stable condition. Follow up with Cardiology and Cardiac Surgery have been arranged. Pt encouraged to follow up with PCP and Cardiac Rehab upon discharge.    Patient discharged on aspirin:  Yes 81 mg  Patient discharged on beta blocker: yes   Patient discharged on ACE Inhibitor/ARB: yes      Patient discharged on statin: yes          Discharge Disposition:     Discharged to home            Condition on Discharge:     Discharge condition: Good   Discharge vitals: /81 (BP Location: Left arm, Cuff Size: Adult Regular)   Pulse 84   Temp 98.9  F (37.2  C) (Oral)   Resp 18   Ht 1.981 m (6' 6\")   Wt 91.4 kg (201 lb 8 oz)   SpO2 96%   BMI 23.29 kg/m     Code status on discharge: Full Code     Vitals:    10/25/23 0247 10/26/23 0420 10/27/23 0556   Weight: 91.2 kg (201 lb 1.6 oz) 90.9 kg (200 lb 8 oz) 91.4 kg (201 lb 8 oz)       DAY OF DISCHARGE PHYSICAL EXAM:    Gen: NAD  Neuro: A&Ox4, intact with no focal deficits   CV: RRR, S1 S2, no murmurs, rubs or gallops  Pulm: CTA, no wheezing or rhonchi, unlabored breathing on RA  Abd: SNTND, active BS  Ext: no peripheral edema, incision C/D/I  Incision: clean, dry, intact, no erythema, sternum stable  Tubes/drain sites: scabbed over, no significant drainage or surrounding erythema      BMP  Recent Labs   Lab " 10/27/23  0807 10/27/23  0446 10/26/23  2130 10/26/23  1758 10/26/23  0828 10/26/23  0559 10/25/23  0752 10/25/23  0610 10/24/23  0748 10/24/23  0641 10/23/23  2156 10/23/23  1905 10/23/23  0818 10/23/23  0449   NA  --  138  --   --   --   --   --  138  --   --   --  136  --  138   POTASSIUM  --  3.9  --   --   --  4.0  --  4.0  --  4.1  --  4.0  --  3.7   CHLORIDE  --  105  --   --   --   --   --  106  --   --   --  104  --  104   DORIS  --  8.3*  --   --   --   --   --  8.5*  --   --   --  8.6*  --  8.3*   CO2  --  23  --   --   --   --   --  22  --   --   --  21*  --  22   BUN  --  12.4  --   --   --   --   --  12.5  --   --   --  8.4  --  9.7   CR  --  1.13  --   --   --   --   --  1.03  --   --   --  0.87  --  0.99   GLC 96 95 129* 83   < >  --    < > 116*   < >  --    < > 166*   < > 123*    < > = values in this interval not displayed.     CBC  Recent Labs   Lab 10/27/23  0446 10/25/23  0610 10/24/23  0836 10/23/23  0449   WBC 7.4 9.9 12.2* 10.8   RBC 2.66* 2.68* 2.95* 2.86*   HGB 8.0* 8.2* 8.8* 8.6*   HCT 25.5* 25.5* 27.4* 27.3*   MCV 96 95 93 96   MCH 30.1 30.6 29.8 30.1   MCHC 31.4* 32.2 32.1 31.5   RDW 14.6 14.6 14.9 14.6    388 423 345     INR  Recent Labs   Lab 10/27/23  0446 10/26/23  0559 10/25/23  0610 10/24/23  0641   INR 2.22* 2.47* 2.46* 1.88*      Hepatic Panel  No lab results found in last 7 days.    Recent Labs   Lab 10/27/23  0807 10/27/23  0446 10/26/23  2130 10/26/23  1758 10/26/23  1215 10/26/23  0828   GLC 96 95 129* 83 131* 169*       ECHOCARDIOGRAM  Recent Results (from the past 4320 hour(s))   Echocardiogram Complete   Result Value    LVEF  60-65%    Located within [facility]    [id number]  [id number]  [id number]  [id number]^DYLAN^[facility],[place]  Echocardiography Laboratory  [address] [place] [id number]     Name: LANI QUINTANA  MRN: [id number]  : 1955  Study Date: 10/20/2023 08:15 AM  Age: 68 yrs  Gender: Male  Patient Location:  [id number]  Reason For Study: CABG, Chest Pain  Ordering Physician: MANDI RICHARDSON  Performed By: Jere Collins     BSA: 2.3 m2  Height: 78 in  Weight: 206 lb  HR: 101  BP: 144/91 mmHg  ______________________________________________________________________________  Procedure  Complete Portable Echo Adult. Technically difficult study.  ______________________________________________________________________________  Interpretation Summary  s/p Coronary Artery Bypass Graft x 2, Aortic root and valve replacement using  On-X Ascending Aortic Prosthesis with Gelweave Valsalva Graft 25mm 10/13/2023.     Global and regional left ventricular function is normal with an EF of 60-65%.  Global right ventricular function is normal.  The prosthetic aortic valve is well-seated. Doppler interrogation is normal.  MG is 13 mmHg. No apperant AI noted. Afib is with RVR at times which makes  assessment difficult.     Trivial pericardial effusion is present posterior to the LV.     This study was compared with the study from 8/15/2023 .Rhythm is afib now  (previous study sinus), s/p AVR and root repair for severe AI and severe  aortic root dilation.     ______________________________________________________________________________  Left Ventricle  Left ventricular wall thickness cannot evaluate. Global and regional left  ventricular function is normal with an EF of 60-65%. Mild concentric wall  thickening consistent with left ventricular hypertrophy is present. Diastolic  function not assessed due to atrial fibrillation.     Right Ventricle  The right ventricle is normal size. Global right ventricular function is  normal.     Atria  Both atria appear normal.     Mitral Valve  The mitral valve is normal. Trace mitral insufficiency is present.     Aortic Valve  The mean AoV pressure gradient is 13.7 mmHg. The prosthetic aortic valve is  well-seated.     Tricuspid Valve  Mild tricuspid insufficiency is present. Pulmonary artery systolic  pressure  cannot be assessed.     Pulmonic Valve  The pulmonic valve is normal.     Vessels  The aorta root is normal. s/p repair.     Pericardium  Trivial pericardial effusion is present.     Compared to Previous Study  This study was compared with the study from 8/15/2023 . Rhythm is afib, s/p  AVR and root repair for severe AI and severe aortic root dilation.  ______________________________________________________________________________  MMode/2D Measurements & Calculations     IVSd: 1.2 cm  LVIDd: 4.4 cm  LVIDs: 2.9 cm  LVPWd: 1.3 cm  FS: 35.0 %  LV mass(C)d: 212.2 grams  LV mass(C)dI: 92.8 grams/m2  LA Volume (BP): 52.7 ml  LA Volume Index (BP): 23.0 ml/m2  RWT: 0.60     Doppler Measurements & Calculations  Ao V2 max: 260.6 cm/sec  Ao max P.2 mmHg  Ao V2 mean: 170.1 cm/sec  Ao mean P.7 mmHg  Ao V2 VTI: 29.9 cm     ______________________________________________________________________________  Report approved by: Juli PEÑA 10/20/2023 10:00 AM             CXR:  No results found for this or any previous visit (from the past 24 hour(s)).      PRE-ADMISSION MEDICATIONS:  Medications Prior to Admission   Medication Sig Dispense Refill Last Dose    amLODIPine (NORVASC) 10 MG tablet Take 1 tablet (10 mg) by mouth daily (Patient taking differently: Take 10 mg by mouth every morning) 90 tablet 1 10/13/2023 at 0645    aspirin 81 MG EC tablet Take 1 tablet (81 mg) by mouth daily Start tomorrow morning. (Patient taking differently: Take 81 mg by mouth every morning Start tomorrow morning.) 90 tablet 3 10/13/2023 at 0645    atorvastatin (LIPITOR) 40 MG tablet Take 1 tablet (40 mg) by mouth daily (Patient taking differently: Take 40 mg by mouth every morning) 90 tablet 3 10/13/2023 at 0645    losartan (COZAAR) 50 MG tablet Take 1 tablet (50 mg) by mouth daily 90 tablet 3 10/13/2023 at 0645    blood glucose (NO BRAND SPECIFIED) test strip Use to test blood sugar one times daily or as directed. To accompany:  Blood Glucose Monitor Brands: per insurance. 100 strip 6     blood glucose monitoring (NO BRAND SPECIFIED) meter device kit Use to test blood sugar one times daily or as directed. Preferred blood glucose meter OR supplies to accompany: Blood Glucose Monitor Brands: per insurance. 1 kit 0     thin (NO BRAND SPECIFIED) lancets Use with lanceting device. To accompany: Blood Glucose Monitor Brands: per insurance. 100 each 6     [DISCONTINUED] carvedilol (COREG) 12.5 MG tablet Take 1 tablet (12.5 mg) by mouth 2 times daily (with meals) for 180 days 180 tablet 1 10/13/2023 at 0645    [DISCONTINUED] hydrALAZINE (APRESOLINE) 25 MG tablet Take 1 tablet (25 mg) by mouth 3 times daily 245 tablet 3 10/13/2023 at 0645         DISCHARGE MEDICATIONS:      Review of your medicines        START taking        Dose / Directions   acetaminophen 500 MG tablet  Commonly known as: TYLENOL  Used for: S/P ascending aortic replacement, S/P CABG (coronary artery bypass graft)      Dose: 750 mg  Take 1.5 tablets (750 mg) by mouth every 6 hours as needed for other (For optimal non-opioid multimodal pain management to improve pain control.)  Refills: 0     droNABinol 5 MG capsule  Commonly known as: MARINOL  Used for: S/P ascending aortic replacement, S/P CABG (coronary artery bypass graft)      Dose: 5 mg  Take 1 capsule (5 mg) by mouth 2 times daily (before meals)  Quantity: 60 capsule  Refills: 0     escitalopram 10 MG tablet  Commonly known as: LEXAPRO  Used for: S/P ascending aortic replacement, S/P CABG (coronary artery bypass graft)      Dose: 10 mg  Start taking on: October 28, 2023  Take 1 tablet (10 mg) by mouth daily  Quantity: 90 tablet  Refills: 3     metFORMIN 500 MG tablet  Commonly known as: GLUCOPHAGE  Used for: S/P ascending aortic replacement, S/P CABG (coronary artery bypass graft)      Dose: 500 mg  Take 1 tablet (500 mg) by mouth 2 times daily (with meals)  Quantity: 60 tablet  Refills: 3     methocarbamol 750 MG  tablet  Commonly known as: ROBAXIN  Used for: S/P ascending aortic replacement, S/P CABG (coronary artery bypass graft)      Dose: 750 mg  1 tablet (750 mg) by Oral or Feeding Tube route every 6 hours as needed for muscle spasms or other (pain)  Quantity: 60 tablet  Refills: 0     multivitamin w/minerals tablet  Used for: S/P ascending aortic replacement, S/P CABG (coronary artery bypass graft)      Dose: 1 tablet  Start taking on: October 28, 2023  Take 1 tablet by mouth daily  Quantity: 90 tablet  Refills: 0     polyethylene glycol 17 GM/Dose powder  Commonly known as: MIRALAX  Used for: S/P ascending aortic replacement, S/P CABG (coronary artery bypass graft)      Dose: 17 g  Take 17 g by mouth daily as needed for constipation  Quantity: 510 g  Refills: 0     senna-docusate 8.6-50 MG tablet  Commonly known as: SENOKOT-S/PERICOLACE  Used for: S/P ascending aortic replacement, S/P CABG (coronary artery bypass graft)      Dose: 2 tablet  2 tablets by Oral or Feeding Tube route 2 times daily as needed for constipation  Quantity: 60 tablet  Refills: 0     thiamine 100 MG tablet  Commonly known as: B-1  Used for: S/P ascending aortic replacement, S/P CABG (coronary artery bypass graft)      Dose: 100 mg  Start taking on: October 28, 2023  Take 1 tablet (100 mg) by mouth daily  Quantity: 90 tablet  Refills: 3     traMADol 50 MG tablet  Commonly known as: ULTRAM  Used for: S/P ascending aortic replacement, S/P CABG (coronary artery bypass graft)      Dose: 25 mg  Take 0.5 tablets (25 mg) by mouth every 6 hours as needed for severe pain  Quantity: 15 tablet  Refills: 0     triamcinolone 0.1 % external cream  Commonly known as: KENALOG  Used for: Rash and nonspecific skin eruption      APPLY TOPICALLY TWO TIMES A DAY FOR 14 DAYS  Quantity: 45 g  Refills: 0     warfarin ANTICOAGULANT 1 MG tablet  Commonly known as: COUMADIN  Used for: S/P ascending aortic replacement, S/P CABG (coronary artery bypass graft)      Take 1 mg  on 10/27, take 1 mg on 10/28, take 1 mg on 10/29, have INR checked on 10/30 and dose from there. Lifelong goal INR 2-3.  Quantity: 90 tablet  Refills: 3            CONTINUE these medicines which may have CHANGED, or have new prescriptions. If we are uncertain of the size of tablets/capsules you have at home, strength may be listed as something that might have changed.        Dose / Directions   amLODIPine 10 MG tablet  Commonly known as: NORVASC  This may have changed: when to take this  Used for: Hypertension goal BP (blood pressure) < 140/90      Dose: 10 mg  Take 1 tablet (10 mg) by mouth daily  Quantity: 90 tablet  Refills: 1     aspirin 81 MG EC tablet  This may have changed: when to take this  Used for: Acute chest pain      Dose: 81 mg  Take 1 tablet (81 mg) by mouth daily Start tomorrow morning.  Quantity: 90 tablet  Refills: 3     atorvastatin 40 MG tablet  Commonly known as: LIPITOR  This may have changed: when to take this  Used for: Acute chest pain      Dose: 40 mg  Take 1 tablet (40 mg) by mouth daily  Quantity: 90 tablet  Refills: 3     carvedilol 25 MG tablet  Commonly known as: COREG  This may have changed:   medication strength  how much to take  Used for: S/P ascending aortic replacement, S/P CABG (coronary artery bypass graft)      Dose: 25 mg  Take 1 tablet (25 mg) by mouth 2 times daily (with meals)  Quantity: 60 tablet  Refills: 3            CONTINUE these medicines which have NOT CHANGED        Dose / Directions   blood glucose monitoring meter device kit  Commonly known as: NO BRAND SPECIFIED  Used for: Type 2 diabetes mellitus without complication, without long-term current use of insulin (H)      Use to test blood sugar one times daily or as directed. Preferred blood glucose meter OR supplies to accompany: Blood Glucose Monitor Brands: per insurance.  Quantity: 1 kit  Refills: 0     blood glucose test strip  Commonly known as: NO BRAND SPECIFIED  Used for: Type 2 diabetes mellitus without  complication, without long-term current use of insulin (H)      Use to test blood sugar one times daily or as directed. To accompany: Blood Glucose Monitor Brands: per insurance.  Quantity: 100 strip  Refills: 6     losartan 50 MG tablet  Commonly known as: COZAAR  Used for: Aneurysm of ascending aorta without rupture (H24), Essential hypertension      Dose: 50 mg  Take 1 tablet (50 mg) by mouth daily  Quantity: 90 tablet  Refills: 3     thin lancets  Commonly known as: NO BRAND SPECIFIED  Used for: Type 2 diabetes mellitus without complication, without long-term current use of insulin (H)      Use with lanceting device. To accompany: Blood Glucose Monitor Brands: per insurance.  Quantity: 100 each  Refills: 6            STOP taking      hydrALAZINE 25 MG tablet  Commonly known as: APRESOLINE                  Where to get your medicines        These medications were sent to [facility] [place], [place] - [address] [address]      Phone: [phone]   carvedilol 25 MG tablet  droNABinol 5 MG capsule  escitalopram 10 MG tablet  metFORMIN 500 MG tablet  methocarbamol 750 MG tablet  multivitamin w/minerals tablet  polyethylene glycol 17 GM/Dose powder  senna-docusate 8.6-50 MG tablet  thiamine 100 MG tablet  traMADol 50 MG tablet  warfarin ANTICOAGULANT 1 MG tablet       These medications were sent to [address] [address]      Phone: [phone]   triamcinolone 0.1 % external cream       Some of these will need a paper prescription and others can be bought over the counter. Ask your nurse if you have questions.    You don't need a prescription for these medications  acetaminophen 500 MG tablet         Anticoagulation Dose History  More data exists         Latest Ref Rng & Units 10/17/2023 10/18/2023 10/19/2023 10/20/2023 10/21/2023 10/22/2023 10/23/2023   Recent Dosing  and Labs   warfarin ANTICOAGULANT (COUMADIN) half-tab 0.5 mg - - 0.5 mg, $Given - - 0.5 mg, $Given - -   warfarin ANTICOAGULANT (COUMADIN) tablet 1 mg - - - 1 mg, $Given 1 mg, $Given - 1 mg, $Given -   Chromogenic Factor 10 70 - 130 % - - - 31  - - -   INR 0.85 - 1.15 1.93  1.96  1.98  2.14  2.40  2.25  1.95

## 2023-10-23 NOTE — PROGRESS NOTES
Cardiovascular Surgery Progress Note           Assessment and Plan:     Sukh Craven is a 68 year old male with PMH of severe aortic insufficiency 2/2 sinotubular aortic root dilitation (sinus of valsalva 5.3 cm), CAD (mid RCA 90% stenosed, dCx 35% stenosed, mid LAD 70% stenosed), PAD (mild-mod left lower extremity), bilateral popliteal aneurysms, HTN, T2DM, and chronic opioid/cannabis use, who is s/p AVR and CABG x2 on 10/13 by Dr. Lockett.     Cardiovascular:   CAD s/p CABG x2  Severe AI 2/2 aortic root aneurysm s/p On-X mechanical Bentall  PMH PAF  1st degree AV block, resolved  Hypertension  Rate-controlled afib, HD stable.  Post-op echo complete 10/20:  LVEF 60-65%, AV gradient 13   - EP consulted for 1st deg AV block with bradycardia and PAF and have since signed off; rec deferred amiodarone and cautious titration of BB   - ASA 81 mg (reduced from post-CABG protocol in setting of AC for afib)   - PTA atorvastatin resumed   - BB:  Coreg 25 mg BID   - PTA amlodipine ordered   - PTA hydralazine resumed   - PRN hydralazine & labetalol available   - AC as below ([id number] VASc 3)   - Will need OP EP follow up at 2 months with 2-wk Zio patch (to be arranged at discharge)    Chest tubes: pleural tubes removed 10/19, meds removed 10/20  TPW:  fractured during removal with segment of retained FB    Pulmonary:  - Extubated POD 1; now saturating well on RA  - Supplemental O2 PRN to keep sats > 92%  - Pulm toilet, IS, OOB/activity and deep breathing  - Right hemidiaphragm elevation present prior to surgery    Neurology / MSK:  Acute post-operative pain  - Multimodal analgesia with acetaminophen, methocarbamol PRN, lidocaine patches, Toradol PRN  - Avoid opiates in s/o ileus    Bilat LE paresthesias  Ddx includes RLS, neuropathy, and nutritional deficiencies   - Start on multivitamin for now and monitor    S/p sternotomy   - PT/OT/CR consults   - Sternal precautions     / Renal / FE:  EFFIE, resolved  BPH  - Most  recent creatinine WNL (BL ~1-1.12)  - Diuresis: not indicated at this time  - Replace electrolytes prn per protocol  - Strict I/O, daily standing weights per protocol    GI / Nutrition:   Adynamic ileus, improving  Moderate malnutrition in context of acute illness/injury  - Advance to soft diet today; continue bowel regimen - having antegrade bowel function  - AXR showing improvement distally following aggressive stimulation from below (ordered gastrograffin enema however not done per Radiology; had results following pink lady enema 10/19)  - Dietitian following, appreciate recs; resume matthew counts & supplements with diet advancement  - Add multivitamin daily    Endocrine:  Stress-induced hyperglycemia, resolved  DM2 - Hgb A1c 6.8    - Initially managed on insulin drip postop, transitioned to high sliding scale   - BG goal <180 for optimal wound healing   - No antidiabetic agents PTA   - Will start on metformin 500mg BID and monitor response rather than resume basal insulin   - Pt requesting additional diabetes education since dx is fairly new; will place diabetic educator consult however pt does not need to remain IP to complete education, could be deferred to the OP setting    Infectious Disease:  Stress-induced leukocytosis, resolved  - WBC WNL, afebrile, no signs or symptoms of infection  - Completed perioperative antibiotics    Hematology:   Acute blood loss anemia  Thrombocytopenia, resolved  Hgb stable; Plt WNL, no signs or symptoms of active bleeding    Anticoagulation:   Chronic anticoagulation for mechanical AVR, afib  - ASA 81 mg  - Coumadin for mechanical valve/afib, INR goal 2-3. Most recent INR therapeutic     Prophylaxis:   - Stress ulcer prophylaxis: Pantoprazole 40 mg daily for 30 days post-operatively  - DVT prophylaxis: AC as above, SCD, OOB/activity    Disposition:   - Transferred to  on 10/16  - Therapies recommending discharge to home once medically stable, therapeutic INR, and tolerating diet  "-  likely in the next 1-2 days    Alec Marc, CNP, [facility]-  Cardiothoracic Surgery  [employer] Pager [phone]        Interval History:     No overnight events.  Pain is well-controlled but now c/o paresthesias in bilateral feet (dorsum, soles, and sides) that Sukh attributes to swelling; passing gas and having antegrade bowel function.  Denies nausea, no vomiting.  No dyspnea.  Denies chest pain, dizziness/lightheadedness, and sternal popping/clicking/snapping.         Physical Exam:   Blood pressure 132/82, pulse 83, temperature 98.6  F (37  C), temperature source Oral, resp. rate 25, height 1.981 m (6' 6\"), weight 94.4 kg (208 lb 3.2 oz), SpO2 97%.  Vitals:    10/21/23 0216 10/22/23 0030 10/22/23 2338   Weight: 94.8 kg (208 lb 14.4 oz) 94.5 kg (208 lb 6.4 oz) 94.4 kg (208 lb 3.2 oz)      Admission weight: 95.1 kg  24 hr Fluid status:  +640 mL    Gen: NAD, resting quietly in bed in darkened room, calm, pleasant, cooperative on exam  Neuro: A&Ox4, no focal deficits, speech clear, face symmetric, JESSICA  CV: WWP, no LE edema  Pulm:  unlabored breathing on RA  Abd: SNT, minimally distended, no guarding or peritoneal signs  Ext: no peripheral edema, JESSICA, no gross deformities  Incision: clean, dry, intact, no erythema, sternum stable  Tubes/drain sites: dressing C/D         Data:    Imaging:      No results found for this or any previous visit (from the past 24 hour(s)).      Labs:  BMP  Recent Labs   Lab 10/22/23  2021 10/22/23  1743 10/22/23  1322 10/22/23  0928 10/21/23  0747 10/21/23  0658 10/20/23  1200 10/20/23  0429 10/19/23  2223 10/19/23  0606 10/18/23  1220 10/18/23  0607   NA  --   --   --   --   --  137  --  137  --  138  --  140   POTASSIUM  --   --   --   --   --  3.9  --  3.8  --  3.9  --  3.6   CHLORIDE  --   --   --   --   --  105  --  104  --  105  --  104   DORIS  --   --   --   --   --  8.0*  --  8.2*  --  8.2*  --  8.2*   CO2  --   --   --   --   --  20*  --  23  --  21*  --  24   BUN  --   --  "  --   --   --  12.6  --  22.1  --  18.6  --  15.6   CR  --   --   --   --   --  0.90  --  1.29*  --  1.23*  --  0.98   * 136* 113* 125*   < > 108*   < > 120*   < > 121*   < > 142*    < > = values in this interval not displayed.     CBC  Recent Labs   Lab 10/21/23  0658 10/20/23  0429 10/19/23  1705 10/19/23  0606   WBC 9.8 11.2* 10.1 9.0   RBC 2.74* 2.84* 2.98* 3.14*   HGB 8.4* 8.6* 9.2* 9.5*   HCT 26.3* 27.3* 28.6* 29.6*   MCV 96 96 96 94   MCH 30.7 30.3 30.9 30.3   MCHC 31.9 31.5 32.2 32.1   RDW 15.0 14.9 14.9 15.1*    224 227 189     INR  Recent Labs   Lab 10/22/23  0633 10/21/23  0658 10/20/23  0429 10/19/23  0606   INR 2.25* 2.40* 2.14* 1.98*      Hepatic Panel  Recent Labs   Lab 10/17/23  0529   AST 35   ALT 18   ALKPHOS 81   BILITOTAL 0.7   ALBUMIN 3.3*     GLUCOSE:   Recent Labs   Lab 10/22/23  2021 10/22/23  1743 10/22/23  1322 10/22/23  0928 10/21/23  2049 10/21/23  1653   * 136* 113* 125* 158* 156*

## 2023-10-23 NOTE — PROGRESS NOTES
Cardiovascular Surgery Progress Note           Assessment and Plan:     Sukh Craven is a 68 year old male with PMH of severe aortic insufficiency 2/2 sinotubular aortic root dilitation (sinus of valsalva 5.3 cm), CAD (mid RCA 90% stenosed, dCx 35% stenosed, mid LAD 70% stenosed), PAD (mild-mod left lower extremity), bilateral popliteal aneurysms, HTN, T2DM, and chronic opioid/cannabis use, who is s/p AVR and CABG x2 on 10/13 by Dr. Lockett.     Cardiovascular:   CAD s/p CABG x2  Severe AI 2/2 aortic root aneurysm s/p On-X mechanical Bentall  PMH PAF  1st degree AV block, resolved  Hypertension  Post-op echo complete 10/20:  LVEF 60-65%, AV gradient 13   - EP consulted for 1st deg AV block with bradycardia and PAF and have since signed off; rec deferred amiodarone and cautious titration of BB   - ASA 81 mg (reduced from post-CABG protocol in setting of AC for afib)   - PTA atorvastatin resumed   - BB:  Coreg 25 mg BID   - PTA amlodipine ordered   - PTA hydralazine resumed   - PRN hydralazine & labetalol available   - AC as below ([id number] VASc 3)   - Will need OP EP follow up at 2 months with 2-wk Zio patch (to be arranged at discharge)    Chest tubes: pleural tubes removed 10/19, meds removed 10/20  TPW:  fractured during removal with segment of retained FB    Pulmonary:  - Extubated POD 1; now saturating well on RA  - Supplemental O2 PRN to keep sats > 92%  - Pulm toilet, IS, OOB/activity and deep breathing  - Right hemidiaphragm elevation present prior to surgery    Neurology / MSK:  Acute post-operative pain  - Multimodal analgesia with acetaminophen, methocarbamol PRN, lidocaine patches  - Avoid opiates in s/o ileus  - Discontinue ketorolac, trial of prn Ultram    Bilat LE paresthesias  Ddx includes RLS, neuropathy, and nutritional deficiencies   - Start on multivitamin for now and monitor    S/p sternotomy   - PT/OT/CR consults   - Sternal precautions     / Renal / FE:  EFFIE, resolved  BPH  - Most recent  creatinine WNL (BL ~1-1.12)  - Diuresis: not indicated at this time  - Replace electrolytes prn per protocol  - Strict I/O, daily standing weights per protocol    GI / Nutrition:   Adynamic ileus, improving  Moderate malnutrition in context of acute illness/injury  - Continue soft diet; de-escalate bowel regimen - having antegrade bowel function  - AXR showing improvement distally following aggressive stimulation from below (ordered gastrograffin enema however not done per Radiology; had results following pink lady enema 10/19).  Recheck AXR in AM.  - Dietitian following, appreciate recs; resume matthew counts & supplements with diet advancement. Suspect 2/22 matthew count incorrect as pt reports he did eat cream of wheat and eggs.  - Daily multivitamin    Endocrine:  Stress-induced hyperglycemia, resolved  DM2 - Hgb A1c 6.8    - Initially managed on insulin drip postop, transitioned to high sliding scale   - BG goal <180 for optimal wound healing   - No antidiabetic agents PTA   - Will start on metformin 500mg BID and monitor response rather than resume basal insulin   - Pt requesting additional diabetes education since dx is fairly new; will place diabetic educator consult however pt does not need to remain IP to complete education, could be deferred to the OP setting    Infectious Disease:  Stress-induced leukocytosis, resolved  - WBC WNL, afebrile, no signs or symptoms of infection  - Completed perioperative antibiotics    Hematology:   Acute blood loss anemia  Thrombocytopenia, resolved  Hgb stable; Plt WNL, no signs or symptoms of active bleeding    Anticoagulation:   Chronic anticoagulation for mechanical AVR, afib  - ASA 81 mg  - Coumadin for mechanical valve/afib, INR goal 2-3. Most recent INR near therapeutic     Prophylaxis:   - Stress ulcer prophylaxis: Pantoprazole 40 mg daily for 30 days post-operatively  - DVT prophylaxis: AC as above, SCD, OOB/activity    Disposition:   - Transferred to  on 10/16  -  "Therapies recommending discharge to home once medically stable, therapeutic INR, and tolerating diet -  likely in the next 1-2 days    Dr. Lockett updated.    Alec Marc, CNP, [facility]  Cardiothoracic Surgery  [employer] Pager [phone]        Interval History:     No overnight events.  Pain is well-controlled during the day but c/o more discomfort at night that is limiting sleep.  C/o feeling cold all the time but state he does not feel ill; BLE paresthesias continue.  States that he is having more substance in his bowel movements but that they are largely liquid and as a result, his perineum is becoming raw; asks if his laxatives can be scaled back.  No N/V or dyspnea.  Denies chest pain, dizziness/lightheadedness, and sternal popping/clicking/snapping.         Physical Exam:   Blood pressure 139/85, pulse 84, temperature 98.3  F (36.8  C), temperature source Oral, resp. rate 27, height 1.981 m (6' 6\"), weight 94.4 kg (208 lb 3.2 oz), SpO2 95%.  Vitals:    10/21/23 0216 10/22/23 0030 10/22/23 2338   Weight: 94.8 kg (208 lb 14.4 oz) 94.5 kg (208 lb 6.4 oz) 94.4 kg (208 lb 3.2 oz)      Admission weight: 95.1 kg  24 hr Fluid status:  -- not documented    Gen: NAD, sitting at edge of bed, calm, pleasant, cooperative on exam  Neuro: A&Ox4, no focal deficits, speech clear, face symmetric, JESSICA  CV: WWP, no LE edema, valve click  Pulm:  unlabored breathing on RA, CTA throughout posterior lung fields  Abd: SNTND, no guarding or peritoneal signs  Ext: no peripheral edema, JESSICA, no gross deformities  Incision: clean, dry, intact, no erythema, sternum stable  Tubes/drain sites: dressing C/D/I         Data:    Imaging:      No results found for this or any previous visit (from the past 24 hour(s)).      Labs:  BMP  Recent Labs   Lab 10/23/23  1118 10/23/23  0818 10/23/23  0449 10/22/23  2021 10/21/23  0747 10/21/23  0658 10/20/23  1200 10/20/23  0429 10/19/23  2223 10/19/23  0606   NA  --   --  138  --   --  137  --  137 "  --  138   POTASSIUM  --   --  3.7  --   --  3.9  --  3.8  --  3.9   CHLORIDE  --   --  104  --   --  105  --  104  --  105   DORIS  --   --  8.3*  --   --  8.0*  --  8.2*  --  8.2*   CO2  --   --  22  --   --  20*  --  23  --  21*   BUN  --   --  9.7  --   --  12.6  --  22.1  --  18.6   CR  --   --  0.99  --   --  0.90  --  1.29*  --  1.23*   * 118* 123* 117*   < > 108*   < > 120*   < > 121*    < > = values in this interval not displayed.     CBC  Recent Labs   Lab 10/23/23  0449 10/21/23  0658 10/20/23  0429 10/19/23  1705   WBC 10.8 9.8 11.2* 10.1   RBC 2.86* 2.74* 2.84* 2.98*   HGB 8.6* 8.4* 8.6* 9.2*   HCT 27.3* 26.3* 27.3* 28.6*   MCV 96 96 96 96   MCH 30.1 30.7 30.3 30.9   MCHC 31.5 31.9 31.5 32.2   RDW 14.6 15.0 14.9 14.9    261 224 227     INR  Recent Labs   Lab 10/23/23  0449 10/22/23  0633 10/21/23  0658 10/20/23  0429   INR 1.95* 2.25* 2.40* 2.14*      Hepatic Panel  Recent Labs   Lab 10/17/23  0529   AST 35   ALT 18   ALKPHOS 81   BILITOTAL 0.7   ALBUMIN 3.3*     GLUCOSE:   Recent Labs   Lab 10/23/23  1118 10/23/23  0818 10/23/23  0449 10/22/23  2021 10/22/23  1743 10/22/23  1322   * 118* 123* 117* 136* 113*

## 2023-10-24 ENCOUNTER — APPOINTMENT (OUTPATIENT)
Dept: GENERAL RADIOLOGY | Facility: CLINIC | Age: 68
DRG: 220 | End: 2023-10-24
Payer: COMMERCIAL

## 2023-10-24 ENCOUNTER — APPOINTMENT (OUTPATIENT)
Dept: OCCUPATIONAL THERAPY | Facility: CLINIC | Age: 68
DRG: 220 | End: 2023-10-24
Attending: THORACIC SURGERY (CARDIOTHORACIC VASCULAR SURGERY)
Payer: COMMERCIAL

## 2023-10-24 LAB
ALBUMIN UR-MCNC: 30 MG/DL
APPEARANCE UR: CLEAR
ATRIAL RATE - MUSE: NORMAL BPM
BILIRUB UR QL STRIP: NEGATIVE
COLOR UR AUTO: YELLOW
CRP SERPL-MCNC: 82.3 MG/L
DIASTOLIC BLOOD PRESSURE - MUSE: NORMAL MMHG
ERYTHROCYTE [DISTWIDTH] IN BLOOD BY AUTOMATED COUNT: 14.9 % (ref 10–15)
GLUCOSE BLDC GLUCOMTR-MCNC: 117 MG/DL (ref 70–99)
GLUCOSE BLDC GLUCOMTR-MCNC: 123 MG/DL (ref 70–99)
GLUCOSE BLDC GLUCOMTR-MCNC: 127 MG/DL (ref 70–99)
GLUCOSE BLDC GLUCOMTR-MCNC: 135 MG/DL (ref 70–99)
GLUCOSE BLDC GLUCOMTR-MCNC: 144 MG/DL (ref 70–99)
GLUCOSE UR STRIP-MCNC: NEGATIVE MG/DL
HCT VFR BLD AUTO: 27.4 % (ref 40–53)
HGB BLD-MCNC: 8.8 G/DL (ref 13.3–17.7)
HGB UR QL STRIP: NEGATIVE
HYALINE CASTS: 1 /LPF
INR PPP: 1.88 (ref 0.85–1.15)
INTERPRETATION ECG - MUSE: NORMAL
KETONES UR STRIP-MCNC: NEGATIVE MG/DL
LACTATE SERPL-SCNC: 2.1 MMOL/L (ref 0.7–2)
LEUKOCYTE ESTERASE UR QL STRIP: NEGATIVE
MAGNESIUM SERPL-MCNC: 2.1 MG/DL (ref 1.7–2.3)
MCH RBC QN AUTO: 29.8 PG (ref 26.5–33)
MCHC RBC AUTO-ENTMCNC: 32.1 G/DL (ref 31.5–36.5)
MCV RBC AUTO: 93 FL (ref 78–100)
MUCOUS THREADS #/AREA URNS LPF: PRESENT /LPF
NITRATE UR QL: NEGATIVE
P AXIS - MUSE: NORMAL DEGREES
PH UR STRIP: 6 [PH] (ref 5–7)
PHOSPHATE SERPL-MCNC: 3.5 MG/DL (ref 2.5–4.5)
PLATELET # BLD AUTO: 423 10E3/UL (ref 150–450)
POTASSIUM SERPL-SCNC: 4.1 MMOL/L (ref 3.4–5.3)
PR INTERVAL - MUSE: NORMAL MS
PROCALCITONIN SERPL IA-MCNC: 0.2 NG/ML
QRS DURATION - MUSE: 84 MS
QT - MUSE: 418 MS
QTC - MUSE: 454 MS
R AXIS - MUSE: 34 DEGREES
RBC # BLD AUTO: 2.95 10E6/UL (ref 4.4–5.9)
RBC URINE: 1 /HPF
SP GR UR STRIP: 1.03 (ref 1–1.03)
SQUAMOUS EPITHELIAL: 1 /HPF
SYSTOLIC BLOOD PRESSURE - MUSE: NORMAL MMHG
T AXIS - MUSE: -51 DEGREES
UFH PPP CHRO-ACNC: 0.64 IU/ML
UROBILINOGEN UR STRIP-MCNC: 2 MG/DL
VENTRICULAR RATE- MUSE: 71 BPM
WBC # BLD AUTO: 12.2 10E3/UL (ref 4–11)
WBC URINE: 2 /HPF

## 2023-10-24 PROCEDURE — 99223 1ST HOSP IP/OBS HIGH 75: CPT | Performed by: INTERNAL MEDICINE

## 2023-10-24 PROCEDURE — 99222 1ST HOSP IP/OBS MODERATE 55: CPT | Performed by: PSYCHIATRY & NEUROLOGY

## 2023-10-24 PROCEDURE — 83605 ASSAY OF LACTIC ACID: CPT

## 2023-10-24 PROCEDURE — 250N000013 HC RX MED GY IP 250 OP 250 PS 637: Performed by: SURGERY

## 2023-10-24 PROCEDURE — 85027 COMPLETE CBC AUTOMATED: CPT

## 2023-10-24 PROCEDURE — 120N000003 HC R&B IMCU UMMC

## 2023-10-24 PROCEDURE — 250N000011 HC RX IP 250 OP 636: Mod: JZ

## 2023-10-24 PROCEDURE — 74018 RADEX ABDOMEN 1 VIEW: CPT

## 2023-10-24 PROCEDURE — 250N000013 HC RX MED GY IP 250 OP 250 PS 637: Performed by: NURSE PRACTITIONER

## 2023-10-24 PROCEDURE — 87040 BLOOD CULTURE FOR BACTERIA: CPT

## 2023-10-24 PROCEDURE — 84100 ASSAY OF PHOSPHORUS: CPT | Performed by: THORACIC SURGERY (CARDIOTHORACIC VASCULAR SURGERY)

## 2023-10-24 PROCEDURE — 81001 URINALYSIS AUTO W/SCOPE: CPT

## 2023-10-24 PROCEDURE — 85520 HEPARIN ASSAY: CPT

## 2023-10-24 PROCEDURE — 250N000013 HC RX MED GY IP 250 OP 250 PS 637: Performed by: THORACIC SURGERY (CARDIOTHORACIC VASCULAR SURGERY)

## 2023-10-24 PROCEDURE — 99222 1ST HOSP IP/OBS MODERATE 55: CPT | Mod: GC | Performed by: INTERNAL MEDICINE

## 2023-10-24 PROCEDURE — 83735 ASSAY OF MAGNESIUM: CPT | Performed by: THORACIC SURGERY (CARDIOTHORACIC VASCULAR SURGERY)

## 2023-10-24 PROCEDURE — 250N000013 HC RX MED GY IP 250 OP 250 PS 637

## 2023-10-24 PROCEDURE — 85610 PROTHROMBIN TIME: CPT

## 2023-10-24 PROCEDURE — 71045 X-RAY EXAM CHEST 1 VIEW: CPT

## 2023-10-24 PROCEDURE — 84145 PROCALCITONIN (PCT): CPT

## 2023-10-24 PROCEDURE — 97530 THERAPEUTIC ACTIVITIES: CPT | Mod: GO

## 2023-10-24 PROCEDURE — 71045 X-RAY EXAM CHEST 1 VIEW: CPT | Mod: 26 | Performed by: RADIOLOGY

## 2023-10-24 PROCEDURE — 99418 PROLNG IP/OBS E/M EA 15 MIN: CPT | Performed by: INTERNAL MEDICINE

## 2023-10-24 PROCEDURE — 86140 C-REACTIVE PROTEIN: CPT

## 2023-10-24 PROCEDURE — 250N000011 HC RX IP 250 OP 636: Mod: JZ | Performed by: NURSE PRACTITIONER

## 2023-10-24 PROCEDURE — 36592 COLLECT BLOOD FROM PICC: CPT | Performed by: THORACIC SURGERY (CARDIOTHORACIC VASCULAR SURGERY)

## 2023-10-24 PROCEDURE — 250N000013 HC RX MED GY IP 250 OP 250 PS 637: Performed by: PHYSICIAN ASSISTANT

## 2023-10-24 PROCEDURE — 84132 ASSAY OF SERUM POTASSIUM: CPT | Performed by: THORACIC SURGERY (CARDIOTHORACIC VASCULAR SURGERY)

## 2023-10-24 PROCEDURE — 250N000011 HC RX IP 250 OP 636: Mod: JZ | Performed by: PHYSICIAN ASSISTANT

## 2023-10-24 PROCEDURE — 74018 RADEX ABDOMEN 1 VIEW: CPT | Mod: 26 | Performed by: RADIOLOGY

## 2023-10-24 PROCEDURE — 36415 COLL VENOUS BLD VENIPUNCTURE: CPT

## 2023-10-24 RX ORDER — ESCITALOPRAM OXALATE 10 MG/1
10 TABLET ORAL DAILY
Status: DISCONTINUED | OUTPATIENT
Start: 2023-10-24 | End: 2023-10-27 | Stop reason: HOSPADM

## 2023-10-24 RX ORDER — HEPARIN SODIUM 10000 [USP'U]/100ML
0-5000 INJECTION, SOLUTION INTRAVENOUS CONTINUOUS
Status: DISCONTINUED | OUTPATIENT
Start: 2023-10-24 | End: 2023-10-25

## 2023-10-24 RX ORDER — WARFARIN SODIUM 2.5 MG/1
2.5 TABLET ORAL
Status: COMPLETED | OUTPATIENT
Start: 2023-10-24 | End: 2023-10-24

## 2023-10-24 RX ORDER — LOSARTAN POTASSIUM 25 MG/1
25 TABLET ORAL DAILY
Status: DISCONTINUED | OUTPATIENT
Start: 2023-10-25 | End: 2023-10-27

## 2023-10-24 RX ORDER — QUETIAPINE FUMARATE 25 MG/1
25 TABLET, FILM COATED ORAL 2 TIMES DAILY PRN
Status: DISCONTINUED | OUTPATIENT
Start: 2023-10-24 | End: 2023-10-27 | Stop reason: HOSPADM

## 2023-10-24 RX ADMIN — METFORMIN HYDROCHLORIDE 500 MG: 500 TABLET ORAL at 09:10

## 2023-10-24 RX ADMIN — HYDRALAZINE HYDROCHLORIDE 25 MG: 25 TABLET, FILM COATED ORAL at 20:27

## 2023-10-24 RX ADMIN — CARVEDILOL 25 MG: 25 TABLET, FILM COATED ORAL at 09:11

## 2023-10-24 RX ADMIN — PANTOPRAZOLE SODIUM 40 MG: 40 TABLET, DELAYED RELEASE ORAL at 09:10

## 2023-10-24 RX ADMIN — ACETAMINOPHEN 975 MG: 325 TABLET, FILM COATED ORAL at 16:30

## 2023-10-24 RX ADMIN — ESCITALOPRAM OXALATE 10 MG: 10 TABLET ORAL at 16:30

## 2023-10-24 RX ADMIN — HYDRALAZINE HYDROCHLORIDE 25 MG: 25 TABLET, FILM COATED ORAL at 09:11

## 2023-10-24 RX ADMIN — Medication 1 TABLET: at 09:11

## 2023-10-24 RX ADMIN — METHOCARBAMOL 1000 MG: 500 TABLET ORAL at 20:27

## 2023-10-24 RX ADMIN — HYDRALAZINE HYDROCHLORIDE 25 MG: 25 TABLET, FILM COATED ORAL at 14:03

## 2023-10-24 RX ADMIN — THIAMINE HCL TAB 100 MG 100 MG: 100 TAB at 14:03

## 2023-10-24 RX ADMIN — AMLODIPINE BESYLATE 10 MG: 10 TABLET ORAL at 09:11

## 2023-10-24 RX ADMIN — QUETIAPINE FUMARATE 25 MG: 25 TABLET ORAL at 20:27

## 2023-10-24 RX ADMIN — ASPIRIN 81 MG CHEWABLE TABLET 81 MG: 81 TABLET CHEWABLE at 09:10

## 2023-10-24 RX ADMIN — HEPARIN SODIUM 1100 UNITS/HR: 10000 INJECTION, SOLUTION INTRAVENOUS at 09:24

## 2023-10-24 RX ADMIN — METFORMIN HYDROCHLORIDE 500 MG: 500 TABLET ORAL at 17:44

## 2023-10-24 RX ADMIN — WARFARIN SODIUM 2.5 MG: 2.5 TABLET ORAL at 17:44

## 2023-10-24 RX ADMIN — HYDROXYZINE HYDROCHLORIDE 50 MG: 25 TABLET, FILM COATED ORAL at 09:08

## 2023-10-24 RX ADMIN — CARVEDILOL 25 MG: 25 TABLET, FILM COATED ORAL at 17:44

## 2023-10-24 RX ADMIN — SODIUM CHLORIDE, PRESERVATIVE FREE 5 ML: 5 INJECTION INTRAVENOUS at 16:30

## 2023-10-24 RX ADMIN — ACETAMINOPHEN 975 MG: 325 TABLET, FILM COATED ORAL at 09:07

## 2023-10-24 RX ADMIN — ATORVASTATIN CALCIUM 40 MG: 40 TABLET, FILM COATED ORAL at 20:27

## 2023-10-24 ASSESSMENT — ACTIVITIES OF DAILY LIVING (ADL)
ADLS_ACUITY_SCORE: 25
ADLS_ACUITY_SCORE: 27
ADLS_ACUITY_SCORE: 27
ADLS_ACUITY_SCORE: 25
ADLS_ACUITY_SCORE: 25
ADLS_ACUITY_SCORE: 27
ADLS_ACUITY_SCORE: 25
ADLS_ACUITY_SCORE: 25

## 2023-10-24 NOTE — PROGRESS NOTES
"D-S/p AVR and CABG X 2 on 10/13/20. 17 beat run of vtach this evening. See provider notification note.C/o diarrhea.Poor po intake.  I-Received prn IV hydralazine @ 1627 for BP of 149/96.  A-C/o \"feeling terrible\" after receiving IV hydralazine, \"fatigue, hot flashed and chills\". Symptoms have continued throughout the shift.   I-12 lead EKG completed. Replacing calcium and magnesium IV per order.  A-Pt has been afib/flutter/SR in addition to one run of vt this shift.  P-Continue with current POC. Notify CVTS provider of any concerns/changes.  "

## 2023-10-24 NOTE — PLAN OF CARE
Goal Outcome Evaluation:      Plan of Care Reviewed With: patient    Overall Patient Progress: no changeOverall Patient Progress: no change    Outcome Evaluation: see RD note

## 2023-10-24 NOTE — PROGRESS NOTES
Calorie Count  Intake recorded for: 10/23  Total Kcals: 82 Total Protein: 2g  Kcals from Hospital Food: 82   Protein: 2g  Kcals from Outside Food (average):0 Protein: 0g  # Meals Ordered from Kitchen: 2  # Meals Recorded: 1 (Meal 1: 100% 4oz orange juice; 10% turkey breast with gravy, mashed potatoes with gravy)  # Supplements Recorded: No supplement intake recorded

## 2023-10-24 NOTE — PROGRESS NOTES
"Goal outcome evaluation:  Time: 1900 - 0700  Plan of care reviewed with: Patient  Overall patient progress: No change  VS /61 (BP Location: Left arm, Cuff Size: Adult Regular)   Pulse 55   Temp 99.1  F (37.3  C) (Oral)   Resp 24   Ht 1.981 m (6' 6\")   Wt 94.4 kg (208 lb 3.2 oz)   SpO2 100%   BMI 24.06 kg/m        Activity: UAL  Neuros: Alert and oriented to person, place, situation and time. Calls appropriately. Able to make needs known. Clear and appropriate speech. Follows instructions.  Psychosocial Assessment: Cooperative and interacts appropriately with staff. Ativan x 1 for anxiety.  Cardiac: SR, bradycardic. Peripheral pulses are palpable and equal bilaterally.  Respiratory: Respiratory rate is normal and effortless on RA. Lung sounds are clear bilaterally   GI/: Reports no nausea, vomiting, or abdominal pain. Bowel sounds are present in all quadrants, last BM 10/23/23. Reports no difficulty or pain with urination.   Diet: Tolerated moderate consistent carb diet well, no complaints of nausea, vomiting or abdominal discomfort.  Skin/Incisions: Skin is warm and dry, sternal incision ALEE, LLE graft site ALEE, no new deficits noted.  Lines/Drains: R DL PICC SL  Labs: BG ACHS, k+ mg and phos recheck in the AM  Pain/Nausea: No c/o of pain or nausea.  New changes this shift:None  Plan: Continue POC, notify provider of changes    "

## 2023-10-24 NOTE — PLAN OF CARE
"  Neuro: A&Ox4. Pleasant and cooperative, reporting feelings of anxiety and \"something is wrong\". PRN seroquel ordered, scheduled lexapro started. Psych consult done.   Cardiac: Afebrile, VSS. Afib 40s-60s with some pauses most of day then SR 70-80s after 5pm. EP consulted today.  Respiratory: RA, denies SOB, productive loose cough.   GI/: Voiding spontaneously. Loose BM x4 today per patient report.  Diet/appetite: Tolerating mod consistent carb diet, minimal appetite, on calorie counts and supplements. ACHS blood glucose. Denies nausea.  Activity: Up with standby assist    Pain: reporting minimal pain, scheduled tylenol effective.   Skin: sternal and CT incisions open to air, LLE harvest sites.   Lines: R PICC x2  Drains: n/a  Replacements: Mag, K, and phos protocols.      Blood cultures pending, CRP elevated, UA pending. Addiction medicine consult pending. Heparin restarted pending INR levels, infusing at 900u/hr, next Xa at 2345. Continue current plan of care and report changes to CVTS.     Problem: Oral Intake Inadequate  Goal: Improved Oral Intake  Outcome: Progressing     Problem: Anxiety  Goal: Anxiety Reduction or Resolution  Outcome: Progressing     Problem: Infection  Goal: Absence of Infection Signs and Symptoms  Outcome: Progressing      "
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
## 2023-10-24 NOTE — CONSULTS
"    Electrophysiology Consultation Note   Date of Service: 10/24/2023      HPI:   Sukh Craven is a 68 year old male with pAF, severe aortic insufficiency 2/2 sinotubular aortic root dilatation, multivessel CAD, PAD, HTN, DM2, who is s/p aortic valve and root replacement and 2V CABG on 10/13. EP was consulted after episode of wide complex tachycardia noted on telemetry.   Telemetry and ECGs reviewed. Episode of wide complex tachycardia lasting 7 seconds at 18:32. Hemodynamically stable and without chest pain, palpitations, pre-syncope during event. 12-lead ECG c/w afib w/ PVC and diffuse TWI. Given 2g IV magnesium and 1g calcium gluconate. Noted to have chills, diarrhea, and poor PO intake during daytime. No subsequent events afterward and remains in AF w/ rates 60-70 this AM.   Of note, EP was consulted on 10/15/23 for consideration for pacemaker with chronic history of 1st degree AVB. Without evidence of high degree AV block, no indication for pacemaker.     Past Medical History: Reviewed  Allergies: Per MAR  Medications: Reviewed  Social History: Reviewed   ROS: A comprehensive 10 point ROS was negative other than as mentioned in HPI.  LABS: Reviewed    Physical Examination:   VITALS: /88 (BP Location: Left arm)   Pulse 67   Temp 99.4  F (37.4  C) (Oral)   Resp 26   Ht 1.981 m (6' 6\")   Wt 91.7 kg (202 lb 1.6 oz)   SpO2 96%   BMI 23.36 kg/m    General: comfortable, no distress    HEENT: NC/AT.  PERRLA.  EOMI.  Anicteric sclerae.   Neck: No jugular venous distension. No rigidity.   Lymph: No cervical adenopathy. No thyromegaly.   Heart: Normal rate, regular rhythm. Normal S1, S2. No murmur, rub, click, or gallop.   Lungs: Clear bilaterally.  No wheezes or crackles.   GI: Soft, nontender, nondistended, no guarding.   Extremities: No edema. 2+ DP and PT pulses bilaterally.   Neuro: awake, alert, CN II-XII grossly intact  Skin: warm, dry, no rash   Musculoskeletal: no joint swelling or " tenderness  Psych: appropriate affect      EKG:       Tele:           ECHO (TTE) 10/20/23:  Interpretation Summary  s/p Coronary Artery Bypass Graft x 2, Aortic root and valve replacement using  On-X Ascending Aortic Prosthesis with Gelweave Valsalva Graft 25mm 10/13/2023  Global and regional left ventricular function is normal with an EF of 60-65%.  Global right ventricular function is normal.  The prosthetic aortic valve is well-seated. Doppler interrogation is normal.  MG is 13 mmHg. No apperant AI noted. Afib is with RVR at times which makes  assessment difficult.  Trivial pericardial effusion is present posterior to the LV.  This study was compared with the study from 8/15/2023 .Rhythm is afib now  (previous study sinus), s/p AVR and root repair for severe AI and severe  aortic root dilation.      Assessment:  Sukh Craven is a 68 year old male with pAF, severe aortic insufficiency 2/2 sinotubular aortic root dilatation, multivessel CAD, PAD, HTN, DM2, who is s/p AVR and 2V CABG on 10/13. EP was consulted after episode of wide complex tachycardia noted on telemetry, consistent with monomorphic, non-sustained ventricular tachycardia. Hemodynamically stable during the event. Has since remained in sinus arrhythmia w/ intermittent PVC's. Rates well controlled in 60-70's on coreg 25 mg BID. No indication to alter current medical regimen from a cardiac standpoint.   Of note, EP was consulted on 10/15/23 for consideration for pacemaker with chronic history of 1st degree AVB. Without evidence of high degree AV block, no indication for pacemaker.     Recommendations:  - Continue Coreg 25 mg BID  - Continue warfarin   - On discharge recommend zio patch monitoring with cardiology followup 3 months     The patient states understanding and is agreeable with plan.   Thank you for allowing us to participate in the care of this patient.     The patient was discussed w/ Dr. Abimbola Varela.  The above note reflects our joint  plan.    Ryan Montano MD  Cardiology Fellow  Pager: [phone]       I have seen, interviewed, and examined patient. I reviewed the management plan with the patient.  I have reviewed the laboratory tests, imaging, and other investigations.  Discussed with the team and agree with the findings and plan in this resident/fellow/nurse practitioner's note. In addition, changes in the physical examination, assessment and plan have been incorporated into the note by myself, as to make it a single cohesive document. Plan was communicated to referring team/physician.      Abimbola Varela MD, MS  Cardiology/Cardiac EP Attending Staff
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
## 2023-10-24 NOTE — PROGRESS NOTES
CLINICAL NUTRITION SERVICES - REASSESSMENT NOTE     Nutrition Prescription    RECOMMENDATIONS FOR MDs/PROVIDERS TO ORDER:  -Rec modify to very high consistent carbohydrate diet vs regular and rec pt self-select tolerate foods/beverages to minimize diet restrictions.   -Rec nutrition support (recs below) if oral intake remains decreased.   - Monitor lytes (Phos, Mg++, and K+) for refeeding syndrome. If lytes trend low, aggressively replace. Ensure the lyte Replacement ADULT order set/s is/are implemented and select the option for high replacement. If suspect refeeding, adjust IVFs to non-dextrose-containing (if infusing). Lytes WNL 10/24. However, +40 ketones per urinalysis on 10/20/2023.   -Order a multivitamin with minerals to help meet micronutrient needs.     Malnutrition Status:    Severe malnutrition in the context of acute illness/injury    Recommendations already ordered by Registered Dietitian (RD):  -Modified oral supplements to send Ensure Clear three times daily  -Thiamine x 7 days    Future/Additional Recommendations:  -Rec self-select tolerated foods/beverages (altered dentition). Encourage small, frequent meals and use of oral supplements. Pt with increased protein needs but may favor lower protein foods such as fruit.  -Consider appetite stimulant if not contraindicated.  -Monitor BG control. Hgb A1c of 6.8 on 9/13/2023. BG was 166 on 10/23.   -Consider checking vitamin D status.   -If TF becomes nutrition plan of care, would rec place feeding tube (FT) and initiate TFs, Osmolite 1.5 (concentrated, maintenance TF formula, or equivalent) and order Prosource TF 20 modulars, 1 pkt twice daily. Initiate TFs at 15 mL/hr, advancing rate by 10 mL Q 8 hrs (or per provider discretion, pending hemodynamic stability) to goal rate of 65 mL/hr. Osmolite 1.5 Justino (or equivalent) @ goal of  65ml/hr  (1560ml/day)  + Prosource TF 20 modulars, 1 pkt BID, provides: 2500 kcals, 137g PRO, 1188 ml free H20, 317 g CHO, and 0  "g fiber daily. TF may be adjusted pending oral intake     If begin tube feeds:    - Flush FT with 30 mL water Q 4 hrs for patency. Rec provider adjust free water flushes as needed, pending fluid status.   - Ensure K+/Mg++/Phos labs are ordered daily until TFs advance to goal infusion to evaluate for sx of refeeding with nutrition received. If lytes trend low, aggressively replace lytes. Do not advance TF greater than 30 mL/hr unless K+ is = or > 3, Mg++ is = or > 1.5, and Phos is = or > 1.9.   - If not already ordered, order a multivitamin/mineral (certavite or liquid multivitamin/minerals 15 mL/day via FT) to help ensure micronutrient needs being met with suspected hypermetabolic demands and potential interruptions to TF infusions.   - Monitor TF and possible need to adjust nutrition support regimen if necessary, pending medical course and nutrition status.       - If Pivot 1.5 TF is started, order a baseline CRP lab and then CRP labs for the subsequent two Mondays.    - Monitor need to check a metabolic cart study.     - Send a nutrition consult for \"Registered Dietitian to Order TF per Medical Nutrition Therapy Guidelines\" if desire RD to order TFs.       EVALUATION OF THE PROGRESS TOWARD GOALS   Diet: Moderate Consistent Carbohydrate and Level 7: Easy to Chew Dysphagia Diet + thin liquids since 10/22. Was on a regular diet 10/14-10/17, then NPO/clear liquids 10/17-10/21 (suspected ileus), and full liquids 10/21. Ordered to receive Ensure Clear (apple) at 10:00, Ensure Enlive at 14:00, and Chocolate Magic Cup at . SLP not consulted this admission.    Intake/Tolerance: Tolerating diet in small amounts. Per nursing flowsheets (% intake), pt consuming 25-50% 10/21 and then 25% 10/22-10/24. States he tolerates fruit and juices well. He is consuming at least one Ensure Clear daily (provides 240 kcals and 8 g protein per supplement). Per pt, altered taste is the biggest factor affecting his nutrition intake. He may " take five bites and then be done eating, even items he typically enjoys eating. Pt estimates he is eating 25% of his usual intake, or less. Per discussion with pt, seems he eats softer foods. Family in room visiting today   Kcal counts:  10/18   # Meals Ordered from Kitchen: no meals ordered from kitchen. # Meals Recorded: no intake recorded. # Supplements Recorded: no intake recorded.   10/22   # Meals Ordered from Kitchen: 1 meal. # Meals Recorded: No food intake recorded. # Supplements Recorded: No food intake recorded - Pr received 1250 kcals and 48 g protein via room service but unclear the amount he actually ate.   10/23   Total Kcals: 82         Total Protein: 2g. # Meals Ordered from Kitchen: 2. # Meals Recorded: 1 (Meal 1: 100% 4oz orange juice; 10% turkey breast with gravy, mashed potatoes with gravy) # Supplements Recorded: No supplement intake recorded   10/24-10/26  Pending  * Not meeting estimated needs noted below. However, not all data recorded.     Nutrition Support: None so far this admission.      NEW FINDINGS   Resp: No O2  HEENT: Missing tooth/teeth. Per pt, he has no teeth. Suspect no dentures.   WOC: Not following at this time  GI: Having one to five stools daily on average. Last Bowel Movement: 10/24/23. Bowel regimen was discontinued. Stools are loose and brown. No nausea.   Weight History: 99.9 kg (10/8/20), 97.5 kg (9/26/2022), 90.7 kg (4/25/2023), 97.7 kg (9/20/2023), 95.1 kg (10/13/2023, admit), 95.3 kg (10/16), 91.7 kg (10/24)- Pt has lost 6% of body wt over the last approximate month. Received lasix this admission but suspect actual wt loss as well.     ASSESSED NUTRITION NEEDS (for inpatient hospital stay)  Dosing Weight: 92 kg (based on lowest wt so far this admission of 91.7 kg on 10/24)  Estimated Energy Needs: 6590-6449 kcals/day (25 - 30 kcals/kg)  Justification: Increased needs  Estimated Protein Needs: 110-138 grams protein/day (1.2 - 1.5 grams of pro/kg)  Justification:  Increased needs pending renal function  Estimated Fluid Needs: 7356-5692 mL/day (25 - 30 mL/kg)   Justification: Maintenance needs or per provider, pending fluid status    MALNUTRITION  % Intake: </= 50% for >/= 5 days (severe)  % Weight Loss: > 5% in 1 month (severe)  Subcutaneous Fat Loss: Facial region:  Mild - Pt mostly covered and in bed 10/24  Muscle Loss: Temporal:  Mild and Thoracic region (clavicle, acromium bone, deltoid, trapezius, pectoral):  Mild - Pt mostly covered and in bed 10/24  Fluid Accumulation/Edema: None noted  Malnutrition Diagnosis: Severe malnutrition in the context of acute illness/injury    Previous Goals   Patient to consume % of nutritionally adequate meal trays TID, or the equivalent with supplements/snacks.  Evaluation: Not met    Previous Nutrition Diagnosis  Inadequate oral intake related to decreased appetite, nausea, and suspect constipation contributing as evidenced by [employer] (meal ordering system) indicates food/beverages sent 10/14-10/16 totaled 820 kcals and 24 g protein daily on average while estimated needs are 3414-9080 kcals/day (25 - 30 kcals/kg) and 114-143 grams protein/day (1.2 - 1.5 grams of pro/kg).  Evaluation: Unresolved. Updated below.     CURRENT NUTRITION DIAGNOSIS  Inadequate oral intake related to decreased appetite and taste changes as evidenced by pt report of consuming 25% of his usual oral intake or less.        INTERVENTIONS  Implementation  Nutrition education for recommended modifications: Gave handout, Coping with Taste Changes. Discussed examples of interventions. Encouraged small, frequent meals and trialing other meal options. Provided handout, Heart Healthy Consistent Carb Diet, and rec refer to this handout once his oral intake has improved.   Multivitamin/mineral supplement therapy: Ordered thiamine x 7 days, refeeding risk.   Medical food supplement therapy: Modified oral supplements to Ensure Clear TID. Encouraged pt to consume TID  oral supplements for added protein/kcals.   Modify composition of meals/snacks: Gave soft menu to help with meal selection.   Collaboration with other providers: Discussed pt with provider (inadequate nutrition intake, refeeding risk, ordered thiamine, rec diet order changes, and TF recs)    Goals  Patient to consume % of nutritionally adequate meal trays TID, or the equivalent with supplements/snacks.    Monitoring/Evaluation  Progress toward goals will be monitored and evaluated per protocol.     Nutrition will continue to follow.      Adele Bojorquez, MS, RD, LD, [facility]   6C (beds 4133-7941 and [id number]) RD pgr: [phone]  Saturday/Sunday/Holiday RD pgr: [phone]

## 2023-10-24 NOTE — PROGRESS NOTES
Cardiovascular Surgery Progress Note           Assessment and Plan:     Sukh Craven is a 68 year old male with PMH of severe aortic insufficiency 2/2 sinotubular aortic root dilitation (sinus of valsalva 5.3 cm), CAD (mid RCA 90% stenosed, dCx 35% stenosed, mid LAD 70% stenosed), PAD (mild-mod left lower extremity), bilateral popliteal aneurysms, HTN, T2DM, and chronic opioid/cannabis use, who is s/p AVR and CABG x2 on 10/13 by Dr. Lockett.     Cardiovascular:   CAD s/p CABG x2  Severe AI 2/2 aortic root aneurysm s/p On-X mechanical Bentall  PMH PAF  1st degree AV block, resolved  Hypertension  Post-op echo complete 10/20:  LVEF 60-65%, AV gradient 13   - EP consulted for 1st deg AV block with bradycardia and PAF and have since signed off; rec deferred amiodarone and cautious titration of BB  - 17 beat of Vtach 10/23, EP re-consulted for any adjustment of meds, recommend no changes   - ASA 81 mg (reduced from post-CABG protocol in setting of AC for afib)   - PTA atorvastatin resumed   - BB: Coreg 25 mg BID   - PTA amlodipine ordered   - PTA hydralazine resumed  - PTA losartan 25 ordered for 10/25   - PRN hydralazine & labetalol available   - AC as below ([id number] VASc 3)   - Will need OP EP follow up at 2 months with 2-wk Zio patch (to be arranged at discharge)    Chest tubes: pleural tubes removed 10/19, meds removed 10/20  TPW:  fractured during removal with segment of retained FB    Pulmonary:  - Extubated POD 1; now saturating well on RA  - Supplemental O2 PRN to keep sats > 92%  - Pulm toilet, IS, OOB/activity and deep breathing  - Right hemidiaphragm elevation present prior to surgery    Neurology /Psych:  Acute post-operative pain  - Multimodal analgesia with acetaminophen, methocarbamol PRN, lidocaine patches  - Avoid opiates in s/o ileus  - Discontinue ketorolac, PRN Ultram    Anxiety  Chronic cannabis use  - Patient endorsing anxiety with sense of impending doom which first starting 5-6 years prior.  Timeline correlates somewhat with diagnosis of intermittent afib and would explain why anxiety symptoms wax and wane.   - Consulted psych 10/24  - Lexapro 10 mg daily  - PRN Seroquel  - Discontinue hydroxyzine  - Health psychology consult 10/24 placed by psych, appreciate recs  - Behavioral health consult 10/24 placed by psych in context of cannabis use, appreciate recs  - Agree with selective serotonin reuptake inhibitor, health psych consult  - May consider gabapentin PRN (100 mg TID) for anxiety/cannabis withdrawal syndrome  - Will continue to follow      / Renal / FE:  EFFIE, resolved  BPH  - Most recent creatinine WNL (BL ~1-1.12)  - Diuresis: not indicated at this time, remains clinically euvolemic  - Replace electrolytes prn per protocol  - Strict I/O, daily standing weights per protocol    GI / Nutrition:   Adynamic ileus, improving  Severe malnutrition in context of acute illness/injury  At risk for re-feeding syndrome  - Liberalized diet to regular diet with persistent poor PO intake, continues to have antegrade bowel function  - Last AXR showing improvement distally following aggressive stimulation from below (ordered gastrograffin enema however not done per Radiology; had results following pink lady enema 10/19). Ordered recheck AXR, pending. Recheck AXR in AM.  - Dietitian following, appreciate recs; resume matthew counts & supplements with diet advancement.  - Daily multivitamin, thiamine X 7 days    Endocrine:  Stress-induced hyperglycemia, resolved  DM2 - Hgb A1c 6.8    - Initially managed on insulin drip postop, then on sliding scale insulin. Now trial of orals as below.   - BG goal <180 for optimal wound healing   - No antidiabetic agents PTA   - Continue metformin 500mg BID, monitor response rather than resume basal insulin   - Pt requesting additional diabetes education since dx is fairly new; will place diabetic educator consult however pt does not need to remain IP to complete education, could be  deferred to the OP setting    Infectious Disease:  Stress-induced leukocytosis, resolved  - WBC elevated to 12.2, afebrile, no signs or symptoms of infection  - Completed perioperative antibiotics  - Ordered UA, CXR, blood cultures - pending    Hematology:   Acute blood loss anemia  Thrombocytopenia, resolved  Hgb stable; Plt WNL, no signs or symptoms of active bleeding    MSK/Skin:  Bilateral LE paresthesias  Ddx includes RLS, neuropathy, and nutritional deficiencies   - Start on multivitamin for now and monitor    S/p sternotomy   - PT/OT/CR consults   - Sternal precautions    Anticoagulation:   Chronic anticoagulation for mechanical AVR, afib  - ASA 81 mg  - Coumadin for mechanical valve/afib, INR goal 2-3. INR 1.88, resumed heparin gtt to bridge per discussion with surgeon.     Prophylaxis:   - Stress ulcer prophylaxis: Pantoprazole 40 mg daily for 30 days post-operatively  - DVT prophylaxis: AC as above, SCD, OOB/activity    Disposition:   - Transferred to  on 10/16  - Therapies recommending discharge to home once medically stable, therapeutic INR, and tolerating diet -  likely in the next 1-2 days    Discussed with Dr. Lockett via verbal communication.    Teresita Iglesias PA-C  Cardiothoracic Surgery  Pager [phone]    8:12 AM October 24, 2023        Interval History:   17 beats of Vtach overnight, no recurrence today.  Pain is adequately controlled. Patient reports most bothersome symptom is anxiety. He reports sense of impending doom that occupies all of his attention. He first experienced symptoms like this 5-6 years ago. These symptoms would come on without apparent trigger and would spontaneously resolve. One time they did not resolve for ~3 days - he sought care and was diagnosed with intermittent atrial fibrillation. He feels these symptoms have been fairly constant this admission. Discussed consulting psych and considering medications with which patient is agreeable.   Pain is adequately controlled.   "C/o feeling cold all the time but state he does not feel ill; BLE paresthesias continue.   Minimal appetite.   No N/V or dyspnea.  Denies chest pain, dizziness/lightheadedness, and sternal popping/clicking/snapping.         Physical Exam:   Blood pressure 134/63, pulse 71, temperature 98.7  F (37.1  C), temperature source Oral, resp. rate 21, height 1.981 m (6' 6\"), weight 91.7 kg (202 lb 1.6 oz), SpO2 97%.  Vitals:    10/22/23 0030 10/22/23 2338 10/24/23 0600   Weight: 94.5 kg (208 lb 6.4 oz) 94.4 kg (208 lb 3.2 oz) 91.7 kg (202 lb 1.6 oz)     Gen: NAD, sitting at edge of bed, calm, pleasant, cooperative on exam  Neuro: A&Ox4, no focal deficits, speech clear, face symmetric, JESSICA  CV: WWP, no LE edema, valve click  Pulm:  unlabored breathing on RA, CTA throughout posterior lung fields  Abd: SNTND, no guarding or peritoneal signs  Ext: no peripheral edema, JESSICA, no gross deformities  Incision: clean, dry, intact, no erythema, sternum stable  Tubes/drain sites: dressing C/D/I         Data:    Imaging:      No results found for this or any previous visit (from the past 24 hour(s)).      Labs:  BMP  Recent Labs   Lab 10/24/23  1229 10/24/23  0748 10/24/23  0641 10/24/23  0451 10/23/23  2156 10/23/23  1905 10/23/23  0818 10/23/23  0449 10/21/23  0747 10/21/23  0658 10/20/23  1200 10/20/23  0429   NA  --   --   --   --   --  136  --  138  --  137  --  137   POTASSIUM  --   --  4.1  --   --  4.0  --  3.7  --  3.9  --  3.8   CHLORIDE  --   --   --   --   --  104  --  104  --  105  --  104   DORIS  --   --   --   --   --  8.6*  --  8.3*  --  8.0*  --  8.2*   CO2  --   --   --   --   --  21*  --  22  --  20*  --  23   BUN  --   --   --   --   --  8.4  --  9.7  --  12.6  --  22.1   CR  --   --   --   --   --  0.87  --  0.99  --  0.90  --  1.29*   * 135*  --  123* 134* 166*   < > 123*   < > 108*   < > 120*    < > = values in this interval not displayed.     CBC  Recent Labs   Lab 10/24/23  0836 10/23/23  0449 10/21/23  0658 " 10/20/23  0429   WBC 12.2* 10.8 9.8 11.2*   RBC 2.95* 2.86* 2.74* 2.84*   HGB 8.8* 8.6* 8.4* 8.6*   HCT 27.4* 27.3* 26.3* 27.3*   MCV 93 96 96 96   MCH 29.8 30.1 30.7 30.3   MCHC 32.1 31.5 31.9 31.5   RDW 14.9 14.6 15.0 14.9    345 261 224     INR  Recent Labs   Lab 10/24/23  0641 10/23/23  0449 10/22/23  0633 10/21/23  0658   INR 1.88* 1.95* 2.25* 2.40*      Hepatic Panel  No lab results found in last 7 days.    GLUCOSE:   Recent Labs   Lab 10/24/23  1229 10/24/23  0748 10/24/23  0451 10/23/23  2156 10/23/23  1905 10/23/23  1632   * 135* 123* 134* 166* 115*

## 2023-10-25 LAB
ANION GAP SERPL CALCULATED.3IONS-SCNC: 10 MMOL/L (ref 7–15)
BUN SERPL-MCNC: 12.5 MG/DL (ref 8–23)
CALCIUM SERPL-MCNC: 8.5 MG/DL (ref 8.8–10.2)
CHLORIDE SERPL-SCNC: 106 MMOL/L (ref 98–107)
CREAT SERPL-MCNC: 1.03 MG/DL (ref 0.67–1.17)
DEPRECATED HCO3 PLAS-SCNC: 22 MMOL/L (ref 22–29)
EGFRCR SERPLBLD CKD-EPI 2021: 80 ML/MIN/1.73M2
ERYTHROCYTE [DISTWIDTH] IN BLOOD BY AUTOMATED COUNT: 14.6 % (ref 10–15)
GLUCOSE BLDC GLUCOMTR-MCNC: 117 MG/DL (ref 70–99)
GLUCOSE BLDC GLUCOMTR-MCNC: 120 MG/DL (ref 70–99)
GLUCOSE BLDC GLUCOMTR-MCNC: 125 MG/DL (ref 70–99)
GLUCOSE SERPL-MCNC: 116 MG/DL (ref 70–99)
HCT VFR BLD AUTO: 25.5 % (ref 40–53)
HGB BLD-MCNC: 8.2 G/DL (ref 13.3–17.7)
INR PPP: 2.46 (ref 0.85–1.15)
MAGNESIUM SERPL-MCNC: 1.9 MG/DL (ref 1.7–2.3)
MCH RBC QN AUTO: 30.6 PG (ref 26.5–33)
MCHC RBC AUTO-ENTMCNC: 32.2 G/DL (ref 31.5–36.5)
MCV RBC AUTO: 95 FL (ref 78–100)
PLATELET # BLD AUTO: 388 10E3/UL (ref 150–450)
POTASSIUM SERPL-SCNC: 4 MMOL/L (ref 3.4–5.3)
RBC # BLD AUTO: 2.68 10E6/UL (ref 4.4–5.9)
SODIUM SERPL-SCNC: 138 MMOL/L (ref 135–145)
UFH PPP CHRO-ACNC: 0.1 IU/ML
UFH PPP CHRO-ACNC: 0.17 IU/ML
WBC # BLD AUTO: 9.9 10E3/UL (ref 4–11)

## 2023-10-25 PROCEDURE — 36592 COLLECT BLOOD FROM PICC: CPT | Performed by: THORACIC SURGERY (CARDIOTHORACIC VASCULAR SURGERY)

## 2023-10-25 PROCEDURE — 250N000013 HC RX MED GY IP 250 OP 250 PS 637: Performed by: PHYSICIAN ASSISTANT

## 2023-10-25 PROCEDURE — 85520 HEPARIN ASSAY: CPT | Performed by: INTERNAL MEDICINE

## 2023-10-25 PROCEDURE — 85027 COMPLETE CBC AUTOMATED: CPT | Performed by: SURGERY

## 2023-10-25 PROCEDURE — 250N000013 HC RX MED GY IP 250 OP 250 PS 637

## 2023-10-25 PROCEDURE — 250N000013 HC RX MED GY IP 250 OP 250 PS 637: Performed by: SURGERY

## 2023-10-25 PROCEDURE — 250N000013 HC RX MED GY IP 250 OP 250 PS 637: Performed by: THORACIC SURGERY (CARDIOTHORACIC VASCULAR SURGERY)

## 2023-10-25 PROCEDURE — 85520 HEPARIN ASSAY: CPT | Performed by: THORACIC SURGERY (CARDIOTHORACIC VASCULAR SURGERY)

## 2023-10-25 PROCEDURE — 83735 ASSAY OF MAGNESIUM: CPT | Performed by: THORACIC SURGERY (CARDIOTHORACIC VASCULAR SURGERY)

## 2023-10-25 PROCEDURE — 250N000011 HC RX IP 250 OP 636: Mod: JZ

## 2023-10-25 PROCEDURE — 250N000011 HC RX IP 250 OP 636: Mod: JZ | Performed by: PHYSICIAN ASSISTANT

## 2023-10-25 PROCEDURE — 250N000011 HC RX IP 250 OP 636: Performed by: NURSE PRACTITIONER

## 2023-10-25 PROCEDURE — 99232 SBSQ HOSP IP/OBS MODERATE 35: CPT | Performed by: INTERNAL MEDICINE

## 2023-10-25 PROCEDURE — 85610 PROTHROMBIN TIME: CPT

## 2023-10-25 PROCEDURE — 80048 BASIC METABOLIC PNL TOTAL CA: CPT | Performed by: SURGERY

## 2023-10-25 PROCEDURE — 120N000003 HC R&B IMCU UMMC

## 2023-10-25 PROCEDURE — 36592 COLLECT BLOOD FROM PICC: CPT | Performed by: SURGERY

## 2023-10-25 RX ORDER — MAGNESIUM OXIDE 400 MG/1
400 TABLET ORAL EVERY 4 HOURS
Status: COMPLETED | OUTPATIENT
Start: 2023-10-25 | End: 2023-10-25

## 2023-10-25 RX ADMIN — HEPARIN SODIUM 1050 UNITS/HR: 10000 INJECTION, SOLUTION INTRAVENOUS at 04:25

## 2023-10-25 RX ADMIN — CARVEDILOL 25 MG: 25 TABLET, FILM COATED ORAL at 18:02

## 2023-10-25 RX ADMIN — METFORMIN HYDROCHLORIDE 500 MG: 500 TABLET ORAL at 18:02

## 2023-10-25 RX ADMIN — MAGNESIUM OXIDE TAB 400 MG (241.3 MG ELEMENTAL MG) 400 MG: 400 (241.3 MG) TAB at 10:15

## 2023-10-25 RX ADMIN — ACETAMINOPHEN 975 MG: 325 TABLET, FILM COATED ORAL at 00:05

## 2023-10-25 RX ADMIN — THIAMINE HCL TAB 100 MG 100 MG: 100 TAB at 08:33

## 2023-10-25 RX ADMIN — ACETAMINOPHEN 975 MG: 325 TABLET, FILM COATED ORAL at 08:32

## 2023-10-25 RX ADMIN — WARFARIN SODIUM 0.5 MG: 1 TABLET ORAL at 18:02

## 2023-10-25 RX ADMIN — AMLODIPINE BESYLATE 10 MG: 10 TABLET ORAL at 08:33

## 2023-10-25 RX ADMIN — MAGNESIUM OXIDE TAB 400 MG (241.3 MG ELEMENTAL MG) 400 MG: 400 (241.3 MG) TAB at 14:12

## 2023-10-25 RX ADMIN — HYDRALAZINE HYDROCHLORIDE 25 MG: 25 TABLET, FILM COATED ORAL at 08:33

## 2023-10-25 RX ADMIN — HYDRALAZINE HYDROCHLORIDE 25 MG: 25 TABLET, FILM COATED ORAL at 14:12

## 2023-10-25 RX ADMIN — ONDANSETRON 4 MG: 2 INJECTION INTRAMUSCULAR; INTRAVENOUS at 17:11

## 2023-10-25 RX ADMIN — METHOCARBAMOL 1000 MG: 500 TABLET ORAL at 20:02

## 2023-10-25 RX ADMIN — ESCITALOPRAM OXALATE 10 MG: 10 TABLET ORAL at 08:33

## 2023-10-25 RX ADMIN — QUETIAPINE FUMARATE 25 MG: 25 TABLET ORAL at 20:02

## 2023-10-25 RX ADMIN — SODIUM CHLORIDE, PRESERVATIVE FREE 5 ML: 5 INJECTION INTRAVENOUS at 17:21

## 2023-10-25 RX ADMIN — LOSARTAN POTASSIUM 25 MG: 25 TABLET, FILM COATED ORAL at 08:33

## 2023-10-25 RX ADMIN — Medication 1 TABLET: at 08:32

## 2023-10-25 RX ADMIN — ASPIRIN 81 MG CHEWABLE TABLET 81 MG: 81 TABLET CHEWABLE at 08:32

## 2023-10-25 RX ADMIN — CARVEDILOL 25 MG: 25 TABLET, FILM COATED ORAL at 08:33

## 2023-10-25 RX ADMIN — ACETAMINOPHEN 975 MG: 325 TABLET, FILM COATED ORAL at 23:48

## 2023-10-25 RX ADMIN — ATORVASTATIN CALCIUM 40 MG: 40 TABLET, FILM COATED ORAL at 20:02

## 2023-10-25 RX ADMIN — METFORMIN HYDROCHLORIDE 500 MG: 500 TABLET ORAL at 08:33

## 2023-10-25 RX ADMIN — PANTOPRAZOLE SODIUM 40 MG: 40 TABLET, DELAYED RELEASE ORAL at 08:33

## 2023-10-25 RX ADMIN — ACETAMINOPHEN 975 MG: 325 TABLET, FILM COATED ORAL at 18:09

## 2023-10-25 RX ADMIN — HYDRALAZINE HYDROCHLORIDE 25 MG: 25 TABLET, FILM COATED ORAL at 20:01

## 2023-10-25 ASSESSMENT — ACTIVITIES OF DAILY LIVING (ADL)
ADLS_ACUITY_SCORE: 27
ADLS_ACUITY_SCORE: 25
ADLS_ACUITY_SCORE: 25
ADLS_ACUITY_SCORE: 27
ADLS_ACUITY_SCORE: 25
ADLS_ACUITY_SCORE: 25
ADLS_ACUITY_SCORE: 27
ADLS_ACUITY_SCORE: 27
ADLS_ACUITY_SCORE: 25
ADLS_ACUITY_SCORE: 25

## 2023-10-25 NOTE — PLAN OF CARE
Problem: Adult Inpatient Plan of Care  Goal: Absence of Hospital-Acquired Illness or Injury  Outcome: Progressing  Intervention: Identify and Manage Fall Risk  Recent Flowsheet Documentation  Taken 10/24/2023 2000 by Rowan Beaulieu RN  Safety Promotion/Fall Prevention:   activity supervised   assistive device/personal items within reach   clutter free environment maintained   nonskid shoes/slippers when out of bed   patient and family education   safety round/check completed  Intervention: Prevent Skin Injury  Recent Flowsheet Documentation  Taken 10/25/2023 0424 by Rowan Beaulieu RN  Body Position: position changed independently  Taken 10/24/2023 2000 by Rowan Beaulieu RN  Body Position: position changed independently  Intervention: Prevent and Manage VTE (Venous Thromboembolism) Risk  Recent Flowsheet Documentation  Taken 10/24/2023 2000 by Rowan Beaulieu RN  VTE Prevention/Management: SCDs (sequential compression devices) off  Intervention: Prevent Infection  Recent Flowsheet Documentation  Taken 10/24/2023 2000 by Rowan Beaulieu RN  Infection Prevention:   environmental surveillance performed   equipment surfaces disinfected   hand hygiene promoted   personal protective equipment utilized   rest/sleep promoted   single patient room provided   visitors restricted/screened  Goal: Optimal Comfort and Wellbeing  Outcome: Progressing  Intervention: Provide Person-Centered Care  Recent Flowsheet Documentation  Taken 10/24/2023 2000 by Rowan Beaulieu RN  Trust Relationship/Rapport:   care explained   choices provided   reassurance provided   thoughts/feelings acknowledged   empathic listening provided   emotional support provided     Problem: Risk for Delirium  Goal: Optimal Coping  Outcome: Progressing   Goal Outcome Evaluation:

## 2023-10-25 NOTE — PROGRESS NOTES
Calorie Count  Intake recorded for: 10/24  Total Kcals: 240 Total Protein: 8g  Kcals from Hospital Food: 240  Protein: 8g  Kcals from Outside Food (average):0 Protein: 0g  # Meals Ordered from Kitchen: 1 meal   # Meals Recorded: no food intake recorded.   # Supplements Recorded: 100% 1 Ensure Clear

## 2023-10-25 NOTE — DISCHARGE INSTRUCTIONS
AFTER YOU GO HOME FROM YOUR HEART SURGERY  (CABG X2 and Aortic root and valve replacement using On-X (mechanical) Ascending Aortic Prosthesis on 10/13/23 by Dr. Lockett)    You had a sternotomy, avoid lifting anything greater than ten pounds for 6 weeks after surgery and then less than 20 pounds for an additional 6 weeks.   Do not reach backwards or use arms to push out of chair.   Do not let people pull on your arms to assist with standing.   Avoid twisting or reaching too far across your body.    Avoid strenuous activities such as bowling, vacuuming, raking, shoveling, golf or tennis for 12 weeks after your surgery.   It is okay to resume sex if you feel comfortable in doing so. You may have to try different positions with your partner.    Splint your chest incision by hugging a pillow or bringing your arms across your chest when coughing or sneezing.     No driving for 4 weeks after surgery or while on pain medication.    Shower or wash your incisions daily with soap and water (or as instructed), pat dry.   Keep wound clean and dry, showers are okay after discharge, but don't let spray hit directly on incision.   No baths or swimming for 1 month.   Cover chest tube sites with dry gauze until they stop draining, then leave open to air. It is not abnormal for chest tube sites to drain yellowish/clear fluid for up to 2-3 weeks after surgery.   Watch for signs of infection: increased redness, tenderness, warmth or any drainage that appears infected (pus like) or is persistent.  Also a temperature > 100.5 F or chills. Call your surgeon or primary care provider's office immediately.   Remove any skin glue left on incisions after 10-14 days. This will not affect your incision and can speed up healing.    Exercise is very important in your recovery. Please follow the guidelines set up for you in your cardiac rehab classes at the hospital. If outpatient cardiac rehab was ordered for you, we highly recommend you  "participate. If you have problems arranging your cardiac rehab, please call [phone] for all locations, with the exception of [place], please call [phone] and [place], please call [phone].    Avoid sitting for prolonged periods of time, try to walk every hour during the day. If you have a leg incision, elevate your leg often when you are not walking.    Check your weight when you get home from the hospital and continue to check it daily through your recovery for at least a month. If you notice a weight gain of 2-3 pounds in a week, notify your primary care physician, cardiologist or surgeon.    Bowel activity may be slow after surgery. If necessary, you may take an over the counter laxative such as Milk of Magnesia or Miralax. You may have stool softeners prescribed (docusate sodium, Senokot). We recommend using stool softeners while using narcotics for pain (oxycodone/percocet, hydrocodone/vicodin, hydromorphone/dilaudid).      Wean OFF of narcotics (oxycodone, dilaudid, hydrocodone) as soon as possible. You should continue taking acetaminophen as long as you have any surgical pain as the first choice for pain control and add narcotics as necessary for pain to be tolerable.      DENTAL VISITS AFTER SURGERY  You have had your heart valve repaired or replace; we do not recommend having any dental work done for 6 months and you will need to take an antibiotic prior to dental visits from now on.  Please notify your dentist before any procedure for the proper treatment needed. The antibiotic is taken by mouth one hour prior to visit. This includes routine cleanings.  You can sometimes hear a mechanical valve \"clicking,\" this is normal and not a sign of something wrong.    DO NOT SMOKE.  IF YOU NEED HELP QUITTING, PLEASE TALK WITH YOUR CARDIOLOGIST OR PRIMARY DOCTOR.    You are on a blood thinner, follow the instructions you were given in the hospital and DO NOT SKIP this medication. Try and take it the " same time everyday. Your primary care physician or coumadin clinic will manage the dosing. INR goal is 2-3.    REGARDING PRESCRIPTION REFILLS.  If you need a refill on your pain medication contact us to discuss your pain and a possible one time refill.   All other medications will be adjusted, discontinued and re-filled by your primary care physician and/or your cardiologist as they were prior to your surgery. We have given you enough for one to three month with possibly one refill.    POST-OPERATIVE CLINIC VISITS  You will have a follow up visit with [facility] in ~2 weeks from discharge.   You should see your primary care provider in 2-4 weeks after discharge.   It is important to see your cardiologist about 4-6 weeks after discharge.    You have an INR check scheduled for ***  If you do not hear from a  in 7 days, please call [phone] (choose option 1) and request to be seen with a general cardiologist or someone that you have seen in the past.   If there is a need to return to see CT Surgery please call our  at [phone].    SURGICAL QUESTIONS  Please call Dyllan Padilla, Mayela Velasquez, Margaux Ledbetter, Lorene Marquis or Melani Araya with surgical recovery and medication questions, their phone numbers are listed below.  They will assist you with your needs and contact other surgery care team members as indicated.    On weekends or after hours, please call [phone] and ask the  to page the Cardiothoracic Surgery fellow on call.      Thank you,    Your Cardiothoracic Surgery Team   Dyllan Padilla RN Care Coordinator - [phone]  Mayela Velasquez RN Care Coordinator - [phone]   Melani Araya, RN Care Coordinator - [phone]   Lorene Marquis RN Care Coordinator - [phone]  Margaux Ledbetter, EARL Care Coordinator -  [phone]  __________________________________________________________________________________________________________________________________________________________________    Oct 30th Monday Hospital Follow Up 2:40 PM  Taryn Dong  [facility] [address] [place] [address]  [phone]      PLC for warfarin education and diabetic education were completed on 10/23.     I have called above clinic EARL Mcgarry on 10/27/23  and RE-scheduled pt with Dr Bryn Dong on Monday, 10/30/23 @ 2:40 arrival with 3pm appointment.  Mr. Craven, it is VERY important that you attend this appointment.

## 2023-10-25 NOTE — PROGRESS NOTES
Care Management Follow Up    Length of Stay (days): 12    Expected Discharge Date: 10/26/2023     Concerns to be Addressed: discharge planning     Patient plan of care discussed at interdisciplinary rounds: Yes    Anticipated Discharge Disposition: Home     Anticipated Discharge Services: Outpatient Cardiac Rehab  Anticipated Discharge DME: None anticipated    Patient/family educated on Medicare website which has current facility and service quality ratings: N/A  Education Provided on the Discharge Plan: Yes   Patient/Family in Agreement with the Plan: Yes    Referrals Placed by CM/SW: Outpatient Anticoagulation Clinic referral previously placed for [facility] Anticoagulation    Additional Information:  Patient scheduled for close post hospital follow up and INR lab draw as below:     OCT    27 Hospital Follow Up 1:45 PM  Parrish Stephenson  [facility] [address] [place] [address] [phone]     PLC for warfarin education and diabetic education were completed on 10/23.    CC will continue to monitor patient's medical condition and progress towards discharge.  Sugey Dyer RN BSN  6C Unit Care Coordinator  Phone number: [phone]  Pager: [phone]

## 2023-10-25 NOTE — PROGRESS NOTES
[facility]     Addiction Progress Note - Addiction Service        Date of Admission:  10/13/2023  Assessment & Plan       The patient has a PMHx that includes pAF, aortic insufficiency s/p sinotubular aortic root dilation, PAD, DMII, chronic cannabis use, who is admitted for management after aortic valve and root replacement with 2vCABG on 10/13. Over past two days, patient has been experiencing episodes of restlessness and agitation likely c/w anxiety. He reports a history of the same since about 6 months ago. He does have a history of chronic cannabis use.    # Chronic cannabis use  # Suspected generalized anxiety disorder  # pAF  Likely he does have a cannabis use disorder (or did in the past), but he doesn't feel that cannabis contributes to his feelings of anxiety or worry, nor that smoking makes his perseveration/worry worse. We did discuss that cannabis is associated with anxiety disorders, but he feels that his medical problems are more consistent with his developing anxiety than the other way around. It also does seem his episodic symptoms could be c/w pAF. In either case, he is having symptoms of worry which are bothersome to him and likely could benefit from selective serotonin reuptake inhibitor. I also think health psych would be helpful as he is very insightful and appears to do better when he has someone to talk to. Of note, anxiety related to cannabis withdrawal syndrome has been treated successfully with Gabapentin in the past.  - Agree with selective serotonin reuptake inhibitor  - Agree with health psychology consult  - Would consider use of PRN Gabapentin (100 mg TID) for anxiety symptoms that do not leonarda on their own - could trial this if Seroquel is ineffective  - No episodes of anxiety or panic over past 24 hours nor need to use PRNs. He is appreciative of  team's input and initiation of selective serotonin reuptake inhibitor for PIERRE. Offered  "follow-up in an addiction medicine clinic for management of cannabis use but he declined    Time Spent on this Encounter   I spent 35 minutes on the unit/floor managing the care of Sukh Craven. Over 50% of my time was spent on the following:   - Counseling the patient and/or family regarding: risks and benefits of treatment options and recommended follow-up    Raman Frederick MD on 10/25/2023 at 12:09 PM   Addiction Service   [facility]     Contact information available via [facility] Paging/Directory under \"addiction medicine\"   ______________________________________________________________________    Interval History   Doing well today. Slept well. Feeling better. No episodes of anxiety or worry that came up during our evaluation today. He did not require any PRNs over the past 24 hours either. He had a good understanding of the current medical plan and the medications started for treatment for anxiety.     ROS:  CV, Pulm, GI and  assessed. Pertinent positives as above, otherwise negative.     Data reviewed today: I reviewed all medications, new labs and imaging results over the last 24 hours.     Physical Exam   Vital Signs: Temp: 99.5  F (37.5  C) Temp src: Oral BP: 94/63 Pulse: 108   Resp: 16 SpO2: 96 % O2 Device: None (Room air)    Weight: 185 lbs 6.51 oz  General Appearance: Comfortable, NAD  Respiratory: Breathing comfortably on room air.  Cardiovascular: RRR, no m/r/g.  GI: soft, non-distended, non-tender  Skin: pallor present, no skin rash  Other: Pitting edema to the proximal thighs bilaterally     Due to regulation of Title 42 of the Code of Federal Regulations (CFR) Part 2: Confidentiality laws apply to this note and the information wherein.  Thus, this note cannot be copy and pasted into any other health care staff's note nor can it be included in general medical records sent to ANY outside agency without the patient's written consent.    "

## 2023-10-25 NOTE — PLAN OF CARE
Vital signs:  Temp: 98.5  F (36.9  C) Temp src: Oral BP: 129/82 Pulse: 86   Resp: 18 SpO2: 100 % O2 Device: None (Room air)     Time of care: 1831-1729    D: s/p AVR and CABG x2 on 10/13 by Dr. Lockett. Post-operative course has been complicated by post-operative ileus and malnutrition    I/A: A0x4, able to make needs known. SR, rates of 70-90s. On RA, denies SOB. BP elevated in AM but decreased and in normal range after meds. Reports mild incisional pain that did not require intervention.   INR therapeutic, dc'd Heparin gtt. DL-PICC hep locked.   Continues to have poor appetite, drinking ensure supplements. Justino counts continue through 10/28    P: Continue to monitor Pt status and report changes to [facility].

## 2023-10-25 NOTE — PROGRESS NOTES
Cardiovascular Surgery Progress Note           Assessment and Plan:     Sukh Craven is a 68 year old male with PMH of severe aortic insufficiency 2/2 sinotubular aortic root dilitation (sinus of valsalva 5.3 cm), CAD (mid RCA 90% stenosed, dCx 35% stenosed, mid LAD 70% stenosed), PAD (mild-mod left lower extremity), bilateral popliteal aneurysms, HTN, T2DM, and chronic opioid/cannabis use, who is s/p AVR and CABG x2 on 10/13 by Dr. Lockett. Post-operative course has been complicated by post-operative ileus and malnutrition.    Cardiovascular:   CAD s/p CABG x2  Severe AI 2/2 aortic root aneurysm s/p On-X mechanical Bentall  PMH PAF  1st degree AV block, resolved  Hypertension  Post-op echo complete 10/20:  LVEF 60-65%, AV gradient 13   - EP consulted for 1st deg AV block with bradycardia and PAF and have since signed off; rec deferred amiodarone and cautious titration of BB  - 17 beat of Vtach 10/23, EP re-consulted for any adjustment of meds, recommend no changes  - ASA 81 mg (reduced from post-CABG protocol in setting of AC for afib)  - PTA atorvastatin resumed  - BB: Coreg 25 mg BID  - PTA amlodipine  - PTA hydralazine  - PTA losartan 25 mg started 10/25  - PRN hydralazine & labetalol available  - AC as below ([id number] VASc 3)  - Will need OP EP follow up at 2 months with 2-wk Zio patch (to be arranged at discharge)    Chest tubes: pleural tubes removed 10/19, meds removed 10/20  TPW:  fractured during removal with segment of retained FB    Pulmonary:  - Extubated POD 1; now saturating well on RA  - Supplemental O2 PRN to keep sats > 92%  - Pulm toilet, IS, OOB/activity and deep breathing  - Right hemidiaphragm elevation present prior to surgery    Neurology /Psych:  Acute post-operative pain  - Multimodal analgesia with acetaminophen, methocarbamol PRN, lidocaine patches  - Avoid opiates in s/o ileus  - Discontinue ketorolac, PRN Ultram    Anxiety  Chronic cannabis use  - Patient endorsing anxiety with  sense of impending doom which first starting 5-6 years prior. Timeline correlates somewhat with diagnosis of intermittent afib and would explain why anxiety symptoms wax and wane.   - Consulted psych 10/24  - Lexapro 10 mg daily  - PRN Seroquel  - Discontinue hydroxyzine  - Health psychology consult 10/24 placed by psych, appreciate recs  - Behavioral health consult 10/24 placed by psych in context of cannabis use, appreciate recs  - Agree with selective serotonin reuptake inhibitor, health psych consult  - May consider gabapentin PRN (100 mg TID) for anxiety/cannabis withdrawal syndrome     / Renal / FE:  EFFIE, resolved  BPH  - Most recent creatinine WNL (BL ~1-1.12)  - Pre-op weight 209 lbs, most recent weight 201 lbs  - Diuresis: not indicated at this time, remains clinically euvolemic  - Replace electrolytes prn per protocol  - Strict I/O, daily standing weights per protocol    GI / Nutrition:   Adynamic ileus, resolved  Severe malnutrition in context of acute illness/injury  At risk for re-feeding syndrome  - Regular diet with persistent poor PO intake. Per discussion with surgeon on 10/25, calorie counts X 3 days and if no improved PO intake, consider enteral access.   - Dietitian following, appreciate recs  - continues to have antegrade bowel function, denies N/V and abdominal pain  - 10/25 AXR with decreased in bowel loops in upper right quadrant, clinically benign abdomen on exam today.  Recheck AXR ordered for 10/27 AM.  - Daily multivitamin, thiamine X 7 days    Endocrine:  Stress-induced hyperglycemia, resolved  DM2 - Hgb A1c 6.8    - Initially managed on insulin drip postop, then on sliding scale insulin. Now trial of orals as below.   - BG goal <180 for optimal wound healing   - No antidiabetic agents PTA   - Continue metformin 500mg BID, monitor response rather than resume basal insulin   - s/p diabetes education 10/23    Infectious Disease:  Stress-induced leukocytosis, resolved  - WBC WNL afebrile,  no signs or symptoms of infection  - Completed perioperative antibiotics    Hematology:   Acute blood loss anemia  Thrombocytopenia, resolved  Hgb stable; Plt WNL, no signs or symptoms of active bleeding    MSK/Skin:  Bilateral LE paresthesias  Ddx includes RLS, neuropathy, and nutritional deficiencies   - Start on multivitamin for now and monitor    S/p sternotomy   - PT/OT/CR consults   - Sternal precautions    Anticoagulation:   Chronic anticoagulation for mechanical AVR, afib  - ASA 81 mg  - Coumadin for mechanical valve/afib, INR goal 2-3. INR 2.46, discontinued heparin gtt 10/25.  - Completed warfarin education 10/23.    Prophylaxis:   - Stress ulcer prophylaxis: Pantoprazole 40 mg daily for 30 days post-operatively  - DVT prophylaxis: AC as above, SCD, OOB/activity    Disposition:   - Transferred to  on 10/16  - Therapies recommending discharge to home once medically ready. Currently not meeting nutritional needs, may need to consider enteral access per discussion with surgeon.  - Has INR check & PCP appointment scheduled for 10/27    Discussed with Dr. Lockett via written communication.    Teresita Iglesias PA-C  Cardiothoracic Surgery  Pager [phone]    2:21 PM October 25, 2023        Interval History:   In sinus rhythm during exam today.  Patient reports anxiety has now resolved. He is grateful for discussion with psych yesterday. We discussed the utility of selective serotonin reuptake inhibitor as preventative and he is agreeable to continuing this as well as PRN Seroquel.  Pain is minimal, well-controlled. Denies N/V/D or abdomina pain.   He has persistently minimal appetite - feels he would eat better at home but then admits he does not eat much. Agrees to make a good effort (goal 100% of breakfast and dinner) today.   Breathing well on room air. Denies chest pain, dizziness/lightheadedness, and sternal popping/clicking/snapping.         Physical Exam:   Blood pressure 108/73, pulse 81, temperature 99.1  " F (37.3  C), temperature source Oral, resp. rate 27, height 1.981 m (6' 6\"), weight 91.2 kg (201 lb 1.6 oz), SpO2 98%.  Vitals:    10/24/23 0600 10/25/23 0005 10/25/23 0247   Weight: 91.7 kg (202 lb 1.6 oz) 91.2 kg (201 lb 1.6 oz) 91.2 kg (201 lb 1.6 oz)     Gen: NAD, sitting in bed, calm, cooperative with exam  Neuro: A&Ox4, no focal deficits, speech clear, face symmetric, JESSICA  CV: regular rate and rhythm, no LE edema, valve click  Pulm:  unlabored breathing on RA, CTA throughout posterior lung fields  Abd: SNTND, no guarding or peritoneal signs  Ext: no peripheral edema, JESSICA, no gross deformities  Incision: clean, dry, intact, no erythema, sternum stable  Tubes/drain sites: dressing C/D/I         Data:    Imaging:      Recent Results (from the past 24 hour(s))   XR Chest Port 1 View    Narrative    EXAM: XR CHEST PORT 1 VIEW 10/24/2023 5:30 PM      HISTORY: infectious work up.    COMPARISON: Chest radiograph 10/21/2023.     TECHNIQUE: Frontal view of the chest.    FINDINGS: Median sternotomy wires appear intact. Right upper extremity  PICC tip terminates over the high SVC. Prosthetic aortic valve.  Cardiac silhouette appears stable. Costophrenic angles appear clear.  Minimal streaky opacities of the lung bases.      Impression    IMPRESSION: Continued bibasilar atelectasis. Pneumonia especially in  the left lower lobe isn't excluded.    I have personally reviewed the examination and initial interpretation  and I agree with the findings.    KATHARINA CASTELLON MD         SYSTEM ID:  [id number]   XR Abdomen Port 1 View    Narrative    EXAMINATION:  XR ABDOMEN PORT 1 VIEW 10/24/2023 5:30 PM     COMPARISON: Abdominal radiograph 10/20/2023    HISTORY: monitoring ileus    TECHNIQUE: Frontal view of the abdomen.    FINDINGS: Slight decrease in prominent bowel loops in the right upper  quadrant, now measuring up to 5.7 cm (measuring 6.1 cm on 10/20/2023.  No abnormal soft tissue densities.  No free air, pneumatosis or " portal  venous gas. Partially visualized postoperative changes of the chest.  No focal consolidative opacity. No significant pleural effusion.      Impression    IMPRESSION: Slightly improved gaseous distention of bowel loops  without obstructive pattern.    I have personally reviewed the examination and initial interpretation  and I agree with the findings.    KATHARINA CASTELLON MD         SYSTEM ID:  [id number]         Labs:  BMP  Recent Labs   Lab 10/25/23  0752 10/25/23  0610 10/24/23  2025 10/24/23  1750 10/24/23  0748 10/24/23  0641 10/23/23  2156 10/23/23  1905 10/23/23  0818 10/23/23  0449 10/21/23  0747 10/21/23  0658   NA  --  138  --   --   --   --   --  136  --  138  --  137   POTASSIUM  --  4.0  --   --   --  4.1  --  4.0  --  3.7  --  3.9   CHLORIDE  --  106  --   --   --   --   --  104  --  104  --  105   DORIS  --  8.5*  --   --   --   --   --  8.6*  --  8.3*  --  8.0*   CO2  --  22  --   --   --   --   --  21*  --  22  --  20*   BUN  --  12.5  --   --   --   --   --  8.4  --  9.7  --  12.6   CR  --  1.03  --   --   --   --   --  0.87  --  0.99  --  0.90   * 116* 127* 117*   < >  --    < > 166*   < > 123*   < > 108*    < > = values in this interval not displayed.     CBC  Recent Labs   Lab 10/25/23  0610 10/24/23  0836 10/23/23  0449 10/21/23  0658   WBC 9.9 12.2* 10.8 9.8   RBC 2.68* 2.95* 2.86* 2.74*   HGB 8.2* 8.8* 8.6* 8.4*   HCT 25.5* 27.4* 27.3* 26.3*   MCV 95 93 96 96   MCH 30.6 29.8 30.1 30.7   MCHC 32.2 32.1 31.5 31.9   RDW 14.6 14.9 14.6 15.0    423 345 261     INR  Recent Labs   Lab 10/25/23  0610 10/24/23  0641 10/23/23  0449 10/22/23  0633   INR 2.46* 1.88* 1.95* 2.25*      Hepatic Panel  No lab results found in last 7 days.    GLUCOSE:   Recent Labs   Lab 10/25/23  0752 10/25/23  0610 10/24/23  2025 10/24/23  1750 10/24/23  1229 10/24/23  0748   * 116* 127* 117* 144* 135*

## 2023-10-26 ENCOUNTER — APPOINTMENT (OUTPATIENT)
Dept: OCCUPATIONAL THERAPY | Facility: CLINIC | Age: 68
DRG: 220 | End: 2023-10-26
Attending: THORACIC SURGERY (CARDIOTHORACIC VASCULAR SURGERY)
Payer: COMMERCIAL

## 2023-10-26 LAB
GLUCOSE BLDC GLUCOMTR-MCNC: 129 MG/DL (ref 70–99)
GLUCOSE BLDC GLUCOMTR-MCNC: 131 MG/DL (ref 70–99)
GLUCOSE BLDC GLUCOMTR-MCNC: 169 MG/DL (ref 70–99)
GLUCOSE BLDC GLUCOMTR-MCNC: 83 MG/DL (ref 70–99)
INR PPP: 2.47 (ref 0.85–1.15)
MAGNESIUM SERPL-MCNC: 1.9 MG/DL (ref 1.7–2.3)
PHOSPHATE SERPL-MCNC: 3.6 MG/DL (ref 2.5–4.5)
POTASSIUM SERPL-SCNC: 4 MMOL/L (ref 3.4–5.3)

## 2023-10-26 PROCEDURE — 97110 THERAPEUTIC EXERCISES: CPT | Mod: GO

## 2023-10-26 PROCEDURE — 90832 PSYTX W PT 30 MINUTES: CPT | Performed by: PSYCHOLOGIST

## 2023-10-26 PROCEDURE — 84132 ASSAY OF SERUM POTASSIUM: CPT | Performed by: THORACIC SURGERY (CARDIOTHORACIC VASCULAR SURGERY)

## 2023-10-26 PROCEDURE — 250N000013 HC RX MED GY IP 250 OP 250 PS 637: Performed by: NURSE PRACTITIONER

## 2023-10-26 PROCEDURE — 250N000011 HC RX IP 250 OP 636: Mod: JZ | Performed by: NURSE PRACTITIONER

## 2023-10-26 PROCEDURE — 250N000013 HC RX MED GY IP 250 OP 250 PS 637

## 2023-10-26 PROCEDURE — 36592 COLLECT BLOOD FROM PICC: CPT | Performed by: THORACIC SURGERY (CARDIOTHORACIC VASCULAR SURGERY)

## 2023-10-26 PROCEDURE — 84100 ASSAY OF PHOSPHORUS: CPT | Performed by: THORACIC SURGERY (CARDIOTHORACIC VASCULAR SURGERY)

## 2023-10-26 PROCEDURE — 120N000003 HC R&B IMCU UMMC

## 2023-10-26 PROCEDURE — 250N000013 HC RX MED GY IP 250 OP 250 PS 637: Performed by: SURGERY

## 2023-10-26 PROCEDURE — 83735 ASSAY OF MAGNESIUM: CPT | Performed by: THORACIC SURGERY (CARDIOTHORACIC VASCULAR SURGERY)

## 2023-10-26 PROCEDURE — 250N000013 HC RX MED GY IP 250 OP 250 PS 637: Performed by: PHYSICIAN ASSISTANT

## 2023-10-26 PROCEDURE — 85610 PROTHROMBIN TIME: CPT

## 2023-10-26 PROCEDURE — 250N000013 HC RX MED GY IP 250 OP 250 PS 637: Performed by: THORACIC SURGERY (CARDIOTHORACIC VASCULAR SURGERY)

## 2023-10-26 RX ORDER — DRONABINOL 2.5 MG/1
5 CAPSULE ORAL 2 TIMES DAILY
Status: DISCONTINUED | OUTPATIENT
Start: 2023-10-26 | End: 2023-10-27 | Stop reason: HOSPADM

## 2023-10-26 RX ORDER — WARFARIN SODIUM 1 MG/1
1 TABLET ORAL
Status: COMPLETED | OUTPATIENT
Start: 2023-10-26 | End: 2023-10-26

## 2023-10-26 RX ADMIN — CARVEDILOL 25 MG: 25 TABLET, FILM COATED ORAL at 08:30

## 2023-10-26 RX ADMIN — METFORMIN HYDROCHLORIDE 500 MG: 500 TABLET ORAL at 18:04

## 2023-10-26 RX ADMIN — SODIUM CHLORIDE, PRESERVATIVE FREE 10 ML: 5 INJECTION INTRAVENOUS at 14:43

## 2023-10-26 RX ADMIN — HYDRALAZINE HYDROCHLORIDE 25 MG: 25 TABLET, FILM COATED ORAL at 08:30

## 2023-10-26 RX ADMIN — METFORMIN HYDROCHLORIDE 500 MG: 500 TABLET ORAL at 08:31

## 2023-10-26 RX ADMIN — Medication 1 TABLET: at 08:30

## 2023-10-26 RX ADMIN — PANTOPRAZOLE SODIUM 40 MG: 40 TABLET, DELAYED RELEASE ORAL at 08:31

## 2023-10-26 RX ADMIN — THIAMINE HCL TAB 100 MG 100 MG: 100 TAB at 08:31

## 2023-10-26 RX ADMIN — ASPIRIN 81 MG CHEWABLE TABLET 81 MG: 81 TABLET CHEWABLE at 08:30

## 2023-10-26 RX ADMIN — AMLODIPINE BESYLATE 10 MG: 10 TABLET ORAL at 08:31

## 2023-10-26 RX ADMIN — ACETAMINOPHEN 650 MG: 325 TABLET, FILM COATED ORAL at 20:14

## 2023-10-26 RX ADMIN — ACETAMINOPHEN 975 MG: 325 TABLET, FILM COATED ORAL at 15:57

## 2023-10-26 RX ADMIN — ESCITALOPRAM OXALATE 10 MG: 10 TABLET ORAL at 08:30

## 2023-10-26 RX ADMIN — DRONABINOL 5 MG: 2.5 CAPSULE ORAL at 10:41

## 2023-10-26 RX ADMIN — ACETAMINOPHEN 975 MG: 325 TABLET, FILM COATED ORAL at 08:30

## 2023-10-26 RX ADMIN — ATORVASTATIN CALCIUM 40 MG: 40 TABLET, FILM COATED ORAL at 20:12

## 2023-10-26 RX ADMIN — QUETIAPINE FUMARATE 25 MG: 25 TABLET ORAL at 23:35

## 2023-10-26 RX ADMIN — LOSARTAN POTASSIUM 25 MG: 25 TABLET, FILM COATED ORAL at 08:30

## 2023-10-26 RX ADMIN — CARVEDILOL 25 MG: 25 TABLET, FILM COATED ORAL at 18:04

## 2023-10-26 RX ADMIN — DRONABINOL 5 MG: 2.5 CAPSULE ORAL at 20:12

## 2023-10-26 RX ADMIN — WARFARIN SODIUM 1 MG: 1 TABLET ORAL at 18:04

## 2023-10-26 RX ADMIN — TRAMADOL HYDROCHLORIDE 25 MG: 50 TABLET, COATED ORAL at 23:34

## 2023-10-26 RX ADMIN — HYDRALAZINE HYDROCHLORIDE 25 MG: 25 TABLET, FILM COATED ORAL at 20:12

## 2023-10-26 ASSESSMENT — ACTIVITIES OF DAILY LIVING (ADL)
ADLS_ACUITY_SCORE: 27
ADLS_ACUITY_SCORE: 25
ADLS_ACUITY_SCORE: 25
ADLS_ACUITY_SCORE: 27
ADLS_ACUITY_SCORE: 25
ADLS_ACUITY_SCORE: 25
ADLS_ACUITY_SCORE: 27
ADLS_ACUITY_SCORE: 25
ADLS_ACUITY_SCORE: 27

## 2023-10-26 NOTE — PROGRESS NOTES
"BP 95/66 (BP Location: Left arm, Cuff Size: Adult Regular)   Pulse 79   Temp 98.8  F (37.1  C) (Oral)   Resp 18   Ht 1.981 m (6' 6\")   Wt 90.9 kg (200 lb 8 oz)   SpO2 96%   BMI 23.17 kg/m      Hours of Care: 2284-4824.    Neuro:  A+Ox4, pleasant  Vitals:  Stable, soft BP at noon   Respiratory:  RA, denies SOB  Cardiac:  SR, no c/o chest pain    GI/:  continent, uses BR  Skin/Wounds:  Intact  Lines:  PICC  Diet:  Regular  Activity:  Independent  Pain:  denies  Labs:  K & Mg & PO4 protocols    Plan: Calorie count x3 days      Continue to monitor and follow POC    Amilcar Arrieta RN on 10/26/2023 at 2:27 PM    6C-Intermediate Care       "

## 2023-10-26 NOTE — PROGRESS NOTES
Calorie Count  Intake recorded for: 10/25  Total Kcals: 161 Total Protein: 12g  Kcals from Hospital Food: 161  Protein: 12g  Kcals from Outside Food (average):0 Protein: 0g  # Meals Ordered from Kitchen: 2 meals   # Meals Recorded: 1 meal - 100% scrambled eggs, 50% pineapple  # Supplements Recorded: 0

## 2023-10-26 NOTE — PLAN OF CARE
Problem: Oral Intake Inadequate  Goal: Improved Oral Intake  Outcome: Not Progressing     Problem: Risk for Delirium  Goal: Optimal Coping  Outcome: Progressing  Goal: Improved Behavioral Control  Outcome: Progressing  Intervention: Minimize Safety Risk  Recent Flowsheet Documentation  Taken 10/25/2023 1957 by Rowan Beaulieu RN  Trust Relationship/Rapport:   care explained   choices provided   reassurance provided   thoughts/feelings acknowledged   empathic listening provided   emotional support provided  Goal: Improved Attention and Thought Clarity  Outcome: Progressing  Goal: Improved Sleep  Outcome: Progressing     Problem: Anxiety  Goal: Anxiety Reduction or Resolution  Outcome: Progressing     Problem: Adult Inpatient Plan of Care  Goal: Absence of Hospital-Acquired Illness or Injury  Intervention: Identify and Manage Fall Risk  Recent Flowsheet Documentation  Taken 10/25/2023 1957 by Rowan Beaulieu RN  Safety Promotion/Fall Prevention:   assistive device/personal items within reach   clutter free environment maintained   nonskid shoes/slippers when out of bed   patient and family education   safety round/check completed  Intervention: Prevent Skin Injury  Recent Flowsheet Documentation  Taken 10/26/2023 0420 by Rowan Beaulieu RN  Body Position: position changed independently  Taken 10/25/2023 1957 by Rowan Beaulieu RN  Body Position: position changed independently  Taken 10/25/2023 1937 by Rowan Beaulieu RN  Body Position: position changed independently  Intervention: Prevent Infection  Recent Flowsheet Documentation  Taken 10/25/2023 1957 by Rowan Beaulieu RN  Infection Prevention:   environmental surveillance performed   equipment surfaces disinfected   hand hygiene promoted   personal protective equipment utilized   rest/sleep promoted   single patient room provided   visitors restricted/screened  Goal: Optimal Comfort and Wellbeing  Intervention: Provide  Person-Centered Care  Recent Flowsheet Documentation  Taken 10/25/2023 1957 by Rowan Beaulieu, RN  Trust Relationship/Rapport:   care explained   choices provided   reassurance provided   thoughts/feelings acknowledged   empathic listening provided   emotional support provided   Goal Outcome Evaluation:

## 2023-10-26 NOTE — PROGRESS NOTES
Cardiovascular Surgery Progress Note           Assessment and Plan:     Sukh Craven is a 68 year old male with PMH of severe aortic insufficiency 2/2 sinotubular aortic root dilitation (sinus of valsalva 5.3 cm), CAD (mid RCA 90% stenosed, dCx 35% stenosed, mid LAD 70% stenosed), PAD (mild-mod left lower extremity), bilateral popliteal aneurysms, HTN, T2DM, and chronic opioid/cannabis use, who is s/p AVR and CABG x2 on 10/13 by Dr. Lockett. Post-operative course has been complicated by post-operative ileus and malnutrition.    Cardiovascular:   CAD s/p CABG x2  Severe AI 2/2 aortic root aneurysm s/p On-X mechanical Bentall  PMH PAF  1st degree AV block, resolved  Hypertension  HD stable overnight in NSR  Post-op echo complete 10/20:  LVEF 60-65%, AV gradient 13   - EP consulted for 1st deg AV block with bradycardia and PAF and have since signed off; rec deferred amiodarone and cautious titration of BB. 17 beat run of Vtach 10/23, EP re-consulted for any adjustment of meds, recommend no changes  - ASA 81 mg (reduced from post-CABG protocol in setting of AC for afib)  - PTA atorvastatin resumed  - BB: Coreg 25 mg BID  - PTA amlodipine  - PTA hydralazine  - PTA losartan 25 mg started 10/25  - PRN hydralazine & labetalol available  - AC as below ([id number] VASc 3)  - Will need OP EP follow up at 2 months with 2-wk Zio patch (to be arranged at discharge)    Chest tubes: pleural tubes removed 10/19, meds removed 10/20  TPW:  fractured during removal with segment of retained FB    Pulmonary:  Elevated right hemidiaphragm  - Extubated POD 1; now saturating well on RA  - Supplemental O2 PRN to keep sats > 92%  - Pulm toilet, IS, OOB/activity and deep breathing  - Right hemidiaphragm elevation present prior to surgery    Neurology /Psych:  Acute post-operative pain  - Multimodal analgesia with acetaminophen, methocarbamol PRN, lidocaine patches  - Avoid opiates in s/o ileus  - Discontinue ketorolac, PRN  Ultram    Anxiety  Chronic cannabis use  - Patient endorsing anxiety with sense of impending doom which first starting 5-6 years prior. Timeline correlates somewhat with diagnosis of intermittent afib and would explain why anxiety symptoms wax and wane.   - Consulted psych 10/24  - Lexapro 10 mg daily  - PRN Seroquel  - Discontinue hydroxyzine  - Health psychology consult 10/24 placed by psych, appreciate recs  - Behavioral health consult 10/24 placed by psych in context of cannabis use, appreciate recs  - Agree with selective serotonin reuptake inhibitor, health psych consult  - May consider gabapentin PRN (100 mg TID) for anxiety/cannabis withdrawal syndrome     / Renal / FE:  EFFIE, resolved  BPH  - Most recent creatinine WNL (BL ~1-1.12)  - Pre-op weight 209 lbs, most recent weight 200 lbs  - Diuresis: not indicated at this time, remains clinically euvolemic  - Replace electrolytes prn per protocol  - Strict I/O, daily standing weights per protocol    GI / Nutrition:   Adynamic ileus, resolved  Severe malnutrition in context of acute illness/injury  At risk for re-feeding syndrome  - Regular diet with persistent poor PO intake. Per discussion with surgeon on 10/25, calorie counts X 3 days and if no improved PO intake, consider enteral access.   - Marinol 5 mg po BID added 10/26  - Dietitian following, appreciate recs  - continues to have antegrade bowel function, denies N/V and abdominal pain  - 10/25 AXR with decreased in bowel loops in upper right quadrant, clinically benign abdomen on exam.  - Daily multivitamin, thiamine X 7 days    Endocrine:  Stress-induced hyperglycemia, resolved  DM2 - Hgb A1c 6.8%   - Initially managed on insulin drip postop, then on sliding scale insulin. Now trial of orals as below.   - BG goal <180 for optimal wound healing   - No antidiabetic agents PTA   - Continue metformin 500mg BID, monitor response rather than resume basal insulin   - s/p diabetes education 10/23    Infectious  "Disease:  Stress-induced leukocytosis, resolved  - WBC WNL afebrile, no signs or symptoms of infection  - Completed perioperative antibiotics    Hematology:   Acute blood loss anemia  Thrombocytopenia, resolved  Hgb stable; Plt WNL, no signs or symptoms of active bleeding    MSK/Skin:  Bilateral LE paresthesias  Ddx includes RLS, neuropathy, and nutritional deficiencies   - Start on multivitamin for now and monitor    S/p sternotomy   - PT/OT/CR consults   - Sternal precautions    Anticoagulation:   Chronic anticoagulation for mechanical AVR, afib  - ASA 81 mg  - Coumadin for mechanical valve/afib, INR goal 2-3. INR 2.46, discontinued heparin gtt 10/25.  - Completed warfarin education 10/23.    Prophylaxis:   - Stress ulcer prophylaxis: Pantoprazole 40 mg daily for 30 days post-operatively  - DVT prophylaxis: AC as above, SCD, OOB/activity    Disposition:   - Transferred to  on 10/16  - Therapies recommending discharge to home once medically ready. Currently not meeting nutritional needs, may need to consider enteral access per discussion with surgeon.    Discussed with Dr. Lockett via written communication.    HEMAL Valdovinos, ACNPC-AG, CCRN  Nurse Practitioner  Cardiothoracic Surgery  Pager: [phone]          Interval History:   No acute events overnight. States pain is well managed on current regimen, slept well. Breathing is stable on room air, working with IS. Tolerating diet but very poor appetite, trying to eat more and consuming ensure; is passing flatus, +BM, no n/v. Denies chest pain, palpitations, dizziness, syncopal symptoms, chills, myalgias, sternal popping/clicking.         Physical Exam:   Blood pressure 126/87, pulse 89, temperature 99.1  F (37.3  C), temperature source Oral, resp. rate 23, height 1.981 m (6' 6\"), weight 90.9 kg (200 lb 8 oz), SpO2 99%.  Vitals:    10/25/23 0005 10/25/23 0247 10/26/23 0420   Weight: 91.2 kg (201 lb 1.6 oz) 91.2 kg (201 lb 1.6 oz) 90.9 kg (200 lb 8 oz) "     Gen: NAD, sitting in bed, calm, cooperative with exam  Neuro: Intact with no focal deficits  CV: regular rate and rhythm, no LE edema, valve click  Pulm:  unlabored breathing on RA, CTA throughout posterior lung fields  Abd: SNTND, no guarding or peritoneal signs  Ext: no peripheral edema, JESSICA, no gross deformities  Incision: clean, dry, intact, no erythema, sternum stable  Tubes/drain sites: dressing C/D/I         Data:    Imaging:      No results found for this or any previous visit (from the past 24 hour(s)).        Labs:  BMP  Recent Labs   Lab 10/26/23  0828 10/25/23  1952 10/25/23  1720 10/25/23  0752 10/25/23  0610 10/24/23  0748 10/24/23  0641 10/23/23  2156 10/23/23  1905 10/23/23  0818 10/23/23  0449 10/21/23  0747 10/21/23  0658   NA  --   --   --   --  138  --   --   --  136  --  138  --  137   POTASSIUM  --   --   --   --  4.0  --  4.1  --  4.0  --  3.7  --  3.9   CHLORIDE  --   --   --   --  106  --   --   --  104  --  104  --  105   DORIS  --   --   --   --  8.5*  --   --   --  8.6*  --  8.3*  --  8.0*   CO2  --   --   --   --  22  --   --   --  21*  --  22  --  20*   BUN  --   --   --   --  12.5  --   --   --  8.4  --  9.7  --  12.6   CR  --   --   --   --  1.03  --   --   --  0.87  --  0.99  --  0.90   * 125* 117* 120* 116*   < >  --    < > 166*   < > 123*   < > 108*    < > = values in this interval not displayed.     CBC  Recent Labs   Lab 10/25/23  0610 10/24/23  0836 10/23/23  0449 10/21/23  0658   WBC 9.9 12.2* 10.8 9.8   RBC 2.68* 2.95* 2.86* 2.74*   HGB 8.2* 8.8* 8.6* 8.4*   HCT 25.5* 27.4* 27.3* 26.3*   MCV 95 93 96 96   MCH 30.6 29.8 30.1 30.7   MCHC 32.2 32.1 31.5 31.9   RDW 14.6 14.9 14.6 15.0    423 345 261     INR  Recent Labs   Lab 10/26/23  0559 10/25/23  0610 10/24/23  0641 10/23/23  0449   INR 2.47* 2.46* 1.88* 1.95*      Hepatic Panel  No lab results found in last 7 days.    GLUCOSE:   Recent Labs   Lab 10/26/23  0828 10/25/23  1952 10/25/23  1720 10/25/23  0752  10/25/23  0610 10/24/23  2025   * 125* 117* 120* 116* 127*

## 2023-10-26 NOTE — PROGRESS NOTES
D-S/p AVR and CABG X2 on 10/13/23. Concerns for malnutrition and anxiety. See flow sheets for vs and assessments. Spouse and niece at bedside.  I-Calorie counts and marinol initiated. Pt encouraged to eat. Health psychology consulted.   A-Telemetry shows SR. Incisions C/D/I.  P-Continue with current poc. Notify CVTS provider of any concerns/changes.

## 2023-10-26 NOTE — CONSULTS
Health Psychology          Bette Baca, Ph.D.,  [phone]  Sailaja Bernal, Ph.D.,  [phone]  Viv Michelle, Ph.D.,  [phone]  Christina Vizcarra, Ph.D., , [facility] [phone]  Todd Ramachandran, Ph.D., , [facility] [phone]  Yajaira Sterling, Ph.D.,  [phone]  Tori Schaefer, Ph.D., , [facility] [phone]    [facility], 3rd Floor  [address] [place] [address]       Inpatient Health Psychology Consultation     Date of Service: 10/26/23   Time in: 3:05 PM  Time out: 3:29 PM    BACKGROUND: Per EMR: Sukh Craven is a 68 year old male with PMH of severe aortic insufficiency 2/2 sinotubular aortic root dilitation (sinus of valsalva 5.3 cm), CAD (mid RCA 90% stenosed, dCx 35% stenosed, mid LAD 70% stenosed), PAD (mild-mod left lower extremity), bilateral popliteal aneurysms, HTN, T2DM, and chronic opioid/cannabis use, who is s/p AVR and CABG x2 on 10/13 by Dr. Lockett. Post-operative course has been complicated by post-operative ileus and malnutrition.     Health psychology consulted for assistance with anxiety and worry management. Completed chart review. Patient has also been seen by Addiction Medicine and it was recommended that patient initiate selective serotonin reuptake inhibitor, see health psychology, and consider PRN gabapentin for possible cannabis withdrawal-induced anxiety. See Dr. Frederick's note dated 10/25/23 for additional details.     SUBJECTIVE: Attempted consult at 2:15pm, but patient was in the shower. Returned at a later time.    3:05pm - Entered patient's room and introduced myself, the health psychology service, and reason for consult. Educated patient about the difference between psychology and psychiatry and how health psychology can be helpful for teaching skills for anxiety and stress management. Provided psychoeducation about the mind-body connection and the relationship between anxiety, stress, and health. Patient  noted that he does have some symptoms of anxiety including sleep disturbance, irritability, and restlessness, but notes that he has felt this way during acute cardiac episodes. He also notes that he does have some difficulty with racing thoughts at times but is unsure if he struggles with anxiety. Patient was agreeable to completing a beginner meditation today as an introduction to mindfulness as a stress and anxiety management skill. Completed meditation and discussed experience afterward. Patient noted that his mind wandered less and he had fewer racing thoughts during the meditation. Offered to send patient some meditation recordings and he agreed that this would be helpful. Encouraged patient to consider a few sessions of outpatient psychotherapy if interested upon his discharge from his current admission. Will close out consult and sign off, but patient was invited to request re-consultation if desired. He expressed appreciation for the visit today and stated that it was helpful.     OBJECTIVE:   Mental Status Exam:   Appearance: Appropriate   Eye Contact: Good    Orientation: Yes, x4  Behavior/relationship to provider/demeanor: Cooperative, Engaged, and Pleasant  Motor Activity: normal or unremarkable  Mood (subjective report): Stable  Affect (objective appearance): Appropriate  Speech Rate: Normal  Speech Volume: Normal  Speech Articulation: Normal  Speech Coherence: Normal  Speech Spontaneity: Normal  Thought Content: no suicidal/homicidal ideation, plans, or intent, endorses perseverative cognitions  Thought Process (associations): Logical, Linear, and Goal directed  Thought Process (rate): Normal  Abnormal Perception: None  Attention/Concentration: Normal  Memory: Appears grossly intact  Fund of Knowledge: Appears within normal limits  Abstraction:  Normal  Insight:  Adequate  Judgment:   Adequate    ASSESSMENT: Patient with PMH significant for cardiovascular disease and multiple cardiac concerns who  benefited from an introduction to mindfulness today and may benefit from additional support regarding stress and anxiety management in the future.      DIAGNOSES:     Unspecified anxiety    PLAN AND RECOMMENDATIONS:    Will send patient introductory meditations via [employer]. Recommend encouraging patient to use these when struggling with perseverative cognition.   Plan for health psychology to sign off at this time.   Please feel free to call or re-consult if urgent concerns arise or additional assistance is needed.     Tori Schaefer, Ph.D., , [facility]  Clinical Health Psychologist  Phone: [phone]   Pager: [phone]  10/26/2023 3:29 PM

## 2023-10-27 ENCOUNTER — TELEPHONE (OUTPATIENT)
Dept: FAMILY MEDICINE | Facility: CLINIC | Age: 68
End: 2023-10-27

## 2023-10-27 ENCOUNTER — DOCUMENTATION ONLY (OUTPATIENT)
Dept: FAMILY MEDICINE | Facility: CLINIC | Age: 68
End: 2023-10-27

## 2023-10-27 ENCOUNTER — APPOINTMENT (OUTPATIENT)
Dept: GENERAL RADIOLOGY | Facility: CLINIC | Age: 68
DRG: 220 | End: 2023-10-27
Payer: COMMERCIAL

## 2023-10-27 ENCOUNTER — APPOINTMENT (OUTPATIENT)
Dept: OCCUPATIONAL THERAPY | Facility: CLINIC | Age: 68
DRG: 220 | End: 2023-10-27
Attending: THORACIC SURGERY (CARDIOTHORACIC VASCULAR SURGERY)
Payer: COMMERCIAL

## 2023-10-27 ENCOUNTER — DOCUMENTATION ONLY (OUTPATIENT)
Dept: CARDIOLOGY | Facility: CLINIC | Age: 68
End: 2023-10-27

## 2023-10-27 ENCOUNTER — TELEPHONE (OUTPATIENT)
Dept: ANTICOAGULATION | Facility: CLINIC | Age: 68
End: 2023-10-27

## 2023-10-27 VITALS
HEART RATE: 84 BPM | WEIGHT: 201.5 LBS | DIASTOLIC BLOOD PRESSURE: 81 MMHG | HEIGHT: 78 IN | TEMPERATURE: 98.9 F | OXYGEN SATURATION: 96 % | RESPIRATION RATE: 18 BRPM | SYSTOLIC BLOOD PRESSURE: 114 MMHG | BODY MASS INDEX: 23.31 KG/M2

## 2023-10-27 DIAGNOSIS — Z79.01 CURRENT USE OF LONG TERM ANTICOAGULATION: ICD-10-CM

## 2023-10-27 DIAGNOSIS — Z95.2 S/P AVR (AORTIC VALVE REPLACEMENT): Primary | ICD-10-CM

## 2023-10-27 LAB
ANION GAP SERPL CALCULATED.3IONS-SCNC: 10 MMOL/L (ref 7–15)
BUN SERPL-MCNC: 12.4 MG/DL (ref 8–23)
CALCIUM SERPL-MCNC: 8.3 MG/DL (ref 8.8–10.2)
CHLORIDE SERPL-SCNC: 105 MMOL/L (ref 98–107)
CREAT SERPL-MCNC: 1.13 MG/DL (ref 0.67–1.17)
DEPRECATED HCO3 PLAS-SCNC: 23 MMOL/L (ref 22–29)
EGFRCR SERPLBLD CKD-EPI 2021: 72 ML/MIN/1.73M2
ERYTHROCYTE [DISTWIDTH] IN BLOOD BY AUTOMATED COUNT: 14.6 % (ref 10–15)
GLUCOSE BLDC GLUCOMTR-MCNC: 107 MG/DL (ref 70–99)
GLUCOSE BLDC GLUCOMTR-MCNC: 96 MG/DL (ref 70–99)
GLUCOSE SERPL-MCNC: 95 MG/DL (ref 70–99)
HCT VFR BLD AUTO: 25.5 % (ref 40–53)
HGB BLD-MCNC: 8 G/DL (ref 13.3–17.7)
INR PPP: 2.22 (ref 0.85–1.15)
MAGNESIUM SERPL-MCNC: 1.8 MG/DL (ref 1.7–2.3)
MCH RBC QN AUTO: 30.1 PG (ref 26.5–33)
MCHC RBC AUTO-ENTMCNC: 31.4 G/DL (ref 31.5–36.5)
MCV RBC AUTO: 96 FL (ref 78–100)
PHOSPHATE SERPL-MCNC: 3.3 MG/DL (ref 2.5–4.5)
PLATELET # BLD AUTO: 425 10E3/UL (ref 150–450)
POTASSIUM SERPL-SCNC: 3.9 MMOL/L (ref 3.4–5.3)
RBC # BLD AUTO: 2.66 10E6/UL (ref 4.4–5.9)
SODIUM SERPL-SCNC: 138 MMOL/L (ref 135–145)
WBC # BLD AUTO: 7.4 10E3/UL (ref 4–11)

## 2023-10-27 PROCEDURE — 83735 ASSAY OF MAGNESIUM: CPT | Performed by: THORACIC SURGERY (CARDIOTHORACIC VASCULAR SURGERY)

## 2023-10-27 PROCEDURE — 250N000011 HC RX IP 250 OP 636: Mod: JZ | Performed by: THORACIC SURGERY (CARDIOTHORACIC VASCULAR SURGERY)

## 2023-10-27 PROCEDURE — 36592 COLLECT BLOOD FROM PICC: CPT | Performed by: SURGERY

## 2023-10-27 PROCEDURE — 85027 COMPLETE CBC AUTOMATED: CPT | Performed by: SURGERY

## 2023-10-27 PROCEDURE — 250N000013 HC RX MED GY IP 250 OP 250 PS 637: Performed by: NURSE PRACTITIONER

## 2023-10-27 PROCEDURE — 74018 RADEX ABDOMEN 1 VIEW: CPT | Mod: 26 | Performed by: STUDENT IN AN ORGANIZED HEALTH CARE EDUCATION/TRAINING PROGRAM

## 2023-10-27 PROCEDURE — 250N000013 HC RX MED GY IP 250 OP 250 PS 637

## 2023-10-27 PROCEDURE — 250N000013 HC RX MED GY IP 250 OP 250 PS 637: Performed by: PHYSICIAN ASSISTANT

## 2023-10-27 PROCEDURE — 85610 PROTHROMBIN TIME: CPT

## 2023-10-27 PROCEDURE — 80048 BASIC METABOLIC PNL TOTAL CA: CPT | Performed by: SURGERY

## 2023-10-27 PROCEDURE — 84100 ASSAY OF PHOSPHORUS: CPT | Performed by: THORACIC SURGERY (CARDIOTHORACIC VASCULAR SURGERY)

## 2023-10-27 PROCEDURE — 97110 THERAPEUTIC EXERCISES: CPT | Mod: GO

## 2023-10-27 PROCEDURE — 74018 RADEX ABDOMEN 1 VIEW: CPT

## 2023-10-27 PROCEDURE — 250N000013 HC RX MED GY IP 250 OP 250 PS 637: Performed by: SURGERY

## 2023-10-27 RX ORDER — MAGNESIUM SULFATE HEPTAHYDRATE 40 MG/ML
2 INJECTION, SOLUTION INTRAVENOUS ONCE
Status: COMPLETED | OUTPATIENT
Start: 2023-10-27 | End: 2023-10-27

## 2023-10-27 RX ORDER — WARFARIN SODIUM 1 MG/1
1 TABLET ORAL
Status: DISCONTINUED | OUTPATIENT
Start: 2023-10-27 | End: 2023-10-27 | Stop reason: HOSPADM

## 2023-10-27 RX ORDER — LOSARTAN POTASSIUM 50 MG/1
50 TABLET ORAL DAILY
Status: DISCONTINUED | OUTPATIENT
Start: 2023-10-28 | End: 2023-10-27 | Stop reason: HOSPADM

## 2023-10-27 RX ORDER — TRAMADOL HYDROCHLORIDE 50 MG/1
25 TABLET ORAL EVERY 6 HOURS PRN
Qty: 15 TABLET | Refills: 0 | Status: SHIPPED | OUTPATIENT
Start: 2023-10-27 | End: 2023-10-30

## 2023-10-27 RX ORDER — LANOLIN ALCOHOL/MO/W.PET/CERES
100 CREAM (GRAM) TOPICAL DAILY
Qty: 90 TABLET | Refills: 3 | Status: SHIPPED | OUTPATIENT
Start: 2023-10-28 | End: 2023-11-10

## 2023-10-27 RX ORDER — WARFARIN SODIUM 1 MG/1
TABLET ORAL
Qty: 90 TABLET | Refills: 3 | Status: ON HOLD | OUTPATIENT
Start: 2023-10-27 | End: 2023-11-13

## 2023-10-27 RX ORDER — CARVEDILOL 25 MG/1
25 TABLET ORAL 2 TIMES DAILY WITH MEALS
Qty: 60 TABLET | Refills: 3 | Status: SHIPPED | OUTPATIENT
Start: 2023-10-27

## 2023-10-27 RX ORDER — ESCITALOPRAM OXALATE 10 MG/1
10 TABLET ORAL DAILY
Qty: 90 TABLET | Refills: 3 | Status: SHIPPED | OUTPATIENT
Start: 2023-10-28

## 2023-10-27 RX ORDER — ACETAMINOPHEN 500 MG
650 TABLET ORAL EVERY 6 HOURS PRN
Status: ON HOLD | COMMUNITY
Start: 2023-10-27 | End: 2023-11-13

## 2023-10-27 RX ORDER — POLYETHYLENE GLYCOL 3350 17 G/17G
17 POWDER, FOR SOLUTION ORAL DAILY PRN
Qty: 510 G | Refills: 0 | Status: SHIPPED | OUTPATIENT
Start: 2023-10-27 | End: 2023-11-10

## 2023-10-27 RX ORDER — DRONABINOL 5 MG/1
5 CAPSULE ORAL
Qty: 60 CAPSULE | Refills: 0 | Status: SHIPPED | OUTPATIENT
Start: 2023-10-27 | End: 2023-10-27

## 2023-10-27 RX ORDER — MULTIPLE VITAMINS W/ MINERALS TAB 9MG-400MCG
1 TAB ORAL DAILY
Qty: 90 TABLET | Refills: 0 | Status: SHIPPED | OUTPATIENT
Start: 2023-10-28

## 2023-10-27 RX ORDER — METHOCARBAMOL 750 MG/1
750 TABLET, FILM COATED ORAL EVERY 6 HOURS PRN
Qty: 60 TABLET | Refills: 0 | Status: ON HOLD | OUTPATIENT
Start: 2023-10-27 | End: 2023-11-13

## 2023-10-27 RX ORDER — AMOXICILLIN 250 MG
2 CAPSULE ORAL 2 TIMES DAILY PRN
Qty: 60 TABLET | Refills: 0 | Status: SHIPPED | OUTPATIENT
Start: 2023-10-27 | End: 2023-11-10

## 2023-10-27 RX ADMIN — ASPIRIN 81 MG CHEWABLE TABLET 81 MG: 81 TABLET CHEWABLE at 08:38

## 2023-10-27 RX ADMIN — ESCITALOPRAM OXALATE 10 MG: 10 TABLET ORAL at 08:45

## 2023-10-27 RX ADMIN — ACETAMINOPHEN 650 MG: 325 TABLET, FILM COATED ORAL at 05:58

## 2023-10-27 RX ADMIN — PANTOPRAZOLE SODIUM 40 MG: 40 TABLET, DELAYED RELEASE ORAL at 08:45

## 2023-10-27 RX ADMIN — MAGNESIUM SULFATE HEPTAHYDRATE 2 G: 40 INJECTION, SOLUTION INTRAVENOUS at 11:20

## 2023-10-27 RX ADMIN — DRONABINOL 5 MG: 2.5 CAPSULE ORAL at 09:10

## 2023-10-27 RX ADMIN — THIAMINE HCL TAB 100 MG 100 MG: 100 TAB at 08:38

## 2023-10-27 RX ADMIN — AMLODIPINE BESYLATE 10 MG: 10 TABLET ORAL at 08:45

## 2023-10-27 RX ADMIN — HYDRALAZINE HYDROCHLORIDE 25 MG: 25 TABLET, FILM COATED ORAL at 08:45

## 2023-10-27 RX ADMIN — LOSARTAN POTASSIUM 25 MG: 25 TABLET, FILM COATED ORAL at 08:39

## 2023-10-27 RX ADMIN — CARVEDILOL 25 MG: 25 TABLET, FILM COATED ORAL at 08:39

## 2023-10-27 RX ADMIN — Medication 1 TABLET: at 08:38

## 2023-10-27 RX ADMIN — METFORMIN HYDROCHLORIDE 500 MG: 500 TABLET ORAL at 08:46

## 2023-10-27 ASSESSMENT — ACTIVITIES OF DAILY LIVING (ADL)
ADLS_ACUITY_SCORE: 26
ADLS_ACUITY_SCORE: 27
ADLS_ACUITY_SCORE: 27
ADLS_ACUITY_SCORE: 26
ADLS_ACUITY_SCORE: 27

## 2023-10-27 NOTE — TELEPHONE ENCOUNTER
Patient is still inpatient and had an appointment with Dr Stephenson set for today for hospital followup.  Rescheduled to Monday on Shontel's schedule.  Patient will need an INR and a referral to the Coumadin Clinic.  Ally Dotson RN  [facility]

## 2023-10-27 NOTE — PLAN OF CARE
Occupational Therapy Discharge Summary    Reason for therapy discharge:    Discharged to home.    Progress towards therapy goal(s). See goals on Care Plan in [facility] electronic health record for goal details.  Goals partially met.  Barriers to achieving goals:   discharge from facility.    Therapy recommendation(s):    Continued therapy is recommended.  Rationale/Recommendations:  Pt to benefit from OP CR to promote IND with activity tolerance and endurance.

## 2023-10-27 NOTE — PROGRESS NOTES
Calorie Count  Intake recorded for: 10/26  Total Kcals: 1346 Total Protein: 48g  Kcals from Hospital Food: 1346   Protein: 48g  Kcals from Outside Food (average):0 Protein: 0g  # Meals Ordered from Kitchen: 2  # Meals Recorded: 2   (First: 100% 1 pork sausage josi, 2 hardboiled eggs, 8oz OJ, 8oz Apple juice)  (Second: 100% 2 beef broth, 95% 1 grilled cheese sandwich w/ wheat bun)  # Supplements Recorded: 2 (100% 2 Ensure Clear)

## 2023-10-27 NOTE — PROGRESS NOTES
PRIOR AUTHORIZATION DENIED    Medication: DRONABINOL 5 MG PO CAPS  PA Initiated: 10/27/2023  PA Type: B vs D    Insurance Company: [employer] ([facility]) - Phone [phone] Fax [phone]  [facility] Key / Reference #: [id number] / [id number]   Denial Date: 10/27/2023  Appeal Information: Denied for off-label use. Call [employer] at [phone] or fax a letter of medical necessity to [phone] to appeal.                  Guadalupe Limon  [facility] Liaison  Ph: [phone]  Fax: [phone]  Available on Vocera (learn more here)

## 2023-10-27 NOTE — PROGRESS NOTES
Care Coordinator  D/I: pt is on New Warfarin  A: possible discharge to home today w OP CR  P: Pt HAD BEEN scheduled for:   OCT    27 Hospital Follow Up 1:45 PM  [facility] [address]  [address]  [place] [address]  [phone]      PLC for warfarin education and diabetic education were completed on 10/23.    I have called above clinic [facility]  and RE-scheduled pt with Dr Bryn Dong on Monday, 10/30/23 @ 2:40 arrival with 3pm appointment.  I will inform pt : I met with him and verified his cell phone# and it works. He has My Chart-also will be in his My Chart. He has a ride and can call when bedside RN lets him know he can discharge. I have informed him that OP CR will call him to schedule as well as EP for follow up.

## 2023-10-27 NOTE — TELEPHONE ENCOUNTER
Dr. Stephenson,    Your patient, Sukh Craven, is newly referred to [facility] at hospital discharge by the inpatient team. Anticoagulation clinic needing a new referring provider that will follow patient in ambulatory setting.     Indication(s): On-X mechanical AVR and Bentall Procedure    Goal Range:  2.0-3.0  Duration: Lifetime     Anticoagulation clinic requires a referral from one of Sukh's [facility] ambulatory provider (PCP or specialist) in order to provide anticoagulation management. The referring provider should anticipate seeing the patient on an ongoing basis, will have INRs and warfarin Rx ordered under their name per protocol, and will be point of contact for ACC questions on patient's anticoagulation care (procedural holds, critical results, etc).     Please review and sign the pended referral order for Sukh Craven if you are willing to oversee anticoagulation care.         Thank you,  Farzana Camacho, RN  Anticoagulation clinic

## 2023-10-27 NOTE — TELEPHONE ENCOUNTER
"ANTICOAGULATION  MANAGEMENT: NEW REFERRAL      SUBJECTIVE/OBJECTIVE     Sukh Craven, a 68 year old male  is newly referred to [facility].    Anticoagulation:    Previously on warfarin: No, new to anticoagulation  Warfarin initiation date (approximate): 10/15/23   Indication(s): Mechanical AVR   Goal Range: 2.0-3.0   Anticoagulation Bridge/Overlap: No   Referring provider: from inpatient provider; ambulatory responsible provider from primary or specialty care needed.  Request sent to Dr. Stephenson to oversee management.    General Dietary/Social Hx:    Typical vitamin K intake: low; variable     Other dietary considerations: Plans to start drinking a protein/nutritional supplement drink for the next 1-2 weeks until appetite/diet normalize - writer instructed patient to purchase one without vitamin k if possible.    Social History:   Typical alcohol intake: occasional  Smokes Cigarettes - none  CBD/THC use: Smokes marijuana - daily     In the past 2 weeks, patient estimates taking medications as instructed % of time: 90%    Results:        Recent labs: (last 7 days)     10/27/23  0446   INR 2.22*       Wt Readings from Last 2 Encounters:   10/27/23 91.4 kg (201 lb 8 oz)   09/27/23 97.5 kg (215 lb)      Estimated body mass index is 23.29 kg/m  as calculated from the following:    Height as of 10/13/23: 1.981 m (6' 6\").    Weight as of an earlier encounter on 10/27/23: 91.4 kg (201 lb 8 oz).  Lab Results   Component Value Date    AST 35 10/17/2023    ALT 18 10/17/2023    ALBUMIN 3.3 (L) 10/17/2023     Lab Results   Component Value Date    CR 1.13 10/27/2023     Estimated Creatinine Clearance: 83.1 mL/min (based on SCr of 1.13 mg/dL).    ASSESSMENT     Goal INR 2-3, standard for indication(s) above  Establishing initial warfarin maintenance dose (on warfarin < 30 days)  Starting warfarin dose adjustment recommended 2 mg MWF + 1 mg AOD per DAVID Lira    PLAN     Dosing Instructions: " Continue your current warfarin dose as directed by IP with INR in 3 days         Education provided:   Please call back if any changes to your diet, medications or how you've been taking warfarin  Goal range and lab monitoring: goal range and significance of current result, Importance of therapeutic range, Importance of following up at instructed interval, and frequency of lab work when starting warfarin and importance of following up when instructed (extends after stability established)  Dietary considerations: importance of consistent vitamin K intake and impact of vitamin K foods on INR  Symptom monitoring: monitoring for bleeding signs and symptoms and monitoring for clotting signs and symptoms    **Patient requested education be completed on Monday 10/30/23 since he just arrived home from long hospital stay - completely understandable request.    Education still needed:   Taking warfarin: purpose of warfarin and how it works, take warfarin at same time each day; preferably in the evening, prescribed tablet strength and color, importance of following ACC instructions vs instructions on the prescription bottle, Importance of taking warfarin as instructed, and warfarin tablet strength change; remove and/or discard previous strength from medication supply  Dietary considerations: importance of consistent vitamin K intake, impact of vitamin K foods on INR, vitamin K content of foods, Impact of protein intake on INR , and importance of notifying ACC to changes in diet  Healthy lifestyle considerations: potential interaction between warfarin and alcohol, limit alcohol intake to no more than 1 to 2 drinks in 24 hours if you choose to drink alcohol, avoid excessive alcohol drinking (binge drinking) while on warfarin due to increased risk of bleeding from effect on INR and fall risk, impact of smoking or tobacco on INR, impact of exercise/activity level on INR, impact of CBD, marijuana, or medical marijuana on INR, and  avoid activities that have a high risk for falling or injury such as contact sports  Symptom monitoring: monitoring for bleeding signs and symptoms, monitoring for clotting signs and symptoms, monitoring for stroke signs and symptoms, when to seek medical attention/emergency care, if you hit your head or have a bad fall seek emergency care, and travel related clotting risk and prevention  Importance of notifying anticoagulation clinic for: changes in medications; a sooner lab recheck maybe needed, diarrhea, nausea/vomiting, reduced intake, cold/flu, and/or infections; a sooner lab recheck maybe needed, upcoming surgeries and procedures 2 weeks in advance, and if you did not receive dosing instructions on the same day as your labs were checked  Information on home INR monitoring  Contact [phone]  with any changes, questions or concerns.       Telephone call with Sukh who verbalizes understanding and agrees to plan    Lab visit scheduled    Standing orders placed in Epic: Point of Care INR (Lab 5000)    Plan made with [facility] Pharmacist Soraya Camacho RN  Anticoagulation Clinic  10/27/2023                     R knee pain, chest pain/BACK PAIN

## 2023-10-27 NOTE — PROGRESS NOTES
Sukh Craven has an upcoming lab appointment.        Future Appointments   Date Time Provider Department Center   10/30/2023  2:45 PM [facility]    10/30/2023  3:00 PM Cesar Dong APRN CNP [facility]   11/10/2023  3:00 PM [facility]   12/13/2023  8:30 AM Parrish Stephenson MD [facility]        The appointment note says: INR POCT    There is no Lab order available.      Please review and place future orders as appropriate.  If no Lab order will be placed, please advise patient.        Thanks,    Carol Sosa

## 2023-10-27 NOTE — PROGRESS NOTES
DISCHARGE   Discharged to: Home  Via: Automobile  Accompanied by: Family  Discharge Instructions: diet, activity, medications, follow up appointments, when to call the MD, and what to watchout for (i.e. s/s of infection, increasing SOB, palpitations, chest pain,) post op sternal precautions.   Prescriptions: To be filled by discharge pharmacy per pt's request; medication list reviewed & sent with pt  Follow Up Appointments: arranged; information given  Belongings: All sent with pt  IV: PICC out  Telemetry: off  Pt exhibits understanding of above discharge instructions; all questions answered.  Discharge Paperwork: given to patient

## 2023-10-27 NOTE — PLAN OF CARE
Goal Outcome Evaluation:  Pt is ready to discharge to home with OP CR. Pt is on new warfarin and appointment has been made with his PCP clinic.

## 2023-10-28 ENCOUNTER — PATIENT OUTREACH (OUTPATIENT)
Dept: CARE COORDINATION | Facility: CLINIC | Age: 68
End: 2023-10-28
Payer: COMMERCIAL

## 2023-10-28 NOTE — PROGRESS NOTES
Clinic Care Coordination Contact  [facility]: Post-Discharge Note  SITUATION                                                      Admission:           Discharge:        BACKGROUND                                                      Per hospital discharge summary and inpatient provider notes:  van Craven is a 68 year old male with PMH of severe aortic insufficiency 2/2 sinotubular aortic root dilitation (sinus of valsalva 5.3 cm), CAD (mid RCA 90% stenosed, dCx 35% stenosed, mid LAD 70% stenosed), PAD (mild-mod left lower extremity), bilateral popliteal aneurysms, HTN, T2DM, and chronic opioid/cannabis use, who is s/p AVR and CABG x2 on 10/13 by Dr. Lockett. Post-operative course has been complicated by post-operative ileus and malnutrition.      Health psychology consulted for assistance with anxiety and worry management. Completed chart review. Patient has also been seen by Addiction Medicine and it was recommended that patient initiate selective serotonin reuptake inhibitor, see health psychology, and consider PRN gabapentin for possible cannabis withdrawal-induced anxiety. See Dr. Frederick's note dated 10/25/23 for additional details.     ASSESSMENT                              PLAN                                                      Outpatient Plan:  Follow up with primary care provider, Parrish Stephenson, within 7 days to evaluate after surgery and for hospital  follow- up. The following labs/tests are recommended: INR.  [facility]oint[facility] (with [facility] or [facility] provider or service). Call [phone] if  you haven't heard regarding these appointments within 7 days of discharge.    Future Appointments   Date Time Provider Department Center   10/30/2023  2:45 PM [facility]   10/30/2023  3:00 PM Cesar Dong APRN CNP [facility]   11/3/2023  2:30 PM [facility]   11/6/2023  2:15 PM [facility]   11/10/2023  2:30 PM [facility]   11/10/2023   3:00 PM UC CVTS [facility]   12/13/2023  8:30 AM Parrish Stephenson MD [facility]         For any urgent concerns, please contact our 24 hour nurse triage line: [phone])         Maddy Fisher MA

## 2023-10-29 LAB
BACTERIA BLD CULT: NO GROWTH
BACTERIA BLD CULT: NO GROWTH
PATH REPORT.COMMENTS IMP SPEC: NORMAL
PATH REPORT.COMMENTS IMP SPEC: NORMAL
PATH REPORT.FINAL DX SPEC: NORMAL
PATH REPORT.GROSS SPEC: NORMAL
PATH REPORT.MICROSCOPIC SPEC OTHER STN: NORMAL
PATH REPORT.RELEVANT HX SPEC: NORMAL
PHOTO IMAGE: NORMAL

## 2023-10-30 ENCOUNTER — TELEPHONE (OUTPATIENT)
Dept: CARDIOLOGY | Facility: CLINIC | Age: 68
End: 2023-10-30

## 2023-10-30 ENCOUNTER — LAB (OUTPATIENT)
Dept: LAB | Facility: CLINIC | Age: 68
End: 2023-10-30
Payer: COMMERCIAL

## 2023-10-30 ENCOUNTER — OFFICE VISIT (OUTPATIENT)
Dept: FAMILY MEDICINE | Facility: CLINIC | Age: 68
End: 2023-10-30
Payer: COMMERCIAL

## 2023-10-30 ENCOUNTER — ANTICOAGULATION THERAPY VISIT (OUTPATIENT)
Dept: ANTICOAGULATION | Facility: CLINIC | Age: 68
End: 2023-10-30

## 2023-10-30 VITALS
DIASTOLIC BLOOD PRESSURE: 77 MMHG | HEART RATE: 86 BPM | WEIGHT: 198 LBS | TEMPERATURE: 98.1 F | RESPIRATION RATE: 15 BRPM | OXYGEN SATURATION: 99 % | BODY MASS INDEX: 22.88 KG/M2 | SYSTOLIC BLOOD PRESSURE: 124 MMHG

## 2023-10-30 DIAGNOSIS — E11.9 TYPE 2 DIABETES MELLITUS WITHOUT COMPLICATION, WITHOUT LONG-TERM CURRENT USE OF INSULIN (H): ICD-10-CM

## 2023-10-30 DIAGNOSIS — Z95.1 S/P CABG (CORONARY ARTERY BYPASS GRAFT): ICD-10-CM

## 2023-10-30 DIAGNOSIS — Z23 HIGH PRIORITY FOR 2019-NCOV VACCINE: ICD-10-CM

## 2023-10-30 DIAGNOSIS — I48.0 PAROXYSMAL ATRIAL FIBRILLATION (H): ICD-10-CM

## 2023-10-30 DIAGNOSIS — Z95.2 S/P AVR (AORTIC VALVE REPLACEMENT): ICD-10-CM

## 2023-10-30 DIAGNOSIS — Z95.828 S/P ASCENDING AORTIC REPLACEMENT: ICD-10-CM

## 2023-10-30 DIAGNOSIS — I10 HYPERTENSION GOAL BP (BLOOD PRESSURE) < 140/90: ICD-10-CM

## 2023-10-30 DIAGNOSIS — Z09 ENCOUNTER FOR EXAMINATION FOLLOWING TREATMENT AT HOSPITAL: Primary | ICD-10-CM

## 2023-10-30 DIAGNOSIS — Z79.01 CURRENT USE OF LONG TERM ANTICOAGULATION: ICD-10-CM

## 2023-10-30 DIAGNOSIS — Z95.2 S/P AVR (AORTIC VALVE REPLACEMENT): Primary | ICD-10-CM

## 2023-10-30 LAB
ERYTHROCYTE [DISTWIDTH] IN BLOOD BY AUTOMATED COUNT: 13.9 % (ref 10–15)
HBA1C MFR BLD: 5.7 % (ref 0–5.6)
HCT VFR BLD AUTO: 33.1 % (ref 40–53)
HGB BLD-MCNC: 10.7 G/DL (ref 13.3–17.7)
MCH RBC QN AUTO: 30.1 PG (ref 26.5–33)
MCHC RBC AUTO-ENTMCNC: 32.3 G/DL (ref 31.5–36.5)
MCV RBC AUTO: 93 FL (ref 78–100)
PLATELET # BLD AUTO: 529 10E3/UL (ref 150–450)
RBC # BLD AUTO: 3.55 10E6/UL (ref 4.4–5.9)
WBC # BLD AUTO: 9.3 10E3/UL (ref 4–11)

## 2023-10-30 PROCEDURE — 85027 COMPLETE CBC AUTOMATED: CPT

## 2023-10-30 PROCEDURE — 85610 PROTHROMBIN TIME: CPT

## 2023-10-30 PROCEDURE — 80048 BASIC METABOLIC PNL TOTAL CA: CPT

## 2023-10-30 PROCEDURE — 90480 ADMN SARSCOV2 VAC 1/ONLY CMP: CPT | Performed by: NURSE PRACTITIONER

## 2023-10-30 PROCEDURE — 91320 SARSCV2 VAC 30MCG TRS-SUC IM: CPT | Performed by: NURSE PRACTITIONER

## 2023-10-30 PROCEDURE — 99495 TRANSJ CARE MGMT MOD F2F 14D: CPT | Performed by: NURSE PRACTITIONER

## 2023-10-30 PROCEDURE — 36415 COLL VENOUS BLD VENIPUNCTURE: CPT

## 2023-10-30 PROCEDURE — 83036 HEMOGLOBIN GLYCOSYLATED A1C: CPT

## 2023-10-30 RX ORDER — BLOOD-GLUCOSE SENSOR
1 EACH MISCELLANEOUS
Qty: 6 EACH | Refills: 3 | Status: SHIPPED | OUTPATIENT
Start: 2023-10-30

## 2023-10-30 RX ORDER — AMLODIPINE BESYLATE 10 MG/1
10 TABLET ORAL EVERY MORNING
Qty: 90 TABLET | Refills: 3 | Status: SHIPPED | OUTPATIENT
Start: 2023-10-30

## 2023-10-30 RX ORDER — TRAMADOL HYDROCHLORIDE 50 MG/1
25 TABLET ORAL EVERY 6 HOURS PRN
Qty: 15 TABLET | Refills: 0 | Status: ON HOLD | OUTPATIENT
Start: 2023-10-30 | End: 2023-11-13

## 2023-10-30 RX ORDER — DRONABINOL 5 MG/1
5 CAPSULE ORAL
Qty: 60 CAPSULE | Refills: 1 | Status: SHIPPED | OUTPATIENT
Start: 2023-10-30 | End: 2023-11-10

## 2023-10-30 RX ORDER — METFORMIN HCL 500 MG
500 TABLET, EXTENDED RELEASE 24 HR ORAL
Qty: 90 TABLET | Refills: 3 | Status: SHIPPED | OUTPATIENT
Start: 2023-10-30

## 2023-10-30 ASSESSMENT — PAIN SCALES - GENERAL: PAINLEVEL: NO PAIN (0)

## 2023-10-30 NOTE — PATIENT INSTRUCTIONS
DM:  - reduced Metformin to 1 tablet every night  - check Bgs in the morning and before bed  - send readings in 2 weeks to PCP      Heart:  - continue current medication as ordered  - losartan, amlodipine, coreg    Warfarin  - anticoagulation clinic will contact for management

## 2023-10-30 NOTE — PROGRESS NOTES
Assessment & Plan     Encounter for examination following treatment at hospital  Stable, continued post-hospital discharge plan; referrals pending; medications reconciled.    S/P AVR (aortic valve replacement)  S/P ascending aortic replacement  Paroxysmal atrial fibrillation (H)  Long-term anticoagulant treatment; referral to anticoagulation   - Hemoglobin A1c  - Basic metabolic panel  - CBC with platelets  - Anticoagulation Clinic Referral  - INR    Type 2 diabetes mellitus without complication, without long-term current use of insulin (H)  Hemoglobin A1C   Date Value Ref Range Status   10/30/2023 5.7 (H) 0.0 - 5.6 % Final     Comment:     Normal <5.7%   Prediabetes 5.7-6.4%    Diabetes 6.5% or higher     Note: Adopted from ADA consensus guidelines.   05/01/2018 5.9 (H) 0 - 5.6 % Final     Comment:     Normal <5.7% Prediabetes 5.7-6.4%  Diabetes 6.5% or higher - adopted from ADA   consensus guidelines.       - Continuous Blood Gluc Sensor (FREESTYLE ALEXA 3 SENSOR) MISC  Dispense: 6 each; Refill: 3  - Hemoglobin A1c  - metFORMIN (GLUCOPHAGE XR) 500 MG 24 hr tablet  Dispense: 90 tablet; Refill:     S/P CABG (coronary artery bypass graft)  Stable; following cardiology; has scheduled cards appointment   - Basic metabolic panel  - CBC with platelets  - Anticoagulation Clinic Referral  - traMADol (ULTRAM) 50 MG tablet  Dispense: 15 tablet; Refill: 0  - INR    Hypertension goal BP (blood pressure) < 140/90  Monitor home blood pressure 2-3 times per week; notify if above goal <130/80; dietary and exercise changes to improve blood pressure  - amLODIPine (NORVASC) 10 MG tablet  Dispense: 90 tablet; Refill: 3    High priority for 2019-nCoV vaccine       MED REC REQUIRED  Post Medication Reconciliation Status:     Patient Instructions   DM:  - reduced Metformin to 1 tablet every night  - check Bgs in the morning and before bed  - send readings in 2 weeks to PCP      Heart:  - continue current medication as ordered  -  losartan, amlodipine, coreg    Warfarin  - anticoagulation clinic will contact for management        43 minutes of the appointment were spent evaluating and developing a treatment plan for her additional concern(s).        HEMAL Wright CNP  [facility]    Eva Luz is a 68 year old, presenting for the following health issues:  Hospital F/U (Hospital Follow Up ) and Imm/Inj (COVID-19 VACCINE)        10/30/2023     3:14 PM   Additional Questions   Roomed by Teresa Vega   Accompanied by Enedina - Girlfriend       [facility]         10/28/2023     3:26 PM   Post Discharge Outreach   Admission Date 10/13/2023   Reason for Admission PMH of severe aortic insufficiency   Discharge Date 10/27/2023   Discharge Diagnosis   Severe double vessel coronary artery disease s/p CABG x2    Severe aortic insufficiency, aortic root aneurysm s/p On-X mechanical AVR and Bentall Procedure    Lifelong anticoagulation: warfarin therapy for mechanical aortic valve with goal INR 2-3   How are you doing now that you are home? pretty good   How are your symptoms? (Red Flag symptoms escalate to triage hotline per guidelines) Improved   Do you feel your condition is stable enough to be safe at home until your provider visit? Yes   Does the patient have their discharge instructions?  Yes   Does the patient have questions regarding their discharge instructions?  No   Were you started on any new medications or were there changes to any of your previous medications?  Yes   Does the patient have all of their medications? Yes   Do you have questions regarding any of your medications?  No   Do you have all of your needed medical supplies or equipment (DME)?  (i.e. oxygen tank, CPAP, cane, etc.) Yes   Discharge follow-up appointment scheduled within 14 calendar days?  Yes   Discharge Follow Up Appointment Date 10/30/2023     Hospital Follow-up Visit:    Hospital/Nursing Home/IP Rehab Facility: [facility]  [facility]  Date of Admission: 10/13/23  Date of Discharge: 10/27/23  Reason(s) for Admission: evere double vessel coronary artery disease s/p CABG x2     Was your hospitalization related to COVID-19? No   Problems taking medications regularly:  None  Medication changes since discharge: None  Problems adhering to non-medication therapy:  None    Summary of hospitalization:  [facility] discharge summary reviewed  Diagnostic Tests/Treatments reviewed.  Follow up needed: none  Other Healthcare Providers Involved in Patient s Care:         Care Coordination and Specialist appointment - Anticogulation clinic  Update since discharge: improvedOmer Craven is a 68 year old male who is status post CABG x2, mechanical Bentall on 10/13/23 with Dr. Lockett on 10/13/2023. Post-operative course was complicated by post-operative ileus and anxiety                    Review of Systems         Objective    /77 (BP Location: Right arm, Patient Position: Sitting, Cuff Size: Adult Large)   Pulse 86   Temp 98.1  F (36.7  C) (Temporal)   Resp 15   Wt 89.8 kg (198 lb)   SpO2 99%   BMI 22.88 kg/m    Body mass index is 22.88 kg/m .  Physical Exam   GENERAL: healthy, alert and no distress  NECK: no adenopathy, no asymmetry, masses, or scars and thyroid normal to palpation  RESP: lungs clear to auscultation - no rales, rhonchi or wheezes  CV: regular rate and rhythm, normal S1 S2, no S3 or S4, no murmur, click or rub, no peripheral edema and peripheral pulses strong  ABDOMEN: soft, nontender, no hepatosplenomegaly, no masses and bowel sounds normal  MS: no gross musculoskeletal defects noted, no edema

## 2023-10-30 NOTE — TELEPHONE ENCOUNTER
CARDIOTHORACIC SURGERY  Discharge Follow Up Phone Call    POST OP MONITORING  How is your pain on a 0-10 scale, how are you managing your pain? Gets worse at night and uses tramadol and that works well.    ACTIVITY  How is your activity tolerance? Feeling quite tired so not doing as much. Encouraged patient to start increasing activity by going on short walks every time he goes to the bathroom to help increase stamina  Are you still doing sternal precautions? Yes  Do you hear any clicking when you are moving or taking a deep breath? No  Are you using your incentive spirometer? Yes  Are you weighing yourself daily? Not currently, encouraged patient to start weighing himself every morning and keeping a log. Patient was instructed to call if he gains 2-3 pounds overnight or 5 pounds in a week. Patient does not currently have any increased edema or shortness of breath.    SIGNS AND SYMPTOMS OF INFECTION  INCREASE IN PAIN - no  FEVER - No  DRAINAGE - no   REDNESS - no  SWELLING - no  Instructed patient on proper wound care. Cleaning all incisions at least once daily with fresh clean washcloth and a gentle liquid soap. Gentle pressure on the incision and no rubbing. Rinse and dry once done. Should look daily in the mirror for signs and symptoms of infection.      ASSISTANCE  Do you have someone at home to assist you with your daily activities? Yes    MEDICATIONS  Is someone helping you to set up your medications? Yes  Do you have any questions about your medications? No    Are you on a blood thinner? Yes  Who is managing your INRs? PCP appointment today    FOLLOW UP  You are scheduled to see your primary care physician on PCP.  You are scheduled to see our surgery advanced practive provider for post operative follow up on 11/10.    You are scheduled for cardiac rehab on Patient has not heard from cardiac rehab yet.  You are scheduled to see your cardiologist on message sent to Dr. Bao Lan team to  schedule.    CONTACT INFORMATION  Please feel free to call us with any questions or symptoms that are concerning for you at [phone]. If it is after 4:30 in the afternoon, or a weekend please call [phone] and ask for the on call specialist. We want to do everything we can to help prevent you needing to return to the ED, so please do not hesitate to call us.    Mayela Velasquez, RNCC  Cardiothoracic Surgery  [phone]

## 2023-10-30 NOTE — PROGRESS NOTES
Today's INR is still in process at  lab - writer contacted patient to discuss tonight's dosing instructions, but no answer at this time. Writer left detailed vm instructing patient to take 2 mg tonight. Farzana Camacho, SHAUNN, RN

## 2023-10-31 LAB
ANION GAP SERPL CALCULATED.3IONS-SCNC: 13 MMOL/L (ref 7–15)
BUN SERPL-MCNC: 7.6 MG/DL (ref 8–23)
CALCIUM SERPL-MCNC: 10 MG/DL (ref 8.8–10.2)
CHLORIDE SERPL-SCNC: 101 MMOL/L (ref 98–107)
CREAT SERPL-MCNC: 0.9 MG/DL (ref 0.67–1.17)
DEPRECATED HCO3 PLAS-SCNC: 23 MMOL/L (ref 22–29)
EGFRCR SERPLBLD CKD-EPI 2021: >90 ML/MIN/1.73M2
GLUCOSE SERPL-MCNC: 123 MG/DL (ref 70–99)
INR PPP: 1.35 (ref 0.85–1.15)
POTASSIUM SERPL-SCNC: 4.6 MMOL/L (ref 3.4–5.3)
SODIUM SERPL-SCNC: 137 MMOL/L (ref 135–145)

## 2023-10-31 NOTE — PROGRESS NOTES
Consulted with Soraya Hensley [facility]: Advise 4 mg x 1 today and increase his weekly dose 50%. He has a standing POC order so would really like that on Friday so we can get a same day result given how low he is today.     ANTICOAGULATION MANAGEMENT     Sukh Craven 68 year old male is on warfarin with subtherapeutic INR result. (Goal INR 2.0-3.0)    Recent labs: (last 7 days)     10/30/23  1649   INR 1.35*       ASSESSMENT     Source(s): Chart Review     Warfarin doses taken: Reviewed in chart  Diet: No new diet changes identified, still needs new start education  Medication/supplement changes:  At visit with provider yesterday metformin dose was reduced  (metformin and warfarin together may decrease effects of warfarin); also Dronabinol was prescribed but it looks like PA was denied? Warfarin and Dronabinol when taken together can increased serum concentration of warfarin.  New illness, injury, or hospitalization: No, however, patient is day 16 of new start on warfarin following On-X mechanical AVR 10/13/23.  Signs or symptoms of bleeding or clotting: No  Previous result: Therapeutic last 2(+) visits  Additional findings: Recent heart valve replacement in last 10 weeks       PLAN     Unable to reach Sukh today.    Left message to return call to [facility] RN. Provided call back number, but also advised to take increased dose of warfarin, 4 mg, tonight if unable to connect with [facility] RN by end of day.   Also sent My[facility] message.    Follow up required to discuss out of range result     Ngoc Goodson, RN  Anticoagulation Clinic  10/31/2023

## 2023-10-31 NOTE — PROGRESS NOTES
ANTICOAGULATION MANAGEMENT     Sukh Craven 68 year old male is on warfarin with subtherapeutic INR result. (Goal INR 2.0-3.0)    Recent labs: (last 7 days)     10/30/23  1649   INR 1.35*       ASSESSMENT     Source(s): Chart Review and Patient/Caregiver Call     Warfarin doses taken: Unsure if he took the 2 mg on fri but thinks he only took 1 mg  Diet: No new diet changes identified  Medication/supplement changes: None noted  New illness, injury, or hospitalization: No  Signs or symptoms of bleeding or clotting: No  Previous result: Therapeutic last 2(+) visits  Additional findings: None  Did go over the guide to warfarin with him       PLAN     Recommended plan for no diet, medication or health factor changes affecting INR     Dosing Instructions: boost dose and continue 3 mg wed and 2 mg row with next INR in 3 days       Summary  As of 10/30/2023      Full warfarin instructions:  10/31: 4 mg; Otherwise 3 mg every Wed; 2 mg all other days   Next INR check:  11/3/2023               Telephone call with Sukh who verbalizes understanding and agrees to plan    Lab visit scheduled    Education provided:   Went over Guide to Warfarin with pt    Plan made per [facility] anticoagulation protocol    Violetta Muñoz RN  Anticoagulation Clinic  10/31/2023    _______________________________________________________________________     Anticoagulation Episode Summary       Current INR goal:  2.0-3.0   TTR:  --   Target end date:  Indefinite   Send INR reminders to:  DAVON PRATT    Indications    S/P AVR (aortic valve replacement) [Z95.2]  Current use of long term anticoagulation [Z79.01]  Paroxysmal atrial fibrillation (H) [I48.0]  S/P CABG (coronary artery bypass graft) [Z95.1]             Comments:               Anticoagulation Care Providers       Provider Role Specialty Phone number    Mariola Peterson PA-C Referring Cardiology [phone]    Alec Marc NP Referring Cardiovascular & Thoracic Surgery  [phone]    Parrish Stephenson MD Referring Family Medicine [phone]    Cesar Dong APRN CNP Referring Family Medicine [phone]

## 2023-11-03 ENCOUNTER — LAB (OUTPATIENT)
Dept: LAB | Facility: CLINIC | Age: 68
End: 2023-11-03
Payer: COMMERCIAL

## 2023-11-03 ENCOUNTER — ANTICOAGULATION THERAPY VISIT (OUTPATIENT)
Dept: ANTICOAGULATION | Facility: CLINIC | Age: 68
End: 2023-11-03

## 2023-11-03 DIAGNOSIS — Z95.1 S/P CABG (CORONARY ARTERY BYPASS GRAFT): ICD-10-CM

## 2023-11-03 DIAGNOSIS — Z95.2 S/P AVR (AORTIC VALVE REPLACEMENT): Primary | ICD-10-CM

## 2023-11-03 DIAGNOSIS — Z79.01 CURRENT USE OF LONG TERM ANTICOAGULATION: ICD-10-CM

## 2023-11-03 DIAGNOSIS — Z95.2 S/P AVR (AORTIC VALVE REPLACEMENT): ICD-10-CM

## 2023-11-03 DIAGNOSIS — I48.0 PAROXYSMAL ATRIAL FIBRILLATION (H): ICD-10-CM

## 2023-11-03 LAB — INR BLD: 1.6 (ref 0.9–1.1)

## 2023-11-03 PROCEDURE — 85610 PROTHROMBIN TIME: CPT

## 2023-11-03 PROCEDURE — 36416 COLLJ CAPILLARY BLOOD SPEC: CPT

## 2023-11-03 NOTE — PROGRESS NOTES
ANTICOAGULATION MANAGEMENT     Sukh Craven 68 year old male is on warfarin with subtherapeutic INR result. (Goal INR 2.0-3.0)    Recent labs: (last 7 days)     11/03/23  1431   INR 1.6*       ASSESSMENT     Source(s): Chart Review and Patient/Caregiver Call     Warfarin doses taken: Booster dose(s) recently taken which may be affecting INR  Diet: No new diet changes identified  Medication/supplement changes: None noted  New illness, injury, or hospitalization: Yes-discharged from hospital post valve on 10/27/23.   Signs or symptoms of bleeding or clotting: No  Previous result: Subtherapeutic  Additional findings: Recent heart valve replacement in last 10 weeks       PLAN     Recommended plan for temporary change(s) affecting INR     Dosing Instructions: booster dose then Increase your warfarin dose (40% change) with next INR in 3 days       Summary  As of 11/3/2023      Full warfarin instructions:  11/3: 5 mg; Otherwise 3 mg every day   Next INR check:  11/6/2023               Telephone call with Sukh who verbalizes understanding and agrees to plan and who agrees to plan and repeated back plan correctly    Already scheduled for 11/6    Education provided:   Please call back if any changes to your diet, medications or how you've been taking warfarin  Goal range and lab monitoring: goal range and significance of current result and Importance of therapeutic range    Plan made with [facility] Pharmacist Joan Dietrich, RN  Anticoagulation Clinic  11/3/2023    _______________________________________________________________________     Anticoagulation Episode Summary       Current INR goal:  2.0-3.0   TTR:  0.0% (3 d)   Target end date:  Indefinite   Send INR reminders to:  DAVON PRATT    Indications    S/P AVR (aortic valve replacement) [Z95.2]  Current use of long term anticoagulation [Z79.01]  Paroxysmal atrial fibrillation (H) [I48.0]  S/P CABG (coronary artery bypass graft) [Z95.1]              Comments:               Anticoagulation Care Providers       Provider Role Specialty Phone number    Mariola Peterson PA-C Referring Cardiology [phone]    Alec Marc NP Referring Cardiovascular & Thoracic Surgery [phone]    Parrish Stephenson MD Referring Family Medicine [phone]    Cesar Dong APRN CNP Referring Family Medicine [phone]

## 2023-11-06 ENCOUNTER — ANTICOAGULATION THERAPY VISIT (OUTPATIENT)
Dept: ANTICOAGULATION | Facility: CLINIC | Age: 68
End: 2023-11-06

## 2023-11-06 ENCOUNTER — LAB (OUTPATIENT)
Dept: LAB | Facility: CLINIC | Age: 68
End: 2023-11-06
Payer: COMMERCIAL

## 2023-11-06 DIAGNOSIS — I48.0 PAROXYSMAL ATRIAL FIBRILLATION (H): ICD-10-CM

## 2023-11-06 DIAGNOSIS — Z95.1 S/P CABG (CORONARY ARTERY BYPASS GRAFT): ICD-10-CM

## 2023-11-06 DIAGNOSIS — Z95.2 S/P AVR (AORTIC VALVE REPLACEMENT): Primary | ICD-10-CM

## 2023-11-06 DIAGNOSIS — Z95.2 S/P AVR (AORTIC VALVE REPLACEMENT): ICD-10-CM

## 2023-11-06 DIAGNOSIS — Z79.01 CURRENT USE OF LONG TERM ANTICOAGULATION: ICD-10-CM

## 2023-11-06 LAB — INR BLD: 1.8 (ref 0.9–1.1)

## 2023-11-06 PROCEDURE — 36416 COLLJ CAPILLARY BLOOD SPEC: CPT

## 2023-11-06 PROCEDURE — 85610 PROTHROMBIN TIME: CPT

## 2023-11-06 NOTE — PROGRESS NOTES
ANTICOAGULATION MANAGEMENT     Sukh Craven 68 year old male is on warfarin with subtherapeutic INR result. (Goal INR 2.0-3.0)    Recent labs: (last 7 days)     11/06/23  1427   INR 1.8*       ASSESSMENT     Source(s): Chart Review and Patient/Caregiver Call     Warfarin doses taken: Warfarin taken as instructed  Diet: No new diet changes identified  Medication/supplement changes: None noted  New illness, injury, or hospitalization: No  Signs or symptoms of bleeding or [id number]: No  Previous result: Subtherapeutic  Additional findings: None       PLAN     Recommended plan for no diet, medication or health factor changes affecting INR     Dosing Instructions: Increase your warfarin dose (23.8% change) with next INR in 4 days       Summary  As of 11/6/2023      Full warfarin instructions:  3 mg every Sun, Thu; 4 mg all other days   Next INR check:  11/10/2023               Telephone call with Sukh who verbalizes understanding and agrees to plan    Lab visit scheduled    Education provided:   Please call back if any changes to your diet, medications or how you've been taking warfarin    Plan made with [facility] Pharmacist Soraya Muñoz, RN  Anticoagulation Clinic  11/6/2023    _______________________________________________________________________     Anticoagulation Episode Summary       Current INR goal:  2.0-3.0   TTR:  0.0% (6 d)   Target end date:  Indefinite   Send INR reminders to:  [facility]    Indications    S/P AVR (aortic valve replacement) [Z95.2]  Current use of long term anticoagulation [Z79.01]  Paroxysmal atrial fibrillation (H) [I48.0]  S/P CABG (coronary artery bypass graft) [Z95.1]             Comments:               Anticoagulation Care Providers       Provider Role Specialty Phone number    Mariola Peterson PA-C Referring Cardiology [phone]    Alec Marc NP Referring Cardiovascular & Thoracic Surgery [phone]    Parrish Stephenson MD Referring  Family Medicine [phone]    Cesar Dong APRN CNP Referring Family Medicine [phone]

## 2023-11-07 ENCOUNTER — TELEPHONE (OUTPATIENT)
Dept: CARDIOLOGY | Facility: CLINIC | Age: 68
End: 2023-11-07
Payer: COMMERCIAL

## 2023-11-09 ENCOUNTER — TELEPHONE (OUTPATIENT)
Dept: FAMILY MEDICINE | Facility: CLINIC | Age: 68
End: 2023-11-09
Payer: COMMERCIAL

## 2023-11-09 NOTE — TELEPHONE ENCOUNTER
PA started for Continuous Blood Gluc Sensor (FREESTYLE ALEXA 3 SENSOR) [facility] [place].  Log into [email or web address] and enter info from below:    Key: [id number]  Patient last name: MARIANO  : 59

## 2023-11-10 ENCOUNTER — HOSPITAL ENCOUNTER (INPATIENT)
Facility: CLINIC | Age: 68
LOS: 3 days | Discharge: HOME | DRG: 950 | End: 2023-11-13
Attending: FAMILY MEDICINE | Admitting: THORACIC SURGERY (CARDIOTHORACIC VASCULAR SURGERY)
Payer: COMMERCIAL

## 2023-11-10 ENCOUNTER — HOSPITAL ENCOUNTER (OUTPATIENT)
Age: 68
Discharge: HOME | End: 2023-11-10
Payer: COMMERCIAL

## 2023-11-10 ENCOUNTER — LAB (OUTPATIENT)
Dept: LAB | Facility: CLINIC | Age: 68
End: 2023-11-10
Payer: COMMERCIAL

## 2023-11-10 ENCOUNTER — ANTICOAGULATION THERAPY VISIT (OUTPATIENT)
Dept: ANTICOAGULATION | Facility: CLINIC | Age: 68
End: 2023-11-10

## 2023-11-10 ENCOUNTER — OFFICE VISIT (OUTPATIENT)
Dept: CARDIOLOGY | Facility: CLINIC | Age: 68
End: 2023-11-10
Attending: THORACIC SURGERY (CARDIOTHORACIC VASCULAR SURGERY)
Payer: COMMERCIAL

## 2023-11-10 VITALS
BODY MASS INDEX: 22.94 KG/M2 | WEIGHT: 198.5 LBS | SYSTOLIC BLOOD PRESSURE: 108 MMHG | OXYGEN SATURATION: 99 % | HEART RATE: 82 BPM | DIASTOLIC BLOOD PRESSURE: 68 MMHG

## 2023-11-10 DIAGNOSIS — Z95.2 S/P AVR (AORTIC VALVE REPLACEMENT): ICD-10-CM

## 2023-11-10 DIAGNOSIS — Z95.1 S/P CABG (CORONARY ARTERY BYPASS GRAFT): ICD-10-CM

## 2023-11-10 DIAGNOSIS — Z95.2 H/O MECHANICAL AORTIC VALVE REPLACEMENT: ICD-10-CM

## 2023-11-10 DIAGNOSIS — I25.10 CORONARY ARTERY DISEASE INVOLVING NATIVE CORONARY ARTERY OF NATIVE HEART WITHOUT ANGINA PECTORIS: ICD-10-CM

## 2023-11-10 DIAGNOSIS — Z51.81 SUBTHERAPEUTIC ANTICOAGULATION: Primary | ICD-10-CM

## 2023-11-10 DIAGNOSIS — Z79.01 SUBTHERAPEUTIC ANTICOAGULATION: Primary | ICD-10-CM

## 2023-11-10 DIAGNOSIS — Z79.01 CURRENT USE OF LONG TERM ANTICOAGULATION: ICD-10-CM

## 2023-11-10 DIAGNOSIS — Z79.01 LONG TERM (CURRENT) USE OF ANTICOAGULANTS: Primary | ICD-10-CM

## 2023-11-10 DIAGNOSIS — I71.21 ANEURYSM OF ASCENDING AORTA WITHOUT RUPTURE (H): ICD-10-CM

## 2023-11-10 DIAGNOSIS — Z95.828 S/P ASCENDING AORTIC REPLACEMENT: ICD-10-CM

## 2023-11-10 DIAGNOSIS — I10 ESSENTIAL HYPERTENSION: ICD-10-CM

## 2023-11-10 DIAGNOSIS — Z95.2 S/P AVR (AORTIC VALVE REPLACEMENT): Primary | ICD-10-CM

## 2023-11-10 DIAGNOSIS — R79.1 SUBTHERAPEUTIC INTERNATIONAL NORMALIZED RATIO (INR): ICD-10-CM

## 2023-11-10 DIAGNOSIS — I48.0 PAROXYSMAL ATRIAL FIBRILLATION (H): ICD-10-CM

## 2023-11-10 LAB
ALBUMIN SERPL BCG-MCNC: 3.8 G/DL (ref 3.5–5.2)
ALP SERPL-CCNC: 100 U/L (ref 40–129)
ALT SERPL W P-5'-P-CCNC: 21 U/L (ref 0–70)
ANION GAP SERPL CALCULATED.3IONS-SCNC: 12 MMOL/L (ref 7–15)
APTT PPP: 33 SECONDS (ref 22–38)
AST SERPL W P-5'-P-CCNC: 19 U/L (ref 0–45)
BILIRUB SERPL-MCNC: 0.4 MG/DL
BUN SERPL-MCNC: 11.4 MG/DL (ref 8–23)
CALCIUM SERPL-MCNC: 9.2 MG/DL (ref 8.8–10.2)
CHLORIDE SERPL-SCNC: 106 MMOL/L (ref 98–107)
CREAT SERPL-MCNC: 0.91 MG/DL (ref 0.67–1.17)
DEPRECATED HCO3 PLAS-SCNC: 22 MMOL/L (ref 22–29)
EGFRCR SERPLBLD CKD-EPI 2021: >90 ML/MIN/1.73M2
ERYTHROCYTE [DISTWIDTH] IN BLOOD BY AUTOMATED COUNT: 13.3 % (ref 10–15)
GLUCOSE SERPL-MCNC: 79 MG/DL (ref 70–99)
HCT VFR BLD AUTO: 30.8 % (ref 40–53)
HGB BLD-MCNC: 9.7 G/DL (ref 13.3–17.7)
INR BLD: 1.6 (ref 0.9–1.1)
INR PPP: 1.64 (ref 0.85–1.15)
MCH RBC QN AUTO: 29.2 PG (ref 26.5–33)
MCHC RBC AUTO-ENTMCNC: 31.5 G/DL (ref 31.5–36.5)
MCV RBC AUTO: 93 FL (ref 78–100)
PLATELET # BLD AUTO: 316 10E3/UL (ref 150–450)
POTASSIUM SERPL-SCNC: 4.2 MMOL/L (ref 3.4–5.3)
PROT SERPL-MCNC: 7 G/DL (ref 6.4–8.3)
RBC # BLD AUTO: 3.32 10E6/UL (ref 4.4–5.9)
SODIUM SERPL-SCNC: 140 MMOL/L (ref 135–145)
UFH PPP CHRO-ACNC: 0.19 IU/ML
WBC # BLD AUTO: 5.1 10E3/UL (ref 4–11)

## 2023-11-10 PROCEDURE — 80061 LIPID PANEL: CPT

## 2023-11-10 PROCEDURE — 250N000013 HC RX MED GY IP 250 OP 250 PS 637: Performed by: FAMILY MEDICINE

## 2023-11-10 PROCEDURE — 99024 POSTOP FOLLOW-UP VISIT: CPT

## 2023-11-10 PROCEDURE — 250N000013 HC RX MED GY IP 250 OP 250 PS 637: Performed by: SURGERY

## 2023-11-10 PROCEDURE — G0463 HOSPITAL OUTPT CLINIC VISIT: HCPCS

## 2023-11-10 PROCEDURE — 120N000003 HC R&B IMCU UMMC

## 2023-11-10 PROCEDURE — 85610 PROTHROMBIN TIME: CPT

## 2023-11-10 PROCEDURE — 99285 EMERGENCY DEPT VISIT HI MDM: CPT | Performed by: FAMILY MEDICINE

## 2023-11-10 PROCEDURE — 85027 COMPLETE CBC AUTOMATED: CPT | Performed by: FAMILY MEDICINE

## 2023-11-10 PROCEDURE — 36415 COLL VENOUS BLD VENIPUNCTURE: CPT | Performed by: FAMILY MEDICINE

## 2023-11-10 PROCEDURE — 250N000011 HC RX IP 250 OP 636: Mod: JZ | Performed by: FAMILY MEDICINE

## 2023-11-10 PROCEDURE — 36415 COLL VENOUS BLD VENIPUNCTURE: CPT

## 2023-11-10 PROCEDURE — 85520 HEPARIN ASSAY: CPT | Performed by: FAMILY MEDICINE

## 2023-11-10 PROCEDURE — 80053 COMPREHEN METABOLIC PANEL: CPT | Performed by: FAMILY MEDICINE

## 2023-11-10 PROCEDURE — 80048 BASIC METABOLIC PNL TOTAL CA: CPT

## 2023-11-10 PROCEDURE — 85730 THROMBOPLASTIN TIME PARTIAL: CPT | Performed by: FAMILY MEDICINE

## 2023-11-10 PROCEDURE — 85610 PROTHROMBIN TIME: CPT | Performed by: FAMILY MEDICINE

## 2023-11-10 RX ORDER — METHOCARBAMOL 750 MG/1
750 TABLET, FILM COATED ORAL EVERY 8 HOURS PRN
Status: DISCONTINUED | OUTPATIENT
Start: 2023-11-10 | End: 2023-11-13 | Stop reason: HOSPADM

## 2023-11-10 RX ORDER — FAMOTIDINE 10 MG
10 TABLET ORAL 2 TIMES DAILY
Status: COMPLETED | OUTPATIENT
Start: 2023-11-10 | End: 2023-11-13

## 2023-11-10 RX ORDER — CARVEDILOL 25 MG/1
25 TABLET ORAL 2 TIMES DAILY WITH MEALS
Status: DISCONTINUED | OUTPATIENT
Start: 2023-11-10 | End: 2023-11-13 | Stop reason: HOSPADM

## 2023-11-10 RX ORDER — WARFARIN SODIUM 5 MG/1
5 TABLET ORAL
Status: DISCONTINUED | OUTPATIENT
Start: 2023-11-10 | End: 2023-11-10

## 2023-11-10 RX ORDER — POLYETHYLENE GLYCOL 3350 17 G/17G
17 POWDER, FOR SOLUTION ORAL DAILY PRN
Status: DISCONTINUED | OUTPATIENT
Start: 2023-11-10 | End: 2023-11-13 | Stop reason: HOSPADM

## 2023-11-10 RX ORDER — AMOXICILLIN 250 MG
2 CAPSULE ORAL
Status: DISCONTINUED | OUTPATIENT
Start: 2023-11-10 | End: 2023-11-13 | Stop reason: HOSPADM

## 2023-11-10 RX ORDER — HYDROXYZINE HYDROCHLORIDE 50 MG/1
50 TABLET, FILM COATED ORAL 3 TIMES DAILY PRN
Status: DISCONTINUED | OUTPATIENT
Start: 2023-11-10 | End: 2023-11-13 | Stop reason: HOSPADM

## 2023-11-10 RX ORDER — METFORMIN HCL 500 MG
500 TABLET, EXTENDED RELEASE 24 HR ORAL
Status: DISCONTINUED | OUTPATIENT
Start: 2023-11-10 | End: 2023-11-13 | Stop reason: HOSPADM

## 2023-11-10 RX ORDER — LOSARTAN POTASSIUM 25 MG/1
50 TABLET ORAL DAILY
Status: DISCONTINUED | OUTPATIENT
Start: 2023-11-11 | End: 2023-11-13 | Stop reason: HOSPADM

## 2023-11-10 RX ORDER — HYDROXYZINE HYDROCHLORIDE 25 MG/1
25 TABLET, FILM COATED ORAL 3 TIMES DAILY PRN
Status: DISCONTINUED | OUTPATIENT
Start: 2023-11-10 | End: 2023-11-13 | Stop reason: HOSPADM

## 2023-11-10 RX ORDER — AMLODIPINE BESYLATE 10 MG/1
10 TABLET ORAL EVERY MORNING
Status: DISCONTINUED | OUTPATIENT
Start: 2023-11-11 | End: 2023-11-13 | Stop reason: HOSPADM

## 2023-11-10 RX ORDER — ATORVASTATIN CALCIUM 40 MG/1
40 TABLET, FILM COATED ORAL EVERY MORNING
Status: DISCONTINUED | OUTPATIENT
Start: 2023-11-11 | End: 2023-11-13 | Stop reason: HOSPADM

## 2023-11-10 RX ORDER — ESCITALOPRAM OXALATE 10 MG/1
10 TABLET ORAL DAILY
Status: DISCONTINUED | OUTPATIENT
Start: 2023-11-11 | End: 2023-11-13 | Stop reason: HOSPADM

## 2023-11-10 RX ORDER — AMOXICILLIN 250 MG
1 CAPSULE ORAL 2 TIMES DAILY PRN
Status: ON HOLD | COMMUNITY
End: 2023-11-13

## 2023-11-10 RX ORDER — ASPIRIN 81 MG/1
81 TABLET ORAL EVERY MORNING
Status: DISCONTINUED | OUTPATIENT
Start: 2023-11-11 | End: 2023-11-13 | Stop reason: HOSPADM

## 2023-11-10 RX ORDER — MULTIPLE VITAMINS W/ MINERALS TAB 9MG-400MCG
1 TAB ORAL DAILY
Status: DISCONTINUED | OUTPATIENT
Start: 2023-11-11 | End: 2023-11-13 | Stop reason: HOSPADM

## 2023-11-10 RX ORDER — HEPARIN SODIUM 10000 [USP'U]/100ML
0-5000 INJECTION, SOLUTION INTRAVENOUS CONTINUOUS
Status: DISCONTINUED | OUTPATIENT
Start: 2023-11-10 | End: 2023-11-13

## 2023-11-10 RX ADMIN — CARVEDILOL 25 MG: 25 TABLET, FILM COATED ORAL at 17:56

## 2023-11-10 RX ADMIN — WARFARIN SODIUM 7 MG: 6 TABLET ORAL at 17:56

## 2023-11-10 RX ADMIN — METFORMIN ER 500 MG 500 MG: 500 TABLET ORAL at 17:56

## 2023-11-10 RX ADMIN — FAMOTIDINE 10 MG: 10 TABLET, FILM COATED ORAL at 21:22

## 2023-11-10 RX ADMIN — HEPARIN SODIUM AND DEXTROSE 1050 UNITS/HR: 10000; 5 INJECTION INTRAVENOUS at 16:59

## 2023-11-10 ASSESSMENT — PAIN SCALES - GENERAL: PAINLEVEL: NO PAIN (0)

## 2023-11-10 ASSESSMENT — ACTIVITIES OF DAILY LIVING (ADL)
ADLS_ACUITY_SCORE: 37
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
## 2023-11-10 NOTE — ED TRIAGE NOTES
"Triage Assessment & Note:    /71   Pulse 79   Temp 98.1  F (36.7  C) (Oral)   Resp 16   Ht 1.956 m (6' 5\")   Wt 86.2 kg (190 lb)   SpO2 98%   BMI 22.53 kg/m      Patient presents with: Recent aortic valve replacement. PT was found to have a sub therapeutic INR at clinic and will need a heparin gtt.     Home Treatments/Remedies: None    Febrile / Afebrile? Afebrile     Duration of C/o:  1 hours    Ryan Ascencio RN  November 10, 2023       Triage Assessment (Adult)       Row Name 11/10/23 1557          Triage Assessment    Airway WDL WDL        Respiratory WDL    Respiratory WDL WDL        Cardiac WDL    Cardiac WDL WDL                     "
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
## 2023-11-10 NOTE — PHARMACY-ANTICOAGULATION SERVICE
Clinical Pharmacy - Warfarin Dosing Consult     Pharmacy has been consulted to manage this patient s warfarin therapy.  Indication: Mechanical Aortic Valve Replacement  Therapy Goal: INR 2-3  OP Anticoag Clinic: [facility]  Warfarin Prior to Admission: Yes  Warfarin PTA Regimen: 7 mg; Otherwise 5 mg every day   Dose Comments: Patient has INR checked this morning and was instructed to take 7 mg; Otherwise 5 mg every day.     INR   Date Value Ref Range Status   11/10/2023 1.6 (H) 0.9 - 1.1 Final   11/06/2023 1.8 (H) 0.9 - 1.1 Final     Factor 10 Chromogenic   Date Value Ref Range Status   10/20/2023 31 (L) 70 - 130 % Final       Recommend warfarin 7 mg today.  Pharmacy will monitor Sukh Craven daily and order warfarin doses to achieve specified goal.      Please contact pharmacy as soon as possible if the warfarin needs to be held for a procedure or if the warfarin goals change.
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
## 2023-11-10 NOTE — MEDICATION SCRIBE - ADMISSION MEDICATION HISTORY
Medication Scribe Admission Medication History    Admission medication history is complete. The information provided in this note is only as accurate as the sources available at the time of the update.    Information Source(s): Patient and CareEverywhere/SureScripts via in-person    Pertinent Information:   -Pt was unsure of some medications so supplemented with CareEverywhere/SureScripts  -Pt is unsure of his warfarin dose but stated it might be 6 tabs but not fully sure    Changes made to PTA medication list:  Added: None  Deleted: Triamcinolone 0.1%, Thiamine 100 mg, Miralax, Dronabinol 5 mg  Changed: Senna-docusate 8.6-50 mg (2 tab bid prn) --> Senna-docusate 8.6-50 mg (1 tab bid prn)     Medication Affordability:  Not including over the counter (OTC) medications, was there a time in the past 3 months when you did not take your medications as prescribed because of cost?: No    Allergies reviewed with patient and updates made in EHR: yes    Medication History Completed By: Alla Santos 11/10/2023 5:28 PM    PTA Med List   Medication Sig Last Dose    acetaminophen (TYLENOL) 500 MG tablet Take 1.5 tablets (750 mg) by mouth every 6 hours as needed for other (For optimal non-opioid multimodal pain management to improve pain control.) Unknown at unknown    amLODIPine (NORVASC) 10 MG tablet Take 1 tablet (10 mg) by mouth every morning 11/10/2023 at am    aspirin 81 MG EC tablet Take 1 tablet (81 mg) by mouth daily Start tomorrow morning. 11/10/2023 at am    atorvastatin (LIPITOR) 40 MG tablet Take 1 tablet (40 mg) by mouth daily 11/10/2023 at am    blood glucose (NO BRAND SPECIFIED) test strip Use to test blood sugar one times daily or as directed. To accompany: Blood Glucose Monitor Brands: per insurance.     blood glucose monitoring (NO BRAND SPECIFIED) meter device kit Use to test blood sugar one times daily or as directed. Preferred blood glucose meter OR supplies to accompany: Blood Glucose Monitor Brands: per  insurance.     carvedilol (COREG) 25 MG tablet Take 1 tablet (25 mg) by mouth 2 times daily (with meals) 11/10/2023 at am    Continuous Blood Gluc Sensor (FREESTYLE ALEXA 3 SENSOR) MISC 1 Device every 14 days     escitalopram (LEXAPRO) 10 MG tablet Take 1 tablet (10 mg) by mouth daily 11/10/2023 at am    losartan (COZAAR) 50 MG tablet Take 1 tablet (50 mg) by mouth daily 11/10/2023 at am    metFORMIN (GLUCOPHAGE XR) 500 MG 24 hr tablet Take 1 tablet (500 mg) by mouth daily (with dinner) 11/9/2023 at pm    methocarbamol (ROBAXIN) 750 MG tablet 1 tablet (750 mg) by Oral or Feeding Tube route every 6 hours as needed for muscle spasms or other (pain) 11/9/2023 at pm    multivitamin w/minerals (THERA-VIT-M) tablet Take 1 tablet by mouth daily 11/10/2023 at am    senna-docusate (SENOKOT-S/PERICOLACE) 8.6-50 MG tablet Take 1 tablet by mouth 2 times daily as needed for constipation 11/10/2023 at am    thin (NO BRAND SPECIFIED) lancets Use with lanceting device. To accompany: Blood Glucose Monitor Brands: per insurance.     traMADol (ULTRAM) 50 MG tablet Take 0.5 tablets (25 mg) by mouth every 6 hours as needed for severe pain 11/9/2023 at pm    warfarin ANTICOAGULANT (COUMADIN) 1 MG tablet Take 1 mg on 10/27, take 1 mg on 10/28, take 1 mg on 10/29, have INR checked on 10/30 and dose from there. Lifelong goal INR 2-3. 11/9/2023 at pm

## 2023-11-10 NOTE — PROGRESS NOTES
BRIEF CVTS PROGRESS NOTE    Aware of pending admission from clinic for subtherapeutic INR in setting of recent mechanical AVR.  ED has notified that pt has arrived.  Pt will be admitted to the CVTS service.    Admission orders signed and held.  Started on LIH without bolus in the ED; continue on admission.  Labs pending.    Pharmacy to dose warfarin, appreciate assistance.    Cardiac diet.    PPX:   - famotidine BID   - OOB/up ad kiersten/SCD/heparin gtt    Alec Marc CNP, ACNPC-AG  Cardiothoracic Surgery  Pager [phone]

## 2023-11-10 NOTE — NURSING NOTE
Chief Complaint   Patient presents with    Follow Up     Post Op - Bentall, mechanical AVR,      Vitals were taken and medications reconciled.    Timothy Portillo, EMT  2:53 PM
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
## 2023-11-10 NOTE — PROGRESS NOTES
ANTICOAGULATION MANAGEMENT     Sukh Craven 68 year old male is on warfarin with subtherapeutic INR result. (Goal INR 2.0-3.0)    Recent labs: (last 7 days)     11/10/23  1348   INR 1.6*       ASSESSMENT     Source(s): Chart Review and Patient/Caregiver Call     Warfarin doses taken: Less warfarin taken than planned which may be affecting INR- took 3mg Tues, Weds instead of 4mg.  Diet: No new diet changes identified  Medication/supplement changes: None noted  New illness, injury, or hospitalization: No  Signs or symptoms of bleeding or clotting: No  Previous result: Subtherapeutic  [facility]       PLAN     Recommended plan for temporary change(s) affecting INR     Dosing Instructions: booster dose then Increase your warfarin dose (35% change) with next INR in 3 days       Summary  As of 11/10/2023      Full warfarin instructions:  11/10: 7 mg; Otherwise 5 mg every day   Next INR check:  11/13/2023             Unable to communicate this plan to patient before he was transferred to [facility] ED for Heparin Drip.     Ned Mcbride RN  Anticoagulation Clinic  11/10/2023    _______________________________________________________________________     Anticoagulation Episode Summary       Current INR goal:  2.0-3.0   TTR:  0.0% (1.4 wk)   Target end date:  Indefinite   Send INR reminders to:  DAVON PRATT    Indications    S/P AVR (aortic valve replacement) [Z95.2]  Current use of long term anticoagulation [Z79.01]  Paroxysmal atrial fibrillation (H) [I48.0]  S/P CABG (coronary artery bypass graft) [Z95.1]             Comments:               Anticoagulation Care Providers       Provider Role Specialty Phone number    Mariola Peterson PA-C Referring Cardiology [phone]    Alec Marc NP Referring Cardiovascular & Thoracic Surgery [phone]    Parrish Stephenson MD Referring Family Medicine [phone]    Cesar Dong APRN CNP Referring Family  Medicine [phone]

## 2023-11-11 LAB
ANION GAP SERPL CALCULATED.3IONS-SCNC: 12 MMOL/L (ref 7–15)
BUN SERPL-MCNC: 9.8 MG/DL (ref 8–23)
CALCIUM SERPL-MCNC: 9.8 MG/DL (ref 8.8–10.2)
CHLORIDE SERPL-SCNC: 105 MMOL/L (ref 98–107)
CHOLEST SERPL-MCNC: 139 MG/DL
CREAT SERPL-MCNC: 0.94 MG/DL (ref 0.67–1.17)
DEPRECATED HCO3 PLAS-SCNC: 23 MMOL/L (ref 22–29)
EGFRCR SERPLBLD CKD-EPI 2021: 89 ML/MIN/1.73M2
GLUCOSE BLDC GLUCOMTR-MCNC: 100 MG/DL (ref 70–99)
GLUCOSE BLDC GLUCOMTR-MCNC: 118 MG/DL (ref 70–99)
GLUCOSE BLDC GLUCOMTR-MCNC: 118 MG/DL (ref 70–99)
GLUCOSE SERPL-MCNC: 142 MG/DL (ref 70–99)
HDLC SERPL-MCNC: 38 MG/DL
HOLD SPECIMEN: NORMAL
HOLD SPECIMEN: NORMAL
INR PPP: 1.74 (ref 0.85–1.15)
LDLC SERPL CALC-MCNC: 81 MG/DL
NONHDLC SERPL-MCNC: 101 MG/DL
POTASSIUM SERPL-SCNC: 4.2 MMOL/L (ref 3.4–5.3)
SODIUM SERPL-SCNC: 140 MMOL/L (ref 135–145)
TRIGL SERPL-MCNC: 100 MG/DL
UFH PPP CHRO-ACNC: 0.39 IU/ML
UFH PPP CHRO-ACNC: 0.4 IU/ML
UFH PPP CHRO-ACNC: 0.49 IU/ML

## 2023-11-11 PROCEDURE — 250N000013 HC RX MED GY IP 250 OP 250 PS 637: Performed by: STUDENT IN AN ORGANIZED HEALTH CARE EDUCATION/TRAINING PROGRAM

## 2023-11-11 PROCEDURE — 85610 PROTHROMBIN TIME: CPT | Performed by: SURGERY

## 2023-11-11 PROCEDURE — 250N000013 HC RX MED GY IP 250 OP 250 PS 637: Performed by: SURGERY

## 2023-11-11 PROCEDURE — 85520 HEPARIN ASSAY: CPT | Performed by: THORACIC SURGERY (CARDIOTHORACIC VASCULAR SURGERY)

## 2023-11-11 PROCEDURE — 36415 COLL VENOUS BLD VENIPUNCTURE: CPT | Performed by: FAMILY MEDICINE

## 2023-11-11 PROCEDURE — 36415 COLL VENOUS BLD VENIPUNCTURE: CPT | Performed by: THORACIC SURGERY (CARDIOTHORACIC VASCULAR SURGERY)

## 2023-11-11 PROCEDURE — 36415 COLL VENOUS BLD VENIPUNCTURE: CPT | Performed by: NURSE PRACTITIONER

## 2023-11-11 PROCEDURE — 250N000011 HC RX IP 250 OP 636: Mod: JZ | Performed by: FAMILY MEDICINE

## 2023-11-11 PROCEDURE — 85520 HEPARIN ASSAY: CPT | Performed by: NURSE PRACTITIONER

## 2023-11-11 PROCEDURE — 120N000003 HC R&B IMCU UMMC

## 2023-11-11 PROCEDURE — 85520 HEPARIN ASSAY: CPT | Performed by: FAMILY MEDICINE

## 2023-11-11 RX ORDER — NALOXONE HYDROCHLORIDE 0.4 MG/ML
0.2 INJECTION, SOLUTION INTRAMUSCULAR; INTRAVENOUS; SUBCUTANEOUS
Status: DISCONTINUED | OUTPATIENT
Start: 2023-11-11 | End: 2023-11-13 | Stop reason: HOSPADM

## 2023-11-11 RX ORDER — NALOXONE HYDROCHLORIDE 0.4 MG/ML
0.4 INJECTION, SOLUTION INTRAMUSCULAR; INTRAVENOUS; SUBCUTANEOUS
Status: DISCONTINUED | OUTPATIENT
Start: 2023-11-11 | End: 2023-11-13 | Stop reason: HOSPADM

## 2023-11-11 RX ADMIN — CARVEDILOL 25 MG: 25 TABLET, FILM COATED ORAL at 17:33

## 2023-11-11 RX ADMIN — WARFARIN SODIUM 7 MG: 6 TABLET ORAL at 17:35

## 2023-11-11 RX ADMIN — ESCITALOPRAM OXALATE 10 MG: 10 TABLET ORAL at 08:17

## 2023-11-11 RX ADMIN — ATORVASTATIN CALCIUM 40 MG: 40 TABLET, FILM COATED ORAL at 08:17

## 2023-11-11 RX ADMIN — METFORMIN ER 500 MG 500 MG: 500 TABLET ORAL at 17:31

## 2023-11-11 RX ADMIN — AMLODIPINE BESYLATE 10 MG: 10 TABLET ORAL at 08:17

## 2023-11-11 RX ADMIN — ASPIRIN 81 MG: 81 TABLET, COATED ORAL at 08:17

## 2023-11-11 RX ADMIN — HEPARIN SODIUM AND DEXTROSE 1200 UNITS/HR: 10000; 5 INJECTION INTRAVENOUS at 11:42

## 2023-11-11 RX ADMIN — CARVEDILOL 25 MG: 25 TABLET, FILM COATED ORAL at 08:17

## 2023-11-11 RX ADMIN — THIAMINE HCL TAB 100 MG 100 MG: 100 TAB at 08:17

## 2023-11-11 RX ADMIN — FAMOTIDINE 10 MG: 10 TABLET, FILM COATED ORAL at 21:03

## 2023-11-11 RX ADMIN — LOSARTAN POTASSIUM 50 MG: 50 TABLET, FILM COATED ORAL at 08:17

## 2023-11-11 RX ADMIN — Medication 1 TABLET: at 08:17

## 2023-11-11 RX ADMIN — FAMOTIDINE 10 MG: 10 TABLET, FILM COATED ORAL at 08:17

## 2023-11-11 ASSESSMENT — ACTIVITIES OF DAILY LIVING (ADL)
ADLS_ACUITY_SCORE: 20
ADLS_ACUITY_SCORE: 37
ADLS_ACUITY_SCORE: 20
ADLS_ACUITY_SCORE: 37

## 2023-11-11 NOTE — PROGRESS NOTES
Admission   Diagnosis: subtherapeutic INR   Admitted from: [facility]   Via: wheelchair  Accompanied by: self  Belongings: remain with patient at bedside. (Clothing, shoes, cell phone)  Admission paperwork: complete   Teaching: Call don't fall, use of console, meal times, visiting hours, orientation to unit, when to call for the RN (angina/sob/dizzyness, etc.), and the importance of safety.   Access: PIV   Telemetry:Placed on pt   Ht./Wt.: complete     Two nurse head-to-toe skin assessment performed by Marie DASILVA RN and Venita ANGELO RN.  Skin issues noted: old scars from surgery last month, other wise no skin issues noted.

## 2023-11-11 NOTE — PROGRESS NOTES
Cardiovascular Surgery Progress Note  11/11/2023         Assessment and Plan:     Sukh Craven is a 68 year old male with a PMH of severe aortic insufficiency 2/2 sinotubular aortic root dilitation (sinus of valsalva 5.3 cm), CAD (mid RCA 90% stenosed, dCx 35% stenosed, mid LAD 70% stenosed), PAD (mild-mod left lower extremity), bilateral popliteal aneurysms, HTN, T2DM, and chronic opioid/cannabis use.  Presents to clinic for a follow-up appointment after surgery. He is s/p CABG x2, and Bentall procedure with On-X mechanical aortic valve on 10/13/23 with Dr. Lockett. Hospital course was remarkable for post-operative ileus and significant anxiety. Patient was discharged home with family on 10/27/23.     Readmitted from clinic 11/10 for subtherapeutic INR requiring heparin infusion bridge.    Cardiovascular:   CAD s/p CABG x2  Severe AI 2/2 aortic root aneurysm s/p On-X mechanical Bentall  PMH PAF  1st degree AV block, resolved  Hypertension   - ASA 81 mg   - PTA atorvastatin continued   - PTA Coreg continued   - PTA amlodipine continued   - PTA losartan continued   - AC as below    Pulmonary:  - Supplemental O2 PRN to keep sats > 92%.   - Pulm toilet, IS, OOB/activity and deep breathing    Neurology / MSK:  Acute post-operative pain  - Multimodal analgesia with acetaminophen, prn Ultram, prn Robaxin    PMH Anxiety  PMH Chronic cannabis use   - PTA Lexapro continued    S/p sternotomy   - PT/OT/CR consults   - Sternal precautions     / Renal / FE:  - BL SCr ~1-1.12 . Most recent creatinine WNL  - Strict I/O, daily standing weights per protocol    GI / Nutrition:   - Cardiac diet  - PRN bowel regimen    Endocrine:  DM2   - PTA metformin continued    Infectious Disease:  - WBC WNL, afebrile, no signs or symptoms of infection    Hematology:   Acute blood loss anemia  Hgb stable; Plt WNL    Anticoagulation:   Chronic warfarin anticoagulation for mechanical AV, afib  - ASA 81 mg  - Coumadin for mechanical valve, INR  "goal 2-3. Most recent INR subtherapeutic.    - Low intensity heparin gtt bridging to therapeutic INR.    Prophylaxis:   - Stress ulcer prophylaxis: famotidine BID while hospitalized  - DVT prophylaxis: low intensity heparin gtt, SCD, OOB/activity    Disposition:    - Discharge home once INR therapeutic, follow up with warfarin clinic for ongoing dosing.    Alec Marc, CNP, ACN-  Cardiothoracic Surgery  [employer] Pager [phone]        Interval History:     No overnight events.  Slept poorly 2/2 environmental factors in the ED.  No pain or dyspnea.  Tolerating diet, no nausea or vomiting, denies constipation and diarrhea.  Denies chest pain, dizziness/lightheadedness, and sternal popping/clicking/snapping.         Physical Exam:   Blood pressure 121/77, pulse 78, temperature 97.9  F (36.6  C), temperature source Oral, resp. rate 18, height 1.956 m (6' 5\"), weight 86.2 kg (190 lb), SpO2 98%.  Vitals:    11/10/23 1557   Weight: 86.2 kg (190 lb)     Gen: NAD, resting quietly in bed in a dimly lit ED room, pleasant, calm, conversant, cooperative on exam  Neuro: A&Ox4, no focal deficits, face symmetric, speech clear, JESSICA  CV: WWP  Pulm: RRR on monitor, unlabored breathing on RA  Abd: SNDNT  Ext: no peripheral edema  Incision: clean, dry, intact, no erythema or induration, sternum stable  Tubes/drain sites: scabbed over/healed, ALEE            Data:    Imaging:      No results found for this or any previous visit (from the past 24 hour(s)).    Labs:  BMP  Recent Labs   Lab 11/10/23  1652 11/10/23  1348    140   POTASSIUM 4.2 4.2   CHLORIDE 106 105   DORIS 9.2 9.8   CO2 22 23   BUN 11.4 9.8   CR 0.91 0.94   GLC 79 142*     CBC  Recent Labs   Lab 11/10/23  1652   WBC 5.1   RBC 3.32*   HGB 9.7*   HCT 30.8*   MCV 93   MCH 29.2   MCHC 31.5   RDW 13.3        INR  Recent Labs   Lab 11/11/23  0526 11/10/23  1652 11/10/23  1348 11/06/23  1427   INR 1.74* 1.64* 1.6* 1.8*      Hepatic Panel  Recent Labs   Lab " 11/10/23  1652   AST 19   ALT 21   ALKPHOS 100   BILITOTAL 0.4   ALBUMIN 3.8     GLUCOSE:   Recent Labs   Lab 11/10/23  1652 11/10/23  1348   GLC 79 142*

## 2023-11-12 LAB
GLUCOSE BLDC GLUCOMTR-MCNC: 79 MG/DL (ref 70–99)
GLUCOSE BLDC GLUCOMTR-MCNC: 89 MG/DL (ref 70–99)
GLUCOSE BLDC GLUCOMTR-MCNC: 95 MG/DL (ref 70–99)
INR PPP: 1.85 (ref 0.85–1.15)
UFH PPP CHRO-ACNC: 0.48 IU/ML

## 2023-11-12 PROCEDURE — 250N000013 HC RX MED GY IP 250 OP 250 PS 637: Performed by: SURGERY

## 2023-11-12 PROCEDURE — 120N000003 HC R&B IMCU UMMC

## 2023-11-12 PROCEDURE — 85520 HEPARIN ASSAY: CPT | Performed by: PHYSICIAN ASSISTANT

## 2023-11-12 PROCEDURE — 250N000013 HC RX MED GY IP 250 OP 250 PS 637: Performed by: THORACIC SURGERY (CARDIOTHORACIC VASCULAR SURGERY)

## 2023-11-12 PROCEDURE — 250N000011 HC RX IP 250 OP 636: Mod: JZ | Performed by: FAMILY MEDICINE

## 2023-11-12 PROCEDURE — 85610 PROTHROMBIN TIME: CPT | Performed by: SURGERY

## 2023-11-12 PROCEDURE — 36415 COLL VENOUS BLD VENIPUNCTURE: CPT | Performed by: PHYSICIAN ASSISTANT

## 2023-11-12 RX ORDER — WARFARIN SODIUM 6 MG/1
6 TABLET ORAL
Status: COMPLETED | OUTPATIENT
Start: 2023-11-12 | End: 2023-11-12

## 2023-11-12 RX ADMIN — CARVEDILOL 25 MG: 25 TABLET, FILM COATED ORAL at 08:52

## 2023-11-12 RX ADMIN — HEPARIN SODIUM AND DEXTROSE 1200 UNITS/HR: 10000; 5 INJECTION INTRAVENOUS at 07:37

## 2023-11-12 RX ADMIN — ASPIRIN 81 MG: 81 TABLET, COATED ORAL at 08:51

## 2023-11-12 RX ADMIN — FAMOTIDINE 10 MG: 10 TABLET, FILM COATED ORAL at 08:52

## 2023-11-12 RX ADMIN — THIAMINE HCL TAB 100 MG 100 MG: 100 TAB at 08:52

## 2023-11-12 RX ADMIN — ATORVASTATIN CALCIUM 40 MG: 40 TABLET, FILM COATED ORAL at 08:52

## 2023-11-12 RX ADMIN — METFORMIN ER 500 MG 500 MG: 500 TABLET ORAL at 17:53

## 2023-11-12 RX ADMIN — FAMOTIDINE 10 MG: 10 TABLET, FILM COATED ORAL at 20:01

## 2023-11-12 RX ADMIN — AMLODIPINE BESYLATE 10 MG: 10 TABLET ORAL at 08:50

## 2023-11-12 RX ADMIN — LOSARTAN POTASSIUM 50 MG: 50 TABLET, FILM COATED ORAL at 08:53

## 2023-11-12 RX ADMIN — ESCITALOPRAM OXALATE 10 MG: 10 TABLET ORAL at 08:51

## 2023-11-12 RX ADMIN — Medication 1 TABLET: at 08:52

## 2023-11-12 RX ADMIN — WARFARIN SODIUM 6 MG: 6 TABLET ORAL at 17:54

## 2023-11-12 ASSESSMENT — ACTIVITIES OF DAILY LIVING (ADL)
ADLS_ACUITY_SCORE: 22
ADLS_ACUITY_SCORE: 20
ADLS_ACUITY_SCORE: 22

## 2023-11-12 NOTE — DISCHARGE SUMMARY
[facility], [place]   Cardiothoracic Surgery Hospital Discharge Summary     Sukh Craven MRN# [id number]   Age: 68 year old YOB: 1955     Admitting Physician:  Alexis Lockett MD  Discharge Physician:  Alec Marc NP  Primary Care Physician:        Parrish Stephenson      DATE OF ADMISSION: 11/10/2023      DATE OF DISCHARGE: 11/13/2023         Primary Diagnoses:   Subtherapeutic INR  Chronic warfarin anticoagulation for mechanical AVR          Secondary Diagnoses:   Acute post-operative pain  Acute blood loss anemia, stable  Hypertension  DM2      BRIEF HISTORY OF ILLNESS:  Sukh Craven is a 68 year old male with a PMH of severe aortic insufficiency 2/2 sinotubular aortic root dilitation (sinus of valsalva 5.3 cm), CAD (mid RCA 90% stenosed, dCx 35% stenosed, mid LAD 70% stenosed), PAD (mild-mod left lower extremity), bilateral popliteal aneurysms, HTN, T2DM, and chronic opioid/cannabis use.  He was hospitalized from 10/13/23 to 10/27/23 for elective CABG x2 and mechanical (OnX) Bentall by Dr. Lockett.  Hospital course was remarkable for post-operative ileus and significant anxiety; he discharged home.    He presented to clinic on 11/10/23 for a routine post-op follow-up appointment.     HOSPITAL COURSE: Sukh Craven is a 68 year old male who on 11/10/2023 was admitted from CV Surgery clinic with subtherapeutic INR in the setting of recent mechanical AVR.     He was started on a low-intensity heparin infusion and Pharmacy was consulted to assist with warfarin dosing.  His INR gradually ashley and was 2.1 on the day of discharge.  He will continue his current dose of 6 mg of warfarin nightly until his next OP INR check on Wednesday, 11/15/2023 at which point dose adjustments will be per the warfarin clinic. In his initial warfarin titration period, INRs should be checked via phlebotomy draw rather than POCT.    Prior to discharge, his pain was controlled  "well, he was able to perform most ADLs, ambulate without difficulty, and had full return of bowel and bladder function.  On 11/13/23, he was discharged home in good condition. Follow up with Cardiology has been arranged; he has already followed up with CV Surgery and no further follow up is necessary. Pt encouraged to follow up with PCP and Cardiac Rehab upon discharge.         Discharge Disposition:     Discharged home            Condition on Discharge:     Discharge condition: Good   Discharge vitals: /71   Pulse 74   Temp 98.1  F (36.7  C) (Oral)   Resp 18   Ht 1.981 m (6' 6\")   Wt 86.4 kg (190 lb 8 oz)   SpO2 98%   BMI 22.01 kg/m     Code status on discharge: Full Code     Vitals:    11/10/23 1557 11/11/23 1310 11/13/23 0431   Weight: 86.2 kg (190 lb) 88 kg (194 lb 1.6 oz) 86.4 kg (190 lb 8 oz)       DAY OF DISCHARGE PHYSICAL EXAM:    Gen: NAD, sitting at edge of bed, pleasant, calm, conversant, cooperative on exam  Neuro: A&Ox4, no focal deficits, JESSICA, face symmetric, speech clear  CV: RRR with valve click, no murmurs, rubs or gallops  Pulm: unlabored breathing on RA  Abd: SNTND  Ext: no peripheral edema  Incision: clean, dry, intact, no erythema, sternum stable  Tubes/drain sites: scabbed over, ALEE    BMP  Recent Labs   Lab 11/13/23  0006 11/12/23  1840 11/12/23  1524 11/12/23  0942 11/11/23  1345 11/10/23  1652 11/10/23  1348   NA  --   --   --   --   --  140 140   POTASSIUM  --   --   --   --   --  4.2 4.2   CHLORIDE  --   --   --   --   --  106 105   DORIS  --   --   --   --   --  9.2 9.8   CO2  --   --   --   --   --  22 23   BUN  --   --   --   --   --  11.4 9.8   CR  --   --   --   --   --  0.91 0.94   GLC 98 89 79 95   < > 79 142*    < > = values in this interval not displayed.     CBC  Recent Labs   Lab 11/13/23  0520 11/10/23  1652   WBC 5.1 5.1   RBC 3.15* 3.32*   HGB 9.3* 9.7*   HCT 28.9* 30.8*   MCV 92 93   MCH 29.5 29.2   MCHC 32.2 31.5   RDW 13.4 13.3    316     INR  Recent Labs "   Lab 11/13/23  0520 11/12/23  0621 11/11/23  0526 11/10/23  1652   INR 2.10* 1.85* 1.74* 1.64*      Hepatic Panel  Recent Labs   Lab 11/10/23  1652   AST 19   ALT 21   ALKPHOS 100   BILITOTAL 0.4   ALBUMIN 3.8     Recent Labs   Lab 11/13/23  0006 11/12/23  1840 11/12/23  1524 11/12/23  0942 11/11/23  2213 11/11/23  1717   GLC 98 89 79 95 100* 118*       PRE-ADMISSION MEDICATIONS:  Medications Prior to Admission   Medication Sig Dispense Refill Last Dose    amLODIPine (NORVASC) 10 MG tablet Take 1 tablet (10 mg) by mouth every morning 90 tablet 3 11/10/2023 at am    aspirin 81 MG EC tablet Take 1 tablet (81 mg) by mouth daily Start tomorrow morning. 90 tablet 3 11/10/2023 at am    atorvastatin (LIPITOR) 40 MG tablet Take 1 tablet (40 mg) by mouth daily 90 tablet 3 11/10/2023 at am    blood glucose (NO BRAND SPECIFIED) test strip Use to test blood sugar one times daily or as directed. To accompany: Blood Glucose Monitor Brands: per insurance. 100 strip 6     blood glucose monitoring (NO BRAND SPECIFIED) meter device kit Use to test blood sugar one times daily or as directed. Preferred blood glucose meter OR supplies to accompany: Blood Glucose Monitor Brands: per insurance. 1 kit 0     carvedilol (COREG) 25 MG tablet Take 1 tablet (25 mg) by mouth 2 times daily (with meals) 60 tablet 3 11/10/2023 at am    Continuous Blood Gluc Sensor (FREESTYLE ALEXA 3 SENSOR) MISC 1 Device every 14 days 6 each 3     escitalopram (LEXAPRO) 10 MG tablet Take 1 tablet (10 mg) by mouth daily 90 tablet 3 11/10/2023 at am    losartan (COZAAR) 50 MG tablet Take 1 tablet (50 mg) by mouth daily 90 tablet 3 11/10/2023 at am    metFORMIN (GLUCOPHAGE XR) 500 MG 24 hr tablet Take 1 tablet (500 mg) by mouth daily (with dinner) 90 tablet 3 11/9/2023 at pm    multivitamin w/minerals (THERA-VIT-M) tablet Take 1 tablet by mouth daily 90 tablet 0 11/10/2023 at am    thin (NO BRAND SPECIFIED) lancets Use with lanceting device. To accompany: Blood  Glucose Monitor Brands: per insurance. 100 each 6     [DISCONTINUED] acetaminophen (TYLENOL) 500 MG tablet Take 1.5 tablets (750 mg) by mouth every 6 hours as needed for other (For optimal non-opioid multimodal pain management to improve pain control.)   Unknown at unknown    [DISCONTINUED] methocarbamol (ROBAXIN) 750 MG tablet 1 tablet (750 mg) by Oral or Feeding Tube route every 6 hours as needed for muscle spasms or other (pain) 60 tablet 0 11/9/2023 at pm    [DISCONTINUED] senna-docusate (SENOKOT-S/PERICOLACE) 8.6-50 MG tablet Take 1 tablet by mouth 2 times daily as needed for constipation   11/10/2023 at am    [DISCONTINUED] traMADol (ULTRAM) 50 MG tablet Take 0.5 tablets (25 mg) by mouth every 6 hours as needed for severe pain 15 tablet 0 11/9/2023 at pm    [DISCONTINUED] warfarin ANTICOAGULANT (COUMADIN) 1 MG tablet Take 1 mg on 10/27, take 1 mg on 10/28, take 1 mg on 10/29, have INR checked on 10/30 and dose from there. Lifelong goal INR 2-3. 90 tablet 3 11/9/2023 at pm         DISCHARGE MEDICATIONS:      Review of your medicines        CONTINUE these medicines which may have CHANGED, or have new prescriptions. If we are uncertain of the size of tablets/capsules you have at home, strength may be listed as something that might have changed.        Dose / Directions   acetaminophen 500 MG tablet  Commonly known as: TYLENOL  This may have changed:   how much to take  when to take this  Used for: S/P AVR (aortic valve replacement), S/P ascending aortic replacement, S/P CABG (coronary artery bypass graft)      Dose: 500-1,000 mg  Take 1-2 tablets (500-1,000 mg) by mouth every 8 hours as needed for other (For optimal non-opioid multimodal pain management to improve pain control.)  Refills: 0     methocarbamol 750 MG tablet  Commonly known as: ROBAXIN  This may have changed: when to take this  Used for: S/P AVR (aortic valve replacement), S/P ascending aortic replacement, S/P CABG (coronary artery bypass graft)       Dose: 750 mg  1 tablet (750 mg) by Oral or Feeding Tube route every 8 hours as needed for muscle spasms or other (pain)  Quantity: 60 tablet  Refills: 0     traMADol 50 MG tablet  Commonly known as: ULTRAM  This may have changed: when to take this  Used for: S/P AVR (aortic valve replacement), S/P ascending aortic replacement, S/P CABG (coronary artery bypass graft)      Dose: 25 mg  Take 0.5 tablets (25 mg) by mouth every 12 hours as needed for severe pain  Refills: 0     warfarin ANTICOAGULANT 3 MG tablet  Commonly known as: COUMADIN  This may have changed:   medication strength  how much to take  how to take this  when to take this  additional instructions      Dose: 6 mg  Take as directed. If you are unsure how to take this medication, talk to your nurse or doctor.  Original instructions: Take 2 tablets (6 mg) by mouth daily Take 2 tablets (6 mg) 11/13 & 11/14 then, further dosing at the direction of your warfarin clinic.  Quantity: 30 tablet  Refills: 0            CONTINUE these medicines which have NOT CHANGED        Dose / Directions   amLODIPine 10 MG tablet  Commonly known as: NORVASC  Used for: Hypertension goal BP (blood pressure) < 140/90      Dose: 10 mg  Take 1 tablet (10 mg) by mouth every morning  Quantity: 90 tablet  Refills: 3     aspirin 81 MG EC tablet  Used for: Acute chest pain      Dose: 81 mg  Take 1 tablet (81 mg) by mouth daily Start tomorrow morning.  Quantity: 90 tablet  Refills: 3     atorvastatin 40 MG tablet  Commonly known as: LIPITOR  Used for: Acute chest pain      Dose: 40 mg  Take 1 tablet (40 mg) by mouth daily  Quantity: 90 tablet  Refills: 3     blood glucose monitoring meter device kit  Commonly known as: NO BRAND SPECIFIED  Used for: Type 2 diabetes mellitus without complication, without long-term current use of insulin (H)      Use to test blood sugar one times daily or as directed. Preferred blood glucose meter OR supplies to accompany: Blood Glucose Monitor Brands: per  insurance.  Quantity: 1 kit  Refills: 0     blood glucose test strip  Commonly known as: NO BRAND SPECIFIED  Used for: Type 2 diabetes mellitus without complication, without long-term current use of insulin (H)      Use to test blood sugar one times daily or as directed. To accompany: Blood Glucose Monitor Brands: per insurance.  Quantity: 100 strip  Refills: 6     carvedilol 25 MG tablet  Commonly known as: COREG  Used for: S/P AVR (aortic valve replacement), S/P ascending aortic replacement, S/P CABG (coronary artery bypass graft)      Dose: 25 mg  Take 1 tablet (25 mg) by mouth 2 times daily (with meals)  Quantity: 60 tablet  Refills: 3     escitalopram 10 MG tablet  Commonly known as: LEXAPRO  Used for: S/P AVR (aortic valve replacement), S/P ascending aortic replacement, S/P CABG (coronary artery bypass graft)      Dose: 10 mg  Take 1 tablet (10 mg) by mouth daily  Quantity: 90 tablet  Refills: 3     FreeStyle Daisha 3 Sensor Misc  Used for: Paroxysmal atrial fibrillation (H), S/P CABG (coronary artery bypass graft)      Dose: 1 Device  1 Device every 14 days  Quantity: 6 each  Refills: 3     losartan 50 MG tablet  Commonly known as: COZAAR  Used for: Aneurysm of ascending aorta without rupture (H24), Essential hypertension      Dose: 50 mg  Take 1 tablet (50 mg) by mouth daily  Quantity: 90 tablet  Refills: 3     metFORMIN 500 MG 24 hr tablet  Commonly known as: GLUCOPHAGE XR  Used for: Type 2 diabetes mellitus without complication, without long-term current use of insulin (H)      Dose: 500 mg  Take 1 tablet (500 mg) by mouth daily (with dinner)  Quantity: 90 tablet  Refills: 3     multivitamin w/minerals tablet  Used for: S/P AVR (aortic valve replacement), S/P ascending aortic replacement, S/P CABG (coronary artery bypass graft)      Dose: 1 tablet  Take 1 tablet by mouth daily  Quantity: 90 tablet  Refills: 0     thin lancets  Commonly known as: NO BRAND SPECIFIED  Used for: Type 2 diabetes mellitus without  complication, without long-term current use of insulin (H)      Use with lanceting device. To accompany: Blood Glucose Monitor Brands: per insurance.  Quantity: 100 each  Refills: 6            STOP taking      senna-docusate 8.6-50 MG tablet  Commonly known as: SENOKOT-S/PERICOLACE                  Where to get your medicines        These medications were sent to [facility] [place] - [address]  [address] [address]      Phone: [phone]   methocarbamol 750 MG tablet  warfarin ANTICOAGULANT 3 MG tablet       Some of these will need a paper prescription and others can be bought over the counter. Ask your nurse if you have questions.    You don't need a prescription for these medications  acetaminophen 500 MG tablet         Anticoagulation Dose History  More data exists         Latest Ref Rng & Units 10/26/2023 10/27/2023 10/30/2023 11/3/2023 11/6/2023 11/10/2023 11/11/2023   Recent Dosing and Labs   warfarin ANTICOAGULANT (COUMADIN) tablet 7 mg - - - - - - 7 mg, $Given 7 mg, $Given   INR 0.85 - 1.15 2.47  2.22  1.35  1.6  1.8  1.64  1.6  1.74        CC  Parrish Stephenson'ravinder Lan, Wilton Marc, CNP, ACNPC-AG  [facility]   Cardiothoracic Surgery  Office phone: [phone]  Office fax: [phone]

## 2023-11-12 NOTE — PLAN OF CARE
"[id number]    V/S: VSS, afebrile. -120s. HR 60-90s.  Neuro: Pt is A&O x4, no c/o headache & lightheadedness. No c/o numbness & tingling, calls appropriately.  Resp: RA. Sats > 95, no c/o SOB. Lung sounds clear & diminished bilaterally.  Cardiac: Tele SR. No c/o chest pain & palpitations.  GI/: BG checks ACHS. Cardiac diet, tolerating well. No FR. No c/o n/v/d. Voiding adequately via toilet. Last BM 11/11 per pt report.  Skin: Skin WDL. No new deficits noted.  Pain: No c/o pain.  Activity: Independent in room & in hallways.  Electrolytes: Labs will be drawn in am.  LDAs: heparin gtt @ 1200 units/hr. L PIV infusing & WDL.     Anti-xa recheck ~1930 therapeutic, recheck in am    Plan of care ongoing.  Patient currently resting in bed with call light in reach.     Goal Outcome Evaluation:    Overall Patient Progress: improving  Outcome Evaluation: pt had 3rd therapeutic anti xa, redraw in am. Pt , no insulin required during shift.      Problem: Adult Inpatient Plan of Care  Goal: Plan of Care Review  Description: The Plan of Care Review/Shift note should be completed every shift.  The Outcome Evaluation is a brief statement about your assessment that the patient is improving, declining, or no change.  This information will be displayed automatically on your shift  note.  Outcome: Not Progressing  Flowsheets (Taken 11/11/2023 2237)  Outcome Evaluation: pt had 3rd therapeutic anti xa, redraw in am. Pt , no insulin required during shift.  Overall Patient Progress: improving  Goal: Patient-Specific Goal (Individualized)  Description: You can add care plan individualizations to a care plan. Examples of Individualization might be:  \"Parent requests to be called daily at 9am for status\", \"I have a hard time hearing out of my right ear\", or \"Do not touch me to wake me up as it startles  me\".  Outcome: Not Progressing  Goal: Absence of Hospital-Acquired Illness or Injury  Outcome: Not " Progressing  Intervention: Identify and Manage Fall Risk  Recent Flowsheet Documentation  Taken 11/11/2023 2100 by Tomasa Mojica RN  Safety Promotion/Fall Prevention:   assistive device/personal items within reach   clutter free environment maintained   nonskid shoes/slippers when out of bed  Intervention: Prevent Skin Injury  Recent Flowsheet Documentation  Taken 11/11/2023 2100 by Tomasa Mojica RN  Body Position: position changed independently  Intervention: Prevent and Manage VTE (Venous Thromboembolism) Risk  Recent Flowsheet Documentation  Taken 11/11/2023 2100 by Tomasa Mojica RN  VTE Prevention/Management: (heparin drip) other (see comments)  Intervention: Prevent Infection  Recent Flowsheet Documentation  Taken 11/11/2023 2100 by Tomasa Mojica RN  Infection Prevention: hand hygiene promoted  Goal: Optimal Comfort and Wellbeing  Outcome: Not Progressing  Intervention: Monitor Pain and Promote Comfort  Recent Flowsheet Documentation  Taken 11/11/2023 2100 by Tomasa Mojica RN  Pain Management Interventions: declines  Goal: Readiness for Transition of Care  Outcome: Not Progressing     Problem: Comorbidity Management  Goal: Blood Glucose Levels Within Targeted Range  Outcome: Not Progressing  Intervention: Monitor and Manage Glycemia  Recent Flowsheet Documentation  Taken 11/11/2023 2100 by Tomasa Mojica RN  Glycemic Management: blood glucose monitored  Medication Review/Management: medications reviewed

## 2023-11-12 NOTE — PROGRESS NOTES
Cardiovascular Surgery Progress Note  11/12/2023         Assessment and Plan:     Sukh Craven is a 68 year old male with a PMH of severe aortic insufficiency 2/2 sinotubular aortic root dilitation (sinus of valsalva 5.3 cm), CAD (mid RCA 90% stenosed, dCx 35% stenosed, mid LAD 70% stenosed), PAD (mild-mod left lower extremity), bilateral popliteal aneurysms, HTN, T2DM, and chronic opioid/cannabis use.  Presents to clinic for a follow-up appointment after surgery. He is s/p CABG x2, and Bentall procedure with On-X mechanical aortic valve on 10/13/23 with Dr. Lockett. Hospital course was remarkable for post-operative ileus and significant anxiety. Patient was discharged home with family on 10/27/23.     Readmitted from clinic 11/10 for subtherapeutic INR requiring heparin infusion bridge.    Cardiovascular:   CAD s/p CABG x2  Severe AI 2/2 aortic root aneurysm s/p On-X mechanical Bentall  PMH PAF  1st degree AV block, resolved  Hypertension   - ASA 81 mg   - PTA atorvastatin continued   - PTA Coreg continued   - PTA amlodipine continued   - PTA losartan continued   - AC as below    Pulmonary:  - Supplemental O2 PRN to keep sats > 92%.   - Pulm toilet, IS, OOB/activity and deep breathing    Neurology / MSK:  Acute post-operative pain  - Multimodal analgesia with acetaminophen, prn Ultram, prn Robaxin    PMH Anxiety  PMH Chronic cannabis use   - PTA Lexapro continued    S/p sternotomy   - Sternal precautions     / Renal / FE:  - BL SCr ~1-1.12 . Most recent creatinine WNL  - Strict I/O, daily standing weights per protocol    GI / Nutrition:   - Cardiac diet  - PRN bowel regimen    Endocrine:  DM2   - PTA metformin continued    Infectious Disease:  - WBC WNL, afebrile, no signs or symptoms of infection    Hematology:   Acute blood loss anemia  Hgb stable; Plt WNL    Anticoagulation:   Chronic warfarin anticoagulation for mechanical AV, afib  - ASA 81 mg  - Coumadin for mechanical valve, INR goal 2-3. Most recent  "INR subtherapeutic.    - Low intensity heparin gtt bridging to therapeutic INR    Prophylaxis:   - Stress ulcer prophylaxis: famotidine BID while hospitalized  - DVT prophylaxis: low intensity heparin gtt/ warfarin, SCD, OOB/activity    Disposition:    - Discharge home once INR therapeutic, follow up with warfarin clinic for ongoing dosing.    Alec Marc CNP, [facility]-  Cardiothoracic Surgery  [employer] Pager [phone]        Interval History:     No overnight events.  Slept a little better than the night before.  Flat affect, questions why his INR isn't rising faster given the significantly higher warfarin dose he has been getting the past few days.  No pain or dyspnea.  Tolerating diet, no nausea or vomiting, denies constipation and diarrhea.  Denies chest pain, dizziness/lightheadedness, and sternal popping/clicking/snapping.         Physical Exam:   Blood pressure 114/82, pulse 84, temperature 98.3  F (36.8  C), temperature source Oral, resp. rate 19, height 1.981 m (6' 6\"), weight 88 kg (194 lb 1.6 oz), SpO2 100%.  Vitals:    11/10/23 1557 11/11/23 1310   Weight: 86.2 kg (190 lb) 88 kg (194 lb 1.6 oz)     Gen: NAD, resting quietly in bed, calm, withdrawn, cooperative on exam  Neuro: A&Ox4, no focal deficits, face symmetric, speech clear, JESSICA  CV: WWP, RRR on monitor  Pulm: unlabored breathing on RA  Ext: no peripheral edema  Incision: clean, dry, intact, no erythema or induration, sternum stable  Tubes/drain sites: scabbed over/healed, ALEE            Data:    Imaging:      No results found for this or any previous visit (from the past 24 hour(s)).    Labs:  BMP  Recent Labs   Lab 11/12/23  0942 11/11/23  2213 11/11/23  1717 11/11/23  1345 11/10/23  1652 11/10/23  1348   NA  --   --   --   --  140 140   POTASSIUM  --   --   --   --  4.2 4.2   CHLORIDE  --   --   --   --  106 105   DORIS  --   --   --   --  9.2 9.8   CO2  --   --   --   --  22 23   BUN  --   --   --   --  11.4 9.8   CR  --   --   --   --  " 0.91 0.94   GLC 95 100* 118* 118* 79 142*     CBC  Recent Labs   Lab 11/10/23  1652   WBC 5.1   RBC 3.32*   HGB 9.7*   HCT 30.8*   MCV 93   MCH 29.2   MCHC 31.5   RDW 13.3        INR  Recent Labs   Lab 11/12/23  0621 11/11/23  0526 11/10/23  1652 11/10/23  1348   INR 1.85* 1.74* 1.64* 1.6*      Hepatic Panel  Recent Labs   Lab 11/10/23  1652   AST 19   ALT 21   ALKPHOS 100   BILITOTAL 0.4   ALBUMIN 3.8     GLUCOSE:   Recent Labs   Lab 11/12/23  0942 11/11/23  2213 11/11/23  1717 11/11/23  1345 11/10/23  1652 11/10/23  1348   GLC 95 100* 118* 118* 79 142*

## 2023-11-12 NOTE — PLAN OF CARE
"Hours of Care: 1362-0804    Current VS:    /73 (BP Location: Right arm)   Pulse 81   Temp 98.6  F (37  C) (Oral)   Resp 22   Ht 1.981 m (6' 6\")   Wt 88 kg (194 lb 1.6 oz)   SpO2 100%   BMI 22.43 kg/m      Neuro: A&Ox 4, no c/o HA, dizziness, numbness, tingling, calls appropriately.  Cardiac/Telemetry: Tele SR/1AVB, no chest pain, palpitations. VSS, HR 60-95's.  Respiratory: RA, sating >95%, no SOD or GUNTER, lung sounds clear bilateral   GI/: Voiding adequately via urinal overnight. Last BM 11/11.  Endocrine: ACHS blood glucose check, no insuline coverage overnight.  Diet/Appetite:   Skin: Sternal incision CDI, no new deficit noted.  LDA: L PIV infusing heparin gtt @1200 units/hr   Activity: Independent   Pain: Denies   Labs: Anti-Xa recheck in AM       Plan: Low intensity Heparin gtt bridging to therapeutic INR.    "

## 2023-11-12 NOTE — PLAN OF CARE
Pt is A/O x 4.   Independent.   VSS, RA. Denies pain.   Tele SR with first degree AVB.   Lung sounds clear throughout. Denies SOB.   Heparin gtt @ 1200 units. Next 10a being drawn now (lab at bedside). Awaiting results.   BS active x4. Adequate urine output via urinal.  BG checks ACHS. Cardiac diet, tolerating well.   Plans for discharge currently pending INR level reaching 2-3.   Patient currently resting in bed with call light in reach.

## 2023-11-13 ENCOUNTER — TELEPHONE (OUTPATIENT)
Dept: ANTICOAGULATION | Facility: CLINIC | Age: 68
End: 2023-11-13
Payer: COMMERCIAL

## 2023-11-13 VITALS
HEIGHT: 78 IN | DIASTOLIC BLOOD PRESSURE: 71 MMHG | HEART RATE: 74 BPM | WEIGHT: 190.5 LBS | SYSTOLIC BLOOD PRESSURE: 129 MMHG | RESPIRATION RATE: 18 BRPM | TEMPERATURE: 98.1 F | OXYGEN SATURATION: 98 % | BODY MASS INDEX: 22.04 KG/M2

## 2023-11-13 DIAGNOSIS — Z95.2 S/P AVR (AORTIC VALVE REPLACEMENT): Primary | ICD-10-CM

## 2023-11-13 DIAGNOSIS — Z95.1 S/P CABG (CORONARY ARTERY BYPASS GRAFT): ICD-10-CM

## 2023-11-13 DIAGNOSIS — I48.0 PAROXYSMAL ATRIAL FIBRILLATION (H): ICD-10-CM

## 2023-11-13 DIAGNOSIS — Z79.01 CURRENT USE OF LONG TERM ANTICOAGULATION: ICD-10-CM

## 2023-11-13 LAB
ERYTHROCYTE [DISTWIDTH] IN BLOOD BY AUTOMATED COUNT: 13.4 % (ref 10–15)
GLUCOSE BLDC GLUCOMTR-MCNC: 98 MG/DL (ref 70–99)
HCT VFR BLD AUTO: 28.9 % (ref 40–53)
HGB BLD-MCNC: 9.3 G/DL (ref 13.3–17.7)
INR PPP: 2.1 (ref 0.85–1.15)
MCH RBC QN AUTO: 29.5 PG (ref 26.5–33)
MCHC RBC AUTO-ENTMCNC: 32.2 G/DL (ref 31.5–36.5)
MCV RBC AUTO: 92 FL (ref 78–100)
PLATELET # BLD AUTO: 261 10E3/UL (ref 150–450)
RBC # BLD AUTO: 3.15 10E6/UL (ref 4.4–5.9)
UFH PPP CHRO-ACNC: <0.1 IU/ML
WBC # BLD AUTO: 5.1 10E3/UL (ref 4–11)

## 2023-11-13 PROCEDURE — 250N000013 HC RX MED GY IP 250 OP 250 PS 637: Performed by: SURGERY

## 2023-11-13 PROCEDURE — 85520 HEPARIN ASSAY: CPT | Performed by: INTERNAL MEDICINE

## 2023-11-13 PROCEDURE — 36415 COLL VENOUS BLD VENIPUNCTURE: CPT | Performed by: FAMILY MEDICINE

## 2023-11-13 PROCEDURE — 85014 HEMATOCRIT: CPT | Performed by: FAMILY MEDICINE

## 2023-11-13 PROCEDURE — 85610 PROTHROMBIN TIME: CPT | Performed by: SURGERY

## 2023-11-13 RX ORDER — TRAMADOL HYDROCHLORIDE 50 MG/1
25 TABLET ORAL EVERY 12 HOURS PRN
Start: 2023-11-13

## 2023-11-13 RX ORDER — ACETAMINOPHEN 500 MG
500-1000 TABLET ORAL EVERY 8 HOURS PRN
COMMUNITY
Start: 2023-11-13

## 2023-11-13 RX ORDER — WARFARIN SODIUM 3 MG/1
6 TABLET ORAL DAILY
Qty: 30 TABLET | Refills: 0 | Status: SHIPPED | OUTPATIENT
Start: 2023-11-13 | End: 2023-11-30

## 2023-11-13 RX ORDER — METHOCARBAMOL 750 MG/1
750 TABLET, FILM COATED ORAL EVERY 8 HOURS PRN
Qty: 60 TABLET | Refills: 0 | Status: SHIPPED | OUTPATIENT
Start: 2023-11-13

## 2023-11-13 RX ORDER — WARFARIN SODIUM 3 MG/1
6 TABLET ORAL DAILY
Start: 2023-11-13 | End: 2023-11-13

## 2023-11-13 RX ADMIN — AMLODIPINE BESYLATE 10 MG: 10 TABLET ORAL at 08:37

## 2023-11-13 RX ADMIN — LOSARTAN POTASSIUM 50 MG: 50 TABLET, FILM COATED ORAL at 08:37

## 2023-11-13 RX ADMIN — CARVEDILOL 25 MG: 25 TABLET, FILM COATED ORAL at 08:37

## 2023-11-13 RX ADMIN — ATORVASTATIN CALCIUM 40 MG: 40 TABLET, FILM COATED ORAL at 08:37

## 2023-11-13 RX ADMIN — ASPIRIN 81 MG: 81 TABLET, COATED ORAL at 08:37

## 2023-11-13 RX ADMIN — THIAMINE HCL TAB 100 MG 100 MG: 100 TAB at 08:37

## 2023-11-13 RX ADMIN — ESCITALOPRAM OXALATE 10 MG: 10 TABLET ORAL at 08:37

## 2023-11-13 RX ADMIN — FAMOTIDINE 10 MG: 10 TABLET, FILM COATED ORAL at 08:37

## 2023-11-13 RX ADMIN — Medication 1 TABLET: at 08:37

## 2023-11-13 ASSESSMENT — ACTIVITIES OF DAILY LIVING (ADL)
ADLS_ACUITY_SCORE: 22

## 2023-11-13 NOTE — PLAN OF CARE
DISCHARGE     Discharged to: Home  Via: Automobile  Accompanied by: Family  Discharge Instructions: diet, activity, medications, follow up appointments, when to call the MD, and what to watchout for (i.e. s/s of infection, increasing SOB, palpitations, chest pain,)  Prescriptions: To be filled by [facility] pharmacy in [place] per pt's request; medication list reviewed & sent with pt  Follow Up Appointments: arranged; information given  Belongings: All sent with pt  IV: out  Telemetry: off  Pt exhibits understanding of above discharge instructions; all questions answered.  Discharge Paperwork: faxed

## 2023-11-13 NOTE — DISCHARGE INSTRUCTIONS
- No bathing, soaking, swimming, or hot tubs; no immersion in standing water.  OK to shower/let water run over wound but avoid aiming stream of water directly at the wound.  OK to gently wash with soap and water but do not scrub.  Wound may be left open to air, no dressing/bandage is necessary.  You have dissolvable stitches under your skin; there is noting that needs to be removed.  Any remaining skin glue can be removed.   - May resume driving if no longer taking prescription pain medications   - No need to return to see Cardiovascular Surgery unless you should develop problems with your wounds   - Follow up with your warfarin clinic, primary care provider, cardiologist, and cardiac rehab as planned   - Continue sternal precautions - no more than 10 lbs pushing, pulling, or lifting for the first 6 weeks after surgery, then up to 20 lbs for weeks 7-12.

## 2023-11-13 NOTE — PHARMACY-ANTICOAGULATION SERVICE
Clinical Pharmacy- Warfarin Discharge Note  This patient is currently on warfarin for the treatment of Mechanical heart valve.  INR Goal= 2-3  Expected length of therapy lifetime.    Warfarin PTA Regimen: 7 mg; Otherwise 5 mg every day (this was an increase on 11/10 after subtherapeutic INR of 1.6)      Anticoagulation Dose History  More data exists         Latest Ref Rng & Units 10/30/2023 11/3/2023 11/6/2023 11/10/2023 11/11/2023 11/12/2023 11/13/2023   Recent Dosing and Labs   warfarin ANTICOAGULANT (COUMADIN) tablet 7 mg - - - - 7 mg, $Given 7 mg, $Given - -   warfarin ANTICOAGULANT (COUMADIN) tablet 6 mg - - - - - - 6 mg, $Given -   INR 0.85 - 1.15 1.35  1.6  1.8  1.64  1.6  1.74  1.85  2.10        Vitamin K doses administered during the last 7 days: none  FFP administered during the last 7 days: none  Recommend discharging the patient on a warfarin regimen of 6 mg daily with a prescription for warfarin 1mg tablets.      The patient should have an INR checked in 2 days as scheduled.    Christina Cuellar RPH on 11/13/2023 at 11:18 AM

## 2023-11-13 NOTE — TELEPHONE ENCOUNTER
ANTICOAGULATION  MANAGEMENT: Discharge Review    Sukh Craven chart reviewed for anticoagulation continuity of care    Hospital Admission on 11/10/23-11/13/23 for subtherapeutic INR requiring heparin infusion bridge.     Discharge disposition: Home    Results:    Recent labs: (last 7 days)     11/06/23  1427 11/10/23  1348 11/10/23  1652 11/10/23  2222 11/11/23  0526 11/11/23  1109 11/11/23  1826 11/12/23  0621 11/13/23  0520   INR 1.8* 1.6* 1.64*  --  1.74*  --   --  1.85* 2.10*   AAUFH  --   --   --  0.19 0.40 0.49 0.39 0.48 <0.10     Anticoagulation inpatient management:     more warfarin administered than maintenance regimen     Anticoagulation discharge instructions:     Warfarin dosing: increase dose to 6 mg 11/13/23 and 11/14/23   Bridging: No   INR goal change: No      Medication changes affecting anticoagulation: No    Additional factors affecting anticoagulation: No     PLAN     Agree with discharge plan for follow up on 11/15/23    Spoke with Sukh    Anticoagulation Calendar updated    Farzana Camacho RN

## 2023-11-13 NOTE — PLAN OF CARE
Pt is A/O x 4.   Independent.   VSS, RA. Denies pain.   Tele SR with first degree AVB.   Lung sounds clear throughout. Denies SOB.   Heparin gtt @ 1200 units. Next 10a scheduled for 11/13 AM draw.   BS active x4. Adequate urine output via urinal.   BG checks ACHS. Cardiac diet, tolerating well.  Plans for discharge back home once INR becomes therapeutic at goal of 2-3.    Patient currently resting in bed with call light in reach.

## 2023-11-13 NOTE — PLAN OF CARE
"Hours of Care: 0063-1521    Current VS:     /71 (BP Location: Right arm, Cuff Size: Adult Regular)   Pulse 74   Temp 98.1  F (36.7  C) (Oral)   Resp 18   Ht 1.981 m (6' 6\")   Wt 86.4 kg (190 lb 8 oz)   SpO2 98%   BMI 22.01 kg/m      Neuro: A&Ox 4, no c/o HA, dizziness, numbness, tingling, calls appropriately.  Cardiac/Telemetry: Tele SR/1AVB, no chest pain, palpitations. VSS, HR 60-95's.  Respiratory: RA, sating >95%, no SOB or GUNTER, lung sounds clear bilateral.  GI/: Voiding adequately via urinal overnight. Last BM 11/12.  Endocrine: ACHS blood glucose check 98, no insuline coverage overnight.  Diet/Appetite: Cardiac diet,no fluid restriction, poor intake ate 25% super overnight.  Skin: Sternal incision CDI, no new deficit noted.  LDA: L PIV infusing heparin gtt @1200 units/hr.  Activity: Independent   Pain: Denies   Labs: Last anti-Xa recheck was 11/12 @AM, 0.48, recheck at AM       Plan: Low intensity Heparin gtt bridging to therapeutic INR.  "

## 2023-11-14 ENCOUNTER — TELEPHONE (OUTPATIENT)
Dept: FAMILY MEDICINE | Facility: CLINIC | Age: 68
End: 2023-11-14

## 2023-11-14 NOTE — TELEPHONE ENCOUNTER
Insurance states that this must go to his DME provider. At this time the PA team does not handle DME/Medical authorizations. Only PA's for medications through pharmacy benefits.

## 2023-11-14 NOTE — TELEPHONE ENCOUNTER
LVM for patient to callback to schedule.    Sent letter. Max attempts, if patient wishes to still be seen, please call [phone]

## 2023-11-14 NOTE — TELEPHONE ENCOUNTER
This Rx for Continuous Blood Gluc Sensor (FREESTYLE ALEXA 3 SENSOR) MISC cannot be ordered.  Not covered by Medicare part B.  Please put in a new Rx for an alternative.  Suggested alternative/s is/are: Freestyle Alexa 14 or 2, Dexcom.

## 2023-11-14 NOTE — TELEPHONE ENCOUNTER
Central Prior Authorization Team   Phone: [phone]    PA Initiation    Medication: Continuous Blood Gluc Sensor (FREESTYLE ALEXA 3 SENSOR) [facility], rec 11-9-23  Insurance Company: [employer] ([facility]) - Phone [phone] Fax [phone]  Pharm[facility] Filling the Rx[facility] [place] [place] [facility] [address] [place] [address].  Filling Pharmacy Phone: [phone]  Filling Pharmacy Fax:    Start Date: 11/14/2023

## 2023-11-15 ENCOUNTER — LAB (OUTPATIENT)
Dept: LAB | Facility: CLINIC | Age: 68
End: 2023-11-15
Payer: COMMERCIAL

## 2023-11-15 ENCOUNTER — PATIENT OUTREACH (OUTPATIENT)
Dept: CARE COORDINATION | Facility: CLINIC | Age: 68
End: 2023-11-15

## 2023-11-15 ENCOUNTER — OFFICE VISIT (OUTPATIENT)
Dept: FAMILY MEDICINE | Facility: CLINIC | Age: 68
End: 2023-11-15
Payer: COMMERCIAL

## 2023-11-15 ENCOUNTER — MYC MEDICAL ADVICE (OUTPATIENT)
Dept: ANTICOAGULATION | Facility: CLINIC | Age: 68
End: 2023-11-15

## 2023-11-15 ENCOUNTER — ANTICOAGULATION THERAPY VISIT (OUTPATIENT)
Dept: ANTICOAGULATION | Facility: CLINIC | Age: 68
End: 2023-11-15

## 2023-11-15 VITALS
DIASTOLIC BLOOD PRESSURE: 67 MMHG | HEIGHT: 76 IN | RESPIRATION RATE: 15 BRPM | WEIGHT: 199 LBS | OXYGEN SATURATION: 100 % | HEART RATE: 84 BPM | TEMPERATURE: 97.6 F | BODY MASS INDEX: 24.23 KG/M2 | SYSTOLIC BLOOD PRESSURE: 114 MMHG

## 2023-11-15 DIAGNOSIS — R79.1 SUBTHERAPEUTIC INTERNATIONAL NORMALIZED RATIO (INR): ICD-10-CM

## 2023-11-15 DIAGNOSIS — E11.9 TYPE 2 DIABETES MELLITUS WITHOUT COMPLICATION, WITHOUT LONG-TERM CURRENT USE OF INSULIN (H): ICD-10-CM

## 2023-11-15 DIAGNOSIS — Z95.2 S/P AVR (AORTIC VALVE REPLACEMENT): ICD-10-CM

## 2023-11-15 DIAGNOSIS — I48.0 PAROXYSMAL ATRIAL FIBRILLATION (H): ICD-10-CM

## 2023-11-15 DIAGNOSIS — Z79.01 CURRENT USE OF LONG TERM ANTICOAGULATION: ICD-10-CM

## 2023-11-15 DIAGNOSIS — Z95.2 S/P AVR (AORTIC VALVE REPLACEMENT): Primary | ICD-10-CM

## 2023-11-15 DIAGNOSIS — Z29.11 NEED FOR VACCINATION AGAINST RESPIRATORY SYNCYTIAL VIRUS: ICD-10-CM

## 2023-11-15 DIAGNOSIS — Z95.1 S/P CABG (CORONARY ARTERY BYPASS GRAFT): ICD-10-CM

## 2023-11-15 DIAGNOSIS — Z23 NEED FOR SHINGLES VACCINE: ICD-10-CM

## 2023-11-15 LAB — INR BLD: 3.4 (ref 0.9–1.1)

## 2023-11-15 PROCEDURE — 99213 OFFICE O/P EST LOW 20 MIN: CPT | Performed by: FAMILY MEDICINE

## 2023-11-15 PROCEDURE — 36416 COLLJ CAPILLARY BLOOD SPEC: CPT

## 2023-11-15 PROCEDURE — 85610 PROTHROMBIN TIME: CPT

## 2023-11-15 RX ORDER — RESPIRATORY SYNCYTIAL VIRUS VACCINE 120MCG/0.5
0.5 KIT INTRAMUSCULAR ONCE
Qty: 1 EACH | Refills: 0 | Status: SHIPPED | OUTPATIENT
Start: 2023-11-15 | End: 2023-11-15

## 2023-11-15 ASSESSMENT — PAIN SCALES - GENERAL: PAINLEVEL: NO PAIN (0)

## 2023-11-15 NOTE — PROGRESS NOTES
[facility]    Background: Transitional Care Management program identified per system criteria and reviewed by [facility] team for possible outreach.    Assessment: Upon chart review, [facility] Team member will not proceed with patient outreach related to this episode of Transitional Care Management program due to reason below:    Patient has a follow up appointment with an appropriate provider today for hospital discharge    Plan: Transitional Care Management episode addressed appropriately per reason noted above.      ISELA Lema  [facility]    *Connected Care Resource Team does NOT follow patient ongoing. Referrals are identified based on internal discharge reports and the outreach is to ensure patient has an understanding of their discharge instructions.

## 2023-11-15 NOTE — PROGRESS NOTES
ANTICOAGULATION MANAGEMENT     Sukh Craven 68 year old male is on warfarin with supratherapeutic INR result. (Goal INR 2.0-3.0)    Recent labs: (last 7 days)     11/15/23  1536   INR 3.4*       ASSESSMENT     Source(s): Chart Review     Warfarin doses taken: Reviewed in chartDischarged with instructions to take 6 mg both on Mon and Tuesday night, recheck INR today.   Diet:  unable to assess  Medication/supplement changes: None noted  New illness, injury, or hospitalization: Yes: hospitalized 11/10/23 - 11/13/23 for ongoing subtherapeutic INRs and need to bridge with heparin drip until INR back to therapeutic range.   Signs or symptoms of bleeding or clotting: No  Previous result: Therapeutic last visit; previously outside of goal range  Additional findings: None. Consulted with Venita Marquis RPH. Agreed to continue with current maintenance dose and recheck next INR Monday, 11/20/23.     PLAN     Unable to reach Sukh today.    Left message to continue current dose of warfarin 5 mg tonight. Request call back for assessment.    Follow up required to discuss out of range result     _______________________________________________________________________     Anticoagulation Episode Summary       Current INR goal:  2.0-3.0   TTR:  0.0% (1.4 wk)   Target end date:  Indefinite   Send INR reminders to:  [facility]    Indications    S/P AVR (aortic valve replacement) [Z95.2]  Current use of long term anticoagulation [Z79.01]  Paroxysmal atrial fibrillation (H) [I48.0]  S/P CABG (coronary artery bypass graft) [Z95.1]             Comments:               Anticoagulation Care Providers       Provider Role Specialty Phone number    Mariola Peterson PA-C Referring Cardiology [phone]    Alec Marc NP Referring Cardiovascular & Thoracic Surgery [phone]    Parrish Stephenson MD Referring Family Medicine [phone]    Cesar Dong APRN CNP Referring Family Medicine [phone]           Ngoc Goodson, RN  Anticoagulation Clinic  11/15/2023

## 2023-11-15 NOTE — PATIENT INSTRUCTIONS
Lindsay Garcia, [facility]   to Sukh Craven   GISELE      11/14/23 12:27 PM  Hello,     Our records indicate that you have been referred by Dr. Wilton Odonnell for a visit with BERNARD ESCALANTE in the CARDIOVASCULAR CONGENITAL/GENETICS CLINIC at the [facility] ([facility]).  We have been unsuccessful in reaching you to schedule.     To make your appointment, please call: [phone], Monday - Friday, 8 A.M. - 4:30 P.M (Central Time Zone).     Please check with your insurance company about your benefits.  Some insurance plans provide different coverage levels for services at [facility] hospital-based clinics.      We look forward to hearing from you.     Sincerely,     Lindsay Garcia  Program Care Coordinator [facility]  [facility]  Office: [phone]  Fax: [phone]

## 2023-11-15 NOTE — PROGRESS NOTES
Assessment & Plan     S/P AVR (aortic valve replacement)  Current use of long term anticoagulation  Subtherapeutic international normalized ratio (INR)  - He will repeat INR, managed by INR team     Type 2 diabetes mellitus without complication, without long-term current use of insulin  - doing well, continue current regimen    Need for shingles vaccine  - zoster vaccine recombinant adjuvanted (SHINGRIX) injection; Inject 0.5 mLs into the muscle once for 1 dose Pharmacist administered  Dispense: 0.5 mL; Refill: 0    Need for vaccination against respiratory syncytial virus  - respiratory syncytial virus vaccine, bivalent (ABRYSVO) injection; Inject 0.5 mLs into the muscle once for 1 dose  Dispense: 1 each; Refill: 0        MED REC REQUIRED  Post Medication Reconciliation Status: discharge medications reconciled, continue medications without change      Parrish Stephenson MD  [facility] TERENCE Luz is a 68 year old, presenting for the following health issues:  RECHECK (Follow Up )      11/15/2023     2:39 PM   Additional Questions   Roomed by Teresa Vega       History of Present Illness       Reason for visit:  Follow up    He eats 0-1 servings of fruits and vegetables daily.He consumes 2 sweetened beverage(s) daily.He exercises with enough effort to increase his heart rate 9 or less minutes per day.  He exercises with enough effort to increase his heart rate 3 or less days per week.   He is taking medications regularly.         Hospital Follow-up Visit:    Hospital/Nursing Home/IP Rehab Facility: [facility]  Date of Admission: 11/10/23   Date of Discharge: 11/13/23    Reason(s) for Admission: H/O mechanical aortic valve replacement        Was your hospitalization related to COVID-19? No   Problems taking medications regularly:  None  Medication changes since discharge: None  Problems adhering to non-medication therapy:   "None    Summary of hospitalization:  [facility] discharge summary reviewed  Discharge summary was not complete  Diagnostic Tests/Treatments reviewed.  Follow up needed: none  Other Healthcare Providers Involved in Patient s Care:         None  Update since discharge: stable.         Plan of care communicated with patient           He has constipation and takes senokot PRN.   No chest pain, palpitations or claudication.   He is taking Metformin  mg in the evening.   Fasting BG < 130. No hypoglycemia.       Review of Systems         Objective    /67 (BP Location: Right arm, Patient Position: Sitting, Cuff Size: Adult Large)   Pulse 84   Temp 97.6  F (36.4  C) (Temporal)   Resp 15   Ht 1.93 m (6' 4\")   Wt 90.3 kg (199 lb)   SpO2 100%   BMI 24.22 kg/m    Body mass index is 24.22 kg/m .  Physical Exam                         "

## 2023-11-16 NOTE — TELEPHONE ENCOUNTER
Pharmacy staff --    Please review and advise -- CGM system    Yaneth Contreras message --  Cgm coverage limited on medicare to insulin injecting diabetics --and [facility] ins. Uses dex com . He is not using insulin at this time.    His other option is running rx for cash + [employer] savings card at our clinic pharmacy for alyssa-3 cgm sensors -- I think he might be able to get 2-14 day sensors with discount card for 83.33$ /month --send rx to shayne at our pharmacy -she can tell you cost.      Maren Zuleta, BSN RN  [facility]

## 2023-11-16 NOTE — PROGRESS NOTES
ANTICOAGULATION MANAGEMENT     Sukh Craven 68 year old male is on warfarin with supratherapeutic INR result. (Goal INR 2.0-3.0)    Recent labs: (last 7 days)     11/15/23  1536   INR 3.4*       ASSESSMENT     Source(s): Chart Review. Patient not answering phone, but did read last night's MyC message so did know to continue with 5 mg dose last night.    Warfarin doses taken: Reviewed in chartDischarged with instructions to take 6 mg both on Mon and Tuesday night, recheck INR today.   Diet:  unable to assess  Medication/supplement changes: None noted  New illness, injury, or hospitalization: Yes: hospitalized 11/10/23 - 11/13/23 for ongoing subtherapeutic INRs and need to bridge with heparin drip until INR back to therapeutic range.   Signs or symptoms of bleeding or clotting: No  Previous result: Therapeutic last visit; previously outside of goal range  Additional findings: None. Consulted with Venita Marquis RPH. Agreed to continue with current maintenance dose and recheck next INR Monday, 11/20/23.       PLAN     Recommended plan for temporary change(s) affecting INR     Dosing Instructions: Continue your current warfarin dose with next INR in 5 days       Summary  As of 11/15/2023      Full warfarin instructions:  5 mg every day   Next INR check:  11/20/2023               Detailed voice message left for Sukh with dosing instructions and follow up date.   Sent [employer] message with dosing and follow up instructions. Specified in [employer] message dosage with the tablet size Sukh should have available at home. (3 mg and 1 mg tablets). Ideally would like to speak with him over the phone. But for now, MyC seems to be the way he prefers to communicate.    Contact [phone]  to schedule and with any changes, questions or concerns.     Education provided:   Please call back if any changes to your diet, medications or how you've been taking warfarin  Contact [phone]  with any changes, questions or concerns.      Plan made with [facility] Pharmacist Venita Goodson, RN  Anticoagulation Clinic  11/16/2023    _______________________________________________________________________     Anticoagulation Episode Summary       Current INR goal:  2.0-3.0   TTR:  0.0% (1.4 wk)   Target end date:  Indefinite   Send INR reminders to:  [facility]    Indications    S/P AVR (aortic valve replacement) [Z95.2]  Current use of long term anticoagulation [Z79.01]  Paroxysmal atrial fibrillation (H) [I48.0]  S/P CABG (coronary artery bypass graft) [Z95.1]             Comments:               Anticoagulation Care Providers       Provider Role Specialty Phone number    Mariola Peterson PA-C Referring Cardiology [phone]    Alec Marc NP Referring Cardiovascular & Thoracic Surgery [phone]    Parrish Stephenson MD Referring Family Medicine [phone]    Cesar Dong APRN CNP Referring Family Medicine [phone]

## 2023-11-20 ENCOUNTER — LAB (OUTPATIENT)
Dept: LAB | Facility: CLINIC | Age: 68
End: 2023-11-20
Payer: COMMERCIAL

## 2023-11-20 ENCOUNTER — ANTICOAGULATION THERAPY VISIT (OUTPATIENT)
Dept: ANTICOAGULATION | Facility: CLINIC | Age: 68
End: 2023-11-20

## 2023-11-20 DIAGNOSIS — Z95.1 S/P CABG (CORONARY ARTERY BYPASS GRAFT): ICD-10-CM

## 2023-11-20 DIAGNOSIS — Z95.2 S/P AVR (AORTIC VALVE REPLACEMENT): ICD-10-CM

## 2023-11-20 DIAGNOSIS — Z79.01 CURRENT USE OF LONG TERM ANTICOAGULATION: ICD-10-CM

## 2023-11-20 DIAGNOSIS — I48.0 PAROXYSMAL ATRIAL FIBRILLATION (H): ICD-10-CM

## 2023-11-20 DIAGNOSIS — Z95.2 S/P AVR (AORTIC VALVE REPLACEMENT): Primary | ICD-10-CM

## 2023-11-20 LAB — INR BLD: 2.9 (ref 0.9–1.1)

## 2023-11-20 PROCEDURE — 85610 PROTHROMBIN TIME: CPT

## 2023-11-20 PROCEDURE — 36416 COLLJ CAPILLARY BLOOD SPEC: CPT

## 2023-11-20 NOTE — PROGRESS NOTES
ANTICOAGULATION MANAGEMENT     Sukh Craven 68 year old male is on warfarin with therapeutic INR result. (Goal INR 2.0-3.0)    Recent labs: (last 7 days)     11/20/23  1441   INR 2.9*       ASSESSMENT     Source(s): Chart Review and Patient/Caregiver Call     Warfarin doses taken: Warfarin taken as instructed  Diet: No new diet changes identified  Medication/supplement changes: None noted  New illness, injury, or hospitalization: No  Signs or symptoms of bleeding or clotting: No  Previous result: Supratherapeutic  Additional findings: Recent heart valve replacement in last 10 weeks       PLAN     Recommended plan for no diet, medication or health factor changes affecting INR     Dosing Instructions: Continue your current warfarin dose with next INR in 1 week       Summary  As of 11/20/2023      Full warfarin instructions:  5 mg every day   Next INR check:  11/27/2023               Telephone call with Sukh who verbalizes understanding and agrees to plan    Lab visit scheduled    Education provided:   Please call back if any changes to your diet, medications or how you've been taking warfarin  Contact [phone]  with any changes, questions or concerns.     Plan made per [facility] anticoagulation protocol    Ngoc Goodson RN  Anticoagulation Clinic  11/20/2023    _______________________________________________________________________     Anticoagulation Episode Summary       Current INR goal:  2.0-3.0   TTR:  6.3% (2.1 wk)   Target end date:  Indefinite   Send INR reminders to:  [facility]    Indications    S/P AVR (aortic valve replacement) [Z95.2]  Current use of long term anticoagulation [Z79.01]  Paroxysmal atrial fibrillation (H) [I48.0]  S/P CABG (coronary artery bypass graft) [Z95.1]             Comments:               Anticoagulation Care Providers       Provider Role Specialty Phone number    Mariola Peterson PA-C Referring Cardiology [phone]    Alec Marc NP Referring  Cardiovascular & Thoracic Surgery [phone]    Parrish Stephenson MD Referring Family Medicine [phone]    Cesar Dong APRN CNP Referring Family Medicine [phone]

## 2023-11-27 ENCOUNTER — ANTICOAGULATION THERAPY VISIT (OUTPATIENT)
Dept: ANTICOAGULATION | Facility: CLINIC | Age: 68
End: 2023-11-27

## 2023-11-27 ENCOUNTER — LAB (OUTPATIENT)
Dept: LAB | Facility: CLINIC | Age: 68
End: 2023-11-27
Payer: COMMERCIAL

## 2023-11-27 DIAGNOSIS — I48.0 PAROXYSMAL ATRIAL FIBRILLATION (H): ICD-10-CM

## 2023-11-27 DIAGNOSIS — Z95.2 S/P AVR (AORTIC VALVE REPLACEMENT): Primary | ICD-10-CM

## 2023-11-27 DIAGNOSIS — Z79.01 CURRENT USE OF LONG TERM ANTICOAGULATION: ICD-10-CM

## 2023-11-27 DIAGNOSIS — Z95.1 S/P CABG (CORONARY ARTERY BYPASS GRAFT): ICD-10-CM

## 2023-11-27 DIAGNOSIS — Z95.2 S/P AVR (AORTIC VALVE REPLACEMENT): ICD-10-CM

## 2023-11-27 LAB — INR BLD: 2.9 (ref 0.9–1.1)

## 2023-11-27 PROCEDURE — 36416 COLLJ CAPILLARY BLOOD SPEC: CPT

## 2023-11-27 PROCEDURE — 85610 PROTHROMBIN TIME: CPT

## 2023-11-27 NOTE — PROGRESS NOTES
ANTICOAGULATION MANAGEMENT     Sukh Craven 68 year old male is on warfarin with therapeutic INR result. (Goal INR 2.0-3.0)    Recent labs: (last 7 days)     11/27/23  1439   INR 2.9*       ASSESSMENT     Source(s): Chart Review and Patient/Caregiver Call     Warfarin doses taken: Warfarin taken as instructed  Diet: No new diet changes identified  Medication/supplement changes: None noted  New illness, injury, or hospitalization: No  Signs or symptoms of bleeding or clotting: No  Previous result: Therapeutic last 2(+) visits  Additional findings: Recent heart valve replacement in last 10 weeks       PLAN     Recommended plan for no diet, medication or health factor changes affecting INR     Dosing Instructions: Continue your current warfarin dose with next INR in 10 days       Summary  As of 11/27/2023      Full warfarin instructions:  5 mg every day   Next INR check:  12/7/2023               Telephone call with Sukh who verbalizes understanding and agrees to plan    Lab visit scheduled    Education provided:   Please call back if any changes to your diet, medications or how you've been taking warfarin  Contact [phone]  with any changes, questions or concerns.     Plan made per [facility] anticoagulation protocol    Ngoc Goodson RN  Anticoagulation Clinic  11/27/2023    _______________________________________________________________________     Anticoagulation Episode Summary       Current INR goal:  2.0-3.0   TTR:  35.0% (3.1 wk)   Target end date:  Indefinite   Send INR reminders to:  [facility]    Indications    S/P AVR (aortic valve replacement) [Z95.2]  Current use of long term anticoagulation [Z79.01]  Paroxysmal atrial fibrillation (H) [I48.0]  S/P CABG (coronary artery bypass graft) [Z95.1]             Comments:               Anticoagulation Care Providers       Provider Role Specialty Phone number    Mariola Peterson PA-C Referring Cardiology [phone]    Alec Marc, NP  Referring Cardiovascular & Thoracic Surgery [phone]    Parrish Stephenson MD Referring Family Medicine [phone]    Cesar Dong APRN CNP Referring Family Medicine [phone]

## 2023-11-29 ENCOUNTER — OFFICE VISIT (OUTPATIENT)
Dept: OPHTHALMOLOGY | Facility: CLINIC | Age: 68
End: 2023-11-29
Attending: FAMILY MEDICINE
Payer: COMMERCIAL

## 2023-11-29 DIAGNOSIS — H25.813 COMBINED FORMS OF AGE-RELATED CATARACT OF BOTH EYES: ICD-10-CM

## 2023-11-29 DIAGNOSIS — H40.003 GLAUCOMA SUSPECT OF BOTH EYES: ICD-10-CM

## 2023-11-29 DIAGNOSIS — E11.9 TYPE 2 DIABETES MELLITUS WITHOUT COMPLICATION, WITHOUT LONG-TERM CURRENT USE OF INSULIN (H): Primary | ICD-10-CM

## 2023-11-29 PROCEDURE — 92133 CPTRZD OPH DX IMG PST SGM ON: CPT | Performed by: OPHTHALMOLOGY

## 2023-11-29 PROCEDURE — 92004 COMPRE OPH EXAM NEW PT 1/>: CPT | Performed by: OPHTHALMOLOGY

## 2023-11-29 ASSESSMENT — VISUAL ACUITY
OD_SC: 20/60
OS_PH_SC: 20/25
METHOD: SNELLEN - LINEAR
CORRECTION_TYPE: GLASSES
OD_PH_SC: 20/30
OS_SC: 20/40
OD_SC+: -1
OS_CC+: +2
OD_CC: 20/25
OS_CC: 20/30

## 2023-11-29 ASSESSMENT — REFRACTION_WEARINGRX
OD_CYLINDER: +2.25
OS_SPHERE: PLANO
OS_AXIS: 163
OD_ADD: +2.00
OD_AXIS: 006
SPECS_TYPE: PAL
OS_CYLINDER: +1.25
OD_CYLINDER: +2.75
OS_CYLINDER: +1.00
OS_ADD: +2.00
OS_AXIS: 164
SPECS_TYPE: PAL
OD_AXIS: 014
OD_SPHERE: -1.75
OS_ADD: +2.00
OD_ADD: +2.00
OS_SPHERE: PLANO
OD_SPHERE: -0.75

## 2023-11-29 ASSESSMENT — CONF VISUAL FIELD
OD_SUPERIOR_NASAL_RESTRICTION: 0
OD_INFERIOR_TEMPORAL_RESTRICTION: 0
OS_INFERIOR_NASAL_RESTRICTION: 0
OD_INFERIOR_NASAL_RESTRICTION: 0
OS_INFERIOR_TEMPORAL_RESTRICTION: 0
OD_NORMAL: 1
OS_NORMAL: 1
OS_SUPERIOR_TEMPORAL_RESTRICTION: 0
OS_SUPERIOR_NASAL_RESTRICTION: 0
OD_SUPERIOR_TEMPORAL_RESTRICTION: 0

## 2023-11-29 ASSESSMENT — TONOMETRY
OS_IOP_MMHG: 25
OD_IOP_MMHG: 22
IOP_METHOD: APPLANATION

## 2023-11-29 ASSESSMENT — REFRACTION_MANIFEST
OD_CYLINDER: +2.50
OS_ADD: +2.50
OS_AXIS: 158
OS_CYLINDER: +1.25
OD_SPHERE: -1.50
OD_AXIS: 005
OS_SPHERE: PLANO
OD_ADD: +2.50

## 2023-11-29 ASSESSMENT — EXTERNAL EXAM - LEFT EYE: OS_EXAM: 1+ BROW PTOSIS, PROLAPSED FAT PADS: UPPER, LOWER

## 2023-11-29 ASSESSMENT — CUP TO DISC RATIO
OS_RATIO: 0.5
OD_RATIO: 0.5

## 2023-11-29 ASSESSMENT — SLIT LAMP EXAM - LIDS
COMMENTS: 2+ DERMATOCHALASIS, 2+ SCLERAL SHOW
COMMENTS: 2+ DERMATOCHALASIS, 2+ SCLERAL SHOW

## 2023-11-29 ASSESSMENT — EXTERNAL EXAM - RIGHT EYE: OD_EXAM: 1+ BROW PTOSIS, PROLAPSED FAT PADS: UPPER, LOWER

## 2023-11-29 NOTE — PROGRESS NOTES
" Current Eye Medications:  none.       Subjective:  Patient is here for a Diabetic Eye Exam.  Patient complains of cloudy vision in each eye, distance and near over the past two months.  He describes his vision has looking \"smokey.\"   He has two pair glasses, but rarely wears them because they don't help.   Last eye exam:  6-7 months ago at [facility].   No history of eye injuries or surgery.    No family history of glaucoma.      Lab Results   Component Value Date    A1C 5.7 10/30/2023    A1C 6.8 09/13/2023    A1C 6.1 06/16/2023    A1C 6.0 07/08/2022    A1C 5.9 05/01/2018    A1C 5.6 07/14/2017    A1C 6.2 05/10/2016     No family history of glaucoma.     Objective:  See Ophthalmology Exam.       Assessment:  Baseline dilated exam in patient with diabetes.  Cataracts probably visually significant both eyes.  Disc changes on exam and glaucoma OCT suggest possible glaucoma.      Plan:  Glasses prescription given - optional    May use artificial tears up to four times a day (like Refresh Optive, Systane Balance, or TheraTears. Avoid \"get the red out\" drops and generic artifical tears).     Possible posterior vitreous detachment (sudden onset large floater and/or flashing lights) both eyes discussed.     Return visit in the near future at your convenience for an intraocular pressure check, Pachy, and Hahn Visual Field.     Obtain OCT today - will call with any concerns.      If deem glaucoma, will start treatment and anticipate referral for cataract surgery +/- glaucoma procedure.    Tyshawn Tabor M.D.  [phone]     "

## 2023-11-29 NOTE — PATIENT INSTRUCTIONS
"Glasses prescription given - optional    May use artificial tears up to four times a day (like Refresh Optive, Systane Balance, or TheraTears. Avoid \"get the red out\" drops and generic artifical tears).     Possible posterior vitreous detachment (sudden onset large floater and/or flashing lights) both eyes discussed.     Return visit in the near future at your convenience for an intraocular pressure check, Pachy, and Hahn Visual Field.     Obtain OCT today - will call with any concerns.      Tyshawn Tabor M.D.  [phone]    Patient Education   Diabetes weakens the blood vessels all over the body, including the eyes. Damage to the blood vessels in the eyes can cause swelling or bleeding into part of the eye (called the retina). This is called diabetic retinopathy ([facility]-tin--[facility]-thee). If not treated, this disease can cause vision loss or blindness.   Symptoms may include blurred or distorted vision, but many people have no symptoms. It's important to see your eye doctor regularly to check for problems.   Early treatment and good control can help protect your vision. Here are the things you can do to help prevent vision loss:      1. Keep your blood sugar levels under tight control.      2. Bring high blood pressure under control.      3. No smoking.      4. Have yearly dilated eye exams.     "

## 2023-11-29 NOTE — LETTER
"    11/29/2023         RE: Sukh Craven  [address]  [facility] [address]        Dear Colleague,    Thank you for referring your patient, Sukh Craven, to the [facility]. Please see a copy of my visit note below.     Current Eye Medications:  none.       Subjective:  Patient is here for a Diabetic Eye Exam.  Patient complains of cloudy vision in each eye, distance and near over the past two months.  He describes his vision has looking \"smokey.\"   He has two pair glasses, but rarely wears them because they don't help.   Last eye exam:  6-7 months ago at [facility].   No history of eye injuries or surgery.    No family history of glaucoma.      Lab Results   Component Value Date    A1C 5.7 10/30/2023    A1C 6.8 09/13/2023    A1C 6.1 06/16/2023    A1C 6.0 07/08/2022    A1C 5.9 05/01/2018    A1C 5.6 07/14/2017    A1C 6.2 05/10/2016     No family history of glaucoma.     Objective:  See Ophthalmology Exam.       Assessment:  Baseline dilated exam in patient with diabetes.  Cataracts probably visually significant both eyes.  Disc changes on exam and glaucoma OCT suggest possible glaucoma.      Plan:  Glasses prescription given - optional    May use artificial tears up to four times a day (like Refresh Optive, Systane Balance, or TheraTears. Avoid \"get the red out\" drops and generic artifical tears).     Possible posterior vitreous detachment (sudden onset large floater and/or flashing lights) both eyes discussed.     Return visit in the near future at your convenience for an intraocular pressure check, Pachy, and Hahn Visual Field.     Obtain OCT today - will call with any concerns.      If deem glaucoma, will start treatment and anticipate referral for cataract surgery +/- glaucoma procedure.    Tyshawn Tabor M.D.  [phone]       Again, thank you for allowing me to participate in the care of your patient.        Sincerely,        Tyshawn Taobr MD  "

## 2023-11-30 ENCOUNTER — OFFICE VISIT (OUTPATIENT)
Dept: CARDIOLOGY | Facility: CLINIC | Age: 68
End: 2023-11-30
Attending: NURSE PRACTITIONER
Payer: COMMERCIAL

## 2023-11-30 VITALS
OXYGEN SATURATION: 96 % | DIASTOLIC BLOOD PRESSURE: 78 MMHG | BODY MASS INDEX: 25.33 KG/M2 | SYSTOLIC BLOOD PRESSURE: 134 MMHG | HEART RATE: 80 BPM | WEIGHT: 208.1 LBS

## 2023-11-30 DIAGNOSIS — I71.21 ANEURYSM OF ASCENDING AORTA WITHOUT RUPTURE (H): ICD-10-CM

## 2023-11-30 DIAGNOSIS — R79.1 SUBTHERAPEUTIC INTERNATIONAL NORMALIZED RATIO (INR): ICD-10-CM

## 2023-11-30 DIAGNOSIS — I25.10 CORONARY ARTERY DISEASE INVOLVING NATIVE CORONARY ARTERY OF NATIVE HEART WITHOUT ANGINA PECTORIS: ICD-10-CM

## 2023-11-30 DIAGNOSIS — I35.1 SEVERE AORTIC INSUFFICIENCY: ICD-10-CM

## 2023-11-30 PROCEDURE — 99213 OFFICE O/P EST LOW 20 MIN: CPT | Mod: 24 | Performed by: NURSE PRACTITIONER

## 2023-11-30 PROCEDURE — G0463 HOSPITAL OUTPT CLINIC VISIT: HCPCS | Performed by: NURSE PRACTITIONER

## 2023-11-30 RX ORDER — WARFARIN SODIUM 3 MG/1
6 TABLET ORAL DAILY
Qty: 90 TABLET | Refills: 11 | Status: SHIPPED | OUTPATIENT
Start: 2023-11-30 | End: 2024-10-01

## 2023-11-30 ASSESSMENT — PAIN SCALES - GENERAL: PAINLEVEL: NO PAIN (0)

## 2023-11-30 NOTE — PROGRESS NOTES
[facility] Cardiology - [facility]   Cardiology Clinic Note      HPI:   Mr. Craven is a 68 year old male with medical history pertinent for severe aortic insufficiency 2/2 sinotubular aortic root dilitation (sinus of valsalva 5.3 cm), CAD (mid RCA 90% stenosed, dCx 35% stenosed, mid LAD 70% stenosed), PAD (mild-mod left lower extremity), bilateral popliteal aneurysms, HTN, T2DM, and chronic opioid/cannabis use, who is s/p Bentall procedure/ AVR and CABG x2 on 10/13 by Dr. Lockett. He presents to cardiology clinic for hospital follow up.     Today, Mr. Craven reports feeling very well. He is without cardiopulmonary concerns. No CP or palpitations. Breathing comfortable and is without SOB or GUNTER. Denies lightheadedness, syncope, presyncope. No other HF symptoms including orthopnea, PND, LE edema, abdominal distention. Appetite has improved. He has completed cardiac rehab.     Home BP 120s/70s     Cardiac Medications   - Norvasc 10 mg daily   - ASA 81 mg daily   - Atorvastatin 40 mg daily   - Coreg 25 mg BID  - Losartan 50 mg daily   - Warfarin     PAST MEDICAL HISTORY:  Past Medical History:   Diagnosis Date    BPH (benign prostatic hyperplasia)     Diabetes (H)     Dyslipidemia     HTN (hypertension)     Metal foreign body in chest 10/17/2023    Fractured temporary pacing wire    PAD (peripheral artery disease) (H24)     Severe aortic insufficiency        FAMILY HISTORY:  Family History   Problem Relation Age of Onset    Pulmonary Embolism Mother     Diabetes Sister     Peripheral Vascular Disease Sister     Diabetes Sister     Diabetes Brother     C.A.D. No family hx of     Hypertension No family hx of     Cerebrovascular Disease No family hx of     Breast Cancer No family hx of     Cancer - colorectal No family hx of     Prostate Cancer No family hx of        SOCIAL HISTORY:  Social History     Socioeconomic History    Marital status: Single     Spouse name: Ricardo    Number of children: 5    Years of education: 14    Occupational History    Occupation:      Employer: [employer]   Tobacco Use    Smoking status: Former     Packs/day: 0.50     Years: 25.00     Additional pack years: 0.00     Total pack years: 12.50     Types: Cigarettes     Quit date: 3/23/2007     Years since quittin.7    Smokeless tobacco: Never   Vaping Use    Vaping Use: Never used   Substance and Sexual Activity    Alcohol use: Yes     Comment: rare- social drinker    Drug use: No    Sexual activity: Yes     Partners: Female   Other Topics Concern     Service Yes     Comment: [employer]- 6 years    Blood Transfusions No    Caffeine Concern No    Occupational Exposure No    Hobby Hazards No    Sleep Concern No    Stress Concern No    Weight Concern No    Special Diet No    Back Care No    Exercise Yes     Comment: 4 times a week    Bike Helmet Yes    Seat Belt Yes    Self-Exams Yes    Parent/sibling w/ CABG, MI or angioplasty before 65F 55M? No     Social Determinants of Health     Financial Resource Strain: Low Risk  (2023)    Financial Resource Strain     Within the past 12 months, have you or your family members you live with been unable to get utilities (heat, electricity) when it was really needed?: No   Food Insecurity: Low Risk  (2023)    Food Insecurity     Within the past 12 months, did you worry that your food would run out before you got money to buy more?: No     Within the past 12 months, did the food you bought just not last and you didn t have money to get more?: No   Transportation Needs: Low Risk  (2023)    Transportation Needs     Within the past 12 months, has lack of transportation kept you from medical appointments, getting your medicines, non-medical meetings or appointments, work, or from getting things that you need?: No   Interpersonal Safety: Low Risk  (2023)    Interpersonal Safety     Do you feel physically and emotionally safe where you currently live?: Yes     Within the past 12  months, have you been hit, slapped, kicked or otherwise physically hurt by someone?: No     Within the past 12 months, have you been humiliated or emotionally abused in other ways by your partner or ex-partner?: No   Housing Stability: Low Risk  (11/13/2023)    Housing Stability     Do you have housing? : Yes     Are you worried about losing your housing?: No       CURRENT MEDICATIONS:  acetaminophen (TYLENOL) 500 MG tablet, Take 1-2 tablets (500-1,000 mg) by mouth every 8 hours as needed for other (For optimal non-opioid multimodal pain management to improve pain control.)  amLODIPine (NORVASC) 10 MG tablet, Take 1 tablet (10 mg) by mouth every morning  aspirin 81 MG EC tablet, Take 1 tablet (81 mg) by mouth daily Start tomorrow morning.  atorvastatin (LIPITOR) 40 MG tablet, Take 1 tablet (40 mg) by mouth daily  blood glucose (NO BRAND SPECIFIED) test strip, Use to test blood sugar one times daily or as directed. To accompany: Blood Glucose Monitor Brands: per insurance.  blood glucose monitoring (NO BRAND SPECIFIED) meter device kit, Use to test blood sugar one times daily or as directed. Preferred blood glucose meter OR supplies to accompany: Blood Glucose Monitor Brands: per insurance.  carvedilol (COREG) 25 MG tablet, Take 1 tablet (25 mg) by mouth 2 times daily (with meals)  Continuous Blood Gluc Sensor (FREESTYLE ALEXA 3 SENSOR) [facility], 1 Device every 14 days  escitalopram (LEXAPRO) 10 MG tablet, Take 1 tablet (10 mg) by mouth daily  losartan (COZAAR) 50 MG tablet, Take 1 tablet (50 mg) by mouth daily  metFORMIN (GLUCOPHAGE XR) 500 MG 24 hr tablet, Take 1 tablet (500 mg) by mouth daily (with dinner)  methocarbamol (ROBAXIN) 750 MG tablet, 1 tablet (750 mg) by Oral or Feeding Tube route every 8 hours as needed for muscle spasms or other (pain)  multivitamin w/minerals (THERA-VIT-M) tablet, Take 1 tablet by mouth daily  thin (NO BRAND SPECIFIED) lancets, Use with lanceting device. To accompany: Blood Glucose  Monitor Brands: per insurance.  traMADol (ULTRAM) 50 MG tablet, Take 0.5 tablets (25 mg) by mouth every 12 hours as needed for severe pain  warfarin ANTICOAGULANT (COUMADIN) 3 MG tablet, Take 2 tablets (6 mg) by mouth daily Take 2 tablets (6 mg) 11/13 & 11/14 then, further dosing at the direction of your warfarin clinic.    No current facility-administered medications on file prior to visit.      ROS:   Refer to HPI    EXAM:  /78 (BP Location: Right arm, Patient Position: Chair, Cuff Size: Adult Large)   Pulse 80   Wt 94.4 kg (208 lb 1.6 oz)   SpO2 96%   BMI 25.33 kg/m      GENERAL: Appears comfortable, in no acute distress.   HEENT: Eye symmetrical, no discharge or icterus bilaterally. Mucous membranes moist and without lesions.  CV: RRR, +S1S2, mechical valve click, no rub or gallop.   RESPIRATORY: Respirations regular, even, and unlabored. Lungs CTA throughout.   GI: Soft and non distended with normoactive bowel sounds present in all quadrants. No tenderness, rebound, guarding. No hepatomegaly.   EXTREMITIES: No peripheral edema. 2+ bilateral pedal pulses.   NEUROLOGIC: Alert and oriented x 3. No focal deficits.   MUSCULOSKELETAL: No joint swelling or tenderness.   SKIN: No jaundice. No rashes or lesions.     Labs, reviewed with patient in clinic today:  CBC RESULTS:  Lab Results   Component Value Date    WBC 5.1 11/13/2023    WBC 4.6 05/28/2019    RBC 3.15 (L) 11/13/2023    RBC 5.17 05/28/2019    HGB 9.3 (L) 11/13/2023    HGB 15.7 05/28/2019    HCT 28.9 (L) 11/13/2023    HCT 47.0 05/28/2019    MCV 92 11/13/2023    MCV 91 05/28/2019    MCH 29.5 11/13/2023    MCH 30.4 05/28/2019    MCHC 32.2 11/13/2023    MCHC 33.4 05/28/2019    RDW 13.4 11/13/2023    RDW 14.3 05/28/2019     11/13/2023     05/28/2019       CMP RESULTS:  Lab Results   Component Value Date     11/10/2023     07/29/2020    POTASSIUM 4.2 11/10/2023    POTASSIUM 4.2 10/13/2023    POTASSIUM 3.8 07/08/2022    POTASSIUM  3.8 07/29/2020    CHLORIDE 106 11/10/2023    CHLORIDE 107 07/08/2022    CHLORIDE 107 07/29/2020    CO2 22 11/10/2023    CO2 23 07/08/2022    CO2 28 07/29/2020    ANIONGAP 12 11/10/2023    ANIONGAP 11 07/08/2022    ANIONGAP 5 07/29/2020    GLC 98 11/13/2023     (H) 07/08/2022    GLC 85 07/29/2020    BUN 11.4 11/10/2023    BUN 15 07/08/2022    BUN 20 07/29/2020    CR 0.91 11/10/2023    CR 1.38 (H) 07/29/2020    GFRESTIMATED >90 11/10/2023    GFRESTIMATED >60 01/14/2023    GFRESTIMATED 54 (L) 07/29/2020    GFRESTBLACK 62 07/29/2020    DORIS 9.2 11/10/2023    DORIS 9.5 07/29/2020    BILITOTAL 0.4 11/10/2023    BILITOTAL 1.3 04/09/2019    ALBUMIN 3.8 11/10/2023    ALBUMIN 4.2 07/08/2022    ALBUMIN 4.8 04/09/2019    ALKPHOS 100 11/10/2023    ALKPHOS 67 04/09/2019    ALT 21 11/10/2023    ALT 38 04/09/2019    AST 19 11/10/2023    AST 36 04/09/2019        INR RESULTS:  Lab Results   Component Value Date    INR 2.9 (H) 11/27/2023    INR 2.10 (H) 11/13/2023       Lab Results   Component Value Date    MAG 1.8 10/27/2023     Lab Results   Component Value Date    NTBNPI 1,030 (H) 08/14/2023     Lab Results   Component Value Date    NTBNP 141 (H) 04/09/2019       Cardiac Diagnostics:    10/20/2023 Echo  Interpretation Summary  s/p Coronary Artery Bypass Graft x 2, Aortic root and valve replacement using  On-X Ascending Aortic Prosthesis with Gelweave Valsalva Graft 25mm 10/13/2023.     Global and regional left ventricular function is normal with an EF of 60-65%.  Global right ventricular function is normal.  The prosthetic aortic valve is well-seated. Doppler interrogation is normal.  MG is 13 mmHg. No apperant AI noted. Afib is with RVR at times which makes  assessment difficult.     Trivial pericardial effusion is present posterior to the LV.     This study was compared with the study from 8/15/2023 .Rhythm is afib now  (previous study sinus), s/p AVR and root repair for severe AI and severe  aortic root  dilation.        Assessment and Plan:   Mr. Craven is a 68 year old male with medical history pertinent for severe aortic insufficiency 2/2 sinotubular aortic root dilitation (sinus of valsalva 5.3 cm), CAD (mid RCA 90% stenosed, dCx 35% stenosed, mid LAD 70% stenosed), PAD (mild-mod left lower extremity), bilateral popliteal aneurysms, HTN, T2DM, and chronic opioid/cannabis use, who is s/p Bentall procedure/ AVR and CABG x2 on 10/13 by Dr. Lockett. He presents to cardiology clinic for hospital follow up.     Feeling and appearing well. Blood pressure is well controlled. Review of most recent labs are stable. INR therapeutic.  Doing well from cardiac perspective. No medication changes. I will reach out to vascular surgery to determine if and when they would like to see Mr. Craven back in clinic for possible aneurysm repair.     # CAD s/p CABG x2   # Severe AI 2/2 aortic root aneurysm s/p On-X mechanical Bentall  # HLD  - continue ASA and statin   - continue coreg 25 mg BID    #PAF  # 1st deg AV block with bradycardia   Defer AAD therapy in the post-op period.       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  Cavitary aneurysms are more associated with rupture while extremity and peripheral aneurysms are more associated with thrombosis or embolic phenomenon.  Per vascular surgery, discussed the fact that his left popliteal aneurysm has been chronically occluded with excellent collateral flow.  He has no signs of lower extremity ischemia or residual embolic deficit although he may have some tibial disease as a result.  Discussed indications for repair including the size criteria which he is below for the right side, and other indications  including mural thrombus or embolic disease.  He could benefit from repair, but elected to postpone due to recent Bentall/CABG  - Epic message sent to vascular surgery to determine if/when follow up is warranted     # Anxiety  # Chronic cannabis use  Endorsed anxiety with sense of impending doom which first started 5-6 years ago. Timeline correlates somewhat with diagnosis of intermittent afib and would explain why anxiety symptoms wax and wane.   - continue Lexapro 10 mg daily          Follow up:  - Dr. Bao Lan in 3-4 months           Melissa Gee DNP, NP-C  General Cardiology   11/30/23

## 2023-11-30 NOTE — NURSING NOTE
Chief Complaint   Patient presents with    Follow Up     general cards follow-up     Vitals were taken and medications reconciled.    Timothy Portillo, EMT  1:05 PM

## 2023-11-30 NOTE — LETTER
11/30/2023      RE: Sukh Craven  [address]  [facility] [address]       Dear Colleague,    Thank you for the opportunity to participate in the care of your patient, Sukh Craven, at the [facility] [place] at [facility]. Please see a copy of my visit note below.      [facility] [place] [facility] [place]      HPI:   Mr. Craven is a 68 year old male with medical history pertinent for severe aortic insufficiency 2/2 sinotubular aortic root dilitation (sinus of valsalva 5.3 cm), CAD (mid RCA 90% stenosed, dCx 35% stenosed, mid LAD 70% stenosed), PAD (mild-mod left lower extremity), bilateral popliteal aneurysms, HTN, T2DM, and chronic opioid/cannabis use, who is s/p Bentall procedure/ AVR and CABG x2 on 10/13 by Dr. Lockett. He presents to cardiology clinic for hospital follow up.     Today, Mr. Craven reports feeling very well. He is without cardiopulmonary concerns. No CP or palpitations. Breathing comfortable and is without SOB or GUNTER. Denies lightheadedness, syncope, presyncope. No other HF symptoms including orthopnea, PND, LE edema, abdominal distention. Appetite has improved. He has completed cardiac rehab.     Home BP 120s/70s     Cardiac Medications   - Norvasc 10 mg daily   - ASA 81 mg daily   - Atorvastatin 40 mg daily   - Coreg 25 mg BID  - Losartan 50 mg daily   - Warfarin     PAST MEDICAL HISTORY:  Past Medical History:   Diagnosis Date    BPH (benign prostatic hyperplasia)     Diabetes (H)     Dyslipidemia     HTN (hypertension)     Metal foreign body in chest 10/17/2023    Fractured temporary pacing wire    PAD (peripheral artery disease) (H24)     Severe aortic insufficiency        FAMILY HISTORY:  Family History   Problem Relation Age of Onset    Pulmonary Embolism Mother     Diabetes Sister     Peripheral Vascular Disease Sister     Diabetes Sister     Diabetes Brother     C.A.D. No family hx of      Hypertension No family hx of     Cerebrovascular Disease No family hx of     Breast Cancer No family hx of     Cancer - colorectal No family hx of     Prostate Cancer No family hx of        SOCIAL HISTORY:  Social History     Socioeconomic History    Marital status: Single     Spouse name: Ricardo    Number of children: 5    Years of education: 14   Occupational History    Occupation:      Employer: [employer]   Tobacco Use    Smoking status: Former     Packs/day: 0.50     Years: 25.00     Additional pack years: 0.00     Total pack years: 12.50     Types: Cigarettes     Quit date: 3/23/2007     Years since quittin.7    Smokeless tobacco: Never   Vaping Use    Vaping Use: Never used   Substance and Sexual Activity    Alcohol use: Yes     Comment: rare- social drinker    Drug use: No    Sexual activity: Yes     Partners: Female   Other Topics Concern     Service Yes     Comment: [employer]- 6 years    Blood Transfusions No    Caffeine Concern No    Occupational Exposure No    Hobby Hazards No    Sleep Concern No    Stress Concern No    Weight Concern No    Special Diet No    Back Care No    Exercise Yes     Comment: 4 times a week    Bike Helmet Yes    Seat Belt Yes    Self-Exams Yes    Parent/sibling w/ CABG, MI or angioplasty before 65F 55M? No     Social Determinants of Health     Financial Resource Strain: Low Risk  (2023)    Financial Resource Strain     Within the past 12 months, have you or your family members you live with been unable to get utilities (heat, electricity) when it was really needed?: No   Food Insecurity: Low Risk  (2023)    Food Insecurity     Within the past 12 months, did you worry that your food would run out before you got money to buy more?: No     Within the past 12 months, did the food you bought just not last and you didn t have money to get more?: No   Transportation Needs: Low Risk  (2023)    Transportation Needs     Within the past 12  months, has lack of transportation kept you from medical appointments, getting your medicines, non-medical meetings or appointments, work, or from getting things that you need?: No   Interpersonal Safety: Low Risk  (9/27/2023)    Interpersonal Safety     Do you feel physically and emotionally safe where you currently live?: Yes     Within the past 12 months, have you been hit, slapped, kicked or otherwise physically hurt by someone?: No     Within the past 12 months, have you been humiliated or emotionally abused in other ways by your partner or ex-partner?: No   Housing Stability: Low Risk  (11/13/2023)    Housing Stability     Do you have housing? : Yes     Are you worried about losing your housing?: No       CURRENT MEDICATIONS:  acetaminophen (TYLENOL) 500 MG tablet, Take 1-2 tablets (500-1,000 mg) by mouth every 8 hours as needed for other (For optimal non-opioid multimodal pain management to improve pain control.)  amLODIPine (NORVASC) 10 MG tablet, Take 1 tablet (10 mg) by mouth every morning  aspirin 81 MG EC tablet, Take 1 tablet (81 mg) by mouth daily Start tomorrow morning.  atorvastatin (LIPITOR) 40 MG tablet, Take 1 tablet (40 mg) by mouth daily  blood glucose (NO BRAND SPECIFIED) test strip, Use to test blood sugar one times daily or as directed. To accompany: Blood Glucose Monitor Brands: per insurance.  blood glucose monitoring (NO BRAND SPECIFIED) meter device kit, Use to test blood sugar one times daily or as directed. Preferred blood glucose meter OR supplies to accompany: Blood Glucose Monitor Brands: per insurance.  carvedilol (COREG) 25 MG tablet, Take 1 tablet (25 mg) by mouth 2 times daily (with meals)  Continuous Blood Gluc Sensor (FREESTYLE ALEXA 3 SENSOR) [facility], 1 Device every 14 days  escitalopram (LEXAPRO) 10 MG tablet, Take 1 tablet (10 mg) by mouth daily  losartan (COZAAR) 50 MG tablet, Take 1 tablet (50 mg) by mouth daily  metFORMIN (GLUCOPHAGE XR) 500 MG 24 hr tablet, Take 1 tablet  (500 mg) by mouth daily (with dinner)  methocarbamol (ROBAXIN) 750 MG tablet, 1 tablet (750 mg) by Oral or Feeding Tube route every 8 hours as needed for muscle spasms or other (pain)  multivitamin w/minerals (THERA-VIT-M) tablet, Take 1 tablet by mouth daily  thin (NO BRAND SPECIFIED) lancets, Use with lanceting device. To accompany: Blood Glucose Monitor Brands: per insurance.  traMADol (ULTRAM) 50 MG tablet, Take 0.5 tablets (25 mg) by mouth every 12 hours as needed for severe pain  warfarin ANTICOAGULANT (COUMADIN) 3 MG tablet, Take 2 tablets (6 mg) by mouth daily Take 2 tablets (6 mg) 11/13 & 11/14 then, further dosing at the direction of your warfarin clinic.    No current facility-administered medications on file prior to visit.      ROS:   Refer to HPI    EXAM:  /78 (BP Location: Right arm, Patient Position: Chair, Cuff Size: Adult Large)   Pulse 80   Wt 94.4 kg (208 lb 1.6 oz)   SpO2 96%   BMI 25.33 kg/m      GENERAL: Appears comfortable, in no acute distress.   HEENT: Eye symmetrical, no discharge or icterus bilaterally. Mucous membranes moist and without lesions.  CV: RRR, +S1S2, mechical valve click, no rub or gallop.   RESPIRATORY: Respirations regular, even, and unlabored. Lungs CTA throughout.   GI: Soft and non distended with normoactive bowel sounds present in all quadrants. No tenderness, rebound, guarding. No hepatomegaly.   EXTREMITIES: No peripheral edema. 2+ bilateral pedal pulses.   NEUROLOGIC: Alert and oriented x 3. No focal deficits.   MUSCULOSKELETAL: No joint swelling or tenderness.   SKIN: No jaundice. No rashes or lesions.     Labs, reviewed with patient in clinic today:  CBC RESULTS:  Lab Results   Component Value Date    WBC 5.1 11/13/2023    WBC 4.6 05/28/2019    RBC 3.15 (L) 11/13/2023    RBC 5.17 05/28/2019    HGB 9.3 (L) 11/13/2023    HGB 15.7 05/28/2019    HCT 28.9 (L) 11/13/2023    HCT 47.0 05/28/2019    MCV 92 11/13/2023    MCV 91 05/28/2019    MCH 29.5 11/13/2023     MCH 30.4 05/28/2019    MCHC 32.2 11/13/2023    MCHC 33.4 05/28/2019    RDW 13.4 11/13/2023    RDW 14.3 05/28/2019     11/13/2023     05/28/2019       CMP RESULTS:  Lab Results   Component Value Date     11/10/2023     07/29/2020    POTASSIUM 4.2 11/10/2023    POTASSIUM 4.2 10/13/2023    POTASSIUM 3.8 07/08/2022    POTASSIUM 3.8 07/29/2020    CHLORIDE 106 11/10/2023    CHLORIDE 107 07/08/2022    CHLORIDE 107 07/29/2020    CO2 22 11/10/2023    CO2 23 07/08/2022    CO2 28 07/29/2020    ANIONGAP 12 11/10/2023    ANIONGAP 11 07/08/2022    ANIONGAP 5 07/29/2020    GLC 98 11/13/2023     (H) 07/08/2022    GLC 85 07/29/2020    BUN 11.4 11/10/2023    BUN 15 07/08/2022    BUN 20 07/29/2020    CR 0.91 11/10/2023    CR 1.38 (H) 07/29/2020    GFRESTIMATED >90 11/10/2023    GFRESTIMATED >60 01/14/2023    GFRESTIMATED 54 (L) 07/29/2020    GFRESTBLACK 62 07/29/2020    DORIS 9.2 11/10/2023    DORIS 9.5 07/29/2020    BILITOTAL 0.4 11/10/2023    BILITOTAL 1.3 04/09/2019    ALBUMIN 3.8 11/10/2023    ALBUMIN 4.2 07/08/2022    ALBUMIN 4.8 04/09/2019    ALKPHOS 100 11/10/2023    ALKPHOS 67 04/09/2019    ALT 21 11/10/2023    ALT 38 04/09/2019    AST 19 11/10/2023    AST 36 04/09/2019        INR RESULTS:  Lab Results   Component Value Date    INR 2.9 (H) 11/27/2023    INR 2.10 (H) 11/13/2023       Lab Results   Component Value Date    MAG 1.8 10/27/2023     Lab Results   Component Value Date    NTBNPI 1,030 (H) 08/14/2023     Lab Results   Component Value Date    NTBNP 141 (H) 04/09/2019       Cardiac Diagnostics:    10/20/2023 Echo  Interpretation Summary  s/p Coronary Artery Bypass Graft x 2, Aortic root and valve replacement using  On-X Ascending Aortic Prosthesis with Gelweave Valsalva Graft 25mm 10/13/2023.     Global and regional left ventricular function is normal with an EF of 60-65%.  Global right ventricular function is normal.  The prosthetic aortic valve is well-seated. Doppler interrogation is  normal.  MG is 13 mmHg. No apperant AI noted. Afib is with RVR at times which makes  assessment difficult.     Trivial pericardial effusion is present posterior to the LV.     This study was compared with the study from 8/15/2023 .Rhythm is afib now  (previous study sinus), s/p AVR and root repair for severe AI and severe  aortic root dilation.        Assessment and Plan:   Mr. Craven is a 68 year old male with medical history pertinent for severe aortic insufficiency 2/2 sinotubular aortic root dilitation (sinus of valsalva 5.3 cm), CAD (mid RCA 90% stenosed, dCx 35% stenosed, mid LAD 70% stenosed), PAD (mild-mod left lower extremity), bilateral popliteal aneurysms, HTN, T2DM, and chronic opioid/cannabis use, who is s/p Bentall procedure/ AVR and CABG x2 on 10/13 by Dr. Lockett. He presents to cardiology clinic for hospital follow up.     Feeling and appearing well. Blood pressure is well controlled. Review of most recent labs are stable. INR therapeutic.  Doing well from cardiac perspective. No medication changes. I will reach out to vascular surgery to determine if and when they would like to see Mr. Craven back in clinic for possible aneurysm repair.     # CAD s/p CABG x2   # Severe AI 2/2 aortic root aneurysm s/p On-X mechanical Bentall  # HLD  - continue ASA and statin   - continue coreg 25 mg BID    #PAF  # 1st deg AV block with bradycardia   Defer AAD therapy in the post-op period.       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  Cavitary aneurysms are more associated with rupture while extremity and peripheral aneurysms are more associated with thrombosis or embolic phenomenon.  Per vascular surgery, discussed the fact that  his left popliteal aneurysm has been chronically occluded with excellent collateral flow.  He has no signs of lower extremity ischemia or residual embolic deficit although he may have some tibial disease as a result.  Discussed indications for repair including the size criteria which he is below for the right side, and other indications including mural thrombus or embolic disease.  He could benefit from repair, but elected to postpone due to recent Bentall/CABG  - Epic message sent to vascular surgery to determine if/when follow up is warranted     # Anxiety  # Chronic cannabis use  Endorsed anxiety with sense of impending doom which first started 5-6 years ago. Timeline correlates somewhat with diagnosis of intermittent afib and would explain why anxiety symptoms wax and wane.   - continue Lexapro 10 mg daily          Follow up:  - Dr. Bao Lan in 3-4 months           Melissa Gee DNP, NP-C  General Cardiology   11/30/23

## 2023-11-30 NOTE — PATIENT INSTRUCTIONS
You were seen today in the Cardiovascular Clinic at the [facility] by:       ELDA Barrett     Your visit summary and instructions are as follows:    N medication changes   Continue to work toward the recommendation of 150 minutes of moderate intensity exercise/week and maintain a healthy lifestyle (avoid illicit drugs, smoking and moderate alcohol consumption)      Return to cardiology clinic with Dr. Bao Lan in 3-4 months     Thank you for your visit today!     Please MyChart message me or call my nurse if you have any questions or concerns.      During Business Hours:  [phone], option # 1 (University) then option # 4 (medical questions) and ask to speak with my nurse.     After hours, weekends or holidays:   [phone], Option #4  Ask to speak to the On-Call Cardiologist. Inform them you are a cardiology patient at the [place].

## 2023-12-04 DIAGNOSIS — I72.4 POPLITEAL ARTERY ANEURYSM (H): ICD-10-CM

## 2023-12-04 DIAGNOSIS — I72.4 BILATERAL POPLITEAL ARTERY ANEURYSM (H): Primary | ICD-10-CM

## 2023-12-07 ENCOUNTER — ANTICOAGULATION THERAPY VISIT (OUTPATIENT)
Dept: ANTICOAGULATION | Facility: CLINIC | Age: 68
End: 2023-12-07

## 2023-12-07 ENCOUNTER — LAB (OUTPATIENT)
Dept: LAB | Facility: CLINIC | Age: 68
End: 2023-12-07
Payer: COMMERCIAL

## 2023-12-07 DIAGNOSIS — Z95.2 S/P AVR (AORTIC VALVE REPLACEMENT): Primary | ICD-10-CM

## 2023-12-07 DIAGNOSIS — Z79.01 CURRENT USE OF LONG TERM ANTICOAGULATION: ICD-10-CM

## 2023-12-07 DIAGNOSIS — I48.0 PAROXYSMAL ATRIAL FIBRILLATION (H): ICD-10-CM

## 2023-12-07 DIAGNOSIS — Z95.1 S/P CABG (CORONARY ARTERY BYPASS GRAFT): ICD-10-CM

## 2023-12-07 DIAGNOSIS — Z95.2 S/P AVR (AORTIC VALVE REPLACEMENT): ICD-10-CM

## 2023-12-07 LAB — INR BLD: 2 (ref 0.9–1.1)

## 2023-12-07 PROCEDURE — 85610 PROTHROMBIN TIME: CPT

## 2023-12-07 PROCEDURE — 36416 COLLJ CAPILLARY BLOOD SPEC: CPT

## 2023-12-07 RX ORDER — WARFARIN SODIUM 1 MG/1
TABLET ORAL
Qty: 180 TABLET | Refills: 1 | Status: SHIPPED | OUTPATIENT
Start: 2023-12-07 | End: 2024-05-28

## 2023-12-07 NOTE — PROGRESS NOTES
ANTICOAGULATION MANAGEMENT     Sukh Craven 68 year old male is on warfarin with therapeutic INR result. (Goal INR 2.0-3.0)    Recent labs: (last 7 days)     12/07/23  1449   INR 2.0*       ASSESSMENT     Source(s): Chart Review and Patient/Caregiver Call     Warfarin doses taken: Warfarin taken as instructed  Diet: No new diet changes identified  Medication/supplement changes: None noted  New illness, injury, or hospitalization: No  Signs or symptoms of bleeding or clotting: No  Previous result: Therapeutic last 2(+) visits  Additional findings: None       PLAN     Recommended plan for no diet, medication or health factor changes affecting INR     Dosing Instructions: Continue your current warfarin dose with next INR in 1 week       Summary  As of 12/7/2023      Full warfarin instructions:  5 mg every day   Next INR check:  12/14/2023               Telephone call with Sukh who verbalizes understanding and agrees to plan    Lab visit scheduled    Education provided:   Please call back if any changes to your diet, medications or how you've been taking warfarin    Plan made per [facility] anticoagulation protocol    Violetta Muñoz RN  Anticoagulation Clinic  12/7/2023    _______________________________________________________________________     Anticoagulation Episode Summary       Current INR goal:  2.0-3.0   TTR:  54.8% (1.1 mo)   Target end date:  Indefinite   Send INR reminders to:  [facility] [place]    Indications    S/P AVR (aortic valve replacement) [Z95.2]  Current use of long term anticoagulation [Z79.01]  Paroxysmal atrial fibrillation (H) [I48.0]  S/P CABG (coronary artery bypass graft) [Z95.1]             Comments:               Anticoagulation Care Providers       Provider Role Specialty Phone number    Mariola Peterson PA-C Referring Cardiology [phone]    Alec Marc NP Referring Cardiovascular & Thoracic Surgery [phone]    Parrish Stephenson MD Referring Family Medicine  [phone]    Cesar Dong APRN CNP [facility] Medicine [phone]

## 2023-12-07 NOTE — CONFIDENTIAL NOTE
ANTICOAGULATION MANAGEMENT:  Medication Refill    Anticoagulation Summary  As of 12/7/2023      Warfarin maintenance plan:  5 mg (1 mg x 2 and 3 mg x 1) every day   Next INR check:  12/14/2023   Target end date:  Indefinite    Indications    S/P AVR (aortic valve replacement) [Z95.2]  Current use of long term anticoagulation [Z79.01]  Paroxysmal atrial fibrillation (H) [I48.0]  S/P CABG (coronary artery bypass graft) [Z95.1]                 Anticoagulation Care Providers       Provider Role Specialty Phone number    Mariola Peterson PA-C Referring Cardiology [phone]    Alec Marc NP Referring Cardiovascular & Thoracic Surgery [phone]    Parrish Stephenson MD Referring Family Medicine [phone]    Cesar Dong APRN CNP Referring Family Medicine [phone]            Refill Criteria    Visit with referring provider/group: Meets criteria: office visit within referring provider group in the last 1 year on 11/15/23    [facility] referral signed last signed: 10/30/2023; within last year: Yes    Lab monitoring not exceeding 2 weeks overdue: Yes    Sukh meets all criteria for refill. Rx instructions and quantity supplied updated to match patient's current dosing plan. Warfarin 90 day supply with 1 refill granted per [facility] protocol     Violetta Muñoz RN  Anticoagulation Clinic

## 2023-12-14 ENCOUNTER — LAB (OUTPATIENT)
Dept: LAB | Facility: CLINIC | Age: 68
End: 2023-12-14
Payer: COMMERCIAL

## 2023-12-14 ENCOUNTER — ANTICOAGULATION THERAPY VISIT (OUTPATIENT)
Dept: ANTICOAGULATION | Facility: CLINIC | Age: 68
End: 2023-12-14

## 2023-12-14 DIAGNOSIS — I48.0 PAROXYSMAL ATRIAL FIBRILLATION (H): ICD-10-CM

## 2023-12-14 DIAGNOSIS — Z95.2 S/P AVR (AORTIC VALVE REPLACEMENT): Primary | ICD-10-CM

## 2023-12-14 DIAGNOSIS — Z95.1 S/P CABG (CORONARY ARTERY BYPASS GRAFT): ICD-10-CM

## 2023-12-14 DIAGNOSIS — Z79.01 CURRENT USE OF LONG TERM ANTICOAGULATION: ICD-10-CM

## 2023-12-14 DIAGNOSIS — Z95.2 S/P AVR (AORTIC VALVE REPLACEMENT): ICD-10-CM

## 2023-12-14 LAB — INR BLD: 2.2 (ref 0.9–1.1)

## 2023-12-14 PROCEDURE — 85610 PROTHROMBIN TIME: CPT

## 2023-12-14 PROCEDURE — 36415 COLL VENOUS BLD VENIPUNCTURE: CPT

## 2023-12-14 NOTE — PROGRESS NOTES
ANTICOAGULATION MANAGEMENT     Sukh Craven 68 year old male is on warfarin with therapeutic INR result. (Goal INR 2.0-3.0)    Recent labs: (last 7 days)     12/14/23  1059   INR 2.2*       ASSESSMENT     Source(s): Chart Review and Patient/Caregiver Call     Warfarin doses taken: Warfarin taken as instructed  Diet: No new diet changes identified  Medication/supplement changes: None noted  New illness, injury, or hospitalization: No  Signs or symptoms of bleeding or clotting: No  Previous result: Therapeutic last 2(+) visits  Additional findings: None       PLAN     Recommended plan for no diet, medication or health factor changes affecting INR     Dosing Instructions: Continue your current warfarin dose with next INR in 1 week       Summary  As of 12/14/2023      Full warfarin instructions:  5 mg every day   Next INR check:  12/21/2023               Telephone call with Sukh who verbalizes understanding and agrees to plan    Lab visit scheduled    Education provided:   Please call back if any changes to your diet, medications or how you've been taking warfarin    Plan made per [facility] anticoagulation protocol    Violetta Muñoz RN  Anticoagulation Clinic  12/14/2023    _______________________________________________________________________     Anticoagulation Episode Summary       Current INR goal:  2.0-3.0   TTR:  62.6% (1.3 mo)   Target end date:  Indefinite   Send INR reminders to:  [facility] [place]    Indications    S/P AVR (aortic valve replacement) [Z95.2]  Current use of long term anticoagulation [Z79.01]  Paroxysmal atrial fibrillation (H) [I48.0]  S/P CABG (coronary artery bypass graft) [Z95.1]             Comments:               Anticoagulation Care Providers       Provider Role Specialty Phone number    Mariola Peterson PA-C Referring Cardiology [phone]    Alec Marc NP Referring Cardiovascular & Thoracic Surgery [phone]    Parrish Stephenson MD Referring Family Medicine  [phone]    Cesar Dong APRN CNP [facility] Medicine [phone]

## 2023-12-20 ENCOUNTER — TELEPHONE (OUTPATIENT)
Dept: VASCULAR SURGERY | Facility: CLINIC | Age: 68
End: 2023-12-20
Payer: COMMERCIAL

## 2023-12-20 NOTE — TELEPHONE ENCOUNTER
----- Message from Montse Gunedrson RN sent at 10/2/2023  9:52 AM CDT -----  Vascular Clinic Appointment Request    Imaging needed? Yes. Orders placed.     Visit type: in person visit    Provider: Dr. Franco    Timeframe: 3 months    Appt notes: Popliteal aneurysm follow-up    Additional info: Return vascular slot    Documentation routed to clinic coordinators for scheduling.      CAROLANN Colon, RN  RN - P Vascular Surgery  Ph: [phone]  Fax: [phone]

## 2023-12-21 ENCOUNTER — ANTICOAGULATION THERAPY VISIT (OUTPATIENT)
Dept: ANTICOAGULATION | Facility: CLINIC | Age: 68
End: 2023-12-21

## 2023-12-21 ENCOUNTER — LAB (OUTPATIENT)
Dept: LAB | Facility: CLINIC | Age: 68
End: 2023-12-21
Payer: COMMERCIAL

## 2023-12-21 DIAGNOSIS — Z95.2 S/P AVR (AORTIC VALVE REPLACEMENT): Primary | ICD-10-CM

## 2023-12-21 DIAGNOSIS — I48.0 PAROXYSMAL ATRIAL FIBRILLATION (H): ICD-10-CM

## 2023-12-21 DIAGNOSIS — Z95.1 S/P CABG (CORONARY ARTERY BYPASS GRAFT): ICD-10-CM

## 2023-12-21 DIAGNOSIS — Z95.2 S/P AVR (AORTIC VALVE REPLACEMENT): ICD-10-CM

## 2023-12-21 DIAGNOSIS — Z79.01 CURRENT USE OF LONG TERM ANTICOAGULATION: ICD-10-CM

## 2023-12-21 LAB — INR BLD: 2.8 (ref 0.9–1.1)

## 2023-12-21 PROCEDURE — 36416 COLLJ CAPILLARY BLOOD SPEC: CPT

## 2023-12-21 PROCEDURE — 85610 PROTHROMBIN TIME: CPT

## 2023-12-21 NOTE — PROGRESS NOTES
PLAN      Unable to reach Sukh today x 2 attempts     Left message to continue current dose of warfarin 5 mg tonight. Request call back for assessment.     Follow up required to confirm warfarin dose taken and assess for changes     Farzana Camacho RN  Anticoagulation Clinic  12/21/2023

## 2023-12-21 NOTE — PROGRESS NOTES
ANTICOAGULATION MANAGEMENT     Sukh Craven 68 year old male is on warfarin with therapeutic INR result. (Goal INR 2.0-3.0)    Recent labs: (last 7 days)     12/21/23  1156   INR 2.8*       ASSESSMENT     Warfarin Lab Questionnaire    Warfarin Doses Last 7 Days      12/20/2023    11:33 PM   Dose in Tablet or Mg   TAB or MG? milligram (mg)     Pt Rptd Dose SUNDAY MONDAY TUESDAY WED THURS FRIDAY SATURDAY 12/20/2023  11:33 PM 5 5 5 5 5 5 5         12/20/2023   Warfarin Lab Questionnaire   Missed doses within past 14 days? No   Changes in diet or alcohol within past 14 days? No   Medication changes since last result? No   Injuries or illness since last result? No   New shortness of breath, severe headaches or sudden changes in vision since last result? No   Abnormal bleeding since last result? No   Upcoming surgery, procedure? No     Previous result: Therapeutic last 2(+) visits  Additional findings: Recent heart valve replacement in last 10 weeks       PLAN     Unable to reach Sukh today.    Left message to continue current dose of warfarin 5 mg tonight. Request call back for assessment.    Follow up required to confirm warfarin dose taken and assess for changes    Farzana Camacho RN  Anticoagulation Clinic  12/21/2023

## 2023-12-22 NOTE — PROGRESS NOTES
ANTICOAGULATION MANAGEMENT     Sukh Craven 68 year old male is on warfarin with therapeutic INR result. (Goal INR 2.0-3.0)    Recent labs: (last 7 days)     12/21/23  1156   INR 2.8*       ASSESSMENT     Warfarin Lab Questionnaire    Warfarin Doses Last 7 Days      12/20/2023    11:33 PM   Dose in Tablet or Mg   TAB or MG? milligram (mg)     Pt Rptd Dose SUNDAY MONDAY TUESDAY WED THURS FRIDAY SATURDAY 12/20/2023  11:33 PM 5 5 5 5 5 5 5         12/20/2023   Warfarin Lab Questionnaire   Missed doses within past 14 days? No   Changes in diet or alcohol within past 14 days? No   Medication changes since last result? No   Injuries or illness since last result? No   New shortness of breath, severe headaches or sudden changes in vision since last result? No   Abnormal bleeding since last result? No   Upcoming surgery, procedure? No     Previous result: Therapeutic last 2(+) visits  Additional findings: Recent heart valve replacement in last 10 weeks        PLAN     Recommended plan for no diet, medication or health factor changes affecting INR     Dosing Instructions: Continue your current warfarin dose with next INR in 10 days       Summary  As of 12/21/2023      Full warfarin instructions:  5 mg every day   Next INR check:  1/2/2024               Telephone call with Sukh who verbalizes understanding and agrees to plan    Lab visit scheduled    Education provided:   Please call back if any changes to your diet, medications or how you've been taking warfarin  Symptom monitoring: monitoring for bleeding signs and symptoms and monitoring for clotting signs and symptoms    Plan made per [facility] anticoagulation protocol    Farzana Camacho RN  Anticoagulation Clinic  12/22/2023    _______________________________________________________________________     Anticoagulation Episode Summary       Current INR goal:  2.0-3.0   TTR:  68.2% (1.5 mo)   Target end date:  Indefinite   Send INR reminders to:  [facility]     Indications    S/P AVR (aortic valve replacement) [Z95.2]  Current use of long term anticoagulation [Z79.01]  Paroxysmal atrial fibrillation (H) [I48.0]  S/P CABG (coronary artery bypass graft) [Z95.1]             Comments:               Anticoagulation Care Providers       Provider Role Specialty Phone number    Mariola Peterson PA-C Referring Cardiology [phone]    Alec Marc NP Referring Cardiovascular & Thoracic Surgery [phone]    Parrish Stephenson MD Referring Family Medicine [phone]    Cesar Dong APRN CNP Referring Family Medicine [phone]

## 2023-12-26 ENCOUNTER — TELEPHONE (OUTPATIENT)
Dept: VASCULAR SURGERY | Facility: CLINIC | Age: 68
End: 2023-12-26
Payer: COMMERCIAL

## 2023-12-26 NOTE — TELEPHONE ENCOUNTER
12/26 LVM to schedule following    Vascular Clinic Appointment Request     Imaging needed? Yes. Orders placed.      Visit type: in person visit     Provider: Dr. Franco     Timeframe: 1 Month     Appt notes: Popliteal aneurysm follow-up     Additional info: Return vascular slot    Max Attempts reached.. closing encounter

## 2023-12-26 NOTE — TELEPHONE ENCOUNTER
----- Message from Montse Gunderson RN sent at 10/2/2023  9:52 AM CDT -----  Vascular Clinic Appointment Request    Imaging needed? Yes. Orders placed.     Visit type: in person visit    Provider: Dr. Franco    Timeframe: 3 months    Appt notes: Popliteal aneurysm follow-up    Additional info: Return vascular slot    Documentation routed to clinic coordinators for scheduling.      CAROLANN Colon, RN  RN - P Vascular Surgery  Ph: [phone]  Fax: [phone]

## 2023-12-28 ENCOUNTER — TELEPHONE (OUTPATIENT)
Dept: VASCULAR SURGERY | Facility: CLINIC | Age: 68
End: 2023-12-28
Payer: COMMERCIAL

## 2023-12-28 NOTE — TELEPHONE ENCOUNTER
Left Voicemail (1st Attempt) for the patient to call back and schedule the following:    Appointment type: RVASCP  Provider: MICHAEL  Return date: MARCH 2024  Specialty phone number: [phone]  Additional appointment(s) needed: US  Additonal Notes: per IB.

## 2024-01-02 ENCOUNTER — LAB (OUTPATIENT)
Dept: LAB | Facility: CLINIC | Age: 69
End: 2024-01-02
Payer: COMMERCIAL

## 2024-01-02 ENCOUNTER — ANTICOAGULATION THERAPY VISIT (OUTPATIENT)
Dept: ANTICOAGULATION | Facility: CLINIC | Age: 69
End: 2024-01-02

## 2024-01-02 DIAGNOSIS — Z95.2 S/P AVR (AORTIC VALVE REPLACEMENT): ICD-10-CM

## 2024-01-02 DIAGNOSIS — I48.0 PAROXYSMAL ATRIAL FIBRILLATION (H): ICD-10-CM

## 2024-01-02 DIAGNOSIS — Z79.01 CURRENT USE OF LONG TERM ANTICOAGULATION: ICD-10-CM

## 2024-01-02 DIAGNOSIS — Z95.1 S/P CABG (CORONARY ARTERY BYPASS GRAFT): ICD-10-CM

## 2024-01-02 DIAGNOSIS — Z95.2 S/P AVR (AORTIC VALVE REPLACEMENT): Primary | ICD-10-CM

## 2024-01-02 LAB — INR BLD: 2.4 (ref 0.9–1.1)

## 2024-01-02 PROCEDURE — 85610 PROTHROMBIN TIME: CPT

## 2024-01-02 PROCEDURE — 36416 COLLJ CAPILLARY BLOOD SPEC: CPT

## 2024-01-02 NOTE — PROGRESS NOTES
ANTICOAGULATION MANAGEMENT     Sukh Craven 68 year old male is on warfarin with therapeutic INR result. (Goal INR 2.0-3.0)    Recent labs: (last 7 days)     01/02/24  1306   INR 2.4*       ASSESSMENT     Warfarin Lab Questionnaire    Warfarin Doses Last 7 Days      1/1/2024     1:49 PM   Dose in Tablet or Mg   TAB or MG? milligram (mg)     Pt Rptd Dose SUNDAY MONDAY TUESDAY WED THURS FRIDAY SATURDAY 1/1/2024   1:49 PM 5 5 5 5 5 5 5         1/1/2024   Warfarin Lab Questionnaire   Missed doses within past 14 days? No   Changes in diet or alcohol within past 14 days? No   Medication changes since last result? No   Injuries or illness since last result? No   New shortness of breath, severe headaches or sudden changes in vision since last result? No   Abnormal bleeding since last result? No   Upcoming surgery, procedure? No     Previous result: Therapeutic last 2(+) visits  Additional findings: None       PLAN     Recommended plan for no diet, medication or health factor changes affecting INR     Dosing Instructions: Continue your current warfarin dose with next INR in 3 weeks       Summary  As of 1/2/2024      Full warfarin instructions:  5 mg every day   Next INR check:  1/23/2024               Telephone call with Sukh who verbalizes understanding and agrees to plan    Lab visit scheduled    Education provided:   Please call back if any changes to your diet, medications or how you've been taking warfarin  Contact [phone]  with any changes, questions or concerns.     Plan made per [facility] anticoagulation protocol    Ngoc Goodson RN  Anticoagulation Clinic  1/2/2024    _______________________________________________________________________     Anticoagulation Episode Summary       Current INR goal:  2.0-3.0   TTR:  74.7% (1.9 mo)   Target end date:  Indefinite   Send INR reminders to:  [facility]    Indications    S/P AVR (aortic valve replacement) [Z95.2]  Current use of long term  anticoagulation [Z79.01]  Paroxysmal atrial fibrillation (H) [I48.0]  S/P CABG (coronary artery bypass graft) [Z95.1]             Comments:               Anticoagulation Care Providers       Provider Role Specialty Phone number    Mariola Peterson PA-C Referring Cardiology [phone]    Alec Marc NP Referring Cardiovascular & Thoracic Surgery [phone]    Parrish Stephenson MD Referring Family Medicine [phone]    Cesar Dong APRN CNP Referring Family Medicine [phone]

## 2024-01-23 ENCOUNTER — LAB (OUTPATIENT)
Dept: LAB | Facility: CLINIC | Age: 69
End: 2024-01-23
Payer: COMMERCIAL

## 2024-01-23 ENCOUNTER — ANTICOAGULATION THERAPY VISIT (OUTPATIENT)
Dept: ANTICOAGULATION | Facility: CLINIC | Age: 69
End: 2024-01-23

## 2024-01-23 DIAGNOSIS — Z95.2 S/P AVR (AORTIC VALVE REPLACEMENT): ICD-10-CM

## 2024-01-23 DIAGNOSIS — Z95.2 S/P AVR (AORTIC VALVE REPLACEMENT): Primary | ICD-10-CM

## 2024-01-23 DIAGNOSIS — Z79.01 CURRENT USE OF LONG TERM ANTICOAGULATION: ICD-10-CM

## 2024-01-23 DIAGNOSIS — E11.9 DIABETES MELLITUS, TYPE 2 (H): Primary | ICD-10-CM

## 2024-01-23 DIAGNOSIS — Z95.1 S/P CABG (CORONARY ARTERY BYPASS GRAFT): ICD-10-CM

## 2024-01-23 DIAGNOSIS — I48.0 PAROXYSMAL ATRIAL FIBRILLATION (H): ICD-10-CM

## 2024-01-23 LAB — INR BLD: 1.9 (ref 0.9–1.1)

## 2024-01-23 PROCEDURE — 85610 PROTHROMBIN TIME: CPT

## 2024-01-23 PROCEDURE — 36416 COLLJ CAPILLARY BLOOD SPEC: CPT

## 2024-01-23 NOTE — PROGRESS NOTES
ANTICOAGULATION MANAGEMENT     Sukh Craven 68 year old male is on warfarin with subtherapeutic INR result. (Goal INR 2.0-3.0)    Recent labs: (last 7 days)     01/23/24  1253   INR 1.9*       ASSESSMENT     Source(s): Chart Review and Patient/Caregiver Call     Warfarin doses taken: Warfarin taken as instructed  Diet: No new diet changes identified  Medication/supplement changes:  has been out of atorvastatin and carvedilol for the last 5 days but pharmacy working on refill, neither medication should influence INR.  New illness, injury, or hospitalization: No  Signs or symptoms of bleeding or clotting: No  Previous result: Therapeutic last 2(+) visits, but trending down, 2.8, 2.4, now 1.9  Additional findings:  On-X AVR just over 3 months ago.       PLAN     Recommended plan for INR trending down and now subtherapeutic with no diet, medication or health factor changes affecting INR     Dosing Instructions: Increase your warfarin dose (3% change) with next INR in 2 weeks       Summary  As of 1/23/2024      Full warfarin instructions:  6 mg every Tue; 5 mg all other days   Next INR check:  2/6/2024               Telephone call with Sukh who verbalizes understanding and agrees to plan    Lab visit scheduled    Education provided:   Please call back if any changes to your diet, medications or how you've been taking warfarin  Contact [phone]  with any changes, questions or concerns.     Plan made per [facility] anticoagulation protocol    Ngoc Goodson RN  Anticoagulation Clinic  1/23/2024    _______________________________________________________________________     Anticoagulation Episode Summary       Current INR goal:  2.0-3.0   TTR:  76.1% (2.6 mo)   Target end date:  Indefinite   Send INR reminders to:  [facility]    Indications    S/P AVR (aortic valve replacement) [Z95.2]  Current use of long term anticoagulation [Z79.01]  Paroxysmal atrial fibrillation (H) [I48.0]  S/P CABG (coronary artery  bypass graft) [Z95.1]             Comments:               Anticoagulation Care Providers       Provider Role Specialty Phone number    Mariola Peterson PA-C Referring Cardiology [phone]    Alec Marc NP Referring Cardiovascular & Thoracic Surgery [phone]    Parrish Stephenson MD Referring Family Medicine [phone]    Cesar Dong APRN CNP Referring Family Medicine [phone]

## 2024-01-29 ENCOUNTER — OFFICE VISIT (OUTPATIENT)
Dept: OPHTHALMOLOGY | Facility: CLINIC | Age: 69
End: 2024-01-29
Payer: COMMERCIAL

## 2024-01-29 DIAGNOSIS — H25.813 COMBINED FORMS OF AGE-RELATED CATARACT OF BOTH EYES: ICD-10-CM

## 2024-01-29 DIAGNOSIS — H40.003 GLAUCOMA SUSPECT OF BOTH EYES: Primary | ICD-10-CM

## 2024-01-29 PROCEDURE — 92012 INTRM OPH EXAM EST PATIENT: CPT | Performed by: OPHTHALMOLOGY

## 2024-01-29 RX ORDER — LATANOPROST 50 UG/ML
1 SOLUTION/ DROPS OPHTHALMIC EVERY EVENING
Qty: 2.5 ML | Refills: 11 | Status: SHIPPED | OUTPATIENT
Start: 2024-01-29

## 2024-01-29 ASSESSMENT — CONF VISUAL FIELD
OS_SUPERIOR_TEMPORAL_RESTRICTION: 0
OD_INFERIOR_NASAL_RESTRICTION: 0
OS_NORMAL: 1
OS_INFERIOR_TEMPORAL_RESTRICTION: 0
OD_SUPERIOR_TEMPORAL_RESTRICTION: 0
OD_SUPERIOR_NASAL_RESTRICTION: 0
OD_NORMAL: 1
OD_INFERIOR_TEMPORAL_RESTRICTION: 0
OS_SUPERIOR_NASAL_RESTRICTION: 0
OS_INFERIOR_NASAL_RESTRICTION: 0

## 2024-01-29 ASSESSMENT — SLIT LAMP EXAM - LIDS
COMMENTS: 2+ DERMATOCHALASIS, 2+ SCLERAL SHOW
COMMENTS: 2+ DERMATOCHALASIS, 2+ SCLERAL SHOW

## 2024-01-29 ASSESSMENT — PACHYMETRY
OD_CT(UM): 570
OS_CT(UM): 575

## 2024-01-29 ASSESSMENT — VISUAL ACUITY
METHOD: SNELLEN - LINEAR
OD_SC: 20/70
OS_SC+: -1
OS_SC: 20/30
OD_SC+: +1

## 2024-01-29 ASSESSMENT — EXTERNAL EXAM - LEFT EYE: OS_EXAM: 1+ BROW PTOSIS, PROLAPSED FAT PADS: UPPER, LOWER

## 2024-01-29 ASSESSMENT — EXTERNAL EXAM - RIGHT EYE: OD_EXAM: 1+ BROW PTOSIS, PROLAPSED FAT PADS: UPPER, LOWER

## 2024-01-29 ASSESSMENT — TONOMETRY
OD_IOP_MMHG: 21
IOP_METHOD: APPLANATION
OS_IOP_MMHG: 21

## 2024-01-29 NOTE — PATIENT INSTRUCTIONS
Begin:   Latanoprost (green top) one drop in both eyes at bedtime     Referral sent to Dr. Nick Ward at the [facility] for a cataract/Glaucoma evaluation. His office will contact you to schedule an appointment     Tyshawn Tabor M.D.  [phone]

## 2024-01-29 NOTE — PROGRESS NOTES
Current Eye Medications:  None     Subjective:  Glaucoma suspect. HVF, pachymetry, IOP check. OCT performed at  11/29/2023.      Objective:  See Ophthalmology Exam.       Assessment:  Hahn Visual Field shows mild inferior depression left eye consistent with area of NFL thinning on glaucoma OCT.  Cataracts visually significant both eyes.  Intermediate corneal thickness.      Plan:  Begin:   Latanoprost (green top) one drop in both eyes at bedtime     Referral sent to Dr. Nick Ward at the [facility] for a cataract evaluation (w consideration of [facility]). His office will contact you to schedule an appointment     Tyshawn Tabor M.D.  [phone]

## 2024-01-29 NOTE — LETTER
1/29/2024         RE: Sukh Craven  [address]  [place] [address]        Dear Colleague,    Thank you for referring your patient, Sukh Craven, to the [facility]. Please see a copy of my visit note below.     Current Eye Medications:  None     Subjective:  Glaucoma suspect. HVF, pachymetry, IOP check. OCT performed at  11/29/2023.      Objective:  See Ophthalmology Exam.       Assessment:  Hahn Visual Field shows mild inferior depression left eye consistent with area of NFL thinning on glaucoma OCT.  Cataracts visually significant both eyes.  Intermediate corneal thickness.      Plan:  Begin:   Latanoprost (green top) one drop in both eyes at bedtime     Referral sent to Dr. Nick Ward at the [facility] for a cataract evaluation (w [facility] of [facility] His office will contact you to schedule an appointment     Tyshawn Tabor M.D.  [phone]          Again, thank you for allowing me to participate in the care of your patient.        Sincerely,        Tyshawn Tabor MD

## 2024-02-06 ENCOUNTER — LAB (OUTPATIENT)
Dept: LAB | Facility: CLINIC | Age: 69
End: 2024-02-06
Payer: COMMERCIAL

## 2024-02-06 ENCOUNTER — ANTICOAGULATION THERAPY VISIT (OUTPATIENT)
Dept: ANTICOAGULATION | Facility: CLINIC | Age: 69
End: 2024-02-06

## 2024-02-06 DIAGNOSIS — I48.0 PAROXYSMAL ATRIAL FIBRILLATION (H): ICD-10-CM

## 2024-02-06 DIAGNOSIS — Z79.01 CURRENT USE OF LONG TERM ANTICOAGULATION: ICD-10-CM

## 2024-02-06 DIAGNOSIS — Z95.2 S/P AVR (AORTIC VALVE REPLACEMENT): ICD-10-CM

## 2024-02-06 DIAGNOSIS — Z95.1 S/P CABG (CORONARY ARTERY BYPASS GRAFT): ICD-10-CM

## 2024-02-06 DIAGNOSIS — Z95.2 S/P AVR (AORTIC VALVE REPLACEMENT): Primary | ICD-10-CM

## 2024-02-06 LAB — INR BLD: 1.9 (ref 0.9–1.1)

## 2024-02-06 PROCEDURE — 36416 COLLJ CAPILLARY BLOOD SPEC: CPT

## 2024-02-06 PROCEDURE — 85610 PROTHROMBIN TIME: CPT

## 2024-02-06 NOTE — PROGRESS NOTES
ANTICOAGULATION MANAGEMENT     Sukh Craven 68 year old male is on warfarin with subtherapeutic INR result. (Goal INR 2.0-3.0)    Recent labs: (last 7 days)     02/06/24  1254   INR 1.9*       ASSESSMENT     Warfarin Lab Questionnaire    Warfarin Doses Last 7 Days      2/6/2024     9:24 AM   Dose in Tablet or Mg   TAB or MG? milligram (mg)     Pt Rptd Dose SUNDAY MONDAY TUESDAY WED THURS FRIDAY SATURDAY 2/6/2024   9:24 AM 5 5 6 5 5 5 5         2/6/2024   Warfarin Lab Questionnaire   Missed doses within past 14 days? No   Changes in diet or alcohol within past 14 days? No   Medication changes since last result? No   Injuries or illness since last result? No   New shortness of breath, severe headaches or sudden changes in vision since last result? No   Abnormal bleeding since last result? No   Upcoming surgery, procedure? No     Previous result: Subtherapeutic  Additional findings: None       PLAN     Recommended plan for ongoing change(s) affecting INR     Dosing Instructions: Increase your warfarin dose (5.6% change) with next INR in 2 weeks       Summary  As of 2/6/2024      Full warfarin instructions:  6 mg every Tue, Thu, Sat; 5 mg all other days   Next INR check:  2/20/2024               Telephone call with Sukh who verbalizes understanding and agrees to plan    Lab visit scheduled    Education provided:   Please call back if any changes to your diet, medications or how you've been taking warfarin    Plan made per [facility] anticoagulation protocol    Vince Gaines RN  Anticoagulation Clinic  2/6/2024    _______________________________________________________________________     Anticoagulation Episode Summary       Current INR goal:  2.0-3.0   TTR:  64.8% (3.1 mo)   Target end date:  Indefinite   [facility]    Indications    S/P AVR (aortic valve replacement) [Z95.2]  Paroxysmal atrial fibrillation (H) [I48.0]             Comments:               Anticoagulation Care  Providers       Provider Role Specialty Phone number    Parrish Stephenson MD Referring Family Medicine [phone]

## 2024-02-20 ENCOUNTER — LAB (OUTPATIENT)
Dept: LAB | Facility: CLINIC | Age: 69
End: 2024-02-20
Payer: COMMERCIAL

## 2024-02-20 ENCOUNTER — ANTICOAGULATION THERAPY VISIT (OUTPATIENT)
Dept: ANTICOAGULATION | Facility: CLINIC | Age: 69
End: 2024-02-20

## 2024-02-20 DIAGNOSIS — Z95.2 S/P AVR (AORTIC VALVE REPLACEMENT): Primary | ICD-10-CM

## 2024-02-20 DIAGNOSIS — I48.0 PAROXYSMAL ATRIAL FIBRILLATION (H): ICD-10-CM

## 2024-02-20 DIAGNOSIS — Z95.2 S/P AVR (AORTIC VALVE REPLACEMENT): ICD-10-CM

## 2024-02-20 LAB — INR BLD: 2.9 (ref 0.9–1.1)

## 2024-02-20 PROCEDURE — 85610 PROTHROMBIN TIME: CPT

## 2024-02-20 PROCEDURE — 36416 COLLJ CAPILLARY BLOOD SPEC: CPT

## 2024-02-20 NOTE — PROGRESS NOTES
ANTICOAGULATION MANAGEMENT     Sukh Craven 68 year old male is on warfarin with therapeutic INR result. (Goal INR 2.0-3.0)    Recent labs: (last 7 days)     02/20/24  1306   INR 2.9*       ASSESSMENT     Warfarin Lab Questionnaire    Warfarin Doses Last 7 Days      2/19/2024     3:58 PM   Dose in Tablet or Mg   TAB or MG? milligram (mg)     Pt Rptd Dose SUNDAY MONDAY TUESDAY WED THURS FRIDAY SATURDAY 2/19/2024   3:58 PM 5 5 6 5 6 5 6         2/19/2024   Warfarin Lab Questionnaire   Missed doses within past 14 days? No, but double dosed 2/16   Changes in diet or alcohol within past 14 days? No   Medication changes since last result? No   Injuries or illness since last result? No   New shortness of breath, severe headaches or sudden changes in vision since last result? No   Abnormal bleeding since last result? No   Upcoming surgery, procedure? No     Previous result: Subtherapeutic  Additional findings: None       PLAN     Recommended plan for temporary change(s) affecting INR     Dosing Instructions: Continue your current warfarin dose with next INR in 1-2 weeks. Double dose from 4 days ago should really be influencing INR today.      Summary  As of 2/20/2024      Full warfarin instructions:  6 mg every Tue, Thu, Sat; 5 mg all other days   Next INR check:  3/4/2024               Telephone call with Sukh who verbalizes understanding and agrees to plan    Check at provider office visit    Education provided:   Please call back if any changes to your diet, medications or how you've been taking warfarin  Contact [phone]  with any changes, questions or concerns.     Plan made per [facility] anticoagulation protocol    Ngoc Goodson, RN  Anticoagulation Clinic  2/20/2024    _______________________________________________________________________     Anticoagulation Episode Summary       Current INR goal:  2.0-3.0   TTR:  68.0% (3.6 mo)   Target end date:  Indefinite   Send INR reminders to:  [facility]     Indications    S/P AVR (aortic valve replacement) [Z95.2]  Paroxysmal atrial fibrillation (H) [I48.0]             Comments:               Anticoagulation Care Providers       Provider Role Specialty Phone number    Parrish Stephenson MD Referring Family Medicine [phone]

## 2024-03-04 ENCOUNTER — OFFICE VISIT (OUTPATIENT)
Dept: VASCULAR SURGERY | Facility: CLINIC | Age: 69
End: 2024-03-04
Payer: COMMERCIAL

## 2024-03-04 ENCOUNTER — LAB (OUTPATIENT)
Dept: LAB | Facility: CLINIC | Age: 69
End: 2024-03-04
Payer: COMMERCIAL

## 2024-03-04 ENCOUNTER — ANCILLARY PROCEDURE (OUTPATIENT)
Dept: ULTRASOUND IMAGING | Facility: CLINIC | Age: 69
End: 2024-03-04
Attending: SURGERY
Payer: COMMERCIAL

## 2024-03-04 ENCOUNTER — TELEPHONE (OUTPATIENT)
Dept: FAMILY MEDICINE | Facility: CLINIC | Age: 69
End: 2024-03-04

## 2024-03-04 ENCOUNTER — ANTICOAGULATION THERAPY VISIT (OUTPATIENT)
Dept: ANTICOAGULATION | Facility: CLINIC | Age: 69
End: 2024-03-04

## 2024-03-04 VITALS — DIASTOLIC BLOOD PRESSURE: 88 MMHG | HEART RATE: 82 BPM | OXYGEN SATURATION: 96 % | SYSTOLIC BLOOD PRESSURE: 141 MMHG

## 2024-03-04 DIAGNOSIS — I72.4 BILATERAL POPLITEAL ARTERY ANEURYSM (H): Primary | ICD-10-CM

## 2024-03-04 DIAGNOSIS — Z95.2 S/P AVR (AORTIC VALVE REPLACEMENT): ICD-10-CM

## 2024-03-04 DIAGNOSIS — I10 ESSENTIAL HYPERTENSION: ICD-10-CM

## 2024-03-04 DIAGNOSIS — I48.0 PAROXYSMAL ATRIAL FIBRILLATION (H): ICD-10-CM

## 2024-03-04 DIAGNOSIS — E11.9 DIABETES MELLITUS, TYPE 2 (H): ICD-10-CM

## 2024-03-04 DIAGNOSIS — I72.4 POPLITEAL ARTERY ANEURYSM (H): ICD-10-CM

## 2024-03-04 DIAGNOSIS — Z95.1 S/P CABG (CORONARY ARTERY BYPASS GRAFT): ICD-10-CM

## 2024-03-04 DIAGNOSIS — I25.10 CORONARY ARTERY DISEASE INVOLVING NATIVE CORONARY ARTERY OF NATIVE HEART WITHOUT ANGINA PECTORIS: ICD-10-CM

## 2024-03-04 DIAGNOSIS — Z01.818 ENCOUNTER FOR OTHER PREPROCEDURAL EXAMINATION: ICD-10-CM

## 2024-03-04 DIAGNOSIS — I72.4 BILATERAL POPLITEAL ARTERY ANEURYSM (H): ICD-10-CM

## 2024-03-04 DIAGNOSIS — I71.21 ANEURYSM OF ASCENDING AORTA WITHOUT RUPTURE (H): ICD-10-CM

## 2024-03-04 DIAGNOSIS — Z95.2 S/P AVR (AORTIC VALVE REPLACEMENT): Primary | ICD-10-CM

## 2024-03-04 DIAGNOSIS — I73.9 PAD (PERIPHERAL ARTERY DISEASE) (H): ICD-10-CM

## 2024-03-04 LAB
HBA1C MFR BLD: 6.5 %
INR BLD: 1.9 (ref 0.9–1.1)

## 2024-03-04 PROCEDURE — 99000 SPECIMEN HANDLING OFFICE-LAB: CPT | Performed by: PATHOLOGY

## 2024-03-04 PROCEDURE — 99213 OFFICE O/P EST LOW 20 MIN: CPT | Performed by: SURGERY

## 2024-03-04 PROCEDURE — 85610 PROTHROMBIN TIME: CPT | Performed by: PATHOLOGY

## 2024-03-04 PROCEDURE — 83036 HEMOGLOBIN GLYCOSYLATED A1C: CPT | Performed by: FAMILY MEDICINE

## 2024-03-04 PROCEDURE — 93925 LOWER EXTREMITY STUDY: CPT | Performed by: RADIOLOGY

## 2024-03-04 PROCEDURE — 36415 COLL VENOUS BLD VENIPUNCTURE: CPT | Performed by: PATHOLOGY

## 2024-03-04 NOTE — PATIENT INSTRUCTIONS
Thank you so much for choosing us for your care. It was a pleasure to see you at the vascular clinic today.     Follow-up recommendations: We will see you back in 3 months. Please do not hesitate to reach out to us in the meantime with any concerns. Our schedulers will get in touch with you to set up your follow-up visit.    Additional testing/imaging ordered today: Vein mapping, CT scan, lower extremity US       Our scheduling team will get in touch with you to set up any follow-up testing/imaging and/or appointments. Please be aware that any testing/imaging recommended today will need to completed prior to your next visit with the provider. If testing/imaging is not completed prior to your next visit, your visit may be rescheduled.     If you have any questions, please contact our clinic directly at [phone] and ask for the nurse. We also encourage the use of [employer] to communicate with your healthcare provider.    If you have an urgent need after business hours (8:00 am to 4:30 pm) please call [phone], option 4, and ask for the vascular attending on call. For non-urgent after hours needs, please call the vascular clinic at [phone]. For scheduling needs, please call our clinic directly at [phone].    =====================================================================    [facility] is recognized by the [facility] as a comprehensive stroke center. As part of our commitment to better patient outcomes and excellent stroke education, we attach the below stroke education materials to ALL of the after visit summaries in our vascular clinic.        Learning About BE FAST: Stroke Warning Signs  BE FAST is a simple way to remember the main symptoms of stroke. These symptoms happen suddenly. So learning what to look for helps you know when to call for medical help. BE FAST stands for:    B - Balance.  Loss of balance or trouble walking.     E - Eyes.  Trouble seeing out  of one or both eyes.     F - Face.  Weakness or drooping on one side of the face.     A - Arm.  Weakness or numbness in an arm or leg.     S - Speech.  Trouble speaking.     T - Time to call 911.  Also call 911 if you have other stroke symptoms. They include:  Sudden confusion.  Sudden trouble understanding simple statements.  Fainting.  A seizure.  A sudden, severe headache.     A stroke happens when a blood vessel in the brain bursts or is blocked by a blood clot. The blood supply to part of the brain--and the oxygen the blood carries--is reduced. This damages the brain.   If you have a stroke, quick treatment may save your life. And it may reduce the damage in your brain so that you have fewer problems after the stroke.   Current as of: December 18, 2022               Content Version: 13.8    1749-0554 [employer].   Care instructions adapted under license by your healthcare professional. If you have questions about a medical condition or this instruction, always ask your healthcare professional. [employer] disclaims any warranty or liability for your use of this information.    Learning About How to Prevent a Stroke  What is a stroke?  A stroke is damage to the brain that occurs when a blood vessel in the brain bursts or is blocked by a blood clot. Without blood and the oxygen it carries, part of the brain starts to die. The part of the body controlled by the damaged area of the brain can't work properly.  Brain damage can start within minutes of a stroke. But quick treatment can help limit the damage and increase the chance of a full recovery.  What puts you at risk for stroke?  A risk factor is anything that makes you more likely to have a particular health problem.  Risk factors for stroke that you can manage or change include:  Health problems like atrial fibrillation, diabetes, high blood pressure, high cholesterol, hardening of the arteries (atherosclerosis), and sickle cell  disease.  Smoking.  Drinking more than 2 alcoholic drinks a day for men and 1 drink a day for women.  Being overweight.  Not eating healthy foods.  Not getting enough physical activity.  Risk factors you can't change include:  Having a previous stroke.  Family history of stroke.  Being older.  Being , [place] Native, , or South  American.  Being female.  Having certain problems during pregnancy, such as preeclampsia.  Being past menopause.  Your doctor can help you know your risk. Then you and your doctor can talk about whether to take steps to lower it.  How can you help prevent a stroke?  Here are some things you can do to help prevent a stroke.  Manage health problems that raise your risk. These include atrial fibrillation, diabetes, high blood pressure, and high cholesterol.  Have a heart-healthy lifestyle.  Don't smoke. If you need help quitting, talk to your doctor about stop-smoking programs and medicines. These can increase your chances of quitting for good.  Limit alcohol to 2 drinks a day for men and 1 drink a day for women.  Stay at a healthy weight. Lose weight if you need to.  Be active. Get at least 30 minutes of exercise on most days of the week. Walking is a good choice. You also may want to do other activities, such as running, swimming, cycling, or playing tennis or team sports.  Eat heart-healthy foods. These include vegetables, fruits, nuts, beans, lean meat, fish, and whole grains. Limit sodium and sugar.  If you think you may have a problem with alcohol or drug use, talk to your doctor.  If you use hormone therapy for menopause or hormonal birth control, talk with your doctor. Ask if these are right for you. They may raise the risk of stroke in some people.  Decide with your doctor whether you will also take medicines to help lower your risk. For example, you and your doctor may decide you will take a medicine that prevents blood clots.  What are the symptoms  of a stroke?  Symptoms of a stroke happen quickly. A stroke may cause:  Sudden numbness, tingling, weakness, or loss of movement in your face, arm, or leg, especially on only one side of your body.  Sudden vision changes.  Sudden trouble speaking.  Sudden confusion or trouble understanding simple statements.  Sudden problems with walking or balance.  A sudden, severe headache that is different from past headaches.  Fainting.  A seizure.  It's important to call for medical help if you have stroke symptoms. Quick treatment may save your life. And it may reduce the damage in your brain so that you have fewer problems after the stroke.  Follow-up care is a key part of your treatment and safety. Be sure to make and go to all appointments, and call your doctor if you are having problems. It's also a good idea to know your test results and keep a list of the medicines you take.    Current as of: December 18, 2022               Content Version: 13.8    4744-5227 [employer].   Care instructions adapted under license by your healthcare professional. If you have questions about a medical condition or this instruction, always ask your healthcare professional. [employer] disclaims any warranty or liability for your use of this information.

## 2024-03-04 NOTE — PROGRESS NOTES
Sukh returned call while [facility] RN was on the line with another patient. Tried calling Sukh back again at 5:32 p.m., but once again, no answer. He has warfarin dosing for tonight. [facility] RN will try once more in the morning.    Ngoc FLETCHER RN  Anticoagulation Team

## 2024-03-04 NOTE — TELEPHONE ENCOUNTER
Patient Returning Call    Reason for call:  Patient calling Ngoc from Anitcoagulation Back re his INR can you please call patient back? Thank you!    Information relayed to patient: N/A    Patient has additional questions:  Yes, he received a message to call Ngoc back from INR clinic       Could we send this information to you in [employer] or would you prefer to receive a phone call?:   Patient would prefer a phone call   Okay to leave a detailed message?: Yes at Cell number on file:    Telephone Information:   Mobile [phone]

## 2024-03-04 NOTE — PROGRESS NOTES
ANTICOAGULATION MANAGEMENT     Sukh Craven 68 year old male is on warfarin with subtherapeutic INR result. (Goal INR 2.0-3.0)    Recent labs: (last 7 days)     03/04/24  1154   INR 1.9*       ASSESSMENT     Source(s): Chart Review  Previous INR was Therapeutic last visit; previously outside of goal range  Medication, diet, health changes since last INR chart reviewed; none identified         PLAN     Unable to reach Sukh today. Called twice.    Left message to take increased dose of warfarin,  6 mg tonight. Request call back for assessment.    Follow up required to discuss out of range result . If, after talking with Sukh, no other changes or concerns, will probably advise another maintenance dose increase. If taking dose to 5 mg Sun and Thu, 6 mg all other days, that will be an increase of 5.3%, which is within protocol. Recheck in another 1-2 weeks.     Ngoc Goodson, RN  Anticoagulation Clinic  3/4/2024
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
## 2024-03-04 NOTE — PROGRESS NOTES
Patient had 1.9 INR 1/23 (increased maintenance dose 3%), and 2/6, increased maintenance dose 5.6%. Then on 2/20 INR was 2.9, but he thought he double dosed 4 days prior, so maintenance dose was left the same and he was advised to recheck INR today. No changes seen on chart review since then.   If no other changes or concerns, will probably advise another maintenance dose increase. If taking dose to 5 mg Sun and Thu, 6 mg all other days, that will be an increase of 5.3% which is within protocol. Recheck in another 1-2 weeks.   Called patient. No answer. Left vm to return call to [facility] RN.    Ngoc FLETCHER RN  Anticoagulation Team

## 2024-03-04 NOTE — NURSING NOTE
Chief Complaint   Patient presents with    Follow Up     F/U regarding popliteal artery aneurysm. Arterial duplex today.       Medications and allergies reconciled.  Vitals collected.    Selena Vega LPN
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
## 2024-03-05 NOTE — PROGRESS NOTES
ANTICOAGULATION MANAGEMENT     Sukh Craven 68 year old male is on warfarin with subtherapeutic INR result. (Goal INR 2.0-3.0)    Recent labs: (last 7 days)     03/04/24  1154   INR 1.9*       ASSESSMENT     Source(s): Chart Review and Patient/Caregiver Call     Warfarin doses taken: Warfarin taken as instructed  Diet: No new diet changes identified  Medication/supplement changes: None noted  New illness, injury, or hospitalization: No  Signs or symptoms of bleeding or clotting: No  Previous result: Therapeutic last visit; previously outside of goal range. When in-range, he had a double dose, prior to that he had two subtherapeutic INRs with two small maintenance dose increases  Additional findings: None       PLAN     Recommended plan for ongoing subtherapeutic INR and no diet, medication or health factor changes affecting INR     Dosing Instructions: Increase your warfarin dose (5.3% change) with next INR in 2 weeks       Summary  As of 3/4/2024      Full warfarin instructions:  5 mg every Sun, Thu; 6 mg all other days   Next INR check:  3/11/2024               Telephone call with Sukh who verbalizes understanding and agrees to plan    Lab visit scheduled in one week since he will be going out of town right after.    Education provided:   Please call back if any changes to your diet, medications or how you've been taking warfarin  Contact [phone]  with any changes, questions or concerns.     Plan made per [facility] anticoagulation protocol    Ngoc Goodson RN  Anticoagulation Clinic  3/5/2024    _______________________________________________________________________     Anticoagulation Episode Summary       Current INR goal:  2.0-3.0   TTR:  70.4% (4 mo)   Target end date:  Indefinite   Send INR reminders to:  DAVON RPATT    Indications    S/P AVR (aortic valve replacement) [Z95.2]  Paroxysmal atrial fibrillation (H) [I48.0]             Comments:               Anticoagulation Care Providers        Provider Role Specialty Phone number    Parrish Stephenson MD Referring Family Medicine [phone]

## 2024-03-11 ENCOUNTER — LAB (OUTPATIENT)
Dept: LAB | Facility: CLINIC | Age: 69
End: 2024-03-11
Payer: COMMERCIAL

## 2024-03-11 ENCOUNTER — ANTICOAGULATION THERAPY VISIT (OUTPATIENT)
Dept: ANTICOAGULATION | Facility: CLINIC | Age: 69
End: 2024-03-11

## 2024-03-11 DIAGNOSIS — Z95.2 S/P AVR (AORTIC VALVE REPLACEMENT): Primary | ICD-10-CM

## 2024-03-11 DIAGNOSIS — I48.0 PAROXYSMAL ATRIAL FIBRILLATION (H): ICD-10-CM

## 2024-03-11 DIAGNOSIS — Z95.2 S/P AVR (AORTIC VALVE REPLACEMENT): ICD-10-CM

## 2024-03-11 LAB — INR BLD: 1.6 (ref 0.9–1.1)

## 2024-03-11 PROCEDURE — 36416 COLLJ CAPILLARY BLOOD SPEC: CPT

## 2024-03-11 PROCEDURE — 85610 PROTHROMBIN TIME: CPT

## 2024-03-11 NOTE — PROGRESS NOTES
ANTICOAGULATION MANAGEMENT     Sukh Craven 68 year old male is on warfarin with therapeutic INR result. (Goal INR 2.0-3.0)    Recent labs: (last 7 days)     03/11/24  1026   INR 1.6*       ASSESSMENT     Warfarin Lab Questionnaire    Warfarin Doses Last 7 Days      3/10/2024    10:42 AM   Dose in Tablet or Mg   TAB or MG? milligram (mg)     Pt Rptd Dose MIRIAM MONDAY TUESDAY WED THURS FRIDAY SATURDAY   3/10/2024  10:42 AM 6 6 6 6 6 6 6         3/10/2024   Warfarin Lab Questionnaire   Missed doses within past 14 days? No   Changes in diet or alcohol within past 14 days? No   Medication changes since last result? No   Injuries or illness since last result? No   New shortness of breath, severe headaches or sudden changes in vision since last result? No   Abnormal bleeding since last result? No   Upcoming surgery, procedure? No     Previous result: Subtherapeutic  Additional findings:  verified that Sukh has been taking 6 mg daily as indicated on questionnaire. Also he states he would like to have a drink of etoh once or twice a week. Will increase per protocol, discuss would need to monitor for etoh interactions and limiting to twice a week would help with consistent inrs       PLAN     Recommended plan for no diet, medication or health factor changes affecting INR     Dosing Instructions: Increase your warfarin dose (12.5% change) with next INR in 10 days       Summary  As of 3/11/2024      Full warfarin instructions:  7 mg every Mon, Wed, Fri; 6 mg all other days   Next INR check:  3/20/2024               Telephone call with Sukh who agrees to plan and repeated back plan correctly    Lab visit scheduled    Education provided:   Please call back if any changes to your diet, medications or how you've been taking warfarin  Goal range and lab monitoring: goal range and significance of current result, Importance of therapeutic range, and Importance of following up at instructed interval  Healthy lifestyle  considerations: potential interaction between warfarin and alcohol, limit alcohol intake to no more than 1 to 2 drinks in 24 hours if you choose to drink alcohol, and avoid excessive alcohol drinking (binge drinking) while on warfarin due to increased risk of bleeding from effect on INR and fall risk    Plan made per [facility] anticoagulation protocol    Josephine Barr RN  Anticoagulation Clinic  3/11/2024    _______________________________________________________________________     Anticoagulation Episode Summary       Current INR goal:  2.0-3.0   TTR:  66.6% (4.2 mo)   Target end date:  Indefinite   Send INR reminders to:  DAVON PRATT    Indications    S/P AVR (aortic valve replacement) [Z95.2]  Paroxysmal atrial fibrillation (H) [I48.0]             Comments:               Anticoagulation Care Providers       Provider Role Specialty Phone number    Parrish Stephenson MD Referring Family Medicine [phone]

## 2024-03-20 ENCOUNTER — LAB (OUTPATIENT)
Dept: LAB | Facility: CLINIC | Age: 69
End: 2024-03-20
Payer: COMMERCIAL

## 2024-03-20 ENCOUNTER — ANTICOAGULATION THERAPY VISIT (OUTPATIENT)
Dept: ANTICOAGULATION | Facility: CLINIC | Age: 69
End: 2024-03-20

## 2024-03-20 DIAGNOSIS — Z95.2 S/P AVR (AORTIC VALVE REPLACEMENT): ICD-10-CM

## 2024-03-20 DIAGNOSIS — I48.0 PAROXYSMAL ATRIAL FIBRILLATION (H): ICD-10-CM

## 2024-03-20 DIAGNOSIS — Z95.2 S/P AVR (AORTIC VALVE REPLACEMENT): Primary | ICD-10-CM

## 2024-03-20 LAB — INR BLD: 3.7 (ref 0.9–1.1)

## 2024-03-20 PROCEDURE — 85610 PROTHROMBIN TIME: CPT

## 2024-03-20 PROCEDURE — 36415 COLL VENOUS BLD VENIPUNCTURE: CPT

## 2024-03-20 NOTE — PROGRESS NOTES
ANTICOAGULATION MANAGEMENT     Sukh Craven 68 year old male is on warfarin with supratherapeutic INR result. (Goal INR 2.0-3.0)    Recent labs: (last 7 days)     03/20/24  1556   INR 3.7*       ASSESSMENT     Warfarin Lab Questionnaire    Warfarin Doses Last 7 Days    Pt Rptd Dose MIRIAM MONDAY TUESDAY WED THURS FRIDAY SATURDAY   3/19/2024   5:15 PM 6 7 6 7 6 7 6         3/19/2024   Warfarin Lab Questionnaire   Missed doses within past 14 days? No   Changes in diet or alcohol within past 14 days? No   Medication changes since last result? No   Injuries or illness since last result? No   New shortness of breath, severe headaches or sudden changes in vision since last result? No   Abnormal bleeding since last result? No   Upcoming surgery, procedure? No     Previous result: Subtherapeutic  Additional findings: Warfarin maintenance dose was increased at last 2 visits         PLAN     Recommended plan for no diet, medication or health factor changes affecting INR     Dosing Instructions: partial hold then decrease your warfarin dose (6.7% change) with next INR in 12 days       Summary  As of 3/20/2024      Full warfarin instructions:  3/20: 3 mg; Otherwise 6 mg every day   Next INR check:  4/1/2024               Telephone call with Sukh who verbalizes understanding and agrees to plan and who agrees to plan and repeated back plan correctly    Lab visit scheduled    Education provided:   Taking warfarin: importance of following [facility] instructions vs instructions on the prescription bottle    Plan made per [facility] anticoagulation protocol    Madeline Yang, RN  Anticoagulation Clinic  3/20/2024    _______________________________________________________________________     Anticoagulation Episode Summary       Current INR goal:  2.0-3.0   TTR:  65.3% (4.5 mo)   Target end date:  Indefinite   Send INR reminders to:  DAVON PARTT    Indications    S/P AVR (aortic valve replacement) [Z95.2]  Paroxysmal atrial  fibrillation (H) [I48.0]             Comments:  [facility] so goal range remaining 2.0-3.0.             Anticoagulation Care Providers       Provider Role Specialty Phone number    Parrish Stephenson MD Referring Family Medicine [phone]

## 2024-04-01 ENCOUNTER — ANTICOAGULATION THERAPY VISIT (OUTPATIENT)
Dept: ANTICOAGULATION | Facility: CLINIC | Age: 69
End: 2024-04-01

## 2024-04-01 ENCOUNTER — LAB (OUTPATIENT)
Dept: LAB | Facility: CLINIC | Age: 69
End: 2024-04-01
Payer: COMMERCIAL

## 2024-04-01 DIAGNOSIS — I48.0 PAROXYSMAL ATRIAL FIBRILLATION (H): ICD-10-CM

## 2024-04-01 DIAGNOSIS — Z95.2 S/P AVR (AORTIC VALVE REPLACEMENT): Primary | ICD-10-CM

## 2024-04-01 DIAGNOSIS — Z95.2 S/P AVR (AORTIC VALVE REPLACEMENT): ICD-10-CM

## 2024-04-01 LAB — INR BLD: 3 (ref 0.9–1.1)

## 2024-04-01 PROCEDURE — 85610 PROTHROMBIN TIME: CPT

## 2024-04-01 PROCEDURE — 36416 COLLJ CAPILLARY BLOOD SPEC: CPT

## 2024-04-01 NOTE — PROGRESS NOTES
ANTICOAGULATION MANAGEMENT     Sukh Craven 68 year old male is on warfarin with therapeutic INR result. (Goal INR 2.0-3.0)    Recent labs: (last 7 days)     04/01/24  0813   INR 3.0*       ASSESSMENT     Source(s): Chart Review and Patient/Caregiver Call     Warfarin doses taken: Warfarin taken as instructed  Diet: No new diet changes identified  Medication/supplement changes: None noted  New illness, injury, or hospitalization: No  Signs or symptoms of bleeding or clotting: No  Previous result: Supratherapeutic  Additional findings: None       PLAN     Recommended plan for no diet, medication or health factor changes affecting INR     Dosing Instructions: Continue your current warfarin dose with next INR in 3 weeks       Summary  As of 4/1/2024      Full warfarin instructions:  6 mg every day   Next INR check:  4/22/2024               Telephone call with Sukh who verbalizes understanding and agrees to plan    Lab visit scheduled    Education provided:   Please call back if any changes to your diet, medications or how you've been taking warfarin  Goal range and lab monitoring: goal range and significance of current result, Importance of therapeutic range, and Importance of following up at instructed interval    Plan made per [facility] anticoagulation protocol    Josephine Barr RN  Anticoagulation Clinic  4/1/2024    _______________________________________________________________________     Anticoagulation Episode Summary       Current INR goal:  2.0-3.0   TTR:  60.2% (4.9 mo)   Target end date:  Indefinite   Send INR reminders to:  DAVON PRATT    Indications    S/P AVR (aortic valve replacement) [Z95.2]  Paroxysmal atrial fibrillation (H) [I48.0]             Comments:  On-X [facility] AVR 10/13/23 with a-fib so goal range remaining 2.0-3.0.             Anticoagulation Care Providers       Provider Role Specialty Phone number    Parrish Stephenson MD Referring Family Medicine [phone]

## 2024-04-22 ENCOUNTER — ANTICOAGULATION THERAPY VISIT (OUTPATIENT)
Dept: ANTICOAGULATION | Facility: CLINIC | Age: 69
End: 2024-04-22

## 2024-04-22 ENCOUNTER — LAB (OUTPATIENT)
Dept: LAB | Facility: CLINIC | Age: 69
End: 2024-04-22
Payer: COMMERCIAL

## 2024-04-22 DIAGNOSIS — Z95.2 S/P AVR (AORTIC VALVE REPLACEMENT): Primary | ICD-10-CM

## 2024-04-22 DIAGNOSIS — Z95.2 S/P AVR (AORTIC VALVE REPLACEMENT): ICD-10-CM

## 2024-04-22 DIAGNOSIS — N18.2 CKD (CHRONIC KIDNEY DISEASE) STAGE 2, GFR 60-89 ML/MIN: Primary | ICD-10-CM

## 2024-04-22 DIAGNOSIS — I48.0 PAROXYSMAL ATRIAL FIBRILLATION (H): ICD-10-CM

## 2024-04-22 LAB — INR BLD: 3.5 (ref 0.9–1.1)

## 2024-04-22 PROCEDURE — 85610 PROTHROMBIN TIME: CPT

## 2024-04-22 PROCEDURE — 36416 COLLJ CAPILLARY BLOOD SPEC: CPT

## 2024-04-22 NOTE — PROGRESS NOTES
ANTICOAGULATION MANAGEMENT     Sukh Craven 69 year old male is on warfarin with supratherapeutic INR result. (Goal INR 6 mg daily2.0-3.0)    Recent labs: (last 7 days)     04/22/24  0754   INR 3.5*       ASSESSMENT     Warfarin Lab Questionnaire    Warfarin Doses Last 7 Days      4/21/2024     6:42 PM   Dose in Tablet or Mg   TAB or MG? milligram (mg)     Pt Rptd Dose SUNDAY MONDAY TUESDAY WED THURS FRIDAY SATURDAY 4/21/2024   6:42 PM 6 6 6 6 6 6 6         4/21/2024   Warfarin Lab Questionnaire   Missed doses within past 14 days? No   Changes in diet or alcohol within past 14 days? No   Medication changes since last result? No   Injuries or illness since last result? No   New shortness of breath, severe headaches or sudden changes in vision since last result? No   Abnormal bleeding since last result? No   Upcoming surgery, procedure? No     Previous result: Therapeutic last visit; previously outside of goal range  Additional findings: None       PLAN     Recommended plan for no diet, medication or health factor changes affecting INR     Dosing Instructions: partial hold then continue your current warfarin dose with next INR in 2 weeks       Summary  As of 4/22/2024      Full warfarin instructions:  4/22: 3 mg; Otherwise 6 mg every day   Next INR check:  5/6/2024               Detailed voice message left for Sukh with dosing instructions and follow up date.     Lab visit scheduled    Education provided:   Please call back if any changes to your diet, medications or how you've been taking warfarin    Plan made per [facility] anticoagulation protocol    Roxann Whiting RN  Anticoagulation Clinic  4/22/2024    _______________________________________________________________________     Anticoagulation Episode Summary       Current INR goal:  2.0-3.0   TTR:  52.7% (5.6 mo)   Target end date:  Indefinite   Send INR reminders to:  DAVON PRATT    Indications    S/P AVR (aortic valve replacement)  [Z95.2]  Paroxysmal atrial fibrillation (H) [I48.0]             Comments[facility]  [facility] so goal range remaining 2.0-3.0.             Anticoagulation Care Providers       Provider Role Specialty Phone number    Parrish Stephenson MD Referring Family Medicine [phone]

## 2024-05-10 ENCOUNTER — LAB (OUTPATIENT)
Dept: LAB | Facility: CLINIC | Age: 69
End: 2024-05-10
Payer: COMMERCIAL

## 2024-05-10 ENCOUNTER — ANTICOAGULATION THERAPY VISIT (OUTPATIENT)
Dept: ANTICOAGULATION | Facility: CLINIC | Age: 69
End: 2024-05-10

## 2024-05-10 DIAGNOSIS — I48.0 PAROXYSMAL ATRIAL FIBRILLATION (H): ICD-10-CM

## 2024-05-10 DIAGNOSIS — Z95.2 S/P AVR (AORTIC VALVE REPLACEMENT): Primary | ICD-10-CM

## 2024-05-10 DIAGNOSIS — Z95.2 S/P AVR (AORTIC VALVE REPLACEMENT): ICD-10-CM

## 2024-05-10 LAB — INR BLD: 2.8 (ref 0.9–1.1)

## 2024-05-10 PROCEDURE — 36416 COLLJ CAPILLARY BLOOD SPEC: CPT

## 2024-05-10 PROCEDURE — 85610 PROTHROMBIN TIME: CPT

## 2024-05-10 NOTE — PROGRESS NOTES
ANTICOAGULATION MANAGEMENT     Sukh Craven 69 year old male is on warfarin with therapeutic INR result. (Goal INR 2.0-3.0)    Recent labs: (last 7 days)     05/10/24  0808   INR 2.8*       ASSESSMENT     Warfarin Lab Questionnaire    Warfarin Doses Last 7 Days    Pt Rptd Dose MIRIAM MONDAY TUESDAY WED THURS FRIDAY SATURDAY   5/10/2024   7:17 AM 6 6 6 6 6 6 6         5/10/2024   Warfarin Lab Questionnaire   Missed doses within past 14 days? No   Changes in diet or alcohol within past 14 days? No   Medication changes since last result? No   Injuries or illness since last result? No   New shortness of breath, severe headaches or sudden changes in vision since last result? No   Abnormal bleeding since last result? No   Upcoming surgery, procedure? No     Previous result: Supratherapeutic  Additional findings: None       PLAN     Recommended plan for no diet, medication or health factor changes affecting INR     Dosing Instructions: Continue your current warfarin dose with next INR in 3 weeks       Summary  As of 5/10/2024      Full warfarin instructions:  6 mg every day   Next INR check:  6/3/2024               Telephone call with Sukh who verbalizes understanding and agrees to plan    Lab visit scheduled    Education provided:   Please call back if any changes to your diet, medications or how you've been taking warfarin  Contact [phone]  with any changes, questions or concerns.     Plan made per [facility] anticoagulation protocol    Ngoc Goodson RN  Anticoagulation Clinic  5/10/2024    _______________________________________________________________________     Anticoagulation Episode Summary       Current INR goal:  2.0-3.0   TTR:  50.4% (6.2 mo)   Target end date:  Indefinite   Send INR reminders to:  [facility]    Indications    S/P AVR (aortic valve replacement) [Z95.2]  Paroxysmal atrial fibrillation (H) [I48.0]             Comments:  On-X [facility] AVR 10/13/23 with a-fib so goal range remaining  2.0-3.0.             Anticoagulation Care Providers       Provider Role Specialty Phone number    Parrish Stephenson MD Referring Family Medicine [phone]

## 2024-05-26 DIAGNOSIS — Z95.1 S/P CABG (CORONARY ARTERY BYPASS GRAFT): ICD-10-CM

## 2024-05-26 DIAGNOSIS — Z79.01 CURRENT USE OF LONG TERM ANTICOAGULATION: ICD-10-CM

## 2024-05-26 DIAGNOSIS — Z95.2 S/P AVR (AORTIC VALVE REPLACEMENT): ICD-10-CM

## 2024-05-26 DIAGNOSIS — I48.0 PAROXYSMAL ATRIAL FIBRILLATION (H): ICD-10-CM

## 2024-05-28 RX ORDER — WARFARIN SODIUM 1 MG/1
TABLET ORAL
Qty: 90 TABLET | Refills: 1 | Status: SHIPPED | OUTPATIENT
Start: 2024-05-28

## 2024-05-28 NOTE — TELEPHONE ENCOUNTER
ANTICOAGULATION MANAGEMENT:  Medication Refill    Anticoagulation Summary  As of 5/10/2024      Warfarin maintenance plan:  6 mg (3 mg x 2) every day   Next INR check:  6/3/2024   Target end date:  Indefinite    Indications    S/P AVR (aortic valve replacement) [Z95.2]  Paroxysmal atrial fibrillation (H) [I48.0]                 Anticoagulation Care Providers       Provider Role Specialty Phone number    Parrish Stephenson MD Referring Family Medicine [phone]            Refill Criteria    Visit with referring provider/group: Meets criteria: office visit within referring provider group in the last 1 year on 11/15/23    [facility] referral last signed: 10/30/2023; within last year: Yes    Lab monitoring not exceeding 2 weeks overdue: Yes    Sukh meets all criteria for refill. Rx instructions and quantity supplied updated to match patient's current dosing plan. Warfarin 90 day supply with 1 refill granted per [facility] protocol     Vince Gaines RN  Anticoagulation Clinic

## 2024-06-03 ENCOUNTER — LAB (OUTPATIENT)
Dept: LAB | Facility: CLINIC | Age: 69
End: 2024-06-03
Payer: COMMERCIAL

## 2024-06-03 ENCOUNTER — ANTICOAGULATION THERAPY VISIT (OUTPATIENT)
Dept: ANTICOAGULATION | Facility: CLINIC | Age: 69
End: 2024-06-03

## 2024-06-03 DIAGNOSIS — N18.2 CKD (CHRONIC KIDNEY DISEASE) STAGE 2, GFR 60-89 ML/MIN: ICD-10-CM

## 2024-06-03 DIAGNOSIS — Z95.2 S/P AVR (AORTIC VALVE REPLACEMENT): Primary | ICD-10-CM

## 2024-06-03 DIAGNOSIS — I48.0 PAROXYSMAL ATRIAL FIBRILLATION (H): ICD-10-CM

## 2024-06-03 DIAGNOSIS — E11.9 DIABETES MELLITUS, TYPE 2 (H): Primary | ICD-10-CM

## 2024-06-03 DIAGNOSIS — Z95.2 S/P AVR (AORTIC VALVE REPLACEMENT): ICD-10-CM

## 2024-06-03 LAB
ANION GAP SERPL CALCULATED.3IONS-SCNC: 7 MMOL/L (ref 7–15)
BUN SERPL-MCNC: 10.6 MG/DL (ref 8–23)
CALCIUM SERPL-MCNC: 9 MG/DL (ref 8.8–10.2)
CHLORIDE SERPL-SCNC: 104 MMOL/L (ref 98–107)
CREAT SERPL-MCNC: 1.09 MG/DL (ref 0.67–1.17)
DEPRECATED HCO3 PLAS-SCNC: 27 MMOL/L (ref 22–29)
EGFRCR SERPLBLD CKD-EPI 2021: 74 ML/MIN/1.73M2
GLUCOSE SERPL-MCNC: 116 MG/DL (ref 70–99)
HBA1C MFR BLD: 5.9 % (ref 0–5.6)
INR BLD: 3.8 (ref 0.9–1.1)
POTASSIUM SERPL-SCNC: 4.1 MMOL/L (ref 3.4–5.3)
SODIUM SERPL-SCNC: 138 MMOL/L (ref 135–145)

## 2024-06-03 PROCEDURE — 85610 PROTHROMBIN TIME: CPT

## 2024-06-03 PROCEDURE — 83036 HEMOGLOBIN GLYCOSYLATED A1C: CPT

## 2024-06-03 PROCEDURE — 80048 BASIC METABOLIC PNL TOTAL CA: CPT

## 2024-06-03 PROCEDURE — 36415 COLL VENOUS BLD VENIPUNCTURE: CPT

## 2024-06-03 NOTE — PROGRESS NOTES
ANTICOAGULATION MANAGEMENT     Sukh Craven 69 year old male is on warfarin with supratherapeutic INR result. (Goal INR 2.0-3.0)    Recent labs: (last 7 days)     06/03/24  0838   INR 3.8*       ASSESSMENT     Warfarin Lab Questionnaire    Warfarin Doses Last 7 Days      6/2/2024    11:02 AM   Dose in Tablet or Mg   TAB or MG? milligram (mg)     Pt Rptd Dose SUNDAY MONDAY TUESDAY WED THURS FRIDAY SATURDAY 6/2/2024  11:02 AM 6 6 6 6 6 6 6         6/2/2024   Warfarin Lab Questionnaire   Missed doses within past 14 days? No   Changes in diet or alcohol within past 14 days? No, Yes, patient reports decreased appetite x 4 days; however, he also reports that he rarely eats greens   Medication changes since last result? No   Injuries or illness since last result? No, Yes, patient reports feeling nauseated x 4 days   New shortness of breath, severe headaches or sudden changes in vision since last result? No   Abnormal bleeding since last result? No   Upcoming surgery, procedure? No     Previous result: Therapeutic last visit; previously outside of goal range  Additional findings:  Despite temporary factors, warfarin maintenance dose still reduced due to several recent supratherapeutic INRs.  Patient reports he just returned from [place], nausea started while there.       PLAN     Recommended plan for temporary change(s) affecting INR     Dosing Instructions: decrease your warfarin dose (7% change) with next INR in 2 weeks       Summary  As of 6/3/2024      Full warfarin instructions:  3 mg every Mon; 6 mg all other days   Next INR check:  6/17/2024               Telephone call with Sukh who verbalizes understanding and agrees to plan and who agrees to plan and repeated back plan correctly    Lab visit scheduled    Education provided:   Taking warfarin: Importance of taking warfarin as instructed    Plan made per [facility] anticoagulation protocol    Madeline Yang, RN  Anticoagulation  Clinic  6/3/2024    _______________________________________________________________________     Anticoagulation Episode Summary       Current INR goal:  2.0-3.0   TTR:  46.9% (7 mo)   Target end date:  Indefinite   [facility]    Indications    S/P AVR (aortic valve replacement) [Z95.2]  Paroxysmal atrial fibrillation (H) [I48.0]             Comments:  On-X [facility] AVR 10/13/23 with a-fib so goal range remaining 2.0-3.0.             Anticoagulation Care Providers       Provider Role Specialty Phone number    Parrish Stephenson MD Referring Family Medicine [phone]

## 2024-06-17 ENCOUNTER — ANTICOAGULATION THERAPY VISIT (OUTPATIENT)
Dept: ANTICOAGULATION | Facility: CLINIC | Age: 69
End: 2024-06-17

## 2024-06-17 ENCOUNTER — LAB (OUTPATIENT)
Dept: LAB | Facility: CLINIC | Age: 69
End: 2024-06-17
Payer: COMMERCIAL

## 2024-06-17 DIAGNOSIS — Z95.2 S/P AVR (AORTIC VALVE REPLACEMENT): ICD-10-CM

## 2024-06-17 DIAGNOSIS — Z95.2 S/P AVR (AORTIC VALVE REPLACEMENT): Primary | ICD-10-CM

## 2024-06-17 DIAGNOSIS — I48.0 PAROXYSMAL ATRIAL FIBRILLATION (H): ICD-10-CM

## 2024-06-17 LAB — INR BLD: 2.3 (ref 0.9–1.1)

## 2024-06-17 PROCEDURE — 85610 PROTHROMBIN TIME: CPT

## 2024-06-17 PROCEDURE — 36415 COLL VENOUS BLD VENIPUNCTURE: CPT

## 2024-06-19 ENCOUNTER — PATIENT OUTREACH (OUTPATIENT)
Dept: CARE COORDINATION | Facility: CLINIC | Age: 69
End: 2024-06-19
Payer: COMMERCIAL

## 2024-07-03 ENCOUNTER — PATIENT OUTREACH (OUTPATIENT)
Dept: CARE COORDINATION | Facility: CLINIC | Age: 69
End: 2024-07-03
Payer: COMMERCIAL

## 2024-07-09 ENCOUNTER — LAB (OUTPATIENT)
Dept: LAB | Facility: CLINIC | Age: 69
End: 2024-07-09
Payer: COMMERCIAL

## 2024-07-09 ENCOUNTER — ANTICOAGULATION THERAPY VISIT (OUTPATIENT)
Dept: ANTICOAGULATION | Facility: CLINIC | Age: 69
End: 2024-07-09

## 2024-07-09 DIAGNOSIS — Z95.2 S/P AVR (AORTIC VALVE REPLACEMENT): Primary | ICD-10-CM

## 2024-07-09 DIAGNOSIS — I48.0 PAROXYSMAL ATRIAL FIBRILLATION (H): ICD-10-CM

## 2024-07-09 DIAGNOSIS — Z95.2 S/P AVR (AORTIC VALVE REPLACEMENT): ICD-10-CM

## 2024-07-09 DIAGNOSIS — N18.2 CKD (CHRONIC KIDNEY DISEASE) STAGE 2, GFR 60-89 ML/MIN: Primary | ICD-10-CM

## 2024-07-09 LAB — INR BLD: 2.5 (ref 0.9–1.1)

## 2024-07-09 PROCEDURE — 85610 PROTHROMBIN TIME: CPT

## 2024-07-09 PROCEDURE — 36415 COLL VENOUS BLD VENIPUNCTURE: CPT

## 2024-07-09 NOTE — PROGRESS NOTES
ANTICOAGULATION MANAGEMENT     Sukh Craven 69 year old male is on warfarin with therapeutic INR result. (Goal INR 2.0-3.0)    Recent labs: (last 7 days)     07/09/24  0811   INR 2.5*       ASSESSMENT     Source(s): Chart Review and Patient/Caregiver Call     Warfarin doses taken: Warfarin taken as instructed  Diet: No new diet changes identified  Medication/supplement changes: None noted  New illness, injury, or hospitalization: No  Signs or symptoms of bleeding or clotting: No  Previous result: Therapeutic last visit; previously outside of goal range  Additional findings: None       PLAN     Recommended plan for no diet, medication or health factor changes affecting INR     Dosing Instructions: Continue your current warfarin dose with next INR in 3 weeks       Summary  As of 7/9/2024      Full warfarin instructions:  3 mg every Mon; 6 mg all other days   Next INR check:  7/30/2024               Telephone call with Sukh who verbalizes understanding and agrees to plan    Lab visit scheduled    Education provided: Please call back if any changes to your diet, medications or how you've been taking warfarin  Symptom monitoring: monitoring for bleeding signs and symptoms and monitoring for clotting signs and symptoms    Plan made per [facility] anticoagulation protocol    Farzana Camacho RN  Anticoagulation Clinic  7/9/2024    _______________________________________________________________________     Anticoagulation Episode Summary       Current INR goal:  2.0-3.0   TTR:  51.6% (8.2 mo)   Target end date:  Indefinite   Send INR reminders to:  [facility]    Indications    S/P AVR (aortic valve replacement) [Z95.2]  Paroxysmal atrial fibrillation (H) [I48.0]             Comments:  On-X [facility] AVR 10/13/23 with a-fib so goal range remaining 2.0-3.0.             Anticoagulation Care Providers       Provider Role Specialty Phone number    Parrish Stephenson MD Referring Family Medicine [phone]

## 2024-07-30 ENCOUNTER — ANTICOAGULATION THERAPY VISIT (OUTPATIENT)
Dept: ANTICOAGULATION | Facility: CLINIC | Age: 69
End: 2024-07-30

## 2024-07-30 ENCOUNTER — LAB (OUTPATIENT)
Dept: LAB | Facility: CLINIC | Age: 69
End: 2024-07-30
Payer: COMMERCIAL

## 2024-07-30 DIAGNOSIS — Z95.2 S/P AVR (AORTIC VALVE REPLACEMENT): Primary | ICD-10-CM

## 2024-07-30 DIAGNOSIS — I48.0 PAROXYSMAL ATRIAL FIBRILLATION (H): ICD-10-CM

## 2024-07-30 DIAGNOSIS — Z95.2 S/P AVR (AORTIC VALVE REPLACEMENT): ICD-10-CM

## 2024-07-30 LAB — INR BLD: 2.9 (ref 0.9–1.1)

## 2024-07-30 PROCEDURE — 36416 COLLJ CAPILLARY BLOOD SPEC: CPT

## 2024-07-30 PROCEDURE — 85610 PROTHROMBIN TIME: CPT

## 2024-07-30 NOTE — PROGRESS NOTES
ANTICOAGULATION MANAGEMENT     Sukh Craven 69 year old male is on warfarin with therapeutic INR result. (Goal INR 2.0-3.0)    Recent labs: (last 7 days)     07/30/24  0733   INR 2.9*       ASSESSMENT     Warfarin Lab Questionnaire    Warfarin Doses Last 7 Days      7/29/2024    10:14 PM   Dose in Tablet or Mg   TAB or MG? milligram (mg)     Pt Rptd Dose SUNDAY MONDAY TUESDAY WED THURS FRIDAY SATURDAY 7/29/2024  10:14 PM 6 3 6 6 6 6 6         7/29/2024   Warfarin Lab Questionnaire   Missed doses within past 14 days? No   Changes in diet or alcohol within past 14 days? No   Medication changes since last result? No   Injuries or illness since last result? No   New shortness of breath, severe headaches or sudden changes in vision since last result? No   Abnormal bleeding since last result? No   Upcoming surgery, procedure? No        Previous result: Therapeutic last 2(+) visits  Additional findings:  INR trending up; however, patient reports no changes or concerns.       PLAN     Recommended plan for no diet, medication or health factor changes affecting INR     Dosing Instructions: Continue your current warfarin dose with next INR in 4 weeks       Summary  As of 7/30/2024      Full warfarin instructions:  3 mg every Mon; 6 mg all other days   Next INR check:  8/27/2024               Telephone call with Sukh who verbalizes understanding and agrees to plan    Lab visit scheduled    Education provided: Dietary considerations: importance of consistent vitamin K intake    Plan made per [facility] anticoagulation protocol    Madeline Yang, RN  Anticoagulation Clinic  7/30/2024    _______________________________________________________________________     Anticoagulation Episode Summary       Current INR goal:  2.0-3.0   TTR:  55.4% (8.9 mo)   Target end date:  Indefinite   Send INR reminders to:  [facility]    Indications    S/P AVR (aortic valve replacement) [Z95.2]  Paroxysmal atrial fibrillation (H)  [I48.0]             Comments:  On-X [facility] AVR 10/13/23 with a-fib so goal range remaining 2.0-3.0.             Anticoagulation Care Providers       Provider Role Specialty Phone number    Parrish Stephenson MD Referring Family Medicine [phone]

## 2024-08-27 ENCOUNTER — ANTICOAGULATION THERAPY VISIT (OUTPATIENT)
Dept: ANTICOAGULATION | Facility: CLINIC | Age: 69
End: 2024-08-27

## 2024-08-27 ENCOUNTER — LAB (OUTPATIENT)
Dept: LAB | Facility: CLINIC | Age: 69
End: 2024-08-27
Payer: COMMERCIAL

## 2024-08-27 DIAGNOSIS — Z95.2 S/P AVR (AORTIC VALVE REPLACEMENT): ICD-10-CM

## 2024-08-27 DIAGNOSIS — Z95.2 S/P AVR (AORTIC VALVE REPLACEMENT): Primary | ICD-10-CM

## 2024-08-27 DIAGNOSIS — I48.0 PAROXYSMAL ATRIAL FIBRILLATION (H): ICD-10-CM

## 2024-08-27 LAB — INR BLD: 2.8 (ref 0.9–1.1)

## 2024-08-27 PROCEDURE — 36415 COLL VENOUS BLD VENIPUNCTURE: CPT

## 2024-08-27 PROCEDURE — 85610 PROTHROMBIN TIME: CPT

## 2024-08-27 NOTE — PROGRESS NOTES
ANTICOAGULATION MANAGEMENT     Sukh Craven 69 year old male is on warfarin with therapeutic INR result. (Goal INR 2.0-3.0)    Recent labs: (last 7 days)     08/27/24  0837   INR 2.8*       ASSESSMENT     Source(s): Chart Review and Patient/Caregiver Call     Warfarin doses taken: Warfarin taken as instructed  Diet: No new diet changes identified  Medication/supplement changes: None noted  New illness, injury, or hospitalization: No  Signs or symptoms of bleeding or clotting: No  Previous result: Therapeutic last 2(+) visits  Additional findings: None       PLAN     Recommended plan for no diet, medication or health factor changes affecting INR     Dosing Instructions: Continue your current warfarin dose with next INR in 5 weeks       Summary  As of 8/27/2024      Full warfarin instructions:  3 mg every Mon; 6 mg all other days   Next INR check:  10/1/2024               Telephone call with Sukh who agrees to plan and repeated back plan correctly    Lab visit scheduled    Education provided: Please call back if any changes to your diet, medications or how you've been taking warfarin    Plan made per [facility] anticoagulation protocol    Lorene Lopes RN  Anticoagulation Clinic  8/27/2024    _______________________________________________________________________     Anticoagulation Episode Summary       Current INR goal:  2.0-3.0   TTR:  59.6% (9.9 mo)   Target end date:  Indefinite   Send INR reminders to:  [facility]    Indications    S/P AVR (aortic valve replacement) [Z95.2]  Paroxysmal atrial fibrillation (H) [I48.0]             Comments:  On-X [facility] AVR 10/13/23 with a-fib so goal range remaining 2.0-3.0.             Anticoagulation Care Providers       Provider Role Specialty Phone number    Parrish Stephenson MD Referring Family Medicine [phone]

## 2024-10-01 ENCOUNTER — LAB (OUTPATIENT)
Dept: LAB | Facility: CLINIC | Age: 69
End: 2024-10-01
Payer: COMMERCIAL

## 2024-10-01 ENCOUNTER — ANTICOAGULATION THERAPY VISIT (OUTPATIENT)
Dept: ANTICOAGULATION | Facility: CLINIC | Age: 69
End: 2024-10-01

## 2024-10-01 DIAGNOSIS — E11.9 DIABETES MELLITUS, TYPE 2 (H): Primary | ICD-10-CM

## 2024-10-01 DIAGNOSIS — R79.1 SUBTHERAPEUTIC INTERNATIONAL NORMALIZED RATIO (INR): ICD-10-CM

## 2024-10-01 DIAGNOSIS — Z95.2 S/P AVR (AORTIC VALVE REPLACEMENT): Primary | ICD-10-CM

## 2024-10-01 DIAGNOSIS — I48.0 PAROXYSMAL ATRIAL FIBRILLATION (H): ICD-10-CM

## 2024-10-01 DIAGNOSIS — Z95.2 S/P AVR (AORTIC VALVE REPLACEMENT): ICD-10-CM

## 2024-10-01 LAB — INR BLD: 3.3 (ref 0.9–1.1)

## 2024-10-01 PROCEDURE — 36416 COLLJ CAPILLARY BLOOD SPEC: CPT

## 2024-10-01 PROCEDURE — 85610 PROTHROMBIN TIME: CPT

## 2024-10-01 RX ORDER — WARFARIN SODIUM 3 MG/1
TABLET ORAL
Qty: 180 TABLET | Refills: 1 | Status: SHIPPED | OUTPATIENT
Start: 2024-10-01

## 2024-10-01 NOTE — PROGRESS NOTES
ANTICOAGULATION MANAGEMENT     Sukh Craven 69 year old male is on warfarin with supratherapeutic INR result. (Goal INR 2.0-3.0)    Recent labs: (last 7 days)     10/01/24  0846   INR 3.3*       ASSESSMENT     Source(s): Chart Review and Patient/Caregiver Call     Warfarin doses taken: Warfarin taken as instructed  Diet: No new diet changes identified  Medication/supplement changes: None noted  New illness, injury, or hospitalization: No  Signs or symptoms of bleeding or clotting: No  Previous result: Therapeutic last 2(+) visits  Additional findings: Refill needed today. States he ran out of his 3 mg tablet size so he has been using up his 1 mg tablets to equal same daily dose.   Sukh meets all criteria for refill (current [facility] referral, visit with referring provider/group in last 15 months unless directed to return in 2 years in last referring provider visit note, lab monitoring up to date or not exceeding 2 weeks overdue). Rx instructions and quantity supplied updated to match patient's current dosing plan. Warfarin 90 day supply with 1 refill granted per [facility] protocol        PLAN     Recommended plan for no diet, medication or health factor changes affecting INR     Dosing Instructions: Continue your current warfarin dose with next INR in 2 weeks       Summary  As of 10/1/2024      Full warfarin instructions:  3 mg every Mon; 6 mg all other days   Next INR check:  10/15/2024               Telephone call with Sukh who verbalizes understanding and agrees to plan. He will schedule annual visit (due in Nov) with Primary Care Provider.    Lab visit scheduled    Education provided: Please call back if any changes to your diet, medications or how you've been taking warfarin  Contact [phone] with any changes, questions or concerns.     Plan made per [facility] anticoagulation protocol    Ngoc Goodson, RN  10/1/2024  Anticoagulation Clinic  [employer] for routing messages: sheree PRATT  [facility] patient  phone line: [phone]        _______________________________________________________________________     Anticoagulation Episode Summary       Current INR goal:  2.0-3.0   TTR:  57.6% (11 mo)   Target end date:  Indefinite   Send INR reminders to:  [facility]    Indications    S/P AVR (aortic valve replacement) [Z95.2]  Paroxysmal atrial fibrillation (H) [I48.0]             Comments:  On-X [facility] AVR 10/13/23 with a-fib so goal range remaining 2.0-3.0.             Anticoagulation Care Providers       Provider Role Specialty Phone number    Parrish Stephenson MD Referring Family Medicine [phone]

## 2024-10-12 SDOH — HEALTH STABILITY: PHYSICAL HEALTH: ON AVERAGE, HOW MANY DAYS PER WEEK DO YOU ENGAGE IN MODERATE TO STRENUOUS EXERCISE (LIKE A BRISK WALK)?: 2 DAYS

## 2024-10-12 SDOH — HEALTH STABILITY: PHYSICAL HEALTH: ON AVERAGE, HOW MANY MINUTES DO YOU ENGAGE IN EXERCISE AT THIS LEVEL?: 20 MIN

## 2024-10-12 ASSESSMENT — SOCIAL DETERMINANTS OF HEALTH (SDOH): HOW OFTEN DO YOU GET TOGETHER WITH FRIENDS OR RELATIVES?: THREE TIMES A WEEK

## 2024-10-15 ENCOUNTER — ANTICOAGULATION THERAPY VISIT (OUTPATIENT)
Dept: ANTICOAGULATION | Facility: CLINIC | Age: 69
End: 2024-10-15

## 2024-10-15 ENCOUNTER — LAB (OUTPATIENT)
Dept: LAB | Facility: CLINIC | Age: 69
End: 2024-10-15
Payer: COMMERCIAL

## 2024-10-15 DIAGNOSIS — I25.10 CORONARY ARTERY DISEASE INVOLVING NATIVE CORONARY ARTERY OF NATIVE HEART WITHOUT ANGINA PECTORIS: ICD-10-CM

## 2024-10-15 DIAGNOSIS — I48.0 PAROXYSMAL ATRIAL FIBRILLATION (H): ICD-10-CM

## 2024-10-15 DIAGNOSIS — Z95.2 S/P AVR (AORTIC VALVE REPLACEMENT): ICD-10-CM

## 2024-10-15 DIAGNOSIS — Z13.220 SCREENING FOR HYPERLIPIDEMIA: Primary | ICD-10-CM

## 2024-10-15 DIAGNOSIS — Z95.2 S/P AVR (AORTIC VALVE REPLACEMENT): Primary | ICD-10-CM

## 2024-10-15 LAB
CHOLEST SERPL-MCNC: 159 MG/DL
FASTING STATUS PATIENT QL REPORTED: YES
HDLC SERPL-MCNC: 36 MG/DL
INR BLD: 1.6 (ref 0.9–1.1)
LDLC SERPL CALC-MCNC: 105 MG/DL
NONHDLC SERPL-MCNC: 123 MG/DL
TRIGL SERPL-MCNC: 92 MG/DL

## 2024-10-15 PROCEDURE — 80061 LIPID PANEL: CPT

## 2024-10-15 PROCEDURE — 36415 COLL VENOUS BLD VENIPUNCTURE: CPT

## 2024-10-15 PROCEDURE — 85610 PROTHROMBIN TIME: CPT

## 2024-10-15 NOTE — PROGRESS NOTES
ANTICOAGULATION MANAGEMENT     Sukh Craven 69 year old male is on warfarin with subtherapeutic INR result. (Goal INR 2.0-3.0)    Recent labs: (last 7 days)     10/15/24  0840   INR 1.6*       ASSESSMENT     Warfarin Lab Questionnaire    Warfarin Doses Last 7 Days      10/14/2024     7:26 PM   Dose in Tablet or Mg   TAB or MG? milligram (mg)     Pt Rptd Dose MIRIAM MONDAY TUESDAY WED FRIDAY SATURDAY   10/14/2024   7:26 PM 6 6 6 6 6 6   Patient clarified that he is actually taking 3 mg on Mondays.      10/14/2024   Warfarin Lab Questionnaire   Missed doses within past 14 days? Yes   If yes; please list when: Last Thursday   Changes in diet or alcohol within past 14 days? No   Medication changes since last result? No   Injuries or illness since last result? No   New shortness of breath, severe headaches or sudden changes in vision since last result? No   Abnormal bleeding since last result? No   Upcoming surgery, procedure? No        Previous result: Supratherapeutic  Additional findings: None. Did mention he will be meeting up with his family from different parts of the country in [facility]  for Halloween! Will need to discuss keeping alcohol and diet of green veggies stable during his trip at the next INR visit.       PLAN     Recommended plan for temporary change(s) affecting INR     Dosing Instructions: booster dose then continue your current warfarin dose with next INR in 2 weeks       Summary  As of 10/15/2024      Full warfarin instructions:  10/15: 10 mg; Otherwise 3 mg every Mon; 6 mg all other days   Next INR check:  10/29/2024               Telephone call with Sukh who verbalizes understanding and agrees to plan    Lab visit scheduled    Education provided: Please call back if any changes to your diet, medications or how you've been taking warfarin  Contact [phone] with any changes, questions or concerns.     Plan made per [facility] anticoagulation protocol    Ngoc Goodson,  RN  10/15/2024  Anticoagulation Clinic  [facility] for routing messages: p DAVON PRATT  [facility] patient phone line: [phone]        _______________________________________________________________________     Anticoagulation Episode Summary       Current INR goal:  2.0-3.0   TTR:  57.6% (11.5 mo)   Target end date:  Indefinite   Send INR reminders to:  [facility]    Indications    S/P AVR (aortic valve replacement) [Z95.2]  Paroxysmal atrial fibrillation (H) [I48.0]             Comments:  On-X [facility]R 10/13/23 with a-fib so goal range remaining 2.0-3.0.             Anticoagulation Care Providers       Provider Role Specialty Phone number    Parrish Stephenson MD Referring Family Medicine [phone]

## 2024-10-16 ENCOUNTER — OFFICE VISIT (OUTPATIENT)
Dept: FAMILY MEDICINE | Facility: CLINIC | Age: 69
End: 2024-10-16
Payer: COMMERCIAL

## 2024-10-16 VITALS
SYSTOLIC BLOOD PRESSURE: 138 MMHG | WEIGHT: 214 LBS | DIASTOLIC BLOOD PRESSURE: 80 MMHG | RESPIRATION RATE: 18 BRPM | BODY MASS INDEX: 28.99 KG/M2 | HEIGHT: 72 IN | HEART RATE: 86 BPM | OXYGEN SATURATION: 99 % | TEMPERATURE: 97 F

## 2024-10-16 DIAGNOSIS — E11.9 TYPE 2 DIABETES MELLITUS WITHOUT COMPLICATION, WITHOUT LONG-TERM CURRENT USE OF INSULIN (H): ICD-10-CM

## 2024-10-16 DIAGNOSIS — I48.0 PAROXYSMAL ATRIAL FIBRILLATION (H): ICD-10-CM

## 2024-10-16 DIAGNOSIS — I25.10 CORONARY ARTERY DISEASE INVOLVING NATIVE CORONARY ARTERY OF NATIVE HEART WITHOUT ANGINA PECTORIS: ICD-10-CM

## 2024-10-16 DIAGNOSIS — E78.5 HYPERLIPIDEMIA, UNSPECIFIED HYPERLIPIDEMIA TYPE: ICD-10-CM

## 2024-10-16 DIAGNOSIS — I10 HYPERTENSION GOAL BP (BLOOD PRESSURE) < 140/90: ICD-10-CM

## 2024-10-16 DIAGNOSIS — Z79.01 CURRENT USE OF LONG TERM ANTICOAGULATION: ICD-10-CM

## 2024-10-16 DIAGNOSIS — F32.5 DEPRESSION, MAJOR, IN REMISSION (H): ICD-10-CM

## 2024-10-16 DIAGNOSIS — Z00.00 ENCOUNTER FOR MEDICARE ANNUAL WELLNESS EXAM: Primary | ICD-10-CM

## 2024-10-16 LAB
ALBUMIN SERPL BCG-MCNC: 4.4 G/DL (ref 3.5–5.2)
ALP SERPL-CCNC: 91 U/L (ref 40–150)
ALT SERPL W P-5'-P-CCNC: 21 U/L (ref 0–70)
ANION GAP SERPL CALCULATED.3IONS-SCNC: 11 MMOL/L (ref 7–15)
AST SERPL W P-5'-P-CCNC: 30 U/L (ref 0–45)
BASOPHILS # BLD AUTO: 0.1 10E3/UL (ref 0–0.2)
BASOPHILS NFR BLD AUTO: 1 %
BILIRUB SERPL-MCNC: 0.4 MG/DL
BUN SERPL-MCNC: 14.8 MG/DL (ref 8–23)
CALCIUM SERPL-MCNC: 9.5 MG/DL (ref 8.8–10.4)
CHLORIDE SERPL-SCNC: 104 MMOL/L (ref 98–107)
CREAT SERPL-MCNC: 1.05 MG/DL (ref 0.67–1.17)
EGFRCR SERPLBLD CKD-EPI 2021: 78 ML/MIN/1.73M2
EOSINOPHIL # BLD AUTO: 0.1 10E3/UL (ref 0–0.7)
EOSINOPHIL NFR BLD AUTO: 3 %
ERYTHROCYTE [DISTWIDTH] IN BLOOD BY AUTOMATED COUNT: 14.8 % (ref 10–15)
EST. AVERAGE GLUCOSE BLD GHB EST-MCNC: 126 MG/DL
GLUCOSE SERPL-MCNC: 116 MG/DL (ref 70–99)
HBA1C MFR BLD: 6 % (ref 0–5.6)
HCO3 SERPL-SCNC: 25 MMOL/L (ref 22–29)
HCT VFR BLD AUTO: 40 % (ref 40–53)
HGB BLD-MCNC: 13.1 G/DL (ref 13.3–17.7)
IMM GRANULOCYTES # BLD: 0 10E3/UL
IMM GRANULOCYTES NFR BLD: 0 %
LYMPHOCYTES # BLD AUTO: 1.3 10E3/UL (ref 0.8–5.3)
LYMPHOCYTES NFR BLD AUTO: 30 %
MCH RBC QN AUTO: 28.9 PG (ref 26.5–33)
MCHC RBC AUTO-ENTMCNC: 32.8 G/DL (ref 31.5–36.5)
MCV RBC AUTO: 88 FL (ref 78–100)
MONOCYTES # BLD AUTO: 0.4 10E3/UL (ref 0–1.3)
MONOCYTES NFR BLD AUTO: 9 %
NEUTROPHILS # BLD AUTO: 2.4 10E3/UL (ref 1.6–8.3)
NEUTROPHILS NFR BLD AUTO: 57 %
PLATELET # BLD AUTO: 204 10E3/UL (ref 150–450)
POTASSIUM SERPL-SCNC: 4.2 MMOL/L (ref 3.4–5.3)
PROT SERPL-MCNC: 7.8 G/DL (ref 6.4–8.3)
RBC # BLD AUTO: 4.54 10E6/UL (ref 4.4–5.9)
SODIUM SERPL-SCNC: 140 MMOL/L (ref 135–145)
WBC # BLD AUTO: 4.2 10E3/UL (ref 4–11)

## 2024-10-16 PROCEDURE — 99207 PR FOOT EXAM NO CHARGE: CPT | Performed by: NURSE PRACTITIONER

## 2024-10-16 PROCEDURE — 85025 COMPLETE CBC W/AUTO DIFF WBC: CPT | Performed by: NURSE PRACTITIONER

## 2024-10-16 PROCEDURE — 80053 COMPREHEN METABOLIC PANEL: CPT | Performed by: NURSE PRACTITIONER

## 2024-10-16 PROCEDURE — 36415 COLL VENOUS BLD VENIPUNCTURE: CPT | Performed by: NURSE PRACTITIONER

## 2024-10-16 PROCEDURE — 83036 HEMOGLOBIN GLYCOSYLATED A1C: CPT | Performed by: NURSE PRACTITIONER

## 2024-10-16 PROCEDURE — G0439 PPPS, SUBSEQ VISIT: HCPCS | Performed by: NURSE PRACTITIONER

## 2024-10-16 PROCEDURE — 99214 OFFICE O/P EST MOD 30 MIN: CPT | Mod: 25 | Performed by: NURSE PRACTITIONER

## 2024-10-16 RX ORDER — AMLODIPINE BESYLATE 10 MG/1
10 TABLET ORAL EVERY MORNING
Qty: 90 TABLET | Refills: 3 | Status: SHIPPED | OUTPATIENT
Start: 2024-10-16

## 2024-10-16 RX ORDER — LOSARTAN POTASSIUM 50 MG/1
50 TABLET ORAL DAILY
Qty: 90 TABLET | Refills: 3 | Status: SHIPPED | OUTPATIENT
Start: 2024-10-16

## 2024-10-16 RX ORDER — ESCITALOPRAM OXALATE 10 MG/1
10 TABLET ORAL DAILY
Qty: 90 TABLET | Refills: 3 | Status: SHIPPED | OUTPATIENT
Start: 2024-10-16

## 2024-10-16 RX ORDER — METFORMIN HYDROCHLORIDE 500 MG/1
500 TABLET, EXTENDED RELEASE ORAL
Qty: 90 TABLET | Refills: 3 | Status: SHIPPED | OUTPATIENT
Start: 2024-10-16

## 2024-10-16 RX ORDER — LANOLIN ALCOHOL/MO/W.PET/CERES
100 CREAM (GRAM) TOPICAL DAILY
Qty: 90 TABLET | Refills: 0 | Status: SHIPPED | OUTPATIENT
Start: 2024-10-16

## 2024-10-16 RX ORDER — CARVEDILOL 25 MG/1
25 TABLET ORAL 2 TIMES DAILY WITH MEALS
Qty: 60 TABLET | Refills: 3 | Status: SHIPPED | OUTPATIENT
Start: 2024-10-16

## 2024-10-16 RX ORDER — ATORVASTATIN CALCIUM 40 MG/1
40 TABLET, FILM COATED ORAL DAILY
Qty: 90 TABLET | Refills: 3 | Status: SHIPPED | OUTPATIENT
Start: 2024-10-16

## 2024-10-16 ASSESSMENT — PATIENT HEALTH QUESTIONNAIRE - PHQ9: SUM OF ALL RESPONSES TO PHQ QUESTIONS 1-9: 0

## 2024-10-16 ASSESSMENT — PAIN SCALES - GENERAL: PAINLEVEL: NO PAIN (0)

## 2024-10-16 NOTE — PATIENT INSTRUCTIONS
Patient Education   Preventive Care Advice   This is general advice given by our system to help you stay healthy. However, your care team may have specific advice just for you. Please talk to your care team about your preventive care needs.  Nutrition  Eat 5 or more servings of fruits and vegetables each day.  Try wheat bread, brown rice and whole grain pasta (instead of white bread, rice, and pasta).  Get enough calcium and vitamin D. Check the label on foods and aim for 100% of the RDA (recommended daily allowance).  Lifestyle  Exercise at least 150 minutes each week  (30 minutes a day, 5 days a week).  Do muscle strengthening activities 2 days a week. These help control your weight and prevent disease.  No smoking.  Wear sunscreen to prevent skin cancer.  Have a dental exam and cleaning every 6 months.  Yearly exams  See your health care team every year to talk about:  Any changes in your health.  Any medicines your care team has prescribed.  Preventive care, family planning, and ways to prevent chronic diseases.  Shots (vaccines)   HPV shots (up to age 26), if you've never had them before.  Hepatitis B shots (up to age 59), if you've never had them before.  COVID-19 shot: Get this shot when it's due.  Flu shot: Get a flu shot every year.  Tetanus shot: Get a tetanus shot every 10 years.  Pneumococcal, hepatitis A, and RSV shots: Ask your care team if you need these based on your risk.  Shingles shot (for age 50 and up)  General health tests  Diabetes screening:  Starting at age 35, Get screened for diabetes at least every 3 years.  If you are younger than age 35, ask your care team if you should be screened for diabetes.  Cholesterol test: At age 39, start having a cholesterol test every 5 years, or more often if advised.  Bone density scan (DEXA): At age 50, ask your care team if you should have this scan for osteoporosis (brittle bones).  Hepatitis C: Get tested at least once in your life.  STIs (sexually  transmitted infections)  Before age 24: Ask your care team if you should be screened for STIs.  After age 24: Get screened for STIs if you're at risk. You are at risk for STIs (including HIV) if:  You are sexually active with more than one person.  You don't use condoms every time.  You or a partner was diagnosed with a sexually transmitted infection.  If you are at risk for HIV, ask about PrEP medicine to prevent HIV.  Get tested for HIV at least once in your life, whether you are at risk for HIV or not.  Cancer screening tests  Cervical cancer screening: If you have a cervix, begin getting regular cervical cancer screening tests starting at age 21.  Breast cancer scan (mammogram): If you've ever had breasts, begin having regular mammograms starting at age 40. This is a scan to check for breast cancer.  Colon cancer screening: It is important to start screening for colon cancer at age 45.  Have a colonoscopy test every 10 years (or more often if you're at risk) Or, ask your provider about stool tests like a FIT test every year or Cologuard test every 3 years.  To learn more about your testing options, visit:   .  For help making a decision, visit:   https://bit.ly/wn44685.  Prostate cancer screening test: If you have a prostate, ask your care team if a prostate cancer screening test (PSA) at age 55 is right for you.  Lung cancer screening: If you are a current or former smoker ages 50 to 80, ask your care team if ongoing lung cancer screenings are right for you.  For informational purposes only. Not to replace the advice of your health care provider. Copyright   2023 [employer]. All rights reserved. Clinically reviewed by the [facility] Transitions Program. [employer] [id number] - REV 01/24.

## 2024-10-16 NOTE — LETTER
My Depression Action Plan  Name: Sukh Craven   Date of Birth 1955  Date: 10/17/2024    My doctor: Parrish Stephenson   [facility] [address]  [facility] [address]  [phone]            GREEN    ZONE   Good Control    What it looks like:   Things are going generally well. You have normal ups and downs. You may even feel depressed from time to time, but bad moods usually last less than a day.   What you need to do:  Continue to care for yourself (see self care plan)  Check your depression survival kit and update it as needed  Follow your physician s recommendations including any medication.  Do not stop taking medication unless you consult with your physician first.             YELLOW         ZONE Getting Worse    What it looks like:   Depression is starting to interfere with your life.   It may be hard to get out of bed; you may be starting to isolate yourself from others.  Symptoms of depression are starting to last most all day and this has happened for several days.   You may have suicidal thoughts but they are not constant.   What you need to do:     Call your care team. Your response to treatment will improve if you keep your care team informed of your progress. Yellow periods are signs an adjustment may need to be made.     Continue your self-care.  Just get dressed and ready for the day.  Don't give yourself time to talk yourself out of it.    Talk to someone in your support network.    Open up your Depression Self-Care Plan/Wellness Kit.             RED    ZONE Medical Alert - Get Help    What it looks like:   Depression is seriously interfering with your life.   You may experience these or other symptoms: You can t get out of bed most days, can t work or engage in other necessary activities, you have trouble taking care of basic hygiene, or basic responsibilities, thoughts of suicide or death that will not go away, self-injurious behavior.      What you need to do:  Call your care team and request a same-day appointment. If they are not available (weekends or after hours) call your local crisis line, emergency room or 911.          Depression Self-Care Plan / Wellness Kit    Many people find that medication and therapy are helpful treatments for managing depression. In addition, making small changes to your everyday life can help to boost your mood and improve your wellbeing. Below are some tips for you to consider. Be sure to talk with your medical provider and/or behavioral health consultant if your symptoms are worsening or not improving.     Sleep   Sleep hygiene  means all of the habits that support good, restful sleep. It includes maintaining a consistent bedtime and wake time, using your bedroom only for sleeping or sex, and keeping the bedroom dark and free of distractions like a computer, smartphone, or television.     Develop a Healthy Routine  Maintain good hygiene. Get out of bed in the morning, make your bed, brush your teeth, take a shower, and get dressed. Don t spend too much time viewing media that makes you feel stressed. Find time to relax each day.    Exercise  Get some form of exercise every day. This will help reduce pain and release endorphins, the  feel good  chemicals in your brain. It can be as simple as just going for a walk or doing some gardening, anything that will get you moving.      Diet  Strive to eat healthy foods, including fruits and vegetables. Drink plenty of water. Avoid excessive sugar, caffeine, alcohol, and other mood-altering substances.     Stay Connected with Others  Stay in touch with friends and family members.    Manage Your Mood  Try deep breathing, massage therapy, biofeedback, or meditation. Take part in fun activities when you can. Try to find something to smile about each day.     Psychotherapy  Be open to working with a therapist if your provider recommends it.     Medication  Be sure to take your  medication as prescribed. Most anti-depressants need to be taken every day. It usually takes several weeks for medications to work. Not all medicines work for all people. It is important to follow-up with your provider to make sure you have a treatment plan that is working for you. Do not stop your medication abruptly without first discussing it with your provider.    Crisis Resources   These hotlines are for both adults and children. They and are open 24 hours a day, 7 days a week unless noted otherwise.    National Suicide Prevention Lifeline   988 or 9-738-335-CIRJ (2816)    Crisis Text Line    www.crisistextline.org  Text HOME to 984410 from anywhere in the [place], anytime, about any type of crisis. A live, trained crisis counselor will receive the text and respond quickly.    [facility] Lifeline for LGBTQ Youth  A national crisis intervention and suicide lifeline for LGBTQ youth under 25. Provides a safe place to talk without judgement. Call 1-469.679.1117; text START to 377532 or visit www.the[employer]80vorproject.org to talk to a trained counselor.    For [facility] crisis numbers, visit the [facility] website at:  https://[place].gov/dhs/people-we-serve/adults/health-care/mental-health/resources/crisis-contacts.jsp

## 2024-10-16 NOTE — PROGRESS NOTES
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                 Signed Electronically by: HEMAL Bee CNP

## 2024-10-28 ENCOUNTER — DOCUMENTATION ONLY (OUTPATIENT)
Dept: ANTICOAGULATION | Facility: CLINIC | Age: 69
End: 2024-10-28
Payer: COMMERCIAL

## 2024-10-28 DIAGNOSIS — R79.1 SUBTHERAPEUTIC INTERNATIONAL NORMALIZED RATIO (INR): ICD-10-CM

## 2024-10-28 DIAGNOSIS — Z95.2 S/P AVR (AORTIC VALVE REPLACEMENT): Primary | ICD-10-CM

## 2024-10-28 DIAGNOSIS — I48.0 PAROXYSMAL ATRIAL FIBRILLATION (H): ICD-10-CM

## 2024-10-28 NOTE — PROGRESS NOTES
ANTICOAGULATION CLINIC REFERRAL RENEWAL REQUEST       An annual renewal order is required for all patients referred to [facility].?  Please review and sign the pended referral order for Sukh Craven.       ANTICOAGULATION SUMMARY      Warfarin indication(s)   Atrial Fibrillation and Mechanical AVR    Mechanical heart valve present?  Mechanical AVR- Bileaflet- On X       Current goal range   INR: 2.0-3.0     Goal appropriate for indication? Goal INR 2-3, standard for indication(s) above, keeping higher INR than usual for an On-X AVR with the addition of a-fib     Time in Therapeutic Range (TTR)  (Goal > 60%) 57.6%       Office visit with referring provider's group within last year yes on 11/15/23       Ngoc Goodson RN  [facility]

## 2024-10-29 ENCOUNTER — ANTICOAGULATION THERAPY VISIT (OUTPATIENT)
Dept: ANTICOAGULATION | Facility: CLINIC | Age: 69
End: 2024-10-29

## 2024-10-29 ENCOUNTER — LAB (OUTPATIENT)
Dept: LAB | Facility: CLINIC | Age: 69
End: 2024-10-29
Payer: COMMERCIAL

## 2024-10-29 DIAGNOSIS — Z95.2 S/P AVR (AORTIC VALVE REPLACEMENT): Primary | ICD-10-CM

## 2024-10-29 DIAGNOSIS — I48.0 PAROXYSMAL ATRIAL FIBRILLATION (H): ICD-10-CM

## 2024-10-29 DIAGNOSIS — Z95.2 S/P AVR (AORTIC VALVE REPLACEMENT): ICD-10-CM

## 2024-10-29 LAB — INR BLD: 3 (ref 0.9–1.1)

## 2024-10-29 PROCEDURE — 36416 COLLJ CAPILLARY BLOOD SPEC: CPT

## 2024-10-29 PROCEDURE — 85610 PROTHROMBIN TIME: CPT

## 2024-10-29 NOTE — PROGRESS NOTES
ANTICOAGULATION MANAGEMENT     Sukh Craven 69 year old male is on warfarin with therapeutic INR result. (Goal INR 2.0-3.0)    Recent labs: (last 7 days)     10/29/24  1152   INR 3.0*       ASSESSMENT     Warfarin Lab Questionnaire    Warfarin Doses Last 7 Days      10/28/2024    12:19 PM   Dose in Tablet or Mg   TAB or MG? milligram (mg)     Pt Rptd Dose MIRIAM MONDAY TUESDAY WED THURS FRIDAY SATURDAY   10/28/2024  12:19 PM 6 3 6 6 6 6 6         10/28/2024   Warfarin Lab Questionnaire   Missed doses within past 14 days? No   Changes in diet or alcohol within past 14 days? No   Medication changes since last result? No   Injuries or illness since last result? No   New shortness of breath, severe headaches or sudden changes in vision since last result? No   Abnormal bleeding since last result? No   Upcoming surgery, procedure? No        Previous result: Subtherapeutic  Additional findings:  traveling to [facility] this week to visit with family.       PLAN     Recommended plan for no diet, medication or health factor changes affecting INR     Dosing Instructions: Continue your current warfarin dose with next INR in 3 weeks       Summary  As of 10/29/2024      Full warfarin instructions:  3 mg every Mon; 6 mg all other days   Next INR check:  11/19/2024               Telephone call with Sukh who verbalizes understanding and agrees to plan    Lab visit scheduled    Education provided: Please call back if any changes to your diet, medications or how you've been taking warfarin  Dietary considerations: importance of consistent vitamin K intake and impact of vitamin K foods on INR  Healthy lifestyle considerations: potential interaction between warfarin and alcohol, limit alcohol intake to no more than 1 to 2 drinks in 24 hours if you choose to drink alcohol, and avoid excessive alcohol drinking (binge drinking) while on warfarin due to increased risk of bleeding from effect on INR and fall risk  Symptom monitoring:  monitoring for bleeding signs and symptoms, monitoring for clotting signs and symptoms, when to seek medical attention/emergency care, and travel related clotting risk and prevention  Contact [phone] with any changes, questions or concerns.     Plan made per [facility] anticoagulation protocol    Ngoc Goodson RN  10/29/2024  Anticoagulation Clinic  [facility] for routing messages: p DAVON PRATT  [facility] patient phone line: [phone]        _______________________________________________________________________     Anticoagulation Episode Summary       Current INR goal:  2.0-3.0   TTR:  58.1% (12 mo)   Target end date:  Indefinite   Send INR reminders to:  DAVON PRATT    Indications    S/P AVR (aortic valve replacement) [Z95.2]  Paroxysmal atrial fibrillation (H) [I48.0]             Comments:  On-X [facility] AVR 10/13/23 with a-fib so goal range remaining 2.0-3.0.             Anticoagulation Care Providers       Provider Role Specialty Phone number    Parrish Stephenson MD Referring Family Medicine [phone]

## 2024-11-05 DIAGNOSIS — E11.9 TYPE 2 DIABETES MELLITUS WITHOUT COMPLICATION, WITHOUT LONG-TERM CURRENT USE OF INSULIN (H): ICD-10-CM

## 2024-11-05 NOTE — TELEPHONE ENCOUNTER
Pending Prescriptions:                       Disp   Refills    blood glucose (NO BRAND SPECIFIED) test s*100 st*6            Sig: Use to test blood sugar one times daily or as           directed. To accompany: Blood Glucose Monitor           Brands: per insurance.

## 2024-11-06 NOTE — TELEPHONE ENCOUNTER
Patient had a visit with Primary Care clinician on 10/16/24.    Prescription approved per [facility] Refill Protocol.    CAROLANN Pedersen, RN-[facility] Primary [facility]

## 2024-11-19 ENCOUNTER — LAB (OUTPATIENT)
Dept: LAB | Facility: CLINIC | Age: 69
End: 2024-11-19
Payer: COMMERCIAL

## 2024-11-19 ENCOUNTER — ANTICOAGULATION THERAPY VISIT (OUTPATIENT)
Dept: ANTICOAGULATION | Facility: CLINIC | Age: 69
End: 2024-11-19

## 2024-11-19 DIAGNOSIS — Z95.2 S/P AVR (AORTIC VALVE REPLACEMENT): ICD-10-CM

## 2024-11-19 DIAGNOSIS — I48.0 PAROXYSMAL ATRIAL FIBRILLATION (H): ICD-10-CM

## 2024-11-19 DIAGNOSIS — Z95.2 S/P AVR (AORTIC VALVE REPLACEMENT): Primary | ICD-10-CM

## 2024-11-19 LAB — INR BLD: 3.9 (ref 0.9–1.1)

## 2024-11-19 PROCEDURE — 36416 COLLJ CAPILLARY BLOOD SPEC: CPT

## 2024-11-19 PROCEDURE — 85610 PROTHROMBIN TIME: CPT

## 2024-11-19 NOTE — PROGRESS NOTES
ANTICOAGULATION MANAGEMENT     Sukh Craven 69 year old male is on warfarin with supratherapeutic INR result. (Goal INR 2.0-3.0)    Recent labs: (last 7 days)     11/19/24  1148   INR 3.9*       ASSESSMENT     Warfarin Lab Questionnaire    Warfarin Doses Last 7 Days      11/19/2024     9:49 AM   Dose in Tablet or Mg   TAB or MG? milligram (mg)     Pt Rptd Dose SUNDAY MONDAY TUESDAY WED THURS FRIDAY SATURDAY 11/19/2024   9:49 AM 6 3 6 6 6 6 6         11/19/2024   Warfarin Lab Questionnaire   Missed doses within past 14 days? No   Changes in diet or alcohol within past 14 days? No   Medication changes since last result? No   Injuries or illness since last result? No   New shortness of breath, severe headaches or sudden changes in vision since last result? No   Abnormal bleeding since last result? No   Upcoming surgery, procedure? No        Previous result: Therapeutic last visit; previously outside of goal range  Additional findings: None       PLAN     Recommended plan for no diet, medication or health factor changes affecting INR     Dosing Instructions: partial hold then decrease your warfarin dose (7.7% change) with next INR in 2 weeks       Summary  As of 11/19/2024      Full warfarin instructions:  11/19: 1 mg; Otherwise 3 mg every Mon, Fri; 6 mg all other days   Next INR check:  12/3/2024               Telephone call with Sukh who agrees to plan and repeated back plan correctly    Lab visit scheduled    Education provided: Please call back if any changes to your diet, medications or how you've been taking warfarin    Plan made per [facility] anticoagulation protocol    Lorene Lopes RN  11/19/2024  Anticoagulation Clinic  [employer] for routing messages: sheree PRATT  [facility] patient phone line: [phone]        _______________________________________________________________________     Anticoagulation Episode Summary       Current INR goal:  2.0-3.0   TTR:  57.1% (1 y)   Target end date:  Indefinite    Send INR reminders to:  [facility]    Indications    S/P AVR (aortic valve replacement) [Z95.2]  Paroxysmal atrial fibrillation (H) [I48.0]             Comments:  On-X [facility] AVR 10/13/23 with a-fib so goal range remaining 2.0-3.0.             Anticoagulation Care Providers       Provider Role Specialty Phone number    Parrish Stephenson MD Referring Family Medicine [phone]

## 2024-12-03 ENCOUNTER — ANTICOAGULATION THERAPY VISIT (OUTPATIENT)
Dept: ANTICOAGULATION | Facility: CLINIC | Age: 69
End: 2024-12-03

## 2024-12-03 ENCOUNTER — LAB (OUTPATIENT)
Dept: LAB | Facility: CLINIC | Age: 69
End: 2024-12-03
Payer: COMMERCIAL

## 2024-12-03 DIAGNOSIS — I48.0 PAROXYSMAL ATRIAL FIBRILLATION (H): ICD-10-CM

## 2024-12-03 DIAGNOSIS — Z95.2 S/P AVR (AORTIC VALVE REPLACEMENT): Primary | ICD-10-CM

## 2024-12-03 DIAGNOSIS — Z95.2 S/P AVR (AORTIC VALVE REPLACEMENT): ICD-10-CM

## 2024-12-03 LAB — INR BLD: 2.8 (ref 0.9–1.1)

## 2024-12-03 PROCEDURE — 36416 COLLJ CAPILLARY BLOOD SPEC: CPT

## 2024-12-03 PROCEDURE — 85610 PROTHROMBIN TIME: CPT

## 2024-12-03 NOTE — PROGRESS NOTES
ANTICOAGULATION MANAGEMENT     Sukh Craven 69 year old male is on warfarin with therapeutic INR result. (Goal INR 2.0-3.0)    Recent labs: (last 7 days)     12/03/24  1147   INR 2.8*       ASSESSMENT     Source(s): Chart Review and Patient/Caregiver Call     Warfarin doses taken: Warfarin taken as instructed  Diet: No new diet changes identified  Medication/supplement changes: None noted  New illness, injury, or hospitalization: No  Signs or symptoms of bleeding or clotting: No  Previous result: Supratherapeutic  Additional findings: None       PLAN     Recommended plan for no diet, medication or health factor changes affecting INR     Dosing Instructions: Continue your current warfarin dose with next INR in 3 weeks       Summary  As of 12/3/2024      Full warfarin instructions:  3 mg every Mon, Fri; 6 mg all other days   Next INR check:  12/23/2024               Telephone call with Sukh who verbalizes understanding and agrees to plan    Lab visit scheduled    Education provided: Please call back if any changes to your diet, medications or how you've been taking warfarin  Contact [phone] with any changes, questions or concerns.     Plan made per [facility] anticoagulation protocol    Jennifer Marquis RN  12/3/2024  Anticoagulation Clinic  [employer] for routing messages: sheree PRATT  [facility] patient phone line: [phone]        _______________________________________________________________________     Anticoagulation Episode Summary       Current INR goal:  2.0-3.0   TTR:  54.0% (1 y)   Target end date:  Indefinite   Send INR reminders to:  DAVON PRATT    Indications    S/P AVR (aortic valve replacement) [Z95.2]  Paroxysmal atrial fibrillation (H) [I48.0]             Comments:  On-X [facility] AVR 10/13/23 with a-fib so goal range remaining 2.0-3.0.             Anticoagulation Care Providers       Provider Role Specialty Phone number    Parrish Stephenson MD Referring Family Medicine [phone]

## 2024-12-23 ENCOUNTER — LAB (OUTPATIENT)
Dept: LAB | Facility: CLINIC | Age: 69
End: 2024-12-23
Payer: COMMERCIAL

## 2024-12-23 ENCOUNTER — ANTICOAGULATION THERAPY VISIT (OUTPATIENT)
Dept: ANTICOAGULATION | Facility: CLINIC | Age: 69
End: 2024-12-23

## 2024-12-23 DIAGNOSIS — I48.0 PAROXYSMAL ATRIAL FIBRILLATION (H): ICD-10-CM

## 2024-12-23 DIAGNOSIS — Z95.2 S/P AVR (AORTIC VALVE REPLACEMENT): Primary | ICD-10-CM

## 2024-12-23 DIAGNOSIS — Z95.2 S/P AVR (AORTIC VALVE REPLACEMENT): ICD-10-CM

## 2024-12-23 LAB — INR BLD: 2.5 (ref 0.9–1.1)

## 2024-12-23 PROCEDURE — 85610 PROTHROMBIN TIME: CPT

## 2024-12-23 PROCEDURE — 36415 COLL VENOUS BLD VENIPUNCTURE: CPT

## 2024-12-23 NOTE — PROGRESS NOTES
ANTICOAGULATION MANAGEMENT     Sukh Craven 69 year old male is on warfarin with therapeutic INR result. (Goal INR 2.0-3.0)    Recent labs: (last 7 days)     12/23/24  1147   INR 2.5*       ASSESSMENT     Warfarin Lab Questionnaire    Warfarin Doses Last 7 Days      12/22/2024    12:24 PM   Dose in Tablet or Mg   TAB or MG? milligram (mg)     Pt Rptd Dose SUNDAY MONDAY TUESDAY WED THURS FRIDAY SATURDAY 12/22/2024  12:24 PM 6 3 6 6 6 3 6         12/22/2024   Warfarin Lab Questionnaire   Missed doses within past 14 days? No   Changes in diet or alcohol within past 14 days? No   Medication changes since last result? No   Injuries or illness since last result? No   New shortness of breath, severe headaches or sudden changes in vision since last result? No   Abnormal bleeding since last result? No   Upcoming surgery, procedure? No     Previous result: Therapeutic last visit; previously outside of goal range  Additional findings: None       PLAN     Recommended plan for no diet, medication or health factor changes affecting INR     Dosing Instructions: Continue your current warfarin dose with next INR in 4 weeks       Summary  As of 12/23/2024      Full warfarin instructions:  3 mg every Mon, Fri; 6 mg all other days   Next INR check:  1/20/2025               Telephone call with Sukh who verbalizes understanding and agrees to plan    Lab visit scheduled    Education provided: Goal range and lab monitoring: goal range and significance of current result    Plan made per [facility] anticoagulation protocol    Mylene Mccloud RN  12/23/2024  Anticoagulation Clinic  [facility] for routing messages: sheree PRATT  [facility] patient phone line: [phone]        _______________________________________________________________________     Anticoagulation Episode Summary       Current INR goal:  2.0-3.0   TTR:  54.0% (1 y)   Target end date:  Indefinite   Send INR reminders to:  DAVON PRATT    Indications    S/P AVR  (aortic valve replacement) [Z95.2]  Paroxysmal atrial fibrillation (H) [I48.0]             Comments:  On-X [facility] AVR 10/13/23 with a-fib so goal range remaining 2.0-3.0.             Anticoagulation Care Providers       Provider Role Specialty Phone number    Parrish Stephenson MD Referring Family Medicine [phone]

## 2025-01-07 DIAGNOSIS — Z00.00 ENCOUNTER FOR MEDICARE ANNUAL WELLNESS EXAM: ICD-10-CM

## 2025-01-08 RX ORDER — LANOLIN ALCOHOL/MO/W.PET/CERES
100 CREAM (GRAM) TOPICAL DAILY
Qty: 90 TABLET | Refills: 0 | Status: SHIPPED | OUTPATIENT
Start: 2025-01-08

## 2025-01-09 ENCOUNTER — OFFICE VISIT (OUTPATIENT)
Dept: CARDIOLOGY | Facility: CLINIC | Age: 70
End: 2025-01-09
Attending: NURSE PRACTITIONER
Payer: COMMERCIAL

## 2025-01-09 VITALS
SYSTOLIC BLOOD PRESSURE: 139 MMHG | HEIGHT: 77 IN | OXYGEN SATURATION: 99 % | DIASTOLIC BLOOD PRESSURE: 83 MMHG | HEART RATE: 66 BPM | BODY MASS INDEX: 25.62 KG/M2 | WEIGHT: 217 LBS

## 2025-01-09 DIAGNOSIS — I10 HYPERTENSION GOAL BP (BLOOD PRESSURE) < 140/90: ICD-10-CM

## 2025-01-09 DIAGNOSIS — I25.10 CORONARY ARTERY DISEASE INVOLVING NATIVE CORONARY ARTERY OF NATIVE HEART WITHOUT ANGINA PECTORIS: ICD-10-CM

## 2025-01-09 PROCEDURE — G0463 HOSPITAL OUTPT CLINIC VISIT: HCPCS | Performed by: NURSE PRACTITIONER

## 2025-01-09 RX ORDER — ATORVASTATIN CALCIUM 80 MG/1
80 TABLET, FILM COATED ORAL DAILY
Qty: 90 TABLET | Refills: 3 | Status: SHIPPED | OUTPATIENT
Start: 2025-01-09

## 2025-01-09 ASSESSMENT — PAIN SCALES - GENERAL: PAINLEVEL_OUTOF10: NO PAIN (0)

## 2025-01-09 NOTE — LETTER
1/9/2025      RE: Sukh Craven  [address]  [facility] [address]       Dear Colleague,    Thank you for the opportunity to participate in the care of your patient, Sukh Craven, at the [facility] [place] [facility]. Please see a copy of my visit note below.      [facility] - [facility]   Cardiology Clinic Note      HPI:   Mr. Craven is a 69 year old male with medical history pertinent for severe aortic insufficiency 2/2 sinotubular aortic root dilitation (sinus of valsalva 5.3 cm), CAD (mid RCA 90% stenosed, dCx 35% stenosed, mid LAD 70% stenosed), PAD (mild-mod left lower extremity), bilateral popliteal aneurysms, HTN, T2DM, and chronic opioid/cannabis use, who is s/p Bentall procedure/ AVR and CABG x2 on 10/13/23 by Dr. Lockett. He presents to cardiology clinic for annual follow up.     Today, Mr. Craven reports feeling very well. He is without cardiopulmonary concerns. No CP or palpitations. Breathing comfortable and is without SOB or GUNTER. Denies lightheadedness, syncope, presyncope. No other HF symptoms including orthopnea, PND, LE edema, abdominal distention. He notes his appetite has been reduced. Not exactly sure when this started. He was started on metformin in October. Weight stable.      Home BP 120s/70s     Cardiac Medications   - Norvasc 10 mg daily   - ASA 81 mg daily   - Atorvastatin 40 mg daily   - Coreg 25 mg BID  - Losartan 50 mg daily   - Warfarin     PAST MEDICAL HISTORY:  Past Medical History:   Diagnosis Date     BPH (benign prostatic hyperplasia)      Diabetes (H)      Dyslipidemia      HTN (hypertension)      Metal foreign body in chest 10/17/2023    Fractured temporary pacing wire     PAD (peripheral artery disease)      Severe aortic insufficiency        FAMILY HISTORY:  Family History   Problem Relation Age of Onset     Pulmonary Embolism Mother      Diabetes Sister      Peripheral Vascular Disease  Sister      Diabetes Sister      Diabetes Brother      C.A.D. No family hx of      Hypertension No family hx of      Cerebrovascular Disease No family hx of      Breast Cancer No family hx of      Cancer - colorectal No family hx of      Prostate Cancer No family hx of        SOCIAL HISTORY:  Social History     Socioeconomic History     Marital status: Single     Spouse name: Ricardo     Number of children: 5     Years of education: 14   Occupational History     Occupation:      Employer: [employer]   Tobacco Use     Smoking status: Former     Packs/day: 0.50     Years: 25.00     Additional pack years: 0.00     Total pack years: 12.50     Types: Cigarettes     Quit date: 3/23/2007     Years since quittin.7     Smokeless tobacco: Never   Vaping Use     Vaping Use: Never used   Substance and Sexual Activity     Alcohol use: Yes     Comment: rare- social drinker     Drug use: No     Sexual activity: Yes     Partners: Female   Other Topics Concern      Service Yes     Comment: [employer]- 6 years     Blood Transfusions No     Caffeine Concern No     Occupational Exposure No     Hobby Hazards No     Sleep Concern No     Stress Concern No     Weight Concern No     Special Diet No     Back Care No     Exercise Yes     Comment: 4 times a week     Bike Helmet Yes     Seat Belt Yes     Self-Exams Yes     Parent/sibling w/ CABG, MI or angioplasty before 65F 55M? No     Social Determinants of Health     Financial Resource Strain: Low Risk  (2023)    Financial Resource Strain      Within the past 12 months, have you or your family members you live with been unable to get utilities (heat, electricity) when it was really needed?: No   Food Insecurity: Low Risk  (2023)    Food Insecurity      Within the past 12 months, did you worry that your food would run out before you got money to buy more?: No      Within the past 12 months, did the food you bought just not last and you didn t have money  to get more?: No   Transportation Needs: Low Risk  (11/13/2023)    Transportation Needs      Within the past 12 months, has lack of transportation kept you from medical appointments, getting your medicines, non-medical meetings or appointments, work, or from getting things that you need?: No   Interpersonal Safety: Low Risk  (9/27/2023)    Interpersonal Safety      Do you feel physically and emotionally safe where you currently live?: Yes      Within the past 12 months, have you been hit, slapped, kicked or otherwise physically hurt by someone?: No      Within the past 12 months, have you been humiliated or emotionally abused in other ways by your partner or ex-partner?: No   Housing Stability: Low Risk  (11/13/2023)    Housing Stability      Do you have housing? : Yes      Are you worried about losing your housing?: No       CURRENT MEDICATIONS:  Current Outpatient Medications   Medication Sig Dispense Refill     acetaminophen (TYLENOL) 500 MG tablet Take 1-2 tablets (500-1,000 mg) by mouth every 8 hours as needed for other (For optimal non-opioid multimodal pain management to improve pain control.)       amLODIPine (NORVASC) 10 MG tablet Take 1 tablet (10 mg) by mouth every morning. 90 tablet 3     aspirin 81 MG EC tablet Take 1 tablet (81 mg) by mouth daily Start tomorrow morning. 90 tablet 3     atorvastatin (LIPITOR) 40 MG tablet Take 1 tablet (40 mg) by mouth daily. 90 tablet 3     blood glucose (NO BRAND SPECIFIED) test strip Use to test blood sugar one times daily or as directed. To accompany: Blood Glucose Monitor Brands: per insurance. 100 strip 1     blood glucose monitoring (NO BRAND SPECIFIED) meter device kit Use to test blood sugar one times daily or as directed. Preferred blood glucose meter OR supplies to accompany: Blood Glucose Monitor Brands: per insurance. 1 kit 0     carvedilol (COREG) 25 MG tablet Take 1 tablet (25 mg) by mouth 2 times daily (with meals). 60 tablet 3     Continuous Blood Gluc  "Sensor (FREESTYLE ALEXA 3 SENSOR) MISC 1 Device every 14 days 6 each 3     escitalopram (LEXAPRO) 10 MG tablet Take 1 tablet (10 mg) by mouth daily. 90 tablet 3     latanoprost (XALATAN) 0.005 % ophthalmic solution Place 1 drop into both eyes every evening Wait at least 5 minutes between drops if using more than one at a time. 2.5 mL 11     losartan (COZAAR) 50 MG tablet Take 1 tablet (50 mg) by mouth daily. 90 tablet 3     metFORMIN (GLUCOPHAGE XR) 500 MG 24 hr tablet Take 1 tablet (500 mg) by mouth daily (with dinner). 90 tablet 3     multivitamin w/minerals (THERA-VIT-M) tablet Take 1 tablet by mouth daily 90 tablet 0     thiamine (B-1) 100 MG tablet TAKE ONE TABLET BY MOUTH EVERY DAY 90 tablet 0     thin (NO BRAND SPECIFIED) lancets Use with lanceting device. To accompany: Blood Glucose Monitor Brands: per insurance. 100 each 6     warfarin ANTICOAGULANT (COUMADIN) 1 MG tablet 6 mg daily or as directed by INR clinic 90 tablet 1     warfarin ANTICOAGULANT (COUMADIN) 3 MG tablet Take one tablet (3 mg) on Mondays; take 6 mg (two tablets) all other days; or as advised by anticoagulation Clinic 180 tablet 1     No current facility-administered medications for this visit.       ROS:   Refer to HPI    EXAM:  /83 (BP Location: Right arm, Patient Position: Chair, Cuff Size: Adult Large)   Pulse 66   Ht 1.956 m (6' 5\")   Wt 98.4 kg (217 lb)   SpO2 99%   BMI 25.73 kg/m       GENERAL: Appears comfortable, in no acute distress.   HEENT: Eye symmetrical, no discharge or icterus bilaterally. Mucous membranes moist and without lesions.  CV: RRR, +S1S2, mechical valve click, no rub or gallop.   RESPIRATORY: Respirations regular, even, and unlabored. Lungs CTA throughout.   GI: Soft and non distended with normoactive bowel sounds present in all quadrants. No tenderness, rebound, guarding. No hepatomegaly.   EXTREMITIES: No peripheral edema. 2+ bilateral pedal pulses.   NEUROLOGIC: Alert and oriented x 3. No focal " deficits.   MUSCULOSKELETAL: No joint swelling or tenderness.   SKIN: No jaundice. No rashes or lesions.     Labs, reviewed with patient in clinic today:  CBC RESULTS:  Lab Results   Component Value Date    WBC 4.2 10/16/2024    WBC 4.6 05/28/2019    RBC 4.54 10/16/2024    RBC 5.17 05/28/2019    HGB 13.1 (L) 10/16/2024    HGB 15.7 05/28/2019    HCT 40.0 10/16/2024    HCT 47.0 05/28/2019    MCV 88 10/16/2024    MCV 91 05/28/2019    MCH 28.9 10/16/2024    MCH 30.4 05/28/2019    MCHC 32.8 10/16/2024    MCHC 33.4 05/28/2019    RDW 14.8 10/16/2024    RDW 14.3 05/28/2019     10/16/2024     05/28/2019       CMP RESULTS:  Lab Results   Component Value Date     10/16/2024     07/29/2020    POTASSIUM 4.2 10/16/2024    POTASSIUM 4.2 10/13/2023    POTASSIUM 3.8 07/08/2022    POTASSIUM 3.8 07/29/2020    CHLORIDE 104 10/16/2024    CHLORIDE 107 07/08/2022    CHLORIDE 107 07/29/2020    CO2 25 10/16/2024    CO2 23 07/08/2022    CO2 28 07/29/2020    ANIONGAP 11 10/16/2024    ANIONGAP 11 07/08/2022    ANIONGAP 5 07/29/2020     (H) 10/16/2024    GLC 98 11/13/2023     (H) 07/08/2022    GLC 85 07/29/2020    BUN 14.8 10/16/2024    BUN 15 07/08/2022    BUN 20 07/29/2020    CR 1.05 10/16/2024    CR 1.38 (H) 07/29/2020    GFRESTIMATED 78 10/16/2024    GFRESTIMATED >60 01/14/2023    GFRESTIMATED 54 (L) 07/29/2020    GFRESTBLACK 62 07/29/2020    DORIS 9.5 10/16/2024    DORIS 9.5 07/29/2020    BILITOTAL 0.4 10/16/2024    BILITOTAL 1.3 04/09/2019    ALBUMIN 4.4 10/16/2024    ALBUMIN 4.2 07/08/2022    ALBUMIN 4.8 04/09/2019    ALKPHOS 91 10/16/2024    ALKPHOS 67 04/09/2019    ALT 21 10/16/2024    ALT 38 04/09/2019    AST 30 10/16/2024    AST 36 04/09/2019        INR RESULTS:  Lab Results   Component Value Date    INR 2.5 (H) 12/23/2024    INR 2.10 (H) 11/13/2023       Lab Results   Component Value Date    MAG 1.8 10/27/2023     Lab Results   Component Value Date    NTBNPI 1,030 (H) 08/14/2023     Lab Results    Component Value Date    NTBNP 141 (H) 04/09/2019       Cardiac Diagnostics:    10/20/2023 Echo  Interpretation Summary  s/p Coronary Artery Bypass Graft x 2, Aortic root and valve replacement using  On-X Ascending Aortic Prosthesis with Gelweave Valsalva Graft 25mm 10/13/2023.     Global and regional left ventricular function is normal with an EF of 60-65%.  Global right ventricular function is normal.  The prosthetic aortic valve is well-seated. Doppler interrogation is normal.  MG is 13 mmHg. No apperant AI noted. Afib is with RVR at times which makes  assessment difficult.     Trivial pericardial effusion is present posterior to the LV.     This study was compared with the study from 8/15/2023 .Rhythm is afib now  (previous study sinus), s/p AVR and root repair for severe AI and severe  aortic root dilation.        Assessment and Plan:   Mr. Craven is a 69 year old male with medical history pertinent for severe aortic insufficiency 2/2 sinotubular aortic root dilitation (sinus of valsalva 5.3 cm), CAD (mid RCA 90% stenosed, dCx 35% stenosed, mid LAD 70% stenosed), PAD (mild-mod left lower extremity), bilateral popliteal aneurysms, HTN, T2DM, and chronic opioid/cannabis use, who is s/p Bentall procedure/ AVR and CABG x2 on 10/13 by Dr. Lockett. He presents to cardiology clinic for annual follow up.     Feeling and appearing well. Blood pressure is well controlled. Review of most recent labs are stable. INR therapeutic.  Doing well from cardiac perspective. No medication changes. I will reach out to vascular surgery to determine if and when they would like to see Mr. Craven back in clinic for possible aneurysm repair.     # CAD s/p CABG x2   # Severe AI 2/2 aortic root aneurysm s/p On-X mechanical Bentall  # HLD  - continue ASA  - increase atorvastatin to 80 mg daily   - continue coreg 25 mg BID    # PAF  # 1st deg AV block with bradycardia   Deferred AAD therapy in the post-op period.     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  Cavitary aneurysms are more associated with rupture while extremity and peripheral aneurysms are more associated with thrombosis or embolic phenomenon.  Per vascular surgery, discussed the fact that his left popliteal aneurysm has been chronically occluded with excellent collateral flow.  He has no signs of lower extremity ischemia or residual embolic deficit although he may have some tibial disease as a result.  Discussed indications for repair including the size criteria which he is below for the right side, and other indications including mural thrombus or embolic disease.  He could benefit from repair, but elected to postpone due to recent Bentall/CABG  - Evaluated by Dr. Franco 3/2024. His asymptomatic occluded left popliteal aneurysm does not need intervention and right popliteal aneurysm does not meet size criteria for repair. He is entirely asymptomatic however, discussed that thrombosis could lead to acute limb ischemia or threatened limb versus be asymptomatic as his other side has.  Unfortunately, there is no way to predict how this will behave.  Discussed prophylactic repair, however recommended to wait until he has been fully 6 months from his coronary artery revascularization and previous operation as he is just getting back to baseline now.  Will plan to see him back in 3 months with a repeat CT angiogram of the abdomen pelvis with runoff, arterial ultrasounds, and upper and lower extremity vein mapping.    - Schedule follow up with Dr. Franco     # Anxiety  # Chronic cannabis use  Endorsed anxiety with sense of impending doom which first started 5-6 years ago. Timeline correlates  somewhat with diagnosis of intermittent afib and would explain why anxiety symptoms wax and wane.   - continue Lexapro 10 mg daily      Follow up:  - General cardiology YVONNE annually, sooner if needed  - Schedule overdue follow up with Dr. Franco with repeat CT angiogram of the abdomen pelvis with runoff, arterial ultrasounds, and upper and lower extremity vein mapping prior to visit       Melissa Gee DNP, NP-C  General Cardiology   1/9/2025      Please do not hesitate to contact me if you have any questions/concerns.     Sincerely,     HEMAL De Guzman CNP

## 2025-01-09 NOTE — PATIENT INSTRUCTIONS
You were seen at the [facility] clinic today.   You saw ELDA Barrett.    The following is a summary of your office visit today:    Increase atorvastatin to 80 mg for your cholesterol. This prescription was sent to your pharmacy.   Please try and pursue moderate-intensity exercise for 30 minutes at least five times weekly.  Please try and maintain a diet rich in fruit and vegetables and low in saturated fats and sugars.   Follow up with general cardiology in 1 year  Recommend making an appointment with Dr. Franco for overdue follow up with below imaging done prior to clinic visit:  CT angiogram abdomen/pelvis with runoff  arterial ultrasounds  upper and lower extremity vein mapping      If you have questions after this visit:   Send a [facility] message or contact the Cardiology Nurse Line  Office:  [phone] option #1, then #4 and ask for a Cardiology Nurse  Fax:  [phone]   Appointments:  [phone] option #1, then option #1     HOW TO CHECK YOUR BLOOD PRESSURE AT HOME:    Avoid eating, smoking, and exercising for at least 30 minutes before taking a reading.    Be sure you have taken your BP medication at least 2-3 hours before you check it.     Sit quietly for 10 minutes before a reading.     Sit in a chair with your feet flat on the floor. Rest your  arm on a table so that the arm cuff is at the same level as your heart.    Remain still during the reading.    Record your blood pressure and pulse in a log and bring to your next appointment.     If you have had any blood work, imaging or other testing completed we will be in touch within 1-2 weeks regarding the results. If you have any questions, concerns or need to schedule a follow up, please contact us at the numbers above. If you are needing refills please contact your pharmacy. For urgent after-hours care please call the the [facility] at [phone] (option #4) and ask to speak  to the on-call Cardiologist.    It was a pleasure meeting with you today. Please let us know if there is anything else we can do for you so that we can be sure you are leaving completely satisfied with your care experience.     Your Cardiology Team at the [facility]

## 2025-01-09 NOTE — PROGRESS NOTES
[facility] Cardiology - [facility] [place]   Cardiology Clinic Note      HPI:   Mr. Craven is a 69 year old male with medical history pertinent for severe aortic insufficiency 2/2 sinotubular aortic root dilitation (sinus of valsalva 5.3 cm), CAD (mid RCA 90% stenosed, dCx 35% stenosed, mid LAD 70% stenosed), PAD (mild-mod left lower extremity), bilateral popliteal aneurysms, HTN, T2DM, and chronic opioid/cannabis use, who is s/p Bentall procedure/ AVR and CABG x2 on 10/13/23 by Dr. Lockett. He presents to cardiology clinic for annual follow up.     Today, Mr. Craven reports feeling very well. He is without cardiopulmonary concerns. No CP or palpitations. Breathing comfortable and is without SOB or GUNTER. Denies lightheadedness, syncope, presyncope. No other HF symptoms including orthopnea, PND, LE edema, abdominal distention. He notes his appetite has been reduced. Not exactly sure when this started. He was started on metformin in October. Weight stable.      Home BP 120s/70s     Cardiac Medications   - Norvasc 10 mg daily   - ASA 81 mg daily   - Atorvastatin 40 mg daily   - Coreg 25 mg BID  - Losartan 50 mg daily   - Warfarin     PAST MEDICAL HISTORY:  Past Medical History:   Diagnosis Date    BPH (benign prostatic hyperplasia)     Diabetes (H)     Dyslipidemia     HTN (hypertension)     Metal foreign body in chest 10/17/2023    Fractured temporary pacing wire    PAD (peripheral artery disease)     Severe aortic insufficiency        FAMILY HISTORY:  Family History   Problem Relation Age of Onset    Pulmonary Embolism Mother     Diabetes Sister     Peripheral Vascular Disease Sister     Diabetes Sister     Diabetes Brother     C.A.D. No family hx of     Hypertension No family hx of     Cerebrovascular Disease No family hx of     Breast Cancer No family hx of     Cancer - colorectal No family hx of     Prostate Cancer No family hx of        SOCIAL HISTORY:  Social History     Socioeconomic History    Marital status: Single      Spouse name: Ricardo    Number of children: 5    Years of education: 14   Occupational History    Occupation:      Employer: [employer]   Tobacco Use    Smoking status: Former     Packs/day: 0.50     Years: 25.00     Additional pack years: 0.00     Total pack years: 12.50     Types: Cigarettes     Quit date: 3/23/2007     Years since quittin.7    Smokeless tobacco: Never   Vaping Use    Vaping Use: Never used   Substance and Sexual Activity    Alcohol use: Yes     Comment: rare- social drinker    Drug use: No    Sexual activity: Yes     Partners: Female   Other Topics Concern     Service Yes     Comment: [employer]- 6 years    Blood Transfusions No    Caffeine Concern No    Occupational Exposure No    Hobby Hazards No    Sleep Concern No    Stress Concern No    Weight Concern No    Special Diet No    Back Care No    Exercise Yes     Comment: 4 times a week    Bike Helmet Yes    Seat Belt Yes    Self-Exams Yes    Parent/sibling w/ CABG, MI or angioplasty before 65F 55M? No     Social Determinants of Health     Financial Resource Strain: Low Risk  (2023)    Financial Resource Strain     Within the past 12 months, have you or your family members you live with been unable to get utilities (heat, electricity) when it was really needed?: No   Food Insecurity: Low Risk  (2023)    Food Insecurity     Within the past 12 months, did you worry that your food would run out before you got money to buy more?: No     Within the past 12 months, did the food you bought just not last and you didn t have money to get more?: No   Transportation Needs: Low Risk  (2023)    Transportation Needs     Within the past 12 months, has lack of transportation kept you from medical appointments, getting your medicines, non-medical meetings or appointments, work, or from getting things that you need?: No   Interpersonal Safety: Low Risk  (2023)    Interpersonal Safety     Do you feel physically and  emotionally safe where you currently live?: Yes     Within the past 12 months, have you been hit, slapped, kicked or otherwise physically hurt by someone?: No     Within the past 12 months, have you been humiliated or emotionally abused in other ways by your partner or ex-partner?: No   Housing Stability: Low Risk  (11/13/2023)    Housing Stability     Do you have housing? : Yes     Are you worried about losing your housing?: No       CURRENT MEDICATIONS:  Current Outpatient Medications   Medication Sig Dispense Refill    acetaminophen (TYLENOL) 500 MG tablet Take 1-2 tablets (500-1,000 mg) by mouth every 8 hours as needed for other (For optimal non-opioid multimodal pain management to improve pain control.)      amLODIPine (NORVASC) 10 MG tablet Take 1 tablet (10 mg) by mouth every morning. 90 tablet 3    aspirin 81 MG EC tablet Take 1 tablet (81 mg) by mouth daily Start tomorrow morning. 90 tablet 3    atorvastatin (LIPITOR) 40 MG tablet Take 1 tablet (40 mg) by mouth daily. 90 tablet 3    blood glucose (NO BRAND SPECIFIED) test strip Use to test blood sugar one times daily or as directed. To accompany: Blood Glucose Monitor Brands: per insurance. 100 strip 1    blood glucose monitoring (NO BRAND SPECIFIED) meter device kit Use to test blood sugar one times daily or as directed. Preferred blood glucose meter OR supplies to accompany: Blood Glucose Monitor Brands: per insurance. 1 kit 0    carvedilol (COREG) 25 MG tablet Take 1 tablet (25 mg) by mouth 2 times daily (with meals). 60 tablet 3    Continuous Blood Gluc Sensor (FREESTYLE ALEXA 3 SENSOR) MISC 1 Device every 14 days 6 each 3    escitalopram (LEXAPRO) 10 MG tablet Take 1 tablet (10 mg) by mouth daily. 90 tablet 3    latanoprost (XALATAN) 0.005 % ophthalmic solution Place 1 drop into both eyes every evening Wait at least 5 minutes between drops if using more than one at a time. 2.5 mL 11    losartan (COZAAR) 50 MG tablet Take 1 tablet (50 mg) by mouth  "daily. 90 tablet 3    metFORMIN (GLUCOPHAGE XR) 500 MG 24 hr tablet Take 1 tablet (500 mg) by mouth daily (with dinner). 90 tablet 3    multivitamin w/minerals (THERA-VIT-M) tablet Take 1 tablet by mouth daily 90 tablet 0    thiamine (B-1) 100 MG tablet TAKE ONE TABLET BY MOUTH EVERY DAY 90 tablet 0    thin (NO BRAND SPECIFIED) lancets Use with lanceting device. To accompany: Blood Glucose Monitor Brands: per insurance. 100 each 6    warfarin ANTICOAGULANT (COUMADIN) 1 MG tablet 6 mg daily or as directed by INR clinic 90 tablet 1    warfarin ANTICOAGULANT (COUMADIN) 3 MG tablet Take one tablet (3 mg) on Mondays; take 6 mg (two tablets) all other days; or as advised by anticoagulation Clinic 180 tablet 1     No current facility-administered medications for this visit.       ROS:   Refer to HPI    EXAM:  /83 (BP Location: Right arm, Patient Position: Chair, Cuff Size: Adult Large)   Pulse 66   Ht 1.956 m (6' 5\")   Wt 98.4 kg (217 lb)   SpO2 99%   BMI 25.73 kg/m       GENERAL: Appears comfortable, in no acute distress.   HEENT: Eye symmetrical, no discharge or icterus bilaterally. Mucous membranes moist and without lesions.  CV: RRR, +S1S2, mechical valve click, no rub or gallop.   RESPIRATORY: Respirations regular, even, and unlabored. Lungs CTA throughout.   GI: Soft and non distended with normoactive bowel sounds present in all quadrants. No tenderness, rebound, guarding. No hepatomegaly.   EXTREMITIES: No peripheral edema. 2+ bilateral pedal pulses.   NEUROLOGIC: Alert and oriented x 3. No focal deficits.   MUSCULOSKELETAL: No joint swelling or tenderness.   SKIN: No jaundice. No rashes or lesions.     Labs, reviewed with patient in clinic today:  CBC RESULTS:  Lab Results   Component Value Date    WBC 4.2 10/16/2024    WBC 4.6 05/28/2019    RBC 4.54 10/16/2024    RBC 5.17 05/28/2019    HGB 13.1 (L) 10/16/2024    HGB 15.7 05/28/2019    HCT 40.0 10/16/2024    HCT 47.0 05/28/2019    MCV 88 10/16/2024    " MCV 91 05/28/2019    MCH 28.9 10/16/2024    MCH 30.4 05/28/2019    MCHC 32.8 10/16/2024    MCHC 33.4 05/28/2019    RDW 14.8 10/16/2024    RDW 14.3 05/28/2019     10/16/2024     05/28/2019       CMP RESULTS:  Lab Results   Component Value Date     10/16/2024     07/29/2020    POTASSIUM 4.2 10/16/2024    POTASSIUM 4.2 10/13/2023    POTASSIUM 3.8 07/08/2022    POTASSIUM 3.8 07/29/2020    CHLORIDE 104 10/16/2024    CHLORIDE 107 07/08/2022    CHLORIDE 107 07/29/2020    CO2 25 10/16/2024    CO2 23 07/08/2022    CO2 28 07/29/2020    ANIONGAP 11 10/16/2024    ANIONGAP 11 07/08/2022    ANIONGAP 5 07/29/2020     (H) 10/16/2024    GLC 98 11/13/2023     (H) 07/08/2022    GLC 85 07/29/2020    BUN 14.8 10/16/2024    BUN 15 07/08/2022    BUN 20 07/29/2020    CR 1.05 10/16/2024    CR 1.38 (H) 07/29/2020    GFRESTIMATED 78 10/16/2024    GFRESTIMATED >60 01/14/2023    GFRESTIMATED 54 (L) 07/29/2020    GFRESTBLACK 62 07/29/2020    DORIS 9.5 10/16/2024    DORIS 9.5 07/29/2020    BILITOTAL 0.4 10/16/2024    BILITOTAL 1.3 04/09/2019    ALBUMIN 4.4 10/16/2024    ALBUMIN 4.2 07/08/2022    ALBUMIN 4.8 04/09/2019    ALKPHOS 91 10/16/2024    ALKPHOS 67 04/09/2019    ALT 21 10/16/2024    ALT 38 04/09/2019    AST 30 10/16/2024    AST 36 04/09/2019        INR RESULTS:  Lab Results   Component Value Date    INR 2.5 (H) 12/23/2024    INR 2.10 (H) 11/13/2023       Lab Results   Component Value Date    MAG 1.8 10/27/2023     Lab Results   Component Value Date    NTBNPI 1,030 (H) 08/14/2023     Lab Results   Component Value Date    NTBNP 141 (H) 04/09/2019       Cardiac Diagnostics:    10/20/2023 Echo  Interpretation Summary  s/p Coronary Artery Bypass Graft x 2, Aortic root and valve replacement using  On-X Ascending Aortic Prosthesis with Gelweave Valsalva Graft 25mm 10/13/2023.     Global and regional left ventricular function is normal with an EF of 60-65%.  Global right ventricular function is normal.  The  prosthetic aortic valve is well-seated. Doppler interrogation is normal.  MG is 13 mmHg. No apperant AI noted. Afib is with RVR at times which makes  assessment difficult.     Trivial pericardial effusion is present posterior to the LV.     This study was compared with the study from 8/15/2023 .Rhythm is afib now  (previous study sinus), s/p AVR and root repair for severe AI and severe  aortic root dilation.        Assessment and Plan:   Mr. Craven is a 69 year old male with medical history pertinent for severe aortic insufficiency 2/2 sinotubular aortic root dilitation (sinus of valsalva 5.3 cm), CAD (mid RCA 90% stenosed, dCx 35% stenosed, mid LAD 70% stenosed), PAD (mild-mod left lower extremity), bilateral popliteal aneurysms, HTN, T2DM, and chronic opioid/cannabis use, who is s/p Bentall procedure/ AVR and CABG x2 on 10/13 by Dr. Lockett. He presents to cardiology clinic for annual follow up.     Feeling and appearing well. Blood pressure is well controlled. Review of most recent labs are stable. INR therapeutic.  Doing well from cardiac perspective. No medication changes. I will reach out to vascular surgery to determine if and when they would like to see Mr. Craven back in clinic for possible aneurysm repair.     # CAD s/p CABG x2   # Severe AI 2/2 aortic root aneurysm s/p On-X mechanical Bentall  # HLD  - continue ASA  - increase atorvastatin to 80 mg daily   - continue coreg 25 mg BID    # PAF  # 1st deg AV block with bradycardia   Deferred AAD therapy in the post-op period.     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  Cavitary aneurysms are more associated with rupture while extremity and peripheral aneurysms are more  associated with thrombosis or embolic phenomenon.  Per vascular surgery, discussed the fact that his left popliteal aneurysm has been chronically occluded with excellent collateral flow.  He has no signs of lower extremity ischemia or residual embolic deficit although he may have some tibial disease as a result.  Discussed indications for repair including the size criteria which he is below for the right side, and other indications including mural thrombus or embolic disease.  He could benefit from repair, but elected to postpone due to recent Bentall/CABG  - Evaluated by Dr. Franco 3/2024. His asymptomatic occluded left popliteal aneurysm does not need intervention and right popliteal aneurysm does not meet size criteria for repair. He is entirely asymptomatic however, discussed that thrombosis could lead to acute limb ischemia or threatened limb versus be asymptomatic as his other side has.  Unfortunately, there is no way to predict how this will behave.  Discussed prophylactic repair, however recommended to wait until he has been fully 6 months from his coronary artery revascularization and previous operation as he is just getting back to baseline now.  Will plan to see him back in 3 months with a repeat CT angiogram of the abdomen pelvis with runoff, arterial ultrasounds, and upper and lower extremity vein mapping.    - Schedule follow up with Dr. Franco     # Anxiety  # Chronic cannabis use  Endorsed anxiety with sense of impending doom which first started 5-6 years ago. Timeline correlates somewhat with diagnosis of intermittent afib and would explain why anxiety symptoms wax and wane.   - continue Lexapro 10 mg daily      Follow up:  - General cardiology YVONNE annually, sooner if needed  - Schedule overdue follow up with Dr. Franco with repeat CT angiogram of the abdomen pelvis with runoff, arterial ultrasounds, and upper and lower extremity vein mapping prior to visit       Melissa Gee DNP,  [facility]  General Cardiology   1/9/2025

## 2025-01-09 NOTE — NURSING NOTE
Chief Complaint   Patient presents with    Follow Up     RETURN CARDIOLOGY      Vitals were taken and medications reconciled.    Alexis Colón, EMT  12:55 PM

## 2025-01-10 ENCOUNTER — TRANSFERRED RECORDS (OUTPATIENT)
Dept: MULTI SPECIALTY CLINIC | Facility: CLINIC | Age: 70
End: 2025-01-10

## 2025-01-10 LAB — RETINOPATHY: NORMAL

## 2025-01-26 ENCOUNTER — HEALTH MAINTENANCE LETTER (OUTPATIENT)
Age: 70
End: 2025-01-26

## 2025-01-27 ENCOUNTER — MYC MEDICAL ADVICE (OUTPATIENT)
Dept: ANTICOAGULATION | Facility: CLINIC | Age: 70
End: 2025-01-27
Payer: COMMERCIAL

## 2025-02-01 DIAGNOSIS — I10 HYPERTENSION GOAL BP (BLOOD PRESSURE) < 140/90: ICD-10-CM

## 2025-02-02 RX ORDER — CARVEDILOL 25 MG/1
25 TABLET ORAL 2 TIMES DAILY WITH MEALS
Qty: 180 TABLET | Refills: 1 | Status: SHIPPED | OUTPATIENT
Start: 2025-02-02

## 2025-02-03 ENCOUNTER — TELEPHONE (OUTPATIENT)
Dept: ANTICOAGULATION | Facility: CLINIC | Age: 70
End: 2025-02-03
Payer: COMMERCIAL

## 2025-02-03 NOTE — TELEPHONE ENCOUNTER
ANTICOAGULATION     Sukh Craven is overdue for an INR check.     Spoke with Sukh and scheduled lab appointment on 2/10/25    Farzana Camacho, RN  2/3/2025  Anticoagulation Clinic  [facility] for routing messages: sheree PRATT  [facility] patient phone line: [phone]

## 2025-02-10 ENCOUNTER — LAB (OUTPATIENT)
Dept: LAB | Facility: CLINIC | Age: 70
End: 2025-02-10
Payer: COMMERCIAL

## 2025-02-10 ENCOUNTER — ANTICOAGULATION THERAPY VISIT (OUTPATIENT)
Dept: ANTICOAGULATION | Facility: CLINIC | Age: 70
End: 2025-02-10

## 2025-02-10 DIAGNOSIS — Z95.2 S/P AVR (AORTIC VALVE REPLACEMENT): ICD-10-CM

## 2025-02-10 DIAGNOSIS — Z95.2 S/P AVR (AORTIC VALVE REPLACEMENT): Primary | ICD-10-CM

## 2025-02-10 DIAGNOSIS — I48.0 PAROXYSMAL ATRIAL FIBRILLATION (H): ICD-10-CM

## 2025-02-10 LAB — INR BLD: 2.8 (ref 0.9–1.1)

## 2025-02-10 PROCEDURE — 36415 COLL VENOUS BLD VENIPUNCTURE: CPT

## 2025-02-10 PROCEDURE — 85610 PROTHROMBIN TIME: CPT

## 2025-02-10 NOTE — PROGRESS NOTES
ANTICOAGULATION MANAGEMENT     Sukh Craven 69 year old male is on warfarin with therapeutic INR result. (Goal INR 2.0-3.0)    Recent labs: (last 7 days)     02/10/25  1141   INR 2.8*       ASSESSMENT     Warfarin Lab Questionnaire    Warfarin Doses Last 7 Days      2/9/2025    12:26 PM   Dose in Tablet or Mg   TAB or MG? milligram (mg)     Pt Rptd Dose SUNDAY MONDAY TUESDAY WED THURS FRIDAY SATURDAY 2/9/2025  12:26 PM 6 3 6 6 6 3 6         2/9/2025   Warfarin Lab Questionnaire   Missed doses within past 14 days? No   Changes in diet or alcohol within past 14 days? No   Medication changes since last result? No   Injuries or illness since last result? No   New shortness of breath, severe headaches or sudden changes in vision since last result? No   Abnormal bleeding since last result? No   Upcoming surgery, procedure? No     Previous result: Therapeutic last 2(+) visits  Additional findings: None       PLAN     Recommended plan for no diet, medication or health factor changes affecting INR     Dosing Instructions: Continue your current warfarin dose with next INR in 6 weeks       Summary  As of 2/10/2025      Full warfarin instructions:  3 mg every Mon, Fri; 6 mg all other days   Next INR check:  3/24/2025               Telephone call with Sukh who verbalizes understanding and agrees to plan    Lab visit scheduled    Education provided: Please call back if any changes to your diet, medications or how you've been taking warfarin  Contact [phone] with any changes, questions or concerns.     Plan made per [facility] anticoagulation protocol    Ngoc Goodson RN  2/10/2025  Anticoagulation Clinic  [facility] for routing messages: sheree PRATT  [facility] patient phone line: [phone]        _______________________________________________________________________     Anticoagulation Episode Summary       Current INR goal:  2.0-3.0   TTR:  59.4% (1 y)   Target end date:  Indefinite   Send INR reminders to:   [facility]    Indications    S/P AVR (aortic valve replacement) [Z95.2]  Paroxysmal atrial fibrillation (H) [I48.0]             Comments:  On-X [facility] AVR 10/13/23 with a-fib so goal range remaining 2.0-3.0.             Anticoagulation Care Providers       Provider Role Specialty Phone number    Parrish Stephenson MD Referring Family Medicine [phone]

## 2025-03-15 DIAGNOSIS — R79.1 SUBTHERAPEUTIC INTERNATIONAL NORMALIZED RATIO (INR): ICD-10-CM

## 2025-03-17 RX ORDER — WARFARIN SODIUM 3 MG/1
TABLET ORAL
Qty: 155 TABLET | Refills: 1 | Status: SHIPPED | OUTPATIENT
Start: 2025-03-17

## 2025-03-17 NOTE — TELEPHONE ENCOUNTER
ANTICOAGULATION MANAGEMENT:  Medication Refill    Anticoagulation Summary  As of 2/10/2025      Warfarin maintenance plan:  3 mg (3 mg x 1) every Mon, Fri; 6 mg (3 mg x 2) all other days   Next INR check:  3/24/2025   Target end date:  Indefinite    Indications    S/P AVR (aortic valve replacement) [Z95.2]  Paroxysmal atrial fibrillation (H) [I48.0]                 Anticoagulation Care Providers       Provider Role Specialty Phone number    Parrish Stephenson MD Referring Family Medicine [phone]            Refill Criteria    Visit with referring provider/group: Meets criteria: visit within referring provider group in the last 15 months on 10/16/24    [facility] referral last signed: 11/06/2024; within last year:  Yes    Lab monitoring is up to date (not exceeding 2 weeks overdue): Yes    Sukh meets all criteria for refill. Rx instructions and quantity supplied updated to match patient's current dosing plan. Warfarin 90 day supply with 1 refill granted per [facility] protocol     Montse Rosado RN  Anticoagulation Clinic

## 2025-03-24 ENCOUNTER — ANTICOAGULATION THERAPY VISIT (OUTPATIENT)
Dept: ANTICOAGULATION | Facility: CLINIC | Age: 70
End: 2025-03-24

## 2025-03-24 ENCOUNTER — LAB (OUTPATIENT)
Dept: LAB | Facility: CLINIC | Age: 70
End: 2025-03-24
Payer: COMMERCIAL

## 2025-03-24 DIAGNOSIS — Z95.2 S/P AVR (AORTIC VALVE REPLACEMENT): Primary | ICD-10-CM

## 2025-03-24 DIAGNOSIS — I48.0 PAROXYSMAL ATRIAL FIBRILLATION (H): ICD-10-CM

## 2025-03-24 DIAGNOSIS — Z95.2 S/P AVR (AORTIC VALVE REPLACEMENT): ICD-10-CM

## 2025-03-24 LAB — INR BLD: 2 (ref 0.9–1.1)

## 2025-03-24 PROCEDURE — 85610 PROTHROMBIN TIME: CPT

## 2025-03-24 PROCEDURE — 36416 COLLJ CAPILLARY BLOOD SPEC: CPT

## 2025-03-24 NOTE — PROGRESS NOTES
ANTICOAGULATION MANAGEMENT     Sukh Craven 69 year old male is on warfarin with therapeutic INR result. (Goal INR 2.0-3.0)    Recent labs: (last 7 days)     03/24/25  1122   INR 2.0*       ASSESSMENT     Warfarin Lab Questionnaire    Warfarin Doses Last 7 Days      3/24/2025     9:22 AM   Dose in Tablet or Mg   TAB or MG? milligram (mg)     Pt Rptd Dose MIRIAM MONDAY TUESDAY WED THURS FRIDAY SATURDAY   3/24/2025   9:22 AM 6 3 6 6 6 3 6         3/24/2025   Warfarin Lab Questionnaire   Missed doses within past 14 days? No   Changes in diet or alcohol within past 14 days? No   Medication changes since last result? No   Injuries or illness since last result? No   New shortness of breath, severe headaches or sudden changes in vision since last result? No   Abnormal bleeding since last result? No   Upcoming surgery, procedure? No     Previous result: Therapeutic last 2(+) visits  Additional findings: None       PLAN     Recommended plan for no diet, medication or health factor changes affecting INR     Dosing Instructions: Continue your current warfarin dose with next INR in 6 weeks       Summary  As of 3/24/2025      Full warfarin instructions:  3 mg every Mon, Fri; 6 mg all other days   Next INR check:  5/5/2025               Telephone call with Sukh who verbalizes understanding and agrees to plan    Lab visit scheduled    Education provided: Please call back if any changes to your diet, medications or how you've been taking warfarin    Plan made per [facility] anticoagulation protocol    Kristal Marquis RN  3/24/2025  Anticoagulation Clinic  [facility] for routing messages: sheree PRATT  [facility] patient phone line: [phone]        _______________________________________________________________________     Anticoagulation Episode Summary       Current INR goal:  2.0-3.0   TTR:  63.8% (1 y)   Target end date:  Indefinite   Send INR reminders to:  DAVON PRATT    Indications    S/P AVR  (aortic valve replacement) [Z95.2]  Paroxysmal atrial fibrillation (H) [I48.0]             Comments:  On-X [facility] AVR 10/13/23 with a-fib so goal range remaining 2.0-3.0.             Anticoagulation Care Providers       Provider Role Specialty Phone number    Parrish Stephenson MD Referring Family Medicine [phone]

## 2025-04-04 DIAGNOSIS — Z00.00 ENCOUNTER FOR MEDICARE ANNUAL WELLNESS EXAM: ICD-10-CM

## 2025-04-09 ENCOUNTER — OFFICE VISIT (OUTPATIENT)
Dept: FAMILY MEDICINE | Facility: CLINIC | Age: 70
End: 2025-04-09
Payer: COMMERCIAL

## 2025-04-09 VITALS
WEIGHT: 218 LBS | BODY MASS INDEX: 25.74 KG/M2 | DIASTOLIC BLOOD PRESSURE: 62 MMHG | SYSTOLIC BLOOD PRESSURE: 97 MMHG | TEMPERATURE: 98.4 F | HEART RATE: 58 BPM | RESPIRATION RATE: 16 BRPM | HEIGHT: 77 IN | OXYGEN SATURATION: 97 %

## 2025-04-09 DIAGNOSIS — R22.0 MASS OF HEAD: Primary | ICD-10-CM

## 2025-04-09 DIAGNOSIS — E11.9 TYPE 2 DIABETES MELLITUS WITHOUT COMPLICATION, WITHOUT LONG-TERM CURRENT USE OF INSULIN (H): ICD-10-CM

## 2025-04-09 DIAGNOSIS — I10 HYPERTENSION GOAL BP (BLOOD PRESSURE) < 140/90: ICD-10-CM

## 2025-04-09 LAB
EST. AVERAGE GLUCOSE BLD GHB EST-MCNC: 126 MG/DL
HBA1C MFR BLD: 6 % (ref 0–5.6)

## 2025-04-09 PROCEDURE — 3074F SYST BP LT 130 MM HG: CPT | Performed by: NURSE PRACTITIONER

## 2025-04-09 PROCEDURE — 80048 BASIC METABOLIC PNL TOTAL CA: CPT | Performed by: NURSE PRACTITIONER

## 2025-04-09 PROCEDURE — 3078F DIAST BP <80 MM HG: CPT | Performed by: NURSE PRACTITIONER

## 2025-04-09 PROCEDURE — 36415 COLL VENOUS BLD VENIPUNCTURE: CPT | Performed by: NURSE PRACTITIONER

## 2025-04-09 PROCEDURE — 99214 OFFICE O/P EST MOD 30 MIN: CPT | Performed by: NURSE PRACTITIONER

## 2025-04-09 PROCEDURE — 83036 HEMOGLOBIN GLYCOSYLATED A1C: CPT | Performed by: NURSE PRACTITIONER

## 2025-04-09 RX ORDER — CARVEDILOL 25 MG/1
25 TABLET ORAL 2 TIMES DAILY WITH MEALS
Qty: 180 TABLET | Refills: 1 | Status: SHIPPED | OUTPATIENT
Start: 2025-04-09

## 2025-04-09 RX ORDER — AMLODIPINE BESYLATE 10 MG/1
10 TABLET ORAL EVERY MORNING
Qty: 90 TABLET | Refills: 3 | Status: SHIPPED | OUTPATIENT
Start: 2025-04-09

## 2025-04-09 RX ORDER — LANOLIN ALCOHOL/MO/W.PET/CERES
100 CREAM (GRAM) TOPICAL DAILY
Qty: 90 TABLET | Refills: 0 | Status: SHIPPED | OUTPATIENT
Start: 2025-04-09

## 2025-04-09 RX ORDER — LOSARTAN POTASSIUM 50 MG/1
50 TABLET ORAL DAILY
Qty: 90 TABLET | Refills: 3 | Status: SHIPPED | OUTPATIENT
Start: 2025-04-09

## 2025-04-09 RX ORDER — METFORMIN HYDROCHLORIDE 500 MG/1
500 TABLET, EXTENDED RELEASE ORAL
Qty: 90 TABLET | Refills: 3 | Status: SHIPPED | OUTPATIENT
Start: 2025-04-09

## 2025-04-09 ASSESSMENT — PATIENT HEALTH QUESTIONNAIRE - PHQ9
SUM OF ALL RESPONSES TO PHQ QUESTIONS 1-9: 1
10. IF YOU CHECKED OFF ANY PROBLEMS, HOW DIFFICULT HAVE THESE PROBLEMS MADE IT FOR YOU TO DO YOUR WORK, TAKE CARE OF THINGS AT HOME, OR GET ALONG WITH OTHER PEOPLE: NOT DIFFICULT AT ALL
SUM OF ALL RESPONSES TO PHQ QUESTIONS 1-9: 1

## 2025-04-09 NOTE — LETTER
April 9, 2025      Sukh Craven  [address]  [place] [address]        Dear ,    We are writing to inform you of your test results.    Your A1c was within the goal range for your. The goal for diabetics with other complications is less than 8. You should recheck your A1c in 3 months. In the meantime, a healthy diet high in lean protein, whole grain carbohydrates and healthy fats such as olive oil or canola will help mainain a healthy blood sugar     Please contact the clinic if you have additional questions.  Thank you.     Resulted Orders   HEMOGLOBIN A1C   Result Value Ref Range    Estimated Average Glucose 126 (H) <117 mg/dL    Hemoglobin A1C 6.0 (H) 0.0 - 5.6 %      Comment:      Normal <5.7%   Prediabetes 5.7-6.4%    Diabetes 6.5% or higher     Note: Adopted from ADA consensus guidelines.     Sincerely,      HEMAL Bee CNP/bk    Electronically signed

## 2025-04-09 NOTE — ASSESSMENT & PLAN NOTE
Comment: Well-controlled with metformin 500 mg With A1c of 6.  Plan:  Orders:    HEMOGLOBIN A1C; Future    metFORMIN (GLUCOPHAGE XR) 500 MG 24 hr tablet; Take 1 tablet (500 mg) by mouth daily (with dinner).    losartan (COZAAR) 50 MG tablet; Take 1 tablet (50 mg) by mouth daily.  -The current medical regimen is effective;  continue present plan and medications.

## 2025-04-09 NOTE — PATIENT INSTRUCTIONS
Based on our discussion, I have outlined the following instructions for you:      - Get a blood test called A1c to check how well your diabetes is being managed. If the test shows high levels, your metformin dosage might need to be changed.  - Make sure to refill your prescriptions for losartan and any other medications you take for high blood pressure so you can keep managing it effectively.  - Keep taking your metformin exactly as you have been. After you get your A1c test results, check if you need to change how much metformin you take.  - Set up and go to three different appointments to have your leg veins checked. If you have any trouble with scheduling these appointments, reach out for help.    Next appointment(s): - General surgery appointment to remove the mass; the surgery team will call to schedule.  - Follow-up appointment at the Coumadin Clinic on April 20.    Thank you again for your visit, and we look forward to supporting you in your journey to better health.

## 2025-04-09 NOTE — PROGRESS NOTES
Assessment & Plan  Mass of head  Comment :Likely lipoma on the left upper side of the head, likely benign, characterized as soft and mobile, approximately 2 cm in size.  PLAN:   Orders:    Adult Gen Surg  Referral for the removal of the mass on the left upper side of the head.   Type 2 diabetes mellitus without complication, without long-term current use of insulin (H)  Comment: Well-controlled with metformin 500 mg With A1c of 6.  Plan:  Orders:    HEMOGLOBIN A1C; Future    metFORMIN (GLUCOPHAGE XR) 500 MG 24 hr tablet; Take 1 tablet (500 mg) by mouth daily (with dinner).    losartan (COZAAR) 50 MG tablet; Take 1 tablet (50 mg) by mouth daily.  -The current medical regimen is effective;  continue present plan and medications.   Hypertension goal BP (blood pressure) < 140/90  Comment:Stable and managed with losartan, amlodipine, and carvedilol.  PLAN:   Orders:    BASIC METABOLIC PANEL; Future    carvedilol (COREG) 25 MG tablet; Take 1 tablet (25 mg) by mouth 2 times daily (with meals).    amLODIPine (NORVASC) 10 MG tablet; Take 1 tablet (10 mg) by mouth every morning.    losartan (COZAAR) 50 MG tablet; Take 1 tablet (50 mg) by mouth daily.  -Refilled prescriptions for losartan and other blood pressure medications to ensure continued management of hypertension.            Eva Luz is a 70 year old, presenting for the following health issues:  Mass (On left side of head )        4/9/2025     1:44 PM   Additional Questions   Roomed by Sharon KAUR CMA         4/9/2025     1:44 PM   Patient Reported Additional Medications   Patient reports taking the following new medications None       Via the Health Maintenance questionnaire, the patient has reported the following services have been completed -Eye Exam:  health 2025-01-15, this information has not been sent to the abstraction team.  History of Present Illness       Reason for visit:  Knot on my head  Symptom onset:  More than a month  Symptoms include:   Knot on my head  Symptom intensity:  Moderate  Symptom progression:  Staying the same  Had these symptoms before:  Yes  Has tried/received treatment for these symptoms:  No  What makes it worse:  No  What makes it better:  No   He is taking medications regularly.    Sukh Craven, 70 years old male, presents with a chief complaint of an increasing size of a lump on the left upper side of his head.  Reports a mass on the left upper side of the head, present for approximately four years. The mass has been increasing in size over the past year. No associated pain or tenderness. This is the first time seeking medical attention for this issue.    Diabetes  Has a history of diabetes and is currently taking metformin. Reports not regularly checking blood glucose levels at home, but when checked, levels are below 200. No symptoms of excessive thirst, but reports frequent urination. Experiences occasional blurry vision and has an upcoming diabetic eye exam. No numbness, tingling, sores, or wounds on the feet.    Hypertension  Has a history of high blood pressure and is taking multiple medications, including losartan, carvedilol, and amlodipine. Reports not checking blood pressure at home recently, but recalls good numbers when checked. Denies chest pain, shortness of breath, or swelling in the legs. Does not consume salty foods.  Denies any side effects from current medications.           BP Readings from Last 2 Encounters:   04/09/25 97/62   01/09/25 139/83     Hemoglobin A1C (%)   Date Value   10/16/2024 6.0 (H)   06/03/2024 5.9 (H)   05/01/2018 5.9 (H)   07/14/2017 5.6     LDL Cholesterol Calculated (mg/dL)   Date Value   10/15/2024 105 (H)   11/10/2023 81   04/09/2019 196 (H)   08/26/2013 160 (H)            Review of Systems  Constitutional, HEENT, cardiovascular, pulmonary, GI, , musculoskeletal, neuro, skin, endocrine and psych systems are negative, except as otherwise noted.      Objective    BP 97/62   Pulse  "58   Temp 98.4  F (36.9  C) (Temporal)   Resp 16   Ht 1.956 m (6' 5\")   Wt 98.9 kg (218 lb)   SpO2 97%   BMI 25.85 kg/m    Body mass index is 25.85 kg/m .  Physical Exam  Constitutional:       Appearance: Normal appearance.   HENT:      Head: Normocephalic and atraumatic.        Comments: LIPOMA: Soft mobile non tender subcutaneous mass about 2 cm in diameter with no surface changes       Nose: Nose normal.      Mouth/Throat:      Mouth: Mucous membranes are moist.   Cardiovascular:      Rate and Rhythm: Normal rate and regular rhythm.      Pulses: Normal pulses.   Pulmonary:      Effort: Pulmonary effort is normal.      Breath sounds: Normal breath sounds.   Abdominal:      General: Bowel sounds are normal.      Palpations: Abdomen is soft.   Musculoskeletal:      Cervical back: Normal range of motion and neck supple.      Right lower leg: No edema.      Left lower leg: No edema.   Skin:     Capillary Refill: Capillary refill takes less than 2 seconds.      Findings: No lesion or rash.   Neurological:      Mental Status: He is alert and oriented to person, place, and time.   Psychiatric:         Mood and Affect: Mood normal.         Thought Content: Thought content normal.         Judgment: Judgment normal.                  Signed Electronically by: HEMAL Bee CNP      I am utilizing AI dictation through [employer] to document the patient's history and physical examination. Please note that while the AI is designed to assist in capturing detailed information, there may be errors in the dictation. I will review and edit the content for accuracy before finalizing.     "

## 2025-04-10 LAB
ANION GAP SERPL CALCULATED.3IONS-SCNC: 12 MMOL/L (ref 7–15)
BUN SERPL-MCNC: 13.7 MG/DL (ref 8–23)
CALCIUM SERPL-MCNC: 8.6 MG/DL (ref 8.8–10.4)
CHLORIDE SERPL-SCNC: 104 MMOL/L (ref 98–107)
CREAT SERPL-MCNC: 1.1 MG/DL (ref 0.67–1.17)
EGFRCR SERPLBLD CKD-EPI 2021: 73 ML/MIN/1.73M2
GLUCOSE SERPL-MCNC: 80 MG/DL (ref 70–99)
HCO3 SERPL-SCNC: 24 MMOL/L (ref 22–29)
POTASSIUM SERPL-SCNC: 4 MMOL/L (ref 3.4–5.3)
SODIUM SERPL-SCNC: 140 MMOL/L (ref 135–145)

## 2025-04-28 ENCOUNTER — OFFICE VISIT (OUTPATIENT)
Dept: SURGERY | Facility: CLINIC | Age: 70
End: 2025-04-28
Attending: NURSE PRACTITIONER
Payer: COMMERCIAL

## 2025-04-28 VITALS
SYSTOLIC BLOOD PRESSURE: 122 MMHG | HEIGHT: 76 IN | DIASTOLIC BLOOD PRESSURE: 70 MMHG | BODY MASS INDEX: 26.16 KG/M2 | WEIGHT: 214.8 LBS

## 2025-04-28 DIAGNOSIS — D17.0 LIPOMA OF SCALP: Primary | ICD-10-CM

## 2025-04-28 PROCEDURE — 3074F SYST BP LT 130 MM HG: CPT | Performed by: SURGERY

## 2025-04-28 PROCEDURE — 99203 OFFICE O/P NEW LOW 30 MIN: CPT | Performed by: SURGERY

## 2025-04-28 PROCEDURE — 3078F DIAST BP <80 MM HG: CPT | Performed by: SURGERY
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
## 2025-04-30 ENCOUNTER — OFFICE VISIT (OUTPATIENT)
Dept: OPHTHALMOLOGY | Facility: CLINIC | Age: 70
End: 2025-04-30
Attending: OPHTHALMOLOGY
Payer: COMMERCIAL

## 2025-04-30 DIAGNOSIS — H25.813 COMBINED FORMS OF AGE-RELATED CATARACT OF BOTH EYES: Primary | ICD-10-CM

## 2025-04-30 DIAGNOSIS — E11.9 TYPE 2 DIABETES MELLITUS WITHOUT COMPLICATION, WITHOUT LONG-TERM CURRENT USE OF INSULIN (H): ICD-10-CM

## 2025-04-30 DIAGNOSIS — H40.001 GLAUCOMA SUSPECT, RIGHT: ICD-10-CM

## 2025-04-30 DIAGNOSIS — Z95.1 S/P CABG (CORONARY ARTERY BYPASS GRAFT): ICD-10-CM

## 2025-04-30 DIAGNOSIS — H40.1122 PRIMARY OPEN ANGLE GLAUCOMA (POAG) OF LEFT EYE, MODERATE STAGE: ICD-10-CM

## 2025-04-30 DIAGNOSIS — Z95.2 S/P AVR (AORTIC VALVE REPLACEMENT): ICD-10-CM

## 2025-04-30 PROCEDURE — G0463 HOSPITAL OUTPT CLINIC VISIT: HCPCS | Performed by: OPHTHALMOLOGY

## 2025-04-30 PROCEDURE — 76519 ECHO EXAM OF EYE: CPT | Performed by: OPHTHALMOLOGY

## 2025-04-30 RX ORDER — LATANOPROST 50 UG/ML
1 SOLUTION/ DROPS OPHTHALMIC AT BEDTIME
Qty: 7.5 ML | Refills: 11 | Status: SHIPPED | OUTPATIENT
Start: 2025-04-30

## 2025-04-30 ASSESSMENT — PACHYMETRY
OD_CT(UM): 570
OS_CT(UM): 575

## 2025-04-30 ASSESSMENT — CONF VISUAL FIELD
OS_NORMAL: 1
OS_INFERIOR_NASAL_RESTRICTION: 0
OD_SUPERIOR_NASAL_RESTRICTION: 0
OS_SUPERIOR_NASAL_RESTRICTION: 0
OD_SUPERIOR_TEMPORAL_RESTRICTION: 0
OS_INFERIOR_TEMPORAL_RESTRICTION: 0
OD_INFERIOR_NASAL_RESTRICTION: 0
OS_SUPERIOR_TEMPORAL_RESTRICTION: 0
OD_NORMAL: 1
METHOD: COUNTING FINGERS
OD_INFERIOR_TEMPORAL_RESTRICTION: 0

## 2025-04-30 ASSESSMENT — TONOMETRY
IOP_METHOD: APPLANATION
OD_IOP_MMHG: 19
OS_IOP_MMHG: 20
OD_IOP_MMHG: 22
OS_IOP_MMHG: 21
IOP_METHOD: TONOPEN

## 2025-04-30 ASSESSMENT — SLIT LAMP EXAM - LIDS
COMMENTS: 2+ DERMATOCHALASIS, 2+ SCLERAL SHOW
COMMENTS: 2+ DERMATOCHALASIS, 2+ SCLERAL SHOW

## 2025-04-30 ASSESSMENT — REFRACTION_MANIFEST
OD_ADD: +2.50
OD_SPHERE: -1.50
OS_CYLINDER: +1.00
OS_SPHERE: -0.75
OD_CYLINDER: +2.00
OD_AXIS: 014
OS_AXIS: 172
OS_ADD: +2.50

## 2025-04-30 ASSESSMENT — REFRACTION_WEARINGRX
OS_SPHERE: PLANO
OD_CYLINDER: +2.50
OS_ADD: +2.50
OS_CYLINDER: +1.25
OD_AXIS: 005
OD_SPHERE: -1.50
OD_ADD: +2.50
OS_AXIS: 158

## 2025-04-30 ASSESSMENT — VISUAL ACUITY
OD_CC+: -3
OD_CC: 20/25
OD_BAT_LOW: 20/40
CORRECTION_TYPE: GLASSES
OS_BAT_MED: 20/70
METHOD: SNELLEN - LINEAR
OD_BAT_HIGH: 20/60
OS_BAT_HIGH: 20/80
OS_CC+: -1
OS_BAT_LOW: 20/60
METHOD_MR: DX.  NO CHARGE.
OS_CC: 20/50
OD_BAT_MED: 20/40

## 2025-04-30 ASSESSMENT — EXTERNAL EXAM - LEFT EYE: OS_EXAM: 1+ BROW PTOSIS, PROLAPSED FAT PADS: UPPER, LOWER

## 2025-04-30 ASSESSMENT — EXTERNAL EXAM - RIGHT EYE: OD_EXAM: 1+ BROW PTOSIS, PROLAPSED FAT PADS: UPPER, LOWER

## 2025-04-30 NOTE — PATIENT INSTRUCTIONS
Eyedrops to use in Both eyes:     Latanoprost (green top), 1 drop in each eye EVERY NIGHT  Artificial tears in both eyes FOUR TIMES PER day      Please be diligent with these eyedrops, as they are important to prevent glaucoma damage and to lubricate the eye well before repeat testing prior to more cataract testing.

## 2025-04-30 NOTE — PROGRESS NOTES
HPI       Cataract Evaluation    In both eyes.  Associated symptoms include blurred vision, glare, haloes and starburst.  Negative for a need for brighter lights.  Pain was noted as 0/10. Additional comments: Glaucoma suspect of both eyes              Comments    Pt states vision is blurry and hazy.  No pain.  No flashes/floaters.  Glare/haloes and harder to drive at night.  No ocular meds.     DM 2  Pt does not recall today's BS but did check them.  Lab Results       Component                Value               Date                       A1C                      6.0                 04/09/2025                 A1C                      6.0                 10/16/2024                 A1C                      5.9                 06/03/2024                 A1C                      6.5                 03/04/2024                 A1C                      5.7                 10/30/2023                 A1C                      5.9                 05/01/2018                 A1C                      5.6                 07/14/2017                 A1C                      6.2                 05/10/2016              CLINT Rivera April 30, 2025 2:58 PM              Last edited by Walker Morris COT on 4/30/2025  2:58 PM.          Review of systems for the eyes was negative other than the pertinent positives/negatives listed in the HPI.      Assessment & Plan      Sukh Craven is a 70 year old male with the following diagnoses:   1. Combined forms of age-related cataract, mod, of both eyes    2. Glaucoma suspect, right    3. Primary open angle glaucoma (POAG) of left eye, moderate stage    4. S/P AVR (aortic valve replacement)    5. S/P CABG (coronary artery bypass graft)    6. Type 2 diabetes mellitus without complication, without long-term current use of insulin (H)       Last dilated exam for diabetic retinopathy was 11/29/23  - deferred dilation today in the setting of narrow angles.     Primary open angle glaucoma  (POAG) right eye, mild  Primary open angle glaucoma (POAG) left eye, moderate  - Had OCT RNFL and visual field with Dr. Tabor last on 1/2024   - right eye: early ST NFL thinning, normal visual field   - left eye: ild inferior depression left eye consistent with superior area of NFL thinning on RNFL   - Previously treated with Latanoprost at bedtime in both eyes, but Sukh hasn't used drops for about 1 year now.   - Resume latanoprost at bedtime both eyes     Cataract, left eye > right eye   Visually significant both eyes   History of blunt trauma to left eye from a rock   Risks, benefits and alternatives to cataract extraction/IOL implantation discussed; consent obtained.  Will schedule surgery today.      Special equipment/needs:     Anesthesia:Topical  Dilation: unknown  Iris expansion: possible iris hooks  Pseudoexfoliation: No pseudoexfoliation  Anticoagulation/antiplatelet: Aspirin and Warfarin following CABG and AVR  Trypan Blue: Yes  Goal emmetropia both eyes   Left eye first    TORIC candidate in both eyes if resident available (cost prohibitive)  If resident not available, will do standard lens  Return for repeat IOL calcs after improved cornea lubrication     Patient disposition:   Return for Tech visit IOL calcs.      Attending Physician Attestation:  Complete documentation of historical and exam elements from today's encounter can be found in the full encounter summary report (not reduplicated in this progress note).  I personally obtained the chief complaint(s) and history of present illness.  I confirmed and edited as necessary the review of systems, past medical/surgical history, family history, social history, and examination findings as documented by others; and I examined the patient myself.  I personally reviewed the relevant tests, images, and reports as documented above.  I formulated and edited as necessary the assessment and plan and discussed the findings and management plan with the  patient and family. .Attending Physician Image/Tesing Attestation: I personally reviewed the ophthalmic test(s) associated with this encounter, agree with the interpretation(s) as documented by the resident/fellow, and have edited the corresponding report(s) as necessary.  Today with Sukh Craven , I reviewed the indications, risks, benefits, and alternatives of the proposed surgical procedure including, but not limited to, failure obtain the desired result  and need for additional surgery, bleeding, infection, loss of vision, loss of the eye, and the remote possibility of permanent damage to any organ system or death with the use of anesthesia.  I provided multiple opportunities for the questions, answered all questions to the best of my ability, and confirmed that my answers and my discussion were understood.    . - Devin Ward MD

## 2025-04-30 NOTE — NURSING NOTE
Chief Complaints and History of Present Illnesses   Patient presents with    Cataract Evaluation     Glaucoma suspect of both eyes      Chief Complaint(s) and History of Present Illness(es)       Cataract Evaluation              Laterality: both eyes    Associated symptoms: blurred vision, glare, haloes and starburst.  Negative for a need for brighter lights    Pain scale: 0/10    Comments: Glaucoma suspect of both eyes               Comments    Pt states vision is blurry and hazy.  No pain.  No flashes/floaters.  Glare/haloes and harder to drive at night.  No ocular meds.     DM 2  Pt does not recall today's BS but did check them.  Lab Results       Component                Value               Date                       A1C                      6.0                 04/09/2025                 A1C                      6.0                 10/16/2024                 A1C                      5.9                 06/03/2024                 A1C                      6.5                 03/04/2024                 A1C                      5.7                 10/30/2023                 A1C                      5.9                 05/01/2018                 A1C                      5.6                 07/14/2017                 A1C                      6.2                 05/10/2016              CLINT Rivera April 30, 2025 2:58 PM

## 2025-05-03 DIAGNOSIS — E11.9 TYPE 2 DIABETES MELLITUS WITHOUT COMPLICATION, WITHOUT LONG-TERM CURRENT USE OF INSULIN (H): ICD-10-CM

## 2025-05-04 RX ORDER — BLOOD SUGAR DIAGNOSTIC
STRIP MISCELLANEOUS
Qty: 100 STRIP | Refills: 1 | Status: SHIPPED | OUTPATIENT
Start: 2025-05-04

## 2025-05-05 ENCOUNTER — ANTICOAGULATION THERAPY VISIT (OUTPATIENT)
Dept: ANTICOAGULATION | Facility: CLINIC | Age: 70
End: 2025-05-05

## 2025-05-05 ENCOUNTER — LAB (OUTPATIENT)
Dept: LAB | Facility: CLINIC | Age: 70
End: 2025-05-05
Payer: COMMERCIAL

## 2025-05-05 DIAGNOSIS — Z95.2 S/P AVR (AORTIC VALVE REPLACEMENT): Primary | ICD-10-CM

## 2025-05-05 DIAGNOSIS — Z95.2 S/P AVR (AORTIC VALVE REPLACEMENT): ICD-10-CM

## 2025-05-05 DIAGNOSIS — I48.0 PAROXYSMAL ATRIAL FIBRILLATION (H): ICD-10-CM

## 2025-05-05 LAB — INR BLD: 2.7 (ref 0.9–1.1)

## 2025-05-05 PROCEDURE — 36416 COLLJ CAPILLARY BLOOD SPEC: CPT

## 2025-05-05 PROCEDURE — 85610 PROTHROMBIN TIME: CPT

## 2025-05-05 NOTE — PROGRESS NOTES
ANTICOAGULATION MANAGEMENT     Sukh Craven 70 year old male is on warfarin with therapeutic INR result. (Goal INR 2.0-3.0)    Recent labs: (last 7 days)     05/05/25  1105   INR 2.7*       ASSESSMENT     Warfarin Lab Questionnaire    Warfarin Doses Last 7 Days      5/4/2025     9:20 PM   Dose in Tablet or Mg   TAB or MG? milligram (mg)     Pt Rptd Dose SUNDAY MONDAY TUESDAY WED THURS FRIDAY SATURDAY 5/4/2025   9:20 PM 6 3 6 6 6 3 6         5/4/2025   Warfarin Lab Questionnaire   Missed doses within past 14 days? No   Changes in diet or alcohol within past 14 days? No   Medication changes since last result? No   Injuries or illness since last result? No   New shortness of breath, severe headaches or sudden changes in vision since last result? No   Abnormal bleeding since last result? No   Upcoming surgery, procedure? No     Previous result: Therapeutic last 2(+) visits  Additional findings:   General Surgery Plan: Small mobile lipoma easily accessible on the left scalp.  Should be able to remove in clinic under local presuming that he can safely come off his Coumadin prior to the procedure.  If not or needs bridging then would do under local but in the operating room rather than clinic.  Will at least work on scheduling a clinic date for now and he can reach out to his cardiologist to check on the Coumadin.   Rene Ashraf MD    Cardiology is recommending bridging. Routed procedure plan to [facility] on 4/29/25. Plan not yet finalized.     Pt aware of above plan, given brief education today, but has not bridged in the past, will need full education once plan finalized, and uses MyChart.        PLAN     Recommended plan for no diet, medication or health factor changes affecting INR     Dosing Instructions: Continue your current warfarin dose with next INR in 3 weeks, prior to starting hold.       Summary  As of 5/5/2025      Full warfarin instructions:  3 mg every Mon, Fri; 6 mg all other days   Next INR  check:  5/26/2025               Telephone call with Sukh who verbalizes understanding and agrees to plan    Lab visit scheduled prior to warfarin hold and after surgery.    Education provided: Please call back if any changes to your diet, medications or how you've been taking warfarin  Lovenox/Heparin education provided: role of enoxaparin/heparin in bridge therapy   Contact [phone] with any changes, questions or concerns.     Plan made per [facility] anticoagulation protocol    Ngoc Goodson RN  5/5/2025  Anticoagulation Clinic  Netflix for routing messages: sheree PRATT  [facility] patient phone line: [phone]        _______________________________________________________________________     Anticoagulation Episode Summary       Current INR goal:  2.0-3.0   TTR:  75.3% (1 y)   Target end date:  Indefinite   Send INR reminders to:  DAVON PRATT    Indications    S/P AVR (aortic valve replacement) [Z95.2]  Paroxysmal atrial fibrillation (H) [I48.0]             Comments:  On-X [facility] AVR 10/13/23 with a-fib so goal range remaining 2.0-3.0.             Anticoagulation Care Providers       Provider Role Specialty Phone number    Parrish Stephenson MD Referring Family Medicine [phone]

## 2025-05-06 ENCOUNTER — TELEPHONE (OUTPATIENT)
Dept: OPHTHALMOLOGY | Facility: CLINIC | Age: 70
End: 2025-05-06
Payer: COMMERCIAL

## 2025-05-06 PROBLEM — H25.813 COMBINED FORMS OF AGE-RELATED CATARACT OF BOTH EYES: Status: ACTIVE | Noted: 2023-11-29

## 2025-05-06 PROBLEM — H40.001 GLAUCOMA SUSPECT, RIGHT: Status: ACTIVE | Noted: 2025-04-30

## 2025-05-06 PROBLEM — H40.1122 PRIMARY OPEN ANGLE GLAUCOMA (POAG) OF LEFT EYE, MODERATE STAGE: Status: ACTIVE | Noted: 2025-04-30

## 2025-05-06 NOTE — TELEPHONE ENCOUNTER
Called patient to schedule surgery with Dr Ward    Spoke with: Sukh (patient)    Date(s) of Surgery: 6/9/25 & 6/16/25    Patient aware of approximate arrival time: Yes      Tissue: Not Applicable Ordered: Not Applicable     Location of surgery: [facility]     Pre-Op H&P: [facility]      Informed patient that they need to call to schedule pre-op H&P within 30 days of surgery date: Yes    Post-Op Appt Dates: 6/10 at 0900, 6/17 at 0900, and 7/8 at 0900    TECH Appt: 5/27 at 1430     Discussed with patient pre-op RN will call 2-3 days prior to surgery with arrival time and instructions:  Yes       Standard Surgery Packet Sent: Yes 05/06/25  via Telx Message      Additional Information Sent in Packet:     Informed patient that they will need an adult  to bring patient home from surgery: Yes  : Son or other family member         Additional Comments:        All patients questions were answered and was instructed to review surgical packet and call back [phone] with any questions or concerns.       Maryam Rosas on 5/6/2025 at 10:24 AM

## 2025-05-23 ENCOUNTER — OFFICE VISIT (OUTPATIENT)
Dept: FAMILY MEDICINE | Facility: CLINIC | Age: 70
End: 2025-05-23
Payer: COMMERCIAL

## 2025-05-23 VITALS
TEMPERATURE: 98.1 F | SYSTOLIC BLOOD PRESSURE: 128 MMHG | HEIGHT: 77 IN | BODY MASS INDEX: 25.27 KG/M2 | WEIGHT: 214 LBS | OXYGEN SATURATION: 99 % | RESPIRATION RATE: 12 BRPM | HEART RATE: 61 BPM | DIASTOLIC BLOOD PRESSURE: 76 MMHG

## 2025-05-23 DIAGNOSIS — E11.9 TYPE 2 DIABETES MELLITUS WITHOUT COMPLICATION, UNSPECIFIED WHETHER LONG TERM INSULIN USE (H): ICD-10-CM

## 2025-05-23 DIAGNOSIS — Z95.2 H/O MECHANICAL AORTIC VALVE REPLACEMENT: ICD-10-CM

## 2025-05-23 DIAGNOSIS — Z01.818 PREOP GENERAL PHYSICAL EXAM: Primary | ICD-10-CM

## 2025-05-23 DIAGNOSIS — Z01.818 PREOP GENERAL PHYSICAL EXAM: ICD-10-CM

## 2025-05-23 DIAGNOSIS — E78.5 HYPERLIPIDEMIA LDL GOAL <70: ICD-10-CM

## 2025-05-23 DIAGNOSIS — N18.2 CKD (CHRONIC KIDNEY DISEASE) STAGE 2, GFR 60-89 ML/MIN: ICD-10-CM

## 2025-05-23 DIAGNOSIS — I10 ESSENTIAL HYPERTENSION: ICD-10-CM

## 2025-05-23 DIAGNOSIS — E11.9 TYPE 2 DIABETES MELLITUS WITHOUT COMPLICATION, UNSPECIFIED WHETHER LONG TERM INSULIN USE (H): Primary | ICD-10-CM

## 2025-05-23 PROCEDURE — 3074F SYST BP LT 130 MM HG: CPT | Performed by: NURSE PRACTITIONER

## 2025-05-23 PROCEDURE — 99214 OFFICE O/P EST MOD 30 MIN: CPT | Performed by: NURSE PRACTITIONER

## 2025-05-23 PROCEDURE — 3078F DIAST BP <80 MM HG: CPT | Performed by: NURSE PRACTITIONER
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
## 2025-05-23 NOTE — PATIENT INSTRUCTIONS
How to Take Your Medication Before Surgery  Preoperative Medication Instructions   Antiplatelet or Anticoagulation Medication Instructions     - aspirin: it depend on your INR   - warfarin:   Check INR 6 days before surgery. If INR is greater than 3, do not start Lovenox; if lower, consider starting Lovenox.  - come for INR lab 6 days before your procedure  Additional Medication Instructions   - Herbal medications and vitamins: DO NOT TAKE 14 days prior to surgery.   - ACE/ARB/ARNI (lisinopril, enalapril, losartan, valsartan, olmesartan, sacubritril/valsartan) : DO NOT TAKE on day of surgery (minimum 11 hours for general anesthesia).   - Beta Blockers (Coregl) : Continue taking on the day of surgery.   - Calcium  amlodipine: May be continued on the day of surgery.   -  atorvastatin  : Continue taking on the day of surgery.    - metformin: DO NOT TAKE day of surgery.   - Lexapro, Antipsychotics: Continue without modification.    - Topicals: DO NOT TAKE day of surgery.  - Stop taking vitamin 14 days before procedure       Patient Education   Preparing for Your Surgery  For Adults  Getting started  In most cases, a nurse will call to review your health history and instructions. They will give you an arrival time based on your scheduled surgery time. Please be ready to share:  Your doctor's clinic name and phone number  Your medical, surgical, and anesthesia history  A list of allergies and sensitivities  A list of medicines, including herbal treatments and over-the-counter drugs  Whether the patient has a legal guardian (ask how to send us the papers in advance)  Note: You may not receive a call if you were seen at our PAC (Preoperative Assessment Center).  Please tell us if you're pregnant--or if there's any chance you might be pregnant. Some surgeries may injure a fetus (unborn baby), so they require a pregnancy test. Surgeries that are safe for a fetus don't always need a test, and you can choose whether to have  one.   Preparing for surgery  Within 10 to 30 days of surgery: Have a pre-op exam (sometimes called an H&P, or History and Physical). This can be done at a clinic or pre-operative center.  If you're having a , you may not need this exam. Talk to your care team.  At your pre-op exam, talk to your care team about all medicines you take. (This includes CBD oil and any drugs, such as THC, marijuana, and other forms of cannabis.) If you need to stop any medicine before surgery, ask when to start taking it again.  This is for your safety. Many medicines and drugs can make you bleed too much during surgery. Some change how well surgery (anesthesia) drugs work.  Call your insurance company to let them know you're having surgery. (If you don't have insurance, call [phone].)  Call your clinic if there's any change in your health. This includes a scrape or scratch near the surgery site, or any signs of a cold (sore throat, runny nose, cough, rash, fever).  Eating and drinking guidelines  For your safety: Unless your surgeon tells you otherwise, follow the guidelines below.  Eat and drink as normal until 8 hours before you arrive for surgery. After that, no food or milk. You can spit out gum when you arrive.  Drink clear liquids until 2 hours before you arrive. These are liquids you can see through, like water, Gatorade, and Propel Water. They also include plain black coffee and tea (no cream or milk).  No alcohol for 24 hours before you arrive. The night before surgery, stop any drinks that contain THC.  If your care team tells you to take medicine on the morning of surgery, it's okay to take it with a sip of water. No other medicines or drugs are allowed (including CBD oil)--follow your care team's instructions.  If you have questions the day of surgery, call your hospital or surgery center.   Preventing infection  Shower or bathe the night before and the morning of surgery. Follow the instructions your clinic  gave you. (If no instructions, use regular soap.)  Don't shave or clip hair near your surgery site. We'll remove the hair if needed.  Don't smoke or vape the morning of surgery. No chewing tobacco for 6 hours before you arrive. A nicotine patch is okay. You may spit out nicotine gum when you arrive.  For some surgeries, the surgeon will tell you to fully quit smoking and nicotine.  We will make every effort to keep you safe from infection. We will:  Clean our hands often with soap and water (or an alcohol-based hand rub).  Clean the skin at your surgery site with a special soap that kills germs.  Give you a special gown to keep you warm. (Cold raises the risk of infection.)  Wear hair covers, masks, gowns, and gloves during surgery.  Give antibiotic medicine, if prescribed. Not all surgeries need this medicine.  What to bring on the day of surgery  Photo ID and insurance card  Copy of your health care directive, if you have one  Glasses and hearing aids (bring cases)  You can't wear contacts during surgery  Inhaler and eye drops, if you use them (tell us about these when you arrive)  CPAP machine or breathing device, if you use them  A few personal items, if spending the night  If you have . . .  A pacemaker, ICD (cardiac defibrillator), or other implant: Bring the ID card.  An implanted stimulator: Bring the remote control.  A legal guardian: Bring a copy of the certified (court-stamped) guardianship papers.  Please remove any jewelry, including body piercings. Leave jewelry and other valuables at home.  If you're going home the day of surgery  You must have a responsible adult drive you home. They should stay with you overnight as well.  If you don't have someone to stay with you, and you aren't safe to go home alone, we may keep you overnight. Insurance often won't pay for this.  After surgery  If it's hard to control your pain or you need more pain medicine, please call your surgeon's office.  Questions?   If  you have any questions for your care team, list them here:   ____________________________________________________________________________________________________________________________________________________________________________________________________________________________________________________________  For informational purposes only. Not to replace the advice of your health care provider. Copyright   2003, 2019 [facility]. All rights reserved. Clinically reviewed by Moo Grider MD. [employer] [id number] - REV 08/24.

## 2025-05-27 ENCOUNTER — ALLIED HEALTH/NURSE VISIT (OUTPATIENT)
Dept: OPHTHALMOLOGY | Facility: CLINIC | Age: 70
End: 2025-05-27
Attending: OPHTHALMOLOGY
Payer: COMMERCIAL

## 2025-05-27 ENCOUNTER — ANTICOAGULATION THERAPY VISIT (OUTPATIENT)
Dept: ANTICOAGULATION | Facility: CLINIC | Age: 70
End: 2025-05-27

## 2025-05-27 ENCOUNTER — LAB (OUTPATIENT)
Dept: LAB | Facility: CLINIC | Age: 70
End: 2025-05-27
Payer: COMMERCIAL

## 2025-05-27 ENCOUNTER — RESULTS FOLLOW-UP (OUTPATIENT)
Dept: ANTICOAGULATION | Facility: CLINIC | Age: 70
End: 2025-05-27

## 2025-05-27 DIAGNOSIS — Z01.818 PREOP GENERAL PHYSICAL EXAM: ICD-10-CM

## 2025-05-27 DIAGNOSIS — H25.813 COMBINED FORMS OF AGE-RELATED CATARACT OF BOTH EYES: Primary | ICD-10-CM

## 2025-05-27 DIAGNOSIS — Z95.2 S/P AVR (AORTIC VALVE REPLACEMENT): Primary | ICD-10-CM

## 2025-05-27 DIAGNOSIS — I48.0 PAROXYSMAL ATRIAL FIBRILLATION (H): ICD-10-CM

## 2025-05-27 DIAGNOSIS — Z95.2 S/P AVR (AORTIC VALVE REPLACEMENT): ICD-10-CM

## 2025-05-27 DIAGNOSIS — Z95.2 H/O MECHANICAL AORTIC VALVE REPLACEMENT: ICD-10-CM

## 2025-05-27 LAB
INR PPP: 2.59 (ref 0.85–1.15)
PROTHROMBIN TIME: 27.6 SECONDS (ref 11.8–14.8)

## 2025-05-27 PROCEDURE — 999N000103 HC STATISTIC NO CHARGE FACILITY FEE

## 2025-05-27 PROCEDURE — 92025 CPTRIZED CORNEAL TOPOGRAPHY: CPT

## 2025-05-27 PROCEDURE — 85610 PROTHROMBIN TIME: CPT

## 2025-05-27 PROCEDURE — 36415 COLL VENOUS BLD VENIPUNCTURE: CPT

## 2025-05-27 PROCEDURE — 76519 ECHO EXAM OF EYE: CPT

## 2025-05-27 NOTE — PROGRESS NOTES
ANTICOAGULATION MANAGEMENT     Sukh Craven 70 year old male is on warfarin with therapeutic INR result. (Goal INR 2.0-3.0)    Recent labs: (last 7 days)     05/27/25  0733   INR 2.59*       ASSESSMENT     Warfarin Lab Questionnaire    Warfarin Doses Last 7 Days      5/26/2025    11:26 AM   Dose in Tablet or Mg   TAB or MG? milligram (mg)     Pt Rptd Dose SUNDAY MONDAY TUESDAY WED THURS FRIDAY SATURDAY 5/26/2025  11:26 AM 6 3 6 6 6 3 6         5/26/2025   Warfarin Lab Questionnaire   Missed doses within past 14 days? No   Changes in diet or alcohol within past 14 days? No   Medication changes since last result? No   Injuries or illness since last result? No   New shortness of breath, severe headaches or sudden changes in vision since last result? No   Abnormal bleeding since last result? No   Upcoming surgery, procedure? Yes   Please explain, date scheduled? Mass removal  june 2     Previous result: Supratherapeutic  Additional findings: states he has not picked up Lovenox yet, as pharmacy states it would be 300$ copay. Sukh will ask about a good rx code, and we will check back with him tomorrow if he was able to obtain for lower pricing       PLAN     Recommended plan for no diet, medication or health factor changes affecting INR     Dosing Instructions: start procedure plan as documented tomorrow with next INR in 2 weeks       Summary  As of 5/27/2025      Full warfarin instructions:  5/28: Hold; 5/29: Hold; 5/30: Hold; 5/31: Hold; 6/1: Hold; 6/2: 6 mg; 6/3: 9 mg; Otherwise 3 mg every Mon, Fri; 6 mg all other days   Next INR check:  6/9/2025               Telephone call with Sukh who agrees to plan and repeated back plan correctly    Lab visit scheduled    Education provided: Please call back if any changes to your diet, medications or how you've been taking warfarin  Goal range and lab monitoring: goal range and significance of current result, Importance of therapeutic range, and Importance of following  up at instructed interval  Lovenox/Heparin education provided: role of enoxaparin/heparin in bridge therapy     Plan made per [facility] anticoagulation protocol    Josephine Barr RN  5/27/2025  Anticoagulation Clinic  [facility] for routing messages: sheree PRATT  ACC patient phone line: [phone]        _______________________________________________________________________     Anticoagulation Episode Summary       Current INR goal:  2.0-3.0   TTR:  75.7% (1 y)   Target end date:  Indefinite   Send INR reminders to:  DAVON PRATT    Indications    S/P AVR (aortic valve replacement) [Z95.2]  Paroxysmal atrial fibrillation (H) [I48.0]             Comments:  On-X [facility] AVR 10/13/23 with a-fib so goal range remaining 2.0-3.0.             Anticoagulation Care Providers       Provider Role Specialty Phone number    Parrish Stephenson MD Referring Family Medicine [phone]

## 2025-05-27 NOTE — NURSING NOTE
Chief Complaints and History of Present Illnesses   Patient presents with    Follow Up     Cataracts-repeats calculations, testing only     Chief Complaint(s) and History of Present Illness(es)       Follow Up              Comments: Cataracts-repeats calculations, testing only              Comments    Tech only- repeat calcs    Maren Turk, COT 2:18 PM  May 27, 2025

## 2025-05-27 NOTE — PROGRESS NOTES
Tech visit for testing only, not seen by physician.      Devin Ward MD  , Comprehensive Ophthalmology  Department of Ophthalmology and Visual Neurosciences  [facility]

## 2025-06-02 ENCOUNTER — OFFICE VISIT (OUTPATIENT)
Dept: SURGERY | Facility: CLINIC | Age: 70
End: 2025-06-02
Payer: COMMERCIAL

## 2025-06-02 VITALS
BODY MASS INDEX: 25.53 KG/M2 | DIASTOLIC BLOOD PRESSURE: 70 MMHG | SYSTOLIC BLOOD PRESSURE: 136 MMHG | HEART RATE: 77 BPM | WEIGHT: 214 LBS

## 2025-06-02 DIAGNOSIS — D17.0 LIPOMA OF SCALP: Primary | ICD-10-CM

## 2025-06-02 PROCEDURE — 3078F DIAST BP <80 MM HG: CPT | Performed by: SURGERY

## 2025-06-02 PROCEDURE — 3075F SYST BP GE 130 - 139MM HG: CPT | Performed by: SURGERY

## 2025-06-02 PROCEDURE — 21012 EXC FACE LES SBQ 2 CM/>: CPT | Performed by: SURGERY

## 2025-06-02 NOTE — LETTER
6/2/2025      Sukh Craven  [address] [place]  [place] [address]      Dear Colleague,    Thank you for referring your patient, Sukh Craven, to the [facility]. Please see a copy of my visit note below.    PROCEDURE:   Written consent was obtained  The left temple area was prepped and appropriately anesthetized with 1% lidocaine with epinephrine.  I made a 3 cm vertical incision over the palpable lipoma with #15 blade then used sharp iris scissor dissection to free the lipoma.  There was a crossing vein which I made attempt to preserve though this did end up bleeding.  I ended up using combination of some hand-held cautery and suture ligation to control the bleeding until wound was hemostatic. The total area excised, including lesion and margins was 2.3 cm.  Closure was accomplished with interrupted 3-0 vicryl for the deep subcutaneous layer and running subcuticular 4-0 vicryl for the skin.  Incision was covered with dermabond..  The procedure was well tolerated and without complications. Specimen was sent to Pathology.    Rene Ashraf MD      Again, thank you for allowing me to participate in the care of your patient.        Sincerely,        Rene Ashraf MD    Electronically signed

## 2025-06-02 NOTE — PATIENT INSTRUCTIONS
Restart coumadin tomorrow (6/3/25)  Use tylenol or ibuprofen for pain  The glue over the incision (if used) will fall off on its own and may take a few weeks  You may shower/bathe normally however do not submerge the incision for at least 5 days  Call the clinic at [phone] if you have ongoing bleeding, drainage or other wound concerns and we can make an appointment to have it looked at  Any pathology results generally take a few days through MyChart or a phone call

## 2025-06-02 NOTE — PROGRESS NOTES
PROCEDURE:   Written consent was obtained  The left temple area was prepped and appropriately anesthetized with 1% lidocaine with epinephrine.  I made a 3 cm vertical incision over the palpable lipoma with #15 blade then used sharp iris scissor dissection to free the lipoma.  There was a crossing vein which I made attempt to preserve though this did end up bleeding.  I ended up using combination of some hand-held cautery and suture ligation to control the bleeding until wound was hemostatic. The total area excised, including lesion and margins was 2.3 cm.  Closure was accomplished with interrupted 3-0 vicryl for the deep subcutaneous layer and running subcuticular 4-0 vicryl for the skin.  Incision was covered with dermabond..  The procedure was well tolerated and without complications. Specimen was sent to Pathology.    Rene Ashraf MD

## 2025-06-03 ENCOUNTER — TELEPHONE (OUTPATIENT)
Dept: FAMILY MEDICINE | Facility: CLINIC | Age: 70
End: 2025-06-03
Payer: COMMERCIAL

## 2025-06-03 NOTE — TELEPHONE ENCOUNTER
Messaged PCP and advised that patients current warfarin hold/ enoxaparin bridge is for lipoma excision that he had yesterday, but we will not hold warfarin for upcoming cataract surgeries.     Rosalinda Gaines RN   [facility]

## 2025-06-03 NOTE — TELEPHONE ENCOUNTER
I was unable to  send a message to DAVON PATTERSON . Please tell them Cataract surgeon confirmed it is acceptable to continue warfarin prior to cataract surgery. Please restart Coumadin per your and contact the patient.

## 2025-06-05 ENCOUNTER — RESULTS FOLLOW-UP (OUTPATIENT)
Dept: SURGERY | Facility: CLINIC | Age: 70
End: 2025-06-05
Payer: COMMERCIAL

## 2025-06-05 PROBLEM — I25.10 CORONARY ARTERY DISEASE DUE TO CALCIFIED CORONARY LESION: Status: RESOLVED | Noted: 2023-07-02 | Resolved: 2025-06-05

## 2025-06-05 PROBLEM — H40.001 GLAUCOMA SUSPECT, RIGHT: Status: RESOLVED | Noted: 2025-04-30 | Resolved: 2025-06-05

## 2025-06-05 PROBLEM — I25.84 CORONARY ARTERY DISEASE DUE TO CALCIFIED CORONARY LESION: Status: RESOLVED | Noted: 2023-07-02 | Resolved: 2025-06-05

## 2025-06-05 PROBLEM — R79.1 SUBTHERAPEUTIC INTERNATIONAL NORMALIZED RATIO (INR): Status: RESOLVED | Noted: 2023-11-10 | Resolved: 2025-06-05

## 2025-06-05 PROBLEM — R07.9 ACUTE CHEST PAIN: Status: RESOLVED | Noted: 2023-08-14 | Resolved: 2025-06-05

## 2025-06-05 NOTE — TELEPHONE ENCOUNTER
"Pt has seen MD message if you \"hover to discover\" under the column for \"patient sharing\" it shows last seen by patient roby pierson on 6/5/25 at 1pm.    CAROLANN Parks RN 6/5/2025 1:49 PM    "

## 2025-06-07 PROBLEM — I35.1 SEVERE AORTIC INSUFFICIENCY: Status: RESOLVED | Noted: 2023-09-08 | Resolved: 2025-06-07

## 2025-06-07 PROBLEM — Z95.1 S/P CABG (CORONARY ARTERY BYPASS GRAFT): Status: RESOLVED | Noted: 2023-10-30 | Resolved: 2025-06-07

## 2025-06-07 PROBLEM — I35.1 MODERATE AORTIC INSUFFICIENCY: Status: RESOLVED | Noted: 2019-05-28 | Resolved: 2025-06-07

## 2025-06-07 PROBLEM — N32.0 BLADDER NECK CONTRACTURE: Status: RESOLVED | Noted: 2023-02-14 | Resolved: 2025-06-07

## 2025-06-07 PROBLEM — Z98.890 STATUS POST CORONARY ANGIOGRAM: Status: RESOLVED | Noted: 2023-09-08 | Resolved: 2025-06-07

## 2025-06-07 PROBLEM — I1A.0 RESISTANT HYPERTENSION: Status: RESOLVED | Noted: 2023-07-02 | Resolved: 2025-06-07

## 2025-06-08 ENCOUNTER — ANESTHESIA EVENT (OUTPATIENT)
Dept: SURGERY | Facility: AMBULATORY SURGERY CENTER | Age: 70
End: 2025-06-08
Payer: COMMERCIAL

## 2025-06-09 ENCOUNTER — HOSPITAL ENCOUNTER (OUTPATIENT)
Facility: AMBULATORY SURGERY CENTER | Age: 70
Discharge: HOME | End: 2025-06-09
Attending: OPHTHALMOLOGY
Payer: COMMERCIAL

## 2025-06-09 ENCOUNTER — ANESTHESIA (OUTPATIENT)
Dept: SURGERY | Facility: AMBULATORY SURGERY CENTER | Age: 70
End: 2025-06-09
Payer: COMMERCIAL

## 2025-06-09 VITALS
SYSTOLIC BLOOD PRESSURE: 151 MMHG | WEIGHT: 214 LBS | HEIGHT: 77 IN | OXYGEN SATURATION: 97 % | DIASTOLIC BLOOD PRESSURE: 83 MMHG | TEMPERATURE: 96.8 F | BODY MASS INDEX: 25.27 KG/M2 | RESPIRATION RATE: 16 BRPM | HEART RATE: 57 BPM

## 2025-06-09 DIAGNOSIS — H40.1122 PRIMARY OPEN ANGLE GLAUCOMA (POAG) OF LEFT EYE, MODERATE STAGE: Primary | ICD-10-CM

## 2025-06-09 DIAGNOSIS — H40.001 GLAUCOMA SUSPECT, RIGHT: ICD-10-CM

## 2025-06-09 DIAGNOSIS — H25.813 COMBINED FORMS OF AGE-RELATED CATARACT OF BOTH EYES: ICD-10-CM

## 2025-06-09 LAB
GLUCOSE BLDC GLUCOMTR-MCNC: 100 MG/DL (ref 70–99)
GLUCOSE BLDC GLUCOMTR-MCNC: 102 MG/DL (ref 70–99)

## 2025-06-09 PROCEDURE — 82962 GLUCOSE BLOOD TEST: CPT | Performed by: PATHOLOGY

## 2025-06-09 DEVICE — EYE IMP IOL TECNIS IOL DIB00 19.0 DIB00 19.0: Type: IMPLANTABLE DEVICE | Site: EYE | Status: FUNCTIONAL

## 2025-06-09 RX ORDER — NALOXONE HYDROCHLORIDE 0.4 MG/ML
0.1 INJECTION, SOLUTION INTRAMUSCULAR; INTRAVENOUS; SUBCUTANEOUS
Status: DISCONTINUED | OUTPATIENT
Start: 2025-06-09 | End: 2025-06-10 | Stop reason: HOSPADM

## 2025-06-09 RX ORDER — SODIUM CHLORIDE, SODIUM LACTATE, POTASSIUM CHLORIDE, CALCIUM CHLORIDE 600; 310; 30; 20 MG/100ML; MG/100ML; MG/100ML; MG/100ML
INJECTION, SOLUTION INTRAVENOUS CONTINUOUS
Status: DISCONTINUED | OUTPATIENT
Start: 2025-06-09 | End: 2025-06-10 | Stop reason: HOSPADM

## 2025-06-09 RX ORDER — CYCLOPENTOLAT/TROPIC/PHENYLEPH 1%-1%-2.5%
1 DROPS (EA) OPHTHALMIC (EYE)
Status: COMPLETED | OUTPATIENT
Start: 2025-06-09 | End: 2025-06-09

## 2025-06-09 RX ORDER — KETOROLAC TROMETHAMINE 4 MG/ML
1 SOLUTION/ DROPS OPHTHALMIC 4 TIMES DAILY
Qty: 5 ML | Refills: 1 | Status: SHIPPED | OUTPATIENT
Start: 2025-06-09

## 2025-06-09 RX ORDER — SODIUM CHLORIDE, SODIUM LACTATE, POTASSIUM CHLORIDE, CALCIUM CHLORIDE 600; 310; 30; 20 MG/100ML; MG/100ML; MG/100ML; MG/100ML
INJECTION, SOLUTION INTRAVENOUS CONTINUOUS
Status: DISCONTINUED | OUTPATIENT
Start: 2025-06-09 | End: 2025-06-09 | Stop reason: HOSPADM

## 2025-06-09 RX ORDER — ONDANSETRON 2 MG/ML
INJECTION INTRAMUSCULAR; INTRAVENOUS PRN
Status: DISCONTINUED | OUTPATIENT
Start: 2025-06-09 | End: 2025-06-09

## 2025-06-09 RX ORDER — ONDANSETRON 4 MG/1
4 TABLET, ORALLY DISINTEGRATING ORAL EVERY 30 MIN PRN
Status: DISCONTINUED | OUTPATIENT
Start: 2025-06-09 | End: 2025-06-10 | Stop reason: HOSPADM

## 2025-06-09 RX ORDER — TIMOLOL 5 MG/ML
SOLUTION/ DROPS OPHTHALMIC PRN
Status: DISCONTINUED | OUTPATIENT
Start: 2025-06-09 | End: 2025-06-09 | Stop reason: HOSPADM

## 2025-06-09 RX ORDER — PROPOFOL 10 MG/ML
INJECTION, EMULSION INTRAVENOUS PRN
Status: DISCONTINUED | OUTPATIENT
Start: 2025-06-09 | End: 2025-06-09

## 2025-06-09 RX ORDER — DEXAMETHASONE SODIUM PHOSPHATE 10 MG/ML
4 INJECTION, SOLUTION INTRAMUSCULAR; INTRAVENOUS
Status: DISCONTINUED | OUTPATIENT
Start: 2025-06-09 | End: 2025-06-10 | Stop reason: HOSPADM

## 2025-06-09 RX ORDER — PREDNISOLONE ACETATE 10 MG/ML
1 SUSPENSION/ DROPS OPHTHALMIC 4 TIMES DAILY
Qty: 10 ML | Refills: 1 | Status: SHIPPED | OUTPATIENT
Start: 2025-06-09

## 2025-06-09 RX ORDER — HYDROMORPHONE HYDROCHLORIDE 1 MG/ML
0.4 INJECTION, SOLUTION INTRAMUSCULAR; INTRAVENOUS; SUBCUTANEOUS EVERY 5 MIN PRN
Status: DISCONTINUED | OUTPATIENT
Start: 2025-06-09 | End: 2025-06-10 | Stop reason: HOSPADM

## 2025-06-09 RX ORDER — BALANCED SALT SOLUTION 6.4; .75; .48; .3; 3.9; 1.7 MG/ML; MG/ML; MG/ML; MG/ML; MG/ML; MG/ML
SOLUTION OPHTHALMIC PRN
Status: DISCONTINUED | OUTPATIENT
Start: 2025-06-09 | End: 2025-06-09 | Stop reason: HOSPADM

## 2025-06-09 RX ORDER — TETRACAINE HYDROCHLORIDE 5 MG/ML
SOLUTION OPHTHALMIC PRN
Status: DISCONTINUED | OUTPATIENT
Start: 2025-06-09 | End: 2025-06-09 | Stop reason: HOSPADM

## 2025-06-09 RX ORDER — MOXIFLOXACIN IN NACL,ISO-OS/PF 0.3MG/0.3
SYRINGE (ML) INTRAOCULAR PRN
Status: DISCONTINUED | OUTPATIENT
Start: 2025-06-09 | End: 2025-06-09 | Stop reason: HOSPADM

## 2025-06-09 RX ORDER — FENTANYL CITRATE 50 UG/ML
50 INJECTION, SOLUTION INTRAMUSCULAR; INTRAVENOUS EVERY 5 MIN PRN
Status: DISCONTINUED | OUTPATIENT
Start: 2025-06-09 | End: 2025-06-10 | Stop reason: HOSPADM

## 2025-06-09 RX ORDER — MOXIFLOXACIN 5 MG/ML
1 SOLUTION/ DROPS OPHTHALMIC 3 TIMES DAILY
Qty: 3 ML | Refills: 1 | Status: SHIPPED | OUTPATIENT
Start: 2025-06-09

## 2025-06-09 RX ORDER — HYDROMORPHONE HYDROCHLORIDE 1 MG/ML
0.2 INJECTION, SOLUTION INTRAMUSCULAR; INTRAVENOUS; SUBCUTANEOUS EVERY 5 MIN PRN
Status: DISCONTINUED | OUTPATIENT
Start: 2025-06-09 | End: 2025-06-10 | Stop reason: HOSPADM

## 2025-06-09 RX ORDER — PROPARACAINE HYDROCHLORIDE 5 MG/ML
1 SOLUTION/ DROPS OPHTHALMIC ONCE
Status: COMPLETED | OUTPATIENT
Start: 2025-06-09 | End: 2025-06-09

## 2025-06-09 RX ORDER — ONDANSETRON 2 MG/ML
4 INJECTION INTRAMUSCULAR; INTRAVENOUS EVERY 30 MIN PRN
Status: DISCONTINUED | OUTPATIENT
Start: 2025-06-09 | End: 2025-06-10 | Stop reason: HOSPADM

## 2025-06-09 RX ORDER — OXYCODONE HYDROCHLORIDE 5 MG/1
10 TABLET ORAL
Status: DISCONTINUED | OUTPATIENT
Start: 2025-06-09 | End: 2025-06-10 | Stop reason: HOSPADM

## 2025-06-09 RX ORDER — POVIDONE-IODINE 5 %
1 SOLUTION, NON-ORAL OPHTHALMIC (EYE) ONCE
Status: DISCONTINUED | OUTPATIENT
Start: 2025-06-09 | End: 2025-06-09 | Stop reason: HOSPADM

## 2025-06-09 RX ORDER — FENTANYL CITRATE 50 UG/ML
25 INJECTION, SOLUTION INTRAMUSCULAR; INTRAVENOUS EVERY 5 MIN PRN
Status: DISCONTINUED | OUTPATIENT
Start: 2025-06-09 | End: 2025-06-10 | Stop reason: HOSPADM

## 2025-06-09 RX ORDER — LIDOCAINE HYDROCHLORIDE 20 MG/ML
INJECTION, SOLUTION INFILTRATION; PERINEURAL PRN
Status: DISCONTINUED | OUTPATIENT
Start: 2025-06-09 | End: 2025-06-09

## 2025-06-09 RX ORDER — OXYCODONE HYDROCHLORIDE 5 MG/1
5 TABLET ORAL
Status: DISCONTINUED | OUTPATIENT
Start: 2025-06-09 | End: 2025-06-10 | Stop reason: HOSPADM

## 2025-06-09 RX ORDER — LIDOCAINE 40 MG/G
CREAM TOPICAL
Status: DISCONTINUED | OUTPATIENT
Start: 2025-06-09 | End: 2025-06-09 | Stop reason: HOSPADM

## 2025-06-09 RX ADMIN — Medication 1 DROP: at 08:59

## 2025-06-09 RX ADMIN — Medication 1 DROP: at 08:54

## 2025-06-09 RX ADMIN — ONDANSETRON 4 MG: 2 INJECTION INTRAMUSCULAR; INTRAVENOUS at 09:36

## 2025-06-09 RX ADMIN — PROPOFOL 70 MG: 10 INJECTION, EMULSION INTRAVENOUS at 09:39

## 2025-06-09 RX ADMIN — Medication 1 DROP: at 08:56

## 2025-06-09 RX ADMIN — SODIUM CHLORIDE, SODIUM LACTATE, POTASSIUM CHLORIDE, CALCIUM CHLORIDE: 600; 310; 30; 20 INJECTION, SOLUTION INTRAVENOUS at 09:00

## 2025-06-09 RX ADMIN — PROPARACAINE HYDROCHLORIDE 1 DROP: 5 SOLUTION/ DROPS OPHTHALMIC at 08:51

## 2025-06-09 RX ADMIN — LIDOCAINE HYDROCHLORIDE 80 MG: 20 INJECTION, SOLUTION INFILTRATION; PERINEURAL at 09:37

## 2025-06-09 ASSESSMENT — ENCOUNTER SYMPTOMS: DYSRHYTHMIAS: 1

## 2025-06-09 NOTE — ANESTHESIA POSTPROCEDURE EVALUATION
Patient: Sukh Craven    Procedure: Procedure(s):  LEFT EYE PHACOEMULSIFICATION, CATARACT, CLEAR CORNEAL INCISION APPROACH, WITH STANDARD INTRAOCULAR LENS IMPLANT INSERTION, AND ENDOSCOPIC CYCLOPHOTOCOAGULATION       Anesthesia Type:  MAC    Note:  Disposition: Outpatient   Postop Pain Control: Uneventful            Sign Out: Well controlled pain   PONV: No   Neuro/Psych: Uneventful            Sign Out: Acceptable/Baseline neuro status   Airway/Respiratory: Uneventful            Sign Out: Acceptable/Baseline resp. status   CV/Hemodynamics: Uneventful            Sign Out: Acceptable CV status   Other NRE: NONE   DID A NON-ROUTINE EVENT OCCUR? No           Last vitals:  Vitals Value Taken Time   /83 06/09/25 10:46   Temp 36  C (96.8  F) 06/09/25 10:46   Pulse 57 06/09/25 10:46   Resp 16 06/09/25 10:46   SpO2 98 % 06/09/25 10:48   Vitals shown include unfiled device data.    Electronically Signed By: Mati Huang MD  June 9, 2025  12:10 PM
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
## 2025-06-09 NOTE — DISCHARGE INSTRUCTIONS
[facility]  Home Care Following Anesthesia  For 24 hours after surgery:  Get plenty of rest.  A responsible adult must stay with you for at least 24 hours after you leave the surgery center.  Do not drive or use heavy equipment.  If you have weakness or tingling, don't drive or use heavy equipment until this feeling goes away.   Do not drink alcohol.   Avoid strenuous or risky activities.  Ask for help when climbing stairs.  You may feel lightheaded.  IF so, sit for a few minutes before standing.  Have someone help you get up.   If you have nausea (feel sick to your stomach): Drink only clear liquids such as apple juice, ginger ale, broth or 7-Up.  Rest may also help.  Be sure to drink enough fluids.  Move to a regular diet as you feel able.   You may have a slight fever.  Call the doctor if your fever is over 100 F (37.7 C) (taken under the tongue) or lasts longer than 24 hours.  You may have a dry mouth, a sore throat, muscle aches or trouble sleeping. These should go away after 24 hours.  Do not make important or legal decisions.   It is recommended to avoid smoking.               Tips for taking pain medications  To get the best pain relief possible, remember these points:  Take pain medications as directed, before pain becomes severe.  Pain medication can upset your stomach: taking it with food may help.  Constipation is a common side effect of pain medication. Drink plenty of  fluids.  Eat foods high in fiber. Take a stool softener if recommended by your doctor or pharmacist.  Do not drink alcohol, drive or operate machinery while taking pain medications.  Ask about other ways to control pain, such as with heat, ice or relaxation.    Tylenol/Acetaminophen Consumption    If you feel your pain relief is insufficient, you may take Tylenol/Acetaminophen in addition to your narcotic pain medication.   Be careful not to exceed 4,000 mg of Tylenol/Acetaminophen in a 24 hour  period from all sources.  If you are taking extra strength Tylenol/acetaminophen (500 mg), the maximum dose is 8 tablets in 24 hours.  If you are taking regular strength acetaminophen (325 mg), the maximum dose is 12 tablets in 24 hours.    Call a doctor for any of the following:  Signs of infection (fever, growing tenderness at the surgery site, a large amount of drainage or bleeding, severe pain, foul-smelling drainage, redness, swelling).  It has been over 8 to 10 hours since surgery and you are still not able to urinate (pass water).  Headache for over 24 hours.  Numbness, tingling or weakness the day after surgery (if you had spinal anesthesia).  Signs of Covid-19 infection (temperature over 100 degrees, shortness of breath, cough, loss of taste/smell, generalized body aches, persistent headache, chills, sore throat, nausea/vomiting/diarrhea)    Your doctor is:       Dr. Devin Ward, Ophthalmology: [phone]               After hours and weekends call the hospital @ [phone] and ask for the resident on call for:  Ophthalmology  For emergency care, call the:  [facility]:  [phone] (TTY for hearing impaired: [phone])

## 2025-06-09 NOTE — OP NOTE
PREOPERATIVE DIAGNOSIS:   1. Primary open angle glaucoma (POAG) of left eye, moderate stage    2. Glaucoma suspect, right    3. Combined forms of age-related cataract, mod, of both eyes      POSTOPERATIVE DIAGNOSIS: Same   PROCEDURES:   1. Cataract extraction with intraocular lens implant Left eye.  2. ENDOCYLOPHOTOCOAGULATION, left eye   SURGEON: Devin Ward M.D.  Assistant: Emerson Neville M.D.       INDICATIONS: The patient Sukh Craven presented to the eye clinic with decreased vision secondary to cataract in the Left eye. The risks, benefits and alternatives to cataract extraction were discussed. The patient elected to proceed. All questions were answered to the patient's satisfaction.   DESCRIPTION OF PROCEDURE:Prior to the procedure, appropriate cardiac and respiratory monitors were applied to the patient.  In the pre-operative holding area, under monitored anesthesia care, a retrobulbar injection of 2% lidocaine with hyaluronidase was given.  The patient was brought to the operating room where a surgical pause was carried out to identify with all members of the surgical team the correct surgical site.  With adequate anesthesia and akinesia, the Left eye was prepped and draped in the usual sterile fashion. A lid speculum was placed, and the operating microscope was rotated into position. A paracentesis was created.  Through this limbal paracentesis, the anterior chamber was inflated with dispersive viscoelastic. A temporal wound was created at the limbus using a 2.5 mm blade. A capsulorrhexis was initiated using a bent 25-gauge needle and was completed in continuous and circular fashion using the capsulorrhexis forceps. The lens nucleus was hydrodissected using balanced salt solution.  The lens nucleus was rotated and removed using phacoemulsification in a stop and chop technique.  Residual cortical material was removed using irrigation-aspiration.  The capsular bag was reinflated to its maximal extent  with cohesive viscoelastic.  A 19.0 diopter DIBOO was inserted into the capsular bag.  The lens power selected was reviewed using the intraocular lens power measurements that were obtained preoperatively to confirm that the correct lens was selected for the desired post-operative refractive state. The anterior chamber and sulcus were again inflated with viscoelastic.  The endoscopic probe was positioned in the nasal sulcus.  Laser was administered over 180 degrees of the nasal ciliary body using energy setting of 0.25 W.  Treatment was titrated to a mild blanching and shrinkage of the ciliary body.  The remaining viscoelastic was removed from the anterior chamber in its entirety, and the wounds were hydrated and found to be self-sealing.  Intracameral moxifloxacin was administered.  Subconjunctival Dexamethasone (2 mg) was injected.  Tactile pressure was confirmed to be in a normal range.  The lid speculum was removed and a patch and shield were applied.  The patient tolerated the procedure well, and there were no complications.     PLAN: The patient will be discharged to home and will follow up tomorrow morning in the eye clinic.  EBL:  Minimal   Complications:  None    Implant Name Type Inv. Item Serial No.  Lot No. LRB No. Used Action   EYE IMP IOL TECNIS IOL DIB00 19.0 DIB00 19.0 - [id number] Lens/Eye Implant EYE IMP IOL TECNIS IOL DIB00 19.0 DIB00 19.0 [id number] [employer]  Left 1 Implanted

## 2025-06-09 NOTE — ANESTHESIA CARE TRANSFER NOTE
Patient: Sukh Craven    Procedure: Procedure(s):  LEFT EYE PHACOEMULSIFICATION, CATARACT, CLEAR CORNEAL INCISION APPROACH, WITH STANDARD INTRAOCULAR LENS IMPLANT INSERTION, LIMBAL RELAXING INCISIONS, AND ENDOSCOPIC CYCLOPHOTOCOAGULATION       Diagnosis: Combined forms of age-related cataract of both eyes [H25.813]  Primary open angle glaucoma (POAG) of left eye, moderate stage [H40.1122]  Diagnosis Additional Information: No value filed.    Anesthesia Type:   MAC     Note:    Oropharynx: oropharynx clear of all foreign objects and spontaneously breathing  Level of Consciousness: awake  Oxygen Supplementation: room air  Level of Supplemental Oxygen (L/min / FiO2): 0  Independent Airway: airway patency satisfactory and stable  Dentition: dentition unchanged  Vital Signs Stable: post-procedure vital signs reviewed and stable  Report to RN Given: handoff report given  Patient transferred to: Phase II  Comments: Uneventful transport   Report to RN - Lindsay Johnson comfortable  Exchanging well; color natl  Pt responds appropriately to command  IV patent  Lips/teeth/dentition as preop status  Questions answered            Vitals:  Vitals Value Taken Time   /84 1030   Temp 96.1    Pulse 57    Resp 16    SpO2 98%        Electronically Signed By: HEMAL MANCERA CRNA  June 9, 2025  9:54 AM

## 2025-06-09 NOTE — INTERVAL H&P NOTE
"I have reviewed the surgical (or preoperative) H&P that is linked to this encounter, and examined the patient. There are no significant changes    Clinical Conditions Present on Arrival:  Clinically Significant Risk Factors Present on Admission                 # Drug Induced Coagulation Defect: home medication list includes an anticoagulant medication  # Drug Induced Platelet Defect: home medication list includes an antiplatelet medication      # Overweight: Estimated body mass index is 25.38 kg/m  as calculated from the following:    Height as of this encounter: 1.956 m (6' 5\").    Weight as of this encounter: 97.1 kg (214 lb).       "

## 2025-06-10 ENCOUNTER — OFFICE VISIT (OUTPATIENT)
Dept: OPHTHALMOLOGY | Facility: CLINIC | Age: 70
End: 2025-06-10
Attending: OPHTHALMOLOGY
Payer: COMMERCIAL

## 2025-06-10 DIAGNOSIS — H40.1122 PRIMARY OPEN ANGLE GLAUCOMA (POAG) OF LEFT EYE, MODERATE STAGE: ICD-10-CM

## 2025-06-10 DIAGNOSIS — Z96.1 PSEUDOPHAKIA: Primary | ICD-10-CM

## 2025-06-10 PROCEDURE — G0463 HOSPITAL OUTPT CLINIC VISIT: HCPCS | Performed by: OPHTHALMOLOGY

## 2025-06-10 ASSESSMENT — VISUAL ACUITY
OD_SC: 20/60
OD_PH_SC: 20/40
OS_SC: 20/40
OD_SC+: -2
OS_PH_SC+: -1
OS_PH_SC: 20/30
METHOD: SNELLEN - LINEAR

## 2025-06-10 ASSESSMENT — TONOMETRY
OD_IOP_MMHG: 16
IOP_METHOD: TONOPEN
OS_IOP_MMHG: 17

## 2025-06-10 ASSESSMENT — SLIT LAMP EXAM - LIDS
COMMENTS: 2+ DERMATOCHALASIS, 2+ SCLERAL SHOW
COMMENTS: 2+ DERMATOCHALASIS, 2+ SCLERAL SHOW

## 2025-06-10 ASSESSMENT — EXTERNAL EXAM - RIGHT EYE: OD_EXAM: 1+ BROW PTOSIS, PROLAPSED FAT PADS: UPPER, LOWER

## 2025-06-10 ASSESSMENT — EXTERNAL EXAM - LEFT EYE: OS_EXAM: 1+ BROW PTOSIS, PROLAPSED FAT PADS: UPPER, LOWER

## 2025-06-10 NOTE — PROGRESS NOTES
HPI       Post Op (Ophthalmology) Both Eyes    Associated symptoms include redness and swelling.  Negative for eye pain.  Treatments tried include eye drops.             Comments    Pt denies pain and slept alright. He notes the shield didn't stay on well. He did not take any painkiller.     Ocular Meds:   Moxifloxacin TID left eye  Ketorolac QID left eye  Pred QID left eye  Latanoprost each eye at bedtime - wondering if he should continue    Melani Garcia OA 9:00 AM Evy 10, 2025             Last edited by Melani Garcia on 6/10/2025  9:12 AM.          Review of systems for the eyes was negative other than the pertinent positives/negatives listed in the HPI.      Assessment & Plan      Sukh Craven is a 70 year old male with the following diagnoses:   1. Pseudophakia    2. Primary open angle glaucoma (POAG) of left eye, moderate stage           Postoperative day 1 s/p cataract extraction + ENDOCYLOPHOTOCOAGULATION left eye     Doing well  Keep patch in place at night for 5 days  Start post-operative drops and taper according to instructions  Hold latanoprost left eye for now  Post-operative do's and don'ts reviewed, questions answered      Patient disposition:   Right eye surgery            Attending Physician Attestation:  Complete documentation of historical and exam elements from today's encounter can be found in the full encounter summary report (not reduplicated in this progress note).  I personally obtained the chief complaint(s) and history of present illness.  I confirmed and edited as necessary the review of systems, past medical/surgical history, family history, social history, and examination findings as documented by others; and I examined the patient myself.  I personally reviewed the relevant tests, images, and reports as documented above.  I formulated and edited as necessary the assessment and plan and discussed the findings and management plan with the patient and family. . Jose David Beltran  LUZ ELENA Ward MD

## 2025-06-12 ENCOUNTER — ANTICOAGULATION THERAPY VISIT (OUTPATIENT)
Dept: ANTICOAGULATION | Facility: CLINIC | Age: 70
End: 2025-06-12

## 2025-06-12 ENCOUNTER — RESULTS FOLLOW-UP (OUTPATIENT)
Dept: ANTICOAGULATION | Facility: CLINIC | Age: 70
End: 2025-06-12

## 2025-06-12 ENCOUNTER — LAB (OUTPATIENT)
Dept: LAB | Facility: CLINIC | Age: 70
End: 2025-06-12
Payer: COMMERCIAL

## 2025-06-12 DIAGNOSIS — Z95.2 S/P AVR (AORTIC VALVE REPLACEMENT): ICD-10-CM

## 2025-06-12 DIAGNOSIS — Z95.2 S/P AVR (AORTIC VALVE REPLACEMENT): Primary | ICD-10-CM

## 2025-06-12 DIAGNOSIS — I48.0 PAROXYSMAL ATRIAL FIBRILLATION (H): ICD-10-CM

## 2025-06-12 LAB — INR BLD: 2.3 (ref 0.9–1.1)

## 2025-06-12 NOTE — PROGRESS NOTES
ANTICOAGULATION MANAGEMENT     Sukh Craven 70 year old male is on warfarin with therapeutic INR result. (Goal INR 2.0-3.0)    Recent labs: (last 7 days)     06/12/25  0948   INR 2.3*       ASSESSMENT     Warfarin Lab Questionnaire    Warfarin Doses Last 7 Days      6/11/2025     1:55 PM   Dose in Tablet or Mg   TAB or MG? milligram (mg)     Pt Rptd Dose SUNDAY MONDAY TUESDAY WED THURS FRIDAY SATURDAY 6/11/2025   1:55 PM 6 3 6 6 6 3 6         6/11/2025   Warfarin Lab Questionnaire   Missed doses within past 14 days? No   Changes in diet or alcohol within past 14 days? No   Medication changes since last result? No   Injuries or illness since last result? No   New shortness of breath, severe headaches or sudden changes in vision since last result? No   Abnormal bleeding since last result? No   Upcoming surgery, procedure? Yes   Please explain, date scheduled? Eye surgery     Previous result: Therapeutic last visit; previously outside of goal range  Additional findings: Upcoming surgery/procedure right eye cataract surgery 6/16/25, left eye was done 6/9/25 without complications. Lipoma excision was performed 6/2/25. Sukh held his warfarin x5 days as advised prior to surgery and restarted at advised booster doses. He also was bridging with Lovenox as advised up until advised to stop prior to surgery but then he misunderstood directions and never restarted after the surgery. Thankfully, his INR is back within therapeutic range today, without incident.        PLAN     Recommended plan for temporary change(s) affecting INR     Dosing Instructions: Continue your current warfarin dose with next INR in 4 weeks       Summary  As of 6/12/2025      Full warfarin instructions:  3 mg every Mon, Fri; 6 mg all other days   Next INR check:  7/10/2025               Telephone call with Sukh who verbalizes understanding and agrees to plan    Lab visit scheduled    Education provided: Please call back if any changes to your diet,  medications or how you've been taking warfarin  Contact [phone] with any changes, questions or concerns.     Plan made per [facility] anticoagulation protocol    Ngoc Goodson RN  6/12/2025  Anticoagulation Clinic  [facility] for routing messages: sheree PRATT  [facility] patient phone line: [phone]        _______________________________________________________________________     Anticoagulation Episode Summary       Current INR goal:  2.0-3.0   TTR:  79.8% (1 y)   Target end date:  Indefinite   Send INR reminders to:  DAVON PRATT    Indications    S/P AVR (aortic valve replacement) [Z95.2]  Paroxysmal atrial fibrillation (H) [I48.0]             Comments:  On-X [facility] AVR 10/13/23 with a-fib so goal range remaining 2.0-3.0.             Anticoagulation Care Providers       Provider Role Specialty Phone number    Parrish Stephenson MD Referring Family Medicine [phone]

## 2025-06-13 ENCOUNTER — ANESTHESIA EVENT (OUTPATIENT)
Dept: SURGERY | Facility: AMBULATORY SURGERY CENTER | Age: 70
End: 2025-06-13
Payer: COMMERCIAL

## 2025-06-13 PROBLEM — H40.001 GLAUCOMA SUSPECT, RIGHT: Status: ACTIVE | Noted: 2025-04-30

## 2025-06-16 ENCOUNTER — ANESTHESIA (OUTPATIENT)
Dept: SURGERY | Facility: AMBULATORY SURGERY CENTER | Age: 70
End: 2025-06-16
Payer: COMMERCIAL

## 2025-06-16 ENCOUNTER — HOSPITAL ENCOUNTER (OUTPATIENT)
Facility: AMBULATORY SURGERY CENTER | Age: 70
Discharge: HOME | End: 2025-06-16
Attending: OPHTHALMOLOGY
Payer: COMMERCIAL

## 2025-06-16 VITALS
HEART RATE: 57 BPM | HEIGHT: 76 IN | RESPIRATION RATE: 16 BRPM | SYSTOLIC BLOOD PRESSURE: 154 MMHG | DIASTOLIC BLOOD PRESSURE: 90 MMHG | OXYGEN SATURATION: 99 % | WEIGHT: 210 LBS | BODY MASS INDEX: 25.57 KG/M2 | TEMPERATURE: 97 F

## 2025-06-16 DIAGNOSIS — H25.813 COMBINED FORMS OF AGE-RELATED CATARACT OF BOTH EYES: Primary | ICD-10-CM

## 2025-06-16 DIAGNOSIS — H40.1122 PRIMARY OPEN ANGLE GLAUCOMA (POAG) OF LEFT EYE, MODERATE STAGE: ICD-10-CM

## 2025-06-16 LAB — GLUCOSE BLDC GLUCOMTR-MCNC: 105 MG/DL (ref 70–99)

## 2025-06-16 PROCEDURE — 66988 XCAPSL CTRC RMVL W/ECP: CPT | Mod: 79 | Performed by: OPHTHALMOLOGY

## 2025-06-16 PROCEDURE — 66988 XCAPSL CTRC RMVL W/ECP: CPT | Mod: RT

## 2025-06-16 PROCEDURE — V2599 CONTACT LENS/ES OTHER TYPE: HCPCS | Mod: DBP,RT

## 2025-06-16 PROCEDURE — 82962 GLUCOSE BLOOD TEST: CPT | Performed by: PATHOLOGY

## 2025-06-16 PROCEDURE — 96999 UNLISTED SPEC DERM SVC/PX: CPT | Mod: RT | Performed by: OPHTHALMOLOGY

## 2025-06-16 DEVICE — TEC EYHANCE TOR II SMPLCTY 19.0D CYL2.25
Type: IMPLANTABLE DEVICE | Site: EYE | Status: FUNCTIONAL
Brand: TECNIS IOL

## 2025-06-16 RX ORDER — PREDNISOLONE ACETATE 10 MG/ML
1 SUSPENSION/ DROPS OPHTHALMIC 4 TIMES DAILY
Qty: 10 ML | Refills: 1 | Status: SHIPPED | OUTPATIENT
Start: 2025-06-16

## 2025-06-16 RX ORDER — SODIUM CHLORIDE, SODIUM LACTATE, POTASSIUM CHLORIDE, CALCIUM CHLORIDE 600; 310; 30; 20 MG/100ML; MG/100ML; MG/100ML; MG/100ML
INJECTION, SOLUTION INTRAVENOUS CONTINUOUS
Status: DISCONTINUED | OUTPATIENT
Start: 2025-06-16 | End: 2025-06-16 | Stop reason: HOSPADM

## 2025-06-16 RX ORDER — CYCLOPENTOLAT/TROPIC/PHENYLEPH 1%-1%-2.5%
1 DROPS (EA) OPHTHALMIC (EYE)
Status: COMPLETED | OUTPATIENT
Start: 2025-06-16 | End: 2025-06-16

## 2025-06-16 RX ORDER — OXYCODONE HYDROCHLORIDE 5 MG/1
5 TABLET ORAL
Status: DISCONTINUED | OUTPATIENT
Start: 2025-06-16 | End: 2025-06-17 | Stop reason: HOSPADM

## 2025-06-16 RX ORDER — LIDOCAINE HYDROCHLORIDE 10 MG/ML
INJECTION, SOLUTION EPIDURAL; INFILTRATION; INTRACAUDAL; PERINEURAL PRN
Status: DISCONTINUED | OUTPATIENT
Start: 2025-06-16 | End: 2025-06-16 | Stop reason: HOSPADM

## 2025-06-16 RX ORDER — LIDOCAINE 40 MG/G
CREAM TOPICAL
Status: DISCONTINUED | OUTPATIENT
Start: 2025-06-16 | End: 2025-06-16 | Stop reason: HOSPADM

## 2025-06-16 RX ORDER — SODIUM CHLORIDE, SODIUM LACTATE, POTASSIUM CHLORIDE, CALCIUM CHLORIDE 600; 310; 30; 20 MG/100ML; MG/100ML; MG/100ML; MG/100ML
INJECTION, SOLUTION INTRAVENOUS CONTINUOUS
Status: DISCONTINUED | OUTPATIENT
Start: 2025-06-16 | End: 2025-06-17 | Stop reason: HOSPADM

## 2025-06-16 RX ORDER — TETRACAINE HYDROCHLORIDE 5 MG/ML
SOLUTION OPHTHALMIC PRN
Status: DISCONTINUED | OUTPATIENT
Start: 2025-06-16 | End: 2025-06-16 | Stop reason: HOSPADM

## 2025-06-16 RX ORDER — ONDANSETRON 2 MG/ML
4 INJECTION INTRAMUSCULAR; INTRAVENOUS EVERY 30 MIN PRN
Status: DISCONTINUED | OUTPATIENT
Start: 2025-06-16 | End: 2025-06-17 | Stop reason: HOSPADM

## 2025-06-16 RX ORDER — ACETAMINOPHEN 325 MG/1
975 TABLET ORAL ONCE
Status: COMPLETED | OUTPATIENT
Start: 2025-06-16 | End: 2025-06-16

## 2025-06-16 RX ORDER — POVIDONE-IODINE 5 %
1 SOLUTION, NON-ORAL OPHTHALMIC (EYE) ONCE
Status: DISCONTINUED | OUTPATIENT
Start: 2025-06-16 | End: 2025-06-16 | Stop reason: HOSPADM

## 2025-06-16 RX ORDER — TIMOLOL 5 MG/ML
SOLUTION/ DROPS OPHTHALMIC PRN
Status: DISCONTINUED | OUTPATIENT
Start: 2025-06-16 | End: 2025-06-16 | Stop reason: HOSPADM

## 2025-06-16 RX ORDER — MOXIFLOXACIN IN NACL,ISO-OS/PF 0.3MG/0.3
SYRINGE (ML) INTRAOCULAR PRN
Status: DISCONTINUED | OUTPATIENT
Start: 2025-06-16 | End: 2025-06-16 | Stop reason: HOSPADM

## 2025-06-16 RX ORDER — PROPOFOL 10 MG/ML
INJECTION, EMULSION INTRAVENOUS PRN
Status: DISCONTINUED | OUTPATIENT
Start: 2025-06-16 | End: 2025-06-16

## 2025-06-16 RX ORDER — PROPARACAINE HYDROCHLORIDE 5 MG/ML
1 SOLUTION/ DROPS OPHTHALMIC ONCE
Status: COMPLETED | OUTPATIENT
Start: 2025-06-16 | End: 2025-06-16

## 2025-06-16 RX ORDER — DEXAMETHASONE SODIUM PHOSPHATE 10 MG/ML
4 INJECTION, SOLUTION INTRAMUSCULAR; INTRAVENOUS
Status: DISCONTINUED | OUTPATIENT
Start: 2025-06-16 | End: 2025-06-17 | Stop reason: HOSPADM

## 2025-06-16 RX ORDER — KETOROLAC TROMETHAMINE 4 MG/ML
1 SOLUTION/ DROPS OPHTHALMIC 4 TIMES DAILY
Qty: 5 ML | Refills: 1 | Status: SHIPPED | OUTPATIENT
Start: 2025-06-16

## 2025-06-16 RX ORDER — LIDOCAINE HYDROCHLORIDE 20 MG/ML
INJECTION, SOLUTION INFILTRATION; PERINEURAL PRN
Status: DISCONTINUED | OUTPATIENT
Start: 2025-06-16 | End: 2025-06-16

## 2025-06-16 RX ORDER — OXYCODONE HYDROCHLORIDE 5 MG/1
10 TABLET ORAL
Status: DISCONTINUED | OUTPATIENT
Start: 2025-06-16 | End: 2025-06-17 | Stop reason: HOSPADM

## 2025-06-16 RX ORDER — BALANCED SALT SOLUTION 6.4; .75; .48; .3; 3.9; 1.7 MG/ML; MG/ML; MG/ML; MG/ML; MG/ML; MG/ML
SOLUTION OPHTHALMIC PRN
Status: DISCONTINUED | OUTPATIENT
Start: 2025-06-16 | End: 2025-06-16 | Stop reason: HOSPADM

## 2025-06-16 RX ORDER — ONDANSETRON 4 MG/1
4 TABLET, ORALLY DISINTEGRATING ORAL EVERY 30 MIN PRN
Status: DISCONTINUED | OUTPATIENT
Start: 2025-06-16 | End: 2025-06-17 | Stop reason: HOSPADM

## 2025-06-16 RX ORDER — MOXIFLOXACIN 5 MG/ML
1 SOLUTION/ DROPS OPHTHALMIC 3 TIMES DAILY
Qty: 3 ML | Refills: 1 | Status: SHIPPED | OUTPATIENT
Start: 2025-06-16

## 2025-06-16 RX ORDER — FENTANYL CITRATE 50 UG/ML
25 INJECTION, SOLUTION INTRAMUSCULAR; INTRAVENOUS
Status: DISCONTINUED | OUTPATIENT
Start: 2025-06-16 | End: 2025-06-17 | Stop reason: HOSPADM

## 2025-06-16 RX ORDER — NALOXONE HYDROCHLORIDE 0.4 MG/ML
0.1 INJECTION, SOLUTION INTRAMUSCULAR; INTRAVENOUS; SUBCUTANEOUS
Status: DISCONTINUED | OUTPATIENT
Start: 2025-06-16 | End: 2025-06-17 | Stop reason: HOSPADM

## 2025-06-16 RX ADMIN — PROPOFOL 30 MG: 10 INJECTION, EMULSION INTRAVENOUS at 10:42

## 2025-06-16 RX ADMIN — Medication 1 DROP: at 10:29

## 2025-06-16 RX ADMIN — PROPOFOL 50 MG: 10 INJECTION, EMULSION INTRAVENOUS at 10:41

## 2025-06-16 RX ADMIN — Medication 1 DROP: at 10:26

## 2025-06-16 RX ADMIN — LIDOCAINE HYDROCHLORIDE 40 MG: 20 INJECTION, SOLUTION INFILTRATION; PERINEURAL at 10:41

## 2025-06-16 RX ADMIN — ACETAMINOPHEN 975 MG: 325 TABLET ORAL at 10:27

## 2025-06-16 RX ADMIN — SODIUM CHLORIDE, SODIUM LACTATE, POTASSIUM CHLORIDE, CALCIUM CHLORIDE: 600; 310; 30; 20 INJECTION, SOLUTION INTRAVENOUS at 10:26

## 2025-06-16 RX ADMIN — PROPARACAINE HYDROCHLORIDE 1 DROP: 5 SOLUTION/ DROPS OPHTHALMIC at 10:25

## 2025-06-16 RX ADMIN — Medication 1 DROP: at 10:34

## 2025-06-16 ASSESSMENT — LIFESTYLE VARIABLES: TOBACCO_USE: 0

## 2025-06-16 ASSESSMENT — COPD QUESTIONNAIRES: COPD: 0

## 2025-06-16 ASSESSMENT — ENCOUNTER SYMPTOMS
ORTHOPNEA: 0
DYSRHYTHMIAS: 1

## 2025-06-16 NOTE — INTERVAL H&P NOTE
"I have reviewed the surgical (or preoperative) H&P that is linked to this encounter, and examined the patient. There are no significant changes    Clinical Conditions Present on Arrival:  Clinically Significant Risk Factors Present on Admission                 # Drug Induced Coagulation Defect: home medication list includes an anticoagulant medication  # Drug Induced Platelet Defect: home medication list includes an antiplatelet medication      # Overweight: Estimated body mass index is 25.56 kg/m  as calculated from the following:    Height as of this encounter: 1.93 m (6' 4\").    Weight as of this encounter: 95.3 kg (210 lb).       "

## 2025-06-16 NOTE — ANESTHESIA CARE TRANSFER NOTE
Patient: Sukh Craven    Procedure: Procedure(s):  RIGHT EYE PHACOEMULSIFICATION, CATARACT, CLEAR CORNEAL INCISION APPROACH, WITH TORIC INTRAOCULAR LENS IMPLANT INSERTION AND ENDOSCOPIC CYCLOPHOTOCOAGULATION       Diagnosis: Combined forms of age-related cataract of both eyes [H25.813]  Glaucoma suspect, right [H40.001]  Primary open angle glaucoma (POAG) of left eye, moderate stage [H40.1122]  Diagnosis Additional Information: No value filed.    Anesthesia Type:   No value filed.     Note:    Oropharynx: oropharynx clear of all foreign objects and spontaneously breathing  Level of Consciousness: awake  Oxygen Supplementation: room air    Independent Airway: airway patency satisfactory and stable  Dentition: dentition unchanged  Vital Signs Stable: post-procedure vital signs reviewed and stable  Report to RN Given: handoff report given  Patient transferred to: Phase II  Comments: Report to Phase II RN. Resps easy and regular.   Handoff Report: Identifed the Patient, Identified the Reponsible Provider, Reviewed the pertinent medical history, Discussed the surgical course, Reviewed Intra-OP anesthesia mangement and issues during anesthesia, Set expectations for post-procedure period and Allowed opportunity for questions and acknowledgement of understanding      Vitals:  Vitals Value Taken Time   /93 06/16/25 11:32   Temp 36  C (96.8  F) 06/16/25 11:32   Pulse 62 06/16/25 11:31   Resp 16 06/16/25 11:32   SpO2 99 % 06/16/25 11:35   Vitals shown include unfiled device data.    Electronically Signed By: HEMAL Red CRNA  June 16, 2025  11:36 AM

## 2025-06-16 NOTE — OP NOTE
PREOPERATIVE DIAGNOSIS:   1. Combined forms of age-related cataract, right eye    2. Primary open angle glaucoma (POAG) of right eye, mild stage      POSTOPERATIVE DIAGNOSIS: Same   PROCEDURES:   1. Cataract extraction with toric intraocular lens implant Right eye.  2. ENDOCYLOPHOTOCOAGULATION, right eye   SURGEON: Devin Ward M.D.  Assistant: Mireille Kerns M.D.       INDICATIONS: The patient Sukh Craven presented to the eye clinic with decreased vision secondary to cataract in the Right eye. The risks, benefits and alternatives to cataract extraction were discussed. The patient elected to proceed. All questions were answered to the patient's satisfaction.   DESCRIPTION OF PROCEDURE:Prior to the procedure, appropriate cardiac and respiratory monitors were applied to the patient.  In the pre-operative holding area, under monitored anesthesia care, a retrobulbar injection of 2% lidocaine with hyaluronidase was given.  The patient was brought to the operating room where a surgical pause was carried out to identify with all members of the surgical team the correct surgical site.  With adequate anesthesia and akinesia, the Right eye was prepped and draped in the usual sterile fashion. A lid speculum was placed, and the operating microscope was rotated into position. A paracentesis was created.  Through this limbal paracentesis, the anterior chamber was inflated with dispersive viscoelastic. A temporal wound was created at the limbus using a 2.5 mm blade. A capsulorrhexis was initiated using a bent 25-gauge needle and was completed in continuous and circular fashion using the capsulorrhexis forceps. The lens nucleus was hydrodissected using balanced salt solution.  The lens nucleus was rotated and removed using phacoemulsification in a stop and chop technique.  Residual cortical material was removed using irrigation-aspiration.  The capsular bag was reinflated to its maximal extent with cohesive viscoelastic.   A 19.0 diopter [id number] was inserted into the capsular bag.  The lens power selected was reviewed using the intraocular lens power measurements that were obtained preoperatively to confirm that the correct lens was selected for the desired post-operative refractive state. The anterior chamber and sulcus were again inflated with viscoelastic.  The endoscopic probe was positioned in the nasal sulcus.  Laser was administered over 180 degrees of the nasal ciliary body using energy setting of 0.2 W.  Treatment was titrated to a mild blanching and shrinkage of the ciliary body.  The remaining viscoelastic was removed from the anterior chamber in its entirety, and the wounds were hydrated and found to be self-sealing.  Intracameral moxifloxacin was administered.  Subconjunctival Dexamethasone (2 mg) was injected.  Tactile pressure was confirmed to be in a normal range.  The lid speculum was removed and a patch and shield were applied.  The patient tolerated the procedure well, and there were no complications.     PLAN: The patient will be discharged to home and will follow up tomorrow morning in the eye clinic.  EBL:  None  Complications:  None    Implant Name Type Inv. Item Serial No.  Lot No. LRB No. Used Action   LENS IOL TECNIS TORIC  [id number] 19.0 [id number] - [id number] Lens/Eye Implant LENS IOL TECNIS TORIC  [id number] 19.0 [id number] [employer]  Right 1 Implanted        Attending Physician Procedure Attestation: I was present for the entire procedure       Devin Ward MD  , Comprehensive Ophthalmology  Department of Ophthalmology and Visual Neurosciences  [facility]
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
## 2025-06-16 NOTE — DISCHARGE INSTRUCTIONS
[facility]  Home Care Following Anesthesia  For 24 hours after surgery:  Get plenty of rest.  A responsible adult must stay with you for at least 24 hours after you leave the surgery center.  Do not drive or use heavy equipment.  If you have weakness or tingling, don't drive or use heavy equipment until this feeling goes away.   Do not drink alcohol.   Avoid strenuous or risky activities.  Ask for help when climbing stairs.  You may feel lightheaded.  IF so, sit for a few minutes before standing.  Have someone help you get up.   If you have nausea (feel sick to your stomach): Drink only clear liquids such as apple juice, ginger ale, broth or 7-Up.  Rest may also help.  Be sure to drink enough fluids.  Move to a regular diet as you feel able.   You may have a slight fever.  Call the doctor if your fever is over 100 F (37.7 C) (taken under the tongue) or lasts longer than 24 hours.  You may have a dry mouth, a sore throat, muscle aches or trouble sleeping. These should go away after 24 hours.  Do not make important or legal decisions.   It is recommended to avoid smoking.               Tips for taking pain medications  To get the best pain relief possible, remember these points:  Take pain medications as directed, before pain becomes severe.  Pain medication can upset your stomach: taking it with food may help.  Constipation is a common side effect of pain medication. Drink plenty of  fluids.  Eat foods high in fiber. Take a stool softener if recommended by your doctor or pharmacist.  Do not drink alcohol, drive or operate machinery while taking pain medications.  Ask about other ways to control pain, such as with heat, ice or relaxation.    Tylenol/Acetaminophen Consumption    If you feel your pain relief is insufficient, you may take Tylenol/Acetaminophen in addition to your narcotic pain medication.   Be careful not to exceed 4,000 mg of Tylenol/Acetaminophen in a 24 hour  period from all sources.  If you are taking extra strength Tylenol/acetaminophen (500 mg), the maximum dose is 8 tablets in 24 hours.  If you are taking regular strength acetaminophen (325 mg), the maximum dose is 12 tablets in 24 hours.    Tylenol 975 mg given at 10:27 AM. Ok to take more after 4:27 PM.     Call a doctor for any of the following:  Signs of infection (fever, growing tenderness at the surgery site, a large amount of drainage or bleeding, severe pain, foul-smelling drainage, redness, swelling).  It has been over 8 to 10 hours since surgery and you are still not able to urinate (pass water).  Headache for over 24 hours.  Signs of Covid-19 infection (temperature over 100 degrees, shortness of breath, cough, loss of taste/smell, generalized body aches, persistent headache, chills, sore throat, nausea/vomiting/diarrhea)    Your doctor is:  Dr. Devin Ward, Ophthalmology: [phone]               After hours and weekends call the hospital @ [phone] and ask for the resident on call for:  Ophthalmology  For emergency care, call the:  [facility]:  [phone] (TTY for hearing impaired: [phone])

## 2025-06-16 NOTE — PROGRESS NOTES
Writer attempted to call patient 3 times because he is late for her arrival time for surgery. Pt did not answer. All calls went straight to voicemail. Writer left voicemail with number for preop desk and asked patient to call.

## 2025-06-16 NOTE — ANESTHESIA POSTPROCEDURE EVALUATION
Patient: Sukh Craven    Procedure: Procedure(s):  RIGHT EYE PHACOEMULSIFICATION, CATARACT, CLEAR CORNEAL INCISION APPROACH, WITH TORIC INTRAOCULAR LENS IMPLANT INSERTION AND ENDOSCOPIC CYCLOPHOTOCOAGULATION       Anesthesia Type:  MAC    Note:  Disposition: Outpatient   Postop Pain Control: Uneventful            Sign Out: Well controlled pain   PONV: No   Neuro/Psych: Uneventful            Sign Out: Acceptable/Baseline neuro status   Airway/Respiratory: Uneventful            Sign Out: Acceptable/Baseline resp. status   CV/Hemodynamics: Uneventful            Sign Out: Acceptable CV status; No obvious hypovolemia; No obvious fluid overload   Other NRE:    DID A NON-ROUTINE EVENT OCCUR?            Last vitals:  Vitals Value Taken Time   /90 06/16/25 12:00   Temp 36.1  C (97  F) 06/16/25 12:00   Pulse 60 06/16/25 11:56   Resp 16 06/16/25 12:00   SpO2 99 % 06/16/25 12:00   Vitals shown include unfiled device data.    Electronically Signed By: Madeline Mccormick MD  June 16, 2025  1:28 PM

## 2025-06-17 ENCOUNTER — OFFICE VISIT (OUTPATIENT)
Dept: OPHTHALMOLOGY | Facility: CLINIC | Age: 70
End: 2025-06-17
Attending: OPHTHALMOLOGY
Payer: COMMERCIAL

## 2025-06-17 DIAGNOSIS — Z96.1 PSEUDOPHAKIA, BOTH EYES: Primary | ICD-10-CM

## 2025-06-17 DIAGNOSIS — E11.9 TYPE 2 DIABETES MELLITUS WITHOUT COMPLICATION, WITHOUT LONG-TERM CURRENT USE OF INSULIN (H): ICD-10-CM

## 2025-06-17 DIAGNOSIS — H40.001 GLAUCOMA SUSPECT, RIGHT: ICD-10-CM

## 2025-06-17 DIAGNOSIS — H40.1122 PRIMARY OPEN ANGLE GLAUCOMA (POAG) OF LEFT EYE, MODERATE STAGE: ICD-10-CM

## 2025-06-17 PROCEDURE — 99024 POSTOP FOLLOW-UP VISIT: CPT | Performed by: OPHTHALMOLOGY

## 2025-06-17 PROCEDURE — G0463 HOSPITAL OUTPT CLINIC VISIT: HCPCS | Performed by: OPHTHALMOLOGY

## 2025-06-17 ASSESSMENT — VISUAL ACUITY
OD_SC: 20/40
OS_SC+: -1
OS_SC: 20/30
OD_PH_SC: 20/25
METHOD: SNELLEN - LINEAR
OD_PH_SC+: -1

## 2025-06-17 ASSESSMENT — EXTERNAL EXAM - RIGHT EYE: OD_EXAM: 1+ BROW PTOSIS, PROLAPSED FAT PADS: UPPER, LOWER

## 2025-06-17 ASSESSMENT — TONOMETRY
OS_IOP_MMHG: 13
IOP_METHOD: TONOPEN
OD_IOP_MMHG: 12

## 2025-06-17 ASSESSMENT — SLIT LAMP EXAM - LIDS
COMMENTS: DERMATOCHALASIS, SCLERAL SHOW
COMMENTS: DERMATOCHALASIS, SCLERAL SHOW

## 2025-06-17 ASSESSMENT — EXTERNAL EXAM - LEFT EYE: OS_EXAM: 1+ BROW PTOSIS, PROLAPSED FAT PADS: UPPER, LOWER

## 2025-06-17 NOTE — PROGRESS NOTES
HPI       Post Op (Ophthalmology) Right Eye    In right eye.  Associated symptoms include Negative for eye pain, itching and burning.  Pain was noted as 0/10. Additional comments: 1 day PO cataract extraction with toric intraocular lens implant/ ENDOCYLOPHOTOCOAGULATION, Right eye.             Comments    Patient reports no problem last night. No pain or irritation.    Samantha Barrera 8:58 AM 06/17/2025            Last edited by Samantha Barrera NP on 6/17/2025  8:58 AM.          Review of systems for the eyes was negative other than the pertinent positives/negatives listed in the HPI.      Assessment & Plan      Sukh Craven is a 70 year old male with the following diagnoses:   1. Pseudophakia, both eyes    2. Type 2 diabetes mellitus without complication, without long-term current use of insulin (H)    3. Primary open angle glaucoma (POAG) of left eye, moderate stage    4. Glaucoma suspect, right         PostOp, right eye, day 1    Doing well  Keep patch in place at night for 5 days  Start post-operative drops and taper according to instructions  Post-operative do's and don'ts reviewed, questions answered            Patient disposition:   Return for Recheck 2-3 weeks with refraction, OCT Macula.           Attending Physician Attestation:  Complete documentation of historical and exam elements from today's encounter can be found in the full encounter summary report (not reduplicated in this progress note).  I personally obtained the chief complaint(s) and history of present illness.  I confirmed and edited as necessary the review of systems, past medical/surgical history, family history, social history, and examination findings as documented by others; and I examined the patient myself.  I personally reviewed the relevant tests, images, and reports as documented above.  I formulated and edited as necessary the assessment and plan and discussed the findings and management plan with the patient and family. . Jose David LAGUNA  MD Ed

## 2025-06-25 ENCOUNTER — NURSE TRIAGE (OUTPATIENT)
Dept: NURSING | Facility: CLINIC | Age: 70
End: 2025-06-25
Payer: COMMERCIAL

## 2025-06-25 NOTE — TELEPHONE ENCOUNTER
"Nurse Triage SBAR    Is this a 2nd Level Triage? YES, LICENSED PRACTITIONER REVIEW IS REQUIRED    Situation: Right eye :pain- pressure, blurry vision    Background:6-16-25  RIGHT EYE PHACOEMULSIFICATION, CATARACT, CLEAR CORNEAL INCISION APPROACH,   WITH TORIC INTRAOCULAR LENS IMPLANT INSERTION AND ENDOSCOPIC CYCLOPHOTOCOAGULATION  Devin Ward MD    Assessment:   Right eye: started few days ago   Blurry vision, headache- pressure  When eye closed less pressure     Vision: no floaters, halo  Drainage: tears- clear, when pressure is really bad  Sclera: red    Pain/ pressure: 10, constant  Tylenol PM, took edge off - down to 8    Taking eye drops: see med list  One drop does sting    Protocol Recommended Disposition:   Call ADS/Go to ED/UCC Now (Or To Office with PCP Approval)    Recommendation:   High priority note to eye clinic for call back to pt w/ plan.  Pt # [phone]      Reason for Disposition   Blurred vision or visual changes and present now and sudden onset or new (e.g., minutes, hours, days)  (Exception: Seeing floaters / black specks OR previously diagnosed migraine headaches with same symptoms.)    Additional Information   Negative: Complete loss of vision in one or both eyes   Negative: Severe eye pain    Answer Assessment - Initial Assessment Questions  1. DESCRIPTION: \"How has your vision changed?\" (e.g., complete vision loss, blurred vision, double vision, floaters, etc.)         Right eye: started few days ago   Blurry vision, headache- pressure  When eye closed less pressure     Vision: no floaters, halo  Drainage: tears- clear, when pressure is really bad  Sclera: red    Pain/ pressure: 10, constant  Tylenol PM, took edge off - down to 8    Taking eye drops: see med list  One drop does sting    2. LOCATION: \"One or both eyes?\" If one, ask: \"Which eye?\"      Right eye    3. SEVERITY: \"Can you see anything?\" If Yes, ask: \"What can you see?\" (e.g., fine print)      Make out object:   4. " "ONSET: \"When did this begin?\" \"Did it start suddenly or has this been gradual?\"      Pos top   5. PATTERN: \"Does this come and go, or has it been constant since it started?\"          6. PAIN: \"Is there any pain in your eye(s)?\"  (Scale 1-10; or mild, moderate, severe)      See above    7. CONTACTS-GLASSES: \"Do you wear contacts or glasses?\"        Glasses    8. CAUSE: \"What do you think is causing this visual problem?\"        Surgery-    9. OTHER SYMPTOMS: \"Do you have any other symptoms?\" (e.g., confusion, headache, arm or leg weakness, speech problems)          10. PREGNANCY: \"Is there any chance you are pregnant?\" \"When was your last menstrual period?\"    Protocols used: Vision Loss or Change-A-OH    "

## 2025-06-25 NOTE — TELEPHONE ENCOUNTER
I spoke to patient who describes symptoms for the last few days.  He notes that this morning his Right eye is feeling better.   He notes Right eye pain and blurry vision.  The patient is scheduled for tomorrow. He will notify us if symptoms change again.

## 2025-06-25 NOTE — TELEPHONE ENCOUNTER
Caller reporting the following red-flag symptom(s): Sukh called because he has a cataract procedure on both eyes but his right eye he can't see out of and is really blurry, He is also getting extreme headaches

## 2025-06-26 ENCOUNTER — OFFICE VISIT (OUTPATIENT)
Dept: OPHTHALMOLOGY | Facility: CLINIC | Age: 70
End: 2025-06-26
Attending: OPHTHALMOLOGY
Payer: COMMERCIAL

## 2025-06-26 DIAGNOSIS — Z96.1 PSEUDOPHAKIA, BOTH EYES: ICD-10-CM

## 2025-06-26 DIAGNOSIS — H40.051 RAISED INTRAOCULAR PRESSURE OF RIGHT EYE: Primary | ICD-10-CM

## 2025-06-26 PROCEDURE — 99024 POSTOP FOLLOW-UP VISIT: CPT | Mod: GC | Performed by: OPHTHALMOLOGY

## 2025-06-26 PROCEDURE — 65800 DRAINAGE OF EYE: CPT | Mod: 79 | Performed by: OPHTHALMOLOGY

## 2025-06-26 PROCEDURE — G0463 HOSPITAL OUTPT CLINIC VISIT: HCPCS

## 2025-06-26 RX ORDER — DORZOLAMIDE HYDROCHLORIDE AND TIMOLOL MALEATE 20; 5 MG/ML; MG/ML
1 SOLUTION/ DROPS OPHTHALMIC 2 TIMES DAILY
Qty: 10 ML | Refills: 0 | Status: SHIPPED | OUTPATIENT
Start: 2025-06-26

## 2025-06-26 ASSESSMENT — TONOMETRY
IOP_METHOD: TONOPEN
OD_IOP_MMHG: 37
OD_IOP_MMHG: 18
IOP_METHOD: TONOPEN
OD_IOP_MMHG: 45
IOP_METHOD: TONOPEN
OD_IOP_MMHG: 38
IOP_METHOD: TONOPEN
IOP_METHOD: TONOPEN
OS_IOP_MMHG: 20
IOP_METHOD: TONOPEN
OD_IOP_MMHG: 39
OD_IOP_MMHG: 40
OD_IOP_MMHG: 41
OD_IOP_MMHG: 31

## 2025-06-26 ASSESSMENT — VISUAL ACUITY
OD_SC+: -2
OS_SC: 20/40
METHOD: SNELLEN - LINEAR
OD_SC: 20/30
OS_SC+: +1

## 2025-06-26 ASSESSMENT — EXTERNAL EXAM - RIGHT EYE: OD_EXAM: 1+ BROW PTOSIS, PROLAPSED FAT PADS: UPPER, LOWER

## 2025-06-26 ASSESSMENT — SLIT LAMP EXAM - LIDS
COMMENTS: DERMATOCHALASIS, SCLERAL SHOW
COMMENTS: DERMATOCHALASIS, SCLERAL SHOW

## 2025-06-26 ASSESSMENT — EXTERNAL EXAM - LEFT EYE: OS_EXAM: 1+ BROW PTOSIS, PROLAPSED FAT PADS: UPPER, LOWER

## 2025-06-26 NOTE — PROGRESS NOTES
"HPI       Post Op (Ophthalmology) Right Eye    In right eye.  Associated symptoms include eye pain.  Pain was noted as 7/10.             Comments    Sukh is here post bilateral cataract surgery, complaining of blurred vision for the past few days in right eye. He states right after surgery, right eye saw well for 4-5 days; then he woke on morning to find blurriness with eye pain that can be a \"10\" . The pain has been constant just varies in intensity      Kenny Stallworth COT 1:34 PM June 26, 2025             Last edited by Kenny Stallworth on 6/26/2025  1:34 PM.          Review of systems for the eyes was negative other than the pertinent positives/negatives listed in the HPI.      Assessment & Plan      Sukh Craven is a 70 year old male with the following diagnoses:   1. Raised intraocular pressure of right eye       Patient reports 2 days of pressure-like discomfort in right eye - s/p CE/IOL and ECP right eye. Pressures in 40's, no reduction with 3 rounds of drops, burped wound which maintained pressure at 18 mm Hg.      Continue Cosopt twice a day  Add latanaprost at bedtime bedtime  Continue Predforte and ketorolac four times a day right eye  Resume moxi three times a day right eye   Return precautions reviewed     Patient disposition:   Return in about 5 days (around 7/1/2025) for Follow Up, IOP check right eye.    Emerson Larson MD  PGY-2 Ophthalmology       Attending Physician Attestation:  Complete documentation of historical and exam elements from today's encounter can be found in the full encounter summary report (not reduplicated in this progress note).  I personally obtained the chief complaint(s) and history of present illness.  I confirmed and edited as necessary the review of systems, past medical/surgical history, family history, social history, and examination findings as documented by others; and I examined the patient myself.  I personally reviewed the relevant tests, images, and reports as " documented above.  I formulated and edited as necessary the assessment and plan and discussed the findings and management plan with the patient and family. .Attending Physician Procedure Attestation: I was present for the entire procedure    - Devin Ward MD

## 2025-06-26 NOTE — PATIENT INSTRUCTIONS
Continue all your other eye drops as directed  Start blue cap drop (2x a day in the right eye)  Start Lumigan eye drop AT NIGHT TIME only

## 2025-06-29 ENCOUNTER — HOSPITAL ENCOUNTER (EMERGENCY)
Facility: CLINIC | Age: 70
Discharge: HOME | End: 2025-06-29
Attending: STUDENT IN AN ORGANIZED HEALTH CARE EDUCATION/TRAINING PROGRAM | Admitting: STUDENT IN AN ORGANIZED HEALTH CARE EDUCATION/TRAINING PROGRAM
Payer: COMMERCIAL

## 2025-06-29 ENCOUNTER — TELEPHONE (OUTPATIENT)
Dept: OPHTHALMOLOGY | Facility: CLINIC | Age: 70
End: 2025-06-29
Payer: COMMERCIAL

## 2025-06-29 VITALS
TEMPERATURE: 97.8 F | DIASTOLIC BLOOD PRESSURE: 91 MMHG | SYSTOLIC BLOOD PRESSURE: 159 MMHG | RESPIRATION RATE: 20 BRPM | OXYGEN SATURATION: 100 % | HEART RATE: 66 BPM

## 2025-06-29 DIAGNOSIS — H57.11 ACUTE RIGHT EYE PAIN: ICD-10-CM

## 2025-06-29 PROCEDURE — 99284 EMERGENCY DEPT VISIT MOD MDM: CPT | Mod: GC | Performed by: OPHTHALMOLOGY

## 2025-06-29 PROCEDURE — 99284 EMERGENCY DEPT VISIT MOD MDM: CPT | Performed by: STUDENT IN AN ORGANIZED HEALTH CARE EDUCATION/TRAINING PROGRAM

## 2025-06-29 PROCEDURE — 99285 EMERGENCY DEPT VISIT HI MDM: CPT | Performed by: STUDENT IN AN ORGANIZED HEALTH CARE EDUCATION/TRAINING PROGRAM

## 2025-06-29 PROCEDURE — 250N000013 HC RX MED GY IP 250 OP 250 PS 637: Performed by: STUDENT IN AN ORGANIZED HEALTH CARE EDUCATION/TRAINING PROGRAM

## 2025-06-29 PROCEDURE — 250N000009 HC RX 250: Performed by: STUDENT IN AN ORGANIZED HEALTH CARE EDUCATION/TRAINING PROGRAM

## 2025-06-29 RX ORDER — OXYCODONE HYDROCHLORIDE 5 MG/1
5 TABLET ORAL ONCE
Refills: 0 | Status: COMPLETED | OUTPATIENT
Start: 2025-06-29 | End: 2025-06-29

## 2025-06-29 RX ORDER — PREDNISOLONE ACETATE 10 MG/ML
1-2 SUSPENSION/ DROPS OPHTHALMIC
Qty: 10 ML | Refills: 0 | Status: SHIPPED | OUTPATIENT
Start: 2025-06-29 | End: 2025-07-01

## 2025-06-29 RX ORDER — IBUPROFEN 600 MG/1
600 TABLET, FILM COATED ORAL ONCE
Status: COMPLETED | OUTPATIENT
Start: 2025-06-29 | End: 2025-06-29

## 2025-06-29 RX ORDER — PROPARACAINE HYDROCHLORIDE 5 MG/ML
1 SOLUTION/ DROPS OPHTHALMIC ONCE
Status: COMPLETED | OUTPATIENT
Start: 2025-06-29 | End: 2025-06-29

## 2025-06-29 RX ORDER — LATANOPROST 50 UG/ML
1 SOLUTION/ DROPS OPHTHALMIC ONCE
Status: COMPLETED | OUTPATIENT
Start: 2025-06-29 | End: 2025-06-29

## 2025-06-29 RX ORDER — BRIMONIDINE TARTRATE 2 MG/ML
1 SOLUTION/ DROPS OPHTHALMIC 2 TIMES DAILY
Qty: 5 ML | Refills: 0 | Status: SHIPPED | OUTPATIENT
Start: 2025-06-29

## 2025-06-29 RX ORDER — ACETAZOLAMIDE 500 MG/5ML
500 INJECTION, POWDER, LYOPHILIZED, FOR SOLUTION INTRAVENOUS ONCE
Status: DISCONTINUED | OUTPATIENT
Start: 2025-06-29 | End: 2025-06-29 | Stop reason: HOSPADM

## 2025-06-29 RX ORDER — ATROPINE SULFATE 10 MG/ML
1-2 SOLUTION/ DROPS OPHTHALMIC 2 TIMES DAILY
Qty: 5 ML | Refills: 0 | Status: SHIPPED | OUTPATIENT
Start: 2025-06-29

## 2025-06-29 RX ORDER — DORZOLAMIDE HYDROCHLORIDE AND TIMOLOL MALEATE 20; 5 MG/ML; MG/ML
1 SOLUTION/ DROPS OPHTHALMIC 2 TIMES DAILY
Status: DISCONTINUED | OUTPATIENT
Start: 2025-06-29 | End: 2025-06-29 | Stop reason: HOSPADM

## 2025-06-29 RX ORDER — ACETAMINOPHEN 325 MG/1
975 TABLET ORAL ONCE
Status: COMPLETED | OUTPATIENT
Start: 2025-06-29 | End: 2025-06-29

## 2025-06-29 RX ORDER — BRIMONIDINE TARTRATE 2 MG/ML
1 SOLUTION/ DROPS OPHTHALMIC ONCE
Status: COMPLETED | OUTPATIENT
Start: 2025-06-29 | End: 2025-06-29

## 2025-06-29 RX ADMIN — OXYCODONE HYDROCHLORIDE 5 MG: 5 TABLET ORAL at 13:51

## 2025-06-29 RX ADMIN — IBUPROFEN 600 MG: 600 TABLET ORAL at 13:51

## 2025-06-29 RX ADMIN — BRIMONIDINE TARTRATE 1 DROP: 2 SOLUTION OPHTHALMIC at 11:25

## 2025-06-29 RX ADMIN — PROPARACAINE HYDROCHLORIDE 1 DROP: 5 SOLUTION/ DROPS OPHTHALMIC at 10:41

## 2025-06-29 RX ADMIN — DORZOLAMIDE HYDROCHLORIDE AND TIMOLOL MALEATE 1 DROP: 20; 5 SOLUTION/ DROPS OPHTHALMIC at 11:25

## 2025-06-29 RX ADMIN — ACETAMINOPHEN 975 MG: 325 TABLET ORAL at 13:51

## 2025-06-29 RX ADMIN — LATANOPROST 1 DROP: 50 SOLUTION/ DROPS OPHTHALMIC at 10:41

## 2025-06-29 ASSESSMENT — ACTIVITIES OF DAILY LIVING (ADL)
ADLS_ACUITY_SCORE: 58

## 2025-06-29 ASSESSMENT — COLUMBIA-SUICIDE SEVERITY RATING SCALE - C-SSRS
2. HAVE YOU ACTUALLY HAD ANY THOUGHTS OF KILLING YOURSELF IN THE PAST MONTH?: NO
6. HAVE YOU EVER DONE ANYTHING, STARTED TO DO ANYTHING, OR PREPARED TO DO ANYTHING TO END YOUR LIFE?: NO
1. IN THE PAST MONTH, HAVE YOU WISHED YOU WERE DEAD OR WISHED YOU COULD GO TO SLEEP AND NOT WAKE UP?: NO
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
## 2025-06-29 NOTE — ED TRIAGE NOTES
Arrives ambulatory with elevated eye pressure and pain. States he had cataract surgery in 6/16. Pain has been coming and going.       Triage Assessment (Adult)       Row Name 06/29/25 1020          Triage Assessment    Airway WDL WDL        Respiratory WDL    Respiratory WDL WDL        Skin Circulation/Temperature WDL    Skin Circulation/Temperature WDL WDL        Cardiac WDL    Cardiac WDL WDL        Peripheral/Neurovascular WDL    Peripheral Neurovascular WDL WDL        Cognitive/Neuro/Behavioral WDL    Cognitive/Neuro/Behavioral WDL WDL

## 2025-06-29 NOTE — TELEPHONE ENCOUNTER
Received a page regarding telephone call from this patient.  Returned the page by phone call to the patient.  He had cataract surgery and endocyclophotocoagulation with Dr. Ward on June 16.  He was seen in the ophthalmology acute clinic on June 26 and noted to have elevated eye pressures in the 40s.  His pain at that time is similar to the pain that he was experiencing now.  The pain and pressure were refractory to topical therapy in the office.  IOP control required burping the corneal wound.  His pain improved immediately after reducing the IOP in clinic.  However, he notes that pain came back shortly thereafter and has persisted and worsened since then.  I advised that he proceed to the emergency department at [address] [place] [address].      I contacted the emergency department to make them aware of his arrival, asking them to record intraocular pressure, administer a drop of Cosopt, brimonidine, and latanoprost, and page me straightaway upon arrival. I asked them to have 500 mg IV Diamox available in case his pressure is refractory to a few rounds of topical drops.     Jax Currie MD  Resident Physician, PGY-2  Department of Ophthalmology  06/29/2025 9:57 AM

## 2025-06-29 NOTE — DISCHARGE INSTRUCTIONS
You were seen in the ER and found to have high eye pressures and inflammation in your eye. The ophthalmologist saw you and recommend the following:     Recommendations:  - Increase Pred Forte drops (steroid) to every 2 hours while awake (~8 times daily) in right eye  - Start atropine drops two times daily in right eye  - Start brimonidine drops two times daily in right eye  - Stop latanoprost drops right eye   - Continue Vigamox 4 times daily in right eye  - Continue ketorolac 4 times daily in right eye  - acetaminophen and ibuprofen as needed for pain control  - Follow-up ophthalmology acute clinic 6/30/25, a message was sent to our clinic scheduling staff  - Discussed return precautions

## 2025-06-29 NOTE — ED NOTES
Bed: [facility]  Expected date:   Expected time:   Means of arrival:   Comments:  Consult room
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
## 2025-06-30 ENCOUNTER — OFFICE VISIT (OUTPATIENT)
Dept: OPHTHALMOLOGY | Facility: CLINIC | Age: 70
End: 2025-06-30
Payer: COMMERCIAL

## 2025-06-30 ENCOUNTER — DOCUMENTATION ONLY (OUTPATIENT)
Dept: ANTICOAGULATION | Facility: CLINIC | Age: 70
End: 2025-06-30
Payer: COMMERCIAL

## 2025-06-30 DIAGNOSIS — H57.11 PAIN AROUND RIGHT EYE: ICD-10-CM

## 2025-06-30 DIAGNOSIS — H20.9 IRITIS OF RIGHT EYE: ICD-10-CM

## 2025-06-30 DIAGNOSIS — Z98.890 POST-OPERATIVE STATE: Primary | ICD-10-CM

## 2025-06-30 PROCEDURE — 99024 POSTOP FOLLOW-UP VISIT: CPT | Performed by: OPHTHALMOLOGY

## 2025-06-30 ASSESSMENT — PACHYMETRY
OS_CT(UM): 575
OD_CT(UM): 570

## 2025-06-30 ASSESSMENT — VISUAL ACUITY
OS_SC: 20/20
OS_SC+: -1
OD_SC: 20/40
OD_SC+: -2+1
METHOD: SNELLEN - LINEAR

## 2025-06-30 ASSESSMENT — EXTERNAL EXAM - LEFT EYE: OS_EXAM: 1+ BROW PTOSIS, PROLAPSED FAT PADS: UPPER, LOWER

## 2025-06-30 ASSESSMENT — TONOMETRY
IOP_METHOD: ICARE
OS_IOP_MMHG: 13
OD_IOP_MMHG: 16

## 2025-06-30 ASSESSMENT — EXTERNAL EXAM - RIGHT EYE: OD_EXAM: 1+ BROW PTOSIS, PROLAPSED FAT PADS: UPPER, LOWER

## 2025-06-30 ASSESSMENT — SLIT LAMP EXAM - LIDS
COMMENTS: DERMATOCHALASIS, SCLERAL SHOW
COMMENTS: DERMATOCHALASIS, SCLERAL SHOW

## 2025-06-30 ASSESSMENT — CUP TO DISC RATIO: OD_RATIO: 0.5

## 2025-06-30 NOTE — PROGRESS NOTES
HPI       Post Op (Ophthalmology) Right Eye    In right eye.  Onset was sudden.  Characterized as blurred vision.  Context:  distance vision, mid-range vision and near vision.  Associated symptoms include eye pain, redness, tearing and headache (yesterday, improved today).  Negative for double vision, nausea, vomiting, photophobia, flashes and floaters.  Treatments tried include eye drops.  Pain was noted as 6/10.             Comments    Pt seen in ED recs as follows:   - Increase Pred Forte to q2h while awake (~8 times daily) right eye  - Start atropine BID right eye  - Start brimonidine BID right eye  - Discontinue latanoprost right eye (risk of worsening inflammation)  - Continue Vigamox QID right eye  - Continue ketorolac QID right eye  - Oral pain control per ED    Pt reports good compliance with new drops, brought in today for verification          Last edited by Na Carey on 6/30/2025  1:50 PM.          Review of systems for the eyes was negative other than the pertinent positives/negatives listed in the HPI.      Assessment & Plan      Sukh Craven is a 70 year old male with the following diagnoses:   1. Post-operative state    2. Pain around right eye    3. Iritis of right eye           S/P cataract extraction + ENDOCYLOPHOTOCOAGULATION right eye 6/16/25  S/P cataract extraction + ENDOCYLOPHOTOCOAGULATION left eye 6/9/25  Presented last week with dull achy pain in the right eye and found to have persistent anterior chamber cell and high intraocular pressure.  Responded minimally to drops until anterior chamber tap performed.    Initial improvement in symptoms for 2 days, then worsening photophobia and more sharp pain developed  Seen by on-call team in ED yesterday with intraocular pressure found in the low 20s.  SLE c worsening evidence of keratitic precipitates and iritis.  No vitreous cell or hypopyon    Given escalation of topical pred and stopped latanoprost  Pain greatly improved, but  still feels blurry and light sensitive    Exam overall stable from yesterday.  Intraocular pressure again well controlled  Gonio without evidence of retained lens  Recommend return for UBM in 2 days if inflammation not better   Continue current drops for now  Return precautions reviewed     Patient disposition:   Return in about 2 days (around 7/2/2025) for VT only; UBM RIGHT.           Attending Physician Attestation:  Complete documentation of historical and exam elements from today's encounter can be found in the full encounter summary report (not reduplicated in this progress note).  I personally obtained the chief complaint(s) and history of present illness.  I confirmed and edited as necessary the review of systems, past medical/surgical history, family history, social history, and examination findings as documented by others; and I examined the patient myself.  I personally reviewed the relevant tests, images, and reports as documented above.  I formulated and edited as necessary the assessment and plan and discussed the findings and management plan with the patient and family. . - Devin Ward MD

## 2025-06-30 NOTE — PATIENT INSTRUCTIONS
Drop instructions:    Continue Pred Forte to every two hours while awake (~8 times daily) right eye    Continue atropine twice a day right eye    Continue brimonidine twice a day right eye    Continue Vigamox four times a day right eye    Continue ketorolac four times a day right eye

## 2025-06-30 NOTE — PROGRESS NOTES
"ANTICOAGULATION  MANAGEMENT: Discharge Review    Sukh Craven chart reviewed for anticoagulation continuity of care    Emergency room visit on 6/29/25 for eye pain.    Discharge disposition: Home    Results:    No results for input(s): \"INR\", \"[id number]\", \"F2\", \"ALMWH\", \"AAUFH\" in the last 168 hours.  Anticoagulation inpatient management:     not applicable     Anticoagulation discharge instructions:     Warfarin dosing: home regimen continued   Bridging: No   INR goal change: No      Medication changes affecting anticoagulation: No, all eye droplet solutions    Additional factors affecting anticoagulation: No     PLAN     No adjustment to anticoagulation plan needed    Recommended follow up is scheduled  Patient not contacted    No adjustment to Anticoagulation Calendar was required    Kristal Marquis RN  6/30/2025  Anticoagulation Clinic  [place] for routing messages: [facility]  [facility] patient phone line: [phone]  "

## 2025-07-02 ENCOUNTER — OFFICE VISIT (OUTPATIENT)
Dept: OPHTHALMOLOGY | Facility: CLINIC | Age: 70
End: 2025-07-02
Attending: OPHTHALMOLOGY
Payer: COMMERCIAL

## 2025-07-02 DIAGNOSIS — H20.9 IRITIS OF RIGHT EYE: ICD-10-CM

## 2025-07-02 DIAGNOSIS — Z98.890 POST-OPERATIVE STATE: Primary | ICD-10-CM

## 2025-07-02 DIAGNOSIS — H40.051 RAISED INTRAOCULAR PRESSURE OF RIGHT EYE: ICD-10-CM

## 2025-07-02 DIAGNOSIS — Z00.00 ENCOUNTER FOR MEDICARE ANNUAL WELLNESS EXAM: ICD-10-CM

## 2025-07-02 PROCEDURE — G0463 HOSPITAL OUTPT CLINIC VISIT: HCPCS | Performed by: OPHTHALMOLOGY

## 2025-07-02 PROCEDURE — 99024 POSTOP FOLLOW-UP VISIT: CPT | Performed by: OPHTHALMOLOGY

## 2025-07-02 PROCEDURE — 76513 OPH US DX ANT SGM US UNI/BI: CPT | Performed by: OPHTHALMOLOGY

## 2025-07-02 RX ORDER — LANOLIN ALCOHOL/MO/W.PET/CERES
100 CREAM (GRAM) TOPICAL DAILY
Qty: 90 TABLET | Refills: 0 | Status: SHIPPED | OUTPATIENT
Start: 2025-07-02

## 2025-07-02 ASSESSMENT — EXTERNAL EXAM - LEFT EYE: OS_EXAM: 1+ BROW PTOSIS, PROLAPSED FAT PADS: UPPER, LOWER

## 2025-07-02 ASSESSMENT — VISUAL ACUITY
OD_SC+: -2
METHOD: SNELLEN - LINEAR
OD_SC: 20/40
OS_SC+: +2
OS_SC: 20/25

## 2025-07-02 ASSESSMENT — SLIT LAMP EXAM - LIDS
COMMENTS: DERMATOCHALASIS, SCLERAL SHOW
COMMENTS: DERMATOCHALASIS, SCLERAL SHOW

## 2025-07-02 ASSESSMENT — TONOMETRY
IOP_METHOD: TONOPEN
OS_IOP_MMHG: 8
OD_IOP_MMHG: 12

## 2025-07-02 ASSESSMENT — CUP TO DISC RATIO: OD_RATIO: 0.5

## 2025-07-02 ASSESSMENT — EXTERNAL EXAM - RIGHT EYE: OD_EXAM: 1+ BROW PTOSIS, PROLAPSED FAT PADS: UPPER, LOWER

## 2025-07-02 NOTE — PATIENT INSTRUCTIONS
Drop instructions:    Continue Pred Forte (pink cap) 6x right eye    Continue atropine () twice a day right eye    Continue brimonidine (purple cap) twice a day right eye    Continue Vyzulta (teal green cap) at bedtime right eye     Continue ketorolac (gray cap) four times a day right eye    Stop Vigamox (tan cap)

## 2025-07-02 NOTE — PROGRESS NOTES
HPI       Post Op (Ophthalmology) Both Eyes    In both eyes.  Since onset it is stable.  Associated symptoms include eye pain.  Negative for flashes and floaters.  Treatments tried include eye drops.             Comments    Here for post op. VA is about the same. Pain has improved. No flashes or floaters. Compliant with drops.    HEATHER Iqbal 2:42 PM July 2, 2025             Last edited by Rod Martinez COMT on 7/2/2025  2:42 PM.          Review of systems for the eyes was negative other than the pertinent positives/negatives listed in the HPI.      Assessment & Plan      Sukh Craven is a 70 year old male with the following diagnoses:   1. Post-operative state    2. Iritis of right eye    3. Raised intraocular pressure of right eye         S/P cataract extraction + ENDOCYLOPHOTOCOAGULATION right eye 6/16/25  S/P cataract extraction + ENDOCYLOPHOTOCOAGULATION left eye 6/9/25  Right eye complicated by persistent anterior chamber cell and high intraocular pressure.  Responded minimally to drops until anterior chamber tap performed 6/26/25.      Pain and light sensitivity continues to improve  Exam improving.    Intraocular pressure again well controlled    Gonio without evidence of retained lens  UBM c ? CB inflammation v retained fragment temporally in the sulcus      Drop instructions:    Continue Pred Forte (pink cap) 6x right eye    Continue atropine () twice a day right eye    Continue brimonidine (purple cap) twice a day right eye    Continue Vyzulta (teal green cap) at bedtime right eye     Continue ketorolac (gray cap) four times a day right eye    Stop Vigamox (tan cap)    Return precautions reviewed        Patient disposition:   Return in about 3 weeks (around 7/23/2025) for Refraction, VT only.           Attending Physician Attestation:  Complete documentation of historical and exam elements from today's encounter can be found in the full encounter summary report (not reduplicated in this progress  note).  I personally obtained the chief complaint(s) and history of present illness.  I confirmed and edited as necessary the review of systems, past medical/surgical history, family history, social history, and examination findings as documented by others; and I examined the patient myself.  I personally reviewed the relevant tests, images, and reports as documented above.  I formulated and edited as necessary the assessment and plan and discussed the findings and management plan with the patient and family. . - Devin Ward MD

## 2025-07-24 ENCOUNTER — OFFICE VISIT (OUTPATIENT)
Dept: OPHTHALMOLOGY | Facility: CLINIC | Age: 70
End: 2025-07-24
Attending: OPHTHALMOLOGY
Payer: COMMERCIAL

## 2025-07-24 DIAGNOSIS — H40.003 GLAUCOMA SUSPECT OF BOTH EYES: ICD-10-CM

## 2025-07-24 DIAGNOSIS — H40.051 RAISED INTRAOCULAR PRESSURE OF RIGHT EYE: ICD-10-CM

## 2025-07-24 DIAGNOSIS — H25.813 COMBINED FORMS OF AGE-RELATED CATARACT OF BOTH EYES: ICD-10-CM

## 2025-07-24 DIAGNOSIS — Z98.890 POST-OPERATIVE STATE: Primary | ICD-10-CM

## 2025-07-24 DIAGNOSIS — H40.1122 PRIMARY OPEN ANGLE GLAUCOMA (POAG) OF LEFT EYE, MODERATE STAGE: ICD-10-CM

## 2025-07-24 PROCEDURE — 92134 CPTRZ OPH DX IMG PST SGM RTA: CPT | Performed by: OPHTHALMOLOGY

## 2025-07-24 RX ORDER — DORZOLAMIDE HYDROCHLORIDE AND TIMOLOL MALEATE 20; 5 MG/ML; MG/ML
1 SOLUTION/ DROPS OPHTHALMIC 2 TIMES DAILY
Qty: 10 ML | Refills: 0 | Status: SHIPPED | OUTPATIENT
Start: 2025-07-24

## 2025-07-24 RX ORDER — ATROPINE SULFATE 10 MG/ML
1-2 SOLUTION/ DROPS OPHTHALMIC 2 TIMES DAILY
Qty: 5 ML | Refills: 0 | Status: SHIPPED | OUTPATIENT
Start: 2025-07-24

## 2025-07-24 RX ORDER — BRIMONIDINE TARTRATE 2 MG/ML
1 SOLUTION/ DROPS OPHTHALMIC 2 TIMES DAILY
Qty: 5 ML | Refills: 0 | Status: SHIPPED | OUTPATIENT
Start: 2025-07-24

## 2025-07-24 RX ORDER — PREDNISOLONE ACETATE 10 MG/ML
1 SUSPENSION/ DROPS OPHTHALMIC 4 TIMES DAILY
Qty: 10 ML | Refills: 1 | Status: SHIPPED | OUTPATIENT
Start: 2025-07-24

## 2025-07-24 ASSESSMENT — REFRACTION_MANIFEST
OD_SPHERE: PLANO
OS_SPHERE: PLANO
METHOD_AUTOREFRACTION: 1
OS_CYLINDER: +1.25
OD_AXIS: 001
OD_CYLINDER: +0.75
OS_AXIS: 006
OD_AXIS: 171
OS_CYLINDER: +1.25
OD_SPHERE: -0.50
OS_AXIS: 180
OS_SPHERE: +0.25
OS_ADD: +2.50
OD_ADD: +2.50
OD_CYLINDER: +0.75

## 2025-07-24 ASSESSMENT — VISUAL ACUITY
OS_SC: 20/30
METHOD: SNELLEN - LINEAR
OD_SC+: -1
OD_SC: 20/25
OS_SC+: +1

## 2025-07-24 ASSESSMENT — EXTERNAL EXAM - LEFT EYE: OS_EXAM: 1+ BROW PTOSIS, PROLAPSED FAT PADS: UPPER, LOWER

## 2025-07-24 ASSESSMENT — TONOMETRY
IOP_METHOD: ICARE
OS_IOP_MMHG: 9
OD_IOP_MMHG: 9

## 2025-07-24 ASSESSMENT — SLIT LAMP EXAM - LIDS
COMMENTS: DERMATOCHALASIS, SCLERAL SHOW
COMMENTS: DERMATOCHALASIS, SCLERAL SHOW

## 2025-07-24 ASSESSMENT — CUP TO DISC RATIO: OD_RATIO: 0.5

## 2025-07-24 ASSESSMENT — EXTERNAL EXAM - RIGHT EYE: OD_EXAM: 1+ BROW PTOSIS, PROLAPSED FAT PADS: UPPER, LOWER

## 2025-07-24 NOTE — PROGRESS NOTES
HPI       Post Op (Ophthalmology) Both Eyes    Associated symptoms include haloes, eye pain and tearing.  Negative for flashes, floaters, discharge and swelling.  Treatments tried include eye drops.             Comments    Pt's left eye is clear but the right eye is still blurry. He also sees a halo with the right eye only. He occasionally feels pressure in the right eye, and has tearing from that eye.     Ocular Meds:   Continue Pred Forte (pink cap) 6x right eye  Continue atropine () twice a day right eye  Continue brimonidine (purple cap) twice a day right eye  Continue Vyzulta (teal green cap) at bedtime right eye   Continue ketorolac (gray cap) four times a day right eye  No drops for left eye    Melani Garcia OA 1:49 PM July 24, 2025           Melani Garcia OA 1:48 PM July 24, 2025             Last edited by Melani Garcia on 7/24/2025  1:50 PM.          Review of systems for the eyes was negative other than the pertinent positives/negatives listed in the HPI.      Assessment & Plan      Sukh Craven is a 70 year old male with the following diagnoses:   1. Post-operative state    2. Glaucoma suspect of both eyes         S/P cataract extraction + ENDOCYLOPHOTOCOAGULATION right eye 6/16/25  S/P cataract extraction + ENDOCYLOPHOTOCOAGULATION left eye 6/9/25  Right eye complicated by persistent anterior chamber cell and high intraocular pressure.  Responded minimally to drops until anterior chamber tap performed 6/26/25.    Gonio without evidence of retained lens  UBM c ? CB inflammation v retained fragment temporally in the sulcus     Pain resolved   Exam improving, vision improving  Intraocular pressure excellent today in both eyes       Drop instructions:     Start Pred Forte (pink cap) 4x per day in the left AND right eye for two weeks, then taper to 3x per day for two weeks, then 2x per day. Continue 2x per day in both eyes until follow up     Continue atropine () twice a  day right eye     Continue brimonidine (purple cap) twice a day right eye     Continue Vyzulta (teal green cap) at bedtime right eye     Continue hat and glasses wear as needed for light sensitivity     Stop ketorolac (gray cap)     Return precautions reviewed       Patient disposition:   Return in about 1 month (around 8/24/2025) for VT only.    If no improvement, repeat UBM right eye next visit       Attending Physician Attestation:  Complete documentation of historical and exam elements from today's encounter can be found in the full encounter summary report (not reduplicated in this progress note).  I personally obtained the chief complaint(s) and history of present illness.  I confirmed and edited as necessary the review of systems, past medical/surgical history, family history, social history, and examination findings as documented by others; and I examined the patient myself.  I personally reviewed the relevant tests, images, and reports as documented above.  I formulated and edited as necessary the assessment and plan and discussed the findings and management plan with the patient and family. .Attending Physician Image/Tesing Attestation: I personally reviewed the ophthalmic test(s) associated with this encounter, agree with the interpretation(s) as documented by the resident/fellow, and have edited the corresponding report(s) as necessary.   - Devin Ward MD

## 2025-07-24 NOTE — PATIENT INSTRUCTIONS
BOTH EYES  REDUCE Pred Forte (pink cap) to 4x per day for two weeks, then 3x per day for two weeks, then 2x per day. Continue 2x per day in both eyes until follow up     RIGHT EYE    Continue atropine () twice a day right eye     Continue brimonidine (purple cap) twice a day right eye     Continue Vyzulta (teal green cap) at bedtime right eye     Continue hat and glasses wear as needed for light sensitivity     Stop ketorolac (gray cap)

## 2025-07-27 ENCOUNTER — HEALTH MAINTENANCE LETTER (OUTPATIENT)
Age: 70
End: 2025-07-27

## 2025-08-06 PROBLEM — Z95.2 S/P AVR (AORTIC VALVE REPLACEMENT): Status: RESOLVED | Noted: 2023-10-17 | Resolved: 2025-08-06

## 2025-08-26 ENCOUNTER — OFFICE VISIT (OUTPATIENT)
Dept: OPHTHALMOLOGY | Facility: CLINIC | Age: 70
End: 2025-08-26
Attending: OPHTHALMOLOGY
Payer: COMMERCIAL

## 2025-08-26 DIAGNOSIS — Z96.1 PSEUDOPHAKIA, BOTH EYES: ICD-10-CM

## 2025-08-26 DIAGNOSIS — H25.813 COMBINED FORMS OF AGE-RELATED CATARACT OF BOTH EYES: Primary | ICD-10-CM

## 2025-08-26 DIAGNOSIS — H20.9 IRITIS OF RIGHT EYE: ICD-10-CM

## 2025-08-26 PROCEDURE — 99024 POSTOP FOLLOW-UP VISIT: CPT | Mod: GC | Performed by: OPHTHALMOLOGY

## 2025-08-26 PROCEDURE — G0463 HOSPITAL OUTPT CLINIC VISIT: HCPCS | Performed by: OPHTHALMOLOGY

## 2025-08-26 ASSESSMENT — CONF VISUAL FIELD
OS_NORMAL: 1
OS_INFERIOR_TEMPORAL_RESTRICTION: 0
OD_NORMAL: 1
OS_SUPERIOR_NASAL_RESTRICTION: 0
OD_SUPERIOR_NASAL_RESTRICTION: 0
OS_INFERIOR_NASAL_RESTRICTION: 0
OD_INFERIOR_TEMPORAL_RESTRICTION: 0
OD_INFERIOR_NASAL_RESTRICTION: 0
OS_SUPERIOR_TEMPORAL_RESTRICTION: 0
OD_SUPERIOR_TEMPORAL_RESTRICTION: 0

## 2025-08-26 ASSESSMENT — VISUAL ACUITY
OD_SC: 20/30
OS_SC+: +3
METHOD: SNELLEN - LINEAR
OD_PH_SC: 20/25
OS_SC: 20/30
OS_PH_SC+: -2
OS_PH_SC: 20/25

## 2025-08-26 ASSESSMENT — TONOMETRY
IOP_METHOD: TONOPEN
OD_IOP_MMHG: 13
OS_IOP_MMHG: 7

## 2025-08-26 ASSESSMENT — SLIT LAMP EXAM - LIDS
COMMENTS: DERMATOCHALASIS, SCLERAL SHOW
COMMENTS: DERMATOCHALASIS, SCLERAL SHOW

## 2025-08-26 ASSESSMENT — CUP TO DISC RATIO
OD_RATIO: 0.5
OS_RATIO: 0.5

## 2025-08-26 ASSESSMENT — EXTERNAL EXAM - RIGHT EYE: OD_EXAM: 1+ BROW PTOSIS, PROLAPSED FAT PADS: UPPER, LOWER

## 2025-08-26 ASSESSMENT — EXTERNAL EXAM - LEFT EYE: OS_EXAM: 1+ BROW PTOSIS, PROLAPSED FAT PADS: UPPER, LOWER

## 2025-08-27 ENCOUNTER — ANTICOAGULATION THERAPY VISIT (OUTPATIENT)
Dept: ANTICOAGULATION | Facility: CLINIC | Age: 70
End: 2025-08-27

## 2025-08-27 ENCOUNTER — LAB (OUTPATIENT)
Dept: LAB | Facility: CLINIC | Age: 70
End: 2025-08-27
Payer: COMMERCIAL

## 2025-08-27 ENCOUNTER — RESULTS FOLLOW-UP (OUTPATIENT)
Dept: ANTICOAGULATION | Facility: CLINIC | Age: 70
End: 2025-08-27

## 2025-08-27 DIAGNOSIS — I48.0 PAROXYSMAL ATRIAL FIBRILLATION (H): ICD-10-CM

## 2025-08-27 DIAGNOSIS — Z95.2 S/P AVR (AORTIC VALVE REPLACEMENT): ICD-10-CM

## 2025-08-27 DIAGNOSIS — I48.0 PAROXYSMAL ATRIAL FIBRILLATION (H): Primary | ICD-10-CM

## 2025-08-27 LAB — INR BLD: 2.7 (ref 0.9–1.1)

## 2025-08-27 PROCEDURE — 85610 PROTHROMBIN TIME: CPT

## 2025-08-27 PROCEDURE — 36415 COLL VENOUS BLD VENIPUNCTURE: CPT

## (undated) DEVICE — NDL ANGIOCATH 14GA 1.25" 4048

## (undated) DEVICE — SOL NACL 0.9% IRRIG 3000ML BAG 2B7477

## (undated) DEVICE — SU DEVICE ENDO COR KNOT QUICK LOAD RELOAD 030874

## (undated) DEVICE — ESU ELEC BLADE E-SEP INSULATED NEPTUNE 70MM 0703-070-002

## (undated) DEVICE — SU DERMABOND ADVANCED .7ML DNX12

## (undated) DEVICE — SU PROLENE 5-0 C-1DA 36" 8720H

## (undated) DEVICE — SU SILK 0 TIE 6X30" A306H

## (undated) DEVICE — GLOVE BIOGEL PI MICRO SZ 7.5 48575

## (undated) DEVICE — GLOVE PROTEXIS W/NEU-THERA 7.5  2D73TE75

## (undated) DEVICE — GLOVE BIOGEL PI ULTRATOUCH SZ 7.5 41175

## (undated) DEVICE — CATARACT BLADE PACK 2.6MM 58001899

## (undated) DEVICE — SNARE CAPIVATOR ROUND COLD SNR BX10 M00561101

## (undated) DEVICE — JELLY LUBRICATING SURGILUBE 2OZ TUBE

## (undated) DEVICE — BLADE KNIFE BEAVER MICROSHARP GREEN 377515

## (undated) DEVICE — BLADE SAW STERNAL 20X30MM KM-32

## (undated) DEVICE — TUBING INSUFFLATION PNEUMOCLEAR 0620050100

## (undated) DEVICE — ESU HOLSTER PLASTIC DISP E2400

## (undated) DEVICE — TUBING SUCTION 12"X1/4" N612

## (undated) DEVICE — GUIDEWIRE OPTOWIRE 3 W/O GAUGE FACTOR CONNECTOR F1032

## (undated) DEVICE — PAD CHUX UNDERPAD 30X36" P3036C

## (undated) DEVICE — PACK CYSTO CUSTOM ASC

## (undated) DEVICE — SOL WATER IRRIG 500ML BOTTLE 2F7113

## (undated) DEVICE — TAPE MEDIPORE 4"X2YD 2864

## (undated) DEVICE — CLIP HORIZON SM RED WIDE SLOT 001201

## (undated) DEVICE — SYR 70ML TOOMEY 041170

## (undated) DEVICE — SUCTION CATH AIRLIFE TRI-FLO W/CONTROL PORT 14FR  T60C

## (undated) DEVICE — ENDO FORCEP SPIKED SERRATED SHAFT JUMBO 239CM G56998

## (undated) DEVICE — STRAP KNEE/BODY 31143004

## (undated) DEVICE — LINEN TOWEL PACK X5 5464

## (undated) DEVICE — DRSG TELFA 3X8" 1238

## (undated) DEVICE — INTRO SHEATH AVANTI 4FRX23CM 504604T

## (undated) DEVICE — BNDG ELASTIC 6"X5YDS STERILE 6611-6S

## (undated) DEVICE — SUCTION MANIFOLD NEPTUNE 2 SYS 1 PORT 702-025-000

## (undated) DEVICE — TUBING SUCTION MEDI-VAC 1/4"X20' N620A

## (undated) DEVICE — SU ETHIBOND 3-0 BBDA 36" X588H

## (undated) DEVICE — Device

## (undated) DEVICE — SYR 30ML SLIP TIP W/O NDL 302833

## (undated) DEVICE — DRSG DRAIN 4X4" 7086

## (undated) DEVICE — EYE TIP IRRIGATION & ASPIRATION POLYMER CVD 0.3MM 8065751512

## (undated) DEVICE — CLIP HORIZON LG ORANGE 004200

## (undated) DEVICE — SU ETHIBOND 2-0 CT-1 8X18" CX22D

## (undated) DEVICE — PACK CATARACT CUSTOM ASC SEY15CPUMC

## (undated) DEVICE — PACK HEART LEFT CUSTOM

## (undated) DEVICE — SURGICEL HEMOSTAT 4X8" 1952

## (undated) DEVICE — SU PROLENE 3-0 SHDA 36" 8522H

## (undated) DEVICE — SU PROLENE 5-0 RB-2DA 30" 8710H

## (undated) DEVICE — DRAPE MAYO STAND 23X54 8337

## (undated) DEVICE — KIT HAND CONTROL ACIST 016795

## (undated) DEVICE — SYR 50ML LL W/O NDL 309653

## (undated) DEVICE — BLOWER/MISTER CLEARVIEW 22150

## (undated) DEVICE — EYE SHIELD PLASTIC

## (undated) DEVICE — SU VICRYL 2-0 CT-1 27" UND J259H

## (undated) DEVICE — BAG URINARY DRAIN LUBRISIL IC 4000ML LF 253509A

## (undated) DEVICE — RX SURGIFLO HEMOSTATIC MATRIX W/THROMBIN 8ML 2994

## (undated) DEVICE — DRAPE FLUID WARMING 52 X 60" ORS-321

## (undated) DEVICE — SU ETHIBOND 2-0 SHDA 30" X563H

## (undated) DEVICE — PATCH SURGICAL EVARREST FIBRIN SEALANT 4X2IN EVT5024

## (undated) DEVICE — KIT ENDO VASOVIEW HEMOPRO 2 VH-4000

## (undated) DEVICE — CLIP HORIZON MED BLUE 002200

## (undated) DEVICE — SOL WATER IRRIG 1000ML BOTTLE 2F7114

## (undated) DEVICE — BLADE CLIPPER SGL USE 9680

## (undated) DEVICE — PROTECTOR ARM ONE-STEP TRENDELENBURG 40418

## (undated) DEVICE — SPONGE RAY-TEC 4X8" 7318

## (undated) DEVICE — SOL NACL 0.9% IRRIG 500ML BOTTLE 2F7123

## (undated) DEVICE — CATH DIAG 4FR JL 6.0  538424

## (undated) DEVICE — ENDO BITE BLOCK ADULT OMNI-BLOC

## (undated) DEVICE — CONNECTOR SIMS TUBING FOR CHEST TUBES 361

## (undated) DEVICE — SYR 01ML 27GA 0.5" NDL TBC 309623

## (undated) DEVICE — SPECIMEN CONTAINER 3OZ W/FORMALIN 59901

## (undated) DEVICE — DRSG GAUZE 4X8"

## (undated) DEVICE — TOURNIQUET VASCULAR KIT 7 1/2" 79012

## (undated) DEVICE — LINEN GOWN XLG 5407

## (undated) DEVICE — SUCTION MANIFOLD DORNOCH ULTRA CART UL-CL500

## (undated) DEVICE — SU PROLENE 7-0 BV-1DA 24" 8702H

## (undated) DEVICE — PIRANHA TUBING SET

## (undated) DEVICE — GLOVE PROTEXIS W/NEU-THERA 8.0  2D73TE80

## (undated) DEVICE — POSITIONER ASSIST ESSTECH 3S T401210S

## (undated) DEVICE — CATH TRAY FOLEY 16FR BARDEX W/TEMP PRB URINE METER 319416AM

## (undated) DEVICE — DRSG ABDOMINAL 07 1/2X8" 7197D

## (undated) DEVICE — TIES BANDING T50R

## (undated) DEVICE — LINEN GOWN X4 5410

## (undated) DEVICE — SU PROLENE 4-0 SHDA 36" 8521H

## (undated) DEVICE — DEFIB PRO-PADZ LVP LQD GEL ADULT 8900-2105-01

## (undated) DEVICE — SU PROLENE 4-0 RB-1DA 36" 8557H

## (undated) DEVICE — SU STRATAFIX MONOCRYL 3-0 SPIRAL PS-2 45CM SXMP1B107

## (undated) DEVICE — DECANTER BAG 2002S

## (undated) DEVICE — KIT ENDO TURNOVER/PROCEDURE CARRY-ON 101822

## (undated) DEVICE — LIGHT HANDLE X2

## (undated) DEVICE — SU STRATAFIX PDS PLUS 0 CT 45CM SXPP1A406

## (undated) DEVICE — ESU EYE LOW TEMP AA17

## (undated) DEVICE — SU PROLENE 6-0 C-1DA 30" 8706H

## (undated) DEVICE — SU STEEL 6 CCS 4X18" M654G

## (undated) DEVICE — CONNECTOR BLAKE DRAIN SGL BCC1

## (undated) DEVICE — SUCTION MANIFOLD NEPTUNE 2 SYS 4 PORT 0702-020-000

## (undated) DEVICE — EYE PACK CUSTOM ANTERIOR 30DEG TIP CENTURION PPK6682-04

## (undated) DEVICE — LEAD PACER MYOCARDIAL BIPOLAR TEMPORARY 53CM 6495F

## (undated) DEVICE — SU VICRYL 3-0 FS-1 27" J442H

## (undated) DEVICE — DEVICE TISSUE STABILIZATION OCTOBASE 28707

## (undated) DEVICE — TUBING IRRIG CYSTO/BLADDER SET 81" LF 2C4040

## (undated) DEVICE — CATH FOLEY 3WAY 22FR 30ML SIL 73022SI

## (undated) DEVICE — SOL NACL 0.9% IRRIG 1000ML BOTTLE 2F7124

## (undated) DEVICE — CANNULA VESSEL DLP  30001

## (undated) DEVICE — WIPES FOLEY CARE SURESTEP PROVON DFC100

## (undated) DEVICE — LASER FIBER HOLMIUM END FIRE SLIMLINE EZ550 M0068408940

## (undated) DEVICE — ESU PENCIL SMOKE EVAC W/ROCKER SWITCH 0703-047-000

## (undated) DEVICE — SU DEVICE COR-KNOT MINI 4X14MM 031350

## (undated) DEVICE — TUBING SUCTION DRAINAGE PLEURAL DUAL 8884714200

## (undated) DEVICE — LINEN TOWEL PACK X30 5481

## (undated) DEVICE — LINEN TOWEL PACK X6 WHITE 5487

## (undated) DEVICE — MANIFOLD KIT ANGIO AUTOMATED 014613

## (undated) DEVICE — ESU GROUND PAD ADULT W/CORD E7507

## (undated) DEVICE — DRAIN MEDIASTINAL 9MM 14609

## (undated) DEVICE — CATH LASER URETERAL 7.1FRX40CM G17797  022403-7.1-40

## (undated) DEVICE — CATH ANGIO INFINITI 3DRC 4FRX100CM 538476

## (undated) DEVICE — SYR ELLIK EVACUATOR W/ADAPT LATEX

## (undated) DEVICE — SU STRATAFIX PDS PLUS 2-0 SPIRAL CT-1 45CM SXPP1B411

## (undated) DEVICE — PAD CHUX UNDERPAD 30X30"

## (undated) DEVICE — SPECIMEN CONTAINER 5OZ STERILE 2600SA

## (undated) DEVICE — GOWN IMPERVIOUS 2XL BLUE

## (undated) DEVICE — BNDG ELASTIC 4"X5YDS STERILE 6611-4S

## (undated) DEVICE — GLOVE EXAM NITRILE LG PF LATEX FREE 5064

## (undated) DEVICE — SUCTION DRY CHEST DRAIN OASIS 3600-100

## (undated) DEVICE — BLADE KNIFE BEAVER MINI STR BEAVER6900

## (undated) DEVICE — GOWN XLG DISP 9545

## (undated) DEVICE — CATH FOLEY 3WAY 24FR 30ML SIL LUBRISIL IC 73024SI

## (undated) DEVICE — SUCTION SLEEVE NEPTUNE 2 165MM 0703-005-165

## (undated) DEVICE — CATH ANGIO INFINITI JL5 4FRX100CM 538422

## (undated) DEVICE — ANTIFOG SOLUTION W/FOAM PAD 31142527

## (undated) DEVICE — SU STEEL MYO/WIRE II STERNOTOMY 8 BE-1 3X14" 048-217

## (undated) DEVICE — CLIP SPRING FOGARTY SOFTJAW CSOFT6

## (undated) DEVICE — NDL COUNTER 40CT  31142311

## (undated) DEVICE — PACK ADULT HEART UMMC PV15CG92D

## (undated) DEVICE — DRAPE IOBAN INCISE 23X17" 6650EZ

## (undated) DEVICE — PREP CHLORAPREP 26ML TINTED HI-LITE ORANGE 930815

## (undated) DEVICE — ESU ELEC BLADE 6" COATED/INSULATED E1455-6

## (undated) DEVICE — SU VICRYL 0 CTX 36" J370H

## (undated) DEVICE — SU ETHIBOND 2-0 V-5DA 10X30" PXX52

## (undated) DEVICE — TUBING PRESSURE 30"

## (undated) DEVICE — SU PLEDGET SOFT TFE 3/8"X3/26"X1/16" PCP40

## (undated) DEVICE — SOL NACL 0.9% 10ML VIAL 0409-4888-02

## (undated) DEVICE — SU PROLENE 8-0 BV130-5DA 24" 8732H

## (undated) RX ORDER — ONDANSETRON 2 MG/ML
INJECTION INTRAMUSCULAR; INTRAVENOUS
Status: DISPENSED
Start: 2025-06-09

## (undated) RX ORDER — CEFAZOLIN SODIUM 2 G/50ML
SOLUTION INTRAVENOUS
Status: DISPENSED
Start: 2023-02-16

## (undated) RX ORDER — FENTANYL CITRATE 50 UG/ML
INJECTION, SOLUTION INTRAMUSCULAR; INTRAVENOUS
Status: DISPENSED
Start: 2023-10-13

## (undated) RX ORDER — PROTAMINE SULFATE 10 MG/ML
INJECTION, SOLUTION INTRAVENOUS
Status: DISPENSED
Start: 2023-09-08

## (undated) RX ORDER — PAPAVERINE HYDROCHLORIDE 30 MG/ML
INJECTION INTRAMUSCULAR; INTRAVENOUS
Status: DISPENSED
Start: 2023-10-13

## (undated) RX ORDER — SODIUM CHLORIDE 9 MG/ML
INJECTION, SOLUTION INTRAVENOUS
Status: DISPENSED
Start: 2023-09-08

## (undated) RX ORDER — PROPOFOL 10 MG/ML
INJECTION, EMULSION INTRAVENOUS
Status: DISPENSED
Start: 2025-06-09

## (undated) RX ORDER — PROPOFOL 10 MG/ML
INJECTION, EMULSION INTRAVENOUS
Status: DISPENSED
Start: 2023-02-16

## (undated) RX ORDER — FAMOTIDINE 20 MG/1
TABLET, FILM COATED ORAL
Status: DISPENSED
Start: 2023-10-13

## (undated) RX ORDER — ACETAMINOPHEN 325 MG/1
TABLET ORAL
Status: DISPENSED
Start: 2023-02-16

## (undated) RX ORDER — CEFAZOLIN SODIUM 1 G/3ML
INJECTION, POWDER, FOR SOLUTION INTRAMUSCULAR; INTRAVENOUS
Status: DISPENSED
Start: 2023-10-13

## (undated) RX ORDER — LIDOCAINE HYDROCHLORIDE 10 MG/ML
INJECTION, SOLUTION EPIDURAL; INFILTRATION; INTRACAUDAL; PERINEURAL
Status: DISPENSED
Start: 2023-09-08

## (undated) RX ORDER — CHLORHEXIDINE GLUCONATE ORAL RINSE 1.2 MG/ML
SOLUTION DENTAL
Status: DISPENSED
Start: 2023-10-13

## (undated) RX ORDER — GABAPENTIN 300 MG/1
CAPSULE ORAL
Status: DISPENSED
Start: 2017-04-27

## (undated) RX ORDER — ACETAMINOPHEN 325 MG/1
TABLET ORAL
Status: DISPENSED
Start: 2025-06-16

## (undated) RX ORDER — HEPARIN SODIUM 1000 [USP'U]/ML
INJECTION, SOLUTION INTRAVENOUS; SUBCUTANEOUS
Status: DISPENSED
Start: 2023-09-08

## (undated) RX ORDER — HEPARIN SODIUM 1000 [USP'U]/ML
INJECTION, SOLUTION INTRAVENOUS; SUBCUTANEOUS
Status: DISPENSED
Start: 2023-10-13

## (undated) RX ORDER — EPHEDRINE SULFATE 50 MG/ML
INJECTION, SOLUTION INTRAMUSCULAR; INTRAVENOUS; SUBCUTANEOUS
Status: DISPENSED
Start: 2023-10-13

## (undated) RX ORDER — FENTANYL CITRATE 50 UG/ML
INJECTION, SOLUTION INTRAMUSCULAR; INTRAVENOUS
Status: DISPENSED
Start: 2023-02-16

## (undated) RX ORDER — HYDROMORPHONE HYDROCHLORIDE 1 MG/ML
INJECTION, SOLUTION INTRAMUSCULAR; INTRAVENOUS; SUBCUTANEOUS
Status: DISPENSED
Start: 2017-04-27

## (undated) RX ORDER — FENTANYL CITRATE 50 UG/ML
INJECTION, SOLUTION INTRAMUSCULAR; INTRAVENOUS
Status: DISPENSED
Start: 2023-09-08

## (undated) RX ORDER — ASPIRIN 325 MG
TABLET ORAL
Status: DISPENSED
Start: 2023-09-08

## (undated) RX ORDER — ACETAMINOPHEN 325 MG/1
TABLET ORAL
Status: DISPENSED
Start: 2017-04-27

## (undated) RX ORDER — LIDOCAINE HYDROCHLORIDE 20 MG/ML
JELLY TOPICAL
Status: DISPENSED
Start: 2023-02-14

## (undated) RX ORDER — GLYCOPYRROLATE 0.2 MG/ML
INJECTION INTRAMUSCULAR; INTRAVENOUS
Status: DISPENSED
Start: 2023-02-16

## (undated) RX ORDER — GABAPENTIN 100 MG/1
CAPSULE ORAL
Status: DISPENSED
Start: 2023-10-13

## (undated) RX ORDER — DEXAMETHASONE SODIUM PHOSPHATE 4 MG/ML
INJECTION, SOLUTION INTRA-ARTICULAR; INTRALESIONAL; INTRAMUSCULAR; INTRAVENOUS; SOFT TISSUE
Status: DISPENSED
Start: 2023-02-16

## (undated) RX ORDER — ACETAMINOPHEN 325 MG/1
TABLET ORAL
Status: DISPENSED
Start: 2023-10-13

## (undated) RX ORDER — POTASSIUM CHLORIDE 750 MG/1
TABLET, EXTENDED RELEASE ORAL
Status: DISPENSED
Start: 2023-09-08

## (undated) RX ORDER — HEPARIN SODIUM,PORCINE 10 UNIT/ML
VIAL (ML) INTRAVENOUS
Status: DISPENSED
Start: 2023-10-13

## (undated) RX ORDER — CIPROFLOXACIN 500 MG/1
TABLET, FILM COATED ORAL
Status: DISPENSED
Start: 2017-06-08

## (undated) RX ORDER — KETOROLAC TROMETHAMINE 30 MG/ML
INJECTION, SOLUTION INTRAMUSCULAR; INTRAVENOUS
Status: DISPENSED
Start: 2023-02-16

## (undated) RX ORDER — ONDANSETRON 2 MG/ML
INJECTION INTRAMUSCULAR; INTRAVENOUS
Status: DISPENSED
Start: 2023-02-16

## (undated) RX ORDER — PROPOFOL 10 MG/ML
INJECTION, EMULSION INTRAVENOUS
Status: DISPENSED
Start: 2025-06-16

## (undated) RX ORDER — CEFAZOLIN SODIUM/WATER 2 G/20 ML
SYRINGE (ML) INTRAVENOUS
Status: DISPENSED
Start: 2023-10-13